# Patient Record
Sex: FEMALE | Race: WHITE | NOT HISPANIC OR LATINO | Employment: OTHER | URBAN - METROPOLITAN AREA
[De-identification: names, ages, dates, MRNs, and addresses within clinical notes are randomized per-mention and may not be internally consistent; named-entity substitution may affect disease eponyms.]

---

## 2017-01-13 ENCOUNTER — TELEPHONE (OUTPATIENT)
Dept: PHARMACY | Facility: CLINIC | Age: 82
End: 2017-01-13

## 2017-01-13 NOTE — TELEPHONE ENCOUNTER
We have been unable to reach this patient for MTM follow-up after several attempts. We will stop reaching out to the patient at this time. Please let us know if we can assist in this patient's care in the future.    Routing to PCP as Jasmyne OlivarezD, [facility]  Medication Therapy Management Provider  Pager: [phone]

## 2017-01-24 ENCOUNTER — CARE COORDINATION (OUTPATIENT)
Dept: CASE MANAGEMENT | Facility: CLINIC | Age: 82
End: 2017-01-24

## 2017-01-24 NOTE — PROGRESS NOTES
Clinic Care Coordination Contact  [facility]/Voicemail    Referral Source: Home Care Discharge    Clinical Data: Care Coordinator Outreach    Outreach attempted x 1.  Left message on voicemail with call back information and requested return call.    Plan:  Care Coordinator will try to reach patient again in 3-5 business days.      JUANITA Garcia, RN, PHN  FPA Care Coordinator  [phone]  January 24, 2017

## 2017-01-30 DIAGNOSIS — M19.079 DJD (DEGENERATIVE JOINT DISEASE), ANKLE AND FOOT, UNSPECIFIED LATERALITY: Primary | ICD-10-CM

## 2017-01-30 NOTE — TELEPHONE ENCOUNTER
TRAMADOL 50MG TABLETS      Last Written Prescription Date:  NA  Last Fill Quantity: NA,   # refills: NA  Last Office Visit with [facility], [facility] or [facility] prescribing provider: 12/20/2016  Future Office visit:       Routing refill request to provider for review/approval because:  Drug not active on patient's medication list

## 2017-01-31 RX ORDER — TRAMADOL HYDROCHLORIDE 50 MG/1
TABLET ORAL
Qty: 90 TABLET | Refills: 0 | Status: SHIPPED | OUTPATIENT
Start: 2017-01-31 | End: 2017-06-12

## 2017-01-31 NOTE — TELEPHONE ENCOUNTER
Routing refill request to provider for review/approval because:  Drug not active on patient's medication list    PCP: Last rx was on 11/30/2016 for 20 tabs with no refills  Med dc'd on 12/1/2016 during medication reconciliation    Medication pended. Pharmacy requesting 90 tabs    Please review. Thank you    Mabel Mejia RN

## 2017-02-01 NOTE — PROGRESS NOTES
Clinic Care Coordination Contact  Care Team Conversations    I spoke with Opal and she said she is doing okay. Her trip to [place] was good but it rained most of the time so they stayed in and played bridge.  She said she is still feeling tired but feels it is improving.  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  Headache is relieved with Tylenol. She is exercising daily and walking the halls.    She denies questions or concerns at this time.    Plan:  No further outreach indicated at this time.  No ongoing care coordination needs identified.  I encouraged Opal to call me if she has questions, concerns or needs further assistance.      Nadeen Beaver, BS, RN, PHN  A Care Coordinator  [phone]  February 1, 2017

## 2017-02-16 DIAGNOSIS — E78.2 MIXED HYPERLIPIDEMIA: ICD-10-CM

## 2017-02-17 RX ORDER — ATORVASTATIN CALCIUM 20 MG/1
TABLET, FILM COATED ORAL
Qty: 30 TABLET | Refills: 8 | Status: SHIPPED | OUTPATIENT
Start: 2017-02-17 | End: 2017-07-31

## 2017-02-17 NOTE — TELEPHONE ENCOUNTER
atorvastatin (LIPITOR) 20 MG tablet       Last Written Prescription Date: 7/26/2016  Last Fill Quantity: 90, # refills: 3  Last Office Visit with [facility], [facility] or [facility] prescribing provider: 12/20/2016       Lab Results   Component Value Date    CHOL 137 11/29/2016     Lab Results   Component Value Date    HDL 51 11/29/2016     Lab Results   Component Value Date    LDL 56 11/29/2016     Lab Results   Component Value Date    TRIG 152 11/29/2016     Lab Results   Component Value Date    CHOLHDLRATIO 2.8 08/11/2015

## 2017-02-17 NOTE — TELEPHONE ENCOUNTER
Prescription approved per [facility] Refill Protocol.  Romy Alvarez RN

## 2017-02-28 DIAGNOSIS — M81.0 OSTEOPOROSIS: ICD-10-CM

## 2017-02-28 NOTE — TELEPHONE ENCOUNTER
Pending Prescriptions:                       Disp   Refills    alendronate (FOSAMAX) 70 MG tablet        12 tab*3            Last Written Prescription Date: 12/23/16  Last Fill Quantity: 12, # refills: 3  Last Office Visit with [facility], [facility] or [facility] prescribing provider: 12/20/16       DEXA Scan:  Last order of DX HIP/PELVIS/SPINE was found on 8/10/2016 from Hospital Encounter on 8/10/2016     No order of DX HIP/PELVIS/SPINE W LAT FRACTION ANALYSIS is found.       Creatinine   Date Value Ref Range Status   11/30/2016 0.92 0.52 - 1.04 mg/dL Final

## 2017-03-02 RX ORDER — ALENDRONATE SODIUM 70 MG/1
TABLET ORAL
Qty: 12 TABLET | Refills: 1 | Status: SHIPPED | OUTPATIENT
Start: 2017-03-02 | End: 2017-03-13

## 2017-03-13 DIAGNOSIS — M81.0 OSTEOPOROSIS: ICD-10-CM

## 2017-03-13 RX ORDER — ALENDRONATE SODIUM 35 MG/1
35 TABLET ORAL
Qty: 12 TABLET | Refills: 3 | Status: SHIPPED | OUTPATIENT
Start: 2017-03-13 | End: 2018-02-16

## 2017-03-13 NOTE — TELEPHONE ENCOUNTER
Reason for Call:  Other prescription    Detailed comments: Opal Sin is calling to change the dosage on her alendronate (FOSAMAX) 70 MG tablet prescription.       Phone Number Patient can be reached at: Home number on file [phone] (home)    Best Time: ASAP     Can we leave a detailed message on this number? YES    Call taken on 3/13/2017 at 9:43 AM by Miguelina Wallis, Patient Representative

## 2017-03-13 NOTE — TELEPHONE ENCOUNTER
I called patient and spoke with her.   She is requesting Fosamax 35 mg tablets instead of the 70 mg tablets that were sent in.  Patient was switched to Fosamax 35 mg back in December; however, a new prescription was sent in 3/2/17 for 70 mg.   Patient states she has only been taking 35 mg Fosamax. Will route to PCP to verify that she is to be taking 35 mg versus 70 mg.  Please advise. I pended medication for 35 mg.     KT [address] Dr. 12/20/16 OV:  Assessment/Plan:  Patient Instructions   (M81.0) Osteoporosis  Comment: Discussed continuating of vitamin D and calcium. Also continue Evista. For treatment of osteoporosis with bisphosphonates we will add a second agent of fosamax again 35 mg weekly and we can follow up with Gay to ensure that dose is appropriate.  Plan: raloxifene (EVISTA) 60 MG tablet, alendronate   (FOSAMAX) 35 MG tablet        alendronate (FOSAMAX) 35 MG tablet 12 tablet 3 12/20/2016  No      Sig: Take 1 tablet (35 mg) by mouth every 7 days

## 2017-03-23 DIAGNOSIS — I10 BENIGN ESSENTIAL HYPERTENSION: ICD-10-CM

## 2017-03-23 RX ORDER — AMLODIPINE BESYLATE 5 MG/1
TABLET ORAL
Qty: 90 TABLET | Refills: 0 | OUTPATIENT
Start: 2017-03-23

## 2017-03-23 NOTE — TELEPHONE ENCOUNTER
Amlodipine      Last Written Prescription Date: 12/20/2016  Last Fill Quantity: 90, # refills: 3    Last Office Visit with [facility], [facility] or [facility] prescribing provider:  12/20/2016   Future Office Visit:        BP Readings from Last 3 Encounters:   12/20/16 135/80   12/07/16 132/65   12/01/16 147/80     Refills remaining.  Filled for 1 year 12/20/2016  Jory Corrigan RN  [employer]

## 2017-04-21 DIAGNOSIS — Z79.2 PREVENTIVE ANTIBIOTIC: Primary | ICD-10-CM

## 2017-04-21 RX ORDER — AZITHROMYCIN 500 MG/1
TABLET, FILM COATED ORAL
Qty: 1 TABLET | Refills: 0 | Status: SHIPPED | OUTPATIENT
Start: 2017-04-21 | End: 2017-05-03

## 2017-04-21 NOTE — TELEPHONE ENCOUNTER
Pending Prescriptions:                       Disp   Refills    azithromycin (ZITHROMAX) 500 MG tablet [Ph*1 tabl*0        Sig: TAKE 1 TABLET BY MOUTH ONCE FOR 1 DOSE          Last Written Prescription Date:  09/22/2015  Last Fill Quantity:3,   # refills: 0  Last Office Visit with [facility], [facility] or [facility] prescribing provider: 12/20/2016  Future Office visit:       Routing refill request to provider for review/approval because:  Drug not on the [facility], [facility] or  [facility] refill protocol or controlled substance medication is also NOT CURRENT on patients med list.

## 2017-05-03 DIAGNOSIS — Z79.2 PREVENTIVE ANTIBIOTIC: ICD-10-CM

## 2017-05-03 RX ORDER — AZITHROMYCIN 500 MG/1
TABLET, FILM COATED ORAL
Qty: 1 TABLET | Refills: 0 | Status: SHIPPED | OUTPATIENT
Start: 2017-05-03 | End: 2017-08-08

## 2017-05-03 NOTE — TELEPHONE ENCOUNTER
Reason for Call:  Medication or medication refill:    Do you use a [facility]?  Name of the pharmacy and phone number for the current request:         [facility] [id number] [place] [address] [address] AT [facility] [address]    Name of the medication requested: azithromycin (ZITHROMAX) 500 MG tablet    Other request: patient is having more dental work done on Wednesday, 5/10/17.  She is requesting a refill prior to her dental work.    Can we leave a detailed message on this number? YES    Phone number patient can be reached at: Home number on file [phone] (home)    Best Time: any time    Call taken on 5/3/2017 at 8:42 AM by Carlyn Murillo  .

## 2017-05-03 NOTE — TELEPHONE ENCOUNTER
Zithromax      Last Written Prescription Date: 04/21/17  Last Fill Quantity: 1 tablet,  # refills: 0   Last Office Visit with [facility], [facility] or [facility] prescribing provider: 12/20/16                                         Next 5 appointments (look out 90 days)     Jul 28, 2017 10:00 AM CDT   [facility] Russ [facility]   [facility]    [address] [address]   [phone]                  Mary Aggarwal MA

## 2017-05-18 ENCOUNTER — TRANSFERRED RECORDS (OUTPATIENT)
Dept: HEALTH INFORMATION MANAGEMENT | Facility: CLINIC | Age: 82
End: 2017-05-18

## 2017-06-12 DIAGNOSIS — M19.079 DJD (DEGENERATIVE JOINT DISEASE), ANKLE AND FOOT, UNSPECIFIED LATERALITY: ICD-10-CM

## 2017-06-12 NOTE — TELEPHONE ENCOUNTER
Controlled Substance Refill Request for traMADol (ULTRAM) 50 MG tablet    Problem List Complete:  Yes    Last Written Prescription Date:  1/31/2017  Last Fill Quantity: 90,   # refills: 0    Last Office Visit with [facility] primary care provider: 12/20/2016    Clinic visit frequency required: Q 6  months     Future Office visit:   Next 5 appointments (look out 90 days)     Jul 28, 2017 10:00 AM CDT   PHYSICAL with Damian Russ MD   [facility]    [address] [id number] [phone]                  Controlled substance agreement on file: No.     [facility]  [facility]   checked in past 6 months?  No, route to RN

## 2017-06-13 RX ORDER — TRAMADOL HYDROCHLORIDE 50 MG/1
TABLET ORAL
Qty: 90 TABLET | Refills: 0 | Status: SHIPPED | OUTPATIENT
Start: 2017-06-13 | End: 2017-07-31

## 2017-07-14 ENCOUNTER — DOCUMENTATION ONLY (OUTPATIENT)
Dept: LAB | Facility: CLINIC | Age: 82
End: 2017-07-14

## 2017-07-14 DIAGNOSIS — Z00.00 ROUTINE GENERAL MEDICAL EXAMINATION AT A HEALTH CARE FACILITY: Primary | ICD-10-CM

## 2017-07-14 NOTE — PROGRESS NOTES
Please review, associate diagnosis, and sign pending pre physical lab orders for patient's upcoming 7/21/17 lab appointment.     Thank you,  Lab

## 2017-07-26 ENCOUNTER — TELEPHONE (OUTPATIENT)
Dept: FAMILY MEDICINE | Facility: CLINIC | Age: 82
End: 2017-07-26

## 2017-07-26 DIAGNOSIS — Z00.00 ROUTINE GENERAL MEDICAL EXAMINATION AT A HEALTH CARE FACILITY: ICD-10-CM

## 2017-07-26 LAB
AMPHETAMINES UR QL: NORMAL NG/ML
ANION GAP SERPL CALCULATED.3IONS-SCNC: 9 MMOL/L (ref 3–14)
BARBITURATES UR QL SCN: NORMAL NG/ML
BENZODIAZ UR QL SCN: NORMAL NG/ML
BUN SERPL-MCNC: 22 MG/DL (ref 7–30)
BUPRENORPHINE UR QL: NORMAL NG/ML
CALCIUM SERPL-MCNC: 8.8 MG/DL (ref 8.5–10.1)
CANNABINOIDS UR QL: NORMAL NG/ML
CHLORIDE SERPL-SCNC: 105 MMOL/L (ref 94–109)
CHOLEST SERPL-MCNC: 134 MG/DL
CO2 SERPL-SCNC: 22 MMOL/L (ref 20–32)
COCAINE UR QL SCN: NORMAL NG/ML
CREAT SERPL-MCNC: 0.91 MG/DL (ref 0.52–1.04)
CREAT UR-MCNC: 114 MG/DL
D-METHAMPHET UR QL: NORMAL NG/ML
GFR SERPL CREATININE-BSD FRML MDRD: 59 ML/MIN/1.7M2
GLUCOSE SERPL-MCNC: 92 MG/DL (ref 70–99)
HDLC SERPL-MCNC: 65 MG/DL
LDLC SERPL CALC-MCNC: 53 MG/DL
METHADONE UR QL SCN: NORMAL NG/ML
MICROALBUMIN UR-MCNC: 503 MG/L
MICROALBUMIN/CREAT UR: 441.23 MG/G CR (ref 0–25)
NONHDLC SERPL-MCNC: 69 MG/DL
OPIATES UR QL SCN: NORMAL NG/ML
OXYCODONE UR QL SCN: NORMAL NG/ML
PCP UR QL SCN: NORMAL NG/ML
POTASSIUM SERPL-SCNC: 4.2 MMOL/L (ref 3.4–5.3)
PROPOXYPH UR QL: NORMAL NG/ML
SODIUM SERPL-SCNC: 136 MMOL/L (ref 133–144)
TRICYCLICS UR QL SCN: NORMAL NG/ML
TRIGL SERPL-MCNC: 81 MG/DL

## 2017-07-26 PROCEDURE — 36415 COLL VENOUS BLD VENIPUNCTURE: CPT | Performed by: NURSE PRACTITIONER

## 2017-07-26 PROCEDURE — 80061 LIPID PANEL: CPT | Performed by: NURSE PRACTITIONER

## 2017-07-26 PROCEDURE — 80048 BASIC METABOLIC PNL TOTAL CA: CPT | Performed by: NURSE PRACTITIONER

## 2017-07-26 PROCEDURE — 82043 UR ALBUMIN QUANTITATIVE: CPT | Performed by: NURSE PRACTITIONER

## 2017-07-26 PROCEDURE — 80306 DRUG TEST PRSMV INSTRMNT: CPT | Performed by: NURSE PRACTITIONER

## 2017-07-26 NOTE — TELEPHONE ENCOUNTER
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  Itched yesterday but woke up this morning with 14 raised red bumps.   Itched at first but now fluid filled pustules.   Some pain.  Tried Cortisone cream.  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  Please advise.  I did recommended UC this evening.  Betina Lott, RN

## 2017-07-26 NOTE — TELEPHONE ENCOUNTER
Reason for Call:  Other was in today    Detailed comments: had blood work and noticed she has  14 little red spots on breast. Today they are pustles and  She thinks they are shingles. They itch and hurt.    Phone Number Patient can be reached at: Home number on file [phone] (home)    Best Time: any    Can we leave a detailed message on this number? YES    Call taken on 7/26/2017 at 4:40 PM by Violet Garcia

## 2017-07-27 ENCOUNTER — OFFICE VISIT (OUTPATIENT)
Dept: FAMILY MEDICINE | Facility: CLINIC | Age: 82
End: 2017-07-27
Payer: COMMERCIAL

## 2017-07-27 VITALS
BODY MASS INDEX: 24.29 KG/M2 | SYSTOLIC BLOOD PRESSURE: 139 MMHG | HEART RATE: 59 BPM | HEIGHT: 62 IN | WEIGHT: 132 LBS | DIASTOLIC BLOOD PRESSURE: 75 MMHG | OXYGEN SATURATION: 97 %

## 2017-07-27 DIAGNOSIS — B02.9 HERPES ZOSTER WITHOUT COMPLICATION: Primary | ICD-10-CM

## 2017-07-27 PROCEDURE — 99213 OFFICE O/P EST LOW 20 MIN: CPT | Performed by: NURSE PRACTITIONER

## 2017-07-27 RX ORDER — VALACYCLOVIR HYDROCHLORIDE 1 G/1
1000 TABLET, FILM COATED ORAL 3 TIMES DAILY
Qty: 21 TABLET | Refills: 0 | Status: SHIPPED | OUTPATIENT
Start: 2017-07-27 | End: 2017-08-08

## 2017-07-27 NOTE — TELEPHONE ENCOUNTER
Its a bit difficult to treat over the phone - I would suggest a team office visit today if it is at all possible for her to be seen.    Damian Russ MD

## 2017-07-27 NOTE — TELEPHONE ENCOUNTER
I have scheduled patient with JESSIE Campo at 11:00 am today.  She is appreciative.  Marge Nolasco RN

## 2017-07-27 NOTE — MR AVS SNAPSHOT
After Visit Summary   7/27/2017    Opal Sin    MRN: [id number]           Patient Information     Date Of Birth          1935        Visit Information        Provider Department      7/27/2017 11:00 AM Aurora Robledo APRN [facility]        Today's Diagnoses     Herpes zoster without complication    -  1      Care Instructions    Take the valtrex as prescribed     Shingles  Shingles is a viral infection caused by the same virus as chicken pox. Anyone who has had chicken pox may get shingles later in life. The virus stays in the body, but remains dormant (asleep). Shingles often occurs in older persons or persons with lowered immunity. But it can affect anyone at any age.  Shingles starts as a tingling patch of skin on one side of the body. Small, painful blisters may then appear. The rash does not spread to the rest of the body.  Exposure to shingles cannot cause shingles. However, it can cause chicken pox in anyone who has not had chicken pox or has not been vaccinated. The contagious period ends when all blisters have crusted over (generally about 2 weeks after the illness begins).  After the blisters heal, the affected skin may be sensitive or painful for months (neuralgia). This often gradually goes away.  A shingles vaccine is available. This can help prevent shingles or make it less painful. It is generally recommended for adults over the age of 60 who have had chicken pox in the past, but who have never had shingles. Adults over 60 who have had neither chicken pox nor shingles can prevent both diseases with the chicken pox vaccine. Ask your healthcare provider about these vaccines.  Home care    Medicines may be prescribed to help relieve pain. Take these medicines as directed. Ask your healthcare provider or pharmacist before using over-the-counter medicines for helping treat pain and itching.    In certain cases, antiviral medicines may be prescribed to reduce  pain, shorten the illness, and prevent neuralgia. Take these medicines as directed.    Compresses made from a solution of cool water mixed with cornstarch or baking soda may help relieve pain and itching.     Gently wash skin daily with soap and water to help prevent infection.  Be certain to rinse off all of the soap, which can be irritating.    Trim fingernails and try not to scratch. Scratching the sores may leave scars.    Stay home from work or school until all blisters have formed a crust and you are no longer contagious.  Follow-up care  Follow up with your healthcare provider or as directed by our staff.  When to seek medical advice    Fever of 100.4 F (38 C) or higher, or as directed by your healthcare provider    Affected skin is on the face or neck and any of the following occur:    Headache    Eye pain    Changes in vision    Sores near the eye    Weakness of facial muscles    Pain, redness, or swelling of a joint    Signs of skin infection: colored drainage from the sores, warmth, increasing redness, or increasing pain  Date Last Reviewed: 9/25/2015 2000-2017 [employer]. [address] [place] [address]. All rights reserved. This information is not intended as a substitute for professional medical care. Always follow your healthcare professional's instructions.        Shingles (Herpes Zoster)     Talk to your healthcare provider about the shingles vaccine.     Shingles is also called herpes zoster. It is a painful skin rash caused by the herpes zoster virus. This is the same virus that causes chickenpox. After a person has chickenpox, the virus remains inactive in the nerve cells. Years later, the virus can become active again and travel to the skin. Most people have shingles only once, but it is possible to have it more than once.  What are the risk factors for shingles?  Anyone who has ever had chickenpox can develop shingles. But your risk is greater if you:    Are 50 years  of age or older    Have an illness that weakens your immune system, such as HIV/AIDS    Have cancer, especially Hodgkin disease or lymphoma    Take medicines that weaken your immune system  What are the symptoms of shingles?    The first sign of shingles is usually pain, burning, tingling, or itching on one part of your face or body. You may also feel as if you have the flu, with fever and chills.    A red rash with small blisters appears within a few days. The rash may appear as follows:     The blisters can occur anywhere, but they re most common on the back, chest, or abdomen.    They usually appear on only one side of the body, spreading along the nerve pathway where the virus was inactive.     The rash can also form around an eye, along one side of the face or neck, or in the mouth.    In a few people, usually those with weakened immune systems, shingles appear on more than one part of the body at once.    After a few days, the blisters become dry and form a crust. The crust falls off in days to weeks. The blisters generally do not leave scars.  How is shingles treated?  For most people, shingles heals on its own in a few weeks. But treatment is recommended to help relieve pain, speed healing, and reduce the risk of complications. Antiviral medicines are prescribed within the first 72 hours of the appearance of the rash. To lessen symptoms:    Apply ice packs (wrapped in a thin towel) or cool compresses, or soak in a cool bath.    Use calamine lotion to calm itchy skin.    Ask your healthcare provider about over-the-counter pain relievers. If your pain is severe, your healthcare provider may prescribe stronger pain medicines.  What are the complications of shingles?  Shingles often goes away with no lasting effects. But some people have serious problems long after the blisters have healed:    Postherpetic neuralgia. This is the most common complication. It is severe nerve pain at the place where the rash used  to be. It can last for months, or even years after you have had shingles. Medicines can be prescribed to help relieve the pain and improve quality of life.    Bacterial infection. Shingles blisters may become infected with bacteria. Antibiotic medicine is used to treat the infection.    Eye problems. A person with shingles on the face should see his or her healthcare provider right away. Shingles can cause serious problems with vision, and even blindness.  Very rarely shingles can also lead to pneumonia, hearing problems, brain inflammation, or even death.   When to seek medical care  Contact your healthcare provider if you experience any of the following:    Symptoms that don t go away with treatment    A rash or blisters near your eye    Increased drainage, fever, or rash after treatment, or severe pain that doesn t go away   How can shingles be prevented?  You can only get shingles if you have had chicken pox in the past. Those who have never had chickenpox can get the virus from you. Although instead of developing shingles, the person may get chickenpox. Until your blisters form scabs, avoid contact with others, especially the following:    Pregnant women who have never had chickenpox or the vaccine    Infants who were born early (prematurely) or who had low weight at birth    People with weak immune system (for example, people receiving chemotherapy for cancer, people who have had organ transplants, or people with HIV infections)     The shingles vaccine  If you re 60 years of age or older, ask your healthcare provider if you should receive the shingles vaccine. The vaccine makes it less likely that you will develop shingles. If you do develop shingles, your symptoms will likely be milder than if you hadn t been vaccinated. Note: Although the vaccine is licensed for people 50 years of age or older, the CDC does not recommend the vaccine for those who are 50 to 59 years old.   Date Last Reviewed: 10/1/2016     "9479-9568 [employer]. [address] [place] [address]. All rights reserved. This information is not intended as a substitute for professional medical care. Always follow your healthcare professional's instructions.                Follow-ups after your visit        Your next 10 appointments already scheduled     Jul 31, 2017  9:30 AM CDT   [facility] Russ MD   [facility]    [address] [facility] [address] [phone]              Who to contact     If you have questions or need follow up information about today's clinic visit or your schedule please contact [facility] directly at [phone].  Normal or non-critical lab and imaging results will be communicated to you by [employer], letter or phone within 4 business days after the clinic has received the results. If you do not hear from us within 7 days, please contact the clinic through [employer] or phone. If you have a critical or abnormal lab result, we will notify you by phone as soon as possible.  Submit refill requests through [employer] or call your pharmacy and they will forward the refill request to us. Please allow 3 business days for your refill to be completed.          Additional Information About Your Visit        [employer] Information     [employer] gives you secure access to your electronic health record. If you see a primary care provider, you can also send messages to your care team and make appointments. If you have questions, please call your primary care clinic.  If you do not have a primary care provider, please call [phone] and they will assist you.        Care EveryWhere ID     This is your Care EveryWhere ID. This could be used by other organizations to access your [facility] medical records  [id number]        Your Vitals Were     Pulse Height Pulse Oximetry Breastfeeding? BMI (Body Mass Index)       59 5' 2\" (1.575 m) 97% No 24.14 kg/m2     "    Blood Pressure from Last 3 Encounters:   07/27/17 139/75   12/20/16 135/80   12/07/16 132/65    Weight from Last 3 Encounters:   07/27/17 132 lb (59.9 kg)   12/20/16 135 lb 9.6 oz (61.5 kg)   12/07/16 135 lb (61.2 kg)              Today, you had the following     No orders found for display         Today's Medication Changes          These changes are accurate as of: 7/27/17 11:18 AM.  If you have any questions, ask your nurse or doctor.               Start taking these medicines.        Dose/Directions    valACYclovir 1000 mg tablet   Commonly known as:  VALTREX   Used for:  Herpes zoster without complication   Started by:  Aurora Robledo APRN CNP        Dose:  1000 mg   Take 1 tablet (1,000 mg) by mouth 3 times daily   Quantity:  21 tablet   Refills:  0            Where to get your medicines      These medications were sent to [facility], [place] - [address], [address]  [address], [address], [facility] [address]     Phone:  [phone]     valACYclovir 1000 mg tablet                Primary Care Provider Office Phone # Fax #    Damian Russ [phone]       [facility] [address]  [facility] [address]        Equal Access to Services     SANG SARAVIA AH: Hadii aad ku hadasho Soomaali, waaxda luqadaha, qaybta kaalmada adeegyada, juan pablo cortez haythad palacio. So [facility] [phone].    ATENCIÓN: Si habla español, tiene a rangel disposición servicios gratuitos de asistencia lingüística. Llame al [phone].    We comply with applicable federal civil rights laws and [place] laws. We do not discriminate on the basis of race, color, national origin, age, disability sex, sexual orientation or gender identity.            Thank you!     Thank you for choosing [facility]  for your care. Our goal is always to provide you with excellent care. Hearing back from our patients is one way we can continue to improve our  services. Please take a few minutes to complete the written survey that you may receive in the mail after your visit with us. Thank you!             Your Updated Medication List - Protect others around you: Learn how to safely use, store and throw away your medicines at www.disposemymeds.org.          This list is accurate as of: 7/27/17 11:18 AM.  Always use your most recent med list.                   Brand Name Dispense Instructions for use Diagnosis    acetaminophen 325 MG tablet    TYLENOL     Take 650 mg by mouth every 4 hours as needed for mild pain        acetaminophen-codeine 300-30 MG per tablet    TYLENOL #3    18 tablet    Take 1 tablet by mouth every 4 hours as needed for pain maximum 2 tablet(s) per day    Hip pain, left       alendronate 35 MG tablet    FOSAMAX    12 tablet    Take 1 tablet (35 mg) by mouth every 7 days    Osteoporosis       amLODIPine 5 MG tablet    NORVASC    90 tablet    Take 1 tablet (5 mg) by mouth daily    Benign essential hypertension       aspirin 81 MG EC tablet      Take 1 tablet (81 mg) by mouth daily    Transient cerebral ischemia, unspecified type       atorvastatin 20 MG tablet    LIPITOR    30 tablet    TAKE 1 TABLET BY MOUTH EVERY DAY    Mixed hyperlipidemia       azithromycin 500 MG tablet    ZITHROMAX    1 tablet    TAKE 1 TABLET BY MOUTH ONCE FOR 1 DOSE    Preventive antibiotic       Calcium-Magnesium 300-300 MG Tabs      Take 1 tablet by mouth daily        carbidopa-levodopa  MG per tablet    SINEMET     Take 1 tablet by mouth 4 times daily        carvedilol 6.25 MG tablet    COREG    180 tablet    Take 1 tablet (6.25 mg) by mouth 2 times daily (with meals)    Essential hypertension with goal blood pressure less than 140/90       losartan 50 MG tablet    COZAAR    90 tablet    Take 1 tablet (50 mg) by mouth daily    Essential hypertension with goal blood pressure less than 140/90       raloxifene 60 MG tablet    Evista    90 tablet    Take 1 tablet (60 mg) by  mouth daily    Osteoporosis       sodium chloride 0.65 % nasal spray    OCEAN     Spray 1 spray into both nostrils every 4 hours (while awake)        traMADol 50 MG tablet    ULTRAM    90 tablet    TAKE ONE TABLET BY MOUTH EVERY 6 HOURS AS NEEDED    DJD (degenerative joint disease), ankle and foot, unspecified laterality       valACYclovir 1000 mg tablet    VALTREX    21 tablet    Take 1 tablet (1,000 mg) by mouth 3 times daily    Herpes zoster without complication       VITAMIN B 12 PO      Take 100 mcg by mouth daily        VITAMIN D3 PO      Take 400 Units by mouth 2 times daily

## 2017-07-28 NOTE — PROGRESS NOTES
Cristhian Joseph,    I had the opportunity to review your recent labs and a summary of your labs reads as follows:  Your basic metabolic panel shows stable renal function  Your fasting lipid panel show  - normal HDL (good) cholesterol -as your goal is greater than 40  - low LDL (bad) cholesterol as your goal is less than 100  - normal triglyceride levels        Sincerely,  Damian Russ MD

## 2017-07-28 NOTE — PROGRESS NOTES
SUBJECTIVE:   Opal Sin is a 82 year old female who presents for Preventive Visit.      Are you in the first 12 months of your Medicare Part B coverage?  No    Healthy Habits:    Do you get at least three servings of calcium containing foods daily (dairy, green leafy vegetables, etc.)? yes    Amount of exercise or daily activities, outside of work: 5 day(s) per week- pt walks everyday    Problems taking medications regularly No    Medication side effects: No    Have you had an eye exam in the past two years? yes    Do you see a dentist twice per year? yes    Do you have sleep apnea, excessive snoring or daytime drowsiness?yes- sides effects from her parkinsons    COGNITIVE SCREEN  1) Repeat 3 items (Banana, Sunrise, Chair)    2) Clock draw: NORMAL  3) 3 item recall: Recalls 3 objects  Results: 3 items recalled: COGNITIVE IMPAIRMENT LESS LIKELY    Mini[employer]CogTM Copyright S My. Licensed by the author for use in [facility]; reprinted with permission ([email or web address]). All rights reserved.                Reviewed and updated as needed this visit by clinical staff         Reviewed and updated as needed this visit by Provider      Social History   Substance Use Topics     Smoking status: Never Smoker     Smokeless tobacco: Never Used     Alcohol use 0.0 oz/week     0 Standard drinks or equivalent per week      Comment: rare         Today's PHQ-2 Score:   PHQ-2 ( 1999 Pfizer) 7/27/2017 8/22/2016   Q1: Little interest or pleasure in doing things 0 0   Q2: Feeling down, depressed or hopeless 0 0   PHQ-2 Score 0 0   Q1: Little interest or pleasure in doing things - -   Q2: Feeling down, depressed or hopeless - -   PHQ-2 Score - -         Do you feel safe in your environment -yes    Do you have a Health Care Directive?: Yes: Advance Directive has been received and scanned.    Current providers sharing in care for this patient include:   Patient Care Team:  Damian Russ MD as PCP - General  (Internal Medicine)  Nadeen Beaver, RN as       Hearing impairment: Yes, wears hearing aids    Ability to successfully perform activities of daily living: Yes, no assistance needed     Fall risk:         Home safety:  none identified      The following health maintenance items are reviewed in Epic and correct as of today:Health Maintenance   Topic Date Due     COLONOSCOPY Q10 YR  05/09/1955     ASTHMA CONTROL TEST Q6 MOS  01/26/2017     PHQ-9 Q6 MONTHS  06/20/2017     FALL RISK ASSESSMENT  07/26/2017     MAMMO Q1 YR  08/10/2017     INFLUENZA VACCINE (SYSTEM ASSIGNED)  09/01/2017     HEMOGLOBIN Q1 YR  11/30/2017     ASTHMA ACTION PLAN Q1 YR  12/20/2017     FLORINDA QUESTIONNAIRE 1 YEAR  12/20/2017     DEPRESSION ACTION PLAN Q1 YR  12/20/2017     BMP Q6 MOS  01/26/2018     LIPID MONITORING Q1 YEAR  07/26/2018     MICROALBUMIN Q1 YEAR  07/26/2018     URINE DRUG SCREEN Q1 YR  07/26/2018     DEXA Q2 YR  08/10/2018     ADVANCE DIRECTIVE PLANNING Q5 YRS  11/11/2019     TETANUS IMMUNIZATION (SYSTEM ASSIGNED)  09/25/2022     PNEUMOCOCCAL  Completed        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  She is contemplating this still.  For pain medications she has been taking tramadol.  Essential hypertension with goal blood pressure less than 140/90   Blood pressure is well controlled with carvedilol, amlodipine and losartan  Malignant neoplasm of left female breast, unspecified estrogen receptor status, unspecified site of breast (H)   Due for follow mammogram   Cerebral aneurysm, nonruptured   Has had follow up in neurology - no issues - goal blood pressure 130/80  CKD (chronic kidney disease) stage 3, GFR 30-59 ml/min   Labs recently rechecked and stable  Gastroesophageal reflux disease without esophagitis   Doing well with omeprazole  Hyponatremia    Last sodium recheck was within normal limits  "      ROS:  Constitutional, HEENT, cardiovascular, pulmonary, GI, , musculoskeletal, neuro, skin, endocrine and psych systems are negative, except as otherwise noted.      OBJECTIVE:   /84 Estimated body mass index is 24.14 kg/(m^2) as calculated from the following:    Height as of 7/27/17: 5' 2\" (1.575 m).    Weight as of 7/27/17: 132 lb (59.9 kg).  EXAM:   GENERAL: healthy, alert and no distress  EYES: Eyes grossly normal to inspection, PERRL and conjunctivae and sclerae normal  HENT: ear canals and TM's normal, nose and mouth without ulcers or lesions  NECK: no adenopathy, no asymmetry, masses, or scars and thyroid normal to palpation  RESP: lungs clear to auscultation - no rales, rhonchi or wheezes  CV: regular rate and rhythm, normal S1 S2, no S3 or S4, no murmur, click or rub, no peripheral edema an  ABDOMEN: soft, nontender, no hepatosplenomegaly, no masses and bowel sounds normal  MS: no gross musculoskeletal defects noted, no edema  SKIN: rash on right breast  NEURO: tremor noted, gait was antalgic - using a cane  PSYCH: mentation appears normal, affect normal/bright    ASSESSMENT / PLAN:     (I10) Benign essential hypertension  (primary encounter diagnosis)  Comment: blood pressure is well controlled with current medications carvedilol, losartan and amlodipine  Plan:     (M19.439) DJD (degenerative joint disease), ankle and foot, unspecified laterality  Comment:  For foot ankle and hip pain I would recommend continuing the tramadol.  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  Comment: aneurysm has been coiled - follow up with interventional radiology   Plan:     (N18.3) CKD (chronic kidney disease) stage 3, GFR 30-59 ml/min  Comment: labs were reviewed and all within normal limits   Plan:     (I10) Essential hypertension, benign  Comment: blood pressure excellent  Plan:     (K21.9) Gastroesophageal reflux disease without esophagitis  Comment: continue on ranitidine  Plan:     (E87.1) Hyponatremia  Comment: sodium rechecked and within normal limits   Plan:     (G20) Parkinson's disease (H)  Comment: Doing well with current dose of sinemet  Plan:            End of Life Planning:  Patient currently has an advanced directive: DNR/DNI.  Practitioner is supportive of decision.    COUNSELING:        Estimated body mass index is 24.14 kg/(m^2) as calculated from the following:    Height as of 7/27/17: 5' 2\" (1.575 m).    Weight as of 7/27/17: 132 lb (59.9 kg).     reports that she has never smoked. She has never used smokeless tobacco.        Appropriate preventive services were discussed with this patient, including applicable screening as appropriate for cardiovascular disease, diabetes, osteopenia/osteoporosis, and glaucoma.  As appropriate for age/gender, discussed screening for colorectal cancer, prostate cancer, breast cancer, and cervical cancer. Checklist reviewing preventive services available has been given to the patient.    Reviewed patients plan of care and provided an AVS. The Basic Care Plan (routine screening as documented in Health Maintenance) for Opal meets the Care Plan requirement. This Care Plan has been established and reviewed with the Patient.    Counseling Resources:  ATP IV Guidelines  Pooled Cohorts Equation Calculator  Breast Cancer Risk Calculator  FRAX Risk Assessment  ICSI Preventive Guidelines  Dietary Guidelines for Americans, 2010  USDA's MyPlate  ASA Prophylaxis  Lung CA " Screening    Damian Russ MD, MD  [facility]

## 2017-07-28 NOTE — PATIENT INSTRUCTIONS
Preventive Health Recommendations    Female Ages 65 +    Yearly exam:     See your health care provider every year in order to  o Review health changes.   o Discuss preventive care.    o Review your medicines if your doctor has prescribed any.      You no longer need a yearly Pap test unless you've had an abnormal Pap test in the past 10 years. If you have vaginal symptoms, such as bleeding or discharge, be sure to talk with your provider about a Pap test.      Every 1 to 2 years, have a mammogram.  If you are over 69, talk with your health care provider about whether or not you want to continue having screening mammograms.      Every 10 years, have a colonoscopy. Or, have a yearly FIT test (stool test). These exams will check for colon cancer.       Have a cholesterol test every 5 years, or more often if your doctor advises it.       Have a diabetes test (fasting glucose) every three years. If you are at risk for diabetes, you should have this test more often.       At age 65, have a bone density scan (DEXA) to check for osteoporosis (brittle bone disease).    Shots:    Get a flu shot each year.    Get a tetanus shot every 10 years.    Talk to your doctor about your pneumonia vaccines. There are now two you should receive - Pneumovax (PPSV 23) and Prevnar (PCV 13).    Talk to your doctor about the shingles vaccine.    Talk to your doctor about the hepatitis B vaccine.    Nutrition:     Eat at least 5 servings of fruits and vegetables each day.      Eat whole-grain bread, whole-wheat pasta and brown rice instead of white grains and rice.      Talk to your provider about Calcium and Vitamin D.     Lifestyle    Exercise at least 150 minutes a week (30 minutes a day, 5 days a week). This will help you control your weight and prevent disease.      Limit alcohol to one drink per day.      No smoking.       Wear sunscreen to prevent skin cancer.       See your dentist twice a year for an exam and cleaning.      See your  eye doctor every 1 to 2 years to screen for conditions such as glaucoma, macular degeneration and cataracts.    (I10) Benign essential hypertension  (primary encounter diagnosis)  Comment: blood pressure is well controlled with current medications carvedilol, losartan and amlodipine  Plan:     (M19.079) DJD (degenerative joint disease), ankle and foot, unspecified laterality  Comment:  For foot ankle and hip pain I would recommend continuing the tramadol.  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

## 2017-07-31 ENCOUNTER — OFFICE VISIT (OUTPATIENT)
Dept: FAMILY MEDICINE | Facility: CLINIC | Age: 82
End: 2017-07-31
Payer: COMMERCIAL

## 2017-07-31 VITALS — DIASTOLIC BLOOD PRESSURE: 84 MMHG | SYSTOLIC BLOOD PRESSURE: 128 MMHG

## 2017-07-31 DIAGNOSIS — M19.079 DJD (DEGENERATIVE JOINT DISEASE), ANKLE AND FOOT, UNSPECIFIED LATERALITY: ICD-10-CM

## 2017-07-31 DIAGNOSIS — Z00.00 ROUTINE GENERAL MEDICAL EXAMINATION AT A HEALTH CARE FACILITY: Primary | ICD-10-CM

## 2017-07-31 DIAGNOSIS — N18.30 CKD (CHRONIC KIDNEY DISEASE) STAGE 3, GFR 30-59 ML/MIN (H): ICD-10-CM

## 2017-07-31 DIAGNOSIS — E87.1 HYPONATREMIA: ICD-10-CM

## 2017-07-31 DIAGNOSIS — I10 ESSENTIAL HYPERTENSION WITH GOAL BLOOD PRESSURE LESS THAN 140/90: ICD-10-CM

## 2017-07-31 DIAGNOSIS — I10 BENIGN ESSENTIAL HYPERTENSION: ICD-10-CM

## 2017-07-31 DIAGNOSIS — K21.9 GASTROESOPHAGEAL REFLUX DISEASE WITHOUT ESOPHAGITIS: ICD-10-CM

## 2017-07-31 DIAGNOSIS — I67.1 CEREBRAL ANEURYSM, NONRUPTURED: ICD-10-CM

## 2017-07-31 DIAGNOSIS — I10 ESSENTIAL HYPERTENSION, BENIGN: ICD-10-CM

## 2017-07-31 DIAGNOSIS — E78.2 MIXED HYPERLIPIDEMIA: ICD-10-CM

## 2017-07-31 DIAGNOSIS — C50.912 MALIGNANT NEOPLASM OF LEFT FEMALE BREAST, UNSPECIFIED ESTROGEN RECEPTOR STATUS, UNSPECIFIED SITE OF BREAST (H): ICD-10-CM

## 2017-07-31 DIAGNOSIS — G20.A1 PARKINSON'S DISEASE (H): ICD-10-CM

## 2017-07-31 DIAGNOSIS — I67.1 CEREBRAL ANEURYSM: ICD-10-CM

## 2017-07-31 PROCEDURE — 99397 PER PM REEVAL EST PAT 65+ YR: CPT | Performed by: INTERNAL MEDICINE

## 2017-07-31 RX ORDER — LOSARTAN POTASSIUM 50 MG/1
50 TABLET ORAL DAILY
Qty: 90 TABLET | Refills: 3 | Status: SHIPPED | OUTPATIENT
Start: 2017-07-31 | End: 2018-09-08

## 2017-07-31 RX ORDER — ATORVASTATIN CALCIUM 20 MG/1
20 TABLET, FILM COATED ORAL DAILY
Qty: 90 TABLET | Refills: 3 | Status: SHIPPED | OUTPATIENT
Start: 2017-07-31 | End: 2018-08-21

## 2017-07-31 RX ORDER — TRAMADOL HYDROCHLORIDE 50 MG/1
50 TABLET ORAL EVERY 6 HOURS PRN
Qty: 90 TABLET | Refills: 0 | Status: ON HOLD | OUTPATIENT
Start: 2017-07-31 | End: 2018-06-29

## 2017-07-31 RX ORDER — CARVEDILOL 6.25 MG/1
6.25 TABLET ORAL 2 TIMES DAILY WITH MEALS
Qty: 180 TABLET | Refills: 3 | Status: SHIPPED | OUTPATIENT
Start: 2017-07-31 | End: 2018-08-26

## 2017-07-31 NOTE — MR AVS SNAPSHOT
After Visit Summary   7/31/2017    Opal Sin    MRN: [id number]           Patient Information     Date Of Birth          1935        Visit Information        Provider Department      7/31/2017 9:30 AM Damian Russ MD [facility]        Today's Diagnoses     Benign essential hypertension    -  1    DJD (degenerative joint disease), ankle and foot, unspecified laterality        Essential hypertension with goal blood pressure less than 140/90        Malignant neoplasm of left female breast, unspecified estrogen receptor status, unspecified site of breast (H)        Carotid aneurysm non ruptured        CKD (chronic kidney disease) stage 3, GFR 30-59 ml/min        Essential hypertension, benign        Gastroesophageal reflux disease without esophagitis        Hyponatremia        Parkinson's disease (H)        Cerebral aneurysm          Care Instructions      Preventive Health Recommendations    Female Ages 65 +    Yearly exam:     See your health care provider every year in order to  o Review health changes.   o Discuss preventive care.    o Review your medicines if your doctor has prescribed any.      You no longer need a yearly Pap test unless you've had an abnormal Pap test in the past 10 years. If you have vaginal symptoms, such as bleeding or discharge, be sure to talk with your provider about a Pap test.      Every 1 to 2 years, have a mammogram.  If you are over 69, talk with your health care provider about whether or not you want to continue having screening mammograms.      Every 10 years, have a colonoscopy. Or, have a yearly FIT test (stool test). These exams will check for colon cancer.       Have a cholesterol test every 5 years, or more often if your doctor advises it.       Have a diabetes test (fasting glucose) every three years. If you are at risk for diabetes, you should have this test more often.       At age 65, have a bone density scan (DEXA) to  check for osteoporosis (brittle bone disease).    Shots:    Get a flu shot each year.    Get a tetanus shot every 10 years.    Talk to your doctor about your pneumonia vaccines. There are now two you should receive - Pneumovax (PPSV 23) and Prevnar (PCV 13).    Talk to your doctor about the shingles vaccine.    Talk to your doctor about the hepatitis B vaccine.    Nutrition:     Eat at least 5 servings of fruits and vegetables each day.      Eat whole-grain bread, whole-wheat pasta and brown rice instead of white grains and rice.      Talk to your provider about Calcium and Vitamin D.     Lifestyle    Exercise at least 150 minutes a week (30 minutes a day, 5 days a week). This will help you control your weight and prevent disease.      Limit alcohol to one drink per day.      No smoking.       Wear sunscreen to prevent skin cancer.       See your dentist twice a year for an exam and cleaning.      See your eye doctor every 1 to 2 years to screen for conditions such as glaucoma, macular degeneration and cataracts.    (I10) Benign essential hypertension  (primary encounter diagnosis)  Comment: blood pressure is well controlled with current medications carvedilol, losartan and amlodipine  Plan:     (M19.079) DJD (degenerative joint disease), ankle and foot, unspecified laterality  Comment:  For foot ankle and hip pain I would recommend continuing the tramadol.  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  Comment: aneurysm  has been coiled - follow up with interventional radiology   Plan:     (N18.3) CKD (chronic kidney disease) stage 3, GFR 30-59 ml/min  Comment: labs were reviewed and all within normal limits   Plan:     (I10) Essential hypertension, benign  Comment: blood pressure excellent  Plan:     (K21.9) Gastroesophageal reflux disease without esophagitis  Comment: continue on ranitidine  Plan:     (E87.1) Hyponatremia  Comment: sodium rechecked and within normal limits   Plan:     (G20) Parkinson's disease (H)  Comment: Doing well with current dose of sinemet  Plan:              Follow-ups after your visit        Who to contact     If you have questions or need follow up information about today's clinic visit or your schedule please contact [facility] directly at [phone].  Normal or non-critical lab and imaging results will be communicated to you by MyChart, letter or phone within 4 business days after the clinic has received the results. If you do not hear from us within 7 days, please contact the clinic through [employer] or phone. If you have a critical or abnormal lab result, we will notify you by phone as soon as possible.  Submit refill requests through [employer] or call your pharmacy and they will forward the refill request to us. Please allow 3 business days for your refill to be completed.          Additional Information About Your Visit        [employer] Information     [employer] gives you secure access to your electronic health record. If you see a primary care provider, you can also send messages to your care team and make appointments. If you have questions, please call your primary care clinic.  If you do not have a primary care provider, please call [phone] and they will assist you.        Care EveryWhere ID     This is your Care EveryWhere ID. This could be used by other organizations to access your [facility] medical records  [id number]         Blood Pressure from Last 3 Encounters:   07/31/17  128/84   07/27/17 139/75   12/20/16 135/80    Weight from Last 3 Encounters:   07/27/17 132 lb (59.9 kg)   12/20/16 135 lb 9.6 oz (61.5 kg)   12/07/16 135 lb (61.2 kg)              Today, you had the following     No orders found for display         Today's Medication Changes          These changes are accurate as of: 7/31/17 10:08 AM.  If you have any questions, ask your nurse or doctor.               These medicines have changed or have updated prescriptions.        Dose/Directions    traMADol 50 MG tablet   Commonly known as:  ULTRAM   This may have changed:  See the new instructions.   Used for:  DJD (degenerative joint disease), ankle and foot, unspecified laterality   Changed by:  Damian Russ MD        Dose:  50 mg   Take 1 tablet (50 mg) by mouth every 6 hours as needed for moderate pain   Quantity:  90 tablet   Refills:  0            Where to get your medicines      These medications were sent to [facility] [address] [place], [place] [address] AT [place] [id number] [facility] [address]     Phone:  [phone]     carvedilol 6.25 MG tablet    losartan 50 MG tablet         Some of these will need a paper prescription and others can be bought over the counter.  Ask your nurse if you have questions.     Bring a paper prescription for each of these medications     traMADol 50 MG tablet                Primary Care Provider Office Phone # Fax #    Damian Russ [phone]       [facility] [address] [facility] [[email or web address]] [address]  [facility] [address]        Equal Access to Services     [facility] RANI AH: Hadii aad ku hadasho Soomaali, waaxda luqadaha, qaybta kaalmada adeegyada, juan pablo palacio. So [facility] [phone].    ATENCIÓN: Si habla español, tiene a rangel disposición servicios gratuitos de asistencia lingüística. Llame al [phone].    We comply with applicable federal civil rights laws and [place] laws. We do not  discriminate on the basis of race, color, national origin, age, disability sex, sexual orientation or gender identity.            Thank you!     Thank you for choosing [facility]  for your care. Our goal is always to provide you with excellent care. Hearing back from our patients is one way we can continue to improve our services. Please take a few minutes to complete the written survey that you may receive in the mail after your visit with us. Thank you!             Your Updated Medication List - Protect others around you: Learn how to safely use, store and throw away your medicines at www.disposemymeds.org.          This list is accurate as of: 7/31/17 10:08 AM.  Always use your most recent med list.                   Brand Name Dispense Instructions for use Diagnosis    acetaminophen 325 MG tablet    TYLENOL     Take 650 mg by mouth every 4 hours as needed for mild pain        acetaminophen-codeine 300-30 MG per tablet    TYLENOL #3    18 tablet    Take 1 tablet by mouth every 4 hours as needed for pain maximum 2 tablet(s) per day    Hip pain, left       alendronate 35 MG tablet    FOSAMAX    12 tablet    Take 1 tablet (35 mg) by mouth every 7 days    Osteoporosis       amLODIPine 5 MG tablet    NORVASC    90 tablet    Take 1 tablet (5 mg) by mouth daily    Benign essential hypertension       aspirin 81 MG EC tablet      Take 1 tablet (81 mg) by mouth daily    Transient cerebral ischemia, unspecified type       atorvastatin 20 MG tablet    LIPITOR    30 tablet    TAKE 1 TABLET BY MOUTH EVERY DAY    Mixed hyperlipidemia       azithromycin 500 MG tablet    ZITHROMAX    1 tablet    TAKE 1 TABLET BY MOUTH ONCE FOR 1 DOSE    Preventive antibiotic       Calcium-Magnesium 300-300 MG Tabs      Take 1 tablet by mouth daily        carbidopa-levodopa  MG per tablet    SINEMET     Take 1 tablet by mouth 4 times daily        carvedilol 6.25 MG tablet    COREG    180 tablet    Take 1 tablet (6.25 mg) by mouth  2 times daily (with meals)    Essential hypertension with goal blood pressure less than 140/90       losartan 50 MG tablet    COZAAR    90 tablet    Take 1 tablet (50 mg) by mouth daily    Essential hypertension with goal blood pressure less than 140/90       raloxifene 60 MG tablet    Evista    90 tablet    Take 1 tablet (60 mg) by mouth daily    Osteoporosis       ranitidine 150 MG tablet    ZANTAC     Take by mouth 2 times daily        sodium chloride 0.65 % nasal spray    OCEAN     Spray 1 spray into both nostrils every 4 hours (while awake)        traMADol 50 MG tablet    ULTRAM    90 tablet    Take 1 tablet (50 mg) by mouth every 6 hours as needed for moderate pain    DJD (degenerative joint disease), ankle and foot, unspecified laterality       valACYclovir 1000 mg tablet    VALTREX    21 tablet    Take 1 tablet (1,000 mg) by mouth 3 times daily    Herpes zoster without complication       VITAMIN B 12 PO      Take 100 mcg by mouth daily        VITAMIN D3 PO      Take 400 Units by mouth 2 times daily

## 2017-08-08 ENCOUNTER — HOSPITAL ENCOUNTER (OUTPATIENT)
Facility: CLINIC | Age: 82
Setting detail: OBSERVATION
Discharge: HOME | End: 2017-08-09
Attending: EMERGENCY MEDICINE | Admitting: INTERNAL MEDICINE
Payer: COMMERCIAL

## 2017-08-08 ENCOUNTER — TELEPHONE (OUTPATIENT)
Dept: FAMILY MEDICINE | Facility: CLINIC | Age: 82
End: 2017-08-08

## 2017-08-08 ENCOUNTER — APPOINTMENT (OUTPATIENT)
Dept: CT IMAGING | Facility: CLINIC | Age: 82
End: 2017-08-08
Attending: EMERGENCY MEDICINE
Payer: COMMERCIAL

## 2017-08-08 DIAGNOSIS — R47.01 APHASIA: ICD-10-CM

## 2017-08-08 PROBLEM — I63.9 CVA (CEREBRAL VASCULAR ACCIDENT) (H): Status: ACTIVE | Noted: 2017-08-08

## 2017-08-08 LAB
ANION GAP SERPL CALCULATED.3IONS-SCNC: 9 MMOL/L (ref 3–14)
APTT PPP: 28 SEC (ref 22–37)
BASOPHILS # BLD AUTO: 0 10E9/L (ref 0–0.2)
BASOPHILS NFR BLD AUTO: 0.1 %
BUN SERPL-MCNC: 18 MG/DL (ref 7–30)
CALCIUM SERPL-MCNC: 9 MG/DL (ref 8.5–10.1)
CHLORIDE SERPL-SCNC: 96 MMOL/L (ref 94–109)
CHOLEST SERPL-MCNC: 141 MG/DL
CO2 SERPL-SCNC: 27 MMOL/L (ref 20–32)
CREAT SERPL-MCNC: 0.84 MG/DL (ref 0.52–1.04)
CRP SERPL-MCNC: <2.9 MG/L (ref 0–8)
DIFFERENTIAL METHOD BLD: ABNORMAL
EOSINOPHIL # BLD AUTO: 0.1 10E9/L (ref 0–0.7)
EOSINOPHIL NFR BLD AUTO: 0.6 %
ERYTHROCYTE [DISTWIDTH] IN BLOOD BY AUTOMATED COUNT: 15.4 % (ref 10–15)
GFR SERPL CREATININE-BSD FRML MDRD: 65 ML/MIN/1.7M2
GLUCOSE BLDC GLUCOMTR-MCNC: 102 MG/DL (ref 70–99)
GLUCOSE SERPL-MCNC: 100 MG/DL (ref 70–99)
HBA1C MFR BLD: 5.6 % (ref 4.3–6)
HCT VFR BLD AUTO: 39.6 % (ref 35–47)
HDLC SERPL-MCNC: 58 MG/DL
HGB BLD-MCNC: 13.9 G/DL (ref 11.7–15.7)
IMM GRANULOCYTES # BLD: 0 10E9/L (ref 0–0.4)
IMM GRANULOCYTES NFR BLD: 0.4 %
INR PPP: 0.89 (ref 0.86–1.14)
INTERPRETATION ECG - MUSE: NORMAL
LDLC SERPL CALC-MCNC: 69 MG/DL
LYMPHOCYTES # BLD AUTO: 1.3 10E9/L (ref 0.8–5.3)
LYMPHOCYTES NFR BLD AUTO: 13.8 %
MCH RBC QN AUTO: 32.3 PG (ref 26.5–33)
MCHC RBC AUTO-ENTMCNC: 35.1 G/DL (ref 31.5–36.5)
MCV RBC AUTO: 92 FL (ref 78–100)
MONOCYTES # BLD AUTO: 0.8 10E9/L (ref 0–1.3)
MONOCYTES NFR BLD AUTO: 7.8 %
NEUTROPHILS # BLD AUTO: 7.5 10E9/L (ref 1.6–8.3)
NEUTROPHILS NFR BLD AUTO: 77.3 %
NONHDLC SERPL-MCNC: 83 MG/DL
NRBC # BLD AUTO: 0 10*3/UL
NRBC BLD AUTO-RTO: 0 /100
PLATELET # BLD AUTO: 206 10E9/L (ref 150–450)
POTASSIUM SERPL-SCNC: 3.9 MMOL/L (ref 3.4–5.3)
RBC # BLD AUTO: 4.31 10E12/L (ref 3.8–5.2)
SODIUM SERPL-SCNC: 132 MMOL/L (ref 133–144)
TRIGL SERPL-MCNC: 71 MG/DL
TROPONIN I SERPL-MCNC: NORMAL UG/L (ref 0–0.04)
TSH SERPL DL<=0.005 MIU/L-ACNC: 0.95 MU/L (ref 0.4–4)
WBC # BLD AUTO: 9.7 10E9/L (ref 4–11)

## 2017-08-08 PROCEDURE — 99220 ZZC INITIAL OBSERVATION CARE,LEVL III: CPT | Performed by: INTERNAL MEDICINE

## 2017-08-08 PROCEDURE — 84484 ASSAY OF TROPONIN QUANT: CPT | Performed by: INTERNAL MEDICINE

## 2017-08-08 PROCEDURE — 80048 BASIC METABOLIC PNL TOTAL CA: CPT | Performed by: EMERGENCY MEDICINE

## 2017-08-08 PROCEDURE — 82306 VITAMIN D 25 HYDROXY: CPT | Performed by: INTERNAL MEDICINE

## 2017-08-08 PROCEDURE — 36415 COLL VENOUS BLD VENIPUNCTURE: CPT | Performed by: INTERNAL MEDICINE

## 2017-08-08 PROCEDURE — 93005 ELECTROCARDIOGRAM TRACING: CPT

## 2017-08-08 PROCEDURE — 25000125 ZZHC RX 250: Performed by: EMERGENCY MEDICINE

## 2017-08-08 PROCEDURE — 25000132 ZZH RX MED GY IP 250 OP 250 PS 637: Performed by: EMERGENCY MEDICINE

## 2017-08-08 PROCEDURE — 70470 CT HEAD/BRAIN W/O & W/DYE: CPT | Mod: XS

## 2017-08-08 PROCEDURE — 25000128 H RX IP 250 OP 636: Performed by: INTERNAL MEDICINE

## 2017-08-08 PROCEDURE — 00000146 ZZHCL STATISTIC GLUCOSE BY METER IP

## 2017-08-08 PROCEDURE — 70460 CT HEAD/BRAIN W/DYE: CPT

## 2017-08-08 PROCEDURE — 25000128 H RX IP 250 OP 636: Performed by: EMERGENCY MEDICINE

## 2017-08-08 PROCEDURE — 83036 HEMOGLOBIN GLYCOSYLATED A1C: CPT | Performed by: INTERNAL MEDICINE

## 2017-08-08 PROCEDURE — 25000132 ZZH RX MED GY IP 250 OP 250 PS 637: Performed by: INTERNAL MEDICINE

## 2017-08-08 PROCEDURE — 99207 ZZC CDG-CODE CATEGORY CHANGED: CPT | Performed by: INTERNAL MEDICINE

## 2017-08-08 PROCEDURE — 85610 PROTHROMBIN TIME: CPT | Performed by: EMERGENCY MEDICINE

## 2017-08-08 PROCEDURE — 86140 C-REACTIVE PROTEIN: CPT | Performed by: INTERNAL MEDICINE

## 2017-08-08 PROCEDURE — 99285 EMERGENCY DEPT VISIT HI MDM: CPT | Mod: 25

## 2017-08-08 PROCEDURE — 84443 ASSAY THYROID STIM HORMONE: CPT | Performed by: INTERNAL MEDICINE

## 2017-08-08 PROCEDURE — 12000000 ZZH R&B MED SURG/OB

## 2017-08-08 PROCEDURE — 85730 THROMBOPLASTIN TIME PARTIAL: CPT | Performed by: EMERGENCY MEDICINE

## 2017-08-08 PROCEDURE — 80061 LIPID PANEL: CPT | Performed by: INTERNAL MEDICINE

## 2017-08-08 PROCEDURE — 85025 COMPLETE CBC W/AUTO DIFF WBC: CPT | Performed by: EMERGENCY MEDICINE

## 2017-08-08 PROCEDURE — 70498 CT ANGIOGRAPHY NECK: CPT

## 2017-08-08 RX ORDER — ONDANSETRON 2 MG/ML
4 INJECTION INTRAMUSCULAR; INTRAVENOUS EVERY 6 HOURS PRN
Status: DISCONTINUED | OUTPATIENT
Start: 2017-08-08 | End: 2017-08-09 | Stop reason: HOSPADM

## 2017-08-08 RX ORDER — HYDRALAZINE HYDROCHLORIDE 20 MG/ML
10-20 INJECTION INTRAMUSCULAR; INTRAVENOUS
Status: DISCONTINUED | OUTPATIENT
Start: 2017-08-08 | End: 2017-08-09 | Stop reason: HOSPADM

## 2017-08-08 RX ORDER — LABETALOL HYDROCHLORIDE 5 MG/ML
10-40 INJECTION, SOLUTION INTRAVENOUS EVERY 10 MIN PRN
Status: DISCONTINUED | OUTPATIENT
Start: 2017-08-08 | End: 2017-08-09 | Stop reason: HOSPADM

## 2017-08-08 RX ORDER — ACETAMINOPHEN 325 MG/1
650 TABLET ORAL EVERY 4 HOURS PRN
Status: DISCONTINUED | OUTPATIENT
Start: 2017-08-08 | End: 2017-08-09 | Stop reason: HOSPADM

## 2017-08-08 RX ORDER — NALOXONE HYDROCHLORIDE 0.4 MG/ML
.1-.4 INJECTION, SOLUTION INTRAMUSCULAR; INTRAVENOUS; SUBCUTANEOUS
Status: DISCONTINUED | OUTPATIENT
Start: 2017-08-08 | End: 2017-08-09 | Stop reason: HOSPADM

## 2017-08-08 RX ORDER — SODIUM CHLORIDE 9 MG/ML
INJECTION, SOLUTION INTRAVENOUS CONTINUOUS
Status: DISCONTINUED | OUTPATIENT
Start: 2017-08-08 | End: 2017-08-09 | Stop reason: HOSPADM

## 2017-08-08 RX ORDER — ASPIRIN 300 MG/1
300 SUPPOSITORY RECTAL ONCE
Status: COMPLETED | OUTPATIENT
Start: 2017-08-08 | End: 2017-08-08

## 2017-08-08 RX ORDER — IOPAMIDOL 755 MG/ML
120 INJECTION, SOLUTION INTRAVASCULAR ONCE
Status: COMPLETED | OUTPATIENT
Start: 2017-08-08 | End: 2017-08-08

## 2017-08-08 RX ORDER — ACETAMINOPHEN 650 MG/1
650 SUPPOSITORY RECTAL EVERY 4 HOURS PRN
Status: DISCONTINUED | OUTPATIENT
Start: 2017-08-08 | End: 2017-08-09 | Stop reason: HOSPADM

## 2017-08-08 RX ORDER — ONDANSETRON 4 MG/1
4 TABLET, ORALLY DISINTEGRATING ORAL EVERY 6 HOURS PRN
Status: DISCONTINUED | OUTPATIENT
Start: 2017-08-08 | End: 2017-08-09 | Stop reason: HOSPADM

## 2017-08-08 RX ORDER — CARBIDOPA AND LEVODOPA 25; 100 MG/1; MG/1
1 TABLET ORAL 4 TIMES DAILY
Status: DISCONTINUED | OUTPATIENT
Start: 2017-08-08 | End: 2017-08-09 | Stop reason: HOSPADM

## 2017-08-08 RX ADMIN — CARBIDOPA AND LEVODOPA 1 TABLET: 25; 100 TABLET ORAL at 22:00

## 2017-08-08 RX ADMIN — IOPAMIDOL 120 ML: 755 INJECTION, SOLUTION INTRAVENOUS at 18:06

## 2017-08-08 RX ADMIN — SODIUM CHLORIDE 100 ML: 9 INJECTION, SOLUTION INTRAVENOUS at 18:06

## 2017-08-08 RX ADMIN — ASPIRIN 300 MG: 300 SUPPOSITORY RECTAL at 20:18

## 2017-08-08 RX ADMIN — ACETAMINOPHEN 650 MG: 325 TABLET, FILM COATED ORAL at 22:00

## 2017-08-08 RX ADMIN — SODIUM CHLORIDE: 9 INJECTION, SOLUTION INTRAVENOUS at 22:58

## 2017-08-08 ASSESSMENT — ENCOUNTER SYMPTOMS
CONFUSION: 1
FACIAL ASYMMETRY: 1
PHOTOPHOBIA: 0
SPEECH DIFFICULTY: 1

## 2017-08-08 ASSESSMENT — VISUAL ACUITY: OU: GLASSES

## 2017-08-08 NOTE — TELEPHONE ENCOUNTER
PCP:    During conversation with the Pt I observed the Pt exhibit difficulty with speaking.     Spoke with the patient regarding BP's. During conversation the Pt had a very difficult time speaking. Speech was disorganized and there were incomplete statements that did not make sense.   Denies numbness and tingling, unilateral weakness. Did report a HA earlier today.     States BP's are 170's/unknown.   Per PCP Pt's speech is typically clear and logical. Based on the observed findings I recommended Pt go into ER via 911.   Pt declined 911, however will have neighbor take her. I spoke with the neighbor on the phone, Horacio ([phone])  He will bring Pt into ER immediately.     Nadeen Duffy RN

## 2017-08-08 NOTE — IP AVS SNAPSHOT
[address] [facility]    [address]    [place] [address]    Phone:  [phone]                                       After Visit Summary   8/8/2017    Opal Sin    MRN: [id number]           After Visit Summary Signature Page     I have received my discharge instructions, and my questions have been answered. I have discussed any challenges I see with this plan with the nurse or doctor.    ..........................................................................................................................................  Patient/Patient Representative Signature      ..........................................................................................................................................  Patient Representative Print Name and Relationship to Patient    ..................................................               ................................................  Date                                            Time    ..........................................................................................................................................  Reviewed by Signature/Title    ...................................................              ..............................................  Date                                                            Time

## 2017-08-08 NOTE — LETTER
Transition Communication Hand-off for Care Transitions to Next Level of Care Provider    Name: Opal Sin  MRN #: [id number]  Primary Care Provider: Damian Russ MD  Primary Care MD Name: Damian Russ  Primary Clinic: [address]  [place] [address]  Primary Care Clinic Name: [facility]  Reason for Hospitalization:  Aphasia [R47.01]  Admit Date/Time: 8/8/2017  5:28 PM  Discharge Date: 08/09/2017  Payor Source: Payor: UCARE / Plan: [employer] / Product Type: HMO /     Readmission Assessment Measure (NIK) Risk Score/category: Elevated           Reason for Communication Hand-off Referral: Fragility  Difficulty understanding plan of care      Discharge Plan:       Most Recent Value    Concerns To Be Addressed denies needs/concerns at this time           Concern for non-adherence with plan of care:    yes  Discharge Needs Assessment:  Needs       Most Recent Value    Equipment Currently Used at Home cane, straight, walker, rolling, grab bar, shower chair    Transportation Available car, family or friend will provide [Pt still drives,  however, family can provide for now. ]    # of Referrals Placed by [facility] Care Coordination, Homecare, Scheduled Follow-up appointments            Follow-up specialty is recommended: Yes    Follow-up plan:  Future Appointments  Date Time Provider Department Center          8/14/2017 2:30 PM [facility]       Any outstanding tests or procedures:        Referrals     Future Labs/Procedures    Home care nursing referral     Comments:    RN skilled nursing visit. RN to assess home safety.    You are being discharged with orders for home care services. These services have been arranged through [facility]. Please contact home care agency directly at [phone] with any questions/concerns.    Home Care OT Referral for Hospital Discharge     Comments:    OT to eval and treat    Your provider has ordered home  care - occupational therapy. If you have not been contacted within 2 days of your discharge please call the department phone number listed on the top of this document.            Key Recommendations:  Patient was admitted with word finding difficulty, feeling confused and having problems with her memory.  MRI not done because of pain pump. Other work up negative. Lives alone. Still drives. OT  Had concerns and recommended out patient close  follow up. Home OT and RN safety evaluation. Patient doesn't think she needs Home care but has agreed to have Home RN and OT come.    Kathy Baltazar    AVS/Discharge Summary is the source of truth; this is a helpful guide for improved communication of patient story

## 2017-08-08 NOTE — PHARMACY-ADMISSION MEDICATION HISTORY
Admission medication history interview status for the 8/8/2017  admission is complete. See EPIC admission navigator for prior to admission medications     Medication history source reliability:Moderate, pt interview. Pt here with stroke symptoms    Actions taken by pharmacist (provider contacted, etc):None     Additional medication history information not noted on PTA med list :Just completed Valtrex for shingles. Removed from list.    Medication reconciliation/reorder completed by provider prior to medication history? No    Time spent in this activity: 20 minutes    Prior to Admission medications    Medication Sig Last Dose Taking? Auth Provider   traMADol (ULTRAM) 50 MG tablet Take 1 tablet (50 mg) by mouth every 6 hours as needed for moderate pain prn Yes Damian Russ MD   carvedilol (COREG) 6.25 MG tablet Take 1 tablet (6.25 mg) by mouth 2 times daily (with meals) 8/8/2017 at am Yes Damian Russ MD   losartan (COZAAR) 50 MG tablet Take 1 tablet (50 mg) by mouth daily 8/7/2017 at Unknown time Yes Damian Russ MD   ranitidine (ZANTAC) 150 MG tablet Take 150 mg by mouth 2 times daily  8/7/2017 at Unknown time Yes Reported, Patient   atorvastatin (LIPITOR) 20 MG tablet Take 1 tablet (20 mg) by mouth daily 8/7/2017 at hs Yes Damian Russ MD   alendronate (FOSAMAX) 35 MG tablet Take 1 tablet (35 mg) by mouth every 7 days 8/7/2017 at am Yes Damian Russ MD   raloxifene (EVISTA) 60 MG tablet Take 1 tablet (60 mg) by mouth daily 8/7/2017 at Unknown time Yes Damian Russ MD   amLODIPine (NORVASC) 5 MG tablet Take 1 tablet (5 mg) by mouth daily 8/8/2017 at am Yes Damian Russ MD   acetaminophen-codeine (TYLENOL #3) 300-30 MG per tablet Take 1 tablet by mouth every 4 hours as needed for pain maximum 2 tablet(s) per day prn Yes Damian Russ MD   acetaminophen (TYLENOL) 325 MG tablet Take 650 mg by mouth every 4 hours as needed for  mild pain prn Yes Reported, Patient   Cyanocobalamin (VITAMIN B 12 PO) Take 100 mcg by mouth daily  8/8/2017 at am Yes Reported, Patient   Calcium-Magnesium 300-300 MG TABS Take 1 tablet by mouth 3 times daily  8/7/2017 at am Yes Reported, Patient   carbidopa-levodopa (SINEMET)  MG per tablet Take 1 tablet by mouth 4 times daily Takes at 6am, 11am, 4pm, and 9 pm 8/8/2017 at am Yes Reported, Patient   Cholecalciferol (VITAMIN D3 PO) Take 400 Units by mouth 2 times daily  8/8/2017 at am Yes Unknown, Entered By History   aspirin EC 81 MG EC tablet Take 1 tablet (81 mg) by mouth daily 4-5 days ago  Amberly Gutierrez MD

## 2017-08-08 NOTE — ED PROVIDER NOTES
History     Chief Complaint:  Stroke-like symptoms     HPI   Opal Sin is a 82 year old female with a history of TIA, hypertension, and left carotid aneurysm post stenting who presents for evaluation of stroke-like symptoms. The patient was speaking with her neighbor at noon today when she had acute onset memory difficulties, confusion, and expressive aphasia with word finding difficulty. Neighbor reports new left facial droop as well as possible gait difficulty getting her into the car to bring her in. No recent head trauma or falls, focal numbness or weakness, visual deficit, or any other acute symptoms.  She is on aspirin but no other chronic anticoagulation.      Allergies:  Bee Pollen (hives)  Cephalexin Monohydrate (hives)  Clindamycin (hives)  Multi Vitamin-Minerals [Hair-Vites] (hives)  Penicillin [Penicillins] (hives)  Thiazide-Type Diuretics (hyponatremia)  Tobramycin (hives)  Vancomycin (hives)  Atorvastatin (leg cramps)  Cephalexin (hives)  Ciprofloxacin (hives)  Ibuprofen (abdominal pain, GI upset)  Versed [Midazolam] (confusion, agitation)  Zoloft [Sertraline] (headache, hypertension)  Ace Inhibitors (cough)  Advil [Ibuprofen Micronized] (nausea)  Metoprolol (diarrhea)     Medications:    Tramadol  Losartan  Carvedilol  Atorvastatin  Valacyclovir  Alendronate  Aspirin  Acetaminophen  Raloxifene  Vitamin B12  Sinemet  Cholecalciferol   Amlodipine     Past Medical History:    Carotid aneurysm, internal left cavernous, coiled 11/2016  TIA  Hypertension   Parkinson's Disease  Shingles  Physical deconditioning   Chronic pain  Ca, breast  CKD stage III  Asthma  Spinal stenosis cervical  Osteoporosis   Hyperlipidemia  GERD  DJD   Degeneration of intervertebral disc     Past Surgical History:    Appendectomy   Mastectomy, left   TKA, bilateral   Shoulder arthroplasty  Wedge resection of right lung    Family History:    CVA (brother at 67, sister at 68)  Ca, uterus (mother)  Ca, lung (brother)  CAD  (brother)  Alzheimer's disease (brother)    Social History:  Non-smoker. Rare drinker.  Marital Status:  Single [1]       Review of Systems   Eyes: Negative for photophobia and visual disturbance.   Musculoskeletal: Positive for gait problem.   Neurological: Positive for facial asymmetry and speech difficulty.   Psychiatric/Behavioral: Positive for confusion.   10 point review of systems performed and is negative except as above and in HPI.       Physical Exam     Patient Vitals for the past 24 hrs:   BP Temp Heart Rate Resp SpO2   08/08/17 2042 165/89 97.7  F (36.5  C) 79 18 94 %   08/08/17 2000 157/82 - 70 - -   08/08/17 1945 (!) 137/91 - - - -   08/08/17 1930 164/83 - 67 18 99 %   08/08/17 1915 177/86 - 64 13 99 %   08/08/17 1845 176/86 - 66 16 99 %   08/08/17 1830 180/84 - 68 18 99 %   08/08/17 1815 173/83 - 68 21 99 %   08/08/17 1809 (!) 166/115 97.1  F (36.2  C) - 16 -   08/08/17 1750 - - 65 - 99 %   08/08/17 1735 198/87 - 67 17 -     Physical Exam  General: Resting on gurney, appears uncomfortable  Head:  Slight left-sided facial droop. Otherwise the scalp, face, and head appear normal   Eyes:  The pupils are equal, round, and reactive to light    Conjunctivae normal  ENT:    The nose is normal    Ears/pinnae are normal    The oropharynx is normal.      Mucous membranes moist    There is no peritonsillar abscess.  Neck:  Normal range of motion.      There is no rigidity.  No meningismus.    Trachea is in the midline and normal.      No mass detected.    CV:  Regular rate    Normal S1 and S2    No S3 or S4    No pathological murmur   Resp:  Lungs are clear.      There is no tachypnea; Non-labored    No rales    No wheezing   GI:  Abdomen is soft, no rigidity    No distension. No tympani. No rebound tenderness.     Non-surgical without peritoneal features.    RUQ:    Normal    Epigastrium:  Normal    LUQ:   Normal    RLQ:   Normal    Suprapubic:  Normal    LLQ:   Normal  MS:  Normal muscular tone.      No major  joint effusions.      Normal motor assessment of all extremities.  Skin:  No rash or lesions noted.  No petechiae or purpura.  Neuro: Slight left-sided facial droop. Normal strength and sensation in bilateral extremities    Aphasia with considerable difficulty word finding.    Follows commands.     Left eye with incomplete movements (baseline from prior stroke per patient)  Psych:  Awake. Alert. Appropriate interactions.      Emergency Department Course   ECG:  ECG (17:32:07):  Indication: neuro   Rate 65 bpm. MA interval 184. QRS duration 92. QT/QTc 386/401. P-R-T axes 10, -25, 40.   normal sinus rhythm  Moderate voltage criteria for LVH, may be normal variant  Borderline ECG  No significant change when compared to EKG dated 11/29/16  Interpreted at 1742 by Sobia Rivers MD.     Imaging:  Radiographic findings were communicated with the patient who voiced understanding of the findings.  CT Head w/o contrast: 1. No acute abnormality.  2. Atrophy of the brain. White matter changes consistent with sequelae of small vessel ischemic disease. This is unchanged.  3. Previous aneurysm embolization coils and Neuroform stent are unchanged Results per Radiology.     CT Head w/ contrast: 1. No evidence of arterial occlusion or significant stenosis.  2. Previous aneurysm coils and Neuroform stent are again identified.  3. Ectatic distal internal carotid arteries and basilar artery.  4. Normal perfusion CT scan of the head. Results per Radiology.     CTA Angiogram Head Neck: 1. No evidence of arterial occlusion or significant stenosis.  2. Previous aneurysm coils and Neuroform stent are again identified.  3. Ectatic distal internal carotid arteries and basilar artery.  4. Normal perfusion CT scan of the head. Results per Radiology.    Laboratory:  CBC w/diff: RDW 15.4 (H), o/w WNL (WBC 9.7, Hgb 13.9, Plt 206)  BMP: Na 132 (L), Glu 100 (H), o/w WNL (Cr 0.84)  INR: 0.89   PTT: 28      Emergency Department Course:  1731: I am  called urgently into Stab 3. I performed an exam of the patient as documented above.   1732: Code Stroke called  Peripheral IV access established. Blood drawn and sent.    1732: EKG performed showing normal sinus rhythm as above.    Past medical records, nursing notes, and vitals reviewed.    1739: Discussed the case with Dr. Goetz of Stroke Neurology     The above  EKG,  imaging study, and labs  were obtained.  1810: Discussed the case with Dr. Bell of Radiology  1852: I rechecked patient. Findings and plan explained to the patient who consents to admission.   1857: Patient relocated to Room 1.    1915: Discussed the patient with Dr. Price of the Hospitalist Service, who will admit the patient to a neuro-tele bed for further monitoring, evaluation, and treatment.    2018: Aspirin 324mg, [place]    Impression & Plan    CMS Diagnoses: The patient has stroke symptoms:           ED Stroke specific documentation           NIHSS PDF          Protocol PDF     Patient last known well time: 1200h on 8/8/2017   ED Provider first to bedside at: 1731  CT Results received at: 1810  Patient was not treated with TPA due to the following reason(s):  Out of the treatment time window  Mild stroke symptoms ( NIHSS < 4 and not globally aphasic)  Isolated mild neurological deficits such as ataxia alone, sensory loss alone, dysarthria alone or minimal weakness ( NIHSS < 4 AND normal language AND visual fields )      National Institutes of Health Stroke Scale (Baseline)  Time Performed: 1732      Score    Level of consciousness: (0)   Alert, keenly responsive    LOC questions: (0)   Answers both questions correctly    LOC commands: (0)   Performs both tasks correctly    Best gaze: (0)   Normal    Visual: (0)   No visual loss    Facial palsy: (1)   Minor paralysis (flat nasolabial fold, smile asymmetry)    Motor arm (left): (0)   No drift    Motor arm (right): (0)   No drift    Motor leg (left): (0)   No drift    Motor leg  (right): (0)   No drift    Limb ataxia: (0)   Absent    Sensory: (0)   Normal- no sensory loss    Best language: (2)   Severe aphasia    Dysarthria: (0)   Normal    Extinction and inattention: (0)   No abnormality        Total Score:  3        Stroke Mimics were considered (including migraine headache, seizure disorder, hypoglycemia (or hyperglycemia), head or spinal trauma, CNS infection, Toxin ingestion and shock state (e.g. sepsis) .      National Institutes of Health Stroke Scale  Time Performed: 1815      Score    Level of consciousness: (0)   Alert, keenly responsive    LOC questions: (0)   Answers both questions correctly    LOC commands: (0)   Performs both tasks correctly    Best gaze: (0)   Normal    Visual: (0)   No visual loss    Facial palsy: (1)   Minor paralysis (flat nasolabial fold, smile asymmetry)    Motor arm (left): (0)   No drift    Motor arm (right): (0)   No drift    Motor leg (left): (0)   No drift    Motor leg (right): (0)   No drift    Limb ataxia: (0)   Absent    Sensory: (0)   Normal- no sensory loss    Best language: (2)   Severe aphasia    Dysarthria: (0)   Normal    Extinction and inattention: (0)   No abnormality        Total Score:  3         Medical Decision Making:  Opal Sin is a 82 year old female presents for evaluation of left sided facial droop and aphasic speech difficulty. The patient's symptoms started at 1200 and they are not in the window for TPA. A Code Stroke was called, based on symptoms and timing because of possiblity of directed thrombectomy.  The patient's symptoms are currently unchanged compared to when they first started. Per my evaluation and neurology recommendations, TPA was not given.  Other etiologies for the patient's symptoms, such as hypo/hyperglycemia, anemia, encephalitis, electrolyte abnormality, seizure, etc were considered, and evaluated as appropriate, however, stroke is more likely.  The patient was seen by neurology who agrees with  this assessment.  The patient is to be admitted to the Hospitalist with Neurology consulting for further management and treatment.      Critical Care time:  was 20 minutes for this patient excluding procedures.    Diagnosis:    ICD-10-CM    1. Aphasia R47.01        Disposition:  Admitted in fair condition     Roger BALDERRAMA am serving as a scribe at 5:38 PM on 8/8/2017 to document services personally performed by Sobia Rivers MD based on my observations and the provider's statements to me.      Roger Mota  8/8/2017    EMERGENCY DEPARTMENT       Sobia Rivers MD  08/08/17 1250

## 2017-08-08 NOTE — IP AVS SNAPSHOT
MRN:[id number]                      After Visit Summary   8/8/2017    Opal Sin    MRN: [id number]           Thank you!     Thank you for choosing [facility] for your care. Our goal is always to provide you with excellent care. Hearing back from our patients is one way we can continue to improve our services. Please take a few minutes to complete the written survey that you may receive in the mail after you visit with us. Thank you!        Patient Information     Date Of Birth          1935        About your hospital stay     You were admitted on:  August 8, 2017 You last received care in the:  [facility] [address] [facility]    You were discharged on:  August 9, 2017       Who to Call     For medical emergencies, please call 911.  For non-urgent questions about your medical care, please call your primary care provider or clinic, [phone]          Attending Provider     Provider Specialty    Sobia Rivers MD Emergency Medicine    [place], Jairo Mao MD Internal Medicine    Lauryn, MD Judith Internal Medicine       Primary Care Provider Office Phone # Fax #    Damian Russ [phone]      After Care Instructions     Activity       Your activity upon discharge: activity as tolerated            Diet       Follow this diet upon discharge:  Regular                  Follow-up Appointments     Follow-up and recommended labs and tests        Follow up with your neurologist Dr Ferreira in one month.  [facility] will call you to set this up after they talk with Dr Ferreira. Please call them if you don't hear from them in the next couple of days.  [phone]  [address], [address] [place] [id number]                  Your next 10 appointments already scheduled     Aug 14, 2017  2:30 PM CDT   [facility]    [address] [facility]  [id number] [phone]           Bring a current list of meds and any records pertaining to this visit. For Physicals, please bring immunization records and any forms needing to be filled out. Please arrive 10 minutes early to complete paperwork.              Additional Services     Home Care OT Referral for Hospital Discharge       OT to eval and treat    Your provider has ordered home care - occupational therapy. If you have not been contacted within 2 days of your discharge please call the department phone number listed on the top of this document.            Home care nursing referral       RN skilled nursing visit. RN to assess home safety.    You are being discharged with orders for home care services. These services have been arranged through [facility]. Please contact home care agency directly at [phone] with any questions/concerns.            Med Therapy Manage Referral       Your provider has referred you to: **[facility] Scheduling (numerous locations) [phone]   [email or web address]  [facility]: [facility] [phone]   [email or web address]    Reason for Referral: transition of care    The [facility] department will contact you to schedule an appointment.  You may also schedule the appointment by calling [phone].  For [facility] patients, please call [phone] to confirm/schedule your appointment on the next business day.    This service is designed to help you get the most from your medications.  A specially trained Pharmacist will work closely with you and your providers to solve any questions, concerns, issues or problems related to your medications.    Please bring all of your prescription and non-prescription medications (such as vitamins, over-the-counter medications, and herbals) or a detailed  medication list to your appointment.    If you have a glucose meter or other home monitoring information, please also bring this to your appointment (i.e. blood glucose log, blood pressure log, pain log, etc.).                  Further instructions from your care team       You are being discharged with orders for home care services. These services have been arranged through [facility]. Please contact home care agency directly at [phone] with any questions/concerns.    Diet recommendation: You are at risk for aspiration with thin liquids by straw. Do not use straw when drinking beverages. Try to select soft moist textures. Take small bites/sips, alternate between solids and liquids, and swallow twice before taking another bite.     Your risk factors for stroke or TIA (transient ischemic attack):    Your Risk Factors Your Results Normal Ranges   High blood pressure  -continue to take BP medication as prescribed BP Readings from Last 1 Encounters:   08/09/17 134/68    Less than 120/80   Cholesterol              Total Lab Results   Component Value Date    CHOL 141 08/08/2017      Less than 150    Triglycerides   Lab Results   Component Value Date    TRIG 71 08/08/2017    Less than 150   LDL Lab Results   Component Value Date    LDL 69 08/08/2017       Less than 70   HDL Lab Results   Component Value Date    HDL 58 08/08/2017            Greater than 40 (men)  Greater than 50 (women)   Diabetes   Recent Labs  Lab 08/08/17  1745   *    Fasting blood glucose    Smoking/tobacco use  Quit smoking and tobacco   Overweight  Lose 1-2 pounds a week   Lack of exercise  30 minutes moderate activity each day   Other risk factors include carotid (neck) artery disease, atrial fibrillation and stress. You may be on new medicine to treat high blood pressure, cholesterol, diabetes or atrial fibrillation.    Understanding Stroke Booklet given to patient. Please refer to booklet for further  information.    Stroke warning signs and symptoms - CALL 911 right away for:  - Sudden numbness or weakness in the face, arm or leg (often on one side of the body).  - Sudden confusion or trouble understanding what is going on.  - Sudden blurred or decreased vision in one or both eyes.  - Sudden trouble speaking, loss of balance, dizziness or problems with coordination.  - Sudden, severe headache for no reason.  - Fainting or seizures.  - Symptoms may go away then come back suddenly.      Pending Results     Date and Time Order Name Status Description    8/8/2017 2047 Vitamin D Deficiency In process             Statement of Approval     Ordered          08/09/17 1300  I have reviewed and agree with all the recommendations and orders detailed in this document.  EFFECTIVE NOW     Approved and electronically signed by:  Judith Combs MD             Admission Information     Date & Time Provider Department Dept. Phone    8/8/2017 Judith Combs MD [address] [facility] [phone]      Your Vitals Were     Blood Pressure Temperature Respirations Pulse Oximetry          134/68 98.1  F (36.7  C) (Oral) 16 97%        MyChart Information     [facility] gives you secure access to your electronic health record. If you see a primary care provider, you can also send messages to your care team and make appointments. If you have questions, please call your primary care clinic.  If you do not have a primary care provider, please call [phone] and they will assist you.        Care EveryWhere ID     This is your Care EveryWhere ID. This could be used by other organizations to access your [facility] medical records  [id number]        Equal Access to Services     [facility] RANI : Milton Juarez, waloulouda luqadaha, qaybta kaalmajuan pablo reece. So [facility] [phone].    ATENCIÓN: Si habla español, tiene a rangel disposición servicios gratuitos de asistencia  lingüísticaAna Bell al [phone].    We comply with applicable federal civil rights laws and [place] laws. We do not discriminate on the basis of race, color, national origin, age, disability sex, sexual orientation or gender identity.               Review of your medicines      CONTINUE these medicines which have NOT CHANGED        Dose / Directions    acetaminophen 325 MG tablet   Commonly known as:  TYLENOL        Dose:  650 mg   Take 650 mg by mouth every 4 hours as needed for mild pain   Refills:  0       acetaminophen-codeine 300-30 MG per tablet   Commonly known as:  TYLENOL #3   Used for:  Hip pain, left   Notes to Patient:  Do not exceed 4000mg of tylenol in 24hours        Dose:  1 tablet   Take 1 tablet by mouth every 4 hours as needed for pain maximum 2 tablet(s) per day   Quantity:  18 tablet   Refills:  0       alendronate 35 MG tablet   Commonly known as:  FOSAMAX   Used for:  Osteoporosis   Notes to Patient:  Resume home med schedule        Dose:  35 mg   Take 1 tablet (35 mg) by mouth every 7 days   Quantity:  12 tablet   Refills:  3       amLODIPine 5 MG tablet   Commonly known as:  NORVASC   Used for:  Benign essential hypertension        Dose:  5 mg   Take 1 tablet (5 mg) by mouth daily   Quantity:  90 tablet   Refills:  3       aspirin 81 MG EC tablet   Used for:  Transient cerebral ischemia, unspecified type        Dose:  81 mg   Take 1 tablet (81 mg) by mouth daily   Refills:  0       atorvastatin 20 MG tablet   Commonly known as:  LIPITOR   Used for:  Mixed hyperlipidemia        Dose:  20 mg   Take 1 tablet (20 mg) by mouth daily   Quantity:  90 tablet   Refills:  3       Calcium-Magnesium 300-300 MG Tabs   Notes to Patient:  Resume home med schedule        Dose:  1 tablet   Take 1 tablet by mouth 3 times daily   Refills:  0       carbidopa-levodopa  MG per tablet   Commonly known as:  SINEMET        Dose:  1 tablet   Take 1 tablet by mouth 4 times daily Takes at 6am, 11am, 4pm, and  9 pm   Refills:  0       carvedilol 6.25 MG tablet   Commonly known as:  COREG   Used for:  Essential hypertension with goal blood pressure less than 140/90   Notes to Patient:  Resume home med schedule        Dose:  6.25 mg   Take 1 tablet (6.25 mg) by mouth 2 times daily (with meals)   Quantity:  180 tablet   Refills:  3       losartan 50 MG tablet   Commonly known as:  COZAAR   Used for:  Essential hypertension with goal blood pressure less than 140/90        Dose:  50 mg   Take 1 tablet (50 mg) by mouth daily   Quantity:  90 tablet   Refills:  3       raloxifene 60 MG tablet   Commonly known as:  Evista   Used for:  Osteoporosis   Notes to Patient:  Resume home med schedule        Dose:  60 mg   Take 1 tablet (60 mg) by mouth daily   Quantity:  90 tablet   Refills:  3       ranitidine 150 MG tablet   Commonly known as:  ZANTAC   Notes to Patient:  Resume home med schedule        Dose:  150 mg   Take 150 mg by mouth 2 times daily   Refills:  0       traMADol 50 MG tablet   Commonly known as:  ULTRAM   Used for:  DJD (degenerative joint disease), ankle and foot, unspecified laterality   Notes to Patient:  Resume home med schedule        Dose:  50 mg   Take 1 tablet (50 mg) by mouth every 6 hours as needed for moderate pain   Quantity:  90 tablet   Refills:  0       VITAMIN B 12 PO   Notes to Patient:  Resume home med schedule        Dose:  100 mcg   Take 100 mcg by mouth daily   Refills:  0       VITAMIN D3 PO   Notes to Patient:  Resume home med schedule        Dose:  400 Units   Take 400 Units by mouth 2 times daily   Refills:  0                Protect others around you: Learn how to safely use, store and throw away your medicines at www.disposemymeds.org.             Medication List: This is a list of all your medications and when to take them. Check marks below indicate your daily home schedule. Keep this list as a reference.      Medications           Morning Afternoon Evening Bedtime As Needed     acetaminophen 325 MG tablet   Commonly known as:  TYLENOL   Take 650 mg by mouth every 4 hours as needed for mild pain   Last time this was given:  650 mg on 8/9/2017 12:09 PM   Next Dose Due:  Available anytime after 4:15pm on 8/9/17                                   acetaminophen-codeine 300-30 MG per tablet   Commonly known as:  TYLENOL #3   Take 1 tablet by mouth every 4 hours as needed for pain maximum 2 tablet(s) per day   Notes to Patient:  Do not exceed 4000mg of tylenol in 24hours                                   alendronate 35 MG tablet   Commonly known as:  FOSAMAX   Take 1 tablet (35 mg) by mouth every 7 days   Notes to Patient:  Resume home med schedule                                amLODIPine 5 MG tablet   Commonly known as:  NORVASC   Take 1 tablet (5 mg) by mouth daily   Next Dose Due:  8/10/17                                   aspirin 81 MG EC tablet   Take 1 tablet (81 mg) by mouth daily   Next Dose Due:  8/10/17                                   atorvastatin 20 MG tablet   Commonly known as:  LIPITOR   Take 1 tablet (20 mg) by mouth daily   Next Dose Due:  8/9/17                                   Calcium-Magnesium 300-300 MG Tabs   Take 1 tablet by mouth 3 times daily   Notes to Patient:  Resume home med schedule                                         carbidopa-levodopa  MG per tablet   Commonly known as:  SINEMET   Take 1 tablet by mouth 4 times daily Takes at 6am, 11am, 4pm, and 9 pm   Last time this was given:  1 tablet on 8/9/2017 11:22 AM   Next Dose Due:  8/9/17 at 4pm            6am       11am       4pm       9pm           carvedilol 6.25 MG tablet   Commonly known as:  COREG   Take 1 tablet (6.25 mg) by mouth 2 times daily (with meals)   Next Dose Due:  8/9/17 at dinnertime   Notes to Patient:  Resume home med schedule                                      losartan 50 MG tablet   Commonly known as:  COZAAR   Take 1 tablet (50 mg) by mouth daily   Next Dose Due:  8/10/17                                    raloxifene 60 MG tablet   Commonly known as:  Evista   Take 1 tablet (60 mg) by mouth daily   Notes to Patient:  Resume home med schedule                                ranitidine 150 MG tablet   Commonly known as:  ZANTAC   Take 150 mg by mouth 2 times daily   Notes to Patient:  Resume home med schedule                                traMADol 50 MG tablet   Commonly known as:  ULTRAM   Take 1 tablet (50 mg) by mouth every 6 hours as needed for moderate pain   Notes to Patient:  Resume home med schedule                                VITAMIN B 12 PO   Take 100 mcg by mouth daily   Notes to Patient:  Resume home med schedule                                VITAMIN D3 PO   Take 400 Units by mouth 2 times daily   Notes to Patient:  Resume home med schedule

## 2017-08-08 NOTE — LETTER
"Transition Communication Hand-off for Care Transitions to Next Level of Care Provider    Name: Opal Sin  MRN #: [id number]  Primary Care Provider: Damian Russ MD  Primary Care MD Name: (P) Damian Russ  Primary Clinic: [address]  [place] [address]  Primary Care Clinic Name: ([facility]) [facility]  Reason for Hospitalization:  Aphasia [R47.01]  Admit Date/Time: 8/8/2017  5:28 PM  Discharge Date: ***  Payor Source: Payor: ARE / Plan: [employer] / Product Type: HMO /     Readmission Assessment Measure (NIK) Risk Score/category: ***    Plan of Care Goals/Milestone Events:   Patient Concerns: ***   Patient Goals:   Short-term ***   Long-term ***   Medical Goals   Short-term ***   Long-term ***         Reason for Communication Hand-off Referral: {CCREASONCTNHANDOFFREFERRALCTS:21501}    Discharge Plan:       Concern for non-adherence with plan of care:   Y/N ***  Discharge Needs Assessment:  Needs       Most Recent Value    Equipment Currently Used at Home cane, straight, walker, rolling, grab bar, shower chair    Transportation Available car, family or friend will provide [Pt still drives,  however, family can provide for now. ]          Already enrolled in Tele-monitoring program and name of program:  ***  Follow-up specialty is recommended: {YES / NO:96646::\"Yes\"}    Follow-up plan:  Future Appointments  Date Time Provider Department Center   8/10/2017 5:30 AM Kendra Rios [facility]   8/14[facility]2017 2:30 PM [facility]       Any outstanding tests or procedures:              Key Recommendations:      Kathy Baltazar    AVS/Discharge Summary is the source of truth; this is a helpful guide for improved communication of patient story          "

## 2017-08-08 NOTE — LETTER
Transition Communication Hand-off for Care Transitions to Next Level of Care Provider    Name: Opal Sin  MRN #: [id number]  Primary Care Provider: Damian Russ MD  Primary Care MD Name: Damian Russ  Primary Clinic: [address]  [place] [address]  Primary Care Clinic Name: [facility]  Reason for Hospitalization:  Aphasia [R47.01]  Admit Date/Time: 8/8/2017  5:28 PM  Discharge Date: 08/09/2017  Payor Source: Payor: UCARE / Plan: [employer] / Product Type: HMO /     Readmission Assessment Measure (NIK) Risk Score/category:  Average but should be Elevated           Reason for Communication Hand-off Referral: Fragility  Difficulty understanding plan of care    Discharge Plan:  Discharge Plan:       Most Recent Value    Concerns To Be Addressed denies needs/concerns at this time           Concern for non-adherence with plan of care:   yes    Needs       Most Recent Value    Equipment Currently Used at Home cane, straight, walker, rolling, grab bar, shower chair    Transportation Available car, family or friend will provide [Pt still drives,  however, family can provide for now. ]    # of Referrals Placed by [facility] Care Coordination, Homecare, Scheduled Follow-up appointments          Follow-up specialty is recommended: Yes    Follow-up plan:  Future Appointments  Date Time Provider Department Center          8/14/2017 2:30 PM Damian Russ MD [facility]       Any outstanding tests or procedures:              Key Recommendations: Patient presented with  Left facial droop    Kathy Baltazar    AVS/Discharge Summary is the source of truth; this is a helpful guide for improved communication of patient story

## 2017-08-09 ENCOUNTER — APPOINTMENT (OUTPATIENT)
Dept: SPEECH THERAPY | Facility: CLINIC | Age: 82
End: 2017-08-09
Attending: INTERNAL MEDICINE
Payer: COMMERCIAL

## 2017-08-09 ENCOUNTER — APPOINTMENT (OUTPATIENT)
Dept: CARDIOLOGY | Facility: CLINIC | Age: 82
End: 2017-08-09
Attending: INTERNAL MEDICINE
Payer: COMMERCIAL

## 2017-08-09 ENCOUNTER — APPOINTMENT (OUTPATIENT)
Dept: OCCUPATIONAL THERAPY | Facility: CLINIC | Age: 82
End: 2017-08-09
Attending: INTERNAL MEDICINE
Payer: COMMERCIAL

## 2017-08-09 VITALS
OXYGEN SATURATION: 97 % | TEMPERATURE: 98.1 F | DIASTOLIC BLOOD PRESSURE: 68 MMHG | SYSTOLIC BLOOD PRESSURE: 134 MMHG | RESPIRATION RATE: 16 BRPM

## 2017-08-09 PROBLEM — R47.01 APHASIA: Status: ACTIVE | Noted: 2017-08-09

## 2017-08-09 LAB
DEPRECATED CALCIDIOL+CALCIFEROL SERPL-MC: 32 UG/L (ref 20–75)
TROPONIN I SERPL-MCNC: 0.02 UG/L (ref 0–0.04)

## 2017-08-09 PROCEDURE — 92526 ORAL FUNCTION THERAPY: CPT | Mod: GN | Performed by: SPEECH-LANGUAGE PATHOLOGIST

## 2017-08-09 PROCEDURE — 25000132 ZZH RX MED GY IP 250 OP 250 PS 637: Performed by: INTERNAL MEDICINE

## 2017-08-09 PROCEDURE — 40000133 ZZH STATISTIC OT WARD VISIT

## 2017-08-09 PROCEDURE — 97535 SELF CARE MNGMENT TRAINING: CPT | Mod: GO,59

## 2017-08-09 PROCEDURE — G8988 SELF CARE GOAL STATUS: HCPCS | Mod: GO,CH

## 2017-08-09 PROCEDURE — 97166 OT EVAL MOD COMPLEX 45 MIN: CPT | Mod: GO

## 2017-08-09 PROCEDURE — G8996 SWALLOW CURRENT STATUS: HCPCS | Mod: GN,CJ | Performed by: SPEECH-LANGUAGE PATHOLOGIST

## 2017-08-09 PROCEDURE — 25000125 ZZHC RX 250: Performed by: INTERNAL MEDICINE

## 2017-08-09 PROCEDURE — G8987 SELF CARE CURRENT STATUS: HCPCS | Mod: GO,CI

## 2017-08-09 PROCEDURE — 40000264 ECHO BUBBLE STUDY W/LUMASON

## 2017-08-09 PROCEDURE — G0378 HOSPITAL OBSERVATION PER HR: HCPCS

## 2017-08-09 PROCEDURE — 97530 THERAPEUTIC ACTIVITIES: CPT | Mod: GO,59

## 2017-08-09 PROCEDURE — 40000893 ZZH STATISTIC PT IP EVAL DEFER: Performed by: PHYSICAL THERAPIST

## 2017-08-09 PROCEDURE — G8998 SWALLOW D/C STATUS: HCPCS | Mod: GN,CJ | Performed by: SPEECH-LANGUAGE PATHOLOGIST

## 2017-08-09 PROCEDURE — 99217 ZZC OBSERVATION CARE DISCHARGE: CPT | Performed by: INTERNAL MEDICINE

## 2017-08-09 PROCEDURE — 36415 COLL VENOUS BLD VENIPUNCTURE: CPT | Performed by: INTERNAL MEDICINE

## 2017-08-09 PROCEDURE — 92610 EVALUATE SWALLOWING FUNCTION: CPT | Mod: GN | Performed by: SPEECH-LANGUAGE PATHOLOGIST

## 2017-08-09 PROCEDURE — 93306 TTE W/DOPPLER COMPLETE: CPT | Mod: 26 | Performed by: INTERNAL MEDICINE

## 2017-08-09 PROCEDURE — 25500064 ZZH RX 255 OP 636: Performed by: INTERNAL MEDICINE

## 2017-08-09 PROCEDURE — 40000225 ZZH STATISTIC SLP WARD VISIT: Performed by: SPEECH-LANGUAGE PATHOLOGIST

## 2017-08-09 PROCEDURE — G8997 SWALLOW GOAL STATUS: HCPCS | Mod: GN,CJ | Performed by: SPEECH-LANGUAGE PATHOLOGIST

## 2017-08-09 PROCEDURE — 84484 ASSAY OF TROPONIN QUANT: CPT | Performed by: INTERNAL MEDICINE

## 2017-08-09 RX ADMIN — ACETAMINOPHEN 650 MG: 325 TABLET, FILM COATED ORAL at 08:17

## 2017-08-09 RX ADMIN — CARBIDOPA AND LEVODOPA 1 TABLET: 25; 100 TABLET ORAL at 06:23

## 2017-08-09 RX ADMIN — CARBIDOPA AND LEVODOPA 1 TABLET: 25; 100 TABLET ORAL at 11:22

## 2017-08-09 RX ADMIN — ONDANSETRON 4 MG: 4 TABLET, ORALLY DISINTEGRATING ORAL at 00:31

## 2017-08-09 RX ADMIN — SULFUR HEXAFLUORIDE 5 ML: KIT at 10:43

## 2017-08-09 RX ADMIN — ACETAMINOPHEN 650 MG: 325 TABLET, FILM COATED ORAL at 03:09

## 2017-08-09 RX ADMIN — ACETAMINOPHEN 650 MG: 325 TABLET, FILM COATED ORAL at 12:09

## 2017-08-09 ASSESSMENT — VISUAL ACUITY
OU: GLASSES
OU: GLASSES

## 2017-08-09 ASSESSMENT — PAIN DESCRIPTION - DESCRIPTORS: DESCRIPTORS: HEADACHE

## 2017-08-09 ASSESSMENT — ACTIVITIES OF DAILY LIVING (ADL): PREVIOUS_RESPONSIBILITIES: MEAL PREP;HOUSEKEEPING;LAUNDRY;MEDICATION MANAGEMENT;FINANCES;DRIVING

## 2017-08-09 NOTE — DISCHARGE INSTRUCTIONS
You are being discharged with orders for home care services. These services have been arranged through [facility]. Please contact home care agency directly at [phone] with any questions/concerns.    Diet recommendation: You are at risk for aspiration with thin liquids by straw. Do not use straw when drinking beverages. Try to select soft moist textures. Take small bites/sips, alternate between solids and liquids, and swallow twice before taking another bite.     Your risk factors for stroke or TIA (transient ischemic attack):    Your Risk Factors Your Results Normal Ranges   High blood pressure  -continue to take BP medication as prescribed BP Readings from Last 1 Encounters:   08/09/17 134/68    Less than 120/80   Cholesterol              Total Lab Results   Component Value Date    CHOL 141 08/08/2017      Less than 150    Triglycerides   Lab Results   Component Value Date    TRIG 71 08/08/2017    Less than 150   LDL Lab Results   Component Value Date    LDL 69 08/08/2017       Less than 70   HDL Lab Results   Component Value Date    HDL 58 08/08/2017            Greater than 40 (men)  Greater than 50 (women)   Diabetes   Recent Labs  Lab 08/08/17  1745   *    Fasting blood glucose    Smoking/tobacco use  Quit smoking and tobacco   Overweight  Lose 1-2 pounds a week   Lack of exercise  30 minutes moderate activity each day   Other risk factors include carotid (neck) artery disease, atrial fibrillation and stress. You may be on new medicine to treat high blood pressure, cholesterol, diabetes or atrial fibrillation.    Understanding Stroke Booklet given to patient. Please refer to booklet for further information.    Stroke warning signs and symptoms - CALL 911 right away for:  - Sudden numbness or weakness in the face, arm or leg (often on one side of the body).  - Sudden confusion or trouble understanding what is going on.  - Sudden blurred or decreased vision in one or both eyes.  -  Sudden trouble speaking, loss of balance, dizziness or problems with coordination.  - Sudden, severe headache for no reason.  - Fainting or seizures.  - Symptoms may go away then come back suddenly.

## 2017-08-09 NOTE — PROGRESS NOTES
08/09/17 0905   Quick Adds   Type of Visit Initial Occupational Therapy Evaluation   Living Environment   Lives With alone   Living Arrangements condominium   Home Accessibility no concerns   Transportation Available car;family or friend will provide  (Pt still drives; however, family can provide for now. )   Self-Care   Dominant Hand right   Usual Activity Tolerance good   Current Activity Tolerance moderate   Equipment Currently Used at Home cane, straight;walker, rolling;grab bar;shower chair   Functional Level Prior   Ambulation 1-->assistive equipment   Transferring 1-->assistive equipment   Toileting 0-->independent  (comfort height toilets)   Bathing 0-->independent   Dressing 0-->independent   Eating 0-->independent   Communication 0-->understands/communicates without difficulty   Swallowing 0-->swallows foods/liquids without difficulty   Cognition 0 - no cognition issues reported   Fall history within last six months yes   Number of times patient has fallen within last six months 1   General Information   Onset of Illness/Injury or Date of Surgery - Date 08/08/17   Referring Physician Jairo Price MD   Patient/Family Goals Statement Pt plans to discharge home soon   Additional Occupational Profile Info/Pertinent History of Current Problem Admitted for word finding issues and L facial droop due to possible CVA.    Precautions/Limitations fall precautions   Cognitive Status Examination   Orientation orientation to person, place and time   Level of Consciousness alert   Able to Follow Commands WNL/WFL   Personal Safety (Cognitive) at risk behaviors demonstrated   Memory impaired   Visual Perception   Visual Perception Wears glasses   Visual Perception Comments Baseline doubled vision for ~3 yrs. No new visual concerns per pt.    Sensory Examination   Sensory Quick Adds No deficits were identified;Light touch   Sensory Comments Denies B UE numbness and tingling.    Sensation Light Touch, Rehab [place]  within normal limits   RUE within normal limits   Pain Assessment   Patient Currently in Pain No   Range of Motion (ROM)   ROM Quick Adds No deficits were identified   ROM Comment B UE WNL   Strength   Manual Muscle Testing Quick Adds No deficits were identified   Strength Comments B UE 5/5 on MMT   Hand Strength   Hand Strength Comments B grasp WFL   Coordination   Upper Extremity Coordination No deficits were identified   Transfer Skills   Transfer Transfer Safety Analysis Bed/Chair;Transfer Skill: Stand to Sit;Transfer Safety Analysis Sit/Stand   Transfer Skill: Bed to Chair/Chair to Bed   Level of [place]: Bed to Chair stand-by assist   Assistive Device - Transfer Skill Bed to Chair Chair to Bed Rehab Eval straight cane   Transfer Safety Analysis Bed/Chair   Impairments Contributing to Impaired Transfers (impaired safety)   Transfer Skill: Sit to Stand   Level of [place]: Sit/Stand stand-by assist   Assistive Device for Transfer: Sit/Stand straight cane   Transfer Safety Analysis Sit/Stand   Impaired Transfers: Sit/Stand (impaired safety)   Transfer Skill: Toilet Transfer   Level of [place]: Toilet stand-by assist   Assistive Device seat riser;grab bars;straight cane   Upper Body Dressing   Level of [place]: Dress Upper Body independent   Lower Body Dressing   Level of [place]: Dress Lower Body stand-by assist   Toileting   Level of [place]: Toilet stand-by assist   Grooming   Level of [place]: Grooming stand-by assist   Eating/Self Feeding   Level of [place]: Eating independent   Instrumental Activities of Daily Living (IADL)   Previous Responsibilities meal prep;housekeeping;laundry;medication management;finances;driving   IADL Comments Pt uses pillbox for med mgmt.    Activities of Daily Living Analysis   Impairments Contributing to Impaired Activities of Daily Living cognition impaired   ADL Comments Impaired safety   General Therapy Interventions   Planned Therapy  "Interventions ADL retraining;transfer training;cognition   Clinical Impression   Criteria for Skilled Therapeutic Interventions Met yes, treatment indicated   OT Diagnosis Decreased I and safety with ADLs   Influenced by the following impairments Impaired cognition and safety   Assessment of Occupational Performance 3-5 Performance Deficits   Identified Performance Deficits Med mgmt; Transportation; Household mgmt   Clinical Decision Making (Complexity) Moderate complexity   Therapy Frequency 5 times/wk   Predicted Duration of Therapy Intervention (days/wks) 3 days   Anticipated Discharge Disposition Home with Assist;Home with Home Therapy   Risks and Benefits of Treatment have been explained. Yes   Patient, Family & other staff in agreement with plan of care Yes   [facility] TM \"6 Clicks\"   2016, Trustees of [facility], under license to [employer].  All rights reserved.   6 Clicks Short Forms Daily Activity Inpatient Short Form   [facility]  \"6 Clicks\" Daily Activity Inpatient Short Form   1. Putting on and taking off regular lower body clothing? 3 - A Little   2. Bathing (including washing, rinsing, drying)? 3 - A Little   3. Toileting, which includes using toilet, bedpan or urinal? 3 - A Little   4. Putting on and taking off regular upper body clothing? 4 - None   5. Taking care of personal grooming such as brushing teeth? 4 - None   6. Eating meals? 4 - None   Daily Activity Raw Score (Score out of 24.Lower scores equate to lower levels of function) 21   Total Evaluation Time   Total Evaluation Time (Minutes) 12     "

## 2017-08-09 NOTE — PROVIDER NOTIFICATION
"txt pg to hospitalist, \"FYI: per neuro pt ok to d/c. therapies recommending home OT and home RN.\"  "

## 2017-08-09 NOTE — PLAN OF CARE
Problem: Goal Outcome Summary  Goal: Goal Outcome Summary  OT: Eval complete and Tx initiated. Pt admitted for word finding issues due to possible TIA. Prior to admit, pt lives alone in [facility] and reports mod I with ADL/IADLs, including driving, med mgmt, and household mgmt.      Discharge Planner OT   Patient plan for discharge: Home today  Current status: Pt required SBA and SEC with functional transfers and LE dressing task.   Barriers to return to prior living situation: Lives alone; Fall risk; Safety concern with IADLs  Recommendations for discharge: Home with increased A from family for IADLs, ade transportation, home RN for med mgmt, and home OT for ongoing intervention  Rationale for recommendations: Pt limited by impaired cognition and safety; thus, would benefit from continued OT intervention upon return home to ensure safety with IADLs.        Entered by: Rahul Rod 08/09/2017 10:00 AM       Occupational Therapy Discharge Summary    Reason for therapy discharge:    Discharged to home with home therapy.    Progress towards therapy goal(s). See goals on Care Plan in [facility] electronic health record for goal details.  Goals not met.  Barriers to achieving goals:   discharge from facility.    Therapy recommendation(s):    Continued therapy is recommended.  Rationale/Recommendations:  Pt would benefit from ongoing OT intervention in home setting to ensure safety with ADL/IADLs upon return home..

## 2017-08-09 NOTE — PLAN OF CARE
Problem: Goal Outcome Summary  Goal: Goal Outcome Summary  Outcome: Improving  A&O. Neuros slight L droop, some WFD. 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 VSS. Tele NSR. Reg diet, no straws. Up with SBA, cane. C/o headache and back pain decreased with heat and tylenol. Denies pain. Plan d/c home with home OT/RN. Neighbor to pick pt up later today.        Addendum: d/c instructions given to pt, pt able to verbalize d/c plan and f/u appts. Pt d/c with ride from neighbor at 1445.

## 2017-08-09 NOTE — UTILIZATION REVIEW
"Ryan Page  Admission Status; Secondary Review Determination     Admission Date: 8/8/2017  5:28 PM       Under the authority of the Utilization Management Committee, the utilization review process indicated a secondary review on the above patient.  The review outcome is based on review of the medical records, discussions with staff, and applying clinical experience noted on the date of the review.        ()      Inpatient Status Appropriate - This patient's medical care is consistent with medical management for inpatient care and reasonable inpatient medical practice.      (X) Observation Status Appropriate - This patient does not meet hospital inpatient criteria and is placed in observation status. If this patient's primary payer is Medicare and was admitted as an inpatient, Condition Code 44 should be used and patient status changed to \"observation\".   () Admission Status NOT Appropriate - This patient's medical care is not consistent with medical management for Inpatient or Observation Status.        () Outpatient Procedure Status Appropriate - Procedure not on Medicare Inpatient list and no complications at the time of this review       RATIONALE FOR DETERMINATION      Brief clinical presentation, information copied from the chart, abbreviated and edited for relevant content:       Reviewed Neurology evaluation today and all of her symptoms have resolved spontaneously. Paged attending to change her admit status to observation as she is currently not meeting inpatient guidelines and Neurology has cleared her for discharge today if her pending Echo is ok.   Opal Sin is a 82 year old female who was admitted on 8/8/2017 for expressive aphasia, but all her symptoms have resolved and it appears to have been a TIA. Team wanted MRI brain- unable to do due to pain pump so instead did vessel imaging:  CTA head and neck:  previous coils in correct area, no stenosis.  CTH unremarkable  echo with bubble: " pending. Telemetry has been normal.  Her laabs: HA1C normal, CRP normal, lipids: LDL 69, HDL 58, TSH normal, Trop neg  Planned Treatment: asa 81 mg a day at discharge. At the time of this note, I have not heard back from my paged to the attending.             The information on this document is developed by the utilization review team in order for the business office to ensure compliance.  This only denotes the appropriateness of proper admission status and does not reflect the quality of care rendered.         The definitions of Inpatient Status and Observation Status used in making the determination above are those provided in the CMS Coverage Manual, Chapter 1 and Chapter 6, section 70.4.      Sincerely,      Carina Burrows MD   Utilization Review/ Case Management  [facility].

## 2017-08-09 NOTE — DISCHARGE SUMMARY
[facility]  Discharge Summary        Opal Sin MRN# [id number]   YOB: 1935 Age: 82 year old     Date of Admission:  8/8/2017  Date of Discharge:  8/9/2017  Admitting Physician:  Judith Combs MD  Discharge Physician: Judith Combs MD  Discharging Service: Hospitalist     Primary Provider: Damian Russ  Primary Care Physician Phone Number: [phone]         Discharge Diagnoses/Problem Oriented Hospital Course (Providers):    Opal Sin was admitted on 8/8/2017 by Judith Combs MD and I would refer you to their history and physical.  The following problems were addressed during her hospitalization:  TIA with Aphasia:  Pt had word finding difficulty on admission   Improved   --MRI brain- unable to do due to pain pump  --vessel imaging:  CTA head and neck:  previous coils in correct area, no stenosis.  CTH unremarkable  --echo with bubble: normal . Negative bubble study   --tele monitoring: NSR  --EKG: NSR  asa 81 mg a day at discharge (had been taking this prior but stopped it for 5 days prior for a hip injection)  2.  History of Parkinson's disease:  She takes Sinemet  3.  History of hypertension:  PTA meds restarted on discharge   4.  History of hyperlipidemia:  On statin   5.  Mild infrequent asthma:  Stable   6.  Gastroesophageal reflux disease:  On Zantac  7.  Deep venous thrombosis prophylaxis:  PCDs   Dispo: home today with home RN and OT        Code Status:      DNR / DNI        Brief Hospital Stay Summary Sent Home With Patient in AVS:               Important Results:      See below         Pending Results:        Unresulted Labs Ordered in the Past 30 Days of this Admission     No orders found for last 61 day(s).            Discharge Instructions and Follow-Up:      Follow-up Appointments     Follow-up and recommended labs and tests        Follow up with your neurologist Dr Ferreira in one month.  [facility] will call you to set  this up after they talk with Dr Ferreira.   Please call them if you don't hear from them in the next couple of days.  [phone]  [address], [address]  [facility] [address]                      Discharge Disposition:      Discharged to home        Discharge Medications:        Discharge Medication List as of 8/9/2017  1:55 PM      CONTINUE these medications which have NOT CHANGED    Details   traMADol (ULTRAM) 50 MG tablet Take 1 tablet (50 mg) by mouth every 6 hours as needed for moderate pain, Disp-90 tablet, R-0, Local Print      carvedilol (COREG) 6.25 MG tablet Take 1 tablet (6.25 mg) by mouth 2 times daily (with meals), Disp-180 tablet, R-3, E-Prescribe      losartan (COZAAR) 50 MG tablet Take 1 tablet (50 mg) by mouth daily, Disp-90 tablet, R-3, E-Prescribe      ranitidine (ZANTAC) 150 MG tablet Take 150 mg by mouth 2 times daily , Historical      atorvastatin (LIPITOR) 20 MG tablet Take 1 tablet (20 mg) by mouth daily, Disp-90 tablet, R-3, E-Prescribe      alendronate (FOSAMAX) 35 MG tablet Take 1 tablet (35 mg) by mouth every 7 days, Disp-12 tablet, R-3, E-PrescribeTo replace prescription for 70 mg tablets      raloxifene (EVISTA) 60 MG tablet Take 1 tablet (60 mg) by mouth daily, Disp-90 tablet, R-3, E-Prescribe      amLODIPine (NORVASC) 5 MG tablet Take 1 tablet (5 mg) by mouth daily, Disp-90 tablet, R-3, E-Prescribe      acetaminophen-codeine (TYLENOL #3) 300-30 MG per tablet Take 1 tablet by mouth every 4 hours as needed for pain maximum 2 tablet(s) per day, Disp-18 tablet, R-0, Local Print      acetaminophen (TYLENOL) 325 MG tablet Take 650 mg by mouth every 4 hours as needed for mild pain, Historical      aspirin EC 81 MG EC tablet Take 1 tablet (81 mg) by mouth daily, Transitional      Cyanocobalamin (VITAMIN B 12 PO) Take 100 mcg by mouth daily , Historical      Calcium-Magnesium 300-300 MG TABS Take 1 tablet by mouth 3 times daily , Historical      carbidopa-levodopa (SINEMET)   MG per tablet Take 1 tablet by mouth 4 times daily Takes at 6am, 11am, 4pm, and 9 pm, Historical      Cholecalciferol (VITAMIN D3 PO) Take 400 Units by mouth 2 times daily , Historical               Allergies:         Allergies   Allergen Reactions     Bee Pollen Hives     If MVI contains this - she will break out in a rash.      Cephalexin Monohydrate Hives     Ciprofloxacin Hives     Clindamycin Hives     Multi Vitamin-Minerals [Hair-Vites] Other (See Comments)     (If MV contains bee pollen she will break out in hives)      Penicillin [Penicillins] Hives     All antibiotics except zpak??????     Thiazide-Type Diuretics Other (See Comments)     Very low sodium levels     Tobramycin Hives     Vancomycin Hives     Atorvastatin      Leg cramps     Cephalexin Hives     Ciprofloxacin Hives     Ibuprofen Nausea and Vomiting and Nausea     stomach pain     Versed [Midazolam] Other (See Comments)     Confused, agiitated, for 6-7 hrs after anngiogram sedation     Zoloft [Sertraline] Other (See Comments)     Headache, elevated BP     Ace Inhibitors Cough     Advil [Ibuprofen Micronized] Nausea     Metoprolol Diarrhea      tolerates Inderal           Consultations This Hospital Stay:      Consultation during this admission received from neurology        Condition and Physical on Discharge:      Discharge condition: Stable   Vitals: Blood pressure 134/68, temperature 98.1  F (36.7  C), temperature source Oral, resp. rate 16, SpO2 97 %, not currently breastfeeding.     Constitutional: Alert and awake    Lungs: CTA   Cardiovascular: RRR, no murmur    Abdomen: Soft, NT, ND, BS+   Skin: Warm and dry    Other:          Discharge Time:      Less  than 30 minutes.        Image Results From This Hospital Stay (For Non-EPIC Providers):        Results for orders placed or performed during the hospital encounter of 08/08/17   CT Head w/o Contrast    Narrative    CT SCAN OF THE HEAD WITHOUT CONTRAST  8/8/2017  5:56 PM     HISTORY:  Code stroke. Word-finding difficulties and left eye droop.    TECHNIQUE: Axial images of the head and coronal reformations without  IV contrast material. Radiation dose for this scan was reduced using  automated exposure control, adjustment of the mA and/or kV according  to patient size, or iterative reconstruction technique.    COMPARISON: 11/29/2016    FINDINGS: There is generalized atrophy of the brain. There is low  attenuation in the white matter of the cerebral hemispheres consistent  with sequelae of small vessel ischemic disease. There is no evidence  of intracranial hemorrhage, mass, acute infarct or anomaly. 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    The visualized portions of the sinuses and mastoids appear normal.  There is no evidence of trauma.       Impression    IMPRESSION:   1. No acute abnormality.  2. Atrophy of the brain. White matter changes consistent with sequelae  of small vessel ischemic disease. This is unchanged.  3. Previous aneurysm embolization coils and Neuroform stent are  unchanged.    RACHEL RIZO MD   CT Head w Contrast    Narrative    CT ANGIOGRAM OF THE HEAD AND NECK WITHOUT AND WITH CONTRAST  8/8/2017  6:06 PM     HISTORY: Code stroke. Word-finding issues and left eye drooping since  this morning. Prior placement of stent and coils.    TECHNIQUE:  Precontrast localizing scans were followed by CT  angiography with an injection of 70 mL Isovue-370 (accession  [id number]), 50 mL Isovue-370 (accession [id number]) IV with scans  through the head and neck.  Images were transferred to a separate 3-D  workstation where multiplanar reformations and 3-D images were  created.  Estimates of carotid stenoses are made relative to the  distal internal carotid artery diameters except as noted. Radiation  dose for this  scan was reduced using automated exposure control,  adjustment of the mA and/or kV according to patient size, or iterative  reconstruction technique.  Perfusion scans were performed at three  levels with injection of an additional 40 mL IV nonionic contrast and  20 mL saline flush.  These images were processed on a separate 3-D  workstation.    COMPARISON: 11/29/2016    CT HEAD FINDINGS: There is generalized atrophy of the brain.  White  matter changes are present in the cerebral hemispheres that are  consistent with small vessel ischemic disease in this age patient.  Cerebral blood flow is grossly normal.    CT ANGIOGRAM HEAD FINDINGS: Aneurysm coils adjacent to the left alirio  cavernous internal carotid artery and Neuroform stent in the same area  are unchanged. Internal carotid arteries are ectatic distally as is  the basilar artery. No new aneurysm is seen. There is no evidence of  arterial occlusion. Mild stenoses are seen in the carotid siphons.  Perfusion CT scan of the head appears normal. Venous circulation is  unremarkable.     CT ANGIOGRAM NECK FINDINGS:   Right carotid artery: Tortuous and ectatic internal carotid artery.  Minimal plaque in the bifurcation.  No significant stenosis.      Left carotid artery: No significant stenosis.      Vertebral arteries: Tortuous vessels. Balanced circulation.  No  significant stenosis.      Other findings: Cervical spine degenerative changes.      Impression    IMPRESSION:   1. No evidence of arterial occlusion or significant stenosis.  2. Previous aneurysm coils and Neuroform stent are again identified.  3. Ectatic distal internal carotid arteries and basilar artery.  4. Normal perfusion CT scan of the head.    I called report to Dr. Sobia Santos at 6:10 PM.    RACHEL RIZO MD   CTA Angiogram Head Neck    Narrative    CT ANGIOGRAM OF THE HEAD AND NECK WITHOUT AND WITH CONTRAST  8/8/2017  6:06 PM     HISTORY: Code stroke. Word-finding issues and left eye drooping  since  this morning. Prior placement of stent and coils.    TECHNIQUE:  Precontrast localizing scans were followed by CT  angiography with an injection of 70 mL Isovue-370 (accession  [id number]), 50 mL Isovue-370 (accession [id number]) IV with scans  through the head and neck.  Images were transferred to a separate 3-D  workstation where multiplanar reformations and 3-D images were  created.  Estimates of carotid stenoses are made relative to the  distal internal carotid artery diameters except as noted. Radiation  dose for this scan was reduced using automated exposure control,  adjustment of the mA and/or kV according to patient size, or iterative  reconstruction technique.  Perfusion scans were performed at three  levels with injection of an additional 40 mL IV nonionic contrast and  20 mL saline flush.  These images were processed on a separate 3-D  workstation.    COMPARISON: 11/29/2016    CT HEAD FINDINGS: There is generalized atrophy of the brain.  White  matter changes are present in the cerebral hemispheres that are  consistent with small vessel ischemic disease in this age patient.  Cerebral blood flow is grossly normal.    CT ANGIOGRAM HEAD FINDINGS: Aneurysm coils adjacent to the left alirio  cavernous internal carotid artery and Neuroform stent in the same area  are unchanged. Internal carotid arteries are ectatic distally as is  the basilar artery. No new aneurysm is seen. There is no evidence of  arterial occlusion. Mild stenoses are seen in the carotid siphons.  Perfusion CT scan of the head appears normal. Venous circulation is  unremarkable.     CT ANGIOGRAM NECK FINDINGS:   Right carotid artery: Tortuous and ectatic internal carotid artery.  Minimal plaque in the bifurcation.  No significant stenosis.      Left carotid artery: No significant stenosis.      Vertebral arteries: Tortuous vessels. Balanced circulation.  No  significant stenosis.      Other findings: Cervical spine degenerative  changes.      Impression    IMPRESSION:   1. No evidence of arterial occlusion or significant stenosis.  2. Previous aneurysm coils and Neuroform stent are again identified.  3. Ectatic distal internal carotid arteries and basilar artery.  4. Normal perfusion CT scan of the head.    I called report to Dr. Sobia Santos at 6:10 PM.    RACHEL RIZO MD             Most Recent Lab Results In EPIC (For Non-EPIC Providers):    Most Recent 3 CBC's:  Recent Labs   Lab Test  08/08/17 1745 11/30/16   0415  11/29/16   1405   WBC  9.7  10.1  13.8*   HGB  13.9  11.9  14.2   MCV  92  91  91   PLT  206  266  333      Most Recent 3 BMP's:  Recent Labs   Lab Test  08/08/17 1745 07/26/17   0759  11/30/16   0415   NA  132*  136  139   POTASSIUM  3.9  4.2  3.8   CHLORIDE  96  105  109   CO2  27  22  21   BUN  18  22  19   CR  0.84  0.91  0.92   ANIONGAP  9  9  9   CHRIS  9.0  8.8  7.7*   GLC  100*  92  82     Most Recent 3 Troponin's:  Recent Labs   Lab Test  08/09/17   0515  08/08/17   2200  11/30/16   0415   06/25/14   1436   09/28/13   1305   TROPI  0.021  <0.015  The 99th percentile for upper reference range is 0.045 ug/L.  Troponin values in   the range of 0.045 - 0.120 ug/L may be associated with risks of adverse   clinical events.    <0.015  The 99th percentile for upper reference range is 0.045 ug/L.  Troponin values in   the range of 0.045 - 0.120 ug/L may be associated with risks of adverse   clinical events.     < >   --    < >   --    TROPONIN   --    --    --    --   0.01   --   0.01    < > = values in this interval not displayed.     Most Recent 3 INR's:  Recent Labs   Lab Test  08/08/17 1745 11/29/16   1405  11/17/16   1135   INR  0.89  0.94  1.15*     Most Recent 2 LFT's:  Recent Labs   Lab Test  11/30/16 0415  07/22/16   0804   AST  12  19   ALT  7  12   ALKPHOS  53  62   BILITOTAL  0.6  0.5     Most Recent Cholesterol Panel:  Recent Labs   Lab Test  08/08/17   2200   CHOL  141   LDL  69   HDL  58   TRIG  71      Most Recent 6 Bacteria Isolates From Any Culture (See EPIC Reports for Culture Details):  Recent Labs   Lab Test  11/30/16   1210  10/28/14   1016  07/13/10   1321   CULT  No MRSA isolated  Culture negative for acid fast bacilli  Assayed at [facility] [address]    Culture negative after 4 weeks  No growth  No Beta Streptococcus isolated     Most Recent TSH, T4 and HgbA1c:   Recent Labs   Lab Test  08/08/17   2200   TSH  0.95   A1C  5.6

## 2017-08-09 NOTE — PLAN OF CARE
"Problem: Goal Outcome Summary  Goal: Goal Outcome Summary  Outcome: No Change  Patient VSS on RA. CYNTHIA orientation due to word finding difficulties, pt wasn't able to formulate the words to answer the orientation questions. Neuro's: Pt Jackson, bilateral hearing aids only left here, weak dorsiflexion on left, left eye unable to track to left pt states, \"not new\",  Word finding difficulties.  Tele: NSR. Patient had an episode of nausea relieved with zofran. C/O headache, back and neck pain managed with tylenol, heat, repositioning. Patient frusterated with the inability to find her words. Up with 1 + gait belt + cane. Plan for echow/bubble today. Will continue to monitor      "

## 2017-08-09 NOTE — PROGRESS NOTES
Care Transition Initial Assessment - RN    Reason For Consult: discharge planning, utilization management concerns (gave obs broch)   Met with: Patient.    DATA   Active Problems:    CVA (cerebral vascular accident) (H)    Aphasia       Cognitive Status: awake, alert and oriented.  [facility]: [facility]  Primary Care MD Name: Damian Russ  Contact information and PCP information verified: Yes    Lives With: alone  Living Arrangements: condominium  Quality Of Family Relationships: supportive, helpful, involved  Description of Support System: Supportive, Involved   Who is your support system?: Sibling(s)   Support Assessment: Other (see comments) (concern for cognitive impairment)     Insurance concerns: Other concerned about copays when changed to obs care    ASSESSMENT  Patient currently receives the following services:  Sisters assist with shopping and cleaning otherwise she is completely independent.       Identified issues/concerns regarding health management: OT feels she has some cognitive issues and would benefit from a home safety assessment.  Pt still drives.      PLAN  Financial costs for the patient include obs care copays if any .  Patient given options and choices for discharge yes, gave home care agency options .  Patient/family is agreeable to the plan?  Yes: She would like [facility]  Patient anticipates discharging to Home w/ home RN/OT . Sent referral to  home care.  Appts made with PCP Dr Russ for Mon Aug 14th and spoke with [facility] to set up appt with Dr Ferreira.  Pt already sees him.  They will fit pt into the schedule and call her at home once appt arranged.  Pt rescheduled her own hip LAURIE with [facility] that was previously scheduled for today.    Patient anticipates needs for home equipment: No    Care  (CTS) will continue to follow as needed.

## 2017-08-09 NOTE — PLAN OF CARE
Problem: Goal Outcome Summary  Goal: Goal Outcome Summary  PT: Orders received. Chart reviewed. Discussed Pt with OT no current skilled PT needs. See OT note for discharge recommendations. PT order completed.

## 2017-08-09 NOTE — PLAN OF CARE
Problem: Goal Outcome Summary  Goal: Goal Outcome Summary  Discharge Planner SLP   Patient plan for discharge: Home today.  Current status: Bedside swallow evaluation completed. Patient presents with mild oral and pharyngeal dysphagia at bedside. She has a baseline throat clear without PO noted. She has decreased lingual ROM for elevation and slight tongue deviation to the right. She reports that her speech/lnaguage function are near her baseline. She demonstrated premature entry of thin liquids via the straw and Sx of aspiration with a delayed throat clearing. Improved via the cup with single swallows, but can not rule out silent aspiration. Pudding texture was swallowed without difficulty. Mastication was adequate for solids but did have one episode of coughing during the swallow. Improved when texture was moist. Patient is at risk for aspiration with thin liquids by straw and on consecutive swallows of thin liquids and dry solids. Patient was provided education on Sx of aspiration and modifying her diet. She does not want to modify the diet, but is willing to select soft moist textures. (DDL 3 handout was given for soft food selections.) Recommend: 1. Regular diet making soft selections per patient's wishes. 2. No straws, small bites/sips, double swallow and alternate liquids/solids.   Barriers to return to prior living situation: None with increased assistance.   Recommendations for discharge: Home with increased assistance. Patient does not want any ST therapy f/u at home.   Rationale for recommendations: Home.        Entered by: Lulú Mathur 08/09/2017 10:27 AM

## 2017-08-09 NOTE — PROVIDER NOTIFICATION
Paged Dr Price that patient passed her swallowing test to place diet order. Also updated that patient is requesting for her sinemet.

## 2017-08-09 NOTE — PROGRESS NOTES
08/09/17 1003   General Information   Onset Date 08/08/17   Start of Care Date 08/09/17   Referring Physician Dr. Price   Patient Profile Review/OT: Additional Occupational Profile Info See Profile for full history and prior level of function   Patient/Family Goals Statement Patient wants to go home.    Swallowing Evaluation Bedside swallow evaluation   Behaviorial Observations Alert   Mode of current nutrition Oral diet   Type of oral diet Regular;Thin liquid   Respiratory Status Room air   Comments Per MD note: Opal Sin is a pleasant 82-year-old female.  She has a past medical history of TIA, hypertension and also left carotid artery aneurysms, status post coiling in 2016.  This patient around noon today started having word finding difficulty and reports feeling confused and having problems with her memory.  Some her neighbors noted that the left side of her face seemed to be drooping and so EMS was summoned.  She was brought in for further evaluation.  In the ED, her labs all appeared normal.  Blood pressures will elevated at 166/115.  CT of the head and CT angiogram of the neck show normal perfusion, no acute issues that can be ascertained.  As she is outside of the 4-hour window, she is not a candidate for TPA anymore plus she has history of aneurysm.  The patient was given high dose rectal aspirin.  Neurology was called and consulted from ED and they recommended bringing her in for further stroke workup.  Admission was deferred to the Hospitalist Service.  Patient is unable to have MRI due to placement of a pain pump, but she will complete the rest of the stroke workup and have full therapy evaluations.  She is still having the symptoms of word finding difficulties and she still has pretty significant left-sided facial droop but no other focal deficits.  There is report of difficulty walking but family and the patient confirm that this is chronic in nature.  She has chronic hip pain.  She was due to  have a steroid injection in her hip tomorrow. Patient seen by this department in the past.    Clinical Swallow Evaluation   Oral Musculature anomalies present   Structural Abnormalities none present   Dentition present and adequate   Mucosal Quality good   Mandibular Strength and Mobility intact   Oral Labial Strength and Mobility impaired retraction  (Minimal left facial droop. )   Lingual Strength and Mobility impaired protrusion;impaired anterior elevation  (Deviates to the right, and unable to elevate. )   Velar Elevation intact   Buccal Strength and Mobility intact   Laryngeal Function Cough;Throat clear;Swallow;Voicing initiated;Dry swallow palpated   Oral Musculature Comments Mild impairment.    Additional Documentation Yes   Swallow Eval   Feeding Assistance no assistance needed   Clinical Swallow Eval: Thin Liquid Texture Trial   Mode of Presentation, Thin Liquids cup;straw;self-fed   Volume of Liquid or Food Presented 3 oz of water.    Oral Phase of Swallow Premature pharyngeal entry   Pharyngeal Phase of Swallow impaired;throat clearing;reduction in laryngeal movement   Diagnostic Statement Throat clearing with a straw. Suspect penetration and can not rule out silent aspiration.    Clinical Swallow Eval: Puree Solid Texture Trial   Mode of Presentation, Puree spoon;self-fed   Volume of Puree Presented 2 oz of pudding.    Oral Phase, Puree WFL   Pharyngeal Phase, Puree intact   Clinical Swallow Eval: Solid Food Texture Trial   Mode of Presentation, Solid self-fed   Volume of Solid Food Presented 1 jey cracker.    Oral Phase, Solid Premature pharyngeal entry   Pharyngeal Phase, Solid impaired;coughing/choking;reduction in laryngeal movement;repeated swallows;throat clearing   Diagnostic Statement Premature entry with a cough x1. Suspect penetration/aspiration.    Swallow Compensations   Swallow Compensations Alternate viscosity of consistencies;Pacing;Reduce amounts;Multiple swallow   Results Suspect  silent aspiration;Oral difficulties only   Swallow Eval: Clinical Impressions   Skilled Criteria for Therapy Intervention Skilled criteria met.  Treatment indicated.   Functional Assessment Scale (FAS) 5   Treatment Diagnosis Mild oral and pharyngeal dysphagia   Diet texture recommendations Regular diet;Thin liquids  (Soft regular textures per patient's wishes despite increased)   Recommended Feeding/Eating Techniques alternate between small bites and sips of food/liquid;maintain upright posture during/after eating for 30 mins;no straws;small sips/bites   Therapy Frequency 5 times/wk   Predicted Duration of Therapy Intervention (days/wks) 1 session.   Anticipated Discharge Disposition home   Risks and Benefits of Treatment have been explained. Yes   Patient, family and/or staff in agreement with Plan of Care Yes   Clinical Impression Comments Patient presents with mild oral and pharyngeal dysphagia at bedside. She has a baseline throat clear without PO noted. She has decreased lingual ROM for elevation and slight tongue deviation to the right. She reports that her speech/lnaguage function are near her baseline. She demonstrated premature entry of thin liquids via the straw and Sx of aspiration with a delayed throat clearing. Improved via the cup with single swallows, but can not rule out silent aspiration. Pudding texture was swallowed without difficulty. Mastication was adequate for solids but did have one episode of coughing during the swallow. Improved when texture was moist. Patient is at risk for aspiration with thin liquids by straw and on consecutive swallows of thin liquids and dry solids. Patient was provided education on Sx of aspiration and modifying her diet. She does not want to modify the diet, but is willing to select soft moist textures. (DDL 3 handout was given for soft food selections.) Recommend: 1. Regular diet making soft selections per patient's wishes. 2. No straws, small bites/sips, double  swallow and alternate liquids/solids.    Total Evaluation Time   Total Evaluation Time (Minutes) 12
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
## 2017-08-09 NOTE — ED NOTES
[facility]  ED Nurse Handoff Report    ED Chief complaint: Cerebrovascular Accident (confusion, expressive aphasia started at noon today per pt; neighbor noted this afternoon that she has a L facial droop)      ED Diagnosis:   Final diagnoses:   Aphasia       Code Status: Full Code    Allergies:   Allergies   Allergen Reactions     Bee Pollen Hives     If MVI contains this - she will break out in a rash.      Cephalexin Monohydrate Hives     Ciprofloxacin Hives     Clindamycin Hives     Multi Vitamin-Minerals [Hair-Vites] Other (See Comments)     (If MV contains bee pollen she will break out in hives)      Penicillin [Penicillins] Hives     All antibiotics except zpak??????     Thiazide-Type Diuretics Other (See Comments)     Very low sodium levels     Tobramycin Hives     Vancomycin Hives     Atorvastatin      Leg cramps     Cephalexin Hives     Ciprofloxacin Hives     Ibuprofen Nausea and Vomiting and Nausea     stomach pain     Versed [Midazolam] Other (See Comments)     Confused, agiitated, for 6-7 hrs after anngiogram sedation     Zoloft [Sertraline] Other (See Comments)     Headache, elevated BP     Ace Inhibitors Cough     Advil [Ibuprofen Micronized] Nausea     Metoprolol Diarrhea      tolerates Inderal       Activity level - Baseline/Home:  Independent    Activity Level - Current:   Stand with Assist     Needed?: No    Isolation: No  Infection: Not Applicable    Bariatric?: No    Vital Signs:   Vitals:    08/08/17 1815 08/08/17 1830 08/08/17 1845 08/08/17 1915   BP: 173/83 180/84 176/86 177/86   Resp: 21 18 16 13   Temp:       SpO2: 99% 99% 99% 99%       Cardiac Rhythm: ,        Pain level:      Is this patient confused?: No    Patient Report: Initial Complaint: stroke symptoms  Focused Assessment: hypertensive to 160's/110's, otherwise VSS on R/A. Pt began feeling confused and had difficulty speaking around noon (per her report), was witnessed in hallway with a left facial droop  by neighbor who brought her immediately to ER. Cardiac WNL, Respiratory WNL. Neuro exam exhibits expressive aphasia and L facial droop (has improved since presentation to ER). No gross abnormalities noted on CT scan (see rad report). Plan to admit.  Tests Performed: labs, CT, EKG  Abnormal Results: Na 132  Treatments provided: N/A    Family Comments: sisters at bedside    OBS brochure/video discussed/provided to patient: N/A    ED Medications:   Medications   aspirin Suppository 300 mg (not administered)   sodium chloride 0.9 % for CT scan flush dose 100 mL (100 mLs Intravenous Given 8/8/17 1806)   iopamidol (ISOVUE-370) solution 120 mL (120 mLs Intravenous Given 8/8/17 1806)       Drips infusing?:  No      ED NURSE PHONE NUMBER: [phone]

## 2017-08-09 NOTE — H&P
PRIMARY CARE PHYSICIAN:  Damian Russ, [facility].      CODE STATUS:  DNR/DNI.      CHIEF COMPLAINT:  Word finding difficulties.      HISTORY OF PRESENT ILLNESS:  Opal Sin is a pleasant 82-year-old female.  She has a past medical history of TIA, hypertension and also left carotid artery aneurysms, status post coiling in 2016.  This patient around noon today started having word finding difficulty and reports feeling confused and having problems with her memory.  Some her neighbors noted that the left side of her face seemed to be drooping and so EMS was summoned.  She was brought in for further evaluation.  In the ED, her labs all appeared normal.  Blood pressures will elevated at 166/115.  CT of the head and CT angiogram of the neck show normal perfusion, no acute issues that can be ascertained.  As she is outside of the 4-hour window, she is not a candidate for TPA anymore plus she has history of aneurysm.  The patient was given high dose rectal aspirin.  Neurology was called and consulted from ED and they recommended bringing her in for further stroke workup.  Admission was deferred to the Hospitalist Service.  Patient is unable to have MRI due to placement of a pain pump, but she will complete the rest of the stroke workup and have full therapy evaluations.  She is still having the symptoms of word finding difficulties and she still has pretty significant left-sided facial droop but no other focal deficits.  There is report of difficulty walking but family and the patient confirm that this is chronic in nature.  She has chronic hip pain.  She was due to have a steroid injection in her hip tomorrow.      ALLERGIES:     1.  Bee pollen causes hives.   2.  Cephalexin causes hives.   3.  Ciprofloxacin causes hives.   4.  Clindamycin causes hives.   5.  Multivitamins cause hives.   6.  Penicillin causes hives.   7.  Thiazides caused hyponatremia.   8.  Tobramycin causes hives.   9.  Vancomycin causes  hives.   10.  Atorvastatin causes leg cramps.   11.  Ibuprofen causes nausea and vomiting.   12.  Versed causes confusion and agitation.   13.  Zoloft causes headaches and high blood pressure.   14.  ACE inhibitors cause cough.   15.  Advil causes nausea.   16.  Metoprolol causes diarrhea.      HOME MEDICATIONS:   1.  Ultram 50 mg q.6 h. p.r.n.   2.  Coreg 6.25 mg p.o. b.i.d.   3.  Losartan 50 mg p.o. daily.   4.  Zantac 150 mg p.o. b.i.d.   5.  Lipitor 20 mg daily.   6.  Fosamax 35 mg every 7 days.   7.  Evista 60 mg daily.   8.  Norvasc 5 mg daily.   9.  Tylenol #3 one tablet q.4 h. p.r.n.   10.  Regular acetaminophen 650 q.4 h. p.r.n.   11.  Aspirin 81 mg a day.   12.  Vitamin B12 100 mcg daily.   13.  Calcium, magnesium tablet t.i.d.   14.  Sinemet 25/100 four times daily.   15.  Vitamin D3 400 units 2 times a day.      PAST MEDICAL HISTORY:   1.  Transient ischemic attack.   2.  Spinal stenosis.   3.  Parkinson's disease.   4.  Osteoporosis.   5.  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   6.  Hyperlipidemia.   7.  GERD.   8.  Hypertension.   9.  Intervertebral disk degeneration.   10.  Breast cancer in .     11.  Asthma, mild and infrequent.      PAST SURGICAL HISTORY:   1.  Left radical mastectomy in .   2.  Bilateral total knee replacement.   3.  Thorascopic wedge resection of the right lung in .   4.  Replacement of bilateral interscapular sockets.   5.  Strabismus repair in .   6.  Dorsal column stimulator .   7.  Appendectomy.   8.  Bunionectomy.      FAMILY HISTORY:  Mother  of uterine cancer at 47.  Father is , cause unknown.  She has 8 sisters, all but one are alive and doing well.  One of her sisters had a stroke at the age of 68.  She has 13 brothers, 1 of them has a history of lung cancer, the other one had a stroke at age 67.  Heart disease is strong with 4 brothers having heart problems.  One brother had a heart transplant at the age of 60.  Another  brother had Alzheimer disease, onset at 74.  All are alive and doing well.      SOCIAL HISTORY:  Never smoked, very rarely does she use alcohol.  No illicit drug history.  She is retired, single, lives independently.  She is a retired RN      REVIEW OF SYSTEMS:  A 10-system review conducted with patient and other than those systems listed in history present illness are negative.      PHYSICAL EXAMINATION:   VITAL SIGNS:  Blood pressure 164/83, O2 sats 98% on room air, respiratory rate 18, heart rate 67, temperature 97.1 Fahrenheit.   GENERAL:  Well-nourished appearing elderly female in no apparent distress.  She is alert, difficult to orient because of her aphasia.   HEENT:  Normocephalic, atraumatic.  No oropharyngeal erythema.   NECK:  No cervical lymphadenopathy, no thyromegaly.   RESPIRATORY:  Lungs clear to auscultation bilaterally.  Normal work of breathing, no wheezes, rales or rhonchi.   CARDIOVASCULAR:  Regular rate and rhythm, no murmurs, rubs or gallops.  Normal S1, S2, no S3, S4.  2+ pulses palpable in upper extremities, no peripheral edema.   ABDOMEN:  Normal bowel sounds.  Abdomen is soft, nontender, nondistended.  No hepatosplenomegaly or mass palpable.   EXTREMITIES:  No clubbing, cyanosis or edema.   NEUROLOGIC:  She has a left-sided facial droop.  She has definite expressive aphasia.  No weakness in any extremities, 2+ DTRs bilaterally, 5/5 motor noted in all 4 extremities.      LABORATORY EVALUATION:  Metabolic panel shows a slightly reduced sodium of 132.  Her baseline runs about 135-139.  All other values on metabolic panel are normal.  CBC all values within normal limits.      IMAGING STUDIES:  CT head without contrast.        IMPRESSION:  No acute abnormality, atrophy of the brain, white matter change consistent with sequelae of small vessel ischemic disease.  This is unchanged.  Previous aneurysm embolization coils and Neuroform stent are unchanged.  CT angiogram of head and neck, no evidence  of arterial occlusion or significant stenosis, previous aneurysm coiled and Neuroform stents are again identified.  Ectatic distal internal carotid arteries and basilar artery.  Normal perfusion CT scan of the head.  CT head with contrast:  No evidence of arterial occlusion or significant stenosis, previous aneurysm coiling and Neuroform stent are again identified, ectatic distal internal carotid arteries and basilar artery normal perfusion CT scan of the head.      ASSESSMENT:  This is an 82-year-old female with a history of Parkinson's disease, TIA, mild asthma, gastroesophageal reflux disease, hypertension, hyperlipidemia, remote history of breast cancer who is presenting today with new symptoms of expressive aphasia, left-sided facial droop and memory deficits.  She is being admitted for what is likely ischemic stroke but territory is unknown at this time, suspect a possible left hemispheric stroke due to language deficits.  Will admit for further stroke workup and Neurology consults.      PLAN:   1.  Suspected cerebrovascular accident with expressive aphasia and left-sided facial droop.  Neurological deficits are ongoing.  Patient unfortunately outside of the window for TPA and also not a good candidate given her aneurysm history; has been given a therapeutic dose of rectal aspirin.  We will make n.p.o., have bedside swallow study tonight for letting her resume any of her p.o. meds.  PT, OT and speech evaluation in the morning.  Will need to get an echo to evaluate for intracardiac thrombus.  Watch on cardiac telemetry overnight for any suspicious arrhythmia issues.  Neurology consultation formally placed for evaluation in the morning.  The patient may require acute rehab or TCU on discharge.  Will be admitted as inpatient to the Neurology floor tonight.   2.  History of Parkinson's disease:  She takes Sinemet.  Hopefully, we can resume this soon, if she passes her swallow study tonight.  She does have what the  family describes as a pain pump.  It actually sounds like she cannot have an MRI because of her deep stimulator for her Parkinson's disease, so MRI is out as part of her stroke workup.     3.  History of hypertension:  We need to allow for some permissive hypertension tonight but IV hydralazine and labetalol have been ordered with parameters for use if systolic above 200 and diastolic above 100.   4.  History of hyperlipidemia:  Holding p.o. atorvastatin until she passes her speech swallow eval.   5.  Mild infrequent asthma:  Does not have anything that she takes at home for it because she never gets flares.  Tonight she sounds like she is stable without any bronchospasm.  Albuterol can always be ordered p.r.n. if needed.   6.  Gastroesophageal reflux disease:  Having to hold p.o. meds tonight.  If any issues I will order some IV Protonix.   7.  Deep venous thrombosis prophylaxis:  PCDs due to concern for possible hemorrhagic conversion.   8.  Code status:  Family and patient confirm to me that she is to remain DNR/DNI.  This change was made on 2016.      DISPOSITION:  Anticipate at least 2 days for more stroke workup, but most of this is going to concentrate now on safe disposition plan with therapy evaluations and neuro consult.         MEENAKSHI KAM MD             D: 2017 20:05   T: 2017 21:11   MT:       Name:     BENJI KENT   MRN:      [id number]        Account:      [id number]   :      1935           Admitted:     [id number]      Document: [id number]       cc: Damian Russ MD

## 2017-08-09 NOTE — PLAN OF CARE
Problem: Goal Outcome Summary  Goal: Goal Outcome Summary  Outcome: No Change  A&O x 4. Word finding difficulty. CYNTHIA heel to shin due to back pain. [place], have only left hearing aide . High SBP but still within given parameter. Tele NSR. regular diet. Up with 1 with belt and cane. Headache and back pain decreased with tylenol. Plan echo with bubble tomorrow.

## 2017-08-10 ENCOUNTER — TELEPHONE (OUTPATIENT)
Dept: FAMILY MEDICINE | Facility: CLINIC | Age: 82
End: 2017-08-10

## 2017-08-10 ENCOUNTER — CARE COORDINATION (OUTPATIENT)
Dept: CARE COORDINATION | Facility: CLINIC | Age: 82
End: 2017-08-10

## 2017-08-10 NOTE — PROGRESS NOTES
Clinic Care Coordination Contact  OUTREACH    Referral Information:  Referral Source: CTS  Reason for Hospitalization:    Benign essential hypertension   Nonruptured cerebral aneurysm   Nonintractable episodic headache, unspecified headache type   Transient cerebral ischemia, unspecified type   Admit Date/Time: 11/15/2016    Discharge Date: 11/17/2016  Reason for Contact:  Reason for Communication Hand-off Referral: Multiple providers/specialties   Planned readmit 11/21 for aneurysm coiling/stent  Care Conference: No     Universal Utilization:   ED Visits in last year: 0  Hospital visits in last year: 4  Last PCP appointment: 07/31/17  Missed Appointments: 0  Concerns: Transient Cerebral Ischemia  Multiple Providers or Specialists: Yes    Clinical Concerns:  Current Medical Concerns: Aphasia, CVA (cerebral vascular accident) (H)  Current Behavioral Concerns: Reports no concerns  Education Provided to patient: Reviewed discharge instuctions  Clinical Pathway Name: None      Medication Management: Patient has understanding of regimen and is adherent:  Yes      Functional Status:  Mobility Status: Independent w/Device  Equipment Currently Used at Home: cane, straight, walker, rolling, grab bar, shower chair  Transportation: Family           Psychosocial:  Current living arrangement: I live in a private home (condo)  Financial/Insurance: [facility] [employer]       Resources and Interventions:  Current Resources: [facility] RN, OT          Advanced Care Plans/Directives on file:: Yes    Referrals Placed: None     Patient/Caregiver understanding: Patient reports she is doing well and has no unmet needs. Stated she is independent and her family is very helpful and supportive. Stated she doesn't think she needs home care services. She is willing to meet with them to discuss it. We reviewed her discharge instructions and upcoming appointments.     Frequency of Care Coordination: As needed    Upcoming appointment:  08/14/17      Plan: RN Care Coordinator will remain available to contact as needed.     Ariela Levin RN  Clinic Care Coordinator  [facility]  [phone]

## 2017-08-10 NOTE — TELEPHONE ENCOUNTER
ED / Discharge Outreach Protocol    Patient Contact    Attempt # 1    Was call answered?  No.  Left message on voicemail with information to call me back.    F/U hospital appointment scheduled 8/14/17 at 2:30pm    Sabina MARTIN RN    [facility]  Discharge Summary      Opal Sin MRN# [id number]   YOB: 1935 Age: 82 year old      Date of Admission:                                 8/8/2017  Date of Discharge:                                  8/9/2017  Admitting Physician:                    Judith Combs MD  Discharge Physician:                 Judith Combs MD  Discharging Service:                    Hospitalist      Primary Provider: Damian Russ  Primary Care Physician Phone Number: [phone]          Discharge Diagnoses/Problem Oriented Hospital Course (Providers):    Opal Sin was admitted on 8/8/2017 by Judith Combs MD and I would refer you to their history and physical.  The following problems were addressed during her hospitalization:  TIA with Aphasia:  Pt had word finding difficulty on admission   Improved   --MRI brain- unable to do due to pain pump  --vessel imaging:  CTA head and neck:  previous coils in correct area, no stenosis.  CTH unremarkable  --echo with bubble: normal . Negative bubble study   --tele monitoring: NSR  --EKG: NSR  asa 81 mg a day at discharge (had been taking this prior but stopped it for 5 days prior for a hip injection)  2.  History of Parkinson's disease:  She takes Sinemet  3.  History of hypertension:  PTA meds restarted on discharge   4.  History of hyperlipidemia:  On statin   5.  Mild infrequent asthma:  Stable   6.  Gastroesophageal reflux disease:  On Zantac  7.  Deep venous thrombosis prophylaxis:  PCDs   Dispo: home today with home RN and OT        Discharge Instructions and Follow-Up:      Follow-up Appointments     Follow-up and recommended labs and tests        Follow up with your neurologist Dr Ferreira in  one month.  [facility]   Neurology will call you to set this up after they talk with Dr Ferreira.   Please call them if you don't hear from them in the next couple of days.  [phone]  [address], [address]  [facility] [address]               Discharge Medications:             Discharge Medication List as of 8/9/2017  1:55 PM           CONTINUE these medications which have NOT CHANGED     Details   traMADol (ULTRAM) 50 MG tablet Take 1 tablet (50 mg) by mouth every 6 hours as needed for moderate pain, Disp-90 tablet, R-0, Local Print       carvedilol (COREG) 6.25 MG tablet Take 1 tablet (6.25 mg) by mouth 2 times daily (with meals), Disp-180 tablet, R-3, E-Prescribe       losartan (COZAAR) 50 MG tablet Take 1 tablet (50 mg) by mouth daily, Disp-90 tablet, R-3, E-Prescribe       ranitidine (ZANTAC) 150 MG tablet Take 150 mg by mouth 2 times daily , Historical       atorvastatin (LIPITOR) 20 MG tablet Take 1 tablet (20 mg) by mouth daily, Disp-90 tablet, R-3, E-Prescribe       alendronate (FOSAMAX) 35 MG tablet Take 1 tablet (35 mg) by mouth every 7 days, Disp-12 tablet, R-3, E-PrescribeTo replace prescription for 70 mg tablets       raloxifene (EVISTA) 60 MG tablet Take 1 tablet (60 mg) by mouth daily, Disp-90 tablet, R-3, E-Prescribe       amLODIPine (NORVASC) 5 MG tablet Take 1 tablet (5 mg) by mouth daily, Disp-90 tablet, R-3, E-Prescribe       acetaminophen-codeine (TYLENOL #3) 300-30 MG per tablet Take 1 tablet by mouth every 4 hours as needed for pain maximum 2 tablet(s) per day, Disp-18 tablet, R-0, Local Print       acetaminophen (TYLENOL) 325 MG tablet Take 650 mg by mouth every 4 hours as needed for mild pain, Historical       aspirin EC 81 MG EC tablet Take 1 tablet (81 mg) by mouth daily, Transitional       Cyanocobalamin (VITAMIN B 12 PO) Take 100 mcg by mouth daily , Historical       Calcium-Magnesium 300-300 MG TABS Take 1 tablet by mouth 3 times daily , Historical        carbidopa-levodopa (SINEMET)  MG per tablet Take 1 tablet by mouth 4 times daily Takes at 6am, 11am, 4pm, and 9 pm, Historical       Cholecalciferol (VITAMIN D3 PO) Take 400 Units by mouth 2 times daily , Historical

## 2017-08-11 NOTE — TELEPHONE ENCOUNTER
"ED/Discharge Protocol    \"Hi, my name is Rossy Sutherland, a registered nurse, and I am calling on behalf of Dr. Russ's office at [place].  I am calling to follow up and see how things are going for you after your recent visit.\"    \"I see that you were in the (ER/UC/IP) on 8/8/2017-8/9/2017.    How are you doing now that you are home?\" patient states she is doing well, cancelled     Is patient experiencing symptoms that may require a hospital visit?  No    Discharge Instructions    \"Let's review your discharge instructions.  What is/are the follow-up recommendations?  Pt. Response: F/U with PCP and neurology    \"Were you instructed to make a follow-up appointment?\"  Pt. Response: Yes.  Has appointment been made?   Yes      \"When you see the provider, I would recommend that you bring your discharge instructions with you.    Medications    \"How many new medications are you on since your hospitalization/ED visit?\"    0-1  \"How many of your current medicines changed (dose, timing, name, etc.) while you were in the hospital/ED visit?\"   0-1  \"Do you have questions about your medications?\"   No  \"Were you newly diagnosed with heart failure, COPD, diabetes or did you have a heart attack?\"   No  For patients on insulin: \"Did you start on insulin in the hospital or did you have your insulin dose changed?\"   No    Medication reconciliation completed? Yes    Was MTM referral placed (*Make sure to put transitions as reason for referral)?   No    Call Summary    \"Do you have any questions or concerns about your condition or care plan at the moment?\"    No  Triage nurse advice given: Keep f/u appts, call sooner if returned s/s or questions/concerns    Patient was in ER once in the past year (assess appropriateness of ER visits.)      \"If you have questions or things don't continue to improve, we encourage you contact us through the main clinic number,  [phone].  Even if the clinic is not open, triage nurses are available " "24/7 to help you.     We would like you to know that our clinic has extended hours (provide information).  We also have urgent care (provide details on closest location and hours/contact info)\"      \"Thank you for your time and take care!\"    Rossy YOUSSEF RN      "

## 2017-08-14 ENCOUNTER — OFFICE VISIT (OUTPATIENT)
Dept: PHARMACY | Facility: CLINIC | Age: 82
End: 2017-08-14
Payer: COMMERCIAL

## 2017-08-14 ENCOUNTER — OFFICE VISIT (OUTPATIENT)
Dept: FAMILY MEDICINE | Facility: CLINIC | Age: 82
End: 2017-08-14
Payer: COMMERCIAL

## 2017-08-14 VITALS — SYSTOLIC BLOOD PRESSURE: 124 MMHG | HEART RATE: 58 BPM | DIASTOLIC BLOOD PRESSURE: 67 MMHG

## 2017-08-14 VITALS
TEMPERATURE: 97.1 F | DIASTOLIC BLOOD PRESSURE: 75 MMHG | HEART RATE: 58 BPM | WEIGHT: 132 LBS | SYSTOLIC BLOOD PRESSURE: 134 MMHG | OXYGEN SATURATION: 100 % | HEIGHT: 62 IN | BODY MASS INDEX: 24.29 KG/M2

## 2017-08-14 DIAGNOSIS — M19.079 DJD (DEGENERATIVE JOINT DISEASE), ANKLE AND FOOT, UNSPECIFIED LATERALITY: ICD-10-CM

## 2017-08-14 DIAGNOSIS — I10 ESSENTIAL HYPERTENSION: Primary | ICD-10-CM

## 2017-08-14 DIAGNOSIS — G45.9 TRANSIENT CEREBRAL ISCHEMIA, UNSPECIFIED TYPE: ICD-10-CM

## 2017-08-14 DIAGNOSIS — K59.00 CONSTIPATION, UNSPECIFIED CONSTIPATION TYPE: ICD-10-CM

## 2017-08-14 DIAGNOSIS — G20.A1 PARKINSON'S DISEASE (H): ICD-10-CM

## 2017-08-14 DIAGNOSIS — E78.5 HYPERLIPIDEMIA LDL GOAL <100: ICD-10-CM

## 2017-08-14 DIAGNOSIS — K21.9 GASTROESOPHAGEAL REFLUX DISEASE, ESOPHAGITIS PRESENCE NOT SPECIFIED: ICD-10-CM

## 2017-08-14 DIAGNOSIS — I10 ESSENTIAL HYPERTENSION, BENIGN: Primary | ICD-10-CM

## 2017-08-14 DIAGNOSIS — M81.0 AGE RELATED OSTEOPOROSIS, UNSPECIFIED PATHOLOGICAL FRACTURE PRESENCE: ICD-10-CM

## 2017-08-14 PROCEDURE — 99607 MTMS BY PHARM ADDL 15 MIN: CPT | Performed by: PHARMACIST

## 2017-08-14 PROCEDURE — 99605 MTMS BY PHARM NP 15 MIN: CPT | Performed by: PHARMACIST

## 2017-08-14 PROCEDURE — 99496 TRANSJ CARE MGMT HIGH F2F 7D: CPT | Performed by: INTERNAL MEDICINE

## 2017-08-14 NOTE — PATIENT INSTRUCTIONS
(I10) Essential hypertension  (primary encounter diagnosis)  Comment: blood pressure  Is well controlled with current medications.  If you have the hip injection I would recommend checking blood pressure at home and if blood pressure reads consistently over 140/ or /90 then I would recommend increasing your amlodipine to 10 mg.  Plan:     (G45.9) Transient cerebral ischemia, unspecified type  Comment: you are doing great - continue aspirin 81 mg and continue blood pressure management   Plan:     (E78.5) HYPERLIPIDEMIA LDL GOAL <130  Comment: continue atorvastatin 20 mg daily and we will check fasting lipid panel again in the next physical  Plan:

## 2017-08-14 NOTE — Clinical Note
What do you think of using aspirin through her cortisol injections in the future?  The bleed risk with a procedure like that is quite small.  Also, I encouraged her to measure her BP after injections but perhaps having amlodipine 10 mg to use during that time would be helpful.

## 2017-08-14 NOTE — MR AVS SNAPSHOT
After Visit Summary   8/14/2017    Opal Sin    MRN: [id number]           Patient Information     Date Of Birth          1935        Visit Information        Provider Department      8/14/2017 1:30 PM Miguelina Bradley, [facility] [place]        Today's Diagnoses     Essential hypertension, benign    -  1    Age related osteoporosis, unspecified pathological fracture presence        Transient cerebral ischemia, unspecified type        Parkinson's disease (H)        Gastroesophageal reflux disease, esophagitis presence not specified        DJD (degenerative joint disease), ankle and foot, unspecified laterality        Constipation, unspecified constipation type          Care Instructions    Recommendations from today's MTM visit:                                                    Today we reviewed what your medicines are for, how to know if they are working, that your medicines are safe and how to make your medicine regimen as easy as possible.     1. I was unable to find a relationship between Sinemet and skin dryness.  If the skin dryness persists, it may be bello to have Dr. Russ evaluate it.      2. Please continue to use aspirin for future Cortisol injections.      3. I will ask Dr. Russ about using additional amlodipine for the 5-7 days after Cortisol injections.  For now, please monitor your blood pressure at home for right now.     Next MTM visit: Please feel free to call or MyChart with questions at any time!    To schedule another MTM appointment, please call the clinic directly or you may call the MT scheduling line at [phone] or toll-free at [phone].     My Clinical Pharmacist's contact information:                                                      It was a pleasure seeing you today!  Please feel free to contact me with any questions or concerns you have.      Miguelina Bradley, Pharm.D., [facility]  Medication Therapy Management  Pharmacist  Page/VM:  [phone]    You may receive a survey about the [facility] services you received.  I would appreciate your feedback to help me serve you better in the future. Please fill it out and return it when you can. Your comments will be anonymous.                    Follow-ups after your visit        Your next 10 appointments already scheduled     Aug 14, 2017  2:30 PM CDT   Office Visit with Damian Russ MD   [facility]    [address] [facility] [address] [phone]           Bring a current list of meds and any records pertaining to this visit. For Physicals, please bring immunization records and any forms needing to be filled out. Please arrive 10 minutes early to complete paperwork.              Who to contact     If you have questions or need follow up information about today's clinic visit or your schedule please contact [facility] directly at [phone].  Normal or non-critical lab and imaging results will be communicated to you by CertiVoxhart, letter or phone within 4 business days after the clinic has received the results. If you do not hear from us within 7 days, please contact the clinic through [email or web address] or phone. If you have a critical or abnormal lab result, we will notify you by phone as soon as possible.  Submit refill requests through [employer] or call your pharmacy and they will forward the refill request to us. Please allow 3 business days for your refill to be completed.          Additional Information About Your Visit        CertiVoxharComecer Information     [employer] gives you secure access to your electronic health record. If you see a primary care provider, you can also send messages to your care team and make appointments. If you have questions, please call your primary care clinic.  If you do not have a primary care provider, please call [phone] and they will assist you.        Care EveryWhere ID     This is your  Care EveryWhere ID. This could be used by other organizations to access your [place] medical records  [id number]        Your Vitals Were     Pulse                   58            Blood Pressure from Last 3 Encounters:   08/14/17 124/67   08/09/17 134/68   07/31/17 128/84    Weight from Last 3 Encounters:   07/27/17 132 lb (59.9 kg)   12/20/16 135 lb 9.6 oz (61.5 kg)   12/07/16 135 lb (61.2 kg)              Today, you had the following     No orders found for display       Primary Care Provider Office Phone # Fax #    Damian Russ [phone] [address] [facility] [address]  [place] [address]        Equal Access to Services     KYLAH SARAVIA : Hadii vicente almonte hadasho Soomaali, waaxda luqadaha, qaybta kaalmada adeegyada, juan pablo villegas . So [facility] [phone].    ATENCIÓN: Si habla español, tiene a rangel disposición servicios gratuitos de asistencia lingüística. Llame al [phone].    We comply with applicable federal civil rights laws and [place] laws. We do not discriminate on the basis of race, color, national origin, age, disability sex, sexual orientation or gender identity.            Thank you!     Thank you for choosing [facility]  for your care. Our goal is always to provide you with excellent care. Hearing back from our patients is one way we can continue to improve our services. Please take a few minutes to complete the written survey that you may receive in the mail after your visit with us. Thank you!             Your Updated Medication List - Protect others around you: Learn how to safely use, store and throw away your medicines at www.disposemymeds.org.          This list is accurate as of: 8/14/17  2:28 PM.  Always use your most recent med list.                   Brand Name Dispense Instructions for use Diagnosis    acetaminophen 325 MG tablet    TYLENOL     Take 650 mg by mouth every 4 hours as needed for mild pain         acetaminophen-codeine 300-30 MG per tablet    TYLENOL #3    18 tablet    Take 1 tablet by mouth every 4 hours as needed for pain maximum 2 tablet(s) per day    Hip pain, left       alendronate 35 MG tablet    FOSAMAX    12 tablet    Take 1 tablet (35 mg) by mouth every 7 days    Osteoporosis       amLODIPine 5 MG tablet    NORVASC    90 tablet    Take 1 tablet (5 mg) by mouth daily    Benign essential hypertension       aspirin 81 MG EC tablet      Take 1 tablet (81 mg) by mouth daily    Transient cerebral ischemia, unspecified type       atorvastatin 20 MG tablet    LIPITOR    90 tablet    Take 1 tablet (20 mg) by mouth daily    Mixed hyperlipidemia       Calcium-Magnesium 300-300 MG Tabs      Take 1 tablet by mouth 3 times daily        carbidopa-levodopa  MG per tablet    SINEMET     Take 1 tablet by mouth 4 times daily Takes at 6am, 11am, 4pm, and 9 pm        carvedilol 6.25 MG tablet    COREG    180 tablet    Take 1 tablet (6.25 mg) by mouth 2 times daily (with meals)    Essential hypertension with goal blood pressure less than 140/90       losartan 50 MG tablet    COZAAR    90 tablet    Take 1 tablet (50 mg) by mouth daily    Essential hypertension with goal blood pressure less than 140/90       METAMUCIL FIBER PO      Take 1 capsule by mouth        raloxifene 60 MG tablet    Evista    90 tablet    Take 1 tablet (60 mg) by mouth daily    Osteoporosis       ranitidine 150 MG tablet    ZANTAC     Take 150 mg by mouth 2 times daily        traMADol 50 MG tablet    ULTRAM    90 tablet    Take 1 tablet (50 mg) by mouth every 6 hours as needed for moderate pain    DJD (degenerative joint disease), ankle and foot, unspecified laterality       VITAMIN B 12 PO      Take 100 mcg by mouth daily        VITAMIN D3 PO      Take 400 Units by mouth 2 times daily

## 2017-08-14 NOTE — PROGRESS NOTES
SUBJECTIVE:                                                      Patient presents for Hospital Followup Visit:    Hospital:  [facility]      Date of Admission: 08/08/17  Date of Discharge: 08/09/17  Reason(s) for Admission:  Transient ischemic attack             Problems taking medications regularly:  None       Medication changes since discharge: None       Problems adhering to non-medication therapy:  None    Summary of hospitalization:  [facility] discharge summary reviewed  Diagnostic Tests/Treatments reviewed.  Follow up needed: none  Other Healthcare Providers Involved in Patient s Care:         None  Update since discharge: improved.     Transient ischemic attack with aphasia   pOal Sin was recently admitted to [facility] for evaluation of transent word finding difficulty on admission.  This improved spontaneously. She did have have a CTA head and neck:  previous coils in correct area, no stenosis.  Echocardiogram with bubble study was normal and she remained in Normal sinus rhythm while on telemetry.  She was advised to resume aspirin 81 mg a day at discharge as this had been stopped prior to admission.  Hypertension:   PTA medications were restarted on discharge   History of hyperlipidemia:   She continues on statin     Past medical history, medications, allergies, social history, family history reviewed and updated in [facility] as of 8/14/2017 .    ROS  CONSTITUTIONAL: no fatigue, no unexpected change in weight  SKIN: no worrisome rashes, no worrisome moles, no worrisome lesions  EYES: no acute vision problems or changes  ENT: no ear problems, no mouth problems, no throat problems  RESP: no significant cough, no shortness of breath  CV: no chest pain, no palpitations, no new or worsening peripheral edema  GI: no nausea, no vomiting, no constipation, no diarrhea  : no frequency, no dysuria, no hematuria  MS:  Hip pain on left side  PSYCHIATRIC: no changes in mood or  "affect      Objective:  Vitals  /75  Pulse 58  Temp 97.1  F (36.2  C)  Ht 5' 2\" (1.575 m)  Wt 132 lb (59.9 kg)  SpO2 100%  Breastfeeding? No  BMI 24.14 kg/m2  GEN: Alert Oriented x3 NAD  HEENT: Atraumatic, normocephalic, neck supple, no thyromegaly, negative cervical adenopathy  TM: TM bilaterally pearly and grey with normal light reflex  CV: RRR no murmurs or rubs  PULM: CTA no wheezes or crackles  ABD: Soft, nontender nondistended, no hepatosplenomegally  SKIN: No visible skin lesion or ulcerations  EXT: 2+ dorsal pedis pulses, no edema bilateral lower extremities  NEURO: Gait and station with left side favoring, using a cane, no discrete muscle fatigue,   PSYCH: Mood good, affect mood congruent    No results found for this or any previous visit (from the past 24 hour(s)).    Assessment/Plan:  Patient Instructions   (I10) Essential hypertension  (primary encounter diagnosis)  Comment: blood pressure  Is well controlled with current medications.  If you have the hip injection I would recommend checking blood pressure at home and if blood pressure reads consistently over 140/ or /90 then I would recommend increasing your amlodipine to 10 mg.  Plan:     (G45.9) Transient cerebral ischemia, unspecified type  Comment: you are doing great - continue aspirin 81 mg and continue blood pressure management   Plan:     (E78.5) HYPERLIPIDEMIA LDL GOAL <130  Comment: continue atorvastatin 20 mg daily and we will check fasting lipid panel again in the next physical  Plan:        Follow up in 3 months    Disclaimer: This note consists of symbols derived from keyboarding, dictation and/or voice recognition software. As a result, there may be errors in the script that have gone undetected. Please consider this when interpreting information found in this chart.    Damian Russ MD  [phone]       "

## 2017-08-14 NOTE — MR AVS SNAPSHOT
After Visit Summary   8/14/2017    Opal Sin    MRN: [id number]           Patient Information     Date Of Birth          1935        Visit Information        Provider Department      8/14/2017 2:30 PM Damian Russ MD [facility]        Today's Diagnoses     Essential hypertension    -  1    Transient cerebral ischemia, unspecified type        HYPERLIPIDEMIA LDL GOAL <130          Care Instructions    (I10) Essential hypertension  (primary encounter diagnosis)  Comment: blood pressure  Is well controlled with current medications.  If you have the hip injection I would recommend checking blood pressure at home and if blood pressure reads consistently over 140/ or /90 then I would recommend increasing your amlodipine to 10 mg.  Plan:     (G45.9) Transient cerebral ischemia, unspecified type  Comment: you are doing great - continue aspirin 81 mg and continue blood pressure management   Plan:     (E78.5) HYPERLIPIDEMIA LDL GOAL <130  Comment: continue atorvastatin 20 mg daily and we will check fasting lipid panel again in the next physical  Plan:              Follow-ups after your visit        Who to contact     If you have questions or need follow up information about today's clinic visit or your schedule please contact [facility] directly at [phone].  Normal or non-critical lab and imaging results will be communicated to you by MyChart, letter or phone within 4 business days after the clinic has received the results. If you do not hear from us within 7 days, please contact the clinic through MyChart or phone. If you have a critical or abnormal lab result, we will notify you by phone as soon as possible.  Submit refill requests through [employer] or call your pharmacy and they will forward the refill request to us. Please allow 3 business days for your refill to be completed.          Additional Information About Your Visit        MyChart Information      "[employer] gives you secure access to your electronic health record. If you see a primary care provider, you can also send messages to your care team and make appointments. If you have questions, please call your primary care clinic.  If you do not have a primary care provider, please call [phone] and they will assist you.        Care EveryWhere ID     This is your Care EveryWhere ID. This could be used by other organizations to access your [place] medical records  [id number]        Your Vitals Were     Pulse Temperature Height Pulse Oximetry Breastfeeding? BMI (Body Mass Index)    58 97.1  F (36.2  C) 5' 2\" (1.575 m) 100% No 24.14 kg/m2       Blood Pressure from Last 3 Encounters:   08/14/17 134/75   08/14/17 124/67   08/09/17 134/68    Weight from Last 3 Encounters:   08/14/17 132 lb (59.9 kg)   07/27/17 132 lb (59.9 kg)   12/20/16 135 lb 9.6 oz (61.5 kg)              Today, you had the following     No orders found for display       Primary Care Provider Office Phone # Fax #    Damian Russ [phone] [address] [address]        Equal Access to Services     [facility] : Hadii aad ku hadasho Soomaali, waaxda luqadaha, qaybta kaalmada adeegyada, waxay idiin haykain parth miner lajosé miguel . So [facility] [phone].    ATENCIÓN: Si habla español, tiene a rangel disposición servicios gratuitos de asistencia lingüística. Llame al [phone].    We comply with applicable federal civil rights laws and [place] laws. We do not discriminate on the basis of race, color, national origin, age, disability sex, sexual orientation or gender identity.            Thank you!     Thank you for choosing [facility]  for your care. Our goal is always to provide you with excellent care. Hearing back from our patients is one way we can continue to improve our services. Please take a few minutes to complete the written survey that you may receive in the mail after your " visit with us. Thank you!             Your Updated Medication List - Protect others around you: Learn how to safely use, store and throw away your medicines at www.disposemymeds.org.          This list is accurate as of: 8/14/17  2:47 PM.  Always use your most recent med list.                   Brand Name Dispense Instructions for use Diagnosis    acetaminophen 325 MG tablet    TYLENOL     Take 650 mg by mouth every 4 hours as needed for mild pain        acetaminophen-codeine 300-30 MG per tablet    TYLENOL #3    18 tablet    Take 1 tablet by mouth every 4 hours as needed for pain maximum 2 tablet(s) per day    Hip pain, left       alendronate 35 MG tablet    FOSAMAX    12 tablet    Take 1 tablet (35 mg) by mouth every 7 days    Osteoporosis       amLODIPine 5 MG tablet    NORVASC    90 tablet    Take 1 tablet (5 mg) by mouth daily    Benign essential hypertension       aspirin 81 MG EC tablet      Take 1 tablet (81 mg) by mouth daily    Transient cerebral ischemia, unspecified type       atorvastatin 20 MG tablet    LIPITOR    90 tablet    Take 1 tablet (20 mg) by mouth daily    Mixed hyperlipidemia       Calcium-Magnesium 300-300 MG Tabs      Take 1 tablet by mouth 3 times daily        carbidopa-levodopa  MG per tablet    SINEMET     Take 1 tablet by mouth 4 times daily Takes at 6am, 11am, 4pm, and 9 pm        carvedilol 6.25 MG tablet    COREG    180 tablet    Take 1 tablet (6.25 mg) by mouth 2 times daily (with meals)    Essential hypertension with goal blood pressure less than 140/90       losartan 50 MG tablet    COZAAR    90 tablet    Take 1 tablet (50 mg) by mouth daily    Essential hypertension with goal blood pressure less than 140/90       METAMUCIL FIBER PO      Take 1 capsule by mouth        raloxifene 60 MG tablet    Evista    90 tablet    Take 1 tablet (60 mg) by mouth daily    Osteoporosis       ranitidine 150 MG tablet    ZANTAC     Take 150 mg by mouth 2 times daily        traMADol 50 MG  tablet    ULTRAM    90 tablet    Take 1 tablet (50 mg) by mouth every 6 hours as needed for moderate pain    DJD (degenerative joint disease), ankle and foot, unspecified laterality       VITAMIN B 12 PO      Take 100 mcg by mouth daily        VITAMIN D3 PO      Take 400 Units by mouth 2 times daily

## 2017-08-14 NOTE — PATIENT INSTRUCTIONS
Recommendations from today's MTM visit:                                                    Today we reviewed what your medicines are for, how to know if they are working, that your medicines are safe and how to make your medicine regimen as easy as possible.     1. I was unable to find a relationship between Sinemet and skin dryness.  If the skin dryness persists, it may be bello to have Dr. Russ evaluate it.      2. Please continue to use aspirin for future Cortisol injections.      3. I will ask Dr. Russ about using additional amlodipine for the 5-7 days after Cortisol injections.  For now, please monitor your blood pressure at home for right now.     Next MTM visit: Please feel free to call or MyChart with questions at any time!    To schedule another MTM appointment, please call the clinic directly or you may call the MTM scheduling line at [phone] or toll-free at [phone].     My Clinical Pharmacist's contact information:                                                      It was a pleasure seeing you today!  Please feel free to contact me with any questions or concerns you have.      Miguelina Bradley, Pharm.D., [facility]  Medication Therapy Management Pharmacist  Page/VM:  [phone]    You may receive a survey about the MTM services you received.  I would appreciate your feedback to help me serve you better in the future. Please fill it out and return it when you can. Your comments will be anonymous.

## 2017-09-17 DIAGNOSIS — I10 BENIGN ESSENTIAL HYPERTENSION: ICD-10-CM

## 2017-09-17 DIAGNOSIS — I10 ESSENTIAL HYPERTENSION WITH GOAL BLOOD PRESSURE LESS THAN 140/90: ICD-10-CM

## 2017-09-17 DIAGNOSIS — M81.0 OSTEOPOROSIS: ICD-10-CM

## 2017-09-18 RX ORDER — RALOXIFENE HYDROCHLORIDE 60 MG/1
TABLET, FILM COATED ORAL
Refills: 0 | OUTPATIENT
Start: 2017-09-18

## 2017-09-18 RX ORDER — AMLODIPINE BESYLATE 5 MG/1
TABLET ORAL
Refills: 0 | OUTPATIENT
Start: 2017-09-18

## 2017-09-18 RX ORDER — LOSARTAN POTASSIUM 50 MG/1
TABLET ORAL
Refills: 0 | OUTPATIENT
Start: 2017-09-18

## 2017-09-21 DIAGNOSIS — I10 BENIGN ESSENTIAL HYPERTENSION: ICD-10-CM

## 2017-09-21 DIAGNOSIS — M81.0 OSTEOPOROSIS: ICD-10-CM

## 2017-09-21 RX ORDER — RALOXIFENE HYDROCHLORIDE 60 MG/1
1 TABLET, FILM COATED ORAL DAILY
Qty: 90 TABLET | Refills: 2 | Status: SHIPPED | OUTPATIENT
Start: 2017-09-21 | End: 2018-06-10

## 2017-09-21 NOTE — TELEPHONE ENCOUNTER
"Evista Prescription approved per [facility] Refill Protocol.    Per OV notes 8/14/17, \"If you have the hip injection I would recommend checking blood pressure at home and if blood pressure reads consistently over 140/ or /90 then I would recommend increasing your amlodipine to 10 mg.\"     LVM for patient to call back to verify if has recent BP readings.    Sabina MARTIN RN    "

## 2017-09-21 NOTE — TELEPHONE ENCOUNTER
Request for [facility]    raloxifene (EVISTA) 60 MG tablet  Last Written Prescription Date: 12/20/2016  Last Fill Quantity: 90, # refills: 3  Last Office Visit with [facility], [facility] or [facility] prescribing provider: 8/14/2017       DEXA Scan:  Last order of DX HIP/PELVIS/SPINE was found on 8/10/2016 from Hospital Encounter on 8/10/2016     No order of DX HIP/PELVIS/SPINE W LAT FRACTION ANALYSIS is found.       Creatinine   Date Value Ref Range Status   08/08/2017 0.84 0.52 - 1.04 mg/dL Final       amLODIPine (NORVASC) 5 MG tablet        Last Written Prescription Date: 12/20/2016  Last Fill Quantity: 90, # refills: 3  Last Office Visit with [facility], [facility] or [facility] prescribing provider: 8/14/2017       Potassium   Date Value Ref Range Status   08/08/2017 3.9 3.4 - 5.3 mmol/L Final     Creatinine   Date Value Ref Range Status   08/08/2017 0.84 0.52 - 1.04 mg/dL Final     BP Readings from Last 3 Encounters:   08/14/17 134/75   08/14/17 124/67   08/09/17 134/68

## 2017-09-25 RX ORDER — AMLODIPINE BESYLATE 5 MG/1
5 TABLET ORAL DAILY
Qty: 90 TABLET | Refills: 0 | Status: SHIPPED | OUTPATIENT
Start: 2017-09-25 | End: 2017-12-20

## 2017-09-27 ENCOUNTER — TELEPHONE (OUTPATIENT)
Dept: FAMILY MEDICINE | Facility: CLINIC | Age: 82
End: 2017-09-27

## 2017-09-27 DIAGNOSIS — Z12.31 ENCOUNTER FOR SCREENING MAMMOGRAM FOR BREAST CANCER: Primary | ICD-10-CM

## 2017-10-11 ENCOUNTER — HOSPITAL ENCOUNTER (OUTPATIENT)
Dept: MAMMOGRAPHY | Facility: CLINIC | Age: 82
Discharge: HOME | End: 2017-10-11
Attending: INTERNAL MEDICINE | Admitting: INTERNAL MEDICINE
Payer: COMMERCIAL

## 2017-10-11 ENCOUNTER — TRANSFERRED RECORDS (OUTPATIENT)
Dept: HEALTH INFORMATION MANAGEMENT | Facility: CLINIC | Age: 82
End: 2017-10-11

## 2017-10-11 DIAGNOSIS — Z12.31 VISIT FOR SCREENING MAMMOGRAM: ICD-10-CM

## 2017-10-11 PROCEDURE — G0202 SCR MAMMO BI INCL CAD: HCPCS | Mod: 52

## 2017-10-23 DIAGNOSIS — Z79.2 PREVENTIVE ANTIBIOTIC: ICD-10-CM

## 2017-10-23 NOTE — TELEPHONE ENCOUNTER
Pending Prescriptions:                       Disp   Refills    azithromycin (ZITHROMAX) 500 MG tablet [P*1 tabl*0            Sig: TAKE 1 TABLET BY MOUTH 1 TIME FOR 1 DOSE    LOV: 8/14/17 [place]      Last Written Prescription Date:  5/3/17 Discontinued 8/8/17  Last Fill Quantity: 1,   # refills: 0  Future Office visit:       Routing refill request to provider for review/approval because:  Drug not on the [facility], [facility] or [facility] refill protocol or controlled substance  Drug not active on patient's medication list    eKtty Manzanares RT(R)

## 2017-10-24 RX ORDER — AZITHROMYCIN 500 MG/1
TABLET, FILM COATED ORAL
Qty: 1 TABLET | Refills: 0 | Status: SHIPPED | OUTPATIENT
Start: 2017-10-24 | End: 2018-04-27

## 2017-10-24 NOTE — TELEPHONE ENCOUNTER
"Routing refill request to provider for review/approval because:  Drug not on the [facility] refill protocol   Drug not active on patient's medication list - patient to still be on this?  Dx listed as \"preventative abx.\"  Rossy YOUSSEF RN          "

## 2017-11-16 ENCOUNTER — TRANSFERRED RECORDS (OUTPATIENT)
Dept: HEALTH INFORMATION MANAGEMENT | Facility: CLINIC | Age: 82
End: 2017-11-16

## 2017-12-18 ENCOUNTER — MYC MEDICAL ADVICE (OUTPATIENT)
Dept: FAMILY MEDICINE | Facility: CLINIC | Age: 82
End: 2017-12-18

## 2017-12-18 DIAGNOSIS — E04.1 THYROID NODULE: Primary | ICD-10-CM

## 2017-12-20 DIAGNOSIS — I10 BENIGN ESSENTIAL HYPERTENSION: ICD-10-CM

## 2017-12-21 RX ORDER — AMLODIPINE BESYLATE 5 MG/1
TABLET ORAL
Qty: 90 TABLET | Refills: 2 | Status: SHIPPED | OUTPATIENT
Start: 2017-12-21 | End: 2018-10-27

## 2017-12-21 NOTE — TELEPHONE ENCOUNTER
Prescription approved per [facility] Refill Protocol.    Majo RODNEY RN    Requested Prescriptions   Pending Prescriptions Disp Refills     amLODIPine (NORVASC) 5 MG tablet [Pharmacy Med Name: AMLODIPINE BESYLATE 5MG TABLETS] 90 tablet 2     Sig: TAKE 1 TABLET(5 MG) BY MOUTH DAILY    Calcium Channel Blockers Protocol  Passed    12/21/2017 10:07 AM       Passed - Blood pressure under 140/90    BP Readings from Last 3 Encounters:   08/14/17 134/75   08/14/17 124/67   08/09/17 134/68                Passed - Recent or future visit with authorizing provider    Patient had office visit in the last year or has a visit in the next 30 days with authorizing provider.  See chart review.              Passed - Patient is age 18 or older       Passed - No active pregnancy on record       Passed - Normal serum creatinine on file in past 12 months    Recent Labs   Lab Test  08/08/17   1745   CR  0.84            Passed - No positive pregnancy test in past 12 months

## 2017-12-27 ENCOUNTER — HOSPITAL ENCOUNTER (OUTPATIENT)
Dept: ULTRASOUND IMAGING | Facility: CLINIC | Age: 82
Discharge: HOME | End: 2017-12-27
Attending: INTERNAL MEDICINE | Admitting: INTERNAL MEDICINE
Payer: COMMERCIAL

## 2017-12-27 DIAGNOSIS — E04.1 THYROID NODULE: ICD-10-CM

## 2017-12-27 PROCEDURE — 76536 US EXAM OF HEAD AND NECK: CPT

## 2017-12-28 ENCOUNTER — TELEPHONE (OUTPATIENT)
Dept: FAMILY MEDICINE | Facility: CLINIC | Age: 82
End: 2017-12-28

## 2017-12-28 DIAGNOSIS — E04.1 THYROID NODULE: Primary | ICD-10-CM

## 2018-02-16 DIAGNOSIS — M81.0 OSTEOPOROSIS: ICD-10-CM

## 2018-02-16 RX ORDER — ALENDRONATE SODIUM 35 MG/1
TABLET ORAL
Qty: 12 TABLET | Refills: 1 | Status: SHIPPED | OUTPATIENT
Start: 2018-02-16 | End: 2018-07-22

## 2018-02-16 NOTE — TELEPHONE ENCOUNTER
"alendronate (FOSAMAX) 35 MG tablet 12 tablet 3 3/13/2017     Last Written Prescription Date:  3/13/17  Last Fill Quantity: 12,  # refills: 3   Last office visit: 8/14/2017 with prescribing provider:     Future Office Visit:    Requested Prescriptions   Pending Prescriptions Disp Refills     alendronate (FOSAMAX) 35 MG tablet [Pharmacy Med Name: ALENDRONATE 35MG TABLETS] 12 tablet 0     Sig: TAKE 1 TABLET BY MOUTH EVERY 7 DAYS    Bisphosphonates Passed    2/16/2018  8:35 AM       Passed - Recent or future visit with authorizing provider's specialty    Patient had office visit in the last year or has a visit in the next 30 days with authorizing provider.  See \"Patient Info\" tab in inbasket, or \"Choose Columns\" in Meds & Orders section of the refill encounter.            Passed - Dexa on file within past 2 years    Please review last Dexa result.          Passed - Patient is age 18 or older       Passed - Normal Serum Creatinine on file within past 12 months    Recent Labs   Lab Test  08/08/17   1745   CR  0.84             PHQ-9 SCORE 2/3/2015 7/26/2016 12/20/2016   Total Score 0 - -   Total Score MyChart - - -   Total Score - 0 0       "

## 2018-02-21 ENCOUNTER — TELEPHONE (OUTPATIENT)
Dept: PHARMACY | Facility: CLINIC | Age: 83
End: 2018-02-21

## 2018-02-21 NOTE — TELEPHONE ENCOUNTER
We have been unable to reach this patient for MTM follow-up after several attempts. We will stop reaching out to the patient at this time. Please let us know if we can assist in this patient's care in the future.    Routing to PCP as Jasmyne OlivarezD, [facility]  Medication Therapy Management Provider  Pager: [phone]

## 2018-04-04 ENCOUNTER — OFFICE VISIT (OUTPATIENT)
Dept: PHARMACY | Facility: CLINIC | Age: 83
End: 2018-04-04
Payer: COMMERCIAL

## 2018-04-04 VITALS
DIASTOLIC BLOOD PRESSURE: 68 MMHG | SYSTOLIC BLOOD PRESSURE: 114 MMHG | WEIGHT: 132 LBS | HEART RATE: 64 BPM | BODY MASS INDEX: 24.14 KG/M2

## 2018-04-04 DIAGNOSIS — G45.9 TRANSIENT CEREBRAL ISCHEMIA, UNSPECIFIED TYPE: ICD-10-CM

## 2018-04-04 DIAGNOSIS — E78.5 HYPERLIPIDEMIA LDL GOAL <100: ICD-10-CM

## 2018-04-04 DIAGNOSIS — M81.0 AGE RELATED OSTEOPOROSIS, UNSPECIFIED PATHOLOGICAL FRACTURE PRESENCE: ICD-10-CM

## 2018-04-04 DIAGNOSIS — K21.9 GASTROESOPHAGEAL REFLUX DISEASE, ESOPHAGITIS PRESENCE NOT SPECIFIED: ICD-10-CM

## 2018-04-04 DIAGNOSIS — G20.A1 PARKINSON'S DISEASE (H): ICD-10-CM

## 2018-04-04 DIAGNOSIS — E63.9 NUTRITIONAL DISORDER: ICD-10-CM

## 2018-04-04 DIAGNOSIS — K21.9 GASTROESOPHAGEAL REFLUX DISEASE WITHOUT ESOPHAGITIS: ICD-10-CM

## 2018-04-04 DIAGNOSIS — M19.079 DJD (DEGENERATIVE JOINT DISEASE), ANKLE AND FOOT, UNSPECIFIED LATERALITY: ICD-10-CM

## 2018-04-04 DIAGNOSIS — I10 ESSENTIAL HYPERTENSION, BENIGN: Primary | ICD-10-CM

## 2018-04-04 PROCEDURE — 99607 MTMS BY PHARM ADDL 15 MIN: CPT | Performed by: PHARMACIST

## 2018-04-04 PROCEDURE — 99605 MTMS BY PHARM NP 15 MIN: CPT | Performed by: PHARMACIST

## 2018-04-04 RX ORDER — ACETAMINOPHEN 500 MG
1000 TABLET ORAL EVERY 6 HOURS
Status: ON HOLD | COMMUNITY
End: 2024-05-02

## 2018-04-04 RX ORDER — B1/B2/B3/B5/B6/IRON/METH/CHOLN 2.5-18/15
1 LIQUID (ML) ORAL DAILY
Status: ON HOLD | COMMUNITY
End: 2024-05-02

## 2018-04-04 NOTE — MR AVS SNAPSHOT
After Visit Summary   4/4/2018    Opal Sin    MRN: [id number]           Patient Information     Date Of Birth          1935        Visit Information        Provider Department      4/4/2018 2:30 PM Gay Drew, [facility] MTM        Care Instructions    Recommendations from today's MTM visit:                                                    MTM (medication therapy management) is a service provided by a clinical pharmacist designed to help you get the most of out of your medicines.   Today we reviewed what your medicines are for, how to know if they are working, that your medicines are safe and how to make your medicine regimen as easy as possible.     1.  Follow-up with Dr. Russ in August, you'll be due for another DEXA (bone scan) around that time as well.    Next MTM visit:  1 year, sooner if needed    To schedule another MTM appointment, please call the clinic directly or you may call the MTM scheduling line at [phone] or toll-free at [phone].     My Clinical Pharmacist's contact information:                                                      It was a pleasure seeing you today!  Please feel free to contact me with any questions or concerns you have.      Gay Drew, PharmD, [facility]  Medication Therapy Management Provider  Pager: [phone]     You may receive a survey about the MTM services you received.  I would appreciate your feedback to help me serve you better in the future. Please fill it out and return it when you can. Your comments will be anonymous.                   Follow-ups after your visit        Who to contact     If you have questions or need follow up information about today's clinic visit or your schedule please contact [facility] MTM directly at [phone].  Normal or non-critical lab and imaging results will be communicated to you by MyChart, letter or phone within 4 business days after the clinic has  received the results. If you do not hear from us within 7 days, please contact the clinic through [employer] or phone. If you have a critical or abnormal lab result, we will notify you by phone as soon as possible.  Submit refill requests through [employer] or call your pharmacy and they will forward the refill request to us. Please allow 3 business days for your refill to be completed.          Additional Information About Your Visit        [employer] Information     [employer] gives you secure access to your electronic health record. If you see a primary care provider, you can also send messages to your care team and make appointments. If you have questions, please call your primary care clinic.  If you do not have a primary care provider, please call [phone] and they will assist you.        Care EveryWhere ID     This is your Care EveryWhere ID. This could be used by other organizations to access your [place] medical records  [id number]        Your Vitals Were     Pulse BMI (Body Mass Index)                64 24.14 kg/m2           Blood Pressure from Last 3 Encounters:   04/04/18 114/68   08/14/17 134/75   08/14/17 124/67    Weight from Last 3 Encounters:   04/04/18 132 lb (59.9 kg)   08/14/17 132 lb (59.9 kg)   07/27/17 132 lb (59.9 kg)              Today, you had the following     No orders found for display       Primary Care Provider Office Phone # Fax #    Damian Russ [phone] [address]  [place] [address]        Equal Access to Services     SANG SARAVIA : Hadii aad ku hadasho Soomaali, waaxda luqadaha, qaybta kaalmada adeegyada, juan pablo villegas . So [facility] [phone].    ATENCIÓN: Si habla español, tiene a rangel disposición servicios gratuitos de asistencia lingüística. Llame al [phone].    We comply with applicable federal civil rights laws and [place] laws. We do not discriminate on the basis of race, color, national origin, age,  disability, sex, sexual orientation, or gender identity.            Thank you!     Thank you for choosing [facility] [place] [facility]  for your care. Our goal is always to provide you with excellent care. Hearing back from our patients is one way we can continue to improve our services. Please take a few minutes to complete the written survey that you may receive in the mail after your visit with us. Thank you!             Your Updated Medication List - Protect others around you: Learn how to safely use, store and throw away your medicines at www.disposemymeds.org.          This list is accurate as of 4/4/18  2:46 PM.  Always use your most recent med list.                   Brand Name Dispense Instructions for use Diagnosis    acetaminophen 500 MG tablet    TYLENOL     Take 500-1,000 mg by mouth every 6 hours as needed for mild pain        alendronate 35 MG tablet    FOSAMAX    12 tablet    TAKE 1 TABLET BY MOUTH EVERY 7 DAYS    Osteoporosis       amLODIPine 5 MG tablet    NORVASC    90 tablet    TAKE 1 TABLET(5 MG) BY MOUTH DAILY    Benign essential hypertension       aspirin 81 MG EC tablet      Take 1 tablet (81 mg) by mouth daily    Transient cerebral ischemia, unspecified type       atorvastatin 20 MG tablet    LIPITOR    90 tablet    Take 1 tablet (20 mg) by mouth daily    Mixed hyperlipidemia       azithromycin 500 MG tablet    ZITHROMAX    1 tablet    TAKE 1 TABLET BY MOUTH 1 TIME FOR 1 DOSE    Preventive antibiotic       Calcium-Magnesium 300-300 MG Tabs      Take 1 tablet by mouth 3 times daily        carbidopa-levodopa  MG per tablet    SINEMET     Take 1 tablet by mouth 4 times daily Takes at 6am, 11am, 4pm, and 9 pm        carvedilol 6.25 MG tablet    COREG    180 tablet    Take 1 tablet (6.25 mg) by mouth 2 times daily (with meals)    Essential hypertension with goal blood pressure less than 140/90       GERITOL COMPLETE Tabs      Take 1 tablet by mouth daily        losartan 50 MG tablet     COZAAR    90 tablet    Take 1 tablet (50 mg) by mouth daily    Essential hypertension with goal blood pressure less than 140/90       METAMUCIL FIBER PO      Take 1 capsule by mouth        raloxifene 60 MG tablet    Evista    90 tablet    Take 1 tablet (60 mg) by mouth daily    Osteoporosis       ranitidine 150 MG tablet    ZANTAC    180 tablet    TAKE ONE TABLET BY MOUTH TWICE DAILY    Gastroesophageal reflux disease without esophagitis       traMADol 50 MG tablet    ULTRAM    90 tablet    Take 1 tablet (50 mg) by mouth every 6 hours as needed for moderate pain    DJD (degenerative joint disease), ankle and foot, unspecified laterality       VITAMIN B 12 PO      Take 100 mcg by mouth daily        VITAMIN D3 PO      Take 400 Units by mouth 3 times daily

## 2018-04-04 NOTE — PATIENT INSTRUCTIONS
Recommendations from today's MTM visit:                                                    MTM (medication therapy management) is a service provided by a clinical pharmacist designed to help you get the most of out of your medicines.   Today we reviewed what your medicines are for, how to know if they are working, that your medicines are safe and how to make your medicine regimen as easy as possible.     1.  Follow-up with Dr. Russ in August, you'll be due for another DEXA (bone scan) around that time as well.    Next MTM visit:  1 year, sooner if needed    To schedule another MTM appointment, please call the clinic directly or you may call the MTM scheduling line at [phone] or toll-free at [phone].     My Clinical Pharmacist's contact information:                                                      It was a pleasure seeing you today!  Please feel free to contact me with any questions or concerns you have.      Gay Drew PharmD, [facility]  Medication Therapy Management Provider  Pager: [phone]     You may receive a survey about the MTM services you received.  I would appreciate your feedback to help me serve you better in the future. Please fill it out and return it when you can. Your comments will be anonymous.

## 2018-04-04 NOTE — PROGRESS NOTES
SUBJECTIVE/OBJECTIVE:                Opal Sin is a 82 year old female coming in for a follow-up visit for Medication Therapy Management.  She was referred to me from care transitions.     Chief Complaint: Follow up from her visit on 8/14/17 with Yariel Bradley, PharmD, BCACP.  She has no questions or concerns today; would like a general medication review.  This visit serves as an initial visit for 2018.     Tobacco: No tobacco use  Alcohol: not currently using    Medication Adherence/Access: no issues reported    Hypertension: Current medications include amlodipine 5mg daily, carvedilol 6.25mg BID and losartan 50mg daily.  She does monitor her BP regularly and reports it's been running a little high since she's had company (140s systolic).  Otherwise she reports her BP is usually <140/90mmHg.  She denies lightheadedness/dizziness.    Osteoporosis: Current therapy includes: alendronate 35mg weekly (she gets jaw and abdominal pain with the 70mg dose), calcium/magnesium 300mg TID, Vitamin D 1200 IU daily, and she's also on Evista 60mg daily for history of breast cancer . Pt is not experiencing side effects.    Pt is getting the following sources of dietary calcium: limited  Last vitamin D level: 32 ug/L 8/8/17  DEXA History: 8/10/16 - IMPRESSION:  1. Advanced osteoporosis of the left wrist and normal bone marrow density of both hips.  2. The probability of major osteoporotic fracture is 10.8 percent and probability of hip fracture is 1.9 percent within the next 10 years according to FRAX risk assessment.  Risk factors: post-menopausal     Back Pain: She's no longer using Tylenol #3 and is now using APAP 1000mg daily PRN and tramadol 50mg QID PRN (she uses maybe once a week).  She feels this regimen is effective and she denies side effects.    H/o CVA: She takes ASA 81mg daily for this and denies side effects of therapy including bruising/bleeding.    Hyperlipidemia: Current therapy includes atorvastatin 20mg  once daily.  Pt reports no significant myalgias or other side effects.     Parkinson's Disease:  She continues taking Sinemet 25/100mg, is now taking QID and feels this is working well.  She can tell when she's due for her next dose, but denies any other sx.  She denies side effects.    GERD: Current medications include: ranitidine 150mg BID. Pt c/o no current symptoms.  Patient feels that current regimen is effective.     Supplements:  Current supplements include Vitamin B12, Geritol and metamucil.  She feels these are effective and she prefers to continue taking.  She denies side effects.    Current labs include:  Today's Vitals: /68  Pulse 64  Wt 132 lb (59.9 kg)  BMI 24.14 kg/m2     BP Readings from Last 3 Encounters:   08/14/17 134/75   08/14/17 124/67   08/09/17 134/68     Lab Results   Component Value Date    A1C 5.6 08/08/2017   .  Lab Results   Component Value Date    CHOL 141 08/08/2017     Lab Results   Component Value Date    TRIG 71 08/08/2017     Lab Results   Component Value Date    HDL 58 08/08/2017     Lab Results   Component Value Date    LDL 69 08/08/2017       Liver Function Studies -   Recent Labs   Lab Test  11/30/16   0415   PROTTOTAL  5.7*   ALBUMIN  3.1*   BILITOTAL  0.6   ALKPHOS  53   AST  12   ALT  7       Lab Results   Component Value Date    UCRR 114 07/26/2017    MICROL 503 07/26/2017    UMALCR 441.23 (H) 07/26/2017       Last Basic Metabolic Panel:  Lab Results   Component Value Date     08/08/2017      Lab Results   Component Value Date    POTASSIUM 3.9 08/08/2017     Lab Results   Component Value Date    CHLORIDE 96 08/08/2017     Lab Results   Component Value Date    BUN 18 08/08/2017     Lab Results   Component Value Date    CR 0.84 08/08/2017     Estimated CrCl - 42-50ml/min (depending on method used)    GFR Estimate   Date Value Ref Range Status   08/08/2017 65 >60 mL/min/1.7m2 Final     Comment:     Non  GFR Calc   07/26/2017 59 (L) >60  mL/min/1.7m2 Final     Comment:     Non  GFR Calc   11/30/2016 59 (L) >60 mL/min/1.7m2 Final     Comment:     Non  GFR Calc       TSH   Date Value Ref Range Status   08/08/2017 0.95 0.40 - 4.00 mU/L Final   ]    Most Recent Immunizations   Administered Date(s) Administered     Influenza (H1N1) 12/22/2009     Influenza (High Dose) 3 valent vaccine 10/20/2016     Influenza (IIV3) PF 10/24/2013     Pneumo Conj 13-V (2010&after) 05/26/2015     Pneumococcal 23 valent 09/10/2012     TD (ADULT, 7+) 08/16/2007     TDAP Vaccine (Adacel) 09/25/2012     Zoster vaccine, live 03/28/2008       ASSESSMENT:              Current medications were reviewed today as discussed above.      Medication Adherence: good, no issues identified    Hypertension: Stable. Patient is meeting BP goal of < 140/90mmHg.      Osteoporosis: Stable.  Will be due for DEXA scan in August.     Back Pain: Stable.    H/o CVA: Stable.    Hyperlipidemia: Stable. Pt is on moderate intensity statin which is indicated based on 2013 ACC/AHA guidelines for lipid management.    Parkinson's Disease:  Stable.    GERD: Needs improvement.  The maximum recommended dose of ranitidine for her renal function (CrCl <50ml/min) is 150mg once daily.     Supplements:  Stable.        PLAN:                  1.  Advised Opal she'll be due for another DEXA scan in August.  She plans to follow-up with Dr. Russ at that time.  2.  Recommended reducing ranitidine to 150mg once daily.    I spent 20 minutes with this patient today. All changes were made via collaborative practice agreement with Damian Russ. A copy of the visit note was provided to the patient's primary care provider.     Will follow up pending DEXA results in August.    The patient was given a summary of these recommendations as an after visit summary.    Gay Drew, PharmD, [facility]  Medication Therapy Management Provider  Pager: [phone]

## 2018-04-23 DIAGNOSIS — J98.01 ACUTE BRONCHOSPASM: Primary | ICD-10-CM

## 2018-04-23 RX ORDER — ALBUTEROL SULFATE 90 UG/1
1-2 AEROSOL, METERED RESPIRATORY (INHALATION)
COMMUNITY
End: 2018-04-23

## 2018-04-23 NOTE — TELEPHONE ENCOUNTER
Reason for Call:  Medication or medication refill:    Do you use a [facility]?  Name of the pharmacy and p  [facility] [address] [place] [address] [address] AT [facility] [address]    Name of the medication requested:  albuterol    Other request: pt is a seeking a refill for this rx. Please advise    Can we leave a detailed message on this number? YES    Phone number patient can be reached at: Home number on file [phone] (home)    Best Time: any    Call taken on 4/23/2018 at 8:22 AM by Hiren Mooney

## 2018-04-24 RX ORDER — ALBUTEROL SULFATE 90 UG/1
1-2 AEROSOL, METERED RESPIRATORY (INHALATION) EVERY 6 HOURS PRN
Qty: 18 G | Refills: 1 | Status: SHIPPED | OUTPATIENT
Start: 2018-04-24 | End: 2020-01-27

## 2018-04-24 NOTE — TELEPHONE ENCOUNTER
Historic,   Last filled at [facility] per pt.    Last seen 8/14/17 for OV, but saw Johnathan VILLAVICENCIO 4/4/18  Pended 1 inhaler, 1 refill.  Romy Alvarez RN

## 2018-04-27 DIAGNOSIS — Z79.2 PREVENTIVE ANTIBIOTIC: ICD-10-CM

## 2018-04-27 RX ORDER — AZITHROMYCIN 500 MG/1
TABLET, FILM COATED ORAL
Qty: 1 TABLET | Refills: 0 | Status: SHIPPED | OUTPATIENT
Start: 2018-04-27 | End: 2018-06-27

## 2018-04-27 NOTE — TELEPHONE ENCOUNTER
Azithromycin 500 mg    Last Written Prescription Date:  10/24/17  Last Fill Quantity: 1 tablet,  # refills: 0   Last office visit: 8/14/2017 with prescribing provider:  Petrona   Future Office Visit:      Routing refill request to provider for review/approval because:  Drug not on the [facility], P or [facility] refill protocol or controlled substance

## 2018-06-24 ENCOUNTER — NURSE TRIAGE (OUTPATIENT)
Dept: NURSING | Facility: CLINIC | Age: 83
End: 2018-06-24

## 2018-06-24 NOTE — TELEPHONE ENCOUNTER
"Pt started with diarrhea last night 6-8 episode yesterday and 6 today. Abd cramping before stool. Thinks it might be her new medication, raloxifene (EVISTA) 60 MG tablet, started 4 days ago or something she ate. \"I feel like I have an electrolyte imbalance.\" A very dry mouth.     Marie Crowder RN, [facility]    Reason for Disposition    [1] Drinking very little AND [2] dehydration suspected (e.g., no urine > 12 hours, very dry mouth, very lightheaded)    Additional Information    Negative: Shock suspected (e.g., cold/pale/clammy skin, too weak to stand, low BP, rapid pulse)    Negative: Difficult to awaken or acting confused  (e.g., disoriented, slurred speech)    Negative: Sounds like a life-threatening emergency to the triager    Negative: Vomiting also present and worse than the diarrhea    Negative: [1] Blood in stool AND [2] without diarrhea    Negative: [1] SEVERE abdominal pain (e.g., excruciating) AND [2] present > 1 hour    Negative: [1] SEVERE abdominal pain AND [2] age > 60    Negative: [1] Blood in the stool AND [2] moderate or large amount of blood    Negative: Black or tarry bowel movements  (Exception: chronic-unchanged  black-grey bowel movements AND is taking iron pills or Pepto-bismol)    Protocols used: DIARRHEA-ADULT-    "

## 2018-06-26 ENCOUNTER — HOSPITAL ENCOUNTER (EMERGENCY)
Facility: CLINIC | Age: 83
Discharge: HOME | End: 2018-06-26
Attending: EMERGENCY MEDICINE | Admitting: EMERGENCY MEDICINE
Payer: COMMERCIAL

## 2018-06-26 VITALS
RESPIRATION RATE: 18 BRPM | SYSTOLIC BLOOD PRESSURE: 141 MMHG | WEIGHT: 130 LBS | OXYGEN SATURATION: 98 % | DIASTOLIC BLOOD PRESSURE: 71 MMHG | TEMPERATURE: 96.8 F | HEART RATE: 97 BPM | HEIGHT: 63 IN | BODY MASS INDEX: 23.04 KG/M2

## 2018-06-26 DIAGNOSIS — R19.7 DIARRHEA, UNSPECIFIED TYPE: ICD-10-CM

## 2018-06-26 LAB
ANION GAP SERPL CALCULATED.3IONS-SCNC: 9 MMOL/L (ref 3–14)
BASOPHILS # BLD AUTO: 0 10E9/L (ref 0–0.2)
BASOPHILS NFR BLD AUTO: 0.3 %
BUN SERPL-MCNC: 26 MG/DL (ref 7–30)
C COLI+JEJUNI+LARI FUSA STL QL NAA+PROBE: NOT DETECTED
CALCIUM SERPL-MCNC: 8 MG/DL (ref 8.5–10.1)
CHLORIDE SERPL-SCNC: 109 MMOL/L (ref 94–109)
CO2 SERPL-SCNC: 20 MMOL/L (ref 20–32)
CREAT SERPL-MCNC: 1.05 MG/DL (ref 0.52–1.04)
DIFFERENTIAL METHOD BLD: ABNORMAL
EC STX1 GENE STL QL NAA+PROBE: NOT DETECTED
EC STX2 GENE STL QL NAA+PROBE: NOT DETECTED
ENTERIC PATHOGEN COMMENT: NORMAL
EOSINOPHIL # BLD AUTO: 0 10E9/L (ref 0–0.7)
EOSINOPHIL NFR BLD AUTO: 0.3 %
ERYTHROCYTE [DISTWIDTH] IN BLOOD BY AUTOMATED COUNT: 14 % (ref 10–15)
GFR SERPL CREATININE-BSD FRML MDRD: 50 ML/MIN/1.7M2
GLUCOSE SERPL-MCNC: 95 MG/DL (ref 70–99)
HCT VFR BLD AUTO: 35.8 % (ref 35–47)
HGB BLD-MCNC: 12.6 G/DL (ref 11.7–15.7)
IMM GRANULOCYTES # BLD: 0 10E9/L (ref 0–0.4)
IMM GRANULOCYTES NFR BLD: 0.2 %
LYMPHOCYTES # BLD AUTO: 1.1 10E9/L (ref 0.8–5.3)
LYMPHOCYTES NFR BLD AUTO: 18.2 %
MCH RBC QN AUTO: 31.7 PG (ref 26.5–33)
MCHC RBC AUTO-ENTMCNC: 35.2 G/DL (ref 31.5–36.5)
MCV RBC AUTO: 90 FL (ref 78–100)
MONOCYTES # BLD AUTO: 0.7 10E9/L (ref 0–1.3)
MONOCYTES NFR BLD AUTO: 11.3 %
NEUTROPHILS # BLD AUTO: 4.3 10E9/L (ref 1.6–8.3)
NEUTROPHILS NFR BLD AUTO: 69.7 %
NOROV GI+II ORF1-ORF2 JNC STL QL NAA+PR: NOT DETECTED
NRBC # BLD AUTO: 0 10*3/UL
NRBC BLD AUTO-RTO: 0 /100
PLATELET # BLD AUTO: 143 10E9/L (ref 150–450)
POTASSIUM SERPL-SCNC: 3.8 MMOL/L (ref 3.4–5.3)
RBC # BLD AUTO: 3.97 10E12/L (ref 3.8–5.2)
RVA NSP5 STL QL NAA+PROBE: NOT DETECTED
SALMONELLA SP RPOD STL QL NAA+PROBE: NOT DETECTED
SHIGELLA SP+EIEC IPAH STL QL NAA+PROBE: NOT DETECTED
SODIUM SERPL-SCNC: 138 MMOL/L (ref 133–144)
V CHOL+PARA RFBL+TRKH+TNAA STL QL NAA+PR: NOT DETECTED
WBC # BLD AUTO: 6.2 10E9/L (ref 4–11)
Y ENTERO RECN STL QL NAA+PROBE: NOT DETECTED

## 2018-06-26 PROCEDURE — 80048 BASIC METABOLIC PNL TOTAL CA: CPT | Performed by: PHYSICIAN ASSISTANT

## 2018-06-26 PROCEDURE — 87506 IADNA-DNA/RNA PROBE TQ 6-11: CPT | Performed by: PHYSICIAN ASSISTANT

## 2018-06-26 PROCEDURE — 96361 HYDRATE IV INFUSION ADD-ON: CPT

## 2018-06-26 PROCEDURE — 85025 COMPLETE CBC W/AUTO DIFF WBC: CPT | Performed by: PHYSICIAN ASSISTANT

## 2018-06-26 PROCEDURE — 96374 THER/PROPH/DIAG INJ IV PUSH: CPT

## 2018-06-26 PROCEDURE — 25000128 H RX IP 250 OP 636: Performed by: PHYSICIAN ASSISTANT

## 2018-06-26 PROCEDURE — 99284 EMERGENCY DEPT VISIT MOD MDM: CPT

## 2018-06-26 RX ORDER — ONDANSETRON 2 MG/ML
4 INJECTION INTRAMUSCULAR; INTRAVENOUS ONCE
Status: COMPLETED | OUTPATIENT
Start: 2018-06-26 | End: 2018-06-26

## 2018-06-26 RX ADMIN — SODIUM CHLORIDE 1000 ML: 9 INJECTION, SOLUTION INTRAVENOUS at 12:56

## 2018-06-26 RX ADMIN — ONDANSETRON 4 MG: 2 INJECTION INTRAMUSCULAR; INTRAVENOUS at 12:58

## 2018-06-26 ASSESSMENT — ENCOUNTER SYMPTOMS
NAUSEA: 1
ABDOMINAL PAIN: 1
BLOOD IN STOOL: 0
DIARRHEA: 0
FEVER: 0
VOMITING: 0

## 2018-06-26 NOTE — ED AVS SNAPSHOT
Emergency Department    [address] [facility] [address]    Phone:  [phone]    Fax:  [phone]                                       Opal Sin   MRN: [id number]    Department:   Emergency Department   Date of Visit:  6/26/2018           After Visit Summary Signature Page     I have received my discharge instructions, and my questions have been answered. I have discussed any challenges I see with this plan with the nurse or doctor.    ..........................................................................................................................................  Patient/Patient Representative Signature      ..........................................................................................................................................  Patient Representative Print Name and Relationship to Patient    ..................................................               ................................................  Date                                            Time    ..........................................................................................................................................  Reviewed by Signature/Title    ...................................................              ..............................................  Date                                                            Time

## 2018-06-26 NOTE — DISCHARGE INSTRUCTIONS
Discharge Instructions  Adult Diarrhea    You have been seen today for diarrhea (loose stools). This is usually caused by a virus, but some bacteria, parasites, medicines, or other medical conditions can cause similar symptoms. At this time your provider does not find that your diarrhea is a sign of anything dangerous or life-threatening. However, sometimes the signs of serious illness do not show up right away. If you have new or worse symptoms, you may need to be seen again in the Emergency Department or by your primary provider.     Generally, every Emergency Department visit should have a follow-up clinic visit with either a primary or a specialty clinic/provider. Please follow-up as instructed by your emergency provider today.    Return to the Emergency Department if:    You feel you are getting dehydrated, such as being very thirsty, not urinating (peeing) like usual, or feeling faint or lightheaded.     You develop a new fever.    You have abdominal (belly) pain that seems worse than cramps, is in one spot, or is getting worse over time.     You have blood in your stool or your stool becomes black.  (Remember that if you take Pepto-Bismol , this will turn your stool black).     You feel very weak.    What can I do to help myself?    The most important thing to do is to drink clear liquids.   It is best to have only small, frequent sips of liquids. Drinking too much at once may cause more diarrhea. You should also replace minerals, sodium and potassium lost with diarrhea. Pedialyte  and sports drinks can help you replace these minerals. You can also drink clear liquids such as water, weak tea, apple juice, and 7-Up . Avoid acidic liquids (orange juice), caffeine (coffee) or alcohol. Milk products will make the diarrhea worse.    Eat bland (plain) foods. Soda crackers, toast, plain noodles, gelatin, applesauce and bananas are good first choices. Avoid foods that have acid, are spicy, fatty or fibrous (such as  "meats, coarse grains, vegetables). You may start eating these foods again in about 3 days when you are better.     Sometimes treatment includes prescription medicine to prevent diarrhea. If your provider prescribes these for you, take them as directed.     Nonprescription medicine is available for the treatment of diarrhea and can be very effective. If you use it, make sure you use the dose recommended on the package. Check with your healthcare provider before you use any medicine for diarrhea.     Do not take ibuprofen, or other nonsteroidal anti-inflammatory medicines, without checking with your healthcare provider.   Probiotics: If you have been given an antibiotic, you may want to also take a probiotic pill or eat yogurt with live cultures. Probiotics have \"good bacteria\" to help your intestines stay healthy. Studies have shown that probiotics help prevent diarrhea and other intestine problems (including C. diff infection) when you take antibiotics. You can buy these without a prescription in the pharmacy section of the store.   If you were given a prescription for medicine here today, be sure to read all of the information (including the package insert) that comes with your prescription.  This will include important information about the medicine, its side effects, and any warnings that you need to know about.  The pharmacist who fills the prescription can provide more information and answer questions you may have about the medicine.  If you have questions or concerns that the pharmacist cannot address, please call or return to the Emergency Department.  Remember that you can always come back to the Emergency Department if you are not able to see your regular provider in the amount of time listed above, if you get any new symptoms, or if there is anything that worries you.    "
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
## 2018-06-26 NOTE — ED AVS SNAPSHOT
Emergency Department    [address] [facility] [address]    Phone:  [phone]    Fax:  [phone]                                       Opal Sin   MRN: [id number]    Department:   Emergency Department   Date of Visit:  6/26/2018           Patient Information     Date Of Birth          1935        Your diagnoses for this visit were:     Diarrhea, unspecified type        You were seen by Siva Robbins MD.      Follow-up Information     Follow up with Damian Russ MD In 3 days.    Specialty:  Internal Medicine    Contact information:    [address] [facility]  [facility] [id number] [phone]          Follow up with  Emergency Department.    Specialty:  EMERGENCY MEDICINE    Why:  If symptoms worsen    Contact information:    [address] [facility] [address] [phone]        Discharge Instructions         Discharge Instructions  Adult Diarrhea    You have been seen today for diarrhea (loose stools). This is usually caused by a virus, but some bacteria, parasites, medicines, or other medical conditions can cause similar symptoms. At this time your provider does not find that your diarrhea is a sign of anything dangerous or life-threatening. However, sometimes the signs of serious illness do not show up right away. If you have new or worse symptoms, you may need to be seen again in the Emergency Department or by your primary provider.     Generally, every Emergency Department visit should have a follow-up clinic visit with either a primary or a specialty clinic/provider. Please follow-up as instructed by your emergency provider today.    Return to the Emergency Department if:    You feel you are getting dehydrated, such as being very thirsty, not urinating (peeing) like usual, or feeling faint or lightheaded.     You develop a new fever.    You have abdominal (belly) pain that seems worse than cramps, is in one spot, or is getting worse  "over time.     You have blood in your stool or your stool becomes black.  (Remember that if you take Pepto-Bismol , this will turn your stool black).     You feel very weak.    What can I do to help myself?    The most important thing to do is to drink clear liquids.   It is best to have only small, frequent sips of liquids. Drinking too much at once may cause more diarrhea. You should also replace minerals, sodium and potassium lost with diarrhea. Pedialyte  and sports drinks can help you replace these minerals. You can also drink clear liquids such as water, weak tea, apple juice, and 7-Up . Avoid acidic liquids (orange juice), caffeine (coffee) or alcohol. Milk products will make the diarrhea worse.    Eat bland (plain) foods. Soda crackers, toast, plain noodles, gelatin, applesauce and bananas are good first choices. Avoid foods that have acid, are spicy, fatty or fibrous (such as meats, coarse grains, vegetables). You may start eating these foods again in about 3 days when you are better.     Sometimes treatment includes prescription medicine to prevent diarrhea. If your provider prescribes these for you, take them as directed.     Nonprescription medicine is available for the treatment of diarrhea and can be very effective. If you use it, make sure you use the dose recommended on the package. Check with your healthcare provider before you use any medicine for diarrhea.     Do not take ibuprofen, or other nonsteroidal anti-inflammatory medicines, without checking with your healthcare provider.   Probiotics: If you have been given an antibiotic, you may want to also take a probiotic pill or eat yogurt with live cultures. Probiotics have \"good bacteria\" to help your intestines stay healthy. Studies have shown that probiotics help prevent diarrhea and other intestine problems (including C. diff infection) when you take antibiotics. You can buy these without a prescription in the pharmacy section of the store.   If " you were given a prescription for medicine here today, be sure to read all of the information (including the package insert) that comes with your prescription.  This will include important information about the medicine, its side effects, and any warnings that you need to know about.  The pharmacist who fills the prescription can provide more information and answer questions you may have about the medicine.  If you have questions or concerns that the pharmacist cannot address, please call or return to the Emergency Department.  Remember that you can always come back to the Emergency Department if you are not able to see your regular provider in the amount of time listed above, if you get any new symptoms, or if there is anything that worries you.      Your next 10 appointments already scheduled     Jun 28, 2018  9:30 AM CDT   Office Visit with MIO P[facility]    [address] [id number] [phone]           Bring a current list of meds and any records pertaining to this visit. For Physicals, please bring immunization records and any forms needing to be filled out. Please arrive 10 minutes early to complete paperwork.              24 Hour Appointment Hotline       To make an appointment at any [facility], call [phone] ([phone]). If you don't have a family doctor or clinic, we will help you find one. [facility] are conveniently located to serve the needs of you and your family.             Review of your medicines      Our records show that you are taking the medicines listed below. If these are incorrect, please call your family doctor or clinic.        Dose / Directions Last dose taken    acetaminophen 500 MG tablet   Commonly known as:  TYLENOL   Dose:  500-1000 mg        Take 500-1,000 mg by mouth every 6 hours as needed for mild pain   Refills:  0        albuterol 108 (90 Base) MCG/ACT Inhaler   Commonly  known as:  PROAIR HFA/PROVENTIL HFA/VENTOLIN HFA   Dose:  1-2 puff   Quantity:  18 g        Inhale 1-2 puffs into the lungs every 6 hours as needed for shortness of breath / dyspnea or wheezing   Refills:  1        alendronate 35 MG tablet   Commonly known as:  FOSAMAX   Quantity:  12 tablet        TAKE 1 TABLET BY MOUTH EVERY 7 DAYS   Refills:  1        amLODIPine 5 MG tablet   Commonly known as:  NORVASC   Quantity:  90 tablet        TAKE 1 TABLET(5 MG) BY MOUTH DAILY   Refills:  2        aspirin 81 MG EC tablet   Dose:  81 mg        Take 1 tablet (81 mg) by mouth daily   Refills:  0        atorvastatin 20 MG tablet   Commonly known as:  LIPITOR   Dose:  20 mg   Quantity:  90 tablet        Take 1 tablet (20 mg) by mouth daily   Refills:  3        azithromycin 500 MG tablet   Commonly known as:  ZITHROMAX   Quantity:  1 tablet        TAKE 1 TABLET BY MOUTH 1 TIME FOR 1 DOSE   Refills:  0        Calcium-Magnesium 300-300 MG Tabs   Dose:  1 tablet        Take 1 tablet by mouth 3 times daily   Refills:  0        carbidopa-levodopa  MG per tablet   Commonly known as:  SINEMET   Dose:  1 tablet        Take 1 tablet by mouth 4 times daily Takes at 6am, 11am, 4pm, and 9 pm   Refills:  0        carvedilol 6.25 MG tablet   Commonly known as:  COREG   Dose:  6.25 mg   Quantity:  180 tablet        Take 1 tablet (6.25 mg) by mouth 2 times daily (with meals)   Refills:  3        GERITOL COMPLETE Tabs   Dose:  1 tablet        Take 1 tablet by mouth daily   Refills:  0        losartan 50 MG tablet   Commonly known as:  COZAAR   Dose:  50 mg   Quantity:  90 tablet        Take 1 tablet (50 mg) by mouth daily   Refills:  3        METAMUCIL FIBER PO   Dose:  1 capsule        Take 1 capsule by mouth   Refills:  0        raloxifene 60 MG tablet   Commonly known as:  Evista   Quantity:  90 tablet        TAKE 1 TABLET(60 MG) BY MOUTH DAILY   Refills:  0        ranitidine 150 MG tablet   Commonly known as:  ZANTAC   Dose:  150 mg    Quantity:  90 tablet        Take 1 tablet (150 mg) by mouth daily   Refills:  3        traMADol 50 MG tablet   Commonly known as:  ULTRAM   Dose:  50 mg   Quantity:  90 tablet        Take 1 tablet (50 mg) by mouth every 6 hours as needed for moderate pain   Refills:  0        VITAMIN B 12 PO   Dose:  100 mcg        Take 100 mcg by mouth daily   Refills:  0        VITAMIN D3 PO   Dose:  400 Units        Take 400 Units by mouth 3 times daily   Refills:  0                Procedures and tests performed during your visit     Basic metabolic panel    CBC with platelets differential    Enteric Bacteria and Virus Panel by PAOLO Stool      Orders Needing Specimen Collection     None      Pending Results     Date and Time Order Name Status Description    6/26/2018 1248 Enteric Bacteria and Virus Panel by PAOLO Stool In process             Pending Culture Results     Date and Time Order Name Status Description    6/26/2018 1248 Enteric Bacteria and Virus Panel by PAOLO Stool In process             Pending Results Instructions     If you had any lab results that were not finalized at the time of your Discharge, you can call the ED Lab Result RN at [phone]. You will be contacted by this team for any positive Lab results or changes in treatment. The nurses are available 7 days a week from 10A to 6:30P.  You can leave a message 24 hours per day and they will return your call.        Test Results From Your Hospital Stay        6/26/2018  1:09 PM      Component Results     Component Value Ref Range & Units Status    WBC 6.2 4.0 - 11.0 10e9/L Final    RBC Count 3.97 3.8 - 5.2 10e12/L Final    Hemoglobin 12.6 11.7 - 15.7 g/dL Final    Hematocrit 35.8 35.0 - 47.0 % Final    MCV 90 78 - 100 fl Final    MCH 31.7 26.5 - 33.0 pg Final    MCHC 35.2 31.5 - 36.5 g/dL Final    RDW 14.0 10.0 - 15.0 % Final    Platelet Count 143 (L) 150 - 450 10e9/L Final    Diff Method Automated Method  Final    % Neutrophils 69.7 % Final    % Lymphocytes 18.2  % Final    % Monocytes 11.3 % Final    % Eosinophils 0.3 % Final    % Basophils 0.3 % Final    % Immature Granulocytes 0.2 % Final    Nucleated RBCs 0 0 /100 Final    Absolute Neutrophil 4.3 1.6 - 8.3 10e9/L Final    Absolute Lymphocytes 1.1 0.8 - 5.3 10e9/L Final    Absolute Monocytes 0.7 0.0 - 1.3 10e9/L Final    Absolute Eosinophils 0.0 0.0 - 0.7 10e9/L Final    Absolute Basophils 0.0 0.0 - 0.2 10e9/L Final    Abs Immature Granulocytes 0.0 0 - 0.4 10e9/L Final    Absolute Nucleated RBC 0.0  Final         6/26/2018  1:31 PM      Component Results     Component Value Ref Range & Units Status    Sodium 138 133 - 144 mmol/L Final    Potassium 3.8 3.4 - 5.3 mmol/L Final    Chloride 109 94 - 109 mmol/L Final    Carbon Dioxide 20 20 - 32 mmol/L Final    Anion Gap 9 3 - 14 mmol/L Final    Glucose 95 70 - 99 mg/dL Final    Urea Nitrogen 26 7 - 30 mg/dL Final    Creatinine 1.05 (H) 0.52 - 1.04 mg/dL Final    GFR Estimate 50 (L) >60 mL/min/1.7m2 Final    Non  GFR Calc    GFR Estimate If Black 61 >60 mL/min/1.7m2 Final    African American GFR Calc    Calcium 8.0 (L) 8.5 - 10.1 mg/dL Final         6/26/2018  1:57 PM                Clinical Quality Measure: Blood Pressure Screening     Your blood pressure was checked while you were in the emergency department today. The last reading we obtained was  BP: 141/71 . Please read the guidelines below about what these numbers mean and what you should do about them.  If your systolic blood pressure (the top number) is less than 120 and your diastolic blood pressure (the bottom number) is less than 80, then your blood pressure is normal. There is nothing more that you need to do about it.  If your systolic blood pressure (the top number) is 120-139 or your diastolic blood pressure (the bottom number) is 80-89, your blood pressure may be higher than it should be. You should have your blood pressure rechecked within a year by a primary care provider.  If your systolic  blood pressure (the top number) is 140 or greater or your diastolic blood pressure (the bottom number) is 90 or greater, you may have high blood pressure. High blood pressure is treatable, but if left untreated over time it can put you at risk for heart attack, stroke, or kidney failure. You should have your blood pressure rechecked by a primary care provider within the next 4 weeks.  If your provider in the emergency department today gave you specific instructions to follow-up with your doctor or provider even sooner than that, you should follow that instruction and not wait for up to 4 weeks for your follow-up visit.        Thank you for choosing [facility]       Thank you for choosing [facility] for your care. Our goal is always to provide you with excellent care. Hearing back from our patients is one way we can continue to improve our services. Please take a few minutes to complete the written survey that you may receive in the mail after you visit with us. Thank you!        [facility] Information     [facility] [employer] gives you secure access to your electronic health record. If you see a primary care provider, you can also send messages to your care team and make appointments. If you have questions, please call your primary care clinic.  If you do not have a primary care provider, please call [phone] and they will assist you.        Care EveryWhere ID     This is your Care EveryWhere ID. This could be used by other organizations to access your [facility] medical records  [id number]        Equal Access to Services     SANG SARAVIA : Hadii vicente Juarez, waaxda luqadaha, qaybta kaalmada cecily, juan pablo palacio. So [facility] [phone].    ATENCIÓN: Si habla español, tiene a rangel disposición servicios gratuitos de asistencia lingüística. Llame al [phone].    We comply with applicable federal civil rights laws and [place] laws. We do not discriminate on the basis of race, color,  national origin, age, disability, sex, sexual orientation, or gender identity.            After Visit Summary       This is your record. Keep this with you and show to your community pharmacist(s) and doctor(s) at your next visit.

## 2018-06-26 NOTE — ED PROVIDER NOTES
Emergency Department Attending Supervision Note  6/26/2018  1:23 PM    I evaluated this patient in conjunction with Diamond Caal PA-C    Briefly, the patient presented with diarrhea. The patient states she ate some boxed fish prepared in the oven on Friday, 4 days ago and had no immediate symptoms thereafter. However, on in the middle of the night Saturday, 3 days ago, the patient reports she began having diarrhea and has been having 5-6 episodes of diarrhea per day since then. She notes she also had a poor appetite and some nausea this past weekend, but notes she has been able to eat more food since then. However, today, she continued to have the diarrhea, so she came to the ED for further evaluation. On presentation to the ED, the patient endorses some intermittent abdominal cramping, but denies any nausea, vomiting, fevers, blood in the stool, recent travel, or recent antibiotic use. She also denies eating at any restaurants or gas stations, including [employer], recently.    Physical Exam  Nursing note and vitals reviewed.  Constitutional:  Oriented to person, place, and time. Cooperative.   HENT:   Nose:    Nose normal.   Mouth/Throat:   Mucous membranes are normal.   Eyes:    Conjunctivae normal and EOM are normal.      Pupils are equal, round, and reactive to light.   Neck:    Trachea normal.   Cardiovascular:  Normal rate, regular rhythm, normal heart sounds and normal pulses. No murmur heard.  Pulmonary/Chest:  Effort normal and breath sounds normal.   Abdominal:   Soft. Normal appearance and bowel sounds are normal.      There is no tenderness.      There is no rebound and no CVA tenderness.   Musculoskeletal:  Extremities atraumatic x 4.   Lymphadenopathy:  No cervical adenopathy.   Neurological:   Alert and oriented to person, place, and time. Normal strength.      No cranial nerve deficit or sensory deficit. GCS eye subscore is 4. GCS verbal subscore is 5. GCS motor subscore is 6.   Skin:    Skin is  intact. No rash noted.   Psychiatric:   Normal mood and affect.    Results:  CBC:  (L), o/w WNL (WBC 6.2, HGB 12.6)  BMP: Creatinine 1.05 (H), GFR (L), Calcium 8.0 (L), o/w WNL  Enteric Bacteria and Virus Panel by PAOLO Stool: In process    Interventions:  1256: NS 1L IV Bolus  1258: 4 mg Zofran IV    ED course:  Past medical records, nursing notes, and vitals reviewed.  1407: I performed an exam of the patient and obtained history, as documented above.  IV inserted and blood drawn. Stool sample collected and sent for testing.    I rechecked the patient. Findings and plan explained to the patient. Patient discharged home with instructions regarding supportive care, medications, and reasons to return. The importance of close follow-up was reviewed.     My impression is this is an 83-year-old female who came in with ongoing diarrhea.  She had a very benign abdominal exam, which is reassuring.  Her blood work is also unremarkable other than showing a slight increase in her creatinine, which would be consistent with some dehydration.  She was able to provide a stool sample here for culture, which will be very helpful.  She feels comfortable going home, which I think is reasonable.  She does not want any medicine to go home with.  She understands somebody will call her with any positive stool cultures that require treatment.  She should return with any concerns and including increasing pain, vomiting, fevers, or concerns for worsening dehydration.  If she is not improving in a few days, she should also seek reevaluation either here or in the clinic.    Diagnosis    ICD-10-CM   1. Diarrhea, unspecified type R19.7     Siva Robbins MD     I, Lamar Meehan, am serving as a scribe at 2:07 PM on 6/26/2018 to document services personally performed by Siva Robbins MD based on my observations and the provider's statements to me.            Siva Robbins MD  06/26/18 1524       Siva Robbins,  MD  06/26/18 1529       Siva Robbins MD  06/26/18 9961

## 2018-06-27 ENCOUNTER — HOSPITAL ENCOUNTER (INPATIENT)
Facility: CLINIC | Age: 83
LOS: 2 days | Discharge: NURSING, REHAB OR LONG-TERM CARE | DRG: 544 | End: 2018-06-29
Attending: EMERGENCY MEDICINE | Admitting: HOSPITALIST
Payer: COMMERCIAL

## 2018-06-27 ENCOUNTER — APPOINTMENT (OUTPATIENT)
Dept: GENERAL RADIOLOGY | Facility: CLINIC | Age: 83
DRG: 544 | End: 2018-06-27
Attending: PHYSICIAN ASSISTANT
Payer: COMMERCIAL

## 2018-06-27 ENCOUNTER — APPOINTMENT (OUTPATIENT)
Dept: GENERAL RADIOLOGY | Facility: CLINIC | Age: 83
DRG: 544 | End: 2018-06-27
Attending: EMERGENCY MEDICINE
Payer: COMMERCIAL

## 2018-06-27 ENCOUNTER — APPOINTMENT (OUTPATIENT)
Dept: CT IMAGING | Facility: CLINIC | Age: 83
DRG: 544 | End: 2018-06-27
Attending: EMERGENCY MEDICINE
Payer: COMMERCIAL

## 2018-06-27 DIAGNOSIS — S32.301A CLOSED DISPLACED FRACTURE OF RIGHT ILIUM, UNSPECIFIED FRACTURE MORPHOLOGY, INITIAL ENCOUNTER (H): ICD-10-CM

## 2018-06-27 DIAGNOSIS — A08.4 VIRAL GASTROENTERITIS: Primary | ICD-10-CM
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
## 2018-06-27 LAB
ABO + RH BLD: NORMAL
ABO + RH BLD: NORMAL
ALBUMIN UR-MCNC: 30 MG/DL
ANION GAP SERPL CALCULATED.3IONS-SCNC: 9 MMOL/L (ref 3–14)
APPEARANCE UR: CLEAR
APTT PPP: 29 SEC (ref 22–37)
BASOPHILS # BLD AUTO: 0 10E9/L (ref 0–0.2)
BASOPHILS NFR BLD AUTO: 0.3 %
BILIRUB UR QL STRIP: NEGATIVE
BLD GP AB SCN SERPL QL: NORMAL
BLOOD BANK CMNT PATIENT-IMP: NORMAL
BUN SERPL-MCNC: 15 MG/DL (ref 7–30)
CALCIUM SERPL-MCNC: 8.3 MG/DL (ref 8.5–10.1)
CHLORIDE SERPL-SCNC: 110 MMOL/L (ref 94–109)
CK SERPL-CCNC: 107 U/L (ref 30–225)
CO2 SERPL-SCNC: 21 MMOL/L (ref 20–32)
COLOR UR AUTO: YELLOW
CREAT SERPL-MCNC: 0.82 MG/DL (ref 0.52–1.04)
DIFFERENTIAL METHOD BLD: NORMAL
EOSINOPHIL # BLD AUTO: 0 10E9/L (ref 0–0.7)
EOSINOPHIL NFR BLD AUTO: 0 %
ERYTHROCYTE [DISTWIDTH] IN BLOOD BY AUTOMATED COUNT: 14.2 % (ref 10–15)
GFR SERPL CREATININE-BSD FRML MDRD: 67 ML/MIN/1.7M2
GLUCOSE SERPL-MCNC: 91 MG/DL (ref 70–99)
GLUCOSE UR STRIP-MCNC: NEGATIVE MG/DL
HCT VFR BLD AUTO: 37.1 % (ref 35–47)
HGB BLD-MCNC: 12.7 G/DL (ref 11.7–15.7)
HGB UR QL STRIP: NEGATIVE
IMM GRANULOCYTES # BLD: 0 10E9/L (ref 0–0.4)
IMM GRANULOCYTES NFR BLD: 0.3 %
INR PPP: 1.15 (ref 0.86–1.14)
INTERPRETATION ECG - MUSE: NORMAL
KETONES UR STRIP-MCNC: 10 MG/DL
LEUKOCYTE ESTERASE UR QL STRIP: NEGATIVE
LYMPHOCYTES # BLD AUTO: 0.9 10E9/L (ref 0.8–5.3)
LYMPHOCYTES NFR BLD AUTO: 11.5 %
MAGNESIUM SERPL-MCNC: 2 MG/DL (ref 1.6–2.3)
MCH RBC QN AUTO: 31.1 PG (ref 26.5–33)
MCHC RBC AUTO-ENTMCNC: 34.2 G/DL (ref 31.5–36.5)
MCV RBC AUTO: 91 FL (ref 78–100)
MONOCYTES # BLD AUTO: 0.8 10E9/L (ref 0–1.3)
MONOCYTES NFR BLD AUTO: 10.1 %
MUCOUS THREADS #/AREA URNS LPF: PRESENT /LPF
NEUTROPHILS # BLD AUTO: 5.8 10E9/L (ref 1.6–8.3)
NEUTROPHILS NFR BLD AUTO: 77.8 %
NITRATE UR QL: NEGATIVE
NRBC # BLD AUTO: 0 10*3/UL
NRBC BLD AUTO-RTO: 0 /100
PH UR STRIP: 5.5 PH (ref 5–7)
PHOSPHATE SERPL-MCNC: 2.6 MG/DL (ref 2.5–4.5)
PLATELET # BLD AUTO: 176 10E9/L (ref 150–450)
POTASSIUM SERPL-SCNC: 3 MMOL/L (ref 3.4–5.3)
POTASSIUM SERPL-SCNC: 3.8 MMOL/L (ref 3.4–5.3)
RBC # BLD AUTO: 4.09 10E12/L (ref 3.8–5.2)
RBC #/AREA URNS AUTO: 1 /HPF (ref 0–2)
SODIUM SERPL-SCNC: 140 MMOL/L (ref 133–144)
SOURCE: ABNORMAL
SP GR UR STRIP: 1.01 (ref 1–1.03)
SPECIMEN EXP DATE BLD: NORMAL
SQUAMOUS #/AREA URNS AUTO: <1 /HPF (ref 0–1)
TSH SERPL DL<=0.005 MIU/L-ACNC: 1.14 MU/L (ref 0.4–4)
UROBILINOGEN UR STRIP-MCNC: NORMAL MG/DL (ref 0–2)
WBC # BLD AUTO: 7.4 10E9/L (ref 4–11)
WBC #/AREA URNS AUTO: 2 /HPF (ref 0–5)

## 2018-06-27 PROCEDURE — 82550 ASSAY OF CK (CPK): CPT | Performed by: EMERGENCY MEDICINE

## 2018-06-27 PROCEDURE — 81001 URINALYSIS AUTO W/SCOPE: CPT | Performed by: HOSPITALIST

## 2018-06-27 PROCEDURE — 72040 X-RAY EXAM NECK SPINE 2-3 VW: CPT

## 2018-06-27 PROCEDURE — 83735 ASSAY OF MAGNESIUM: CPT | Performed by: HOSPITALIST

## 2018-06-27 PROCEDURE — 96376 TX/PRO/DX INJ SAME DRUG ADON: CPT

## 2018-06-27 PROCEDURE — 36415 COLL VENOUS BLD VENIPUNCTURE: CPT | Performed by: ORTHOPAEDIC SURGERY

## 2018-06-27 PROCEDURE — 85730 THROMBOPLASTIN TIME PARTIAL: CPT | Performed by: EMERGENCY MEDICINE

## 2018-06-27 PROCEDURE — 86850 RBC ANTIBODY SCREEN: CPT | Performed by: EMERGENCY MEDICINE

## 2018-06-27 PROCEDURE — 84443 ASSAY THYROID STIM HORMONE: CPT | Performed by: EMERGENCY MEDICINE

## 2018-06-27 PROCEDURE — 72192 CT PELVIS W/O DYE: CPT

## 2018-06-27 PROCEDURE — 85610 PROTHROMBIN TIME: CPT | Performed by: EMERGENCY MEDICINE

## 2018-06-27 PROCEDURE — 84132 ASSAY OF SERUM POTASSIUM: CPT | Performed by: ORTHOPAEDIC SURGERY

## 2018-06-27 PROCEDURE — 86901 BLOOD TYPING SEROLOGIC RH(D): CPT | Performed by: EMERGENCY MEDICINE

## 2018-06-27 PROCEDURE — 99285 EMERGENCY DEPT VISIT HI MDM: CPT | Mod: 25

## 2018-06-27 PROCEDURE — 80048 BASIC METABOLIC PNL TOTAL CA: CPT | Performed by: EMERGENCY MEDICINE

## 2018-06-27 PROCEDURE — 12000000 ZZH R&B MED SURG/OB

## 2018-06-27 PROCEDURE — 83735 ASSAY OF MAGNESIUM: CPT | Performed by: EMERGENCY MEDICINE

## 2018-06-27 PROCEDURE — 25000128 H RX IP 250 OP 636: Performed by: HOSPITALIST

## 2018-06-27 PROCEDURE — 93005 ELECTROCARDIOGRAM TRACING: CPT

## 2018-06-27 PROCEDURE — 25000132 ZZH RX MED GY IP 250 OP 250 PS 637: Performed by: HOSPITALIST

## 2018-06-27 PROCEDURE — 99223 1ST HOSP IP/OBS HIGH 75: CPT | Mod: AI | Performed by: HOSPITALIST

## 2018-06-27 PROCEDURE — 84100 ASSAY OF PHOSPHORUS: CPT | Performed by: EMERGENCY MEDICINE

## 2018-06-27 PROCEDURE — 86900 BLOOD TYPING SEROLOGIC ABO: CPT | Performed by: EMERGENCY MEDICINE

## 2018-06-27 PROCEDURE — 96374 THER/PROPH/DIAG INJ IV PUSH: CPT

## 2018-06-27 PROCEDURE — 73502 X-RAY EXAM HIP UNI 2-3 VIEWS: CPT

## 2018-06-27 PROCEDURE — 85025 COMPLETE CBC W/AUTO DIFF WBC: CPT | Performed by: EMERGENCY MEDICINE

## 2018-06-27 PROCEDURE — 25000128 H RX IP 250 OP 636: Performed by: EMERGENCY MEDICINE

## 2018-06-27 RX ORDER — HYDROMORPHONE HYDROCHLORIDE 1 MG/ML
0.5 INJECTION, SOLUTION INTRAMUSCULAR; INTRAVENOUS; SUBCUTANEOUS ONCE
Status: COMPLETED | OUTPATIENT
Start: 2018-06-27 | End: 2018-06-27

## 2018-06-27 RX ORDER — ACETAMINOPHEN 500 MG
500-1000 TABLET ORAL EVERY 6 HOURS PRN
Status: DISCONTINUED | OUTPATIENT
Start: 2018-06-27 | End: 2018-06-29 | Stop reason: HOSPADM

## 2018-06-27 RX ORDER — OXYCODONE HYDROCHLORIDE 5 MG/1
5-10 TABLET ORAL
Status: DISCONTINUED | OUTPATIENT
Start: 2018-06-27 | End: 2018-06-29 | Stop reason: HOSPADM

## 2018-06-27 RX ORDER — B1/B2/B3/B5/B6/IRON/METH/CHOLN 2.5-18/15
1 LIQUID (ML) ORAL DAILY
Status: DISCONTINUED | OUTPATIENT
Start: 2018-06-27 | End: 2018-06-27

## 2018-06-27 RX ORDER — AMLODIPINE BESYLATE 5 MG/1
5 TABLET ORAL DAILY
Status: DISCONTINUED | OUTPATIENT
Start: 2018-06-27 | End: 2018-06-29 | Stop reason: HOSPADM

## 2018-06-27 RX ORDER — CARVEDILOL 6.25 MG/1
6.25 TABLET ORAL 2 TIMES DAILY WITH MEALS
Status: DISCONTINUED | OUTPATIENT
Start: 2018-06-27 | End: 2018-06-29 | Stop reason: HOSPADM

## 2018-06-27 RX ORDER — ONDANSETRON 2 MG/ML
4 INJECTION INTRAMUSCULAR; INTRAVENOUS EVERY 6 HOURS PRN
Status: DISCONTINUED | OUTPATIENT
Start: 2018-06-27 | End: 2018-06-29 | Stop reason: HOSPADM

## 2018-06-27 RX ORDER — MAGNESIUM SULFATE HEPTAHYDRATE 40 MG/ML
4 INJECTION, SOLUTION INTRAVENOUS EVERY 4 HOURS PRN
Status: DISCONTINUED | OUTPATIENT
Start: 2018-06-27 | End: 2018-06-29 | Stop reason: HOSPADM

## 2018-06-27 RX ORDER — UBIDECARENONE 75 MG
100 CAPSULE ORAL DAILY
Status: DISCONTINUED | OUTPATIENT
Start: 2018-06-27 | End: 2018-06-29 | Stop reason: HOSPADM

## 2018-06-27 RX ORDER — ALBUTEROL SULFATE 90 UG/1
1-2 AEROSOL, METERED RESPIRATORY (INHALATION) EVERY 6 HOURS PRN
Status: DISCONTINUED | OUTPATIENT
Start: 2018-06-27 | End: 2018-06-29 | Stop reason: HOSPADM

## 2018-06-27 RX ORDER — CARBIDOPA AND LEVODOPA 25; 100 MG/1; MG/1
1 TABLET ORAL 4 TIMES DAILY
Status: DISCONTINUED | OUTPATIENT
Start: 2018-06-27 | End: 2018-06-29 | Stop reason: HOSPADM

## 2018-06-27 RX ORDER — SODIUM CHLORIDE AND POTASSIUM CHLORIDE 150; 900 MG/100ML; MG/100ML
INJECTION, SOLUTION INTRAVENOUS CONTINUOUS
Status: DISCONTINUED | OUTPATIENT
Start: 2018-06-27 | End: 2018-06-28

## 2018-06-27 RX ORDER — POTASSIUM CHLORIDE 1500 MG/1
20-40 TABLET, EXTENDED RELEASE ORAL
Status: DISCONTINUED | OUTPATIENT
Start: 2018-06-27 | End: 2018-06-29 | Stop reason: HOSPADM

## 2018-06-27 RX ORDER — ASPIRIN 81 MG/1
81 TABLET ORAL DAILY
Status: DISCONTINUED | OUTPATIENT
Start: 2018-06-27 | End: 2018-06-29 | Stop reason: HOSPADM

## 2018-06-27 RX ORDER — POTASSIUM CHLORIDE 29.8 MG/ML
20 INJECTION INTRAVENOUS
Status: DISCONTINUED | OUTPATIENT
Start: 2018-06-27 | End: 2018-06-29 | Stop reason: HOSPADM

## 2018-06-27 RX ORDER — NALOXONE HYDROCHLORIDE 0.4 MG/ML
.1-.4 INJECTION, SOLUTION INTRAMUSCULAR; INTRAVENOUS; SUBCUTANEOUS
Status: DISCONTINUED | OUTPATIENT
Start: 2018-06-27 | End: 2018-06-29 | Stop reason: HOSPADM

## 2018-06-27 RX ORDER — POTASSIUM CHLORIDE 7.45 MG/ML
10 INJECTION INTRAVENOUS
Status: DISCONTINUED | OUTPATIENT
Start: 2018-06-27 | End: 2018-06-29 | Stop reason: HOSPADM

## 2018-06-27 RX ORDER — ONDANSETRON 4 MG/1
4 TABLET, ORALLY DISINTEGRATING ORAL EVERY 6 HOURS PRN
Status: DISCONTINUED | OUTPATIENT
Start: 2018-06-27 | End: 2018-06-29 | Stop reason: HOSPADM

## 2018-06-27 RX ORDER — HYDROMORPHONE HYDROCHLORIDE 1 MG/ML
0.2 INJECTION, SOLUTION INTRAMUSCULAR; INTRAVENOUS; SUBCUTANEOUS
Status: DISCONTINUED | OUTPATIENT
Start: 2018-06-27 | End: 2018-06-29 | Stop reason: HOSPADM

## 2018-06-27 RX ORDER — LOPERAMIDE HCL 2 MG
2 CAPSULE ORAL 4 TIMES DAILY PRN
Status: DISCONTINUED | OUTPATIENT
Start: 2018-06-27 | End: 2018-06-29 | Stop reason: HOSPADM

## 2018-06-27 RX ORDER — AMOXICILLIN 250 MG
1 CAPSULE ORAL 2 TIMES DAILY PRN
Status: DISCONTINUED | OUTPATIENT
Start: 2018-06-27 | End: 2018-06-29 | Stop reason: HOSPADM

## 2018-06-27 RX ORDER — AMOXICILLIN 250 MG
2 CAPSULE ORAL 2 TIMES DAILY PRN
Status: DISCONTINUED | OUTPATIENT
Start: 2018-06-27 | End: 2018-06-29 | Stop reason: HOSPADM

## 2018-06-27 RX ORDER — HYDROMORPHONE HYDROCHLORIDE 1 MG/ML
0.2 INJECTION, SOLUTION INTRAMUSCULAR; INTRAVENOUS; SUBCUTANEOUS
Status: COMPLETED | OUTPATIENT
Start: 2018-06-27 | End: 2018-06-27

## 2018-06-27 RX ORDER — LOSARTAN POTASSIUM 50 MG/1
50 TABLET ORAL DAILY
Status: DISCONTINUED | OUTPATIENT
Start: 2018-06-27 | End: 2018-06-29 | Stop reason: HOSPADM

## 2018-06-27 RX ORDER — POTASSIUM CHLORIDE 1.5 G/1.58G
20-40 POWDER, FOR SOLUTION ORAL
Status: DISCONTINUED | OUTPATIENT
Start: 2018-06-27 | End: 2018-06-29 | Stop reason: HOSPADM

## 2018-06-27 RX ORDER — ATORVASTATIN CALCIUM 10 MG/1
20 TABLET, FILM COATED ORAL DAILY
Status: DISCONTINUED | OUTPATIENT
Start: 2018-06-27 | End: 2018-06-29 | Stop reason: HOSPADM

## 2018-06-27 RX ORDER — RALOXIFENE HYDROCHLORIDE 60 MG/1
60 TABLET, FILM COATED ORAL DAILY
Status: DISCONTINUED | OUTPATIENT
Start: 2018-06-27 | End: 2018-06-29 | Stop reason: HOSPADM

## 2018-06-27 RX ORDER — POTASSIUM CL/LIDO/0.9 % NACL 10MEQ/0.1L
10 INTRAVENOUS SOLUTION, PIGGYBACK (ML) INTRAVENOUS
Status: DISCONTINUED | OUTPATIENT
Start: 2018-06-27 | End: 2018-06-29 | Stop reason: HOSPADM

## 2018-06-27 RX ADMIN — HYDROMORPHONE HYDROCHLORIDE 0.5 MG: 1 INJECTION, SOLUTION INTRAMUSCULAR; INTRAVENOUS; SUBCUTANEOUS at 09:55

## 2018-06-27 RX ADMIN — Medication 0.2 MG: at 20:23

## 2018-06-27 RX ADMIN — Medication 0.2 MG: at 14:08

## 2018-06-27 RX ADMIN — Medication 10 MEQ: at 16:58

## 2018-06-27 RX ADMIN — CARVEDILOL 6.25 MG: 6.25 TABLET, FILM COATED ORAL at 12:40

## 2018-06-27 RX ADMIN — OXYCODONE HYDROCHLORIDE 10 MG: 5 TABLET ORAL at 23:55

## 2018-06-27 RX ADMIN — OXYCODONE HYDROCHLORIDE 5 MG: 5 TABLET ORAL at 13:02

## 2018-06-27 RX ADMIN — ACETAMINOPHEN 1000 MG: 500 TABLET, FILM COATED ORAL at 20:13

## 2018-06-27 RX ADMIN — CARBIDOPA AND LEVODOPA 1 TABLET: 25; 100 TABLET ORAL at 16:05

## 2018-06-27 RX ADMIN — POTASSIUM CHLORIDE AND SODIUM CHLORIDE: 900; 150 INJECTION, SOLUTION INTRAVENOUS at 11:26

## 2018-06-27 RX ADMIN — CARBIDOPA AND LEVODOPA 1 TABLET: 25; 100 TABLET ORAL at 11:22

## 2018-06-27 RX ADMIN — HYDROMORPHONE HYDROCHLORIDE 0.2 MG: 1 INJECTION, SOLUTION INTRAMUSCULAR; INTRAVENOUS; SUBCUTANEOUS at 06:46

## 2018-06-27 RX ADMIN — AMLODIPINE BESYLATE 5 MG: 5 TABLET ORAL at 11:23

## 2018-06-27 RX ADMIN — RANITIDINE 75 MG: 75 TABLET, FILM COATED ORAL at 20:14

## 2018-06-27 RX ADMIN — ASPIRIN 81 MG: 81 TABLET, COATED ORAL at 11:23

## 2018-06-27 RX ADMIN — RALOXIFENE HYDROCHLORIDE 60 MG: 60 TABLET, FILM COATED ORAL at 14:15

## 2018-06-27 RX ADMIN — ACETAMINOPHEN 1000 MG: 500 TABLET, FILM COATED ORAL at 14:15

## 2018-06-27 RX ADMIN — OXYCODONE HYDROCHLORIDE 10 MG: 5 TABLET ORAL at 17:09

## 2018-06-27 RX ADMIN — HYDROMORPHONE HYDROCHLORIDE 0.5 MG: 1 INJECTION, SOLUTION INTRAMUSCULAR; INTRAVENOUS; SUBCUTANEOUS at 08:13

## 2018-06-27 RX ADMIN — CARBIDOPA AND LEVODOPA 1 TABLET: 25; 100 TABLET ORAL at 21:18

## 2018-06-27 RX ADMIN — ATORVASTATIN CALCIUM 20 MG: 10 TABLET, FILM COATED ORAL at 11:22

## 2018-06-27 RX ADMIN — Medication 0.2 MG: at 12:07

## 2018-06-27 RX ADMIN — Medication 10 MEQ: at 14:09

## 2018-06-27 RX ADMIN — Medication 10 MEQ: at 18:20

## 2018-06-27 RX ADMIN — RANITIDINE 75 MG: 75 TABLET, FILM COATED ORAL at 12:40

## 2018-06-27 RX ADMIN — LOSARTAN POTASSIUM 50 MG: 50 TABLET ORAL at 20:14

## 2018-06-27 RX ADMIN — Medication 100 MCG: at 12:39

## 2018-06-27 RX ADMIN — Medication 10 MEQ: at 15:48

## 2018-06-27 RX ADMIN — CHOLECALCIFEROL TAB 10 MCG (400 UNIT) 400 UNITS: 10 TAB at 14:15

## 2018-06-27 RX ADMIN — CARVEDILOL 6.25 MG: 6.25 TABLET, FILM COATED ORAL at 18:11

## 2018-06-27 ASSESSMENT — ACTIVITIES OF DAILY LIVING (ADL)
TOILETING: 0-->INDEPENDENT
RETIRED_EATING: 0-->INDEPENDENT
FALL_HISTORY_WITHIN_LAST_SIX_MONTHS: YES
AMBULATION: 1-->ASSISTIVE EQUIPMENT
WHICH_OF_THE_ABOVE_FUNCTIONAL_RISKS_HAD_A_RECENT_ONSET_OR_CHANGE?: FALL HISTORY
BATHING: 0-->INDEPENDENT
DRESS: 0-->INDEPENDENT
NUMBER_OF_TIMES_PATIENT_HAS_FALLEN_WITHIN_LAST_SIX_MONTHS: 1
RETIRED_COMMUNICATION: 0-->UNDERSTANDS/COMMUNICATES WITHOUT DIFFICULTY
COGNITION: 0 - NO COGNITION ISSUES REPORTED
TRANSFERRING: 0-->INDEPENDENT
SWALLOWING: 0-->SWALLOWS FOODS/LIQUIDS WITHOUT DIFFICULTY

## 2018-06-27 ASSESSMENT — ENCOUNTER SYMPTOMS
FEVER: 0
DIZZINESS: 1
VOMITING: 0
BLOOD IN STOOL: 0

## 2018-06-27 ASSESSMENT — PAIN DESCRIPTION - DESCRIPTORS: DESCRIPTORS: DISCOMFORT;ACHING

## 2018-06-27 NOTE — IP AVS SNAPSHOT
"    [place] [address] [facility]: [phone]                                              INTERAGENCY TRANSFER FORM - LAB / IMAGING / EKG / EMG RESULTS   2018                    Hospital Admission Date: 2018  BENJI KENT   : 1935  Sex: Female        Attending Provider: Randall Sal MD     Allergies:  Bee Pollen, Cephalexin Monohydrate, Ciprofloxacin, Clindamycin, Multi Vitamin-minerals [Hair-vites], Penicillin [Penicillins], Thiazide-type Diuretics, Tobramycin, Vancomycin, Atorvastatin, Cephalexin, Ciprofloxacin, Ibuprofen, Versed [Midazolam], Zoloft [Sertraline], Ace Inhibitors, Advil [Ibuprofen Micronized], Metoprolol    Infection:  None   Service:  HOSPITALIST    Ht:  1.6 m (5' 3\")   Wt:  59 kg (130 lb)   Admission Wt:  59 kg (130 lb)    BMI:  23.03 kg/m 2   BSA:  1.62 m 2            Patient PCP Information     Provider PCP Type    Damian Russ MD, MD General         Lab Results - 3 Days      Potassium [[id number]]  Resulted: 18 0849, Result status: Final result    Ordering provider: Randall Sal MD  18 0001 Resulting lab: [facility]    Specimen Information    Type Source Collected On   Blood  18 0820          Components       Value Reference Range Flag Lab   Potassium 4.1 3.4 - 5.3 mmol/L  FrStHsLb            Comprehensive metabolic panel [[id number]] (Abnormal)  Resulted: 18 0811, Result status: Final result    Ordering provider: Randall Sal MD  18 0000 Resulting lab: [facility]    Specimen Information    Type Source Collected On   Blood  18 0740          Components       Value Reference Range Flag Lab   Sodium 137 133 - 144 mmol/L  FrStHsLb   Potassium 4.0 3.4 - 5.3 mmol/L  FrStHsLb   Chloride 110 94 - 109 mmol/L H FrStHsLb   Carbon Dioxide 19 20 - 32 mmol/L L FrStHsLb   Anion Gap 8 3 - 14 mmol/L  FrStHsLb   Glucose 96 70 - 99 mg/dL  FrStHsLb   Urea Nitrogen 13 7 - 30 " mg/dL  FrStHsLb   Creatinine 0.74 0.52 - 1.04 mg/dL  FrStHsLb   GFR Estimate 76 >60 mL/min/1.7m2  FrStHsLb   Comment:  Non  GFR Calc   GFR Estimate If Black >90 >60 mL/min/1.7m2  FrStHsLb   Comment:  African American GFR Calc   Calcium 7.4 8.5 - 10.1 mg/dL L FrStHsLb   Bilirubin Total 0.6 0.2 - 1.3 mg/dL  FrStHsLb   Albumin 2.9 3.4 - 5.0 g/dL L FrStHsLb   Protein Total 5.5 6.8 - 8.8 g/dL L FrStHsLb   Alkaline Phosphatase 55 40 - 150 U/L  FrStHsLb   ALT 9 0 - 50 U/L  FrStHsLb   AST 18 0 - 45 U/L  FrStHsLb            CK total [[id number]]  Resulted: 06/28/18 0811, Result status: Final result    Ordering provider: Randall Sal MD  06/28/18 0000 Resulting lab: [facility]    Specimen Information    Type Source Collected On   Blood  06/28/18 0740          Components       Value Reference Range Flag Lab   CK Total 85 30 - 225 U/L  FrStHsLb            CBC with platelets [[id number]] (Abnormal)  Resulted: 06/28/18 0752, Result status: Final result    Ordering provider: Randall Sal MD  06/28/18 0000 Resulting lab: [facility]    Specimen Information    Type Source Collected On   Blood  06/28/18 0740          Components       Value Reference Range Flag Lab   WBC 7.2 4.0 - 11.0 10e9/L  FrStHsLb   RBC Count 3.64 3.8 - 5.2 10e12/L L FrStHsLb   Hemoglobin 11.4 11.7 - 15.7 g/dL L FrStHsLb   Hematocrit 33.0 35.0 - 47.0 % L FrStHsLb   MCV 91 78 - 100 fl  FrStHsLb   MCH 31.3 26.5 - 33.0 pg  FrStHsLb   MCHC 34.5 31.5 - 36.5 g/dL  FrStHsLb   RDW 14.3 10.0 - 15.0 %  FrStHsLb   Platelet Count 139 150 - 450 10e9/L L FrStHsLb            Potassium [[id number]]  Resulted: 06/27/18 2322, Result status: Final result    Ordering provider: Randall Sal MD  06/27/18 1925 Resulting lab: [facility]    Specimen Information    Type Source Collected On   Blood  06/27/18 2300          Components       Value Reference Range Flag Lab   Potassium 3.8 3.4 - 5.3 mmol/L   FrStHsLb            UA with Microscopic reflex to Culture [[id number]] (Abnormal)  Resulted: 06/27/18 1304, Result status: Final result    Ordering provider: Randall Sal MD  06/27/18 1059 Resulting lab: [facility]    Specimen Information    Type Source Collected On   Midstream Urine Urine clean catch 06/27/18 1145          Components       Value Reference Range Flag Lab   Color Urine Yellow   FrStHsLb   Appearance Urine Clear   FrStHsLb   Glucose Urine Negative NEG^Negative mg/dL  FrStHsLb   Bilirubin Urine Negative NEG^Negative  FrStHsLb   Ketones Urine 10 NEG^Negative mg/dL A FrStHsLb   Specific [place] Urine 1.011 1.003 - 1.035  FrStHsLb   Blood Urine Negative NEG^Negative  FrStHsLb   pH Urine 5.5 5.0 - 7.0 pH  FrStHsLb   Protein Albumin Urine 30 NEG^Negative mg/dL A FrStHsLb   Urobilinogen mg/dL Normal 0.0 - 2.0 mg/dL  FrStHsLb   Nitrite Urine Negative NEG^Negative  FrStHsLb   Leukocyte Esterase Urine Negative NEG^Negative  FrStHsLb   Source Midstream Urine   FrStHsLb   WBC Urine 2 0 - 5 /HPF  FrStHsLb   RBC Urine 1 0 - 2 /HPF  FrStHsLb   Squamous Epithelial /HPF Urine <1 0 - 1 /HPF  FrStHsLb   Mucous Urine Present NEG^Negative /LPF A FrStHsLb            TSH with free T4 reflex [[id number]]  Resulted: 06/27/18 1101, Result status: Final result    Ordering provider: Kiera Mena MD  06/27/18 0639 Resulting lab: [facility]    Specimen Information    Type Source Collected On     06/27/18 0639          Components       Value Reference Range Flag Lab   TSH 1.14 0.40 - 4.00 mU/L  FrStHsLb            Magnesium [[id number]]  Resulted: 06/27/18 1057, Result status: Final result    Ordering provider: Kiera Mena MD  06/27/18 0639 Resulting lab: [facility]    Specimen Information    Type Source Collected On     06/27/18 0639          Components       Value Reference Range Flag Lab   Magnesium 2.0 1.6 - 2.3 mg/dL  FrStHsLb            Phosphorus  [[id number]]  Resulted: 06/27/18 1057, Result status: Final result    Ordering provider: Kiera Mena MD  06/27/18 0639 Resulting lab: [facility]    Specimen Information    Type Source Collected On     06/27/18 0639          Components       Value Reference Range Flag Lab   Phosphorus 2.6 2.5 - 4.5 mg/dL  FrStHsLb            CK total [[id number]]  Resulted: 06/27/18 1057, Result status: Final result    Ordering provider: Kiera Mena MD  06/27/18 0639 Resulting lab: [facility]    Specimen Information    Type Source Collected On     06/27/18 0639          Components       Value Reference Range Flag Lab   CK Total 107 30 - 225 U/L  FrStHsLb            Basic metabolic panel [[id number]] (Abnormal)  Resulted: 06/27/18 0712, Result status: Final result    Ordering provider: Kiera Mena MD  06/27/18 0622 Resulting lab: [facility]    Specimen Information    Type Source Collected On   Blood  06/27/18 0639          Components       Value Reference Range Flag Lab   Sodium 140 133 - 144 mmol/L  FrStHsLb   Potassium 3.0 3.4 - 5.3 mmol/L L FrStHsLb   Chloride 110 94 - 109 mmol/L H FrStHsLb   Carbon Dioxide 21 20 - 32 mmol/L  FrStHsLb   Anion Gap 9 3 - 14 mmol/L  FrStHsLb   Glucose 91 70 - 99 mg/dL  FrStHsLb   Urea Nitrogen 15 7 - 30 mg/dL  FrStHsLb   Creatinine 0.82 0.52 - 1.04 mg/dL  FrStHsLb   GFR Estimate 67 >60 mL/min/1.7m2  FrStHsLb   Comment:  Non  GFR Calc   GFR Estimate If Black 81 >60 mL/min/1.7m2  FrStHsLb   Comment:  African American GFR Calc   Calcium 8.3 8.5 - 10.1 mg/dL L FrStHsLb            INR [[id number]] (Abnormal)  Resulted: 06/27/18 0703, Result status: Final result    Ordering provider: Kiera Mena MD  06/27/18 0622 Resulting lab: [facility]    Specimen Information    Type Source Collected On   Blood  06/27/18 0639          Components       Value Reference Range Flag Lab   INR 1.15 0.86 -  1.14 H FrStHsLb            Partial thromboplastin time [[id number]]  Resulted: 06/27/18 0703, Result status: Final result    Ordering provider: Kiera Mena MD  06/27/18 0622 Resulting lab: [facility]    Specimen Information    Type Source Collected On   Blood  06/27/18 0639          Components       Value Reference Range Flag Lab   PTT 29 22 - 37 sec  FrStHsLb            CBC with platelets differential [[id number]]  Resulted: 06/27/18 0652, Result status: Final result    Ordering provider: Kiera Mena MD  06/27/18 0622 Resulting lab: [facility]    Specimen Information    Type Source Collected On   Blood  06/27/18 0639          Components       Value Reference Range Flag Lab   WBC 7.4 4.0 - 11.0 10e9/L  FrStHsLb   RBC Count 4.09 3.8 - 5.2 10e12/L  FrStHsLb   Hemoglobin 12.7 11.7 - 15.7 g/dL  FrStHsLb   Hematocrit 37.1 35.0 - 47.0 %  FrStHsLb   MCV 91 78 - 100 fl  FrStHsLb   MCH 31.1 26.5 - 33.0 pg  FrStHsLb   MCHC 34.2 31.5 - 36.5 g/dL  FrStHsLb   RDW 14.2 10.0 - 15.0 %  FrStHsLb   Platelet Count 176 150 - 450 10e9/L  FrStHsLb   Diff Method Automated Method   FrStHsLb   % Neutrophils 77.8 %  FrStHsLb   % Lymphocytes 11.5 %  FrStHsLb   % Monocytes 10.1 %  FrStHsLb   % Eosinophils 0.0 %  FrStHsLb   % Basophils 0.3 %  FrStHsLb   % Immature Granulocytes 0.3 %  FrStHsLb   Nucleated RBCs 0 0 /100  FrStHsLb   Absolute Neutrophil 5.8 1.6 - 8.3 10e9/L  FrStHsLb   Absolute Lymphocytes 0.9 0.8 - 5.3 10e9/L  FrStHsLb   Absolute Monocytes 0.8 0.0 - 1.3 10e9/L  FrStHsLb   Absolute Eosinophils 0.0 0.0 - 0.7 10e9/L  FrStHsLb   Absolute Basophils 0.0 0.0 - 0.2 10e9/L  FrStHsLb   Abs Immature Granulocytes 0.0 0 - 0.4 10e9/L  FrStHsLb   Absolute Nucleated RBC 0.0   FrStHsLb            Enteric Bacteria and Virus Panel by PAOLO Stool [[id number]]  Resulted: 06/26/18 7328, Result status: Final result    Ordering provider: Diamond Caal PA-C  06/26/18 1248 Resulting lab:  [facility]    Specimen Information    Type Source Collected On   Feces  06/26/18 1351          Components       Value Reference Range Flag Lab   Campylobacter group by PAOLO Not Detected NDET^Not Detected  75   Salmonella species by PAOLO Not Detected NDET^Not Detected  75   Shigella species by PAOLO Not Detected NDET^Not Detected  75   Vibrio group by PAOLO Not Detected NDET^Not Detected  75   Rotavirus A by PAOLO Not Detected NDET^Not Detected  75   Shiga toxin 1 gene by PAOLO Not Detected NDET^Not Detected  75   Shiga toxin 2 gene by PALOO Not Detected NDET^Not Detected  75   Norovirus I and II by PAOLO Not Detected NDET^Not Detected  75   Yersinia enterocolitica by PAOLO Not Detected NDET^Not Detected  75   Enteric pathogen comment --   75   Comment:         Positive results do not rule out co-infection with other organisms that are   not detected by this test, and may not be the sole or definitive cause of   patient illness.   Negative results in the setting of clinical illness compatible with   gastroenteritis may be due to infection by pathogens that are not detected by   this test or non-infectious causes such as ulcerative colitis, irritable bowel   syndrome, or Crohn's disease.   Note: Shiga toxin producing E. coli (STEC) typically harbor one or both genes   that encode for Shiga toxins 1 and 2.     Result:         Testing performed by multiplexed, qualitative PCR using the [employer] Enteric   Pathogens Nucleic Acid Test. Results should not be used as the sole basis for diagnosis,   treatment, or other patient management decisions.              Basic metabolic panel [[id number]] (Abnormal)  Resulted: 06/26/18 1331, Result status: Final result    Ordering provider: Diamond Caal PA-C  06/26/18 1248 Resulting lab: [facility]    Specimen Information    Type Source Collected On   Blood  06/26/18 1255          Components       Value Reference Range Flag Lab    Sodium 138 133 - 144 mmol/L  FrStHsLb   Potassium 3.8 3.4 - 5.3 mmol/L  FrStHsLb   Chloride 109 94 - 109 mmol/L  FrStHsLb   Carbon Dioxide 20 20 - 32 mmol/L  FrStHsLb   Anion Gap 9 3 - 14 mmol/L  FrStHsLb   Glucose 95 70 - 99 mg/dL  FrStHsLb   Urea Nitrogen 26 7 - 30 mg/dL  FrStHsLb   Creatinine 1.05 0.52 - 1.04 mg/dL H FrStHsLb   GFR Estimate 50 >60 mL/min/1.7m2 L FrStHsLb   Comment:  Non  GFR Calc   GFR Estimate If Black 61 >60 mL/min/1.7m2  FrStHsLb   Comment:  African American GFR Calc   Calcium 8.0 8.5 - 10.1 mg/dL L FrStHsLb            CBC with platelets differential [[id number]] (Abnormal)  Resulted: 06/26/18 1309, Result status: Final result    Ordering provider: Diamond Caal PA-C  06/26/18 1248 Resulting lab: [facility]    Specimen Information    Type Source Collected On   Blood  06/26/18 1255          Components       Value Reference Range Flag Lab   WBC 6.2 4.0 - 11.0 10e9/L  FrStHsLb   RBC Count 3.97 3.8 - 5.2 10e12/L  FrStHsLb   Hemoglobin 12.6 11.7 - 15.7 g/dL  FrStHsLb   Hematocrit 35.8 35.0 - 47.0 %  FrStHsLb   MCV 90 78 - 100 fl  FrStHsLb   MCH 31.7 26.5 - 33.0 pg  FrStHsLb   MCHC 35.2 31.5 - 36.5 g/dL  FrStHsLb   RDW 14.0 10.0 - 15.0 %  FrStHsLb   Platelet Count 143 150 - 450 10e9/L L FrStHsLb   Diff Method Automated Method   FrStHsLb   % Neutrophils 69.7 %  FrStHsLb   % Lymphocytes 18.2 %  FrStHsLb   % Monocytes 11.3 %  FrStHsLb   % Eosinophils 0.3 %  FrStHsLb   % Basophils 0.3 %  FrStHsLb   % Immature Granulocytes 0.2 %  FrStHsLb   Nucleated RBCs 0 0 /100  FrStHsLb   Absolute Neutrophil 4.3 1.6 - 8.3 10e9/L  FrStHsLb   Absolute Lymphocytes 1.1 0.8 - 5.3 10e9/L  FrStHsLb   Absolute Monocytes 0.7 0.0 - 1.3 10e9/L  FrStHsLb   Absolute Eosinophils 0.0 0.0 - 0.7 10e9/L  FrStHsLb   Absolute Basophils 0.0 0.0 - 0.2 10e9/L  FrStHsLb   Abs Immature Granulocytes 0.0 0 - 0.4 10e9/L  FrStHsLb   Absolute Nucleated RBC 0.0   FrStHsLb            Testing Performed By   "   Lab - Abbreviation Name Director Address Valid Date Range    14 - FrStHsLb [facility] Unknown [address] [place] [address] 05/08/15 1057 - Present    75 - Unknown [facility] Unknown [address] [facility] [address] 01/15/15 1019 - Present            Unresulted Labs (24h ago through future)    Start       Ordered    Unscheduled  Potassium  (Potassium Replacement - \"High\" - Replacement for all levels less than 4.1 mmol/L - UU,UR,UA,RH,SH,PH,WY )  CONDITIONAL (SPECIFY),   Routine     Comments:  Obtain Potassium Level for these conditions:  *IF no potassium result within 24 hrs before initiation of order set, draw potassium level with next lab collect.    *2 HOURS AFTER last IV potassium replacement dose and 4 hours after an oral replacement dose when potassium replacement given for level less than 3.4.  *Next morning after potassium dose.     Repeat Potassium Replacement if necessary.    06/27/18 1059    Unscheduled  Magnesium  (Magnesium Replacement -  Adult - \"Standard\" - Replacement for all levels less than 1.6 mg/dL )  CONDITIONAL (SPECIFY),   Routine     Comments:  Obtain Magnesium Level for these conditions:  *IF no magnesium result within 24 hrs before initiation of order set, draw magnesium level with next lab collect.    *2 HOURS AFTER last magnesium replacement dose when magnesium replacement given for level less than 1.6   *Next morning after magnesium dose.     Repeat Magnesium Replacement if necessary.    06/27/18 1059         Imaging Results - 3 Days      XR Cervical Spine 2/3 Views [[id number]]  Resulted: 06/28/18 1209, Result status: Final result    Ordering provider: Kaylee Virgen PA-C  06/27/18 1226 Resulted by: José Robles MD    Performed: 06/27/18 1458 - 06/27/18 1511 Resulting lab: RADIOLOGY RESULTS    Narrative:       XR CERVICAL SPINE 2/3 VWS 6/27/2018 3:11 PM    COMPARISON: CT dated 1/29/2015    HISTORY: Fall with " neck pain.      Impression:       IMPRESSION: No definite fractures are seen in the cervical spine.  However, shoulder soft tissues overlie the lower cervical spine.  Fusion at C3-4 again seen. Severe degenerative change at C4-5, C5-6  and to a lesser extent C6-7. No significant listhesis identified. No  prevertebral soft tissue swelling.    ASHISH FISCHER MD      CT Pelvis w/o Contrast [[id number]]  Resulted: 06/27/18 0936, Result status: Final result    Ordering provider: Kiera Mena MD  06/27/18 0810 Resulted by: Jarvis Solano MD    Performed: 06/27/18 0843 - 06/27/18 0853 Resulting lab: RADIOLOGY RESULTS    Narrative:       CT PELVIS WITHOUT CONTRAST 6/27/2018 8:53 AM     HISTORY:  Right hip pain after fall. X-ray negative.     TECHNIQUE:  Axial images with reconstructions. No IV contrast.  Radiation dose for this scan was reduced using automated exposure  control, adjustment of the mA and/or kV according to patient size, or  iterative reconstruction technique.    COMPARISON:  7/7/2008    FINDINGS:  Comminuted fracture extending along the right iliac wing,  with 12 mm of maximal displacement. Adjacent enlargement of the right  iliacus muscle, presumably representing hemorrhage. Changes of lumbar  fusion surgery with hardware. Left iliac defect/deformity which  presumably relates to bone graft harvesting. Chondrocalcinosis of the  hips and symphysis pubis, consistent with calcium pyrophosphate  deposition disease. There is a 2.1 cm calcified or sclerotic lesion of  the left supra-acetabular region; this could represent an enchondroma  or bone island and is stable compared with July 2008. Moderate left  and fairly prominent right hip osteoarthritis. Incidentally noted  colonic diverticulosis.      Impression:       IMPRESSION:  1. Comminuted right iliac wing fracture with displacement. Adjacent  right iliacus hemorrhage.  2. Additional findings discussed above.    JARVIS SOLANO MD      XR Pelvis w Hip Right  1 View [[id number]]  Resulted: 06/27/18 0804, Result status: Final result    Ordering provider: Kiera Mena MD  06/27/18 0622 Resulted by: José Robles MD    Performed: 06/27/18 0721 - 06/27/18 0746 Resulting lab: RADIOLOGY RESULTS    Narrative:       XR PELVIS AND HIP RIGHT 1 VIEW 6/27/2018 7:46 AM    COMPARISON: 10/28/2014    HISTORY: Pain.      Impression:       IMPRESSION: Bilateral hip osteoarthritis, moderate to severe on the  right and mild to moderate on the left. No fractures are seen.    JOSÉ ROBLES MD      Testing Performed By     Lab - Abbreviation Name Director Address Valid Date Range    104 - Rad Rslts RADIOLOGY RESULTS Unknown Unknown 02/16/05 1553 - Present            Encounter-Level Documents:     There are no encounter-level documents.      Order-Level Documents:     There are no order-level documents.

## 2018-06-27 NOTE — IP AVS SNAPSHOT
"` [place] [address] [facility]: [phone]                                              INTERAGENCY TRANSFER FORM - NURSING   2018                    Hospital Admission Date: 2018  BENJI KENT   : 1935  Sex: Female        Attending Provider: Randall Sal MD     Allergies:  Bee Pollen, Cephalexin Monohydrate, Ciprofloxacin, Clindamycin, Multi Vitamin-minerals [Hair-vites], Penicillin [Penicillins], Thiazide-type Diuretics, Tobramycin, Vancomycin, Atorvastatin, Cephalexin, Ciprofloxacin, Ibuprofen, Versed [Midazolam], Zoloft [Sertraline], Ace Inhibitors, Advil [Ibuprofen Micronized], Metoprolol    Infection:  None   Service:  HOSPITALIST    Ht:  1.6 m (5' 3\")   Wt:  59 kg (130 lb)   Admission Wt:  59 kg (130 lb)    BMI:  23.03 kg/m 2   BSA:  1.62 m 2            Patient PCP Information     Provider PCP Type    Damian Russ MD, MD General      Current Code Status     Date Active Code Status Order ID Comments User Context       2018 10:59 AM DNR/DNI [id number]  Randall Sal MD Inpatient       Code Status History     Date Active Date Inactive Code Status Order ID Comments User Context    2017  1:00 PM 2018 10:59 AM DNR/DNI [id number]  Judith Combs MD Outpatient    2017  8:47 PM 2017  1:00 PM DNR/DNI [id number]  Jairo Price MD Inpatient    2016  1:31 PM 2017  8:47 PM DNR/DNI [id number]  Nick Monteiro MD Outpatient    2016 11:38 AM 2016  1:31 PM Full Code [id number]  Nick Monteiro MD Outpatient    2016  7:45 PM 2016 11:38 AM DNR/DNI [id number]  Nick Monteiro MD Inpatient    2016 12:05 PM 2016  7:45 PM DNR/DNI [id number]  Amberly Gutierrez MD Outpatient    11/15/2016  5:19 AM 2016 12:05 PM DNR/DNI [id number]  José Chen MD Inpatient    2014  2:49 PM 11/15/2016  5:19 AM Full Code [id number]  Damian Russ MD Outpatient    " 6/25/2014  4:13 PM 6/26/2014  3:26 PM Full Code [id number]  Simón Bledsoe PA-C Inpatient      Advance Directives        Scanned docmt in ACP Activity?           Yes, scanned ACP docmt        Hospital Problems as of 6/29/2018              Priority Class Noted POA    Fall Medium  6/27/2018 Yes      Non-Hospital Problems as of 6/29/2018              Priority Class Noted    Mixed hyperlipidemia Medium  12/8/2004    Essential hypertension Medium  12/8/2004    Internal derangement of knee Medium  12/8/2004    Degeneration of intervertebral disc, site unspecified Medium  1/26/2006    Cervical spinal stenosis Medium  1/26/2006    Acute bronchospasm Medium  8/16/2007    Hyposmolality and/or hyponatremia Medium  8/16/2007    Osteoporosis Medium  3/28/2008    HYPERLIPIDEMIA LDL GOAL <130 Medium  10/31/2010    CKD (chronic kidney disease) stage 3, GFR 30-59 ml/min Medium  12/7/2011    Intermittent asthma Medium  5/16/2012    Microalbuminuria Medium  5/23/2012    Essential hypertension, benign Medium  Unknown    Breast CA (H) Medium  Unknown    Previous back surgery Medium  2/26/2013    DJD (degenerative joint disease), ankle and foot Medium  2/26/2013    Ankle joint pain Medium  5/9/2013    Enthesopathy of ankle and tarsus Medium  5/22/2013    Abnormal Pap smear of cervix Medium  3/25/2014    Lung nodule Medium  6/29/2014    Esophageal reflux Medium  7/2/2014    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    Hx of osteomyelitis Medium  10/21/2014    Hx of antibiotic allergy Medium  Unknown    Advanced directives, counseling/discussion Medium  11/11/2014    Hyponatremia Medium  2/10/2015    Leg hematoma Medium  4/23/2015    Fall from standing Medium  4/23/2015    Orbital fracture (H) Medium  4/23/2015    Subdural bleeding (H) Medium  4/23/2015    Physical deconditioning Medium  4/23/2015    Spinal stenosis, unspecified region other than cervical Medium  Unknown    Chronic pain Medium  3/29/2016    Parkinson's disease (H) Medium   7/26/2016    TIA (transient ischemic attack) Medium  11/15/2016    Carotid aneurysm non ruptured Medium  11/18/2016    Delirium Medium  11/18/2016    Cerebral aneurysm Medium  11/21/2016    Benign essential hypertension Medium  11/23/2016    CVA (cerebral vascular accident) (H) Medium  8/8/2017    Aphasia Medium  8/9/2017    Thyroid nodule Medium  12/28/2017      Immunizations     Name Date      Influenza (H1N1) 12/22/09     Influenza (High Dose) 3 valent vaccine 10/20/16     Influenza (High Dose) 3 valent vaccine 09/30/14     Influenza (IIV3) PF 10/24/13     Influenza (IIV3) PF 09/10/12     Influenza (IIV3) PF 10/14/11     Influenza (IIV3) PF 09/09/10     Influenza (IIV3) PF 09/14/09     Influenza (IIV3) PF 10/28/08     Influenza (IIV3) PF 10/11/07     Influenza (IIV3) PF 10/25/06     Pneumo Conj 13-V (2010&after) 05/26/15     Pneumococcal 23 valent 09/10/12     Pneumococcal 23 valent 01/26/06     TD (ADULT, 7+) 08/16/07     TDAP Vaccine (Adacel) 09/25/12     Zoster vaccine, live 03/28/08          END      ASSESSMENT     Discharge Profile Flowsheet     EXPECTED DISCHARGE     GI Signs/Symptoms  constipation 06/29/18 0221    Expected Discharge Date  06/29/18 (TCU) 06/28/18 1621   Passing flatus  yes 06/29/18 0221    DISCHARGE NEEDS ASSESSMENT     COMMUNICATION ASSESSMENT      Concerns To Be Addressed  denies needs/concerns at this time 08/10/17 1626   Patient's communication style  spoken language (English or Bilingual) 06/27/18 1310    Concerns Comments  -- 08/10/17 1631   FINAL RESOURCES      Equipment Currently Used at Home  cane, straight;walker, rolling 06/28/18 1638   Resources List  -- 08/10/17 1631    Transportation Available  family or friend will provide (Pt drives at baseline) 06/28/18 1638   Other Resources  -- 08/10/17 1631    # of Referrals Placed by CTS  Post Acute Facilities 06/29/18 0939   Existing Resources/Services  None 05/11/15 1442    Does Patient Need a Referral for Clinic CC  Yes 08/09/17 1132  "  SKIN      [facility] Name  [facility] 08/09/17 1132   Inspection of bony prominences  Full 06/29/18 0221    Primary Care MD Name  Damian Russ 08/09/17 1132   Inspection under devices  Full 06/29/18 0221    Equipment Used at Home  grab bar;shower chair;walker, rolling 03/25/16 1317   Skin WDL  WDL 06/29/18 0221    GASTROINTESTINAL (ADULT,PEDIATRIC,OB)     SAFETY      GI WDL  ex;GI symptoms 06/29/18 0221   Safety WDL  WDL 06/29/18 0221    Last Bowel Movement  06/26/18 06/29/18 0221   All Alarms  alarm(s) activated and audible 06/29/18 0221                 Assessment WDL (Within Defined Limits) Definitions           Safety WDL     Effective: 09/28/15    Row Information: <b>WDL Definition:</b> Bed in low position, wheels locked; call light in reach; upper side rails up x 2; ID band on<br> <font color=\"gray\"><i>Item=AS safety wdl>>List=AS safety wdl>>Version=F14</i></font>      Skin WDL     Effective: 09/28/15    Row Information: <b>WDL Definition:</b> Warm; dry; intact; elastic; without discoloration; pressure points without redness<br> <font color=\"gray\"><i>Item=AS skin wdl>>List=AS skin wdl>>Version=F14</i></font>      Vitals     Vital Signs Flowsheet     VITAL SIGNS     ANALGESIA SIDE EFFECTS MONITORING      Temp  98.5  F (36.9  C) 06/29/18 0726   Side Effects Monitoring: Respiratory Quality  R 06/29/18 0858    Temp src  Oral 06/29/18 0726   Side Effects Monitoring: Respiratory Depth  N 06/29/18 0858    Resp  16 06/29/18 0858   Side Effects Monitoring: Sedation Level  1 06/29/18 0858    Pulse  68 06/28/18 1608   HEIGHT AND WEIGHT      Heart Rate  63 06/29/18 0726   Height  1.6 m (5' 3\") 06/27/18 0611    Pulse/Heart Rate Source  Monitor 06/29/18 0726   Height Method  Stated 06/27/18 0611    BP  138/57 06/29/18 0726   Weight  59 kg (130 lb) 06/27/18 0611    BP Location  Right arm 06/29/18 0726   BSA (Calculated - sq m)  1.62 06/27/18 0611    OXYGEN THERAPY     BMI (Calculated)  23.08 06/27/18 0611    SpO2  " 95 % 06/29/18 0726   POSITIONING      O2 Device  None (Room air) 06/29/18 0726   Body Position  independently positioning 06/29/18 0858    PAIN/COMFORT     Head of Bed (HOB)  HOB at 20-30 degrees 06/29/18 0359    Patient Currently in Pain  yes 06/29/18 0858   Positioning/Transfer Devices  pillows;in use 06/29/18 0858    Preferred Pain Scale  number (Numeric Rating Pain Scale) 06/29/18 0858   DAILY CARE      Patient's Stated Pain Goal  No pain 06/29/18 0858   Activity Management  up in chair 06/29/18 0858    0-10 Pain Scale  7 06/29/18 1044   Activity Assistance Provided  assistance, 2 people 06/29/18 0858    Pain Location  Hip 06/29/18 0858   Assistive Device Utilized  gait belt;walker 06/29/18 0858    Pain Orientation  Right 06/29/18 0858   HELDER COMA SCALE      Pain Descriptors  Aching 06/29/18 0858   Best Eye Response  4-->(E4) spontaneous 06/28/18 1720    Pain Management Interventions  analgesia administered 06/28/18 2235   Best Motor Response  6-->(M6) obeys commands 06/28/18 1720    Pain Intervention(s)  Medication (See eMAR) 06/29/18 0858   Best Verbal Response  5-->(V5) oriented 06/28/18 1720    Response to Interventions  Absence of nonverbal indicators of pain 06/29/18 0655   [place] Coma Scale Score  15 06/28/18 1720            Patient Lines/Drains/Airways Status    Active LINES/DRAINS/AIRWAYS     Name: Placement date: Placement time: Site: Days: Last dressing change:    Peripheral IV 06/27/18 Right Upper forearm 06/27/18   0644   Upper forearm   2             Patient Lines/Drains/Airways Status    Active PICC/CVC     None            Intake/Output Detail Report     Date Intake     Output Net    Shift P.O. I.V. IV Piggyback Total Urine Total       Noc 06/27/18 2300 - 06/28/18 0659 -- 1251 -- 1251     Day 06/28/18 0700 - 06/28/18 1459 180 212 -- 392 775 775 -383    [place] 06/28/18 1500 - 06/28/18 2259 100 -- -- 100 275 275 -175    Noc 06/28/18 2300 - 06/29/18 0659 -- -- -- -- 350 350 -350    Day  06/29/18 0700 - 06/29/18 1459 120 -- -- 120 300 300 -180      Last Void/BM       Most Recent Value    Urine Occurrence 1 [Bedpan] at 06/29/2018 1100    Stool Occurrence       Case Management/Discharge Planning     Case Management/Discharge Planning Flowsheet     REFERRAL INFORMATION     Concerns Comments  -- 08/10/17 1631    Did the Initial Social Work Assessment result in a Social Work Case?  Yes 06/29/18 0938   DISCHARGE PLANNING      Arrived From  home or self-care 08/09/17 1132   Transportation Available  family or friend will provide (Pt drives at baseline) 06/28/18 1638    Referral Source  interdisciplinary rounds;physician 06/29/18 0940   Does Patient Need a Referral for Clinic CC  Yes 08/09/17 1132    # of Referrals Placed by CTS  Post Acute Facilities 06/29/18 0939   Equipment Used at Home  grab bar;shower chair;walker, rolling 03/25/16 1317    Post Acute Facilities  TCU 06/29/18 0940   FINAL RESOURCES      CTS Assigned to Case  prashanth galvan 06/29/18 0938   Equipment Currently Used at Home  cane, straight;walker, rolling 06/28/18 1638    Primary Care Clinic Name  [facility] 08/09/17 1132   Resources List  -- 08/10/17 1631    Primary Care MD Name  Damian Russ 08/09/17 1132   Other Resources  -- 08/10/17 1631    LIVING ENVIRONMENT     Existing Resources/Services  None 05/11/15 1442    Lives With  alone 06/28/18 1638   ABUSE RISK SCREEN      Living Arrangements  condominium 06/28/18 1638   QUESTION TO PATIENT:  Has a member of your family or a partner(now or in the past) intimidated, hurt, manipulated, or controlled you in any way?  no 06/27/18 1310    COPING/STRESS     QUESTION TO PATIENT: Do you feel safe going back to the place where you are living?  yes 06/27/18 1310    Major Change/Loss/Stressor  illness 06/27/18 1506   OBSERVATION: Is there reason to believe there has been maltreatment of a vulnerable adult (ie. Physical/Sexual/Emotional abuse, self neglect, lack of adequate food, shelter, medical care,  or financial exploitation)?  no 06/27/18 1310    Patient Personal Strengths  expressive of needs;future/goal oriented;motivated;positive attitude;strong support system;successful coping history 11/17/16 1424   OTHER      EXPECTED DISCHARGE     Are you depressed or being treated for depression?  No 06/27/18 1310    Expected Discharge Date  06/29/18 (TCU) 06/28/18 1621   HOMICIDE RISK      ASSESSMENT/CONCERNS TO BE ADDRESSED     Feels Like Hurting Others  no 06/27/18 0613    Concerns To Be Addressed  denies needs/concerns at this time 08/10/17 1622

## 2018-06-27 NOTE — PLAN OF CARE
Problem: Patient Care Overview  Goal: Plan of Care/Patient Progress Review  PT: Attempted to see patient for PT session but pt at xray. RN reports she is very painful with any movement of leg or pelvis and would not tolerate any PT today in any case.

## 2018-06-27 NOTE — IP AVS SNAPSHOT
` [place] [address] [facility]: [phone]            Medication Administration Report for Opal Sin as of 06/29/18 4133   Legend:    Given Hold Not Given Due Canceled Entry Other Actions    Time Time (Time) Time  Time-Action       Inactive    Active    Linked        Medications 06/23/18 06/24/18 06/25/18 06/26/18 06/27/18 06/28/18 06/29/18    acetaminophen (TYLENOL) tablet 500-1,000 mg  Dose: 500-1,000 mg  Freq: EVERY 6 HOURS PRN Route: PO  PRN Reason: mild pain  Start: 06/27/18 1059   Admin Instructions: Maximum acetaminophen dose from all sources = 75 mg/kg/day not to exceed 4 gram    Admin. Amount: 1-2 tablet (1-2 × 500 mg tablet)  Last Admin: 06/29/18 0900  Dispense Loc: [facility]         1415 (1,000 mg)-Given       2013 (1,000 mg)-Given        0334 (1,000 mg)-Given       1037 (1,000 mg)-Given       2051 (1,000 mg)-Given        0250 (1,000 mg)-Given       0900 (1,000 mg)-Given           albuterol (PROAIR HFA/PROVENTIL HFA/VENTOLIN HFA) Inhaler 1-2 puff  Dose: 1-2 puff  Freq: EVERY 6 HOURS PRN Route: IN  PRN Reasons: shortness of breath / dyspnea,wheezing  Start: 06/27/18 1059   Admin. Amount: 1-2 puff  Dispense Loc:  Main Pharmacy               amLODIPine (NORVASC) tablet 5 mg  Dose: 5 mg  Freq: DAILY Route: PO  Start: 06/27/18 1100   Admin Instructions: Hold for SBP < 100    Admin. Amount: 1 tablet (1 × 5 mg tablet)  Last Admin: 06/29/18 0900  Dispense Loc: [facility]         1123 (5 mg)-Given        1036 (5 mg)-Given        0900 (5 mg)-Given           aspirin EC tablet 81 mg  Dose: 81 mg  Freq: DAILY Route: PO  Start: 06/27/18 1100   Admin Instructions: DO NOT CRUSH.    Admin. Amount: 1 tablet (1 × 81 mg tablet)  Last Admin: 06/29/18 0900  Dispense Loc: [place] [id number]         1123 (81 mg)-Given        1037 (81 mg)-Given        0900 (81 mg)-Given           atorvastatin (LIPITOR) tablet 20 mg  Dose: 20 mg  Freq: DAILY Route: PO  Start: 06/27/18 1100   Admin. Amount: 2 tablet  (2 × 10 mg tablet)  Last Admin: 06/29/18 0900  Dispense Loc:  [facility]         1122 (20 mg)-Given        1041 (20 mg)-Given        0900 (20 mg)-Given           carbidopa-levodopa (SINEMET)  MG per tablet 1 tablet  Dose: 1 tablet  Freq: 4 TIMES DAILY Route: PO  Start: 06/27/18 1100   Admin Instructions: Takes at 6am, 11am, 4pm, and 9 pm    Admin. Amount: 1 tablet  Last Admin: 06/29/18 1137  Dispense Loc:  [facility]         1122 (1 tablet)-Given       1605 (1 tablet)-Given       2118 (1 tablet)-Given        0558 (1 tablet)-Given       1115 (1 tablet)-Given       1608 (1 tablet)-Given       2051 (1 tablet)-Given        0553 (1 tablet)-Given       1137 (1 tablet)-Given       [ ] 1600       [ ] 2100           carvedilol (COREG) tablet 6.25 mg  Dose: 6.25 mg  Freq: 2 TIMES DAILY WITH MEALS Route: PO  Start: 06/27/18 1100   Admin. Amount: 1 tablet (1 × 6.25 mg tablet)  Last Admin: 06/29/18 0859  Dispense Loc: [facility]         1240 (6.25 mg)-Given       1811 (6.25 mg)-Given        1036 (6.25 mg)-Given       1836 (6.25 mg)-Given        0859 (6.25 mg)-Given       [ ] 1800           cholecalciferol (vitamin D3) tablet 400 Units  Dose: 400 Units  Freq: DAILY Route: PO  Start: 06/27/18 1100   Admin. Amount: 1 tablet (1 × 400 Units tablet)  Last Admin: 06/29/18 0900  Dispense Loc: [facility]         1415 (400 Units)-Given        1036 (400 Units)-Given        0900 (400 Units)-Given           cyanocolbalamin (vitamin  B-12) tablet 100 mcg  Dose: 100 mcg  Freq: DAILY Route: PO  Start: 06/27/18 1100   Admin. Amount: 1 tablet (1 × 100 mcg tablet)  Last Admin: 06/29/18 0900  Dispense Loc:  [facility]         1239 (100 mcg)-Given        1037 (100 mcg)-Given        0900 (100 mcg)-Given           HYDROmorphone (PF) (DILAUDID) injection 0.2 mg  Dose: 0.2 mg  Freq: EVERY 2 HOURS PRN Route: IV  PRN Reason: other  PRN Comment: pain control or improvement in physical function. Hold dose for analgesic side effects.  Start: 06/27/18 1058    Admin Instructions: Notify provider to assess for uncontrolled pain or analgesic side effects. Hold while on PCA or with regular IV opioid dosing  For ordered doses up to 4 mg give IV Push undiluted. Administer each 2mg over 2-5 minutes.    Admin. Amount: 0.2 mg  Last Admin: 06/27/18 2023  Dispense Loc:  [facility]         1207 (0.2 mg)-Given       1408 (0.2 mg)-Given       2023 (0.2 mg)-Given             loperamide (IMODIUM) capsule 2 mg  Dose: 2 mg  Freq: 4 TIMES DAILY PRN Route: PO  PRN Reason: diarrhea  Start: 06/27/18 1059   Admin. Amount: 1 capsule (1 × 2 mg capsule)  Dispense Loc:  [facility]               losartan (COZAAR) tablet 50 mg  Dose: 50 mg  Freq: DAILY Route: PO  Start: 06/27/18 1100   Admin. Amount: 1 tablet (1 × 50 mg tablet)  Last Admin: 06/28/18 2050  Dispense Loc:  [facility]         (1122)-Not Given [C]       2014 (50 mg)-Given        2050 (50 mg)-Given        [ ] 2100           magnesium sulfate 4 g in 100 mL sterile water (premade)  Dose: 4 g  Freq: EVERY 4 HOURS PRN Route: IV  PRN Reason: magnesium supplementation  Start: 06/27/18 1059   Admin Instructions: For serum Mg++ less than 1.6 mg/dL  Give 4 g and recheck magnesium level 2 hours after dose, and next AM.    Admin. Amount: 4 g = 100 mL Conc: 4 g/100 mL  Dispense Loc: Contact Rx for dose  Infused Over: 120 Minutes  Volume: 100 mL               melatonin tablet 1 mg  Dose: 1 mg  Freq: AT BEDTIME PRN Route: PO  PRN Reason: sleep  Start: 06/27/18 1059   Admin Instructions: Do not give unless at least 6 hours of uninterrupted sleep is expected.    Admin. Amount: 1 tablet (1 × 1 mg tablet)  Dispense Loc:  [facility]               naloxone (NARCAN) injection 0.1-0.4 mg  Dose: 0.1-0.4 mg  Freq: EVERY 2 MIN PRN Route: IV  PRN Reason: opioid reversal  Start: 06/27/18 1059   Admin Instructions: For respiratory rate LESS than or EQUAL to 8.  Partial reversal dose:  0.1 mg titrated q 2 minutes for Analgesia Side Effects Monitoring Sedation Level of  3 (frequently drowsy, arousable, drifts to sleep during conversation).Full reversal dose:  0.4 mg bolus for Analgesia Side Effects Monitoring Sedation Level of 4 (somnolent, minimal or no response to stimulation).  For ordered doses up to 2mg give IVP. Give each 0.4mg over 15 seconds in emergency situations. For non-emergent situations further dilute in 9mL of NS to facilitate titration of response.    Admin. Amount: 0.1-0.4 mg = 0.25-1 mL Conc: 0.4 mg/mL  Dispense Loc:  [facility]  Volume: 1 mL               ondansetron (ZOFRAN-ODT) ODT tab 4 mg  Dose: 4 mg  Freq: EVERY 6 HOURS PRN Route: PO  PRN Reasons: nausea,vomiting  Start: 06/27/18 1059   Admin Instructions: This is Step 1 of nausea and vomiting management.  If nausea not resolved in 15 minutes, go to Step 2 prochlorperazine (COMPAZINE). Do not push through foil backing. Peel back foil and gently remove. Place on tongue immediately. Administration with liquid unnecessary  With dry hands, peel back foil backing and gently remove tablet; do not push oral disintegrating tablet through foil backing; administer immediately on tongue and oral disintegrating tablet dissolves in seconds; then swallow with saliva; liquid not required.    Admin. Amount: 1 tablet (1 × 4 mg tablet)  Dispense Loc:  [facility]              Or  ondansetron (ZOFRAN) injection 4 mg  Dose: 4 mg  Freq: EVERY 6 HOURS PRN Route: IV  PRN Reasons: nausea,vomiting  Start: 06/27/18 1059   Admin Instructions: This is Step 1 of nausea and vomiting management.  If nausea not resolved in 15 minutes, go to Step 2 prochlorperazine (COMPAZINE).  Irritant. For ordered doses up to 4 mg, give IV Push undiluted over 2-5 minutes.    Admin. Amount: 4 mg = 2 mL Conc: 4 mg/2 mL  Dispense Loc:  [facility]  Infused Over: 2-5 Minutes  Volume: 2 mL               oxyCODONE IR (ROXICODONE) tablet 5-10 mg  Dose: 5-10 mg  Freq: EVERY 3 HOURS PRN Route: PO  PRN Reason: other  PRN Comment: pain control or improvement in  physical function. Hold dose for analgesic side effects.  Start: 06/27/18 1059   Admin Instructions: Start with the lowest dose.  May adjust dose by 5 mg every 3 hours as needed. Notify provider to assess for uncontrolled pain or analgesic side effects. Hold while on PCA or with regular IV opioid dosing.    Admin. Amount: 1-2 tablet (1-2 × 5 mg tablet)  Last Admin: 06/29/18 1412  Dispense Loc:  [facility]         1302 (5 mg)-Given       1709 (10 mg)-Given       2355 (10 mg)-Given        0334 (10 mg)-Given       0756 (10 mg)-Given       1115 (10 mg)-Given       1444 (10 mg)-Given       1835 (10 mg)-Given       2148 (10 mg)-Given        0603 (10 mg)-Given       1044 (10 mg)-Given       1412 (10 mg)-Given           potassium chloride (KLOR-CON) Packet 20-40 mEq  Dose: 20-40 mEq  Freq: EVERY 2 HOURS PRN Route: ORAL OR FEED  PRN Reason: potassium supplementation  Start: 06/27/18 1059   Admin Instructions: Use if unable to tolerate tablets.    If Serum K+ 3.4-4.0, dose = 20 mEq x1. Recheck K+ level the next AM.  If Serum K+ 3.0-3.3, dose = 60 mEq po total dose (40 mEq x 1 followed in 2 hours by 20 mEq X1). Recheck K+ level 4 hours after dose and the next AM.  If Serum K+ 2.5-2.9, dose = 80 mEq po total dose (40 mEq Q2H x2). Recheck K+ level 4 hours after dose and the next AM.  If Serum K+ less than 2.5, See IV order.  Dissolve packet contents in 4-8 ounces of cold water or juice.    Admin. Amount: 20-40 mEq  Dispense Loc:  [facility]               potassium chloride 10 mEq in 100 mL intermittent infusion with 10 mg lidocaine  Dose: 10 mEq  Freq: EVERY 1 HOUR PRN Route: IV  PRN Reason: potassium supplementation  Start: 06/27/18 1059   Admin Instructions: Infuse via PERIPHERAL LINE. Use potassium with lidocaine for pain with peripheral administration.  If Serum K+ 3.4-4.0, dose = 10 mEq/hr x2 doses. Recheck K+ level the next AM.  If Serum K+ 3.0-3.3, dose = 10 mEq/hr x4 doses (40 mEq IV total dose). Recheck K+ level 2 hours  after dose and the next AM.  If Serum K+ less than 3.0, dose = 10 mEq/hr x6 doses (60 mEq IV total dose). Recheck K+ level 2 hours after dose and the next AM.    Admin. Amount: 10 mEq = 100 mL Conc: 10 mEq/100 mL  Last Admin: 06/28/18 1220  Dispense Loc: Contact Rx for dose  Infused Over: 1 Hours  Volume: 100 mL         1409 (10 mEq)-New Bag       1548 (10 mEq)-New Bag       1658 (10 mEq)-New Bag       1820 (10 mEq)-New Bag        1117 (10 mEq)-New Bag       1220 (10 mEq)-New Bag            potassium chloride 10 mEq in 100 mL sterile water intermittent infusion (premix)  Dose: 10 mEq  Freq: EVERY 1 HOUR PRN Route: IV  PRN Reason: potassium supplementation  Start: 06/27/18 1059   Admin Instructions: Infuse via PERIPHERAL LINE or CENTRAL LINE. Use for central line replacement if patient weight less than 65 kg, if patient is on TPN with high potassium content or if unit does not stock 20 mEq bags.  If Serum K+ 3.4-4.0, dose = 10 mEq/hr x2 doses. Recheck K+ level the next AM.  If Serum K+ 3.0-3.3, dose = 10 mEq/hr x4 doses (40 mEq IV total dose). Recheck K+ level 2 hours after dose and the next AM.  If Serum K+ less than 3.0, dose = 10 mEq/hr x6 doses (60 mEq IV total dose). Recheck K+ level 2 hours after dose and the next AM.    Admin. Amount: 10 mEq = 100 mL Conc: 10 mEq/100 mL  Dispense Loc: Contact Rx for dose  Infused Over: 60 Minutes  Volume: 100 mL               potassium chloride 20 mEq in 50 mL intermittent infusion  Dose: 20 mEq  Freq: EVERY 1 HOUR PRN Route: IV  PRN Reason: potassium supplementation  Start: 06/27/18 1059   Admin Instructions: Infuse via CENTRAL LINE Only.  May need EKG if less than 65 kg or on TPN - Max rate is 0.3 mEq/kg/hr for patients not on EKG monitoring.  <br>If Serum K+ 3.4-4.0, dose = 20 mEq/hr x1 doses. Recheck K+ level the next AM.<br>If Serum K+ 3.0-3.3, dose = 20 mEq/hr x2 doses (40 mEq IV total dose).  Recheck K+ level 2 hours after dose and the next AM.<br>If Serum K+ less than  3.0, dose = 20 mEq/hr x3 doses (60 mEq IV total dose). Recheck K+ level 2 hours after dose and the next AM.<br>IF NOT ON TELE give over 75 minutes    Admin. Amount: 20 mEq = 50 mL Conc: 20 mEq/50 mL  Dispense Loc: Contact Rx for dose  Volume: 50 mL               potassium chloride SA (K-DUR/KLOR-CON M) CR tablet 20-40 mEq  Dose: 20-40 mEq  Freq: EVERY 2 HOURS PRN Route: PO  PRN Reason: potassium supplementation  Start: 06/27/18 1059   Admin Instructions: Use if able to take PO.   If Serum K+ 3.4-4.0, dose = 20 mEq x1. Recheck K+ level the next AM.  If Serum K+ 3.0-3.3, dose = 60 mEq po total dose (40 mEq x1 followed in 2 hours by 20 mEq x1). Recheck K+ level 4 hours after dose and the next AM.  If Serum K+ 2.5-2.9, dose = 80 mEq po total dose (40 mEq Q2H x2). Recheck K+ level 4 hours after dose and the next AM.  If Serum K+ less than 2.5, See IV order.  DO NOT CRUSH    Admin. Amount: 1-2 tablet (1-2 × 20 mEq tablet)  Dispense Loc:  [facility]               raloxifene (Evista) tablet 60 mg  Dose: 60 mg  Freq: DAILY Route: PO  Start: 06/27/18 1100   Admin. Amount: 1 tablet (1 × 60 mg tablet)  Last Admin: 06/29/18 0900  Dispense Loc:  [facility]         1415 (60 mg)-Given        1036 (60 mg)-Given        0900 (60 mg)-Given           ranitidine (ZANTAC) tablet 75 mg  Dose: 75 mg  Freq: 2 TIMES DAILY Route: PO  Start: 06/27/18 1100   Admin. Amount: 1 tablet (1 × 75 mg tablet)  Last Admin: 06/29/18 0859  Dispense Loc:  [facility]         1240 (75 mg)-Given       2014 (75 mg)-Given        0756 (75 mg)-Given              2050 (75 mg)-Given        0859 (75 mg)-Given       [ ] 2100           senna-docusate (SENOKOT-S;PERICOLACE) 8.6-50 MG per tablet 1 tablet  Dose: 1 tablet  Freq: 2 TIMES DAILY PRN Route: PO  PRN Reason: constipation  Start: 06/27/18 7969   Admin Instructions: If no bowel movement in 24 hours, increase to 2 tablets PO.  Hold for loose stools.  This is the first step of a three step constipation treatment.     Admin. Amount: 1 tablet  Last Admin: 18  Dispense Loc: [facility]          1115 (1 tablet)-Given           Or  senna-docusate (SENOKOT-S;PERICOLACE) 8.6-50 MG per tablet 2 tablet  Dose: 2 tablet  Freq: 2 TIMES DAILY PRN Route: PO  PRN Reason: constipation  Start: 18 1059   Admin Instructions: Hold for loose stools.  This is the first step of a three step constipation treatment.    Admin. Amount: 2 tablet  Dispense Loc: [facility]                     Completed Medications  Medications 18         Dose: 0.2 mg  Freq: ONCE PRN Route: IV  PRN Reason: other  PRN Comment: pain control or improvement in physical function. Hold dose for analgesic side effects.  Start: 18   End: 18   Admin Instructions: Notify the provider to assess for uncontrolled pain or analgesic side effects.  Hold while on IV PCA or with regular IV opioid dosing.  For ordered doses up to 4 mg give IV Push undiluted. Administer each 2mg over 2-5 minutes.    Admin. Amount: 0.2 mg  Last Admin: 18  Dispense Loc: [facility]  Administrations Remainin         0646 (0.2 mg)-Given               Dose: 0.5 mg  Freq: ONCE Route: IV  Start: 18   End: 18   Admin Instructions: For ordered doses up to 4 mg give IV Push undiluted. Administer each 2mg over 2-5 minutes.    Admin. Amount: 0.5 mg  Last Admin: 18  Dispense Loc: [facility]  Administrations Remainin         0955 (0.5 mg)-Given               Dose: 0.5 mg  Freq: ONCE Route: IV  Start: 18   End: 18   Admin Instructions: For ordered doses up to 4 mg give IV Push undiluted. Administer each 2mg over 2-5 minutes.    Admin. Amount: 0.5 mg  Last Admin: 18  Dispense Loc: [facility]  Administrations Remainin         0813 (0.5 mg)-Given            Discontinued Medications  Medications 18  06/27/18 06/28/18 06/29/18         Rate: 75 mL/hr   Freq: CONTINUOUS Route: IV  Start: 06/27/18 1100   End: 06/28/18 0843   Last Admin: 06/28/18 0449  Dispense Loc: [facility] Floor Stock  Volume: 1,000 mL         1126 ( )-New Bag        0449 ( )-New Bag       0843-Med Discontinued          Dose: 1 tablet  Freq: 2 TIMES DAILY WITH MEALS Route: PO  Start: 06/27/18 1130   End: 06/27/18 1355   Admin Instructions: Formulary alternate for<br>Serafin mag 300/300 tid    Admin. Amount: 1 tablet  Dispense Loc: Pharmacy to Load to ADS         1355-Med Discontinued                 Dose: 1 tablet  Freq: 3 TIMES DAILY WITH MEALS Route: PO  Start: 06/27/18 1400   End: 06/28/18 1440   Admin Instructions: Formulary alternate for  Serafin mag 300/300    Admin. Amount: 1 tablet  Dispense Loc: [facility]         (1609)-Not Given       (1808)-Not Given        (1037)-Not Given       (1120)-Not Given       1440-Med Discontinued          Dose: 1 tablet  Freq: DAILY Route: PO  Start: 06/27/18 1100   End: 06/27/18 1114         1114-Med Discontinued
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
## 2018-06-27 NOTE — PHARMACY-ADMISSION MEDICATION HISTORY
Admission medication history interview status for the 6/27/2018  admission is complete. See EPIC admission navigator for prior to admission medications     Medication history source reliability:Good    Actions taken by pharmacist (provider contacted, etc): interview with patient.     Additional medication history information not noted on PTA med list :None    Medication reconciliation/reorder completed by provider prior to medication history? No    Time spent in this activity: 15 min    Prior to Admission medications    Medication Sig Last Dose Taking? Auth Provider   acetaminophen (TYLENOL) 500 MG tablet Take 500-1,000 mg by mouth every 6 hours as needed for mild pain prn Yes Reported, Patient   albuterol (PROAIR HFA/PROVENTIL HFA/VENTOLIN HFA) 108 (90 Base) MCG/ACT Inhaler Inhale 1-2 puffs into the lungs every 6 hours as needed for shortness of breath / dyspnea or wheezing prn Yes Damian Russ MD   alendronate (FOSAMAX) 35 MG tablet TAKE 1 TABLET BY MOUTH EVERY 7 DAYS 6/19/2018 Yes Damian Russ MD   amLODIPine (NORVASC) 5 MG tablet TAKE 1 TABLET(5 MG) BY MOUTH DAILY 6/26/2018 at am Yes Damian Russ MD   aspirin EC 81 MG EC tablet Take 1 tablet (81 mg) by mouth daily 6/26/2018 at am Yes Amberly Gutierrez MD   atorvastatin (LIPITOR) 20 MG tablet Take 1 tablet (20 mg) by mouth daily 6/26/2018 at am Yes Damian Russ MD   Calcium-Magnesium 300-300 MG TABS Take 1 tablet by mouth 3 times daily  6/26/2018 at pm Yes Reported, Patient   carbidopa-levodopa (SINEMET)  MG per tablet Take 1 tablet by mouth 4 times daily Takes at 6am, 11am, 4pm, and 9 pm 6/27/2018 at 0600 Yes Reported, Patient   carvedilol (COREG) 6.25 MG tablet Take 1 tablet (6.25 mg) by mouth 2 times daily (with meals) 6/26/2018 at am Yes Damian Russ MD   Cholecalciferol (VITAMIN D3 PO) Take 400 Units by mouth daily  6/26/2018 at am Yes Unknown, Entered By History   Cyanocobalamin (VITAMIN B  12 PO) Take 100 mcg by mouth daily  6/26/2018 at am Yes Reported, Patient   Iron-Vitamins (GERITOL COMPLETE) TABS Take 1 tablet by mouth daily 6/26/2018 at am Yes Reported, Patient   losartan (COZAAR) 50 MG tablet Take 1 tablet (50 mg) by mouth daily 6/26/2018 at am Yes Damian Russ MD   Psyllium (METAMUCIL FIBER PO) Take 1 capsule by mouth daily as needed  prn Yes Reported, Patient   raloxifene (EVISTA) 60 MG tablet TAKE 1 TABLET(60 MG) BY MOUTH DAILY 6/26/2018 at am Yes Aurora Robledo APRN CNP   RANITIDINE HCL PO Take 75 mg by mouth 2 times daily 6/26/2018 at pm Yes Unknown, Entered By History   traMADol (ULTRAM) 50 MG tablet Take 1 tablet (50 mg) by mouth every 6 hours as needed for moderate pain prn Yes Damian Russ MD

## 2018-06-27 NOTE — PROGRESS NOTES
"SPIRITUAL HEALTH SERVICES Progress Note  [facility] 68    SH, consult visit. 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 With that, the patient talked about travels to the [place] and [place] on a spiritual retracing of places important in the bible. The patient requested the \"Our Father\" prayer.     provided care in presence/listening, said \"Our Father\" prayer, and put in the consult for .    Coping with [employer] francesco practices and some family support     remains available as requested.        Gaurav Bermudez  Chaplain Resident  "

## 2018-06-27 NOTE — ED NOTES
"Patient presents with [facility] after mechanical fall in bathroom.  Patient reports getting up to use the bathroom, tripping on a rug and falling on right side against toilet seat.  Patient states she was able to get up and get to the couch, which is were she was found by EMS.  Patient reports \"knowing it is broken\".  Patient has tenderness to palpation on lateral side of right hip.  Patient has some slight shortening and external rotation of right leg.  Patient has strong pulses and good movement distally (wiggling toes).  Patient denied chest pain, shortness of breath, abdominal pain.  Patient states she was able to use the bathroom prior to falling, reports taking Sinemet this AM.    "
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
## 2018-06-27 NOTE — IP AVS SNAPSHOT
[facility] [address] [facility]    [address] [place] [address]    Phone:  [phone]                                       After Visit Summary   6/27/2018    Opal Sin    MRN: [id number]           After Visit Summary Signature Page     I have received my discharge instructions, and my questions have been answered. I have discussed any challenges I see with this plan with the nurse or doctor.    ..........................................................................................................................................  Patient/Patient Representative Signature      ..........................................................................................................................................  Patient Representative Print Name and Relationship to Patient    ..................................................               ................................................  Date                                            Time    ..........................................................................................................................................  Reviewed by Signature/Title    ...................................................              ..............................................  Date                                                            Time

## 2018-06-27 NOTE — DISCHARGE INSTRUCTIONS
Belongings at admission:  Hearing aides; glasses; clothing (pajamas), cell phone.    Discharge to [facility] [phone]

## 2018-06-27 NOTE — IP AVS SNAPSHOT
"    [place] [address] [facility]: [phone]                                              INTERAGENCY TRANSFER FORM - PHYSICIAN ORDERS   2018                    Hospital Admission Date: 2018  BENJI KENT   : 1935  Sex: Female        Attending Provider: (none)    Allergies:  Bee Pollen, Cephalexin Monohydrate, Ciprofloxacin, Clindamycin, Multi Vitamin-minerals [Hair-vites], Penicillin [Penicillins], Thiazide-type Diuretics, Tobramycin, Vancomycin, Atorvastatin, Cephalexin, Ciprofloxacin, Ibuprofen, Versed [Midazolam], Zoloft [Sertraline], Ace Inhibitors, Advil [Ibuprofen Micronized], Metoprolol    Infection:  None   Service:  HOSPITALIST    Ht:  1.6 m (5' 3\")   Wt:  59 kg (130 lb)   Admission Wt:  59 kg (130 lb)    BMI:  23.03 kg/m 2   BSA:  1.62 m 2            Patient PCP Information     Provider PCP Type    Damian Russ MD, MD General      ED Clinical Impression     Diagnosis Description Comment Added By Time Added    Closed displaced fracture of right ilium, unspecified fracture morphology, initial encounter (H) [S32.301A] Closed displaced fracture of right ilium, unspecified fracture morphology, initial encounter (H) [S32.301A]  Kiera Mena MD 2018  9:00 AM      Hospital Problems as of 2018              Priority Class Noted POA    Fall Medium  2018 Yes      Non-Hospital Problems as of 2018              Priority Class Noted    Mixed hyperlipidemia Medium  2004    Essential hypertension Medium  2004    Internal derangement of knee Medium  2004    Degeneration of intervertebral disc, site unspecified Medium  2006    Cervical spinal stenosis Medium  2006    Acute bronchospasm Medium  2007    Hyposmolality and/or hyponatremia Medium  2007    Osteoporosis Medium  3/28/2008    HYPERLIPIDEMIA LDL GOAL <130 Medium  10/31/2010    CKD (chronic kidney disease) stage 3, GFR 30-59 ml/min Medium  2011    " Intermittent asthma Medium  5/16/2012    Microalbuminuria Medium  5/23/2012    Essential hypertension, benign Medium  Unknown    Breast CA (H) Medium  Unknown    Previous back surgery Medium  2/26/2013    DJD (degenerative joint disease), ankle and foot Medium  2/26/2013    Ankle joint pain Medium  5/9/2013    Enthesopathy of ankle and tarsus Medium  5/22/2013    Abnormal Pap smear of cervix Medium  3/25/2014    Lung nodule Medium  6/29/2014    Esophageal reflux Medium  7/2/2014    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    Hx of osteomyelitis Medium  10/21/2014    Hx of antibiotic allergy Medium  Unknown    Advanced directives, counseling/discussion Medium  11/11/2014    Hyponatremia Medium  2/10/2015    Leg hematoma Medium  4/23/2015    Fall from standing Medium  4/23/2015    Orbital fracture (H) Medium  4/23/2015    Subdural bleeding (H) Medium  4/23/2015    Physical deconditioning Medium  4/23/2015    Spinal stenosis, unspecified region other than cervical Medium  Unknown    Chronic pain Medium  3/29/2016    Parkinson's disease (H) Medium  7/26/2016    TIA (transient ischemic attack) Medium  11/15/2016    Carotid aneurysm non ruptured Medium  11/18/2016    Delirium Medium  11/18/2016    Cerebral aneurysm Medium  11/21/2016    Benign essential hypertension Medium  11/23/2016    CVA (cerebral vascular accident) (H) Medium  8/8/2017    Aphasia Medium  8/9/2017    Thyroid nodule Medium  12/28/2017      Code Status History     Date Active Date Inactive Code Status Order ID Comments User Context    6/29/2018 11:49 AM  DNR/DNI [id number]  Randall Sal MD Outpatient    6/27/2018 10:59 AM 6/29/2018 11:49 AM DNR/DNI [id number]  Randall Sal MD Inpatient    8/9/2017  1:00 PM 6/27/2018 10:59 AM DNR/DNI [id number]  Judith Combs MD Outpatient    8/8/2017  8:47 PM 8/9/2017  1:00 PM DNR/DNI [id number]  Jairo Price MD Inpatient    11/30/2016  1:31 PM 8/8/2017  8:47 PM DNR/DNI [id number]  Nick Monteiro MD  Outpatient    11/30/2016 11:38 AM 11/30/2016  1:31 PM Full Code [id number]  Nick Monteiro MD Outpatient    11/29/2016  7:45 PM 11/30/2016 11:38 AM DNR/DNI [id number]  Nick Monteiro MD Inpatient    11/17/2016 12:05 PM 11/29/2016  7:45 PM DNR/DNI [id number]  Amberly Gutierrez MD Outpatient    11/15/2016  5:19 AM 11/17/2016 12:05 PM DNR/DNI [id number]  José Chen MD Inpatient    11/11/2014  2:49 PM 11/15/2016  5:19 AM Full Code [id number]  Damian Russ MD Outpatient    6/25/2014  4:13 PM 6/26/2014  3:26 PM Full Code [id number]  Simón Bledsoe PA-C Inpatient         Medication Review      START taking        Dose / Directions Comments    loperamide 2 MG capsule   Commonly known as:  IMODIUM   Used for:  Viral gastroenteritis        Dose:  2 mg   Take 1 capsule (2 mg) by mouth 4 times daily as needed for diarrhea   Quantity:  20 capsule   Refills:  0        oxyCODONE IR 10 MG tablet   Commonly known as:  ROXICODONE   Used for:  Closed displaced fracture of right ilium, unspecified fracture morphology, initial encounter (H)        Dose:  10 mg   Take 1 tablet (10 mg) by mouth every 6 hours as needed for other (pain control or improvement in physical function. Hold dose if drowsy)   Quantity:  15 tablet   Refills:  0        senna-docusate 8.6-50 MG per tablet   Commonly known as:  SENOKOT-S;PERICOLACE   Used for:  Closed displaced fracture of right ilium, unspecified fracture morphology, initial encounter (H)        Dose:  1 tablet   Take 1 tablet by mouth 2 times daily as needed for constipation   Quantity:  30 tablet   Refills:  0          CONTINUE these medications which have NOT CHANGED        Dose / Directions Comments    acetaminophen 500 MG tablet   Commonly known as:  TYLENOL        Dose:  500-1000 mg   Take 500-1,000 mg by mouth every 6 hours as needed for mild pain   Refills:  0        albuterol 108 (90 Base) MCG/ACT Inhaler   Commonly known as:  PROAIR HFA/PROVENTIL HFA/VENTOLIN  HFA   Used for:  Acute bronchospasm        Dose:  1-2 puff   Inhale 1-2 puffs into the lungs every 6 hours as needed for shortness of breath / dyspnea or wheezing   Quantity:  18 g   Refills:  1        alendronate 35 MG tablet   Commonly known as:  FOSAMAX   Used for:  Osteoporosis        TAKE 1 TABLET BY MOUTH EVERY 7 DAYS   Quantity:  12 tablet   Refills:  1        amLODIPine 5 MG tablet   Commonly known as:  NORVASC   Used for:  Benign essential hypertension        TAKE 1 TABLET(5 MG) BY MOUTH DAILY   Quantity:  90 tablet   Refills:  2        aspirin 81 MG EC tablet   Used for:  Transient cerebral ischemia, unspecified type        Dose:  81 mg   Take 1 tablet (81 mg) by mouth daily   Refills:  0        atorvastatin 20 MG tablet   Commonly known as:  LIPITOR   Used for:  Mixed hyperlipidemia        Dose:  20 mg   Take 1 tablet (20 mg) by mouth daily   Quantity:  90 tablet   Refills:  3        Calcium-Magnesium 300-300 MG Tabs        Dose:  1 tablet   Take 1 tablet by mouth 3 times daily   Refills:  0        carbidopa-levodopa  MG per tablet   Commonly known as:  SINEMET        Dose:  1 tablet   Take 1 tablet by mouth 4 times daily Takes at 6am, 11am, 4pm, and 9 pm   Refills:  0        carvedilol 6.25 MG tablet   Commonly known as:  COREG   Used for:  Essential hypertension with goal blood pressure less than 140/90        Dose:  6.25 mg   Take 1 tablet (6.25 mg) by mouth 2 times daily (with meals)   Quantity:  180 tablet   Refills:  3        GERITOL COMPLETE Tabs        Dose:  1 tablet   Take 1 tablet by mouth daily   Refills:  0        losartan 50 MG tablet   Commonly known as:  COZAAR   Used for:  Essential hypertension with goal blood pressure less than 140/90        Dose:  50 mg   Take 1 tablet (50 mg) by mouth daily   Quantity:  90 tablet   Refills:  3        METAMUCIL FIBER PO        Dose:  1 capsule   Take 1 capsule by mouth daily as needed   Refills:  0        raloxifene 60 MG tablet   Commonly known  as:  Evista   Used for:  Age-related osteoporosis without current pathological fracture        TAKE 1 TABLET(60 MG) BY MOUTH DAILY   Quantity:  90 tablet   Refills:  0    Overdue for f/u from 8/14/17 apt. Also due for repeat dexa and labs in August       RANITIDINE HCL PO        Dose:  75 mg   Take 75 mg by mouth 2 times daily   Refills:  0        VITAMIN B 12 PO        Dose:  100 mcg   Take 100 mcg by mouth daily   Refills:  0        VITAMIN D3 PO        Dose:  400 Units   Take 400 Units by mouth daily   Refills:  0          STOP taking     traMADol 50 MG tablet   Commonly known as:  ULTRAM                     Further instructions from your care team       Belongings at admission:  Hearing aides; glasses; clothing (pajamas), cell phone.    Discharge to [facility] [phone]    Summary of Visit     Reason for your hospital stay       You were admitted with fall and right iliac wing fracture             After Care     Activity - Up with assistive device           Additional Discharge Instructions       Aggressive incentive spirometry  Recommend outpatient vitamin D levels.   Due for DEXA scan in August  Fall precautions       Advance Diet as Tolerated       Follow this diet upon discharge: Orders Placed This Encounter      Room Service      Regular Diet Adult       General info for SNF       Length of Stay Estimate: Short Term Care: Estimated # of Days <30  Condition at Discharge: Stable  Level of care:skilled   Rehabilitation Potential: Fair  Admission H&P remains valid and up-to-date: Yes  Recent Chemotherapy: N/A  Use Nursing Home Standing Orders: Yes       Mantoux instructions       Give two-step Mantoux (PPD) Per Facility Policy Yes       Weight bearing status       WBAT             Referrals     Occupational Therapy Adult Consult       Evaluate and treat as clinically indicated.    Reason: right iliac wing fracture       Physical Therapy Adult Consult       Evaluate and treat as clinically  indicated.    Reason: right iliac wing fracture             Follow-Up Appointment Instructions     Future Labs/Procedures    Follow Up and recommended labs and tests     Comments:    Follow up with Dr. Matta in 3 weeks.  No follow up labs or test are needed.  Follow-up x-rays will be taken at time of visit. Call Irlanda for appointment [phone]    Follow Up and recommended labs and tests     Comments:    Follow up with Nursing home physician.  No follow up labs or test are needed.      Follow-Up Appointment Instructions     Follow Up and recommended labs and tests       Follow up with Dr. Matta in 3 weeks.  No follow up labs or test are needed.  Follow-up x-rays will be taken at time of visit. Call Irlanda for appointment [phone]       Follow Up and recommended labs and tests       Follow up with Nursing home physician.  No follow up labs or test are needed.             Statement of Approval     Ordered          06/29/18 1935  I have reviewed and agree with all the recommendations and orders detailed in this document.  EFFECTIVE NOW     Approved and electronically signed by:  Randall Sal MD           06/29/18 4007  I have reviewed and agree with all the recommendations and orders detailed in this document.  EFFECTIVE NOW     Approved and electronically signed by:  Randall Sal MD

## 2018-06-27 NOTE — PROGRESS NOTES
RECEIVING UNIT ED HANDOFF REVIEW    ED Nurse Handoff Report was reviewed by: Malina Kyle on June 27, 2018 at 10:00 AM

## 2018-06-27 NOTE — PLAN OF CARE
Problem: Patient Care Overview  Goal: Plan of Care/Patient Progress Review  Outcome: No Change  Strong pain with any movement to right hip/pelvis.  Also reports pain to bilateral neck.  Heat to neck applied.  Dilaudid x 2; oxycodone x 1, ES tylenol x 1 given with little relief.  At rest in supine position is most comfortable.  Was able to use bedpan and submit UA and turn to left side with pillows.  Tele NSR.  No BM.  KCL IV bumps for K+ of 3.0; declined oral supplement as she states she does not tolerate any form of oral potassium.      Addendum:  Admission    Patient arrives to room 603-2 via cart from ED  Care plan note: see above    Inpatient nursing criteria listed below were met:    PCD's Documented: have in room; patient has declined so far; rationale informed  Skin issues/needs documented :NA  Isolation education started/completed NA  Patient allergies verified with patient: No  Verified completion of [place] Risk Assessment Tool:  No  Fall Prevention: Care plan updated, Education given and documented Yes  Care Plan initiated: Yes  Home medications documented in belongings flowsheet: NA  Patient belongings documented in belongings flowsheet: Yes  Reminder note (belongings/ medications) placed in discharge instructions:Yes  Admission profile/ required documentation complete: yes

## 2018-06-27 NOTE — IP AVS SNAPSHOT
` ` Patient Information     Patient Name Sex Opal Wang ([id number]) Female 1935       Room Bed    6603 6603-02      Patient Demographics     Address Phone E-mail Address    [address]    [place] [address] [phone] (Home) *Preferred*  [phone] (Mobile) [email or web address]      Patient Ethnicity & Race     Ethnic Group Patient Race    American White      Emergency Contact(s)     Name Relation Home Work Corrie Rubio [phone] none none      Documents on File        Status Date Received Description       Documents for the Patient    Insurance Card  () 05     Privacy Notice - [place] Received 03     Face Sheet Received () 09     Insurance Card  () 10/05/07     Insurance Card  () 08     Consent Form  08     Privacy Notice - [place] Received 08     Waiver - Payment  08     External Medication Information Consent Accepted () 09     CASIE Face Sheet Received () 09     Waiver - Payment  09     Insurance Card  () 01/05/10     Face Sheet Received () 06/23/10     External Medication Information Consent Accepted () 08/11/10     Patient ID Received () 12     Consent for Services - Hospital/Clinic Received () 11/01/10     Consent for Services - Hospital/Clinic Received () 11     External Medication Information Consent Accepted () 11     Immunization Record   [facility] Flu Immunization 10/14/11    Consent for Services - Hospital/Clinic Received () 12     Advance Directives and Living Will Received 08     External Medication Information Consent Accepted 12     Insurance Card Received () 12     Patient ID  ()      Insurance Card  ()      Consent for EHR Access  13 Copied from existing Consent for services - C/HOD collected on  2012    [facility] Specified Other       Insurance Card Received () 13 [employer]    Consent for Services - Hospital/Clinic Received () 13     Patient ID Received () 13     Patient ID Received () 13     Consent for Services - Hospital/Clinic Received () 14     Patient ID Received () 14 [place] exp: 2016    Insurance Card Received () 14 are    HIM CHANTELLE Authorization - File Only   [facility], 10/15/14    Advance Directives and Living Will Not Received  VALIDATION OF AD-08    Advance Directives and Living Will Received 10/29/14 HEALTH CARE DIRECTIVE-08    HIM CHANTELLE Authorization - File Only  14 BENJI YOLI-10/29/14    Advance Directives and Living Will Not Received  VALIDATION OF AD  3/13/08    Advance Directives and Living Will Received 14 HEALTH CARE DIRECTIVE  3/13/08    HIM CHANTELLE Authorization - File Only  03/01/15 BENJI KENT-10/29/14    Patient ID Received () 06/12/15     Insurance Card Received () 06/12/15     Consent for Services - Hospital/Clinic Received () 06/12/15     Consent for Services/Privacy Notice - Hospital/Clinic Received () 16     Patient ID Received 08/10/16 MNDL 2020    Insurance Card Received () 08/10/16 are    HIM CHANTELLE Authorization  16     Research  16 RESEARCH CONSENT FORM - NEUROFORM MICRODELIVERY STENT CONSENT FORM    Consent for Services - Geriatrics Received 16     Consent for Services/Privacy Notice - Hospital/Clinic Received 17     HIM CHANTELLE Authorization  17     HIM CHANTELLE Authorization  17     Patient ID Received 10/11/17 MNDL 2020    Insurance Card Received () 10/11/17     HIM CHANTELLE Authorization  () 10/17/17 Authorization for batch [employer]  CMS 10/17/2017    Care Everywhere Prospective Auth Received 17     Insurance Card Received 18 [facility]    Consent for Services - Hospital  and Clinic Received 06/26/18     HIE Auth Received 06/26/18        Documents for the Encounter    EMS/Ambulance Record  06/27/18 [facility]    CMS IM for Patient Signature Received 06/27/18       Admission Information     Attending Provider Admitting Provider Admission Type Admission Date/Time    Randall Sal MD Neshangi, Srivani, MD Emergency 06/27/18  0606    Discharge Date Hospital Service Auth/Cert Status Service Area     Hospitalist Incomplete [facility]    Unit Room/Bed Admission Status       [address] UNIT 6603/6603-02 Admission (Confirmed)       Admission     Complaint    Fall      Hospital Account     Name Acct ID Class Status Primary Coverage    Opal Sin [id number] Inpatient Open UCARE - UCARE FOR SENIORS            Guarantor Account (for Hospital Account #[id number])     Name Relation to Pt Service Area Active? Acct Type    Opal Sin  FCS Yes Personal/Family    Address Phone          [address] [place] [address] [phone](H)  NONE(O)              Coverage Information (for Hospital Account #[id number])     F/O Payor/Plan Precert #    UCARE/UCARE FOR SENIORS     Subscriber Subscriber #    Opal Sin [id number]    Address Phone    [address]  [place] [address] [phone]

## 2018-06-27 NOTE — ED PROVIDER NOTES
History     Chief Complaint:  Fall     HPI   Opal Sin is a 83 year old female with a history of parkinson's who was seen in the emergency department yesterday for diarrhea. She had eaten some fish 4 days ago and subsequently began diarrhea the following day. She had some intermittent abdominal cramping but no vomiting, no melanotrichia. She had a diagnostic evaluation performed in the emergency department including CBC, BMP with just mild abnormalities and enteric stool cultures which have returned negative. After some IV fluids and some Zofran, she felt much better and improved and so has discharged home. She presents now today after she sustained a fall and complained of right hip pain. The patient reports she was in her home bathroom bathroom where she lives alone this morning around 0445. She was trying to sit down when she slipped on her bathroom rug and fell onto her toilet on her right hip. She denies hitting her head or losing consciousness. She was able to get up on her own and contacted EMS when she began to notice right hip pain. Here in the emergency department, she denies new neck pain, she has chronic issues, and has been eating and drinking well since being discharged from her last emergency department visit yesterday. She also denies left hip pain, vomiting, chest pain, rib pain, back pain, fever, cough or bloody stool.     Allergies:  Bee Pollen  Cephalexin Monohydrate  Ciprofloxacin  Clindamycin  Multi Vitamin-Minerals [Hair-Vites]  Penicillin [Penicillins]  Thiazide-Type Diuretics  Tobramycin  Vancomycin  Atorvastatin  Cephalexin  Ciprofloxacin  Ibuprofen  Versed [Midazolam]  Zoloft [Sertraline]  Ace Inhibitors  Advil [Ibuprofen Micronized]  Metoprolol    Medications:    alendronate (FOSAMAX) 35 MG tablet  amLODIPine (NORVASC) 5 MG tablet  aspirin EC 81 MG EC tablet  atorvastatin (LIPITOR) 20 MG tablet  azithromycin (ZITHROMAX) 500 MG tablet  Calcium-Magnesium 300-300 MG  "TABS  carbidopa-levodopa (SINEMET)  MG per tablet  carvedilol (COREG) 6.25 MG tablet  losartan (COZAAR) 50 MG tablet  Psyllium (METAMUCIL FIBER PO)  raloxifene (EVISTA) 60 MG tablet  ranitidine (ZANTAC) 150 MG tablet  traMADol (ULTRAM) 50 MG tablet    Past Medical History:    Asthma   Breast CA   Degeneration of intervertebral disc, site unspecified   Essential hypertension, benign   Gastro-oesophageal reflux disease   Hx of antibiotic allergy   Hyperlipidaemia   Osteomyelitis    Osteoporosis, unspecified   Parkinson's disease    PONV (postoperative nausea and vomiting)   Spinal stenosis, unspecified region other than cervical   Unspecified cerebral artery occlusion with cerebral infarction    Past Surgical History:    Left and right knee arthroplasty    Family History:    History reviewed. No pertinent family history.     Social History:  The patient was alone in the emergency department.   Smoking Status: never  Smokeless Tobacco: never  Alcohol Use: rarely  Marital Status:  Single      Review of Systems   Constitutional: Negative for fever.   Cardiovascular: Negative for chest pain.   Gastrointestinal: Negative for blood in stool and vomiting.   Musculoskeletal:        Right hip pain. Chronic neck pain.    Neurological: Positive for dizziness.   All other systems reviewed and are negative.    Physical Exam   First Vitals: BP (!) 176/95  Temp 97.7  F (36.5  C) (Oral)  Resp 18  Ht 1.6 m (5' 3\")  Wt 59 kg (130 lb)  SpO2 100%  BMI 23.03 kg/m2  Patient Vitals for the past 24 hrs:   BP Temp Temp src Heart Rate Resp SpO2 Height Weight   06/27/18 1049 148/76 99.1  F (37.3  C) Oral 76 18 97 % - -   06/27/18 0958 137/75 - - - - 97 % - -   06/27/18 0818 - - - - - 99 % - -   06/27/18 0817 148/69 - - - - - - -   06/27/18 0610 (!) 176/95 97.7  F (36.5  C) Oral 69 18 100 % 1.6 m (5' 3\") 59 kg (130 lb)         Physical Exam    Physical Exam   Constitutional:  Patient is oriented to person, place, and time. They " appear well-developed and well-nourished. Mild distress secondary to hip pain.    HENT:   Mouth/Throat:   Oropharynx is clear and moist.   Eyes:    Strabismus. Conjunctivae normal and EOM are normal. Pupils are equal, round, and reactive to light.   Neck:    Normal range of motion.   Cardiovascular: Normal rate, regular rhythm and normal heart sounds.  Exam reveals no gallop and no friction rub.  No murmur heard.  Pulmonary/Chest:  Effort normal and breath sounds normal. Patient has no wheezes. Patient has no rales.   Abdominal:   Soft. Bowel sounds are normal. Patient exhibits no mass. There is no tenderness. There is no rebound and no guarding.   Musculoskeletal:  Patient has pain to palpation over the right iliac crest . There is no ecchymosis, no abrasion. No pain at the ankle or the knee. Unable to move this leg or sit up secondary to pain.   Neurological:   Parkinson's. Patient is alert and oriented to person, place, and time. Patient has normal strength. No cranial nerve deficit or sensory deficit. GCS 15  Skin:   Skin is warm and dry. No rash noted. No erythema.   Psychiatric:   Patient has a normal mood and affect. Patient's behavior is normal. Judgment and thought content normal.     Emergency Department Course     ECG:  Indication: Fall  Completed at 0628.  Read at 0630.   Sinus rhythm with occasional premature ventricular responses  Minimal voltage criteria for LVH, may be normal variant  Nonspecific T wave abnormality.   Abnormal ECG  Rate 76 bpm. ME interval 184. QRS duration 96. QT/QTc 412/463. P-R-T axes 102 -28 34.    Imaging:  Radiology findings were communicated with the patient who voiced understanding of the findings.    CT Pelvis w/o Contrast   Preliminary Result   IMPRESSION:   1. Comminuted right iliac wing fracture with displacement. Adjacent   right iliacus hemorrhage.   2. Additional findings discussed above.         XR Pelvis w Hip Right 1 View   Final Result   IMPRESSION: Bilateral hip  osteoarthritis, moderate to severe on the   right and mild to moderate on the left. No fractures are seen.      ASHISH FISCHER MD     Laboratory:  Laboratory findings were communicated with the patient who voiced understanding of the findings.    CBC: WBC 7.4, HGB 12.7,   INR: 1.15 (H)  Partial Thromboplastin: 29  ABO/Rh: B-  BMP: Creatinine 0.82, potassium 3.0 (L), chloride 110 (H), calcium 8.3 (L)    Interventions:  0646 Dilaudid 0.2 mg IV  0813 Dilaudid 0.5 mg IV  0955 Dilaudid 0.5 mg IV     Emergency Department Course:  Nursing notes and vitals reviewed.  I performed an exam of the patient as documented above.   The patient was sent for a CT Pelvis w/o Contrast and a XR Pelvis w Hip Right 1 View while in the emergency department, results above.   IV was inserted and blood was drawn for laboratory testing, results above.    0923: I spoke with Dr. Sal of the hospitalsit service regarding patient's presentation, findings, and plan of care.  0944: I spoke with ROSANGELA Page of the orthopedics service from regarding patient's presentation, findings, and plan of care.    I personally reviewed the laboratory and imaging results with the Patient and answered all related questions prior to admission.   Findings and plan explained to the Patient who consents to admission. Discussed the patient with Dr. Sal, who will admit the patient to a bed for further monitoring, evaluation, and treatment.    Impression & Plan      Medical Decision Making:  Opal Sin is a 83 year old female presenting after mechanical fall in the bathroom. She hit her right side on the toilet seat coming down. She did not hit her head or lose consciousness, did not complain of any new neck pain although she has chronic neck issues. Initially on exam, she had pain over the right pelvis, no shortening or rotation of the hip. XR of the hip as well as blood work was obtained. Findings as noted above. She was hypertensive. I suspect  this was due to discomfort. She was given the above pain medication with good improvement of pain and blood pressure. The XR was read as negative but due to significant pain, I did elect to CT her hip and pelvis. CT of the pelvis show a significant iliac wing fracture with displacement, adjacent iliacus hemorrhage. I spoke with orthopedics regarding this particular injury. At this pint, there is no acute intervention. She is hemodynamically stable and hemoglobin at this time is normal. I will admit to hospitalist for pain control. They will trend a hemoglobin. Admit to Dr. Sal.     Diagnosis:      1. Closed displaced fracture of right ilium, unspecified fracture morphology, initial encounter (H)     Disposition:  Admit to Dr. Sal.   Scribe Disclosure:  I, Steven Sosa, am serving as a scribe at 6:16 AM on 6/27/2018 to document services personally performed by Kiera Mena MD based on my observations and the provider's statements to me.     6/27/2018    EMERGENCY DEPARTMENT       Kiera Mena MD  06/27/18 0719

## 2018-06-27 NOTE — IP AVS SNAPSHOT
STOP!!! DO NOT PRINT OR REFERENCE THIS AVS!!!  AVS displayed here is NOT the version that was given to the patient at discharge.  GO TO CHART REVIEW to print or review a copy of the AVS that was frozen/printed at time of discharge.                           MRN:[id number]                      After Visit Summary   6/27/2018    Opal Sin    MRN: [id number]           Thank you!     Thank you for choosing [facility] for your care. Our goal is always to provide you with excellent care. Hearing back from our patients is one way we can continue to improve our services. Please take a few minutes to complete the written survey that you may receive in the mail after you visit with us. Thank you!        Patient Information     Date Of Birth          1935        Designated Caregiver       Most Recent Value    Caregiver    Will someone help with your care after discharge? no      About your hospital stay     You were admitted on:  June 27, 2018 You last received care in the:  [facility] [address] [facility]    You were discharged on:  June 29, 2018        Reason for your hospital stay       You were admitted with fall and right iliac wing fracture                  Who to Call     For medical emergencies, please call 911.  For non-urgent questions about your medical care, please call your primary care provider or clinic, [phone]          Attending Provider     Provider Specialty    Kiera eMna MD Emergency Medicine    [facility]       Primary Care Provider Office Phone # Fax #    Damian Russ [phone]      After Care Instructions     Activity - Up with assistive device           Additional Discharge Instructions       Aggressive incentive spirometry  Recommend outpatient vitamin D levels.   Due for DEXA scan in August Fall precautions            Advance Diet as Tolerated       Follow this diet upon discharge: Orders Placed  This Encounter      Room Service      Regular Diet Adult            General info for SNF       Length of Stay Estimate: Short Term Care: Estimated # of Days <30  Condition at Discharge: Stable  Level of care:skilled   Rehabilitation Potential: Fair  Admission H&P remains valid and up-to-date: Yes  Recent Chemotherapy: N/A  Use Nursing Home Standing Orders: Yes            Mantoux instructions       Give two-step Mantoux (PPD) Per Facility Policy Yes            Weight bearing status       WBAT                  Follow-up Appointments     Follow Up and recommended labs and tests       Follow up with Dr. Matta in 3 weeks.  No follow up labs or test are needed.  Follow-up x-rays will be taken at time of visit. Call Irlanda for appointment [phone]            Follow Up and recommended labs and tests       Follow up with Nursing home physician.  No follow up labs or test are needed.                  Additional Services     Occupational Therapy Adult Consult       Evaluate and treat as clinically indicated.    Reason: right iliac wing fracture            Physical Therapy Adult Consult       Evaluate and treat as clinically indicated.    Reason: right iliac wing fracture                  Further instructions from your care team       Belongings at admission:  Hearing aides; glasses; clothing (pajamas), cell phone.    Discharge to [facility] [phone]    Pending Results     No orders found from 6/25/2018 to 6/28/2018.            Statement of Approval     Ordered          06/29/18 1935  I have reviewed and agree with all the recommendations and orders detailed in this document.  EFFECTIVE NOW     Approved and electronically signed by:  Randall Sal MD           06/29/18 5291  I have reviewed and agree with all the recommendations and orders detailed in this document.  EFFECTIVE NOW     Approved and electronically signed by:  Randall Sal MD             Admission Information     Date & Time Department Dept.  "Phone    6/27/2018 [facility] [address] [facility] [phone]      Your Vitals Were     Blood Pressure Pulse Temperature Respirations Height Weight    138/57 (BP Location: Right arm) 68 98.5  F (36.9  C) (Oral) 16 1.6 m (5' 3\") 59 kg (130 lb)    Pulse Oximetry BMI (Body Mass Index)                95% 23.03 kg/m2          [facility] Information     [facility] gives you secure access to your electronic health record. If you see a primary care provider, you can also send messages to your care team and make appointments. If you have questions, please call your primary care clinic.  If you do not have a primary care provider, please call [phone] and they will assist you.        Care EveryWhere ID     This is your Care EveryWhere ID. This could be used by other organizations to access your [facility] medical records  [id number]        Equal Access to Services     SANG SARAVIA : Milton Juarez, tyson montague, karol tony, juan pablo villegas . So [facility] [phone].    ATENCIÓN: Si habla español, tiene a rangel disposición servicios gratuitos de asistencia lingüística. Llame al [phone].    We comply with applicable federal civil rights laws and [place] laws. We do not discriminate on the basis of race, color, national origin, age, disability, sex, sexual orientation, or gender identity.               Review of your medicines      START taking        Dose / Directions    loperamide 2 MG capsule   Commonly known as:  IMODIUM   Used for:  Viral gastroenteritis        Dose:  2 mg   Take 1 capsule (2 mg) by mouth 4 times daily as needed for diarrhea   Quantity:  20 capsule   Refills:  0       oxyCODONE IR 10 MG tablet   Commonly known as:  ROXICODONE   Used for:  Closed displaced fracture of right ilium, unspecified fracture morphology, initial encounter (H)        Dose:  10 mg   Take 1 tablet (10 mg) by mouth every 6 hours as needed for other (pain control or " improvement in physical function. Hold dose if drowsy)   Quantity:  15 tablet   Refills:  0       senna-docusate 8.6-50 MG per tablet   Commonly known as:  SENOKOT-S;PERICOLACE   Used for:  Closed displaced fracture of right ilium, unspecified fracture morphology, initial encounter (H)        Dose:  1 tablet   Take 1 tablet by mouth 2 times daily as needed for constipation   Quantity:  30 tablet   Refills:  0         CONTINUE these medicines which have NOT CHANGED        Dose / Directions    acetaminophen 500 MG tablet   Commonly known as:  TYLENOL        Dose:  500-1000 mg   Take 500-1,000 mg by mouth every 6 hours as needed for mild pain   Refills:  0       albuterol 108 (90 Base) MCG/ACT Inhaler   Commonly known as:  PROAIR HFA/PROVENTIL HFA/VENTOLIN HFA   Used for:  Acute bronchospasm        Dose:  1-2 puff   Inhale 1-2 puffs into the lungs every 6 hours as needed for shortness of breath / dyspnea or wheezing   Quantity:  18 g   Refills:  1       alendronate 35 MG tablet   Commonly known as:  FOSAMAX   Used for:  Osteoporosis        TAKE 1 TABLET BY MOUTH EVERY 7 DAYS   Quantity:  12 tablet   Refills:  1       amLODIPine 5 MG tablet   Commonly known as:  NORVASC   Used for:  Benign essential hypertension        TAKE 1 TABLET(5 MG) BY MOUTH DAILY   Quantity:  90 tablet   Refills:  2       aspirin 81 MG EC tablet   Used for:  Transient cerebral ischemia, unspecified type        Dose:  81 mg   Take 1 tablet (81 mg) by mouth daily   Refills:  0       atorvastatin 20 MG tablet   Commonly known as:  LIPITOR   Used for:  Mixed hyperlipidemia        Dose:  20 mg   Take 1 tablet (20 mg) by mouth daily   Quantity:  90 tablet   Refills:  3       Calcium-Magnesium 300-300 MG Tabs        Dose:  1 tablet   Take 1 tablet by mouth 3 times daily   Refills:  0       carbidopa-levodopa  MG per tablet   Commonly known as:  SINEMET        Dose:  1 tablet   Take 1 tablet by mouth 4 times daily Takes at 6am, 11am, 4pm, and 9 pm    Refills:  0       carvedilol 6.25 MG tablet   Commonly known as:  COREG   Used for:  Essential hypertension with goal blood pressure less than 140/90        Dose:  6.25 mg   Take 1 tablet (6.25 mg) by mouth 2 times daily (with meals)   Quantity:  180 tablet   Refills:  3       GERITOL COMPLETE Tabs        Dose:  1 tablet   Take 1 tablet by mouth daily   Refills:  0       losartan 50 MG tablet   Commonly known as:  COZAAR   Used for:  Essential hypertension with goal blood pressure less than 140/90        Dose:  50 mg   Take 1 tablet (50 mg) by mouth daily   Quantity:  90 tablet   Refills:  3       METAMUCIL FIBER PO        Dose:  1 capsule   Take 1 capsule by mouth daily as needed   Refills:  0       raloxifene 60 MG tablet   Commonly known as:  Evista   Used for:  Age-related osteoporosis without current pathological fracture        TAKE 1 TABLET(60 MG) BY MOUTH DAILY   Quantity:  90 tablet   Refills:  0       RANITIDINE HCL PO        Dose:  75 mg   Take 75 mg by mouth 2 times daily   Refills:  0       VITAMIN B 12 PO        Dose:  100 mcg   Take 100 mcg by mouth daily   Refills:  0       VITAMIN D3 PO        Dose:  400 Units   Take 400 Units by mouth daily   Refills:  0         STOP taking     traMADol 50 MG tablet   Commonly known as:  ULTRAM                Where to get your medicines      Some of these will need a paper prescription and others can be bought over the counter. Ask your nurse if you have questions.     Bring a paper prescription for each of these medications     oxyCODONE IR 10 MG tablet       You don't need a prescription for these medications     loperamide 2 MG capsule    senna-docusate 8.6-50 MG per tablet                Protect others around you: Learn how to safely use, store and throw away your medicines at www.disposemymeds.org.        Information about OPIOIDS     PRESCRIPTION OPIOIDS: WHAT YOU NEED TO KNOW   We gave you an opioid (narcotic) pain medicine. It is important to manage your  pain, but opioids are not always the best choice. You should first try all the other options your care team gave you. Take this medicine for as short a time (and as few doses) as possible.     These medicines have risks:    DO NOT drive when on new or higher doses of pain medicine. These medicines can affect your alertness and reaction times, and you could be arrested for driving under the influence (DUI). If you need to use opioids long-term, talk to your care team about driving.    DO NOT operate heave machinery    DO NOT do any other dangerous activities while taking these medicines.     DO NOT drink any alcohol while taking these medicines.      If the opioid prescribed includes acetaminophen, DO NOT take with any other medicines that contain acetaminophen. Read all labels carefully. Look for the word  acetaminophen  or  Tylenol.  Ask your pharmacist if you have questions or are unsure.    You can get addicted to pain medicines, especially if you have a history of addiction (chemical, alcohol or substance dependence). Talk to your care team about ways to reduce this risk.    Store your pills in a secure place, locked if possible. We will not replace any lost or stolen medicine. If you don t finish your medicine, please throw away (dispose) as directed by your pharmacist. The [place] Pollution Control Agency has more information about safe disposal: https://www.pca.[facility].[place].us/living-green/managing-unwanted-medications.     All opioids tend to cause constipation. Drink plenty of water and eat foods that have a lot of fiber, such as fruits, vegetables, prune juice, apple juice and high-fiber cereal. Take a laxative (Miralax, milk of magnesia, Colace, Senna) if you don t move your bowels at least every other day.              Medication List: This is a list of all your medications and when to take them. Check marks below indicate your daily home schedule. Keep this list as a reference.      Medications            Morning Afternoon Evening Bedtime As Needed    acetaminophen 500 MG tablet   Commonly known as:  TYLENOL   Take 500-1,000 mg by mouth every 6 hours as needed for mild pain   Last time this was given:  1,000 mg on 6/29/2018  9:00 AM                                albuterol 108 (90 Base) MCG/ACT Inhaler   Commonly known as:  PROAIR HFA/PROVENTIL HFA/VENTOLIN HFA   Inhale 1-2 puffs into the lungs every 6 hours as needed for shortness of breath / dyspnea or wheezing                                alendronate 35 MG tablet   Commonly known as:  FOSAMAX   TAKE 1 TABLET BY MOUTH EVERY 7 DAYS                                amLODIPine 5 MG tablet   Commonly known as:  NORVASC   TAKE 1 TABLET(5 MG) BY MOUTH DAILY   Last time this was given:  5 mg on 6/29/2018  9:00 AM                                aspirin 81 MG EC tablet   Take 1 tablet (81 mg) by mouth daily   Last time this was given:  81 mg on 6/29/2018  9:00 AM                                atorvastatin 20 MG tablet   Commonly known as:  LIPITOR   Take 1 tablet (20 mg) by mouth daily   Last time this was given:  20 mg on 6/29/2018  9:00 AM                                Calcium-Magnesium 300-300 MG Tabs   Take 1 tablet by mouth 3 times daily                                carbidopa-levodopa  MG per tablet   Commonly known as:  SINEMET   Take 1 tablet by mouth 4 times daily Takes at 6am, 11am, 4pm, and 9 pm   Last time this was given:  1 tablet on 6/29/2018 11:37 AM                                carvedilol 6.25 MG tablet   Commonly known as:  COREG   Take 1 tablet (6.25 mg) by mouth 2 times daily (with meals)   Last time this was given:  6.25 mg on 6/29/2018  8:59 AM                                GERITOL COMPLETE Tabs   Take 1 tablet by mouth daily                                loperamide 2 MG capsule   Commonly known as:  IMODIUM   Take 1 capsule (2 mg) by mouth 4 times daily as needed for diarrhea                                losartan 50 MG tablet   Commonly  known as:  COZAAR   Take 1 tablet (50 mg) by mouth daily   Last time this was given:  50 mg on 6/28/2018  8:50 PM                                METAMUCIL FIBER PO   Take 1 capsule by mouth daily as needed                                oxyCODONE IR 10 MG tablet   Commonly known as:  ROXICODONE   Take 1 tablet (10 mg) by mouth every 6 hours as needed for other (pain control or improvement in physical function. Hold dose if drowsy)   Last time this was given:  10 mg on 6/29/2018  2:12 PM                                raloxifene 60 MG tablet   Commonly known as:  Evista   TAKE 1 TABLET(60 MG) BY MOUTH DAILY   Last time this was given:  60 mg on 6/29/2018  9:00 AM                                RANITIDINE HCL PO   Take 75 mg by mouth 2 times daily   Last time this was given:  75 mg on 6/29/2018  8:59 AM                                senna-docusate 8.6-50 MG per tablet   Commonly known as:  SENOKOT-S;PERICOLACE   Take 1 tablet by mouth 2 times daily as needed for constipation   Last time this was given:  1 tablet on 6/28/2018 11:15 AM                                VITAMIN B 12 PO   Take 100 mcg by mouth daily                                VITAMIN D3 PO   Take 400 Units by mouth daily   Last time this was given:  400 Units on 6/29/2018  9:00 AM

## 2018-06-27 NOTE — ED NOTES
"[facility]  ED Nurse Handoff Report    ED Chief complaint: Fall (hx of parkinsons, was going to the bathroom. patient states she has some dizziness all the time, may have been dizzy when fell.) and Hip Pain (right hip pain)      ED Diagnosis:   Final diagnoses:   Closed displaced fracture of right ilium, unspecified fracture morphology, initial encounter (H)       Code Status: to be discussed with admit provider     Allergies:   Allergies   Allergen Reactions     Bee Pollen Hives     If MVI contains this - she will break out in a rash.      Cephalexin Monohydrate Hives     Ciprofloxacin Hives     Clindamycin Hives     Multi Vitamin-Minerals [Hair-Vites] Other (See Comments)     (If MV contains bee pollen she will break out in hives)      Penicillin [Penicillins] Hives     All antibiotics except zpak??????     Thiazide-Type Diuretics Other (See Comments)     Very low sodium levels     Tobramycin Hives     Vancomycin Hives     Atorvastatin      Leg cramps     Cephalexin Hives     Ciprofloxacin Hives     Ibuprofen Nausea and Vomiting and Nausea     stomach pain     Versed [Midazolam] Other (See Comments)     Confused, agiitated, for 6-7 hrs after anngiogram sedation     Zoloft [Sertraline] Other (See Comments)     Headache, elevated BP     Ace Inhibitors Cough     Advil [Ibuprofen Micronized] Nausea     Metoprolol Diarrhea      tolerates Inderal       Activity level - Baseline/Home:  Independent    Activity Level - Current:   Total Care     Needed?: No    Isolation: No  Infection: Not Applicable  Bariatric?: No    Vital Signs:   Vitals:    06/27/18 0610 06/27/18 0817 06/27/18 0818   BP: (!) 176/95 148/69    Resp: 18     Temp: 97.7  F (36.5  C)     TempSrc: Oral     SpO2: 100%  99%   Weight: 59 kg (130 lb)     Height: 1.6 m (5' 3\")         Cardiac Rhythm: ,        Pain level: 0-10 Pain Scale: 8    Is this patient confused?: No   [place] - Suicide Severity Rating Scale Completed?  Yes  If " yes, what color did the patient score?  White    Patient Report: Initial Complaint: Patient fell in bathroom today and hit the back of her right hip. Xray shows right iliac crest wing fracture.   Focused Assessment:   Neuro: WDL   Cards: WDL   Pulm: WDL   GI: WDL   : WDL   Tests Performed: Xray, Labs  Abnormal Results: Xray/CT   Treatments provided: Pain medications     Family Comments: None    OBS brochure/video discussed/provided to patient: No    ED Medications:   Medications   HYDROmorphone (PF) (DILAUDID) injection 0.2 mg (0.2 mg Intravenous Given 6/27/18 0646)   HYDROmorphone (PF) (DILAUDID) injection 0.5 mg (0.5 mg Intravenous Given 6/27/18 0813)       Drips infusing?:  No    For the majority of the shift this patient was Green.   Interventions performed were none.    Severe Sepsis OR Septic Shock Diagnosis Present: No      ED NURSE PHONE NUMBER: *[id number] RN 1

## 2018-06-27 NOTE — IP AVS SNAPSHOT
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 per tablet 1 tablet     carvedilol (COREG) tablet 6.25 mg     cholecalciferol (vitamin D3) tablet 400 Units     cyanocolbalamin (vitamin  B-12) tablet 100 mcg     HYDROmorphone (PF) (DILAUDID) injection 0.2 mg     loperamide (IMODIUM) capsule 2 mg     losartan (COZAAR) tablet 50 mg     magnesium sulfate 4 g in 100 mL sterile water (premade)     melatonin tablet 1 mg     naloxone (NARCAN) injection 0.1-0.4 mg     ondansetron (ZOFRAN-ODT) ODT tab 4 mg    Or     ondansetron (ZOFRAN) injection 4 mg     oxyCODONE IR (ROXICODONE) tablet 5-10 mg     potassium chloride (KLOR-CON) Packet 20-40 mEq     potassium chloride 10 mEq in 100 mL intermittent infusion with 10 mg lidocaine     potassium chloride 10 mEq in 100 mL sterile water intermittent infusion (premix)     potassium chloride 20 mEq in 50 mL intermittent infusion     potassium chloride SA (K-DUR/KLOR-CON M) CR tablet 20-40 mEq     raloxifene (Evista) tablet 60 mg     ranitidine (ZANTAC) tablet 75 mg      "senna-docusate (SENOKOT-S;PERICOLACE) 8.6-50 MG per tablet 1 tablet    Or     senna-docusate (SENOKOT-S;PERICOLACE) 8.6-50 MG per tablet 2 tablet             Review of Systems:[EC1.1]   CV: NEGATIVE for chest pain, palpitations or peripheral edema  C: NEGATIVE for fever, chills, change in weight  E/M: NEGATIVE for ear, mouth and throat problems  R: NEGATIVE for significant cough or SOB[EC1.2]          Physical Exam:   All vitals have been reviewed  Patient Vitals for the past 24 hrs:   BP Temp Temp src Heart Rate Resp SpO2 Height Weight   06/27/18 1049 148/76 99.1  F (37.3  C) Oral 76 18 97 % - -   06/27/18 0958 137/75 - - - - 97 % - -   06/27/18 0818 - - - - - 99 % - -   06/27/18 0817 148/69 - - - - - - -   06/27/18 0610 (!) 176/95 97.7  F (36.5  C) Oral 69 18 100 % 1.6 m (5' 3\") 59 kg (130 lb)     No intake or output data in the 24 hours ending 06/27/18 1202[EC1.1]    Patient resting comfortably in bed  Any motion of the right leg at this time causes her pain  No abrasions or lacerations noted on her right side  Bilateral calves are soft, non-tender.  Bilateral lower extremity is NVI.  Sensation intact bilateral lower extremities  5/5 motor with resisted dorsi and plantar flexion bilaterally  +Dp pulse    Able to fully flex and extend her neck  Able to right rotation of the neck to 15 degrees, to the left 10 degrees with some pain in left lateral area of her neck  No TTP  Bilateral UE sensation intact  Bilateral UE strength intact  Able to abduct and oppose left thumb  +Radial pulse  +cap refill[EC1.2]             Data:   All laboratory data reviewed  Results for orders placed or performed during the hospital encounter of 06/27/18   XR Pelvis w Hip Right 1 View    Narrative    XR PELVIS AND HIP RIGHT 1 VIEW 6/27/2018 7:46 AM    COMPARISON: 10/28/2014    HISTORY: Pain.      Impression    IMPRESSION: Bilateral hip osteoarthritis, moderate to severe on the  right and mild to moderate on the left. No fractures are " seen.    ASHISH FISCHER MD   CT Pelvis w/o Contrast    Narrative    CT PELVIS WITHOUT CONTRAST 6/27/2018 8:53 AM     HISTORY:  Right hip pain after fall. X-ray negative.     TECHNIQUE:  Axial images with reconstructions. No IV contrast.  Radiation dose for this scan was reduced using automated exposure  control, adjustment of the mA and/or kV according to patient size, or  iterative reconstruction technique.    COMPARISON:  7/7/2008    FINDINGS:  Comminuted fracture extending along the right iliac wing,  with 12 mm of maximal displacement. Adjacent enlargement of the right  iliacus muscle, presumably representing hemorrhage. Changes of lumbar  fusion surgery with hardware. Left iliac defect/deformity which  presumably relates to bone graft harvesting. Chondrocalcinosis of the  hips and symphysis pubis, consistent with calcium pyrophosphate  deposition disease. There is a 2.1 cm calcified or sclerotic lesion of  the left supra-acetabular region; this could represent an enchondroma  or bone island and is stable compared with July 2008. Moderate left  and fairly prominent right hip osteoarthritis. Incidentally noted  colonic diverticulosis.      Impression    IMPRESSION:  1. Comminuted right iliac wing fracture with displacement. Adjacent  right iliacus hemorrhage.  2. Additional findings discussed above.    FIDEL SOLANO MD   CBC with platelets differential   Result Value Ref Range    WBC 7.4 4.0 - 11.0 10e9/L    RBC Count 4.09 3.8 - 5.2 10e12/L    Hemoglobin 12.7 11.7 - 15.7 g/dL    Hematocrit 37.1 35.0 - 47.0 %    MCV 91 78 - 100 fl    MCH 31.1 26.5 - 33.0 pg    MCHC 34.2 31.5 - 36.5 g/dL    RDW 14.2 10.0 - 15.0 %    Platelet Count 176 150 - 450 10e9/L    Diff Method Automated Method     % Neutrophils 77.8 %    % Lymphocytes 11.5 %    % Monocytes 10.1 %    % Eosinophils 0.0 %    % Basophils 0.3 %    % Immature Granulocytes 0.3 %    Nucleated RBCs 0 0 /100    Absolute Neutrophil 5.8 1.6 - 8.3 10e9/L    Absolute  Lymphocytes 0.9 0.8 - 5.3 10e9/L    Absolute Monocytes 0.8 0.0 - 1.3 10e9/L    Absolute Eosinophils 0.0 0.0 - 0.7 10e9/L    Absolute Basophils 0.0 0.0 - 0.2 10e9/L    Abs Immature Granulocytes 0.0 0 - 0.4 10e9/L    Absolute Nucleated RBC 0.0    INR   Result Value Ref Range    INR 1.15 (H) 0.86 - 1.14   Partial thromboplastin time   Result Value Ref Range    PTT 29 22 - 37 sec   Basic metabolic panel   Result Value Ref Range    Sodium 140 133 - 144 mmol/L    Potassium 3.0 (L) 3.4 - 5.3 mmol/L    Chloride 110 (H) 94 - 109 mmol/L    Carbon Dioxide 21 20 - 32 mmol/L    Anion Gap 9 3 - 14 mmol/L    Glucose 91 70 - 99 mg/dL    Urea Nitrogen 15 7 - 30 mg/dL    Creatinine 0.82 0.52 - 1.04 mg/dL    GFR Estimate 67 >60 mL/min/1.7m2    GFR Estimate If Black 81 >60 mL/min/1.7m2    Calcium 8.3 (L) 8.5 - 10.1 mg/dL   CK total   Result Value Ref Range    CK Total 107 30 - 225 U/L   Magnesium   Result Value Ref Range    Magnesium 2.0 1.6 - 2.3 mg/dL   Phosphorus   Result Value Ref Range    Phosphorus 2.6 2.5 - 4.5 mg/dL   TSH with free T4 reflex   Result Value Ref Range    TSH 1.14 0.40 - 4.00 mU/L   EKG 12-lead, tracing only   Result Value Ref Range    Interpretation ECG Click View Image link to view waveform and result    ABO/Rh type and screen   Result Value Ref Range    ABO B     RH(D) Neg     Antibody Screen Neg     Test Valid Only At [facility]        Specimen Expires 06/30/2018           Attestation:  I have reviewed today's vital signs, notes, medications, labs and imaging with [EC1.1] [place][EC1.2].  Amount of time performed on this consult:[EC1.1] 20[EC1.2] minutes.    Kaylee Virgen PA-C[EC1.1]        Revision History        User Key Date/Time User Provider Type Action    > EC1.2 6/27/2018  1:56 PM Kaylee Virgen PA-C Physician Assistant - C Sign     EC1.1 6/27/2018 12:02 PM Kaylee Virgen PA-C Physician Assistant - QASIM                      Progress Notes - Physician (Notes from 06/26/18  through 06/29/18)      Progress Notes by Barbara Menon LICSW at 6/29/2018 11:17 AM     Author:  Barbara Menon LICSW Service:  (none) Author Type:      Filed:  6/29/2018 11:25 AM Date of Service:  6/29/2018 11:17 AM Creation Time:  6/29/2018 11:17 AM    Status:  Signed :  Barbara Menon LICSW ()         SW:  D: Patient accepted at both [facility] and [facility] TCU's.  Patient accepted a private room at [facility] and has been notified of he daily charge of $45.00 for the private room.  Transportation discussed and patient chose [facility] and was notified of the cost.  [facility] can transport at 1430.  Brother was present during the meeting.  Bedside nurse updated.  MD paged with update[KH1.1]       Revision History        User Key Date/Time User Provider Type Action    > KH1.1 6/29/2018 11:25 AM Barbara Menon LICSW  Sign            Progress Notes by Barbara Menon LICSW at 6/29/2018  9:30 AM     Author:  Barbara Menon LICSW Service:  (none) Author Type:      Filed:  6/29/2018  9:39 AM Date of Service:  6/29/2018  9:30 AM Creation Time:  6/29/2018  9:30 AM    Status:  Addendum :  Barbara Menon LICSW ()         SW:  D:  D/C is possibly today or Saturday pending pain management.[KH1.1]  TCU is recommended and [facility] is patient's preference.[KH1.2]    [facility] does not have a vacancy for today but may have one for Saturday.    I:  Writer met with patient to discuss alternative TCU's if she is medically stable for d/c today.  Patient shares she doesn't feel ready to d/c due to severe pain with movement.  She also did not think the TCU staff could lift her if needed for transfers and writer explained the TCU's are equipped to provide lifts.  Discussed other TCU's in the area.  Patient declined interest in [facility] however did allow a referral to be sent to [facility] which has a private  room open today.    Referral sent to [facility].  Reviewed cost of private room at [facility] and [facility] and general SNF benefits.    A:  Patient very uncomfortable with d/c today and able to vocalize her concerns.  Encouraged patient to explain her concerns to MD because d/c orders are based on medical readiness.[KH1.1]  Patient accepting of the need for TCU.[KH1.2]    P: Will follow for medical update today.  Will update chart as referral responses are received.[KH1.1]     Revision History        User Key Date/Time User Provider Type Action    > KH1.2 6/29/2018  9:39 AM Barbara Menon LICSW  Addend     KH1.1 6/29/2018  9:37 AM Barbara Menon LICSW  Sign            Progress Notes by Tracie Martins RN at 6/28/2018 12:22 PM     Author:  Tracie Mratins RN Service:  Care Coordinator Author Type:      Filed:  6/28/2018  4:21 PM Date of Service:  6/28/2018 12:22 PM Creation Time:  6/28/2018  3:57 PM    Status:  Addendum :  Tracie Martins, RN ()         Care Coordinator met with pt earlier today.  Pt had been a Nursing Instructor here years ago.  Pt is open to transition to TCU and prefers [facility]. Pt's discharge is anticipated in 1-2 days[HN1.1].[HN1.2]   Pt. has been on oral narcotic analgesis[HN1.3].  Pt has [facility] insurance[HN1.4], that typically waives the need for a three day stay for rehab if this is being recommended.[HN1.3]     Revision History        User Key Date/Time User Provider Type Action    > HN1.3 6/28/2018  4:21 PM Tracie Martins RN Case Manager Addend     HN1.4 6/28/2018  4:15 PM Tracie Martins, RN Case Manager Sign     HN1.2 6/28/2018  4:02 PM Tracie Martins RN Case Manager      HN1.1 6/28/2018  3:57 PM Tracie Martins, RN Case Manager             Progress Notes by Barbara Menon LICSW at 6/28/2018  3:29 PM     Author:  Barbara Menon LICSW Service:  (none) Author Type:      Filed:  6/28/2018   4:10 PM Date of Service:  6/28/2018  3:29 PM Creation Time:  6/28/2018  3:29 PM    Status:  Addendum :  Barbara Menon LICSW ()         SW:  D: Per Care Transition rounds, d/c to TCU is anticipated for Friday 6/29.  Per Care Coordinator, patient's preference is to [facility] TCU- private room.  Due to patient's insurance a three night stay is not required in order to qualify for SNF benefit.  Referral has been made to [facility] via DOD process.[KH1.1]    Update:  As of today [facility] TCU does not expect to have a vacancy.  They will call writer if this changes    Writer will speak with patient in the morning to discuss alternative TCU's.[KH1.2]     Revision History        User Key Date/Time User Provider Type Action    > KH1.2 6/28/2018  4:10 PM Barbara Menon LICSW  Addend     KH1.1 6/28/2018  3:30 PM Barbara Menon LICSW  Sign            Progress Notes by Randall Sal MD at 6/28/2018 12:04 PM     Author:  Randall Sal MD Service:  Hospitalist Author Type:  Physician    Filed:  6/28/2018  2:57 PM Date of Service:  6/28/2018 12:04 PM Creation Time:  6/28/2018 12:04 PM    Status:  Signed :  Randall Sal MD (Physician)         [facility]  Hospitalist Progress Note[SN1.1]   06/28/2018[SN1.2]          Assessment and Plan:[SN1.1]       Opal Sin is a 83 year old  female with medical history of chronic stable asthma, gastroesophageal reflux disease, hyperlipidemia, osteoporosis, Parkinson's disease with ongoing dizziness/orthostatic hypotension, breast cancer status post mastectomy brought into the ED via EMS after a fall this morning.     Status post mechanical fall.  Right iliac fracture with adjacent hemorrhage.  Neck pain likely from sprain / degenerative disk disease  Patient reports that she was in her bathroom, trying to sit down, slipped on her bathroom rug and fell onto her toilet seat, hurt her right hip. Was  able to get up, walked to her bed and had had right hip pain since then.Has chronic dizziness/orthostatic hypotension from Parkinson's disease.    Sodium 140, potassium 3.0, creatinine 0.82. Glucose 91, WBC 7.4,   hemoglobin 12.7.  Platelet 176. INR 1.15.TSH 1.14, CPK 85.  EKG showed sinus rhythm with PVCs heart rate 76, nonspecific T-wave abnormality. .  CT pelvis without contrast showed Comminuted right iliac wing fracture with displacement. Adjacent right iliacus hemorrhage.  X-ray hip and pelvis bilateral hip osteoarthritis, moderate to severe on the right and mild to moderate on the left. No fractures are seen.  X-ray cervical spine shows no definitive fractures. Severe degenerative changes at C-4- C-5, C5 -C6 and to a lesser extent at C6- C7  Telemetry monitoring normal sinus rhythm, no acute events.  Orthopedic team following along, no acute surgical intervention.  Fall precautions.  Orthostatic vitals prn  Pain management with Tylenol for mild pain, oxycodone for moderate pain and IV Dilaudid for severe pain.  Pending physical therapy, occupational therapy assessment.  Would recommend outpatient vitamin D levels. Due for DEXA scan in August     Acute diarrhea from viral gastroenteritis improved  Hypokalemia from diuresis/poor oral intake - corrected   Had diarrhea from 6/22 to 6/26, 4 to 5 times a day with intermittent cramping abdominal pain. Was in the ED on 6/26, electrolytes in the normal range, given IV fluids and dismissed home. Patient admitted to eating some fish prior to episodes of diarrhea likely from viral gastroenteritis.  Diary is improved, no fever or chills.  Potassium is improved from 3.024.0 with replacement therapy. Sodium 137, glucose 96.  Stool for enteric pathogens 6/26 negative  discontinue IV fluids.  PRN Imodium as needed.    Acute anemia likely dilutional, no blood loss noted.  Baseline hemoglobin around 12, presented with the hemoglobin of 12.7.  This morning hemoglobin 11.4  likely dilutional from IV fluid resuscitation.  No active blood loss  monitor hemoglobin levels a.m.     Parkinson's disease.  Continue PTA Sinemet four times a day.  Fall precautions.  PRN orthostatic vitals.     Mild intermittent asthma.  Chronic and stable.  Albuterol PRN.     History of TIA.  Nonruptured cerebral aneurysm, status post coiling.   Continue PTA aspirin 81 mg, atorvastatin 20 mg daily.     Chronic hypertension  Continue PTA amlodipine 5 mg, losartan 50 mg, Coreg 6.25 mg b.i.d.     Hyperlipidemia  Continue PTA atorvastatin 20 mg daily.     Advanced osteoporosis   DEXA History: 8/2016 Advanced osteoporosis of the left wrist and normal bone marrow density of both hips.  Due for DEXA scan in August  Given diarrhea will hold PTA Fosamax prophylactic dose.     Gastroesophageal reflux disease  Continue PTA ranitidine.     Chronic back pain from spinal stenosis.  Continue PTA Tylenol.  PTA takes tramadol - on hold   Treating  Pain currently with oral oxycodone/Dilaudid for pain during hospitalization.     History of left breast cancer   Status post left radical mastectomy 1987   Continue PTA  Evista 60mg daily   Surveillance/follow-up as outpatient.[SN1.3]    # Pain Assessment:[SN1.1]  Current Pain Score 6/28/2018   Patient currently in pain? -   Pain score (0-10) 5   Pain location -   Pain descriptors -[SN1.2]   - Opal is experiencing pain due to pelvic fracture. Pain management was discussed and the plan was created in a collaborative fashion.  Opal's response to the current recommendations: engaged  - Please see the plan for pain management as documented above[SN1.3]    Active Diet Order      Regular Diet Adult[SN1.2] as per patient's request[SN1.3]  DVT Prophylaxis:[SN1.1]  pneumatic compression device[SN1.3]  Code Status:[SN1.1] DNR/DNI[SN1.2]  Disposition: Expected discharge in[SN1.1] 1 to 2 days pending clinical improvement[SN1.3]    Patient, interdisciplinary team involved in care and agrees with  plan.  Total time - Greater than[SN1.1] 2[SN1.3]5 min. More than 50% of time spent in direct patient care, care coordination and formalizing plan of care.[SN1.1]     Randall Sal[SN1.2], MD        Interval History:[SN1.1]      patient lying in bed, minimal ambulation out of bed to commode.  Has been weaned off IV Dilaudid, taking oxycodone since this morning.  Admits to slight improvement in pain.  Denies any chest pain or shortness of breath, no palpitations no cough.  No headache or dizziness.  Tolerating oral diet. No further episodes of diarrhea.[SN1.3]         Physical Exam:[SN1.1]        Physical Exam   Temp:  [97.8  F (36.6  C)-98.9  F (37.2  C)] 98.2  F (36.8  C)  Heart Rate:  [59-68] 65  Resp:  [16-20] 20  BP: (121-143)/(55-68) 124/55  SpO2:  [97 %-98 %] 97 %[SN1.2]    Intake/Output Summary (Last 24 hours) at 06/28/18 1205  Last data filed at 06/28/18 1142   Gross per 24 hour   Intake             2072 ml   Output             1425 ml   Net              647 ml     Admission Weight: 59 kg (130 lb)  Current Weight: 59 kg (130 lb)    PHYSICAL EXAM  GENERAL: Patient is in[SN1.1] mild pain[SN1.3]. Alert and oriented.  HEART: Regular rate and rhythm. S1S2. No murmurs  LUNGS: Clear to auscultation bilaterally.  ABDOMEN: Soft, no abdominal tenderness  NEURO:  Motor exam in lower extremity slightly restricted due to pain.  EXTREMITIES: No pedal edema. 2+ peripheral pulses.  SKIN: Warm, dry. No rash or bruising.         Medications:[SN1.1]          amLODIPine  5 mg Oral Daily     aspirin  81 mg Oral Daily     atorvastatin  20 mg Oral Daily     Calcium-Magnesium-Zinc  1 tablet Oral TID w/meals     carbidopa-levodopa  1 tablet Oral 4x Daily     carvedilol  6.25 mg Oral BID w/meals     cholecalciferol (vitamin D3) tablet 400 Units  400 Units Oral Daily     cyanocolbalamin  100 mcg Oral Daily     losartan  50 mg Oral Daily     raloxifene  60 mg Oral Daily     ranitidine  75 mg Oral BID     acetaminophen, albuterol,  HYDROmorphone, loperamide, magnesium sulfate, melatonin, naloxone, ondansetron **OR** ondansetron, oxyCODONE IR, potassium chloride, potassium chloride with lidocaine, potassium chloride, potassium chloride, potassium chloride, senna-docusate **OR** senna-docusate[SN1.2]         Data:      All new lab and imaging data was reviewed.[SN1.1]                  Revision History        User Key Date/Time User Provider Type Action    > SN1.3 6/28/2018  2:57 PM Randall Sal MD Physician Sign     SN1.2 6/28/2018 12:05 PM Randall Sal MD Physician      SN1.1 6/28/2018 12:04 PM Randall Sal MD Physician             Progress Notes by Kaylee Virgen PA-C at 6/28/2018 10:22 AM     Author:  Kaylee Virgen PA-C Service:  Orthopedics Author Type:  Physician Assistant - C    Filed:  6/28/2018 12:23 PM Date of Service:  6/28/2018 10:22 AM Creation Time:  6/28/2018 10:22 AM    Status:  Signed :  Kaylee Virgen PA-C (Physician Assistant - C)         Opal reports that she feels that her pain is slightly better today - she has been able to wean down from dilaudid to oxycodone. She has PT scheduled for 3:30 this afternoon.   Her neck pain is also improving.  No concerning findings on Cervical Spine films other than DDD which is well known.    Continue ambulation as tolerated   Will Continue to follow    Kaylee Virgen PAC[EC1.1]     Revision History        User Key Date/Time User Provider Type Action    > EC1.1 6/28/2018 12:23 PM Kaylee Virgen PA-C Physician Assistant - C Sign            Progress Notes by Gaurav Bermudez at 6/27/2018  3:56 PM     Author:  Gaurav Bermudez Service:  Spiritual Health Author Type:      Filed:  6/27/2018  4:00 PM Date of Service:  6/27/2018  3:56 PM Creation Time:  6/27/2018  3:56 PM    Status:  Signed :  Gaurav Bermudez ()         SPIRITUAL HEALTH SERVICES Progress Note  [facility] 68    SH, consult visit. 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 With that, the patient talked about travels to the [place] and [place] on a spiritual retracing of places important in the bible. The patient requested the \"Our Father\" prayer.     provided care in presence/listening, said \"Our Father\" prayer, and put in the consult for .    Coping with [employer] practices and some family support    SH remains available as requested.        Gaurav Bermudez  Chaplain Resident[DC1.1]     Revision History        User Key Date/Time User Provider Type Action    > DC1.1 6/27/2018  4:00 PM Gaurav Bermudez Sign            Progress Notes by Kaylee Virgen PA-C at 6/27/2018 12:56 PM     Author:  Kaylee Virgen PA-C Service:  Orthopedics Author Type:  Physician Assistant - C    Filed:  6/27/2018 12:57 PM Date of Service:  6/27/2018 12:56 PM Creation Time:  6/27/2018 12:56 PM    Status:  Signed :  Kaylee Virgen PA-C (Physician Assistant - C)         Formal consult note to follow    Non-surgical treatment at this time  WBAT with walker assist  Pain medication as needed    Having some cervical spine pain - ordered XR 2 views    Kaylee Virgen PAC[EC1.1]     Revision History        User Key Date/Time User Provider Type Action    > EC1.1 6/27/2018 12:57 PM Kaylee Virgen PA-C Physician Assistant - C Sign            ED Provider Notes by Kiera Mena MD at 6/27/2018  6:04 AM     Author:  Kiera Mena MD Service:  Emergency Medicine Author Type:  Physician    Filed:  6/27/2018 10:58 AM Date of Service:  6/27/2018  6:04 AM Creation Time:  6/27/2018  6:15 AM    Status:  Signed :  Kiera Mena MD (Physician)           History     Chief Complaint:  Fall     HPI   Opal Sin is a 83 year old female with a history of parkinson's who[EH1.1] was seen in the emergency department yesterday for diarrhea. She had eaten some fish 4 days ago and " subsequently began diarrhea the following day. She had some intermittent abdominal cramping but no vomiting, no melanotrichia. She had a diagnostic evaluation performed in the emergency department including CBC, BMP with just mild abnormalities and enteric stool cultures which have returned negative. After some IV fluids and some Zofran, she felt much better and improved and so has discharged home. She presents now today after she sustained a fall and complained of right hip pain.[EH1.2] The patient reports she was in her home bathroom bathroom where she lives alone this morning around 0445[EH1.1]. She was trying to sit down when she slipped on her bathroom rug and fell onto her toilet on her right hip. She denies hitting her head or losing consciousness. She was able to get up on her own and contacted EMS when she began to notice right hip pain. Here in the emergency department, she denies[EH1.3] new[MB1.1] neck pain[EH1.3], she has chronic issues,[MB1.1] and has been eating and drinking well since being discharged from her last emergency department visit yesterday.[EH1.3] She[EH1.1] also[EH1.3] denies[EH1.1] left hip pain,[EH1.3] vomiting, chest pain,[EH1.1] rib pain, back pain,[MB1.1] fever, cough or bloody stool.     Allergies:[EH1.1]  Bee Pollen  Cephalexin Monohydrate  Ciprofloxacin  Clindamycin  Multi Vitamin-Minerals [Hair-Vites]  Penicillin [Penicillins]  Thiazide-Type Diuretics  Tobramycin  Vancomycin  Atorvastatin  Cephalexin  Ciprofloxacin  Ibuprofen  Versed [Midazolam]  Zoloft [Sertraline]  Ace Inhibitors  Advil [Ibuprofen Micronized]  Metoprolol[EH1.4]    Medications:    alendronate (FOSAMAX) 35 MG tablet  amLODIPine (NORVASC) 5 MG tablet  aspirin EC 81 MG EC tablet  atorvastatin (LIPITOR) 20 MG tablet  azithromycin (ZITHROMAX) 500 MG tablet  Calcium-Magnesium 300-300 MG TABS  carbidopa-levodopa (SINEMET)  MG per tablet  carvedilol (COREG) 6.25 MG tablet  losartan (COZAAR) 50 MG tablet  Psyllium  "(METAMUCIL FIBER PO)  raloxifene (EVISTA) 60 MG tablet  ranitidine (ZANTAC) 150 MG tablet  traMADol (ULTRAM) 50 MG tablet    Past Medical History:[EH1.1]    Asthma   Breast CA   Degeneration of intervertebral disc, site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 Robbins MD (Physician)         Emergency Department Attending Supervision Note  6/26/2018  1:23 PM    I evaluated this patient in conjunction with Diamond Caal PA-C    Briefly, the patient presented with[SN1.1] diarrhea. The patient states she ate some boxed fish prepared in the oven on Friday, 4 days ago and had no immediate symptoms thereafter. However, on[SN1.2] in the middle of the night[SL1.1] Saturday, 3 days ago, the patient reports she began having diarrhea and has been having 5-6 episodes of diarrhea per  day since then. She notes she also had a poor appetite and some nausea this past weekend, but notes she has been able to eat more food since then. However, today, she continued to have the diarrhea, so she came to the ED for further evaluation. On presentation to the ED, the patient endorses some intermittent abdominal cramping, but denies any nausea, vomiting, fevers, blood in the stool, recent travel, or recent antibiotic use. She also denies eating at any restaurants or gas stations[SN1.2], including [employer],[SL1.1] recently.[SN1.2]    Physical Exam  Nursing note and vitals reviewed.  Constitutional:  Oriented to person, place, and time. Cooperative.   HENT:   Nose:    Nose normal.   Mouth/Throat:   Mucous membranes are normal.   Eyes:    Conjunctivae normal and EOM are normal.      Pupils are equal, round, and reactive to light.   Neck:    Trachea normal.   Cardiovascular:  Normal rate, regular rhythm, normal heart sounds and normal pulses. No murmur heard.  Pulmonary/Chest:  Effort normal and breath sounds normal.   Abdominal:   Soft. Normal appearance and bowel sounds are normal.      There is no tenderness.      There is no rebound and no CVA tenderness.   Musculoskeletal:  Extremities atraumatic x 4.   Lymphadenopathy:  No cervical adenopathy.   Neurological:   Alert and oriented to person, place, and time. Normal strength.      No cranial nerve deficit or sensory deficit. GCS eye subscore is 4. GCS verbal subscore is 5. GCS motor subscore is 6.   Skin:    Skin is intact. No rash noted.   Psychiatric:   Normal mood and affect.    Results:[SN1.1]  CBC:  (L), o/w WNL (WBC 6.2, HGB 12.6)  BMP: Creatinine 1.05 (H), GFR (L), Calcium 8.0 (L), o/w WNL  Enteric Bacteria and Virus Panel by PAOLO Stool: In process    Interventions:  1256: NS 1L IV Bolus  1258: 4 mg Zofran IV[SN1.2]    ED course:[SN1.1]  Past medical records, nursing notes, and vitals reviewed.  1407: I performed an exam of the patient and  obtained history, as documented above.  IV inserted and blood drawn. Stool sample collected and sent for testing.    I rechecked the patient. Findings and plan explained to the patient. Patient discharged home with instructions regarding supportive care, medications, and reasons to return. The importance of close follow-up was reviewed.[SN1.2]     My impression is[SN1.1] this is an 83-year-old female who came in with ongoing diarrhea.  She had a very benign abdominal exam, which is reassuring.  Her blood work is also unremarkable other than showing a slight increase in her creatinine, which would be consistent with some dehydration.  She was able to provide a stool sample here for culture, which will be very helpful.  She feels comfortable going home, which I think is reasonable.  She does not want any medicine to go home with.  She understands somebody will call her with any positive stool cultures that require treatment.  She should return with any concerns and including increasing pain, vomiting, fevers, or concerns for worsening dehydration.  If she is not improving in a few days, she should also seek reevaluation either here or in the clinic.[SL1.1]    Diagnosis[SN1.1]    ICD-10-CM   1. Diarrhea, unspecified type R19.7[SN1.3]     Siva Robbins MD[SN1.1]     I, Lamar Meehan, am serving as a scribe at 2:07 PM on 6/26/2018 to document services personally performed by Siva Robbins MD based on my observations and the provider's statements to me.[SN1.2]            Siva Robbins MD  06/26/18 1524[SL1.2]       Siva Robbins MD  06/26/18 1529[SL1.3]       Siva Robbins MD  06/26/18 2118  [SL1.4]     Revision History        User Key Date/Time User Provider Type Action    > SL1.4 6/26/2018  9:18 PM Siva Robbins MD Physician Addend     SL1.3 6/26/2018  3:29 PM Siva Robbins MD Physician Addend     SL1.2 6/26/2018  3:24 PM Siva Robbins MD Physician Sign     SL1.1 6/26/2018   2:44 PM Siva Robbins MD Physician Share     SN1.3 6/26/2018  2:20 PM Lamar Meehan Share     SN1.2 6/26/2018  2:13 PM Lamar Meehan      SN1.1 6/26/2018  1:25 PM Lamar Meehan Share                  Procedure Notes     No notes of this type exist for this encounter.      Progress Notes - Therapies (Notes from 06/26/18 through 06/29/18)     No notes of this type exist for this encounter.

## 2018-06-27 NOTE — PROGRESS NOTES
Formal consult note to follow    Non-surgical treatment at this time  WBAT with walker assist  Pain medication as needed    Having some cervical spine pain - ordered XR 2 views    Kaylee Virgen PAC

## 2018-06-27 NOTE — PHARMACY-PHARMACOTHERAPY NOTE
"The following home medications were NOT continued on inpatient admission per \"Discontinuation of nonessential home medications during hospitalization\" policy: MVI and minerals    If a therapeutic holiday is deemed inappropriate per the prescriber, please notify the pharmacist regarding the medication order.    The pharmacist is available to answer any questions and/or concerns the patient may have regarding discontinuation of non-essential medications.    Please ensure that these medications are restarted as needed upon discharge via the medication reconciliation discharge process and included on the discharge medication reconciliation report.    Thank you,  Makayla Stewart [facility]    "

## 2018-06-28 ENCOUNTER — APPOINTMENT (OUTPATIENT)
Dept: PHYSICAL THERAPY | Facility: CLINIC | Age: 83
DRG: 544 | End: 2018-06-28
Attending: HOSPITALIST
Payer: COMMERCIAL

## 2018-06-28 LAB
ALBUMIN SERPL-MCNC: 2.9 G/DL (ref 3.4–5)
ALP SERPL-CCNC: 55 U/L (ref 40–150)
ALT SERPL W P-5'-P-CCNC: 9 U/L (ref 0–50)
ANION GAP SERPL CALCULATED.3IONS-SCNC: 8 MMOL/L (ref 3–14)
AST SERPL W P-5'-P-CCNC: 18 U/L (ref 0–45)
BILIRUB SERPL-MCNC: 0.6 MG/DL (ref 0.2–1.3)
BUN SERPL-MCNC: 13 MG/DL (ref 7–30)
CALCIUM SERPL-MCNC: 7.4 MG/DL (ref 8.5–10.1)
CHLORIDE SERPL-SCNC: 110 MMOL/L (ref 94–109)
CK SERPL-CCNC: 85 U/L (ref 30–225)
CO2 SERPL-SCNC: 19 MMOL/L (ref 20–32)
CREAT SERPL-MCNC: 0.74 MG/DL (ref 0.52–1.04)
ERYTHROCYTE [DISTWIDTH] IN BLOOD BY AUTOMATED COUNT: 14.3 % (ref 10–15)
GFR SERPL CREATININE-BSD FRML MDRD: 76 ML/MIN/1.7M2
GLUCOSE SERPL-MCNC: 96 MG/DL (ref 70–99)
HCT VFR BLD AUTO: 33 % (ref 35–47)
HGB BLD-MCNC: 11.4 G/DL (ref 11.7–15.7)
MCH RBC QN AUTO: 31.3 PG (ref 26.5–33)
MCHC RBC AUTO-ENTMCNC: 34.5 G/DL (ref 31.5–36.5)
MCV RBC AUTO: 91 FL (ref 78–100)
PLATELET # BLD AUTO: 139 10E9/L (ref 150–450)
POTASSIUM SERPL-SCNC: 4 MMOL/L (ref 3.4–5.3)
PROT SERPL-MCNC: 5.5 G/DL (ref 6.8–8.8)
RBC # BLD AUTO: 3.64 10E12/L (ref 3.8–5.2)
SODIUM SERPL-SCNC: 137 MMOL/L (ref 133–144)
WBC # BLD AUTO: 7.2 10E9/L (ref 4–11)

## 2018-06-28 PROCEDURE — 85027 COMPLETE CBC AUTOMATED: CPT | Performed by: HOSPITALIST

## 2018-06-28 PROCEDURE — 97530 THERAPEUTIC ACTIVITIES: CPT | Mod: GP | Performed by: PHYSICAL THERAPIST

## 2018-06-28 PROCEDURE — 99232 SBSQ HOSP IP/OBS MODERATE 35: CPT | Performed by: HOSPITALIST

## 2018-06-28 PROCEDURE — 97161 PT EVAL LOW COMPLEX 20 MIN: CPT | Mod: GP | Performed by: PHYSICAL THERAPIST

## 2018-06-28 PROCEDURE — 80053 COMPREHEN METABOLIC PANEL: CPT | Performed by: HOSPITALIST

## 2018-06-28 PROCEDURE — 36415 COLL VENOUS BLD VENIPUNCTURE: CPT | Performed by: HOSPITALIST

## 2018-06-28 PROCEDURE — 40000193 ZZH STATISTIC PT WARD VISIT: Performed by: PHYSICAL THERAPIST

## 2018-06-28 PROCEDURE — 97116 GAIT TRAINING THERAPY: CPT | Mod: GP | Performed by: PHYSICAL THERAPIST

## 2018-06-28 PROCEDURE — 25000128 H RX IP 250 OP 636: Performed by: HOSPITALIST

## 2018-06-28 PROCEDURE — 25000132 ZZH RX MED GY IP 250 OP 250 PS 637: Performed by: HOSPITALIST

## 2018-06-28 PROCEDURE — 12000000 ZZH R&B MED SURG/OB

## 2018-06-28 PROCEDURE — 82550 ASSAY OF CK (CPK): CPT | Performed by: HOSPITALIST

## 2018-06-28 RX ADMIN — CARBIDOPA AND LEVODOPA 1 TABLET: 25; 100 TABLET ORAL at 05:58

## 2018-06-28 RX ADMIN — RANITIDINE 75 MG: 75 TABLET, FILM COATED ORAL at 20:50

## 2018-06-28 RX ADMIN — CARBIDOPA AND LEVODOPA 1 TABLET: 25; 100 TABLET ORAL at 11:15

## 2018-06-28 RX ADMIN — LOSARTAN POTASSIUM 50 MG: 50 TABLET ORAL at 20:50

## 2018-06-28 RX ADMIN — CARBIDOPA AND LEVODOPA 1 TABLET: 25; 100 TABLET ORAL at 16:08

## 2018-06-28 RX ADMIN — OXYCODONE HYDROCHLORIDE 10 MG: 5 TABLET ORAL at 21:48

## 2018-06-28 RX ADMIN — RALOXIFENE HYDROCHLORIDE 60 MG: 60 TABLET, FILM COATED ORAL at 10:36

## 2018-06-28 RX ADMIN — ASPIRIN 81 MG: 81 TABLET, COATED ORAL at 10:37

## 2018-06-28 RX ADMIN — SENNOSIDES AND DOCUSATE SODIUM 1 TABLET: 8.6; 5 TABLET ORAL at 11:15

## 2018-06-28 RX ADMIN — CHOLECALCIFEROL TAB 10 MCG (400 UNIT) 400 UNITS: 10 TAB at 10:36

## 2018-06-28 RX ADMIN — Medication 100 MCG: at 10:37

## 2018-06-28 RX ADMIN — CARVEDILOL 6.25 MG: 6.25 TABLET, FILM COATED ORAL at 10:36

## 2018-06-28 RX ADMIN — OXYCODONE HYDROCHLORIDE 10 MG: 5 TABLET ORAL at 07:56

## 2018-06-28 RX ADMIN — CARVEDILOL 6.25 MG: 6.25 TABLET, FILM COATED ORAL at 18:36

## 2018-06-28 RX ADMIN — ACETAMINOPHEN 1000 MG: 500 TABLET, FILM COATED ORAL at 20:51

## 2018-06-28 RX ADMIN — Medication 10 MEQ: at 11:17

## 2018-06-28 RX ADMIN — ACETAMINOPHEN 1000 MG: 500 TABLET, FILM COATED ORAL at 10:37

## 2018-06-28 RX ADMIN — POTASSIUM CHLORIDE AND SODIUM CHLORIDE: 900; 150 INJECTION, SOLUTION INTRAVENOUS at 04:49

## 2018-06-28 RX ADMIN — OXYCODONE HYDROCHLORIDE 10 MG: 5 TABLET ORAL at 11:15

## 2018-06-28 RX ADMIN — CARBIDOPA AND LEVODOPA 1 TABLET: 25; 100 TABLET ORAL at 20:51

## 2018-06-28 RX ADMIN — OXYCODONE HYDROCHLORIDE 10 MG: 5 TABLET ORAL at 18:35

## 2018-06-28 RX ADMIN — OXYCODONE HYDROCHLORIDE 10 MG: 5 TABLET ORAL at 03:34

## 2018-06-28 RX ADMIN — Medication 10 MEQ: at 12:20

## 2018-06-28 RX ADMIN — ACETAMINOPHEN 1000 MG: 500 TABLET, FILM COATED ORAL at 03:34

## 2018-06-28 RX ADMIN — OXYCODONE HYDROCHLORIDE 10 MG: 5 TABLET ORAL at 14:44

## 2018-06-28 RX ADMIN — AMLODIPINE BESYLATE 5 MG: 5 TABLET ORAL at 10:36

## 2018-06-28 RX ADMIN — RANITIDINE 75 MG: 75 TABLET, FILM COATED ORAL at 07:56

## 2018-06-28 RX ADMIN — ATORVASTATIN CALCIUM 20 MG: 10 TABLET, FILM COATED ORAL at 10:41

## 2018-06-28 NOTE — PLAN OF CARE
Problem: Patient Care Overview  Goal: Plan of Care/Patient Progress Review  Outcome: No Change  [id number] Pt is A&Ox4, bedrest, VSS on RA, c/o R hip/pelvic pain and cervical pain, PRN oxycodone x1 given with some relief. Cervical xray done, results pending. CMS intact. Refusing T&R, uses bedpan. K 3.0, replaced, recheck at 2300. No BM this shift, Pt concerned about becoming constipated, requesting PO senna at HS. LS clear, BS +, Flatus +. D/c pending progress.

## 2018-06-28 NOTE — PLAN OF CARE
Problem: Patient Care Overview  Goal: Plan of Care/Patient Progress Review  Outcome: Improving  Pt VSS on RA. Tele SR w/ 1 degree AVB. A&Ox4. [place]. Bedrest, using bedpan. GIven dilaudid, tylenol and oxycodone for pain. Plan to recheck potassium in the am. PT/OT. Continue to monitor.

## 2018-06-28 NOTE — PROGRESS NOTES
SW:  D: Per Care Transition rounds, d/c to TCU is anticipated for Friday 6/29.  Per Care Coordinator, patient's preference is to [facility] TCU- private room.  Due to patient's insurance a three night stay is not required in order to qualify for SNF benefit.  Referral has been made to [facility] via DOD process.    Update:  As of today [facility] TCU does not expect to have a vacancy.  They will call writer if this changes    Writer will speak with patient in the morning to discuss alternative TCU's.

## 2018-06-28 NOTE — PLAN OF CARE
Problem: Patient Care Overview  Goal: Plan of Care/Patient Progress Review  OT order received. Per nursing, still waiting for xray results, and patient in significant pain.  Given patient's history of dizziness, will hold OT until PT evaluation completed.  Noted plan is for TCU.

## 2018-06-28 NOTE — PLAN OF CARE
Problem: Patient Care Overview  Goal: Plan of Care/Patient Progress Review  Discharge Planner PT   Patient plan for discharge: TCU  Current status: Orders received, eval complete, and treatment initiated. Pt is a 82 y/o F admitted for fall resulting in R iliac fracture. Supine>sit EOB with min A x2 and sit<>stand with min A x2 and FWW. Transfers difficult to complete due to severe pain in R iliac fracture region. Pt amb 10' WBAT with min A and FWW. Completed seated exercise x 10 reps.  Barriers to return to prior living situation: Pain, currently requiring Ax2 with mobility, lives alone, decreased activity tolerance  Recommendations for discharge: TCU  Rationale for recommendations: Pt is currently not at baseline with functional mobility. Anticipate pt will benefit from continued skilled PT intervention at TCU to progress independence, safety, strength, and activity tolerance.       Entered by: Aaron Nissen 06/28/2018 4:52 PM

## 2018-06-28 NOTE — PLAN OF CARE
Problem: Patient Care Overview  Goal: Plan of Care/Patient Progress Review  Outcome: No Change  A&Ox4 but forgetful at times. VSS on RA. Reports right hip pain 8/10- oral oxycodone given x2 and Tylenol x1- effective. Bedrest with assist of one when using bedpan. Reports constipation and would like senna in AM. IV K replaced- recheck 3.8, second recheck scheduled for AM. Frequently refuses turn and repo Q2hr. Tele NSR. Implant on right chest is reported to be a back stimulator from a previous accident. Left arm limb alert from previous radical mastectomy. Plan to D/C to TCU within next 1-2 days. Nursing will continue to monitor.

## 2018-06-28 NOTE — PROGRESS NOTES
Care Coordinator met with pt earlier today.  Pt had been a Nursing Instructor here years ago.  Pt is open to transition to TCU and prefers [facility]. Pt's discharge is anticipated in 1-2 days.   Pt. has been on oral narcotic analgesis.  Pt has [employer] insurance, that typically waives the need for a three day stay for rehab if this is being recommended.

## 2018-06-28 NOTE — PROGRESS NOTES
[facility]  Hospitalist Progress Note   06/28/2018          Assessment and Plan:       Opal Sin is a 83 year old  female with medical history of chronic stable asthma, gastroesophageal reflux disease, hyperlipidemia, osteoporosis, Parkinson's disease with ongoing dizziness/orthostatic hypotension, breast cancer status post mastectomy brought into the ED via EMS after a fall this morning.     Status post mechanical fall.  Right iliac fracture with adjacent hemorrhage.  Neck pain likely from sprain / degenerative disk disease  Patient reports that she was in her bathroom, trying to sit down, slipped on her bathroom rug and fell onto her toilet seat, hurt her right hip. Was able to get up, walked to her bed and had had right hip pain since then.Has chronic dizziness/orthostatic hypotension from Parkinson's disease.    Sodium 140, potassium 3.0, creatinine 0.82. Glucose 91, WBC 7.4,   hemoglobin 12.7.  Platelet 176. INR 1.15.TSH 1.14, CPK 85.  EKG showed sinus rhythm with PVCs heart rate 76, nonspecific T-wave abnormality. .  CT pelvis without contrast showed Comminuted right iliac wing fracture with displacement. Adjacent right iliacus hemorrhage.  X-ray hip and pelvis bilateral hip osteoarthritis, moderate to severe on the right and mild to moderate on the left. No fractures are seen.  X-ray cervical spine shows no definitive fractures. Severe degenerative changes at C-4- C-5, C5 -C6 and to a lesser extent at C6- C7  Telemetry monitoring normal sinus rhythm, no acute events.  Orthopedic team following along, no acute surgical intervention.  Fall precautions.  Orthostatic vitals prn  Pain management with Tylenol for mild pain, oxycodone for moderate pain and IV Dilaudid for severe pain.  Pending physical therapy, occupational therapy assessment.  Would recommend outpatient vitamin D levels. Due for DEXA scan in August     Acute diarrhea from viral gastroenteritis  improved  Hypokalemia from diuresis/poor oral intake - corrected   Had diarrhea from 6/22 to 6/26, 4 to 5 times a day with intermittent cramping abdominal pain. Was in the ED on 6/26, electrolytes in the normal range, given IV fluids and dismissed home. Patient admitted to eating some fish prior to episodes of diarrhea likely from viral gastroenteritis.  Diary is improved, no fever or chills.  Potassium is improved from 3.024.0 with replacement therapy. Sodium 137, glucose 96.  Stool for enteric pathogens 6/26 negative  discontinue IV fluids.  PRN Imodium as needed.    Acute anemia likely dilutional, no blood loss noted.  Baseline hemoglobin around 12, presented with the hemoglobin of 12.7.  This morning hemoglobin 11.4 likely dilutional from IV fluid resuscitation.  No active blood loss  monitor hemoglobin levels a.m.     Parkinson's disease.  Continue PTA Sinemet four times a day.  Fall precautions.  PRN orthostatic vitals.     Mild intermittent asthma.  Chronic and stable.  Albuterol PRN.     History of TIA.  Nonruptured cerebral aneurysm, status post coiling.   Continue PTA aspirin 81 mg, atorvastatin 20 mg daily.     Chronic hypertension  Continue PTA amlodipine 5 mg, losartan 50 mg, Coreg 6.25 mg b.i.d.     Hyperlipidemia  Continue PTA atorvastatin 20 mg daily.     Advanced osteoporosis   DEXA History: 8/2016 Advanced osteoporosis of the left wrist and normal bone marrow density of both hips.  Due for DEXA scan in August  Given diarrhea will hold PTA Fosamax prophylactic dose.     Gastroesophageal reflux disease  Continue PTA ranitidine.     Chronic back pain from spinal stenosis.  Continue PTA Tylenol.  PTA takes tramadol - on hold   Treating  Pain currently with oral oxycodone/Dilaudid for pain during hospitalization.     History of left breast cancer   Status post left radical mastectomy 1987   Continue PTA  Evista 60mg daily   Surveillance/follow-up as outpatient.    # Pain Assessment:  Current Pain Score  6/28/2018   Patient currently in pain? -   Pain score (0-10) 5   Pain location -   Pain descriptors -   - pOal is experiencing pain due to pelvic fracture. Pain management was discussed and the plan was created in a collaborative fashion.  Opal's response to the current recommendations: engaged  - Please see the plan for pain management as documented above    Active Diet Order      Regular Diet Adult as per patient's request  DVT Prophylaxis:  pneumatic compression device  Code Status: DNR/DNI  Disposition: Expected discharge in 1 to 2 days pending clinical improvement    Patient, interdisciplinary team involved in care and agrees with plan.  Total time - Greater than 25 min. More than 50% of time spent in direct patient care, care coordination and formalizing plan of care.     Randall Sal MD        Interval History:      patient lying in bed, minimal ambulation out of bed to commode.  Has been weaned off IV Dilaudid, taking oxycodone since this morning.  Admits to slight improvement in pain.  Denies any chest pain or shortness of breath, no palpitations no cough.  No headache or dizziness.  Tolerating oral diet. No further episodes of diarrhea.         Physical Exam:        Physical Exam   Temp:  [97.8  F (36.6  C)-98.9  F (37.2  C)] 98.2  F (36.8  C)  Heart Rate:  [59-68] 65  Resp:  [16-20] 20  BP: (121-143)/(55-68) 124/55  SpO2:  [97 %-98 %] 97 %    Intake/Output Summary (Last 24 hours) at 06/28/18 1205  Last data filed at 06/28/18 1142   Gross per 24 hour   Intake             2072 ml   Output             1425 ml   Net              647 ml     Admission Weight: 59 kg (130 lb)  Current Weight: 59 kg (130 lb)    PHYSICAL EXAM  GENERAL: Patient is in mild pain. Alert and oriented.  HEART: Regular rate and rhythm. S1S2. No murmurs  LUNGS: Clear to auscultation bilaterally.  ABDOMEN: Soft, no abdominal tenderness  NEURO:  Motor exam in lower extremity slightly restricted due to pain.  EXTREMITIES: No pedal  edema. 2+ peripheral pulses.  SKIN: Warm, dry. No rash or bruising.         Medications:          amLODIPine  5 mg Oral Daily     aspirin  81 mg Oral Daily     atorvastatin  20 mg Oral Daily     Calcium-Magnesium-Zinc  1 tablet Oral TID w/meals     carbidopa-levodopa  1 tablet Oral 4x Daily     carvedilol  6.25 mg Oral BID w/meals     cholecalciferol (vitamin D3) tablet 400 Units  400 Units Oral Daily     cyanocolbalamin  100 mcg Oral Daily     losartan  50 mg Oral Daily     raloxifene  60 mg Oral Daily     ranitidine  75 mg Oral BID     acetaminophen, albuterol, HYDROmorphone, loperamide, magnesium sulfate, melatonin, naloxone, ondansetron **OR** ondansetron, oxyCODONE IR, potassium chloride, potassium chloride with lidocaine, potassium chloride, potassium chloride, potassium chloride, senna-docusate **OR** senna-docusate         Data:      All new lab and imaging data was reviewed.

## 2018-06-28 NOTE — PROGRESS NOTES
Opal reports that she feels that her pain is slightly better today - she has been able to wean down from dilaudid to oxycodone. She has PT scheduled for 3:30 this afternoon.   Her neck pain is also improving.  No concerning findings on Cervical Spine films other than DDD which is well known.    Continue ambulation as tolerated   Will Continue to follow    Kaylee LUIS

## 2018-06-28 NOTE — PLAN OF CARE
Problem: Patient Care Overview  Goal: Plan of Care/Patient Progress Review  Outcome: Improving  Some improvement in pain and movement in bed.  Oxycodone x 3 today and a 3rd dose will be given soon prior to PT eval scheduled for 3:30pm; ES tylenol also given.  Using bedpan and voiding adequately; IVF were d/c'd.  K+ replacement, recheck in am.  Tele NSR, was d/c'd.  Skin intact, no bruising or wounds.  Neck pain also improved; cervical xray results show DDD; Ortho following.

## 2018-06-29 ENCOUNTER — APPOINTMENT (OUTPATIENT)
Dept: PHYSICAL THERAPY | Facility: CLINIC | Age: 83
DRG: 544 | End: 2018-06-29
Payer: COMMERCIAL

## 2018-06-29 VITALS
DIASTOLIC BLOOD PRESSURE: 57 MMHG | WEIGHT: 130 LBS | HEART RATE: 68 BPM | OXYGEN SATURATION: 95 % | SYSTOLIC BLOOD PRESSURE: 138 MMHG | TEMPERATURE: 98.5 F | RESPIRATION RATE: 16 BRPM | HEIGHT: 63 IN | BODY MASS INDEX: 23.04 KG/M2

## 2018-06-29 LAB — POTASSIUM SERPL-SCNC: 4.1 MMOL/L (ref 3.4–5.3)

## 2018-06-29 PROCEDURE — 99239 HOSP IP/OBS DSCHRG MGMT >30: CPT | Performed by: HOSPITALIST

## 2018-06-29 PROCEDURE — 36415 COLL VENOUS BLD VENIPUNCTURE: CPT | Performed by: HOSPITALIST

## 2018-06-29 PROCEDURE — 97116 GAIT TRAINING THERAPY: CPT | Mod: GP | Performed by: PHYSICAL THERAPIST

## 2018-06-29 PROCEDURE — 97110 THERAPEUTIC EXERCISES: CPT | Mod: GP | Performed by: PHYSICAL THERAPIST

## 2018-06-29 PROCEDURE — 25000132 ZZH RX MED GY IP 250 OP 250 PS 637: Performed by: HOSPITALIST

## 2018-06-29 PROCEDURE — 40000193 ZZH STATISTIC PT WARD VISIT: Performed by: PHYSICAL THERAPIST

## 2018-06-29 PROCEDURE — 84132 ASSAY OF SERUM POTASSIUM: CPT | Performed by: HOSPITALIST

## 2018-06-29 RX ORDER — AMOXICILLIN 250 MG
1 CAPSULE ORAL 2 TIMES DAILY PRN
Qty: 30 TABLET | Refills: 0 | DISCHARGE
Start: 2018-06-29

## 2018-06-29 RX ORDER — LOPERAMIDE HCL 2 MG
2 CAPSULE ORAL 4 TIMES DAILY PRN
Qty: 20 CAPSULE | Refills: 0 | DISCHARGE
Start: 2018-06-29 | End: 2024-06-17

## 2018-06-29 RX ORDER — OXYCODONE HYDROCHLORIDE 10 MG/1
10 TABLET ORAL EVERY 6 HOURS PRN
Qty: 15 TABLET | Refills: 0 | Status: SHIPPED | OUTPATIENT
Start: 2018-06-29 | End: 2018-07-03

## 2018-06-29 RX ADMIN — Medication 100 MCG: at 09:00

## 2018-06-29 RX ADMIN — ACETAMINOPHEN 1000 MG: 500 TABLET, FILM COATED ORAL at 02:50

## 2018-06-29 RX ADMIN — OXYCODONE HYDROCHLORIDE 10 MG: 5 TABLET ORAL at 06:03

## 2018-06-29 RX ADMIN — ASPIRIN 81 MG: 81 TABLET, COATED ORAL at 09:00

## 2018-06-29 RX ADMIN — AMLODIPINE BESYLATE 5 MG: 5 TABLET ORAL at 09:00

## 2018-06-29 RX ADMIN — ATORVASTATIN CALCIUM 20 MG: 10 TABLET, FILM COATED ORAL at 09:00

## 2018-06-29 RX ADMIN — OXYCODONE HYDROCHLORIDE 10 MG: 5 TABLET ORAL at 14:12

## 2018-06-29 RX ADMIN — CHOLECALCIFEROL TAB 10 MCG (400 UNIT) 400 UNITS: 10 TAB at 09:00

## 2018-06-29 RX ADMIN — CARBIDOPA AND LEVODOPA 1 TABLET: 25; 100 TABLET ORAL at 11:37

## 2018-06-29 RX ADMIN — RALOXIFENE HYDROCHLORIDE 60 MG: 60 TABLET, FILM COATED ORAL at 09:00

## 2018-06-29 RX ADMIN — CARVEDILOL 6.25 MG: 6.25 TABLET, FILM COATED ORAL at 08:59

## 2018-06-29 RX ADMIN — OXYCODONE HYDROCHLORIDE 10 MG: 5 TABLET ORAL at 10:44

## 2018-06-29 RX ADMIN — CARBIDOPA AND LEVODOPA 1 TABLET: 25; 100 TABLET ORAL at 05:53

## 2018-06-29 RX ADMIN — RANITIDINE 75 MG: 75 TABLET, FILM COATED ORAL at 08:59

## 2018-06-29 RX ADMIN — ACETAMINOPHEN 1000 MG: 500 TABLET, FILM COATED ORAL at 09:00

## 2018-06-29 NOTE — PLAN OF CARE
Problem: Patient Care Overview  Goal: Plan of Care/Patient Progress Review  Outcome: Adequate for Discharge Date Met: 06/29/18  Discharge    Patient discharged to [facility] via [facility] wheelchair  Care plan note: Pt A&Ox4. VSS on room air. Up with assist of 1, right leg WBAT, gait belt and walker to chair for meals and halls once this shift. Complains of right hip pain with intermittent relief of pain from PRN Tylenol once this shift and Oxycodone twice this shift. No complaints of SOB. Lung sounds clear with intermittent audible wheezes. Tolerating regular diet. Adequate output; pt voices constipation but attributes this to taking Imodium prior to admission; denies need for intervention at this time. CMS intact. Discharge packet made and sent with pt. Pt sent with all belongings. [facility] provided wheelchair transport to [facility] TCU.     Listed belongings gathered and returned to patient. Yes  Care Plan and Patient education resolved: Yes  Prescriptions if needed, hard copies sent with patient  Yes  Home and hospital acquired medications returned to patient: NA  Medication Bin checked and emptied on discharge No; pt discharge to TCU  Follow up appointment made for patient: No; pt discharge to TCU

## 2018-06-29 NOTE — PLAN OF CARE
Problem: Patient Care Overview  Goal: Plan of Care/Patient Progress Review  Outcome: No Change  A&Ox4. VSS on RA. Reports generalized aching 7/10- Tylenol given x1. Utilizes bedpan with assist of 1. WBAT. Regular diet. CMS intact, check Q4hr. Possible D/C to TCU later today. Nursing will continue to monitor.

## 2018-06-29 NOTE — PROGRESS NOTES
SW:  D:  D/C is possibly today or Saturday pending pain management.  TCU is recommended and [facility] is patient's preference.    [facility] does not have a vacancy for today but may have one for Saturday.    I:  Writer met with patient to discuss alternative TCU's if she is medically stable for d/c today.  Patient shares she doesn't feel ready to d/c due to severe pain with movement.  She also did not think the TCU staff could lift her if needed for transfers and writer explained the TCU's are equipped to provide lifts.  Discussed other TCU's in the area.  Patient declined interest in [place] however did allow a referral to be sent to [facility] which has a private room open today.    Referral sent to [facility].  Reviewed cost of private room at [facility] and [facility] and general SNF benefits.    A:  Patient very uncomfortable with d/c today and able to vocalize her concerns.  Encouraged patient to explain her concerns to MD because d/c orders are based on medical readiness.  Patient accepting of the need for TCU.    P: Will follow for medical update today.  Will update chart as referral responses are received.

## 2018-06-29 NOTE — PLAN OF CARE
Problem: Patient Care Overview  Goal: Plan of Care/Patient Progress Review  Outcome: Improving  Pt is A&Ox4. Left limb alert. VSS on RA with LS clear throughout. PRN Oxy given 2x and Tylenol x1 with minimal relief for constant severe right hip and neck pain. CMS on affected leg intact. Extensive A-2 with bg and walker. Up in chair for meal. WBAT. Decline T/R. Reg diet with good appetite. Cont B/B uses bedpan. Voiding adequately, no BM this shift. Anticipated d/c Friday 6/29 to TCU. PT/OT/SW following. Will continue to monitor.     .

## 2018-06-29 NOTE — DISCHARGE SUMMARY
"Discharge Summary  Hospitalist    Date of Admission:  6/27/2018  Date of Discharge:  6/29/2018  Discharging Provider: Randall Sal MD    Primary Care Physician   Damian Russ MD  Primary Care Provider Phone Number: [phone]  Primary Care Provider Fax Number: [phone]    PRINCIPAL DIAGNOSIS  Status post mechanical fall.  Right iliac fracture with adjacent hemorrhage.  Neck pain likely from sprain / degenerative disk disease  Acute diarrhea from viral gastroenteritis improved  Hypokalemia from diuresis/poor oral intake - corrected   Acute anemia likely dilutional, no blood loss noted.    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 - MRSA     Osteoporosis, unspecified     bone density- 2011     Parkinson's disease (H) 2015     PONV (postoperative nausea and vomiting)     nausea     Spinal stenosis, unspecified region other than cervical      Unspecified cerebral artery occlusion with cerebral infarction        History of Present Illness   Opal Sin is an 83 year old female who presented with fall    Hospital Course   Opal Sin is a 83 year old  female with medical history of chronic stable asthma, gastroesophageal reflux disease, hyperlipidemia, osteoporosis, Parkinson's disease with ongoing dizziness/orthostatic hypotension, breast cancer status post mastectomy brought into the ED via EMS after a fall on 6/27    Status post mechanical fall.  Right iliac fracture with adjacent hemorrhage.  Neck pain likely from sprain / degenerative disk disease  Patient reports that she was in her bathroom, trying to sit down, slipped on her bathroom " rug and fell onto her toilet seat, hurt her right hip. Was able to get up, walked to her bed and had had right hip pain since then.Has chronic dizziness/orthostatic hypotension from Parkinson's disease.     Sodium 140, potassium 3.0, creatinine 0.82. Glucose 91, WBC 7.4,   Hemoglobin 12.7.  Platelet 176. INR 1.15.TSH 1.14, CPK 85.  EKG showed sinus rhythm with PVCs heart rate 76, nonspecific T-wave abnormality. .  CT pelvis without contrast showed Comminuted right iliac wing fracture with displacement. Adjacent right iliacus hemorrhage.  X-ray hip and pelvis bilateral hip osteoarthritis, moderate to severe on the right and mild to moderate on the left. No fractures are seen.  X-ray cervical spine shows no definitive fractures. Severe degenerative changes at C-4- C-5, C5 -C6 and to a lesser extent at C6- C7  Telemetry monitoring normal sinus rhythm, no acute events.  Orthopedic team consulted, no acute surgical intervention. Follow up in clinic in 2 to 3 weeks for repeat imaging.  Fall precautions.  Pain management with Tylenol for mild pain, oxycodone for moderate - severe pain. Received IV Dilaudid during hospitalization. At the time of discharge given a prescription for three days of 10 mg oxycodone every six hours as needed for pain. Take with stool softeners as needed for constipation  Physical therapy, occupational therapy assessment - recommend TCU   Would recommend outpatient vitamin D levels. Due for DEXA scan in August      Acute diarrhea from viral gastroenteritis improved  Hypokalemia from diuresis/poor oral intake - corrected   Had diarrhea from 6/22 to 6/26, 4 to 5 times a day with intermittent cramping abdominal pain. Was in the ED on 6/26, electrolytes in the normal range, given IV fluids and dismissed home. Patient admitted to eating some fish prior to episodes of diarrhea likely from viral gastroenteritis.  Diarrhea has improved, no fever or chills.  Potassium is improved from 3.0 > 4.0 with  replacement therapy. Sodium 137, glucose 96.  Stool for enteric pathogens 6/26 negative  PRN Imodium as needed.     Acute anemia likely dilutional, no blood loss noted.  Baseline hemoglobin around 12, presented with the hemoglobin of 12.7.  Hemoglobin 11.4 likely dilutional from IV fluid resuscitation.  No active blood loss      Parkinson's disease.  Continue PTA Sinemet four times a day.  Fall precautions.  PRN orthostatic vitals.      Mild intermittent asthma.  Chronic and stable.  Albuterol PRN.      History of TIA.  Nonruptured cerebral aneurysm, status post coiling.   Continue PTA aspirin 81 mg, atorvastatin 20 mg daily.      Chronic hypertension  Continue PTA amlodipine 5 mg, losartan 50 mg, Coreg 6.25 mg b.i.d.      Hyperlipidemia  Continue PTA atorvastatin 20 mg daily.      Advanced osteoporosis   DEXA History: 8/2016 Advanced osteoporosis of the left wrist and normal bone marrow density of both hips.  Due for DEXA scan in August  restarted PTA Fosamax prophylactic dose at the time of discharge       Gastroesophageal reflux disease  Continue PTA ranitidine.      Chronic back pain from spinal stenosis.  Continue PTA Tylenol.  PTA takes tramadol - on hold   Treating  Pain currently with oral oxycodone/Dilaudid for pain during hospitalization.      History of left breast cancer   Status post left radical mastectomy 1987   Continue PTA  Evista 60mg daily   Surveillance/follow-up as outpatient.    # Discharge Pain Plan:   - During her hospitalization, Opal experienced pain due to fall and iliac fracture.  The pain plan for discharge was discussed with Opal and her brother and the plan was created in a collaborative fashion.    - Opioids prescribed on discharge: Oxycodone 10 mg q 6 hr prn  - Duration of opioids after discharge: Per CDC opioid prescribing guidelines, a 3 day prescription of opioids was provided.  - Bowel regimen: goodna  patient, her brother by the bedside and interdisciplinary team involved in  care and agree with discharge plan.    Randall Sal MD    Pending Results   Unresulted Labs Ordered in the Past 30 Days of this Admission     No orders found from 4/28/2018 to 6/28/2018.             Physical Exam   Vitals:    06/27/18 0610   Weight: 59 kg (130 lb)     Vital Signs with Ranges  Temp:  [97.1  F (36.2  C)-98.5  F (36.9  C)] 98.5  F (36.9  C)  Pulse:  [68] 68  Heart Rate:  [63-69] 63  Resp:  [16-21] 16  BP: ()/(55-70) 138/57  SpO2:  [95 %-96 %] 95 %  I/O last 3 completed shifts:  In: 492 [P.O.:280; I.V.:212]  Out: 1400 [Urine:1400]  PHYSICAL EXAM  GENERAL: Patient is in mild pain. Alert and oriented.  HEART: Regular rate and rhythm. S1S2. No murmurs  LUNGS: Clear to auscultation bilaterally.  ABDOMEN: Soft, no abdominal tenderness  NEURO: motor and sensory system grossly intact.  EXTREMITIES: No pedal edema. 2+ peripheral pulses.  SKIN: Warm, dry. No rash or bruising.    )Consultations This Hospital Stay   SOCIAL WORK IP CONSULT  PHYSICAL THERAPY ADULT IP CONSULT  OCCUPATIONAL THERAPY ADULT IP CONSULT  ORTHOPEDICS IP CONSULT  PHYSICAL THERAPY ADULT IP CONSULT  SPIRITUAL HEALTH SERVICES IP CONSULT  SOCIAL WORK IP CONSULT  PHYSICAL THERAPY ADULT IP CONSULT  OCCUPATIONAL THERAPY ADULT IP CONSULT    Time Spent on this Encounter   IRandall, personally saw the patient today and spent greater than 30 minutes discharging this patient.  More than 50% of time spent in direct patient care, care coordination, patient/caregiver counseling, and formalizing plan of care.    Discharge Orders     Follow Up and recommended labs and tests   Follow up with Dr. Matta in 3 weeks.  No follow up labs or test are needed.  Follow-up x-rays will be taken at time of visit. Call Irlanda for appointment [phone]     Activity - Up with assistive device     Weight bearing status   WBAT     General info for SNF   Length of Stay Estimate: Short Term Care: Estimated # of Days <30  Condition at Discharge:  Stable  Level of care:skilled   Rehabilitation Potential: Fair  Admission H&P remains valid and up-to-date: Yes  Recent Chemotherapy: N/A  Use Nursing Home Standing Orders: Yes     Mantoux instructions   Give two-step Mantoux (PPD) Per Facility Policy Yes     Reason for your hospital stay   You were admitted with fall and right iliac wing fracture     Follow Up and recommended labs and tests   Follow up with Nursing home physician.  No follow up labs or test are needed.     Additional Discharge Instructions   Aggressive incentive spirometry  Recommend outpatient vitamin D levels.   Due for DEXA scan in August  Fall precautions     DNR/DNI     Physical Therapy Adult Consult   Evaluate and treat as clinically indicated.    Reason: right iliac wing fracture     Occupational Therapy Adult Consult   Evaluate and treat as clinically indicated.    Reason: right iliac wing fracture     Advance Diet as Tolerated   Follow this diet upon discharge: Orders Placed This Encounter     Room Service     Regular Diet Adult         Discharge Medications   Current Discharge Medication List      START taking these medications    Details   loperamide (IMODIUM) 2 MG capsule Take 1 capsule (2 mg) by mouth 4 times daily as needed for diarrhea  Qty: 20 capsule, Refills: 0    Associated Diagnoses: Viral gastroenteritis      oxyCODONE IR (ROXICODONE) 10 MG tablet Take 1 tablet (10 mg) by mouth every 6 hours as needed for other (pain control or improvement in physical function. Hold dose if drowsy)  Qty: 15 tablet, Refills: 0    Associated Diagnoses: Closed displaced fracture of right ilium, unspecified fracture morphology, initial encounter (H)      senna-docusate (SENOKOT-S;PERICOLACE) 8.6-50 MG per tablet Take 1 tablet by mouth 2 times daily as needed for constipation  Qty: 30 tablet, Refills: 0    Associated Diagnoses: Closed displaced fracture of right ilium, unspecified fracture morphology, initial encounter (H)         CONTINUE these  medications which have NOT CHANGED    Details   acetaminophen (TYLENOL) 500 MG tablet Take 500-1,000 mg by mouth every 6 hours as needed for mild pain      albuterol (PROAIR HFA/PROVENTIL HFA/VENTOLIN HFA) 108 (90 Base) MCG/ACT Inhaler Inhale 1-2 puffs into the lungs every 6 hours as needed for shortness of breath / dyspnea or wheezing  Qty: 18 g, Refills: 1    Associated Diagnoses: Acute bronchospasm      alendronate (FOSAMAX) 35 MG tablet TAKE 1 TABLET BY MOUTH EVERY 7 DAYS  Qty: 12 tablet, Refills: 1    Associated Diagnoses: Osteoporosis      amLODIPine (NORVASC) 5 MG tablet TAKE 1 TABLET(5 MG) BY MOUTH DAILY  Qty: 90 tablet, Refills: 2    Associated Diagnoses: Benign essential hypertension      aspirin EC 81 MG EC tablet Take 1 tablet (81 mg) by mouth daily    Associated Diagnoses: Transient cerebral ischemia, unspecified type      atorvastatin (LIPITOR) 20 MG tablet Take 1 tablet (20 mg) by mouth daily  Qty: 90 tablet, Refills: 3    Associated Diagnoses: Mixed hyperlipidemia      Calcium-Magnesium 300-300 MG TABS Take 1 tablet by mouth 3 times daily       carbidopa-levodopa (SINEMET)  MG per tablet Take 1 tablet by mouth 4 times daily Takes at 6am, 11am, 4pm, and 9 pm      carvedilol (COREG) 6.25 MG tablet Take 1 tablet (6.25 mg) by mouth 2 times daily (with meals)  Qty: 180 tablet, Refills: 3    Associated Diagnoses: Essential hypertension with goal blood pressure less than 140/90      Cholecalciferol (VITAMIN D3 PO) Take 400 Units by mouth daily       Cyanocobalamin (VITAMIN B 12 PO) Take 100 mcg by mouth daily       Iron-Vitamins (GERITOL COMPLETE) TABS Take 1 tablet by mouth daily      losartan (COZAAR) 50 MG tablet Take 1 tablet (50 mg) by mouth daily  Qty: 90 tablet, Refills: 3    Associated Diagnoses: Essential hypertension with goal blood pressure less than 140/90      Psyllium (METAMUCIL FIBER PO) Take 1 capsule by mouth daily as needed       raloxifene (EVISTA) 60 MG tablet TAKE 1 TABLET(60 MG)  BY MOUTH DAILY  Qty: 90 tablet, Refills: 0    Comments: Overdue for f/u from 8/14/17 apt. Also due for repeat dexa and labs in August  Associated Diagnoses: Age-related osteoporosis without current pathological fracture      RANITIDINE HCL PO Take 75 mg by mouth 2 times daily         STOP taking these medications       traMADol (ULTRAM) 50 MG tablet Comments:   Reason for Stopping:             Allergies   Allergies   Allergen Reactions     Bee Pollen Hives     If MVI contains this - she will break out in a rash.      Cephalexin Monohydrate Hives     Ciprofloxacin Hives     Clindamycin Hives     Multi Vitamin-Minerals [Hair-Vites] Other (See Comments)     (If MV contains bee pollen she will break out in hives)      Penicillin [Penicillins] Hives     All antibiotics except zpak??????     Thiazide-Type Diuretics Other (See Comments)     Very low sodium levels     Tobramycin Hives     Vancomycin Hives     Atorvastatin      Leg cramps     Cephalexin Hives     Ciprofloxacin Hives     Ibuprofen Nausea and Vomiting and Nausea     stomach pain     Versed [Midazolam] Other (See Comments)     Confused, agiitated, for 6-7 hrs after anngiogram sedation     Zoloft [Sertraline] Other (See Comments)     Headache, elevated BP     Ace Inhibitors Cough     Advil [Ibuprofen Micronized] Nausea     Metoprolol Diarrhea      tolerates Inderal       Discharge Disposition   Discharged to short-term care facility  Condition at discharge: Stable    DATA  Most Recent 3 CBC's:  Recent Labs   Lab Test  06/28/18   0740  06/27/18   0639  06/26/18   1255   WBC  7.2  7.4  6.2   HGB  11.4*  12.7  12.6   MCV  91  91  90   PLT  139*  176  143*      Most Recent 3 BMP's:  Recent Labs   Lab Test  06/29/18   0820  06/28/18   0740  06/27/18   2300  06/27/18   0639  06/26/18   1255   NA   --   137   --   140  138   POTASSIUM  4.1  4.0  3.8  3.0*  3.8   CHLORIDE   --   110*   --   110*  109   CO2   --   19*   --   21  20   BUN   --   13   --   15  26   CR   --    0.74   --   0.82  1.05*   ANIONGAP   --   8   --   9  9   CHRIS   --   7.4*   --   8.3*  8.0*   GLC   --   96   --   91  95     Most Recent 2 LFT's:  Recent Labs   Lab Test  06/28/18   0740  11/30/16   0415   AST  18  12   ALT  9  7   ALKPHOS  55  53   BILITOTAL  0.6  0.6       Most Recent TSH, T4 and A1c Labs:  Recent Labs   Lab Test  06/27/18   0639  08/08/17   2200   TSH  1.14  0.95   A1C   --   5.6     Results for orders placed or performed during the hospital encounter of 06/27/18   XR Pelvis w Hip Right 1 View    Narrative    XR PELVIS AND HIP RIGHT 1 VIEW 6/27/2018 7:46 AM    COMPARISON: 10/28/2014    HISTORY: Pain.      Impression    IMPRESSION: Bilateral hip osteoarthritis, moderate to severe on the  right and mild to moderate on the left. No fractures are seen.    ASHISH FISHCER MD   CT Pelvis w/o Contrast    Narrative    CT PELVIS WITHOUT CONTRAST 6/27/2018 8:53 AM     HISTORY:  Right hip pain after fall. X-ray negative.     TECHNIQUE:  Axial images with reconstructions. No IV contrast.  Radiation dose for this scan was reduced using automated exposure  control, adjustment of the mA and/or kV according to patient size, or  iterative reconstruction technique.    COMPARISON:  7/7/2008    FINDINGS:  Comminuted fracture extending along the right iliac wing,  with 12 mm of maximal displacement. Adjacent enlargement of the right  iliacus muscle, presumably representing hemorrhage. Changes of lumbar  fusion surgery with hardware. Left iliac defect/deformity which  presumably relates to bone graft harvesting. Chondrocalcinosis of the  hips and symphysis pubis, consistent with calcium pyrophosphate  deposition disease. There is a 2.1 cm calcified or sclerotic lesion of  the left supra-acetabular region; this could represent an enchondroma  or bone island and is stable compared with July 2008. Moderate left  and fairly prominent right hip osteoarthritis. Incidentally noted  colonic diverticulosis.      Impression     IMPRESSION:  1. Comminuted right iliac wing fracture with displacement. Adjacent  right iliacus hemorrhage.  2. Additional findings discussed above.    FIDEL SOLANO MD   XR Cervical Spine 2/3 Views    Narrative    XR CERVICAL SPINE 2/3 VWS 6/27/2018 3:11 PM    COMPARISON: CT dated 1/29/2015    HISTORY: Fall with neck pain.      Impression    IMPRESSION: No definite fractures are seen in the cervical spine.  However, shoulder soft tissues overlie the lower cervical spine.  Fusion at C3-4 again seen. Severe degenerative change at C4-5, C5-6  and to a lesser extent C6-7. No significant listhesis identified. No  prevertebral soft tissue swelling.    ASHISH FISCHER MD

## 2018-06-29 NOTE — PROGRESS NOTES
SW:  D: Patient accepted at both [facility] and [facility] TCU's.  Patient accepted a private room at [facility] and has been notified of he daily charge of $45.00 for the private room.  Trans[facility] and [facility] and was notified of [facility] cost.  [employer] can transport at 1430.  Brother was present during the meeting.  Bedside nurse updated.  MD paged with update  PAS-RR    D: Per DHS regulation, SW completed and submitted PAS-RR to [place] [employer] via the Senior LinkAge Line.  PAS-RR confirmation # is : [id number]    I: SW spoke with patient  and they are aware a PAS-RR has been submitted.  SW reviewed with patient that they may be contacted for a follow up appointment within 10 days of hospital discharge if their SNF stay is < 30 days.  Contact information for Senior LinkAge Line was also provided.    A:Patient verbalized understanding.    P: Further questions may be directed to [facility] LinkAge Line at #[phone], option #4 for PAS-RR staff.

## 2018-06-29 NOTE — PLAN OF CARE
Problem: Patient Care Overview  Goal: Plan of Care/Patient Progress Review  Physical Therapy Discharge Summary    Reason for therapy discharge:    Discharged to transitional care facility.    Progress towards therapy goal(s). See goals on Care Plan in [facility] electronic health record for goal details.  Goals not met.  Barriers to achieving goals:   discharge from facility.    Therapy recommendation(s):    Continued therapy is recommended.  Rationale/Recommendations:  To progress independence, safety, and endurance with functional activities .

## 2018-07-01 VITALS
TEMPERATURE: 97.8 F | HEART RATE: 76 BPM | RESPIRATION RATE: 18 BRPM | BODY MASS INDEX: 23.49 KG/M2 | DIASTOLIC BLOOD PRESSURE: 70 MMHG | SYSTOLIC BLOOD PRESSURE: 118 MMHG | WEIGHT: 132.6 LBS | OXYGEN SATURATION: 97 %

## 2018-07-02 ENCOUNTER — NURSING HOME VISIT (OUTPATIENT)
Dept: GERIATRICS | Facility: CLINIC | Age: 83
End: 2018-07-02
Payer: COMMERCIAL

## 2018-07-02 DIAGNOSIS — G20.A1 PARKINSON'S DISEASE (H): ICD-10-CM

## 2018-07-02 DIAGNOSIS — N18.30 CKD (CHRONIC KIDNEY DISEASE) STAGE 3, GFR 30-59 ML/MIN (H): ICD-10-CM

## 2018-07-02 DIAGNOSIS — S32.301D CLOSED FRACTURE OF RIGHT ILIAC WING WITH ROUTINE HEALING, SUBSEQUENT ENCOUNTER: Primary | ICD-10-CM

## 2018-07-02 DIAGNOSIS — G89.4 CHRONIC PAIN SYNDROME: ICD-10-CM

## 2018-07-02 DIAGNOSIS — I10 BENIGN ESSENTIAL HYPERTENSION: ICD-10-CM

## 2018-07-02 DIAGNOSIS — R53.81 PHYSICAL DECONDITIONING: ICD-10-CM

## 2018-07-02 PROCEDURE — 99310 SBSQ NF CARE HIGH MDM 45: CPT | Performed by: NURSE PRACTITIONER

## 2018-07-02 NOTE — PROGRESS NOTES
[facility]  PRIMARY CARE PROVIDER AND CLINIC:  Damian Russ [address] / [place] [address]  Chief Complaint   Patient presents with     Hospital F/U     [facility] Medical Record Number:  [id number]    HPI:    Opal Sin is a 83 year old  (1935), PMH of  asthma, gastroesophageal reflux disease, hyperlipidemia, osteoporosis, Parkinson's disease with ongoing dizziness/orthostatic hypotension, breast cancer status post mastectomy presented to ED after a fall admitted to the [facility] from [facility].  Hospital stay 6/27/18 through 6/29/18.   Right iliac wing Fx: nonsurgical, pain management, WBAT   Viral gastroenteritis: improved, IVF, K+ low and replaced during hospitalization.   Parkinson's disease: PTA sinemet, orthostasis from meds.   Chronic back pain/spinal stenosis: PTA tramadol, on hold, patient taking oxycodone and dilaudid for above injury.   Admitted to this facility for  rehab, medical management and nursing care.  HPI information obtained from: facility chart records, facility staff, patient report and [facility] chart review.  Current issues are: ON exam today patient is alert, pleasant, states pain in right hip is controlled, rates pain as 6/10 at rest, pain does in crease with weight bearing, patient states she was able to sleep last night, denies fever, chills, cough, congestion, SOB, N/V/D states she had a formed BM last night, no other concerns, states appetite is good.       Last 3 BPs: 118/70, 117/64, 102/64 mmHg  HR Ranges: 64-76 bpm  Admission Weight: 133.3 lbs  Current Weight: 132.6 lbs    CODE STATUS/ADVANCE DIRECTIVES DISCUSSION:   DNR / DNI  Patient's living condition: lives alone    ALLERGIES:Bee pollen; Cephalexin monohydrate; Ciprofloxacin; Clindamycin; Multi vitamin-minerals [hair-vites]; Penicillin [penicillins]; Thiazide-type diuretics; Tobramycin; Vancomycin; Atorvastatin; Cephalexin; Ciprofloxacin;  Ibuprofen; Versed [midazolam]; Zoloft [sertraline]; Ace inhibitors; Advil [ibuprofen micronized]; and Metoprolol  PAST MEDICAL HISTORY:  has a past medical history of Asthma (5 yrs); Breast CA (H) (1987); Degeneration of intervertebral disc, site unspecified; Essential hypertension, benign; Gastro-oesophageal reflux disease; antibiotic allergy; Hyperlipidaemia; Osteomyelitis (H); Osteoporosis, unspecified; Parkinson's disease (H) (2015); PONV (postoperative nausea and vomiting); Spinal stenosis, unspecified region other than cervical; and Unspecified cerebral artery occlusion with cerebral infarction.  PAST SURGICAL HISTORY:  has a past surgical history that includes NONSPECIFIC PROCEDURE; NONSPECIFIC PROCEDURE (1987); NONSPECIFIC PROCEDURE; Bunionectomy; replacement socket, shoulder disarticulation/interscapular thoracic, molded to; TKR; Breast surgery; Mastectomy (Left); Insert stimulator dorsal column (1968); Thoracoscopic wedge resection lung (Right, 10/28/2014); biopsy; TOTAL KNEE ARTHROPLASTY (2008); and Recession resection (repair strabismus) (Left, 6/12/2015).  FAMILY HISTORY: family history includes Alzheimer Disease (age of onset: 74) in her brother; Cancer (age of onset: 47) in her mother; Cancer (age of onset: 6) in her brother; Cardiovascular in her brother; Cerebrovascular Disease in her sister; HEART DISEASE in her brother and brother; HEART DISEASE (age of onset: 60) in her brother; Respiratory in her sister. There is no history of Breast Cancer.  SOCIAL HISTORY:  reports that she has never smoked. She has never used smokeless tobacco. She reports that she drinks alcohol. She reports that she does not use illicit drugs.    Post Discharge Medication Reconciliation Status: discharge medications reconciled, continue medications without change.  Current Outpatient Prescriptions   Medication Sig Dispense Refill     acetaminophen (TYLENOL) 500 MG tablet Take 500-1,000 mg by mouth every 6 hours as needed  for mild pain       albuterol (PROAIR HFA/PROVENTIL HFA/VENTOLIN HFA) 108 (90 Base) MCG/ACT Inhaler Inhale 1-2 puffs into the lungs every 6 hours as needed for shortness of breath / dyspnea or wheezing 18 g 1     alendronate (FOSAMAX) 35 MG tablet TAKE 1 TABLET BY MOUTH EVERY 7 DAYS 12 tablet 1     amLODIPine (NORVASC) 5 MG tablet TAKE 1 TABLET(5 MG) BY MOUTH DAILY 90 tablet 2     aspirin EC 81 MG EC tablet Take 1 tablet (81 mg) by mouth daily       atorvastatin (LIPITOR) 20 MG tablet Take 1 tablet (20 mg) by mouth daily 90 tablet 3     Calcium-Magnesium 300-300 MG TABS Take 1 tablet by mouth 3 times daily        carbidopa-levodopa (SINEMET)  MG per tablet Take 1 tablet by mouth 4 times daily Takes at 6am, 11am, 4pm, and 9 pm       carvedilol (COREG) 6.25 MG tablet Take 1 tablet (6.25 mg) by mouth 2 times daily (with meals) 180 tablet 3     Cholecalciferol (VITAMIN D3 PO) Take 400 Units by mouth daily        Cyanocobalamin (VITAMIN B 12 PO) Take 100 mcg by mouth daily        Iron-Vitamins (GERITOL COMPLETE) TABS Take 1 tablet by mouth daily       loperamide (IMODIUM) 2 MG capsule Take 1 capsule (2 mg) by mouth 4 times daily as needed for diarrhea 20 capsule 0     losartan (COZAAR) 50 MG tablet Take 1 tablet (50 mg) by mouth daily 90 tablet 3     oxyCODONE IR (ROXICODONE) 10 MG tablet Take 1 tablet (10 mg) by mouth every 6 hours as needed for other (pain control or improvement in physical function. Hold dose if drowsy) 15 tablet 0     Psyllium (METAMUCIL FIBER PO) Take 1 capsule by mouth daily as needed        raloxifene (EVISTA) 60 MG tablet TAKE 1 TABLET(60 MG) BY MOUTH DAILY 90 tablet 0     RANITIDINE HCL PO Take 75 mg by mouth 2 times daily       senna-docusate (SENOKOT-S;PERICOLACE) 8.6-50 MG per tablet Take 1 tablet by mouth 2 times daily as needed for constipation 30 tablet 0       ROS:  10 point ROS of systems including Constitutional, Eyes, Respiratory, Cardiovascular, Gastroenterology, Genitourinary,  Integumentary, Muscularskeletal, Psychiatric were all negative except for pertinent positives noted in my HPI.    Exam:  /70  Pulse 76  Temp 97.8  F (36.6  C)  Resp 18  Wt 132 lb 9.6 oz (60.1 kg)  SpO2 97%  BMI 23.49 kg/m2  GENERAL APPEARANCE:  Alert, in no distress  ENT:  Mouth and posterior oropharynx normal, moist mucous membranes  EYES:  EOM, conjunctivae, lids, pupils and irises normal  RESP:  respiratory effort and palpation of chest normal, lungs clear to auscultation , no respiratory distress  CV:  Palpation and auscultation of heart done , regular rate and rhythm, no murmur, rub, or gallop  ABDOMEN:  normal bowel sounds, soft, nontender, no hepatosplenomegaly or other masses  M/S:   Examination of:   right upper extremity, left upper extremity, right lower extremity and left lower extremity  Inspection, ROM, stability and muscle strength normal  SKIN:  Inspection of skin and subcutaneous tissue baseline  NEURO:   Cranial nerves 2-12 are normal tested and grossly at patient's baseline, speech WNL  PSYCH:  affect and mood normal    Lab/Diagnostic data:  CBC RESULTS:   Recent Labs   Lab Test  06/28/18   0740  06/27/18   0639   WBC  7.2  7.4   RBC  3.64*  4.09   HGB  11.4*  12.7   HCT  33.0*  37.1   MCV  91  91   MCH  31.3  31.1   MCHC  34.5  34.2   RDW  14.3  14.2   PLT  139*  176       Last Basic Metabolic Panel:  Recent Labs   Lab Test  06/29/18   0820  06/28/18   0740   06/27/18   0639   NA   --   137   --   140   POTASSIUM  4.1  4.0   < >  3.0*   CHLORIDE   --   110*   --   110*   CHRIS   --   7.4*   --   8.3*   CO2   --   19*   --   21   BUN   --   13   --   15   CR   --   0.74   --   0.82   GLC   --   96   --   91    < > = values in this interval not displayed.       Liver Function Studies -   Recent Labs   Lab Test  06/28/18   0740  11/30/16   0415   PROTTOTAL  5.5*  5.7*   ALBUMIN  2.9*  3.1*   BILITOTAL  0.6  0.6   ALKPHOS  55  53   AST  18  12   ALT  9  7       TSH   Date Value Ref Range  Status   06/27/2018 1.14 0.40 - 4.00 mU/L Final   08/08/2017 0.95 0.40 - 4.00 mU/L Final       Lab Results   Component Value Date    A1C 5.6 08/08/2017    A1C 5.8 11/29/2016       ASSESSMENT/PLAN:  Closed fracture of right iliac wing with routine healing, subsequent encounter  Chronic pain syndrome  Parkinson's disease (H)  Physical deconditioning  Acute/ongoing: PT and OT for strengthening, WBAT, change tylenol to 1000mg TID scheduled, change oxycodone  To 5-10mg q 4 hours prn,   Sinemet 25/100mg 1 PO QID  F/u with Dr. Matta in 3 weeks    Benign essential hypertension  CKD (chronic kidney disease) stage 3, GFR 30-59 ml/min  Chronic/ongoing: vitals daily and prn, BMP follow, continue norvasc 5mg QD, coreg 6.25mg BID, losartan 50mg QD       Orders:  BMP and Hgb weekly on Thursday  Tylenol 1000mg TID scheduled,   Change oxycodone to 5-10mg q 4 hours prn    Total time with patient visit: 40 minutes including discussions about the POC and care coordination with the patient. Greater than 50% of total time spent with counseling and coordinating care due to review chart and update orders.      Electronically signed by:  Tonya Lynn Haase, APRN CNP

## 2018-07-02 NOTE — LETTER
7/2/2018        RE: Opal Sin  [address]  [address]  [facility] [address]        [facility]  PRIMARY CARE PROVIDER AND CLINIC:  Damian Russ [address] / [place] [address]  Chief Complaint   Patient presents with     Hospital F/U     [facility] Medical Record Number:  [id number]    HPI:    Opal Sin is a 83 year old  (1935), PMH of  asthma, gastroesophageal reflux disease, hyperlipidemia, osteoporosis, Parkinson's disease with ongoing dizziness/orthostatic hypotension, breast cancer status post mastectomy presented to ED after a fall admitted to the [facility] from [facility].  Hospital stay 6/27/18 through 6/29/18.   Right iliac wing Fx: nonsurgical, pain management, WBAT   Viral gastroenteritis: improved, IVF, K+ low and replaced during hospitalization.   Parkinson's disease: PTA sinemet, orthostasis from meds.   Chronic back pain/spinal stenosis: PTA tramadol, on hold, patient taking oxycodone and dilaudid for above injury.   Admitted to this facility for  rehab, medical management and nursing care.  HPI information obtained from: facility chart records, facility staff, patient report and [facility] chart review.  Current issues are: ON exam today patient is alert, pleasant, states pain in right hip is controlled, rates pain as 6/10 at rest, pain does in crease with weight bearing, patient states she was able to sleep last night, denies fever, chills, cough, congestion, SOB, N/V/D states she had a formed BM last night, no other concerns, states appetite is good.       Last 3 BPs: 118/70, 117/64, 102/64 mmHg  HR Ranges: 64-76 bpm  Admission Weight: 133.3 lbs  Current Weight: 132.6 lbs    CODE STATUS/ADVANCE DIRECTIVES DISCUSSION:   DNR / DNI  Patient's living condition: lives alone    ALLERGIES:Bee pollen; Cephalexin monohydrate; Ciprofloxacin; Clindamycin; Multi vitamin-minerals [hair-vites];  Penicillin [penicillins]; Thiazide-type diuretics; Tobramycin; Vancomycin; Atorvastatin; Cephalexin; Ciprofloxacin; Ibuprofen; Versed [midazolam]; Zoloft [sertraline]; Ace inhibitors; Advil [ibuprofen micronized]; and Metoprolol  PAST MEDICAL HISTORY:  has a past medical history of Asthma (5 yrs); Breast CA (H) (1987); Degeneration of intervertebral disc, site unspecified; Essential hypertension, benign; Gastro-oesophageal reflux disease; antibiotic allergy; Hyperlipidaemia; Osteomyelitis (H); Osteoporosis, unspecified; Parkinson's disease (H) (2015); PONV (postoperative nausea and vomiting); Spinal stenosis, unspecified region other than cervical; and Unspecified cerebral artery occlusion with cerebral infarction.  PAST SURGICAL HISTORY:  has a past surgical history that includes NONSPECIFIC PROCEDURE; NONSPECIFIC PROCEDURE (1987); NONSPECIFIC PROCEDURE; Bunionectomy; replacement socket, shoulder disarticulation/interscapular thoracic, molded to; TKR; Breast surgery; Mastectomy (Left); Insert stimulator dorsal column (1968); Thoracoscopic wedge resection lung (Right, 10/28/2014); biopsy; TOTAL KNEE ARTHROPLASTY (2008); and Recession resection (repair strabismus) (Left, 6/12/2015).  FAMILY HISTORY: family history includes Alzheimer Disease (age of onset: 74) in her brother; Cancer (age of onset: 47) in her mother; Cancer (age of onset: 6) in her brother; Cardiovascular in her brother; Cerebrovascular Disease in her sister; HEART DISEASE in her brother and brother; HEART DISEASE (age of onset: 60) in her brother; Respiratory in her sister. There is no history of Breast Cancer.  SOCIAL HISTORY:  reports that she has never smoked. She has never used smokeless tobacco. She reports that she drinks alcohol. She reports that she does not use illicit drugs.    Post Discharge Medication Reconciliation Status: discharge medications reconciled, continue medications without change.  Current Outpatient Prescriptions   Medication  Sig Dispense Refill     acetaminophen (TYLENOL) 500 MG tablet Take 500-1,000 mg by mouth every 6 hours as needed for mild pain       albuterol (PROAIR HFA/PROVENTIL HFA/VENTOLIN HFA) 108 (90 Base) MCG/ACT Inhaler Inhale 1-2 puffs into the lungs every 6 hours as needed for shortness of breath / dyspnea or wheezing 18 g 1     alendronate (FOSAMAX) 35 MG tablet TAKE 1 TABLET BY MOUTH EVERY 7 DAYS 12 tablet 1     amLODIPine (NORVASC) 5 MG tablet TAKE 1 TABLET(5 MG) BY MOUTH DAILY 90 tablet 2     aspirin EC 81 MG EC tablet Take 1 tablet (81 mg) by mouth daily       atorvastatin (LIPITOR) 20 MG tablet Take 1 tablet (20 mg) by mouth daily 90 tablet 3     Calcium-Magnesium 300-300 MG TABS Take 1 tablet by mouth 3 times daily        carbidopa-levodopa (SINEMET)  MG per tablet Take 1 tablet by mouth 4 times daily Takes at 6am, 11am, 4pm, and 9 pm       carvedilol (COREG) 6.25 MG tablet Take 1 tablet (6.25 mg) by mouth 2 times daily (with meals) 180 tablet 3     Cholecalciferol (VITAMIN D3 PO) Take 400 Units by mouth daily        Cyanocobalamin (VITAMIN B 12 PO) Take 100 mcg by mouth daily        Iron-Vitamins (GERITOL COMPLETE) TABS Take 1 tablet by mouth daily       loperamide (IMODIUM) 2 MG capsule Take 1 capsule (2 mg) by mouth 4 times daily as needed for diarrhea 20 capsule 0     losartan (COZAAR) 50 MG tablet Take 1 tablet (50 mg) by mouth daily 90 tablet 3     oxyCODONE IR (ROXICODONE) 10 MG tablet Take 1 tablet (10 mg) by mouth every 6 hours as needed for other (pain control or improvement in physical function. Hold dose if drowsy) 15 tablet 0     Psyllium (METAMUCIL FIBER PO) Take 1 capsule by mouth daily as needed        raloxifene (EVISTA) 60 MG tablet TAKE 1 TABLET(60 MG) BY MOUTH DAILY 90 tablet 0     RANITIDINE HCL PO Take 75 mg by mouth 2 times daily       senna-docusate (SENOKOT-S;PERICOLACE) 8.6-50 MG per tablet Take 1 tablet by mouth 2 times daily as needed for constipation 30 tablet 0       ROS:  10  point ROS of systems including Constitutional, Eyes, Respiratory, Cardiovascular, Gastroenterology, Genitourinary, Integumentary, Muscularskeletal, Psychiatric were all negative except for pertinent positives noted in my HPI.    Exam:  /70  Pulse 76  Temp 97.8  F (36.6  C)  Resp 18  Wt 132 lb 9.6 oz (60.1 kg)  SpO2 97%  BMI 23.49 kg/m2  GENERAL APPEARANCE:  Alert, in no distress  ENT:  Mouth and posterior oropharynx normal, moist mucous membranes  EYES:  EOM, conjunctivae, lids, pupils and irises normal  RESP:  respiratory effort and palpation of chest normal, lungs clear to auscultation , no respiratory distress  CV:  Palpation and auscultation of heart done , regular rate and rhythm, no murmur, rub, or gallop  ABDOMEN:  normal bowel sounds, soft, nontender, no hepatosplenomegaly or other masses  M/S:   Examination of:   right upper extremity, left upper extremity, right lower extremity and left lower extremity  Inspection, ROM, stability and muscle strength normal  SKIN:  Inspection of skin and subcutaneous tissue baseline  NEURO:   Cranial nerves 2-12 are normal tested and grossly at patient's baseline, speech WNL  PSYCH:  affect and mood normal    Lab/Diagnostic data:  CBC RESULTS:   Recent Labs   Lab Test  06/28/18   0740  06/27/18   0639   WBC  7.2  7.4   RBC  3.64*  4.09   HGB  11.4*  12.7   HCT  33.0*  37.1   MCV  91  91   MCH  31.3  31.1   MCHC  34.5  34.2   RDW  14.3  14.2   PLT  139*  176       Last Basic Metabolic Panel:  Recent Labs   Lab Test  06/29/18   0820  06/28/18   0740   06/27/18   0639   NA   --   137   --   140   POTASSIUM  4.1  4.0   < >  3.0*   CHLORIDE   --   110*   --   110*   CHRIS   --   7.4*   --   8.3*   CO2   --   19*   --   21   BUN   --   13   --   15   CR   --   0.74   --   0.82   GLC   --   96   --   91    < > = values in this interval not displayed.       Liver Function Studies -   Recent Labs   Lab Test  06/28/18   0740  11/30/16   0415   PROTTOTAL  5.5*  5.7*    ALBUMIN  2.9*  3.1*   BILITOTAL  0.6  0.6   ALKPHOS  55  53   AST  18  12   ALT  9  7       TSH   Date Value Ref Range Status   06/27/2018 1.14 0.40 - 4.00 mU/L Final   08/08/2017 0.95 0.40 - 4.00 mU/L Final       Lab Results   Component Value Date    A1C 5.6 08/08/2017    A1C 5.8 11/29/2016       ASSESSMENT/PLAN:  Closed fracture of right iliac wing with routine healing, subsequent encounter  Chronic pain syndrome  Parkinson's disease (H)  Physical deconditioning  Acute/ongoing: PT and OT for strengthening, WBAT, change tylenol to 1000mg TID scheduled, change oxycodone  To 5-10mg q 4 hours prn,   Sinemet 25/100mg 1 PO QID  F/u with Dr. Matta in 3 weeks    Benign essential hypertension  CKD (chronic kidney disease) stage 3, GFR 30-59 ml/min  Chronic/ongoing: vitals daily and prn, BMP follow, continue norvasc 5mg QD, coreg 6.25mg BID, losartan 50mg QD       Orders:  BMP and Hgb weekly on Thursday  Tylenol 1000mg TID scheduled,   Change oxycodone to 5-10mg q 4 hours prn    Total time with patient visit: 40 minutes including discussions about the POC and care coordination with the patient. Greater than 50% of total time spent with counseling and coordinating care due to review chart and update orders.      Electronically signed by:  Tonya Lynn Haase, APRN CNP                    Sincerely,        Tonya Lynn Haase, APRN CNP

## 2018-07-03 ENCOUNTER — TRANSFERRED RECORDS (OUTPATIENT)
Dept: HEALTH INFORMATION MANAGEMENT | Facility: CLINIC | Age: 83
End: 2018-07-03

## 2018-07-03 ENCOUNTER — NURSING HOME VISIT (OUTPATIENT)
Dept: GERIATRICS | Facility: CLINIC | Age: 83
End: 2018-07-03

## 2018-07-03 ENCOUNTER — PATIENT OUTREACH (OUTPATIENT)
Dept: CARE COORDINATION | Facility: CLINIC | Age: 83
End: 2018-07-03

## 2018-07-03 ENCOUNTER — NURSING HOME VISIT (OUTPATIENT)
Dept: GERIATRICS | Facility: CLINIC | Age: 83
End: 2018-07-03
Payer: COMMERCIAL

## 2018-07-03 VITALS
BODY MASS INDEX: 23.84 KG/M2 | OXYGEN SATURATION: 99 % | DIASTOLIC BLOOD PRESSURE: 78 MMHG | HEART RATE: 64 BPM | SYSTOLIC BLOOD PRESSURE: 153 MMHG | RESPIRATION RATE: 20 BRPM | TEMPERATURE: 97 F | WEIGHT: 134.6 LBS

## 2018-07-03 DIAGNOSIS — M25.551 HIP PAIN, RIGHT: ICD-10-CM

## 2018-07-03 DIAGNOSIS — G89.4 CHRONIC PAIN SYNDROME: ICD-10-CM

## 2018-07-03 DIAGNOSIS — M25.551 HIP PAIN, RIGHT: Primary | ICD-10-CM

## 2018-07-03 DIAGNOSIS — G20.A1 PARKINSON'S DISEASE (H): ICD-10-CM

## 2018-07-03 DIAGNOSIS — R53.81 PHYSICAL DECONDITIONING: ICD-10-CM

## 2018-07-03 DIAGNOSIS — S32.301D CLOSED FRACTURE OF RIGHT ILIAC WING WITH ROUTINE HEALING, SUBSEQUENT ENCOUNTER: Primary | ICD-10-CM

## 2018-07-03 DIAGNOSIS — N18.30 CKD (CHRONIC KIDNEY DISEASE) STAGE 3, GFR 30-59 ML/MIN (H): ICD-10-CM

## 2018-07-03 DIAGNOSIS — I10 BENIGN ESSENTIAL HYPERTENSION: ICD-10-CM

## 2018-07-03 PROCEDURE — 99309 SBSQ NF CARE MODERATE MDM 30: CPT | Performed by: NURSE PRACTITIONER

## 2018-07-03 NOTE — PROGRESS NOTES
"[facility]    Chief Complaint   Patient presents with     POLLY       [facility] Medical Record Number:  [id number]    HPI:    Opal Sin is a 83 year old  (1935), who is being seen today for an episodic care visit at [facility].  HPI information obtained from: facility chart records, facility staff, patient report and [facility] chart review.Today's concern is:  Right hip pain: patient was transferring onto the toilet this AM and states she \"heard a loud crack\" and immediately had pain in right hip. On exam today patient states pain is different than previous pain from right iliac wing Fx. Patient has sharp 10/10 pain with weight bearing and movement of right leg/hip, states pain over hip bone and some groing pain. Point tenderness to right femur head, patient does not tolerate ROM or internal/external rotation due to pain.   HTN/CKD: Last 3 BPs: 153/78, 120/68, 120/71 mmHg  HR Ranges: 64/76 bpm  Admission Weight: 133.3 lbs  Current Weight: 134.6 lbs    ALLERGIES: Bee pollen; Cephalexin monohydrate; Ciprofloxacin; Clindamycin; Multi vitamin-minerals [hair-vites]; Penicillin [penicillins]; Thiazide-type diuretics; Tobramycin; Vancomycin; Atorvastatin; Cephalexin; Ciprofloxacin; Ibuprofen; Versed [midazolam]; Zoloft [sertraline]; Ace inhibitors; Advil [ibuprofen micronized]; and Metoprolol  Past Medical, Surgical, Family and Social History reviewed and updated in [facility].    Current Outpatient Prescriptions   Medication Sig Dispense Refill     acetaminophen (TYLENOL) 500 MG tablet Take 1,000 mg by mouth 3 times daily        albuterol (PROAIR HFA/PROVENTIL HFA/VENTOLIN HFA) 108 (90 Base) MCG/ACT Inhaler Inhale 1-2 puffs into the lungs every 6 hours as needed for shortness of breath / dyspnea or wheezing 18 g 1     alendronate (FOSAMAX) 35 MG tablet TAKE 1 TABLET BY MOUTH EVERY 7 DAYS 12 tablet 1     amLODIPine (NORVASC) 5 MG tablet TAKE 1 TABLET(5 MG) BY MOUTH DAILY 90 tablet 2     " aspirin EC 81 MG EC tablet Take 1 tablet (81 mg) by mouth daily       atorvastatin (LIPITOR) 20 MG tablet Take 1 tablet (20 mg) by mouth daily 90 tablet 3     Calcium-Magnesium 300-300 MG TABS Take 1 tablet by mouth 3 times daily        carbidopa-levodopa (SINEMET)  MG per tablet Take 1 tablet by mouth 4 times daily Takes at 6am, 11am, 4pm, and 9 pm       carvedilol (COREG) 6.25 MG tablet Take 1 tablet (6.25 mg) by mouth 2 times daily (with meals) 180 tablet 3     Cholecalciferol (VITAMIN D3 PO) Take 400 Units by mouth daily        Cyanocobalamin (VITAMIN B 12 PO) Take 100 mcg by mouth daily        Iron-Vitamins (GERITOL COMPLETE) TABS Take 1 tablet by mouth daily       loperamide (IMODIUM) 2 MG capsule Take 1 capsule (2 mg) by mouth 4 times daily as needed for diarrhea 20 capsule 0     losartan (COZAAR) 50 MG tablet Take 1 tablet (50 mg) by mouth daily 90 tablet 3     OXYCODONE HCL PO Take 5-10 mg by mouth every 4 hours as needed       Psyllium (METAMUCIL FIBER PO) Take 1 capsule by mouth daily as needed        raloxifene (EVISTA) 60 MG tablet TAKE 1 TABLET(60 MG) BY MOUTH DAILY 90 tablet 0     RANITIDINE HCL PO Take 75 mg by mouth 2 times daily       senna-docusate (SENOKOT-S;PERICOLACE) 8.6-50 MG per tablet Take 1 tablet by mouth 2 times daily as needed for constipation 30 tablet 0     Medications reviewed:  Medications reconciled to facility chart and changes were made to reflect current medications as identified as above med list. Below are the changes that were made:   Medications stopped since last EPIC medication reconciliation:   Medications Discontinued During This Encounter   Medication Reason     oxyCODONE IR (ROXICODONE) 10 MG tablet Therapy completed       Medications started since last [facility] medication reconciliation:  Orders Placed This Encounter   Medications     OXYCODONE HCL PO     Sig: Take 5-10 mg by mouth every 4 hours as needed         REVIEW OF SYSTEMS:  10 point ROS of systems including  Constitutional, Eyes, Respiratory, Cardiovascular, Gastroenterology, Genitourinary, Integumentary, Muscularskeletal, Psychiatric were all negative except for pertinent positives noted in my HPI.    Physical Exam:  /78  Pulse 64  Temp 97  F (36.1  C)  Resp 20  Wt 134 lb 9.6 oz (61.1 kg)  SpO2 99%  BMI 23.84 kg/m2  GENERAL APPEARANCE:  Alert, in no distress  ENT:  Mouth and posterior oropharynx normal, moist mucous membranes  EYES:  EOM, conjunctivae, lids, pupils and irises normal  RESP:  respiratory effort and palpation of chest normal, lungs clear to auscultation , no respiratory distress  CV:  Palpation and auscultation of heart done , regular rate and rhythm, no murmur, rub, or gallop  ABDOMEN:  normal bowel sounds, soft, nontender, no hepatosplenomegaly or other masses  M/S:   Examination of:   right upper extremity, left upper extremity,  and left lower extremity  Inspection, ROM, stability and muscle strength normal RIght LE pain with ROM, internal/external rotation. shart 10/10 pain  SKIN:  Inspection of skin and subcutaneous tissue baseline  NEURO:   Cranial nerves 2-12 are normal tested and grossly at patient's baseline, speech WNL  PSYCH:  affect and mood normal    Recent Labs:   CBC RESULTS:   Recent Labs   Lab Test  06/28/18   0740  06/27/18   0639   WBC  7.2  7.4   RBC  3.64*  4.09   HGB  11.4*  12.7   HCT  33.0*  37.1   MCV  91  91   MCH  31.3  31.1   MCHC  34.5  34.2   RDW  14.3  14.2   PLT  139*  176       Last Basic Metabolic Panel:  Recent Labs   Lab Test  06/29/18   0820  06/28/18   0740   06/27/18   0639   NA   --   137   --   140   POTASSIUM  4.1  4.0   < >  3.0*   CHLORIDE   --   110*   --   110*   CHRIS   --   7.4*   --   8.3*   CO2   --   19*   --   21   BUN   --   13   --   15   CR   --   0.74   --   0.82   GLC   --   96   --   91    < > = values in this interval not displayed.     Liver Function Studies -   Recent Labs   Lab Test  06/28/18   0740  11/30/16   0415   PROTTOTAL  5.5*   5.7*   ALBUMIN  2.9*  3.1*   BILITOTAL  0.6  0.6   ALKPHOS  55  53   AST  18  12   ALT  9  7       TSH   Date Value Ref Range Status   06/27/2018 1.14 0.40 - 4.00 mU/L Final   08/08/2017 0.95 0.40 - 4.00 mU/L Final     Lab Results   Component Value Date    A1C 5.6 08/08/2017    A1C 5.8 11/29/2016         Assessment/Plan:  Acute right hip pain: stat 2 view x-ray of right hip and pelvis: called and talked to Kenn Helms PA-C who will review x-ray, will attempt to direct admit if found to have Hip Fx. Patient states she would want operative repair and Dr. Michelle would be ortho of choice.     Closed fracture of right iliac wing with routine healing, subsequent encounter  Chronic pain syndrome  Parkinson's disease (H)  Physical deconditioning  Acute/ongoing: PT and OT for strengthening, WBAT, change tylenol to 1000mg TID scheduled, change oxycodone  To 5-10mg q 4 hours prn,   Sinemet 25/100mg 1 PO QID  F/u with Dr. Matta in 3 weeks     Benign essential hypertension  CKD (chronic kidney disease) stage 3, GFR 30-59 ml/min  Chronic/ongoing: vitals daily and prn, BMP follow, continue norvasc 5mg QD, coreg 6.25mg BID, losartan 50mg QD          Orders:  2 view x-ray of right hip and pelvis  Will attempt to direct admit if Fx found      Electronically signed by  Tonya Lynn Haase, APRN CNP

## 2018-07-03 NOTE — LETTER
"    7/3/2018        RE: Opal Sin  [address]  [address]  [facility] [address]        [facility]    Chief Complaint   Patient presents with     STEPHANIEECK       [facility] Medical Record Number:  [id number]    HPI:    Opal Sin is a 83 year old  (1935), who is being seen today for an episodic care visit at [facility].  HPI information obtained from: facility chart records, facility staff, patient report and [facility] chart review.Today's concern is:  Right hip pain: patient was transferring onto the toilet this AM and states she \"heard a loud crack\" and immediately had pain in right hip. On exam today patient states pain is different than previous pain from right iliac wing Fx. Patient has sharp 10/10 pain with weight bearing and movement of right leg/hip, states pain over hip bone and some groing pain. Point tenderness to right femur head, patient does not tolerate ROM or internal/external rotation due to pain.   HTN/CKD: Last 3 BPs: 153/78, 120/68, 120/71 mmHg  HR Ranges: 64/76 bpm  Admission Weight: 133.3 lbs  Current Weight: 134.6 lbs    ALLERGIES: Bee pollen; Cephalexin monohydrate; Ciprofloxacin; Clindamycin; Multi vitamin-minerals [hair-vites]; Penicillin [penicillins]; Thiazide-type diuretics; Tobramycin; Vancomycin; Atorvastatin; Cephalexin; Ciprofloxacin; Ibuprofen; Versed [midazolam]; Zoloft [sertraline]; Ace inhibitors; Advil [ibuprofen micronized]; and Metoprolol  Past Medical, Surgical, Family and Social History reviewed and updated in [facility].    Current Outpatient Prescriptions   Medication Sig Dispense Refill     acetaminophen (TYLENOL) 500 MG tablet Take 1,000 mg by mouth 3 times daily        albuterol (PROAIR HFA/PROVENTIL HFA/VENTOLIN HFA) 108 (90 Base) MCG/ACT Inhaler Inhale 1-2 puffs into the lungs every 6 hours as needed for shortness of breath / dyspnea or wheezing 18 g 1     alendronate (FOSAMAX) 35 MG tablet TAKE 1 TABLET BY MOUTH " EVERY 7 DAYS 12 tablet 1     amLODIPine (NORVASC) 5 MG tablet TAKE 1 TABLET(5 MG) BY MOUTH DAILY 90 tablet 2     aspirin EC 81 MG EC tablet Take 1 tablet (81 mg) by mouth daily       atorvastatin (LIPITOR) 20 MG tablet Take 1 tablet (20 mg) by mouth daily 90 tablet 3     Calcium-Magnesium 300-300 MG TABS Take 1 tablet by mouth 3 times daily        carbidopa-levodopa (SINEMET)  MG per tablet Take 1 tablet by mouth 4 times daily Takes at 6am, 11am, 4pm, and 9 pm       carvedilol (COREG) 6.25 MG tablet Take 1 tablet (6.25 mg) by mouth 2 times daily (with meals) 180 tablet 3     Cholecalciferol (VITAMIN D3 PO) Take 400 Units by mouth daily        Cyanocobalamin (VITAMIN B 12 PO) Take 100 mcg by mouth daily        Iron-Vitamins (GERITOL COMPLETE) TABS Take 1 tablet by mouth daily       loperamide (IMODIUM) 2 MG capsule Take 1 capsule (2 mg) by mouth 4 times daily as needed for diarrhea 20 capsule 0     losartan (COZAAR) 50 MG tablet Take 1 tablet (50 mg) by mouth daily 90 tablet 3     OXYCODONE HCL PO Take 5-10 mg by mouth every 4 hours as needed       Psyllium (METAMUCIL FIBER PO) Take 1 capsule by mouth daily as needed        raloxifene (EVISTA) 60 MG tablet TAKE 1 TABLET(60 MG) BY MOUTH DAILY 90 tablet 0     RANITIDINE HCL PO Take 75 mg by mouth 2 times daily       senna-docusate (SENOKOT-S;PERICOLACE) 8.6-50 MG per tablet Take 1 tablet by mouth 2 times daily as needed for constipation 30 tablet 0     Medications reviewed:  Medications reconciled to facility chart and changes were made to reflect current medications as identified as above med list. Below are the changes that were made:   Medications stopped since last EPIC medication reconciliation:   Medications Discontinued During This Encounter   Medication Reason     oxyCODONE IR (ROXICODONE) 10 MG tablet Therapy completed       Medications started since last [facility] medication reconciliation:  Orders Placed This Encounter   Medications     OXYCODONE HCL PO      Sig: Take 5-10 mg by mouth every 4 hours as needed         REVIEW OF SYSTEMS:  10 point ROS of systems including Constitutional, Eyes, Respiratory, Cardiovascular, Gastroenterology, Genitourinary, Integumentary, Muscularskeletal, Psychiatric were all negative except for pertinent positives noted in my HPI.    Physical Exam:  /78  Pulse 64  Temp 97  F (36.1  C)  Resp 20  Wt 134 lb 9.6 oz (61.1 kg)  SpO2 99%  BMI 23.84 kg/m2  GENERAL APPEARANCE:  Alert, in no distress  ENT:  Mouth and posterior oropharynx normal, moist mucous membranes  EYES:  EOM, conjunctivae, lids, pupils and irises normal  RESP:  respiratory effort and palpation of chest normal, lungs clear to auscultation , no respiratory distress  CV:  Palpation and auscultation of heart done , regular rate and rhythm, no murmur, rub, or gallop  ABDOMEN:  normal bowel sounds, soft, nontender, no hepatosplenomegaly or other masses  M/S:   Examination of:   right upper extremity, left upper extremity,  and left lower extremity  Inspection, ROM, stability and muscle strength normal RIght LE pain with ROM, internal/external rotation. shart 10/10 pain  SKIN:  Inspection of skin and subcutaneous tissue baseline  NEURO:   Cranial nerves 2-12 are normal tested and grossly at patient's baseline, speech WNL  PSYCH:  affect and mood normal    Recent Labs:   CBC RESULTS:   Recent Labs   Lab Test  06/28/18   0740  06/27/18   0639   WBC  7.2  7.4   RBC  3.64*  4.09   HGB  11.4*  12.7   HCT  33.0*  37.1   MCV  91  91   MCH  31.3  31.1   MCHC  34.5  34.2   RDW  14.3  14.2   PLT  139*  176       Last Basic Metabolic Panel:  Recent Labs   Lab Test  06/29/18   0820  06/28/18   0740   06/27/18   0639   NA   --   137   --   140   POTASSIUM  4.1  4.0   < >  3.0*   CHLORIDE   --   110*   --   110*   CHRIS   --   7.4*   --   8.3*   CO2   --   19*   --   21   BUN   --   13   --   15   CR   --   0.74   --   0.82   GLC   --   96   --   91    < > = values in this interval not  displayed.     Liver Function Studies -   Recent Labs   Lab Test  06/28/18   0740  11/30/16   0415   PROTTOTAL  5.5*  5.7*   ALBUMIN  2.9*  3.1*   BILITOTAL  0.6  0.6   ALKPHOS  55  53   AST  18  12   ALT  9  7       TSH   Date Value Ref Range Status   06/27/2018 1.14 0.40 - 4.00 mU/L Final   08/08/2017 0.95 0.40 - 4.00 mU/L Final     Lab Results   Component Value Date    A1C 5.6 08/08/2017    A1C 5.8 11/29/2016         Assessment/Plan:  Acute right hip pain: stat 2 view x-ray of right hip and pelvis: called and talked to Kenn Helms PA-C who will review x-ray, will attempt to direct admit if found to have Hip Fx. Patient states she would want operative repair and Dr. Michelle would be ortho of choice.     Closed fracture of right iliac wing with routine healing, subsequent encounter  Chronic pain syndrome  Parkinson's disease (H)  Physical deconditioning  Acute/ongoing: PT and OT for strengthening, WBAT, change tylenol to 1000mg TID scheduled, change oxycodone  To 5-10mg q 4 hours prn,   Sinemet 25/100mg 1 PO QID  F/u with Dr. Matta in 3 weeks     Benign essential hypertension  CKD (chronic kidney disease) stage 3, GFR 30-59 ml/min  Chronic/ongoing: vitals daily and prn, BMP follow, continue norvasc 5mg QD, coreg 6.25mg BID, losartan 50mg QD          Orders:  2 view x-ray of right hip and pelvis  Will attempt to direct admit if Fx found      Electronically signed by  Tonya Lynn Haase, APRN CNP                            Sincerely,        Tonya Lynn Haase, APRN CNP
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
## 2018-07-03 NOTE — LETTER
[facility]   To:             Please give to facility    From:   Kamilla Goodman RN  Care Coordinator   [facility]   P: [phone]  [email or web address]   Patient Name:  Opal Sin  Date of Birth: [birth year]   Admit date: 7/2/2018      *Information Needed:  Please contact me when the patient will discharge (or if they will move to long term care)- include the discharge date, disposition, and main diagnosis   - If the patient is discharged with home care services, please provide the name of the agency    Also- Please inform me if a care conference is being held.   Phone, Fax or Email with information                              Thank you

## 2018-07-03 NOTE — PROGRESS NOTES
Clinic Care Coordination Contact  Care Coordination Transition Communication         Clinical Data: Patient was hospitalized at [facility] from 6/27/2018 to 6/29/2018 with diagnosis of Right iliac fracture with adjacent hemorrhage.     Transition to Facility:              Facility Name: [facility]               Contact name and phone number/fax: [phone]    Plan: RN/SW Care Coordinator will await notification from facility staff informing RN/SW Care Coordinator of patient's discharge plans/needs. RN/SW Care Coordinator will review chart and outreach to facility staff every 4 weeks and as needed.     Kamilla Goodman RN  Clinic Care Coordinator  [phone]  [email or web address]

## 2018-07-05 ENCOUNTER — TRANSFERRED RECORDS (OUTPATIENT)
Dept: HEALTH INFORMATION MANAGEMENT | Facility: CLINIC | Age: 83
End: 2018-07-05

## 2018-07-05 LAB
BUN SERPL-MCNC: 23 MG/DL (ref 9–26)
CALCIUM SERPL-MCNC: 8.7 MG/DL (ref 8.4–10.4)
CHLORIDE SERPLBLD-SCNC: 105 MMOL/L (ref 98–109)
CO2 SERPL-SCNC: 24 MMOL/L (ref 22–31)
CREAT SERPL-MCNC: 0.8 MG/DL (ref 0.55–1.02)
GFR SERPL CREATININE-BSD FRML MDRD: >60 ML/MIN/1.73M2
GLUCOSE SERPL-MCNC: 102 MG/DL (ref 70–100)
HEMOGLOBIN: 11.1 G/DL (ref 11.8–15.5)
POTASSIUM SERPL-SCNC: 4.3 MMOL/L (ref 3.5–5.2)
SODIUM SERPL-SCNC: 136 MMOL/L (ref 136–145)

## 2018-07-07 ENCOUNTER — NURSING HOME VISIT (OUTPATIENT)
Dept: GERIATRICS | Facility: CLINIC | Age: 83
End: 2018-07-07
Payer: COMMERCIAL

## 2018-07-07 DIAGNOSIS — S32.301D CLOSED FRACTURE OF RIGHT ILIAC WING WITH ROUTINE HEALING, SUBSEQUENT ENCOUNTER: Primary | ICD-10-CM

## 2018-07-07 DIAGNOSIS — I95.9 HYPOTENSION, UNSPECIFIED HYPOTENSION TYPE: ICD-10-CM

## 2018-07-07 DIAGNOSIS — G20.A1 PARKINSON'S DISEASE (H): ICD-10-CM

## 2018-07-07 DIAGNOSIS — I10 ESSENTIAL HYPERTENSION, BENIGN: ICD-10-CM

## 2018-07-07 PROCEDURE — 99305 1ST NF CARE MODERATE MDM 35: CPT | Performed by: INTERNAL MEDICINE

## 2018-07-07 NOTE — MR AVS SNAPSHOT
After Visit Summary   7/7/2018    Opal Sin    MRN: [id number]           Patient Information     Date Of Birth          1935        Visit Information        Provider Department      7/7/2018 5:01 PM Gaurav Ferreira MD [facility]        Today's Diagnoses     Closed fracture of right iliac wing with routine healing, subsequent encounter    -  1    Parkinson's disease (H)        Essential hypertension, benign        Hypotension, unspecified hypotension type           Follow-ups after your visit        Who to contact     If you have questions or need follow up information about today's clinic visit or your schedule please contact [facility] directly at [phone].  Normal or non-critical lab and imaging results will be communicated to you by MyChart, letter or phone within 4 business days after the clinic has received the results. If you do not hear from us within 7 days, please contact the clinic through [employer] or phone. If you have a critical or abnormal lab result, we will notify you by phone as soon as possible.  Submit refill requests through E[employer] or call your pharmacy and they will forward the refill request to us. Please allow 3 business days for your refill to be completed.          Additional Information About Your Visit        MyChart Information     E[employer] gives you secure access to your electronic health record. If you see a primary care provider, you can also send messages to your care team and make appointments. If you have questions, please call your primary care clinic.  If you do not have a primary care provider, please call [phone] and they will assist you.        Care EveryWhere ID     This is your Care EveryWhere ID. This could be used by other organizations to access your [place] medical records  [id number]         Blood Pressure from Last 3 Encounters:   07/09/18 146/80   07/03/18 153/78   07/01/18 118/70     Weight from Last 3 Encounters:   07/09/18 134 lb 9.6 oz (61.1 kg)   07/03/18 134 lb 9.6 oz (61.1 kg)   07/01/18 132 lb 9.6 oz (60.1 kg)              Today, you had the following     No orders found for display       Primary Care Provider Office Phone # Fax #    Damian Russ [phone] [address] [address]  [facility] [address]        Equal Access to Services     SANG SARAVIA : Hadii aad ku hadasho Soomaali, waaxda luqadaha, qaybta kaalmada adeegyada, waxay pernellin hayaan adeeg kristofer villegas . So [facility] [phone].    ATENCIÓN: Si habla español, tiene a rangel disposición servicios gratuitos de asistencia lingüística. [facility] [phone].    We comply with applicable federal civil rights laws and [place] laws. We do not discriminate on the basis of race, color, national origin, age, disability, sex, sexual orientation, or gender identity.            Thank you!     Thank you for choosing [facility]  for your care. Our goal is always to provide you with excellent care. Hearing back from our patients is one way we can continue to improve our services. Please take a few minutes to complete the written survey that you may receive in the mail after your visit with us. Thank you!             Your Updated Medication List - Protect others around you: Learn how to safely use, store and throw away your medicines at www.disposemymeds.org.          This list is accurate as of 7/7/18 11:59 PM.  Always use your most recent med list.                   Brand Name Dispense Instructions for use Diagnosis    acetaminophen 500 MG tablet    TYLENOL     Take 1,000 mg by mouth 3 times daily        albuterol 108 (90 Base) MCG/ACT Inhaler    PROAIR HFA/PROVENTIL HFA/VENTOLIN HFA    18 g    Inhale 1-2 puffs into the lungs every 6 hours as needed for shortness of breath / dyspnea or wheezing    Acute bronchospasm       alendronate 35 MG tablet    FOSAMAX    12 tablet    TAKE 1 TABLET BY MOUTH  EVERY 7 DAYS    Osteoporosis       amLODIPine 5 MG tablet    NORVASC    90 tablet    TAKE 1 TABLET(5 MG) BY MOUTH DAILY    Benign essential hypertension       aspirin 81 MG EC tablet      Take 1 tablet (81 mg) by mouth daily    Transient cerebral ischemia, unspecified type       atorvastatin 20 MG tablet    LIPITOR    90 tablet    Take 1 tablet (20 mg) by mouth daily    Mixed hyperlipidemia       Calcium-Magnesium 300-300 MG Tabs      Take 1 tablet by mouth 3 times daily        carbidopa-levodopa  MG per tablet    SINEMET     Take 1 tablet by mouth 4 times daily Takes at 6am, 11am, 4pm, and 9 pm        carvedilol 6.25 MG tablet    COREG    180 tablet    Take 1 tablet (6.25 mg) by mouth 2 times daily (with meals)    Essential hypertension with goal blood pressure less than 140/90       GERITOL COMPLETE Tabs      Take 1 tablet by mouth daily        loperamide 2 MG capsule    IMODIUM    20 capsule    Take 1 capsule (2 mg) by mouth 4 times daily as needed for diarrhea    Viral gastroenteritis       losartan 50 MG tablet    COZAAR    90 tablet    Take 1 tablet (50 mg) by mouth daily    Essential hypertension with goal blood pressure less than 140/90       METAMUCIL FIBER PO      Take 1 capsule by mouth daily as needed        OXYCODONE HCL PO      Take 5-10 mg by mouth every 4 hours as needed        raloxifene 60 MG tablet    Evista    90 tablet    TAKE 1 TABLET(60 MG) BY MOUTH DAILY    Age-related osteoporosis without current pathological fracture       RANITIDINE HCL PO      Take 75 mg by mouth 2 times daily        senna-docusate 8.6-50 MG per tablet    SENOKOT-S;PERICOLACE    30 tablet    Take 1 tablet by mouth 2 times daily as needed for constipation    Closed displaced fracture of right ilium, unspecified fracture morphology, initial encounter (H)       VITAMIN B 12 PO      Take 100 mcg by mouth daily        VITAMIN D3 PO      Take 400 Units by mouth daily

## 2018-07-09 ENCOUNTER — NURSING HOME VISIT (OUTPATIENT)
Dept: GERIATRICS | Facility: CLINIC | Age: 83
End: 2018-07-09
Payer: COMMERCIAL

## 2018-07-09 VITALS
DIASTOLIC BLOOD PRESSURE: 80 MMHG | TEMPERATURE: 97.3 F | BODY MASS INDEX: 23.84 KG/M2 | HEART RATE: 65 BPM | WEIGHT: 134.6 LBS | OXYGEN SATURATION: 97 % | RESPIRATION RATE: 20 BRPM | SYSTOLIC BLOOD PRESSURE: 146 MMHG

## 2018-07-09 DIAGNOSIS — M25.551 HIP PAIN, RIGHT: Primary | ICD-10-CM

## 2018-07-09 DIAGNOSIS — G89.4 CHRONIC PAIN SYNDROME: ICD-10-CM

## 2018-07-09 DIAGNOSIS — G20.A1 PARKINSON'S DISEASE (H): ICD-10-CM

## 2018-07-09 DIAGNOSIS — I10 BENIGN ESSENTIAL HYPERTENSION: ICD-10-CM

## 2018-07-09 DIAGNOSIS — R53.81 PHYSICAL DECONDITIONING: ICD-10-CM

## 2018-07-09 DIAGNOSIS — N18.30 CKD (CHRONIC KIDNEY DISEASE) STAGE 3, GFR 30-59 ML/MIN (H): ICD-10-CM

## 2018-07-09 DIAGNOSIS — S32.301D CLOSED FRACTURE OF RIGHT ILIAC WING WITH ROUTINE HEALING, SUBSEQUENT ENCOUNTER: ICD-10-CM

## 2018-07-09 PROCEDURE — 99309 SBSQ NF CARE MODERATE MDM 30: CPT | Performed by: NURSE PRACTITIONER

## 2018-07-09 NOTE — PROGRESS NOTES
[facility]    Chief Complaint   Patient presents with     POLLY       [place] Medical Record Number:  [id number]    HPI:    Opal Sin is a 83 year old  (1935), who is being seen today for an episodic care visit at [facility].  HPI information obtained from: facility chart records, facility staff, patient report and [facility] chart review.Today's concern is:  Right hip pain:patient states right hip pain has improved slowly, has some pain radiating from posterior right hip down leg but pain is getting better, Kenn Helms PA-C did evaluate and found patient to have muscle strain, will continue therapy.    HTN/CKD: Last 3 BPs: 146/80, 122/72, 118/67  mmHg  HR Ranges: 56-65 bpm  Admission Weight: 133.3 lbs  Current Weight: 134.6 lbs    ALLERGIES: Bee pollen; Cephalexin monohydrate; Ciprofloxacin; Clindamycin; Multi vitamin-minerals [hair-vites]; Penicillin [penicillins]; Thiazide-type diuretics; Tobramycin; Vancomycin; Atorvastatin; Cephalexin; Ciprofloxacin; Ibuprofen; Versed [midazolam]; Zoloft [sertraline]; Ace inhibitors; Advil [ibuprofen micronized]; and Metoprolol  Past Medical, Surgical, Family and Social History reviewed and updated in [facility].    Current Outpatient Prescriptions   Medication Sig Dispense Refill     acetaminophen (TYLENOL) 500 MG tablet Take 1,000 mg by mouth 3 times daily        albuterol (PROAIR HFA/PROVENTIL HFA/VENTOLIN HFA) 108 (90 Base) MCG/ACT Inhaler Inhale 1-2 puffs into the lungs every 6 hours as needed for shortness of breath / dyspnea or wheezing 18 g 1     alendronate (FOSAMAX) 35 MG tablet TAKE 1 TABLET BY MOUTH EVERY 7 DAYS 12 tablet 1     amLODIPine (NORVASC) 5 MG tablet TAKE 1 TABLET(5 MG) BY MOUTH DAILY 90 tablet 2     aspirin EC 81 MG EC tablet Take 1 tablet (81 mg) by mouth daily       atorvastatin (LIPITOR) 20 MG tablet Take 1 tablet (20 mg) by mouth daily 90 tablet 3     Calcium-Magnesium 300-300 MG TABS Take 1 tablet by mouth 3 times  daily        carbidopa-levodopa (SINEMET)  MG per tablet Take 1 tablet by mouth 4 times daily Takes at 6am, 11am, 4pm, and 9 pm       carvedilol (COREG) 6.25 MG tablet Take 1 tablet (6.25 mg) by mouth 2 times daily (with meals) 180 tablet 3     Cholecalciferol (VITAMIN D3 PO) Take 400 Units by mouth daily        Cyanocobalamin (VITAMIN B 12 PO) Take 100 mcg by mouth daily        Iron-Vitamins (GERITOL COMPLETE) TABS Take 1 tablet by mouth daily       loperamide (IMODIUM) 2 MG capsule Take 1 capsule (2 mg) by mouth 4 times daily as needed for diarrhea 20 capsule 0     losartan (COZAAR) 50 MG tablet Take 1 tablet (50 mg) by mouth daily 90 tablet 3     OXYCODONE HCL PO Take 5-10 mg by mouth every 4 hours as needed       Psyllium (METAMUCIL FIBER PO) Take 1 capsule by mouth daily as needed        raloxifene (EVISTA) 60 MG tablet TAKE 1 TABLET(60 MG) BY MOUTH DAILY 90 tablet 0     RANITIDINE HCL PO Take 75 mg by mouth 2 times daily       senna-docusate (SENOKOT-S;PERICOLACE) 8.6-50 MG per tablet Take 1 tablet by mouth 2 times daily as needed for constipation 30 tablet 0     Medications reviewed:  Medications reconciled to facility chart and changes were made to reflect current medications as identified as above med list. Below are the changes that were made:   Medications stopped since last EPIC medication reconciliation:   Medications Discontinued During This Encounter   Medication Reason     oxyCODONE IR (ROXICODONE) 10 MG tablet Therapy completed       Medications started since last [facility] medication reconciliation:  Orders Placed This Encounter   Medications     OXYCODONE HCL PO     Sig: Take 5-10 mg by mouth every 4 hours as needed         REVIEW OF SYSTEMS:  10 point ROS of systems including Constitutional, Eyes, Respiratory, Cardiovascular, Gastroenterology, Genitourinary, Integumentary, Muscularskeletal, Psychiatric were all negative except for pertinent positives noted in my HPI.    Physical Exam:  /80   Pulse 65  Temp 97.3  F (36.3  C)  Resp 20  Wt 134 lb 9.6 oz (61.1 kg)  SpO2 97%  BMI 23.84 kg/m2  GENERAL APPEARANCE:  Alert, in no distress  ENT:  Mouth and posterior oropharynx normal, moist mucous membranes  EYES:  EOM, conjunctivae, lids, pupils and irises normal  RESP:  respiratory effort and palpation of chest normal, lungs clear to auscultation , no respiratory distress  CV:  Palpation and auscultation of heart done , regular rate and rhythm, no murmur, rub, or gallop  ABDOMEN:  normal bowel sounds, soft, nontender, no hepatosplenomegaly or other masses  M/S:   Examination of:   right upper extremity, left upper extremity,  and left lower extremity  Inspection, ROM, stability and muscle strength normal RIght LE pain has improved, patient tolerating ROM and strength has improved back to normal with relief of pain  SKIN:  Inspection of skin and subcutaneous tissue baseline  NEURO:   Cranial nerves 2-12 are normal tested and grossly at patient's baseline, speech WNL  PSYCH:  affect and mood normal    Recent Labs:   CBC RESULTS:   Recent Labs   Lab Test 07/05/18 06/28/18   0740  06/27/18   0639   WBC   --   7.2  7.4   RBC   --   3.64*  4.09   HGB  11.1*  11.4*  12.7   HCT   --   33.0*  37.1   MCV   --   91  91   MCH   --   31.3  31.1   MCHC   --   34.5  34.2   RDW   --   14.3  14.2   PLT   --   139*  176       Last Basic Metabolic Panel:  Recent Labs   Lab Test 07/05/18 06/29/18   0820  06/28/18   0740   NA  136   --   137   POTASSIUM  4.3  4.1  4.0   CHLORIDE  105   --   110*   CHRIS  8.7   --   7.4*   CO2  24   --   19*   BUN  23   --   13   CR  0.80   --   0.74   GLC  102*   --   96       Liver Function Studies -   Recent Labs   Lab Test  06/28/18   0740  11/30/16   0415   PROTTOTAL  5.5*  5.7*   ALBUMIN  2.9*  3.1*   BILITOTAL  0.6  0.6   ALKPHOS  55  53   AST  18  12   ALT  9  7       TSH   Date Value Ref Range Status   06/27/2018 1.14 0.40 - 4.00 mU/L Final   08/08/2017 0.95 0.40 - 4.00 mU/L Final          Assessment/Plan:  Acute right hip pain: pain improved, continue current POC.      Closed fracture of right iliac wing with routine healing, subsequent encounter  Chronic pain syndrome  Parkinson's disease (H)  Physical deconditioning  Acute/ongoing: PT and OT for strengthening, WBAT, change tylenol to 1000mg TID scheduled, continue oxycodone  To 5-10mg q 4 hours prn,   Sinemet 25/100mg 1 PO QID  F/u with Dr. Matta in 3 weeks     Benign essential hypertension  CKD (chronic kidney disease) stage 3, GFR 30-59 ml/min  Chronic/ongoing: vitals daily and prn, BMP follow, continue norvasc 5mg QD, coreg 6.25mg BID, losartan 50mg QD          Orders:  No new orders today on exam       Electronically signed by  Tonya Lynn Haase, APRN CNP

## 2018-07-09 NOTE — LETTER
7/9/2018        RE: Opal Sin  [address]  [address]  [facility] [address]        [place] [facility]    Chief Complaint   Patient presents with     RECHECK       [place] Medical Record Number:  [id number]    HPI:    Opal Sin is a 83 year old  (1935), who is being seen today for an episodic care visit at [facility].  HPI information obtained from: facility chart records, facility staff, patient report and [facility] chart review.Today's concern is:  Right hip pain:patient states right hip pain has improved slowly, has some pain radiating from posterior right hip down leg but pain is getting better, Kenn Helms PA-C did evaluate and found patient to have muscle strain, will continue therapy.    HTN/CKD: Last 3 BPs: 146/80, 122/72, 118/67  mmHg  HR Ranges: 56-65 bpm  Admission Weight: 133.3 lbs  Current Weight: 134.6 lbs    ALLERGIES: Bee pollen; Cephalexin monohydrate; Ciprofloxacin; Clindamycin; Multi vitamin-minerals [hair-vites]; Penicillin [penicillins]; Thiazide-type diuretics; Tobramycin; Vancomycin; Atorvastatin; Cephalexin; Ciprofloxacin; Ibuprofen; Versed [midazolam]; Zoloft [sertraline]; Ace inhibitors; Advil [ibuprofen micronized]; and Metoprolol  Past Medical, Surgical, Family and Social History reviewed and updated in [facility].    Current Outpatient Prescriptions   Medication Sig Dispense Refill     acetaminophen (TYLENOL) 500 MG tablet Take 1,000 mg by mouth 3 times daily        albuterol (PROAIR HFA/PROVENTIL HFA/VENTOLIN HFA) 108 (90 Base) MCG/ACT Inhaler Inhale 1-2 puffs into the lungs every 6 hours as needed for shortness of breath / dyspnea or wheezing 18 g 1     alendronate (FOSAMAX) 35 MG tablet TAKE 1 TABLET BY MOUTH EVERY 7 DAYS 12 tablet 1     amLODIPine (NORVASC) 5 MG tablet TAKE 1 TABLET(5 MG) BY MOUTH DAILY 90 tablet 2     aspirin EC 81 MG EC tablet Take 1 tablet (81 mg) by mouth daily       atorvastatin (LIPITOR) 20 MG tablet Take 1  tablet (20 mg) by mouth daily 90 tablet 3     Calcium-Magnesium 300-300 MG TABS Take 1 tablet by mouth 3 times daily        carbidopa-levodopa (SINEMET)  MG per tablet Take 1 tablet by mouth 4 times daily Takes at 6am, 11am, 4pm, and 9 pm       carvedilol (COREG) 6.25 MG tablet Take 1 tablet (6.25 mg) by mouth 2 times daily (with meals) 180 tablet 3     Cholecalciferol (VITAMIN D3 PO) Take 400 Units by mouth daily        Cyanocobalamin (VITAMIN B 12 PO) Take 100 mcg by mouth daily        Iron-Vitamins (GERITOL COMPLETE) TABS Take 1 tablet by mouth daily       loperamide (IMODIUM) 2 MG capsule Take 1 capsule (2 mg) by mouth 4 times daily as needed for diarrhea 20 capsule 0     losartan (COZAAR) 50 MG tablet Take 1 tablet (50 mg) by mouth daily 90 tablet 3     OXYCODONE HCL PO Take 5-10 mg by mouth every 4 hours as needed       Psyllium (METAMUCIL FIBER PO) Take 1 capsule by mouth daily as needed        raloxifene (EVISTA) 60 MG tablet TAKE 1 TABLET(60 MG) BY MOUTH DAILY 90 tablet 0     RANITIDINE HCL PO Take 75 mg by mouth 2 times daily       senna-docusate (SENOKOT-S;PERICOLACE) 8.6-50 MG per tablet Take 1 tablet by mouth 2 times daily as needed for constipation 30 tablet 0     Medications reviewed:  Medications reconciled to facility chart and changes were made to reflect current medications as identified as above med list. Below are the changes that were made:   Medications stopped since last EPIC medication reconciliation:   Medications Discontinued During This Encounter   Medication Reason     oxyCODONE IR (ROXICODONE) 10 MG tablet Therapy completed       Medications started since last [facility] medication reconciliation:  Orders Placed This Encounter   Medications     OXYCODONE HCL PO     Sig: Take 5-10 mg by mouth every 4 hours as needed         REVIEW OF SYSTEMS:  10 point ROS of systems including Constitutional, Eyes, Respiratory, Cardiovascular, Gastroenterology, Genitourinary, Integumentary,  Muscularskeletal, Psychiatric were all negative except for pertinent positives noted in my HPI.    Physical Exam:  /80  Pulse 65  Temp 97.3  F (36.3  C)  Resp 20  Wt 134 lb 9.6 oz (61.1 kg)  SpO2 97%  BMI 23.84 kg/m2  GENERAL APPEARANCE:  Alert, in no distress  ENT:  Mouth and posterior oropharynx normal, moist mucous membranes  EYES:  EOM, conjunctivae, lids, pupils and irises normal  RESP:  respiratory effort and palpation of chest normal, lungs clear to auscultation , no respiratory distress  CV:  Palpation and auscultation of heart done , regular rate and rhythm, no murmur, rub, or gallop  ABDOMEN:  normal bowel sounds, soft, nontender, no hepatosplenomegaly or other masses  M/S:   Examination of:   right upper extremity, left upper extremity,  and left lower extremity  Inspection, ROM, stability and muscle strength normal RIght LE pain has improved, patient tolerating ROM and strength has improved back to normal with relief of pain  SKIN:  Inspection of skin and subcutaneous tissue baseline  NEURO:   Cranial nerves 2-12 are normal tested and grossly at patient's baseline, speech WNL  PSYCH:  affect and mood normal    Recent Labs:   CBC RESULTS:   Recent Labs   Lab Test 07/05/18 06/28/18   0740  06/27/18   0639   WBC   --   7.2  7.4   RBC   --   3.64*  4.09   HGB  11.1*  11.4*  12.7   HCT   --   33.0*  37.1   MCV   --   91  91   MCH   --   31.3  31.1   MCHC   --   34.5  34.2   RDW   --   14.3  14.2   PLT   --   139*  176       Last Basic Metabolic Panel:  Recent Labs   Lab Test 07/05/18 06/29/18   0820  06/28/18   0740   NA  136   --   137   POTASSIUM  4.3  4.1  4.0   CHLORIDE  105   --   110*   CHRIS  8.7   --   7.4*   CO2  24   --   19*   BUN  23   --   13   CR  0.80   --   0.74   GLC  102*   --   96       Liver Function Studies -   Recent Labs   Lab Test  06/28/18   0740  11/30/16   0415   PROTTOTAL  5.5*  5.7*   ALBUMIN  2.9*  3.1*   BILITOTAL  0.6  0.6   ALKPHOS  55  53   AST  18  12   ALT  9  7        TSH   Date Value Ref Range Status   06/27/2018 1.14 0.40 - 4.00 mU/L Final   08/08/2017 0.95 0.40 - 4.00 mU/L Final         Assessment/Plan:  Acute right hip pain: pain improved, continue current POC.      Closed fracture of right iliac wing with routine healing, subsequent encounter  Chronic pain syndrome  Parkinson's disease (H)  Physical deconditioning  Acute/ongoing: PT and OT for strengthening, WBAT, change tylenol to 1000mg TID scheduled, continue oxycodone  To 5-10mg q 4 hours prn,   Sinemet 25/100mg 1 PO QID  F/u with Dr. Matta in 3 weeks     Benign essential hypertension  CKD (chronic kidney disease) stage 3, GFR 30-59 ml/min  Chronic/ongoing: vitals daily and prn, BMP follow, continue norvasc 5mg QD, coreg 6.25mg BID, losartan 50mg QD          Orders:  No new orders today on exam       Electronically signed by  Tonya Lynn Haase, APRN CNP                          Sincerely,        Tonya Lynn Haase, APRN CNP

## 2018-07-09 NOTE — PROGRESS NOTES
[facility]  PRIMARY CARE PROVIDER AND CLINIC:  PetronaDamian Jairo [address] / [place] [address]    Pt was seen by Dr Ferreira on 7/7/18 for an initial TCU visit        HPI:    Opal Sin is a 83 year old  (1935), PMH of  Parkinson's disease with symptomatic orthostatic hypotension, who was hospitalized at [facility] from 6/27/18-6/29/18 for the management of a R iliac wing fracture suffered in a fall    Hospital course reviewed by me, is as per the hospital dc summary and NP note.  Pt suffered the injury when she fell when she slipped on her bathroom rug.  She had been ill with diarrhea for the previous days. (neg stool studies in ER).  She has chronic symptomatic orthostatic hypotension.      Pt notes gradually improving R hip pain,  She had experienced acute worsening of hip pain 7/3/18, was felt to have suffered a hip strain.  Xray neg for new acute fracture  She denies abd pain, diarrhea, nausea, cough, dysuria. She is having regular stools    .    CODE STATUS/ADVANCE DIRECTIVES DISCUSSION:   DNR / DNI  Patient's living condition: lives alone    ALLERGIES:Bee pollen; Cephalexin monohydrate; Ciprofloxacin; Clindamycin; Multi vitamin-minerals [hair-vites]; Penicillin [penicillins]; Thiazide-type diuretics; Tobramycin; Vancomycin; Atorvastatin; Cephalexin; Ciprofloxacin; Ibuprofen; Versed [midazolam]; Zoloft [sertraline]; Ace inhibitors; Advil [ibuprofen micronized]; and Metoprolol  PAST MEDICAL HISTORY:  has a past medical history of Asthma (5 yrs); Breast CA (H) (1987); Degeneration of intervertebral disc, site unspecified; Essential hypertension, benign; Gastro-oesophageal reflux disease; antibiotic allergy; Hyperlipidaemia; Osteomyelitis (H); Osteoporosis, unspecified; Parkinson's disease (H) (2015); PONV (postoperative nausea and vomiting); Spinal stenosis, unspecified region other than cervical; and Unspecified cerebral artery occlusion with cerebral infarction.  PAST  SURGICAL HISTORY:  has a past surgical history that includes NONSPECIFIC PROCEDURE; NONSPECIFIC PROCEDURE (1987); NONSPECIFIC PROCEDURE; Bunionectomy; replacement socket, shoulder disarticulation/interscapular thoracic, molded to; TKR; Breast surgery; Mastectomy (Left); Insert stimulator dorsal column (1968); Thoracoscopic wedge resection lung (Right, 10/28/2014); biopsy; TOTAL KNEE ARTHROPLASTY (2008); and Recession resection (repair strabismus) (Left, 6/12/2015).  FAMILY HISTORY: family history includes Alzheimer Disease (age of onset: 74) in her brother; Cancer (age of onset: 47) in her mother; Cancer (age of onset: 6) in her brother; Cardiovascular in her brother; Cerebrovascular Disease in her sister; HEART DISEASE in her brother and brother; HEART DISEASE (age of onset: 60) in her brother; Respiratory in her sister. There is no history of Breast Cancer.  SOCIAL HISTORY:  reports that she has never smoked. She has never used smokeless tobacco. She reports that she drinks alcohol. She reports that she does not use illicit drugs.        Post Discharge Medication Reconciliation Status:   .  Current Outpatient Prescriptions   Medication Sig Dispense Refill     acetaminophen (TYLENOL) 500 MG tablet Take 1,000 mg by mouth 3 times daily        albuterol (PROAIR HFA/PROVENTIL HFA/VENTOLIN HFA) 108 (90 Base) MCG/ACT Inhaler Inhale 1-2 puffs into the lungs every 6 hours as needed for shortness of breath / dyspnea or wheezing 18 g 1     alendronate (FOSAMAX) 35 MG tablet TAKE 1 TABLET BY MOUTH EVERY 7 DAYS 12 tablet 1     amLODIPine (NORVASC) 5 MG tablet TAKE 1 TABLET(5 MG) BY MOUTH DAILY 90 tablet 2     aspirin EC 81 MG EC tablet Take 1 tablet (81 mg) by mouth daily       atorvastatin (LIPITOR) 20 MG tablet Take 1 tablet (20 mg) by mouth daily 90 tablet 3     Calcium-Magnesium 300-300 MG TABS Take 1 tablet by mouth 3 times daily        carbidopa-levodopa (SINEMET)  MG per tablet Take 1 tablet by mouth 4 times daily  Takes at 6am, 11am, 4pm, and 9 pm       carvedilol (COREG) 6.25 MG tablet Take 1 tablet (6.25 mg) by mouth 2 times daily (with meals) 180 tablet 3     Cholecalciferol (VITAMIN D3 PO) Take 400 Units by mouth daily        Cyanocobalamin (VITAMIN B 12 PO) Take 100 mcg by mouth daily        Iron-Vitamins (GERITOL COMPLETE) TABS Take 1 tablet by mouth daily       loperamide (IMODIUM) 2 MG capsule Take 1 capsule (2 mg) by mouth 4 times daily as needed for diarrhea 20 capsule 0     losartan (COZAAR) 50 MG tablet Take 1 tablet (50 mg) by mouth daily 90 tablet 3     OXYCODONE HCL PO Take 5-10 mg by mouth every 4 hours as needed       Psyllium (METAMUCIL FIBER PO) Take 1 capsule by mouth daily as needed        raloxifene (EVISTA) 60 MG tablet TAKE 1 TABLET(60 MG) BY MOUTH DAILY 90 tablet 0     RANITIDINE HCL PO Take 75 mg by mouth 2 times daily       senna-docusate (SENOKOT-S;PERICOLACE) 8.6-50 MG per tablet Take 1 tablet by mouth 2 times daily as needed for constipation 30 tablet 0       ROS:  10 point ROS neg except as noted above      Exam:    GENERAL APPEARANCE:  Sleepy, lying in bed, appears comfortable  ENT:  Mouth and posterior oropharynx normal, moist mucous membranes  EYES:  EOM, conjunctivae, lids, pupils and irises normal  RESP:  Lungs clear  CV:  RRR no M  ABDOMEN:  Soft, non-tender  M/S:   No LE edema  + mod pain with ROM R hip  Gait was not assessed  SKIN:  No rash  NEURO:   Pt is sleepy, easily alerts, is fully oriented. No facial asymmetry  No tremors noted  PSYCH:  affect and mood normal    Lab/Diagnostic data:  CBC RESULTS:   Recent Labs   Lab Test  06/28/18   0740  06/27/18   0639   WBC  7.2  7.4   RBC  3.64*  4.09   HGB  11.4*  12.7   HCT  33.0*  37.1   MCV  91  91   MCH  31.3  31.1   MCHC  34.5  34.2   RDW  14.3  14.2   PLT  139*  176       Last Basic Metabolic Panel:  Recent Labs   Lab Test  06/29/18   0820  06/28/18   0740   06/27/18   0639   NA   --   137   --   140   POTASSIUM  4.1  4.0   < >  3.0*    CHLORIDE   --   110*   --   110*   CHRIS   --   7.4*   --   8.3*   CO2   --   19*   --   21   BUN   --   13   --   15   CR   --   0.74   --   0.82   GLC   --   96   --   91    < > = values in this interval not displayed.       Liver Function Studies -   Recent Labs   Lab Test  06/28/18   0740  11/30/16   0415   PROTTOTAL  5.5*  5.7*   ALBUMIN  2.9*  3.1*   BILITOTAL  0.6  0.6   ALKPHOS  55  53   AST  18  12   ALT  9  7       TSH   Date Value Ref Range Status   06/27/2018 1.14 0.40 - 4.00 mU/L Final   08/08/2017 0.95 0.40 - 4.00 mU/L Final       Lab Results   Component Value Date    A1C 5.6 08/08/2017    A1C 5.8 11/29/2016       ASSESSMENT/PLAN:    Closed fracture of right iliac wing with routine healing, subsequent encounter  Acute R hip pain, likely secondary to muscle strain  Plan PT/OT, WBAT with walker. Continue oxycodone. Ortho f/u    Acute diarrhea  Likely representing a viral gastroenteritis  Resolved  Plan monitor GI status      Parkinson's disease (H)  Hs TIA, s/p coiling of a non-ruptured cerebral aneurysm  Stable  Plan continue therapies, same medication regimen.     Benign essential hypertension  CKD (chronic kidney disease) stage 3, GFR 30-59 ml/min  Chronic  Plan monitor supine and orthostatic BP. Avoid symptomatic hypotension, may need to accept some degree of supine HTN       Gaurav Ferreira MD

## 2018-07-16 VITALS
RESPIRATION RATE: 16 BRPM | OXYGEN SATURATION: 98 % | HEART RATE: 98 BPM | TEMPERATURE: 98.7 F | WEIGHT: 130 LBS | SYSTOLIC BLOOD PRESSURE: 140 MMHG | BODY MASS INDEX: 23.03 KG/M2 | DIASTOLIC BLOOD PRESSURE: 70 MMHG

## 2018-07-17 ENCOUNTER — DISCHARGE SUMMARY NURSING HOME (OUTPATIENT)
Dept: GERIATRICS | Facility: CLINIC | Age: 83
End: 2018-07-17
Payer: COMMERCIAL

## 2018-07-17 DIAGNOSIS — I10 BENIGN ESSENTIAL HYPERTENSION: ICD-10-CM

## 2018-07-17 DIAGNOSIS — G20.A1 PARKINSON'S DISEASE (H): ICD-10-CM

## 2018-07-17 DIAGNOSIS — R53.81 PHYSICAL DECONDITIONING: ICD-10-CM

## 2018-07-17 DIAGNOSIS — S32.301D CLOSED FRACTURE OF RIGHT ILIAC WING WITH ROUTINE HEALING, SUBSEQUENT ENCOUNTER: Primary | ICD-10-CM

## 2018-07-17 DIAGNOSIS — M25.551 HIP PAIN, RIGHT: ICD-10-CM

## 2018-07-17 DIAGNOSIS — N18.30 CKD (CHRONIC KIDNEY DISEASE) STAGE 3, GFR 30-59 ML/MIN (H): ICD-10-CM

## 2018-07-17 DIAGNOSIS — G89.4 CHRONIC PAIN SYNDROME: ICD-10-CM

## 2018-07-17 PROCEDURE — 99316 NF DSCHRG MGMT 30 MIN+: CPT | Performed by: NURSE PRACTITIONER

## 2018-07-17 NOTE — PROGRESS NOTES
[facility] DISCHARGE SUMMARY    PATIENT'S NAME: Opal Sin  YOB: 1935  MEDICAL RECORD NUMBER:  [id number]    PRIMARY CARE PROVIDER AND CLINIC RESPONSIBLE AFTER TRANSFER: Damian Russ [address] / [place] [id number]     CODE STATUS/ADVANCE DIRECTIVES DISCUSSION:   DNR / DNI       Allergies   Allergen Reactions     Bee Pollen Hives     If MVI contains this - she will break out in a rash.      Cephalexin Monohydrate Hives     Ciprofloxacin Hives     Clindamycin Hives     Multi Vitamin-Minerals [Hair-Vites] Other (See Comments)     (If MV contains bee pollen she will break out in hives)      Penicillin [Penicillins] Hives     All antibiotics except zpak??????     Thiazide-Type Diuretics Other (See Comments)     Very low sodium levels     Tobramycin Hives     Vancomycin Hives     Atorvastatin      Leg cramps     Cephalexin Hives     Ciprofloxacin Hives     Ibuprofen Nausea and Vomiting and Nausea     stomach pain     Versed [Midazolam] Other (See Comments)     Confused, agiitated, for 6-7 hrs after anngiogram sedation     Zoloft [Sertraline] Other (See Comments)     Headache, elevated BP     Ace Inhibitors Cough     Advil [Ibuprofen Micronized] Nausea     Metoprolol Diarrhea      tolerates Inderal       TRANSFERRING PROVIDERS: MIO Coburn CNP, Dr. Hanna MD  DATE OF SNF ADMISSION:  June / 29 / 2018  DATE OF SNF (anticipated) DISCHARGE: to be determined - 7/20 or 7/22  DISCHARGE DISPOSITION: [facility] Provider   Nursing Facility: [facility] stay 6/27/18 to 6/29/18.     Condition on Discharge:  Improving.  Function:  Ambulated with therapies  Cognitive Scores: not available    Equipment: walker    DISCHARGE DIAGNOSIS:   1. Closed fracture of right iliac wing with routine healing, subsequent encounter    2. Chronic pain syndrome    3. Parkinson's disease (H)    4. Physical deconditioning    5. Benign  essential hypertension    6. CKD (chronic kidney disease) stage 3, GFR 30-59 ml/min    7. Hip pain, right        HPI Nursing Facility Course:  HPI information obtained from: facility chart records, facility staff, patient report and [facility] chart review.    Acute right hip pain  Closed fracture of right iliac wing with routine healing, subsequent encounter  Chronic pain syndrome  Parkinson's disease (H)  Physical deconditioning  83 year old female with PMH of  asthma, gastroesophageal reflux disease, hyperlipidemia, osteoporosis, Parkinson's disease with ongoing dizziness/orthostatic hypotension, breast cancer status post mastectomy presented to ED after a fall and had a hospital stay 6/27/18 through 6/29/18 with right iliac wing Fx (nonsurgical, pain management, WBAT). She then had increased right hip pain - xray negative, felt to be due to muscle strain. Now ready to dc home this week. Will need home HHA and PT for strengthening, WBAT, changed tylenol to 1000mg TID scheduled, continue oxycodone 5-10mg q 4 hours prn, Sinemet 25/100mg 1 PO QID. Needs to f/u with Dr. Matta in 3 weeks     Benign essential hypertension  CKD (chronic kidney disease) stage 3, GFR 30-59 ml/min  Continue norvasc 5mg QD, coreg 6.25mg BID, losartan 50mg QD   Admission Weight: 133.3 lbs and Current Weight: 130 lbs with BP: 115-153/65-82 mmHg  Lab Results   Component Value Date    CR 0.80 07/05/2018    CR 0.74 06/28/2018       PAST MEDICAL HISTORY:  has a past medical history of Asthma (5 yrs); Breast CA (H) (1987); Degeneration of intervertebral disc, site unspecified; Essential hypertension, benign; Gastro-oesophageal reflux disease; antibiotic allergy; Hyperlipidaemia; Osteomyelitis (H); Osteoporosis, unspecified; Parkinson's disease (H) (2015); PONV (postoperative nausea and vomiting); Spinal stenosis, unspecified region other than cervical; and Unspecified cerebral artery occlusion with cerebral infarction.    DISCHARGE  MEDICATIONS:  Current Outpatient Prescriptions   Medication Sig Dispense Refill     acetaminophen (TYLENOL) 500 MG tablet Take 1,000 mg by mouth 3 times daily        albuterol (PROAIR HFA/PROVENTIL HFA/VENTOLIN HFA) 108 (90 Base) MCG/ACT Inhaler Inhale 1-2 puffs into the lungs every 6 hours as needed for shortness of breath / dyspnea or wheezing 18 g 1     alendronate (FOSAMAX) 35 MG tablet TAKE 1 TABLET BY MOUTH EVERY 7 DAYS 12 tablet 1     amLODIPine (NORVASC) 5 MG tablet TAKE 1 TABLET(5 MG) BY MOUTH DAILY 90 tablet 2     aspirin EC 81 MG EC tablet Take 1 tablet (81 mg) by mouth daily       atorvastatin (LIPITOR) 20 MG tablet Take 1 tablet (20 mg) by mouth daily 90 tablet 3     Calcium-Magnesium 300-300 MG TABS Take 1 tablet by mouth 3 times daily        carbidopa-levodopa (SINEMET)  MG per tablet Take 1 tablet by mouth 4 times daily Takes at 6am, 11am, 4pm, and 9 pm       carvedilol (COREG) 6.25 MG tablet Take 1 tablet (6.25 mg) by mouth 2 times daily (with meals) 180 tablet 3     Cholecalciferol (VITAMIN D3 PO) Take 400 Units by mouth daily        Cyanocobalamin (VITAMIN B 12 PO) Take 100 mcg by mouth daily        Iron-Vitamins (GERITOL COMPLETE) TABS Take 1 tablet by mouth daily       loperamide (IMODIUM) 2 MG capsule Take 1 capsule (2 mg) by mouth 4 times daily as needed for diarrhea 20 capsule 0     losartan (COZAAR) 50 MG tablet Take 1 tablet (50 mg) by mouth daily 90 tablet 3     OXYCODONE HCL PO Take 5-10 mg by mouth every 4 hours as needed       Psyllium (METAMUCIL FIBER PO) Take 1 capsule by mouth daily as needed        raloxifene (EVISTA) 60 MG tablet TAKE 1 TABLET(60 MG) BY MOUTH DAILY 90 tablet 0     RANITIDINE HCL PO Take 75 mg by mouth 2 times daily       senna-docusate (SENOKOT-S;PERICOLACE) 8.6-50 MG per tablet Take 1 tablet by mouth 2 times daily as needed for constipation 30 tablet 0       MEDICATION CHANGES/RATIONALE:   1. Changed Tylenol to 1000 mg PO TID for pain  2. Changed Oxycodone  to 5-10 mg PO every 4 hrs PRN pain.     Controlled medications sent with patient:   Script for oxycodone 5 mg tabs medication for 20 tabs and 0 refills given to patient at dischage to have them fill at their out patient pharmacy     ROS:    10 point ROS of systems including Constitutional, Eyes, Respiratory, Cardiovascular, Gastroenterology, Genitourinary, Integumentary, Musculoskeletal, Psychiatric were all negative except for pertinent positives noted in my HPI.    Physical Exam:   Vitals: /70  Pulse 98  Temp 98.7  F (37.1  C)  Resp 16  Wt 130 lb (59 kg)  SpO2 98%  BMI 23.03 kg/m2  BMI= Body mass index is 23.03 kg/(m^2).  GENERAL APPEARANCE:  Alert, in no distress, pleasant, cooperative, oriented x 4  EYES:  EOM, lids, pupils and irises normal, sclera clear and conjunctiva normal, no discharge or mattering on lids or lashes noted  ENT:  Mouth normal, moist mucous membranes, nose normal without drainage or crusting, external ears without lesions, hearing acuity intact  NECK:  Nontender, supple, symmetrical; no adenopathy, masses or thyromegaly, trachea midline  RESP:  respiratory effort and palpation of chest normal, no chest wall tenderness, no respiratory distress, Lung sounds clear, patient is on room air  CV:  Palpation and auscultation of heart done, rate and rhythm controlled and regular, no murmur, no rub or gallop. Edema none bilateral lower extremities.   ABDOMEN:  normal bowel sounds, soft, nontender, no grimacing or guarding with palpation, no hepatosplenomegaly or other masses  M/S:   Gait and station walks with assist , Digits and nails normal, no tenderness or swelling of the joints; able to move all extremities   NEURO: cranial nerves 2-12 grossly intact, no facial asymmetry, no speech deficits and able to follow directions, moves all extremities symmetrically  PSYCH:  insight and judgement intact, memory intact, affect and mood normal     DISCHARGE PLAN:  Physical Therapy, Home Health  Aide and From:  [facility]  Patient instructed to follow-up with:  PCP in 7 days      Current [facility] scheduled appointments:  Future Appointments  No future appointments.    MTM referral needed and placed by this provider: No    Pending labs: none    SNF labs   CBC RESULTS:   Recent Labs   Lab Test 07/05/18 06/28/18   0740  06/27/18   0639   WBC   --   7.2  7.4   RBC   --   3.64*  4.09   HGB  11.1*  11.4*  12.7   HCT   --   33.0*  37.1   MCV   --   91  91   MCH   --   31.3  31.1   MCHC   --   34.5  34.2   RDW   --   14.3  14.2   PLT   --   139*  176       Last Basic Metabolic Panel:  Recent Labs   Lab Test 07/05/18 06/29/18   0820  06/28/18   0740   NA  136   --   137   POTASSIUM  4.3  4.1  4.0   CHLORIDE  105   --   110*   CHRIS  8.7   --   7.4*   CO2  24   --   19*   BUN  23   --   13   CR  0.80   --   0.74   GLC  102*   --   96       Liver Function Studies -   Recent Labs   Lab Test  06/28/18   0740  11/30/16   0415   PROTTOTAL  5.5*  5.7*   ALBUMIN  2.9*  3.1*   BILITOTAL  0.6  0.6   ALKPHOS  55  53   AST  18  12   ALT  9  7       TSH   Date Value Ref Range Status   06/27/2018 1.14 0.40 - 4.00 mU/L Final   08/08/2017 0.95 0.40 - 4.00 mU/L Final       Discharge Treatments: none    TOTAL DISCHARGE TIME:   Greater than 30 minutes  Electronically signed by:  MIO Coburn CNP

## 2018-07-17 NOTE — LETTER
7/17/2018        RE: Opal Sin  [address]  [address]  [facility] [address]          [facility] DISCHARGE SUMMARY    PATIENT'S NAME: Opal Sin  YOB: 1935  MEDICAL RECORD NUMBER:  [id number]    PRIMARY CARE PROVIDER AND CLINIC RESPONSIBLE AFTER TRANSFER: Damian Russ [address] / [place] [address]     CODE STATUS/ADVANCE DIRECTIVES DISCUSSION:   DNR / DNI       Allergies   Allergen Reactions     Bee Pollen Hives     If MVI contains this - she will break out in a rash.      Cephalexin Monohydrate Hives     Ciprofloxacin Hives     Clindamycin Hives     Multi Vitamin-Minerals [Hair-Vites] Other (See Comments)     (If MV contains bee pollen she will break out in hives)      Penicillin [Penicillins] Hives     All antibiotics except zpak??????     Thiazide-Type Diuretics Other (See Comments)     Very low sodium levels     Tobramycin Hives     Vancomycin Hives     Atorvastatin      Leg cramps     Cephalexin Hives     Ciprofloxacin Hives     Ibuprofen Nausea and Vomiting and Nausea     stomach pain     Versed [Midazolam] Other (See Comments)     Confused, agiitated, for 6-7 hrs after anngiogram sedation     Zoloft [Sertraline] Other (See Comments)     Headache, elevated BP     Ace Inhibitors Cough     Advil [Ibuprofen Micronized] Nausea     Metoprolol Diarrhea      tolerates Inderal       TRANSFERRING PROVIDERS: MIO Coburn CNP, Dr. Hanna MD  DATE OF SNF ADMISSION:  June / 29 / 2018  DATE OF SNF (anticipated) DISCHARGE: to be determined - 7/20 or 7/22  DISCHARGE DISPOSITION: [facility] Provider   Nursing Facility: [facility] stay 6/27/18 to 6/29/18.     Condition on Discharge:  Improving.  Function:  Ambulated with therapies  Cognitive Scores: not available    Equipment: walker    DISCHARGE DIAGNOSIS:   1. Closed fracture of right iliac wing with routine healing, subsequent  encounter    2. Chronic pain syndrome    3. Parkinson's disease (H)    4. Physical deconditioning    5. Benign essential hypertension    6. CKD (chronic kidney disease) stage 3, GFR 30-59 ml/min    7. Hip pain, right        HPI Nursing Facility Course:  HPI information obtained from: facility chart records, facility staff, patient report and [facility] chart review.    Acute right hip pain  Closed fracture of right iliac wing with routine healing, subsequent encounter  Chronic pain syndrome  Parkinson's disease (H)  Physical deconditioning  83 year old female with PMH of  asthma, gastroesophageal reflux disease, hyperlipidemia, osteoporosis, Parkinson's disease with ongoing dizziness/orthostatic hypotension, breast cancer status post mastectomy presented to ED after a fall and had a hospital stay 6/27/18 through 6/29/18 with right iliac wing Fx (nonsurgical, pain management, WBAT). She then had increased right hip pain - xray negative, felt to be due to muscle strain. Now ready to dc home this week. Will need home HHA and PT for strengthening, WBAT, changed tylenol to 1000mg TID scheduled, continue oxycodone 5-10mg q 4 hours prn, Sinemet 25/100mg 1 PO QID. Needs to f/u with Dr. Matta in 3 weeks     Benign essential hypertension  CKD (chronic kidney disease) stage 3, GFR 30-59 ml/min  Continue norvasc 5mg QD, coreg 6.25mg BID, losartan 50mg QD   Admission Weight: 133.3 lbs and Current Weight: 130 lbs with BP: 115-153/65-82 mmHg  Lab Results   Component Value Date    CR 0.80 07/05/2018    CR 0.74 06/28/2018       PAST MEDICAL HISTORY:  has a past medical history of Asthma (5 yrs); Breast CA (H) (1987); Degeneration of intervertebral disc, site unspecified; Essential hypertension, benign; Gastro-oesophageal reflux disease; antibiotic allergy; Hyperlipidaemia; Osteomyelitis (H); Osteoporosis, unspecified; Parkinson's disease (H) (2015); PONV (postoperative nausea and vomiting); Spinal stenosis, unspecified region  other than cervical; and Unspecified cerebral artery occlusion with cerebral infarction.    DISCHARGE MEDICATIONS:  Current Outpatient Prescriptions   Medication Sig Dispense Refill     acetaminophen (TYLENOL) 500 MG tablet Take 1,000 mg by mouth 3 times daily        albuterol (PROAIR HFA/PROVENTIL HFA/VENTOLIN HFA) 108 (90 Base) MCG/ACT Inhaler Inhale 1-2 puffs into the lungs every 6 hours as needed for shortness of breath / dyspnea or wheezing 18 g 1     alendronate (FOSAMAX) 35 MG tablet TAKE 1 TABLET BY MOUTH EVERY 7 DAYS 12 tablet 1     amLODIPine (NORVASC) 5 MG tablet TAKE 1 TABLET(5 MG) BY MOUTH DAILY 90 tablet 2     aspirin EC 81 MG EC tablet Take 1 tablet (81 mg) by mouth daily       atorvastatin (LIPITOR) 20 MG tablet Take 1 tablet (20 mg) by mouth daily 90 tablet 3     Calcium-Magnesium 300-300 MG TABS Take 1 tablet by mouth 3 times daily        carbidopa-levodopa (SINEMET)  MG per tablet Take 1 tablet by mouth 4 times daily Takes at 6am, 11am, 4pm, and 9 pm       carvedilol (COREG) 6.25 MG tablet Take 1 tablet (6.25 mg) by mouth 2 times daily (with meals) 180 tablet 3     Cholecalciferol (VITAMIN D3 PO) Take 400 Units by mouth daily        Cyanocobalamin (VITAMIN B 12 PO) Take 100 mcg by mouth daily        Iron-Vitamins (GERITOL COMPLETE) TABS Take 1 tablet by mouth daily       loperamide (IMODIUM) 2 MG capsule Take 1 capsule (2 mg) by mouth 4 times daily as needed for diarrhea 20 capsule 0     losartan (COZAAR) 50 MG tablet Take 1 tablet (50 mg) by mouth daily 90 tablet 3     OXYCODONE HCL PO Take 5-10 mg by mouth every 4 hours as needed       Psyllium (METAMUCIL FIBER PO) Take 1 capsule by mouth daily as needed        raloxifene (EVISTA) 60 MG tablet TAKE 1 TABLET(60 MG) BY MOUTH DAILY 90 tablet 0     RANITIDINE HCL PO Take 75 mg by mouth 2 times daily       senna-docusate (SENOKOT-S;PERICOLACE) 8.6-50 MG per tablet Take 1 tablet by mouth 2 times daily as needed for constipation 30 tablet 0        MEDICATION CHANGES/RATIONALE:   1. Changed Tylenol to 1000 mg PO TID for pain  2. Changed Oxycodone to 5-10 mg PO every 4 hrs PRN pain.     Controlled medications sent with patient:   Script for oxycodone 5 mg tabs medication for 20 tabs and 0 refills given to patient at dischage to have them fill at their out patient pharmacy     ROS:    10 point ROS of systems including Constitutional, Eyes, Respiratory, Cardiovascular, Gastroenterology, Genitourinary, Integumentary, Musculoskeletal, Psychiatric were all negative except for pertinent positives noted in my HPI.    Physical Exam:   Vitals: /70  Pulse 98  Temp 98.7  F (37.1  C)  Resp 16  Wt 130 lb (59 kg)  SpO2 98%  BMI 23.03 kg/m2  BMI= Body mass index is 23.03 kg/(m^2).  GENERAL APPEARANCE:  Alert, in no distress, pleasant, cooperative, oriented x 4  EYES:  EOM, lids, pupils and irises normal, sclera clear and conjunctiva normal, no discharge or mattering on lids or lashes noted  ENT:  Mouth normal, moist mucous membranes, nose normal without drainage or crusting, external ears without lesions, hearing acuity intact  NECK:  Nontender, supple, symmetrical; no adenopathy, masses or thyromegaly, trachea midline  RESP:  respiratory effort and palpation of chest normal, no chest wall tenderness, no respiratory distress, Lung sounds clear, patient is on room air  CV:  Palpation and auscultation of heart done, rate and rhythm controlled and regular, no murmur, no rub or gallop. Edema none bilateral lower extremities.   ABDOMEN:  normal bowel sounds, soft, nontender, no grimacing or guarding with palpation, no hepatosplenomegaly or other masses  M/S:   Gait and station walks with assist , Digits and nails normal, no tenderness or swelling of the joints; able to move all extremities   NEURO: cranial nerves 2-12 grossly intact, no facial asymmetry, no speech deficits and able to follow directions, moves all extremities symmetrically  PSYCH:  insight and  judgement intact, memory intact, affect and mood normal     DISCHARGE PLAN:  Physical Therapy, Home Health Aide and From:  [facility]  Patient instructed to follow-up with:  PCP in 7 days      Current [place] scheduled appointments:  Future Appointments  No future appointments.    MTM referral needed and placed by this provider: No    Pending labs: none    SNF labs   CBC RESULTS:   Recent Labs   Lab Test 07/05/18 06/28/18   0740  06/27/18   0639   WBC   --   7.2  7.4   RBC   --   3.64*  4.09   HGB  11.1*  11.4*  12.7   HCT   --   33.0*  37.1   MCV   --   91  91   MCH   --   31.3  31.1   MCHC   --   34.5  34.2   RDW   --   14.3  14.2   PLT   --   139*  176       Last Basic Metabolic Panel:  Recent Labs   Lab Test 07/05/18 06/29/18   0820  06/28/18   0740   NA  136   --   137   POTASSIUM  4.3  4.1  4.0   CHLORIDE  105   --   110*   CHRIS  8.7   --   7.4*   CO2  24   --   19*   BUN  23   --   13   CR  0.80   --   0.74   GLC  102*   --   96       Liver Function Studies -   Recent Labs   Lab Test  06/28/18   0740  11/30/16   0415   PROTTOTAL  5.5*  5.7*   ALBUMIN  2.9*  3.1*   BILITOTAL  0.6  0.6   ALKPHOS  55  53   AST  18  12   ALT  9  7       TSH   Date Value Ref Range Status   06/27/2018 1.14 0.40 - 4.00 mU/L Final   08/08/2017 0.95 0.40 - 4.00 mU/L Final       Discharge Treatments: none    TOTAL DISCHARGE TIME:   Greater than 30 minutes  Electronically signed by:  MIO Coburn CNP      Sincerely,        MIO Coburn CNP

## 2018-07-19 ENCOUNTER — TRANSFERRED RECORDS (OUTPATIENT)
Dept: HEALTH INFORMATION MANAGEMENT | Facility: CLINIC | Age: 83
End: 2018-07-19

## 2018-07-22 DIAGNOSIS — M81.0 OSTEOPOROSIS: ICD-10-CM

## 2018-07-23 RX ORDER — ALENDRONATE SODIUM 35 MG/1
TABLET ORAL
Qty: 12 TABLET | Refills: 0 | Status: SHIPPED | OUTPATIENT
Start: 2018-07-23 | End: 2018-11-15

## 2018-07-23 NOTE — TELEPHONE ENCOUNTER
Medication is being filled for 1 time refill only due to:  Patient is being seen this week  Carina Ambriz RN- Triage [employer]

## 2018-07-23 NOTE — TELEPHONE ENCOUNTER
"Alendronate 35 mg    Last Written Prescription Date:  02/16/18  Last Fill Quantity: 12 tablets,  # refills: 1   Last office visit: 8/14/2017 with prescribing provider:  Petrona   Future Office Visit:   Next 5 appointments (look out 90 days)     Jul 27, 2018  2:00 PM CDT   [facility] Damian Russ [facility]   [facility]    [address] [facility] [id number] [phone]                 Requested Prescriptions   Pending Prescriptions Disp Refills     alendronate (FOSAMAX) 35 MG tablet [Pharmacy Med Name: ALENDRONATE 35MG TABLETS] 12 tablet 0     Sig: TAKE 1 TABLET BY MOUTH EVERY 7 DAYS    Bisphosphonates Passed    7/22/2018  3:43 AM       Passed - Recent (12 mo) or future (30 days) visit within the authorizing provider's specialty    Patient had office visit in the last 12 months or has a visit in the next 30 days with authorizing provider or within the authorizing provider's specialty.  See \"Patient Info\" tab in inbasket, or \"Choose Columns\" in Meds & Orders section of the refill encounter.           Passed - Dexa on file within past 2 years    Please review last Dexa result.          Passed - Patient is age 18 or older       Passed - Normal serum creatinine on file within past 12 months    Recent Labs   Lab Test 07/05/18   CR  0.80               "

## 2018-07-24 ENCOUNTER — PATIENT OUTREACH (OUTPATIENT)
Dept: CARE COORDINATION | Facility: CLINIC | Age: 83
End: 2018-07-24

## 2018-07-24 NOTE — PROGRESS NOTES
Clinic Care Coordination Contact  Care Coordination Communication         Clinical Data: Patient was hospitalized at [facility] from 6/27/2018 to 6/29/2018 with diagnosis of S/p mechanical fall. Right iliac fracture with adjacent hemorrhage.     Home Care Contact:              Home Care Agency: [facility]               Contact name () and phone number: Kimberly Carolina @ [phone]              Care Coordination contacted home care: Yes              Anticipated start of care date: 7/22/2018      Plan: RN/SW Care Coordinator will await notification from home care staff informing RN/SW Care Coordinator of patients discharge plans/needs. RN/SW Care Coordinator will review chart and outreach to home care every 4 weeks and as needed.      Kamilla Goodman RN  Clinic Care Coordinator  [phone]  [email or web address]

## 2018-07-25 ENCOUNTER — DOCUMENTATION ONLY (OUTPATIENT)
Dept: CARE COORDINATION | Facility: CLINIC | Age: 83
End: 2018-07-25

## 2018-07-25 NOTE — PROGRESS NOTES
[facility] now requests orders and shares plan of care/discharge summaries for some patients through "[employer].  Please REPLY TO THIS MESSAGE OR ROUTE BACK TO THE AUTHOR in order to give authorization for orders when needed.  This is considered a verbal order, you will still receive a faxed copy of orders for signature.  Thank you for your assistance in improving collaboration for our patients.    Completed OT eval, requesting 2 additional OT visits to address IADLS, safety  Thank you

## 2018-07-27 ENCOUNTER — OFFICE VISIT (OUTPATIENT)
Dept: FAMILY MEDICINE | Facility: CLINIC | Age: 83
End: 2018-07-27
Payer: COMMERCIAL

## 2018-07-27 VITALS
DIASTOLIC BLOOD PRESSURE: 66 MMHG | HEIGHT: 63 IN | WEIGHT: 131 LBS | OXYGEN SATURATION: 97 % | BODY MASS INDEX: 23.21 KG/M2 | TEMPERATURE: 96.9 F | SYSTOLIC BLOOD PRESSURE: 130 MMHG | HEART RATE: 64 BPM

## 2018-07-27 DIAGNOSIS — N18.30 CKD (CHRONIC KIDNEY DISEASE) STAGE 3, GFR 30-59 ML/MIN (H): ICD-10-CM

## 2018-07-27 DIAGNOSIS — S32.301D CLOSED FRACTURE OF RIGHT ILIAC WING WITH ROUTINE HEALING, SUBSEQUENT ENCOUNTER: Primary | ICD-10-CM

## 2018-07-27 DIAGNOSIS — E78.5 HYPERLIPIDEMIA LDL GOAL <100: ICD-10-CM

## 2018-07-27 DIAGNOSIS — G20.A1 PARKINSON'S DISEASE (H): ICD-10-CM

## 2018-07-27 DIAGNOSIS — I10 BENIGN ESSENTIAL HYPERTENSION: ICD-10-CM

## 2018-07-27 LAB
ERYTHROCYTE [DISTWIDTH] IN BLOOD BY AUTOMATED COUNT: 15 % (ref 10–15)
HCT VFR BLD AUTO: 38.2 % (ref 35–47)
HGB BLD-MCNC: 12.6 G/DL (ref 11.7–15.7)
MCH RBC QN AUTO: 31.7 PG (ref 26.5–33)
MCHC RBC AUTO-ENTMCNC: 33 G/DL (ref 31.5–36.5)
MCV RBC AUTO: 96 FL (ref 78–100)
PLATELET # BLD AUTO: 229 10E9/L (ref 150–450)
RBC # BLD AUTO: 3.98 10E12/L (ref 3.8–5.2)
WBC # BLD AUTO: 9 10E9/L (ref 4–11)

## 2018-07-27 PROCEDURE — 99214 OFFICE O/P EST MOD 30 MIN: CPT | Performed by: INTERNAL MEDICINE

## 2018-07-27 PROCEDURE — 82043 UR ALBUMIN QUANTITATIVE: CPT | Performed by: INTERNAL MEDICINE

## 2018-07-27 PROCEDURE — 80048 BASIC METABOLIC PNL TOTAL CA: CPT | Performed by: INTERNAL MEDICINE

## 2018-07-27 PROCEDURE — 36415 COLL VENOUS BLD VENIPUNCTURE: CPT | Performed by: INTERNAL MEDICINE

## 2018-07-27 PROCEDURE — 85027 COMPLETE CBC AUTOMATED: CPT | Performed by: INTERNAL MEDICINE

## 2018-07-27 PROCEDURE — 80061 LIPID PANEL: CPT | Performed by: INTERNAL MEDICINE

## 2018-07-27 RX ORDER — OXYCODONE HYDROCHLORIDE 5 MG/1
5-10 TABLET ORAL EVERY 4 HOURS PRN
Qty: 12 TABLET | Refills: 0 | Status: SHIPPED | OUTPATIENT
Start: 2018-07-27 | End: 2018-10-30

## 2018-07-27 ASSESSMENT — ANXIETY QUESTIONNAIRES
IF YOU CHECKED OFF ANY PROBLEMS ON THIS QUESTIONNAIRE, HOW DIFFICULT HAVE THESE PROBLEMS MADE IT FOR YOU TO DO YOUR WORK, TAKE CARE OF THINGS AT HOME, OR GET ALONG WITH OTHER PEOPLE: NOT DIFFICULT AT ALL
7. FEELING AFRAID AS IF SOMETHING AWFUL MIGHT HAPPEN: NOT AT ALL
6. BECOMING EASILY ANNOYED OR IRRITABLE: NOT AT ALL
1. FEELING NERVOUS, ANXIOUS, OR ON EDGE: NOT AT ALL
2. NOT BEING ABLE TO STOP OR CONTROL WORRYING: NOT AT ALL
GAD7 TOTAL SCORE: 1
5. BEING SO RESTLESS THAT IT IS HARD TO SIT STILL: NOT AT ALL
3. WORRYING TOO MUCH ABOUT DIFFERENT THINGS: NOT AT ALL

## 2018-07-27 ASSESSMENT — PATIENT HEALTH QUESTIONNAIRE - PHQ9: 5. POOR APPETITE OR OVEREATING: SEVERAL DAYS

## 2018-07-27 NOTE — PATIENT INSTRUCTIONS
(G20) Parkinson's disease (H)  (primary encounter diagnosis)  Comment: Continue current dose of sinemet as prescribed by neurology   Plan:     (S32.301D) Closed fracture of right iliac wing with routine healing, subsequent encounter  Comment: Noted that you are taking oxycodone and doing well.  We will prescribe an additional 10 tablets to have as needed   Plan:     (I10) Benign essential hypertension  Comment: Blood pressure is excellent  Plan:     (N18.3) CKD (chronic kidney disease) stage 3, GFR 30-59 ml/min  Comment: check labs today   Plan: Albumin Random Urine Quantitative with Creat         Ratio, CBC with platelets, Basic metabolic         panel            (E78.5) HYPERLIPIDEMIA LDL GOAL <130  Comment: Check fasting lipid panel today   Plan: Lipid panel reflex to direct LDL Fasting

## 2018-07-27 NOTE — PROGRESS NOTES
SUBJECTIVE:   Opal Sin is a 83 year old female who presents to clinic today for the following health issues:          Hospital Follow-up Visit:    Hospital/Nursing Home/IP Rehab Facility: [facility]  Date of Admission: June / 29 / 2018  Date of Discharge: 7/20/2018  Reason(s) for Admission:     1. Closed fracture of right iliac wing with routine healing, subsequent encounter    2. Chronic pain syndrome    3. Parkinson's disease (H)    4. Physical deconditioning    5. Benign essential hypertension    6. CKD (chronic kidney disease) stage 3, GFR 30-59 ml/min    7. Hip pain, right                   Problems taking medications regularly:  None       Medication changes since discharge: None       Problems adhering to non-medication therapy:  None    Summary of hospitalization:  [facility] discharge summary reviewed  Diagnostic Tests/Treatments reviewed.  Follow up needed: none  Other Healthcare Providers Involved in Patient s Care:         None  Update since discharge: improved.     Post Discharge Medication Reconciliation: discharge medications reconciled, continue medications without change.  Plan of care communicated with patient     Coding guidelines for this visit:  Type of Medical   Decision Making Face-to-Face Visit       within 7 Days of discharge Face-to-Face Visit        within 14 days of discharge   Moderate Complexity [id number]   High Complexity [id number]              [facility]  CLINIC PROGRESS NOTE    Subjective:  Closed fracture of her right iliac wing   Opal Sin presents to clinic following her recent discharge from the transitional care unit.  She was originally admitted to [facility] from June 27 - June 29 for a closed fracture of her right iliac wing and transferred to the [place] where she is remained from June 29 - July 20.  She worked with rehab to the transitional care unit and was deemed to be ready for discharge approximately 1  "week ago.  At that time she was recommended to continue with home health and physical therapy for strengthening.  Her pain was controlled with Tylenol scheduled 1000 mg 3 times daily in addition to oxycodone 5 mg to 10 mg every 4 hours as needed.   She has been taking oxycodone at night and feels that she has plenty.  Parkinson's   She continues to follow-up with neurology and is taking Sinemet 1 tablet 4 times daily  Hypertension   There were no changes made in her blood pressure medications and she is on both amlodipine, carvedilol and losartan.      Past medical history, medications, allergies, social history, family history reviewed and updated in [facility] as of 7/27/2018 .    ROS  CONSTITUTIONAL: + o fatigue, no unexpected change in weight  SKIN: no worrisome rashes, no worrisome moles, no worrisome lesions  EYES: no acute vision problems or changes  ENT: no ear problems, no mouth problems, no throat problems  RESP: no significant cough, no shortness of breath  CV: no chest pain, no palpitations, no new or worsening peripheral edema  GI: no nausea, no vomiting, no constipation, no diarrhea  : no frequency, no dysuria, no hematuria  MS: as above  PSYCHIATRIC: no changes in mood or affect      Objective:  Vitals  /66 (BP Location: Right arm, Cuff Size: Adult Regular)  Pulse 64  Temp 96.9  F (36.1  C) (Oral)  Ht 5' 3\" (1.6 m)  Wt 131 lb (59.4 kg)  SpO2 97%  BMI 23.21 kg/m2  GEN: Alert Oriented x3 NAD  HEENT: Atraumatic, normocephalic, neck supple, no thyromegaly, negative cervical adenopathy  TM: TM bilaterally pearly and grey with normal light reflex  CV: RRR no murmurs or rubs  PULM: CTA no wheezes or crackles  ABD: Soft, nontender nondistended, no hepatosplenomegally  SKIN: No visible skin lesion or ulcerations  EXT: No edema bilateral lower extremities  NEURO: No focal neurologic deficits  PSYCH: Mood good, affect mood congruent    No images are attached to the encounter.    No results found for this " or any previous visit (from the past 24 hour(s)).    Assessment/Plan:  Patient Instructions   (G20) Parkinson's disease (H)  (primary encounter diagnosis)  Comment: Continue current dose of sinemet as prescribed by neurology   Plan:     (S32.301D) Closed fracture of right iliac wing with routine healing, subsequent encounter  Comment: Noted that you are taking oxycodone and doing well.  We will prescribe an additional 10 tablets to have as needed   Plan:     (I10) Benign essential hypertension  Comment: Blood pressure is excellent  Plan:     (N18.3) CKD (chronic kidney disease) stage 3, GFR 30-59 ml/min  Comment: check labs today   Plan: Albumin Random Urine Quantitative with Creat         Ratio, CBC with platelets, Basic metabolic         panel            (E78.5) HYPERLIPIDEMIA LDL GOAL <130  Comment: Check fasting lipid panel today   Plan: Lipid panel reflex to direct LDL Fasting               Follow up in 3 months     Disclaimer: This note consists of symbols derived from keyboarding, dictation and/or voice recognition software. As a result, there may be errors in the script that have gone undetected. Please consider this when interpreting information found in this chart.    Damian Russ MD  [phone]

## 2018-07-27 NOTE — MR AVS SNAPSHOT
After Visit Summary   7/27/2018    Opal Sin    MRN: [id number]           Patient Information     Date Of Birth          1935        Visit Information        Provider Department      7/27/2018 2:00 PM Damian Russ MD [facility]        Today's Diagnoses     Closed fracture of right iliac wing with routine healing, subsequent encounter    -  1    Parkinson's disease (H)        Benign essential hypertension        CKD (chronic kidney disease) stage 3, GFR 30-59 ml/min        HYPERLIPIDEMIA LDL GOAL <130          Care Instructions    (G20) Parkinson's disease (H)  (primary encounter diagnosis)  Comment: Continue current dose of sinemet as prescribed by neurology   Plan:     (S32.301D) Closed fracture of right iliac wing with routine healing, subsequent encounter  Comment: Noted that you are taking oxycodone and doing well.  We will prescribe an additional 10 tablets to have as needed   Plan:     (I10) Benign essential hypertension  Comment: Blood pressure is excellent  Plan:     (N18.3) CKD (chronic kidney disease) stage 3, GFR 30-59 ml/min  Comment: check labs today   Plan: Albumin Random Urine Quantitative with Creat         Ratio, CBC with platelets, Basic metabolic         panel            (E78.5) HYPERLIPIDEMIA LDL GOAL <130  Comment: Check fasting lipid panel today   Plan: Lipid panel reflex to direct LDL Fasting                     Follow-ups after your visit        Who to contact     If you have questions or need follow up information about today's clinic visit or your schedule please contact [facility] directly at [phone].  Normal or non-critical lab and imaging results will be communicated to you by MyChart, letter or phone within 4 business days after the clinic has received the results. If you do not hear from us within 7 days, please contact the clinic through MyChart or phone. If you have a critical or abnormal lab result, we will notify  "you by phone as soon as possible.  Submit refill requests through [employer] or call your pharmacy and they will forward the refill request to us. Please allow 3 business days for your refill to be completed.          Additional Information About Your Visit        [employer] Information     [employer] gives you secure access to your electronic health record. If you see a primary care provider, you can also send messages to your care team and make appointments. If you have questions, please call your primary care clinic.  If you do not have a primary care provider, please call [phone] and they will assist you.        Care EveryWhere ID     This is your Care EveryWhere ID. This could be used by other organizations to access your [place] medical records  [id number]        Your Vitals Were     Pulse Temperature Height Pulse Oximetry BMI (Body Mass Index)       64 96.9  F (36.1  C) (Oral) 5' 3\" (1.6 m) 97% 23.21 kg/m2        Blood Pressure from Last 3 Encounters:   07/27/18 130/66   07/16/18 140/70   07/09/18 146/80    Weight from Last 3 Encounters:   07/27/18 131 lb (59.4 kg)   07/16/18 130 lb (59 kg)   07/09/18 134 lb 9.6 oz (61.1 kg)              We Performed the Following     Albumin Random Urine Quantitative with Creat Ratio     Basic metabolic panel     CBC with platelets     Lipid panel reflex to direct LDL Fasting          Today's Medication Changes          These changes are accurate as of 7/27/18  2:29 PM.  If you have any questions, ask your nurse or doctor.               These medicines have changed or have updated prescriptions.        Dose/Directions    oxyCODONE IR 5 MG tablet   Commonly known as:  ROXICODONE   This may have changed:  medication strength   Used for:  Closed fracture of right iliac wing with routine healing, subsequent encounter   Changed by:  Damian Russ MD        Dose:  5-10 mg   Take 1-2 tablets (5-10 mg) by mouth every 4 hours as needed   Quantity:  12 tablet   Refills:  0 "            Where to get your medicines      Some of these will need a paper prescription and others can be bought over the counter.  Ask your nurse if you have questions.     Bring a paper prescription for each of these medications     oxyCODONE IR 5 MG tablet               Information about OPIOIDS     PRESCRIPTION OPIOIDS: WHAT YOU NEED TO KNOW   We gave you an opioid (narcotic) pain medicine. It is important to manage your pain, but opioids are not always the best choice. You should first try all the other options your care team gave you. Take this medicine for as short a time (and as few doses) as possible.     These medicines have risks:    DO NOT drive when on new or higher doses of pain medicine. These medicines can affect your alertness and reaction times, and you could be arrested for driving under the influence (DUI). If you need to use opioids long-term, talk to your care team about driving.    DO NOT operate heave machinery    DO NOT do any other dangerous activities while taking these medicines.     DO NOT drink any alcohol while taking these medicines.      If the opioid prescribed includes acetaminophen, DO NOT take with any other medicines that contain acetaminophen. Read all labels carefully. Look for the word  acetaminophen  or  Tylenol.  Ask your pharmacist if you have questions or are unsure.    You can get addicted to pain medicines, especially if you have a history of addiction (chemical, alcohol or substance dependence). Talk to your care team about ways to reduce this risk.    Store your pills in a secure place, locked if possible. We will not replace any lost or stolen medicine. If you don t finish your medicine, please throw away (dispose) as directed by your pharmacist. The [place] Pollution [employer] Agency has more information about safe disposal: https://www.pca.[facility].[place].us/living-green/managing-unwanted-medications.     All opioids tend to cause constipation. Drink plenty of water and  eat foods that have a lot of fiber, such as fruits, vegetables, prune juice, apple juice and high-fiber cereal. Take a laxative (Miralax, milk of magnesia, Colace, Senna) if you don t move your bowels at least every other day.          Primary Care Provider Office Phone # Fax #    Damian Russ [phone] [address] [address]        Equal Access to Services     [facility] Center[facility]LVIA : Hadii aad ku hadasho Soomaali, waaxda luqadaha, qaybta kaalmada adeegyada, waxay idiin hayaan adeeg kristofer villegas . [facility] [phone].    ATENCIÓN: Si habla español, tiene a rangel disposición servicios gratuitos de asistencia lingüística. [facility] [phone].    We comply with applicable federal civil rights laws and [place] laws. We do not discriminate on the basis of race, color, national origin, age, disability, sex, sexual orientation, or gender identity.            Thank you!     Thank you for choosing [facility]  for your care. Our goal is always to provide you with excellent care. Hearing back from our patients is one way we can continue to improve our services. Please take a few minutes to complete the written survey that you may receive in the mail after your visit with us. Thank you!             Your Updated Medication List - Protect others around you: Learn how to safely use, store and throw away your medicines at www.disposemymeds.org.          This list is accurate as of 7/27/18  2:29 PM.  Always use your most recent med list.                   Brand Name Dispense Instructions for use Diagnosis    acetaminophen 500 MG tablet    TYLENOL     Take 1,000 mg by mouth 3 times daily        albuterol 108 (90 Base) MCG/ACT Inhaler    PROAIR HFA/PROVENTIL HFA/VENTOLIN HFA    18 g    Inhale 1-2 puffs into the lungs every 6 hours as needed for shortness of breath / dyspnea or wheezing    Acute bronchospasm       alendronate 35 MG tablet    FOSAMAX    12 tablet    TAKE 1  TABLET BY MOUTH EVERY 7 DAYS    Osteoporosis       amLODIPine 5 MG tablet    NORVASC    90 tablet    TAKE 1 TABLET(5 MG) BY MOUTH DAILY    Benign essential hypertension       aspirin 81 MG EC tablet      Take 1 tablet (81 mg) by mouth daily    Transient cerebral ischemia, unspecified type       atorvastatin 20 MG tablet    LIPITOR    90 tablet    Take 1 tablet (20 mg) by mouth daily    Mixed hyperlipidemia       Calcium-Magnesium 300-300 MG Tabs      Take 1 tablet by mouth 3 times daily        carbidopa-levodopa  MG per tablet    SINEMET     Take 1 tablet by mouth 4 times daily Takes at 6am, 11am, 4pm, and 9 pm        carvedilol 6.25 MG tablet    COREG    180 tablet    Take 1 tablet (6.25 mg) by mouth 2 times daily (with meals)    Essential hypertension with goal blood pressure less than 140/90       GERITOL COMPLETE Tabs      Take 1 tablet by mouth daily        loperamide 2 MG capsule    IMODIUM    20 capsule    Take 1 capsule (2 mg) by mouth 4 times daily as needed for diarrhea    Viral gastroenteritis       losartan 50 MG tablet    COZAAR    90 tablet    Take 1 tablet (50 mg) by mouth daily    Essential hypertension with goal blood pressure less than 140/90       METAMUCIL FIBER PO      Take 1 capsule by mouth daily as needed        oxyCODONE IR 5 MG tablet    ROXICODONE    12 tablet    Take 1-2 tablets (5-10 mg) by mouth every 4 hours as needed    Closed fracture of right iliac wing with routine healing, subsequent encounter       raloxifene 60 MG tablet    Evista    90 tablet    TAKE 1 TABLET(60 MG) BY MOUTH DAILY    Age-related osteoporosis without current pathological fracture       RANITIDINE HCL PO      Take 75 mg by mouth 2 times daily        senna-docusate 8.6-50 MG per tablet    SENOKOT-S;PERICOLACE    30 tablet    Take 1 tablet by mouth 2 times daily as needed for constipation    Closed displaced fracture of right ilium, unspecified fracture morphology, initial encounter (H)       VITAMIN B 12 PO       Take 100 mcg by mouth daily        VITAMIN D3 PO      Take 400 Units by mouth daily

## 2018-07-28 LAB
ANION GAP SERPL CALCULATED.3IONS-SCNC: 10 MMOL/L (ref 3–14)
BUN SERPL-MCNC: 27 MG/DL (ref 7–30)
CALCIUM SERPL-MCNC: 8.7 MG/DL (ref 8.5–10.1)
CHLORIDE SERPL-SCNC: 107 MMOL/L (ref 94–109)
CHOLEST SERPL-MCNC: 117 MG/DL
CO2 SERPL-SCNC: 21 MMOL/L (ref 20–32)
CREAT SERPL-MCNC: 1.19 MG/DL (ref 0.52–1.04)
CREAT UR-MCNC: 187 MG/DL
GFR SERPL CREATININE-BSD FRML MDRD: 44 ML/MIN/1.7M2
GLUCOSE SERPL-MCNC: 107 MG/DL (ref 70–99)
HDLC SERPL-MCNC: 45 MG/DL
LDLC SERPL CALC-MCNC: 31 MG/DL
MICROALBUMIN UR-MCNC: 330 MG/L
MICROALBUMIN/CREAT UR: 176.47 MG/G CR (ref 0–25)
NONHDLC SERPL-MCNC: 72 MG/DL
POTASSIUM SERPL-SCNC: 4.8 MMOL/L (ref 3.4–5.3)
SODIUM SERPL-SCNC: 138 MMOL/L (ref 133–144)
TRIGL SERPL-MCNC: 205 MG/DL

## 2018-07-28 ASSESSMENT — ASTHMA QUESTIONNAIRES: ACT_TOTALSCORE: 25

## 2018-07-28 ASSESSMENT — PATIENT HEALTH QUESTIONNAIRE - PHQ9: SUM OF ALL RESPONSES TO PHQ QUESTIONS 1-9: 2

## 2018-07-28 ASSESSMENT — ANXIETY QUESTIONNAIRES: GAD7 TOTAL SCORE: 1

## 2018-07-31 NOTE — PROGRESS NOTES
Cristhian Joseph,    I had the opportunity to review your recent labs and a summary of your labs reads as follows:    Your complete blood counts show no sign of anemia, normal white blood cell count and platelets.  Your basic metabolic panel shows stable renal function  Your fasting lipid panel show  - normal HDL (good) cholesterol -as your goal is greater than 40  - low LDL (bad) cholesterol as your goal is less than 130  - normal triglyceride levels    Congratulaions on your excellent results      Sincerely,  Damian Russ MD

## 2018-08-14 ENCOUNTER — TELEPHONE (OUTPATIENT)
Dept: FAMILY MEDICINE | Facility: CLINIC | Age: 83
End: 2018-08-14

## 2018-08-14 NOTE — TELEPHONE ENCOUNTER
Verbal approval given per request below.Homecare/hospice agency to fax orders for provider signature.     Majo RODNEY RN

## 2018-08-14 NOTE — TELEPHONE ENCOUNTER
Reason for Call:  Home Health Care    lenore with [facility] called regarding (reason for call): PT orders    Orders are needed for this patient. Lenore needs PT orders    PT:  1x per wk for 2 wks for PT therapy,Gait training and strengthening         OT:     Skilled Nursing:     Pt Provider:     Phone Number Homecare Nurse can be reached at: [phone] or [phone]     Can we leave a detailed message on this number? YES    Phone number patient can be reached at: Home number on file [phone] (home)    Best Time: any    Call taken on 8/14/2018 at 9:34 AM by Hiren Mooney

## 2018-08-17 ENCOUNTER — TELEPHONE (OUTPATIENT)
Dept: FAMILY MEDICINE | Facility: CLINIC | Age: 83
End: 2018-08-17

## 2018-08-17 NOTE — TELEPHONE ENCOUNTER
Noted, thanks.  Gay Drew, PharmD, [facility]  Medication Therapy Management Provider  Pager: [phone]

## 2018-08-17 NOTE — TELEPHONE ENCOUNTER
Reason for Call:  Other     Detailed comments: pt called/ will not make mtm appt    Phone Number Patient can be reached at: Home number on file [phone] (home)    Best Time:     Can we leave a detailed message on this number? YES    Call taken on 8/17/2018 at 3:30 PM by Milton Spicer

## 2018-08-21 DIAGNOSIS — E78.2 MIXED HYPERLIPIDEMIA: ICD-10-CM

## 2018-08-21 NOTE — TELEPHONE ENCOUNTER
"atorvastatin (LIPITOR) 20 MG tablet 90 tablet 3 7/31/2017     Last Written Prescription Date:  7/31/17  Last Fill Quantity: 90,  # refills: 3   Last office visit: 7/27/2018 with prescribing provider:  Petrona   Future Office Visit: none     Requested Prescriptions   Pending Prescriptions Disp Refills     atorvastatin (LIPITOR) 20 MG tablet [Pharmacy Med Name: ATORVASTATIN 20MG TABLETS] 90 tablet 0     Sig: TAKE 1 TABLET(20 MG) BY MOUTH DAILY    Statins Protocol Passed    8/21/2018 11:15 AM       Passed - LDL on file in past 12 months    Recent Labs   Lab Test  07/27/18   1434   LDL  31            Passed - No abnormal creatine kinase in past 12 months    Recent Labs   Lab Test  06/28/18   0740   CKT  85               Passed - Recent (12 mo) or future (30 days) visit within the authorizing provider's specialty    Patient had office visit in the last 12 months or has a visit in the next 30 days with authorizing provider or within the authorizing provider's specialty.  See \"Patient Info\" tab in inbasket, or \"Choose Columns\" in Meds & Orders section of the refill encounter.           Passed - Patient is age 18 or older       Passed - No active pregnancy on record       Passed - No positive pregnancy test in past 12 months        PHQ-9 SCORE 7/26/2016 12/20/2016 7/27/2018   Total Score - - -   Total Score MyChart - - -   Total Score 0 0 2     "

## 2018-08-22 NOTE — TELEPHONE ENCOUNTER
Routing refill request to provider for review/approval because:  Drug interaction warning    Please review and authorize if appropriate,     Thank you,   Majo BAI RN

## 2018-08-23 ENCOUNTER — TRANSFERRED RECORDS (OUTPATIENT)
Dept: HEALTH INFORMATION MANAGEMENT | Facility: CLINIC | Age: 83
End: 2018-08-23

## 2018-08-23 RX ORDER — ATORVASTATIN CALCIUM 20 MG/1
TABLET, FILM COATED ORAL
Qty: 90 TABLET | Refills: 3 | Status: SHIPPED | OUTPATIENT
Start: 2018-08-23 | End: 2019-02-27

## 2018-08-26 DIAGNOSIS — I10 ESSENTIAL HYPERTENSION WITH GOAL BLOOD PRESSURE LESS THAN 140/90: ICD-10-CM

## 2018-08-27 RX ORDER — CARVEDILOL 6.25 MG/1
TABLET ORAL
Qty: 180 TABLET | Refills: 3 | Status: SHIPPED | OUTPATIENT
Start: 2018-08-27 | End: 2019-09-17

## 2018-08-27 NOTE — TELEPHONE ENCOUNTER
"Carvedilol 6.25 mg    Last Written Prescription Date:  07/31/17  Last Fill Quantity: 180 tablets,  # refills: 3   Last office visit: 7/27/2018 with prescribing provider:  Petrona   Future Office Visit:      Requested Prescriptions   Pending Prescriptions Disp Refills     carvedilol (COREG) 6.25 MG tablet [Pharmacy Med Name: CARVEDILOL 6.25MG TABLETS] 180 tablet 0     Sig: TAKE 1 TABLET(6.25 MG) BY MOUTH TWICE DAILY WITH MEALS    Beta-Blockers Protocol Passed    8/26/2018 10:37 AM       Passed - Blood pressure under 140/90 in past 12 months    BP Readings from Last 3 Encounters:   07/27/18 130/66   07/16/18 140/70   07/09/18 146/80                Passed - Patient is age 6 or older       Passed - Recent (12 mo) or future (30 days) visit within the authorizing provider's specialty    Patient had office visit in the last 12 months or has a visit in the next 30 days with authorizing provider or within the authorizing provider's specialty.  See \"Patient Info\" tab in inbasket, or \"Choose Columns\" in Meds & Orders section of the refill encounter.              "

## 2018-10-17 ENCOUNTER — TRANSFERRED RECORDS (OUTPATIENT)
Dept: HEALTH INFORMATION MANAGEMENT | Facility: CLINIC | Age: 83
End: 2018-10-17

## 2018-10-18 ENCOUNTER — TRANSFERRED RECORDS (OUTPATIENT)
Dept: HEALTH INFORMATION MANAGEMENT | Facility: CLINIC | Age: 83
End: 2018-10-18

## 2018-10-27 DIAGNOSIS — I10 BENIGN ESSENTIAL HYPERTENSION: ICD-10-CM

## 2018-10-27 NOTE — TELEPHONE ENCOUNTER
"Last Written Prescription Date:  12/21/17  Last Fill Quantity: 90 tablet,  # refills: 2   Last office visit: 7/27/2018 with prescribing provider:  Petrona   Future Office Visit:   Next 5 appointments (look out 90 days)     Oct 30, 2018 10:30 AM CDT   PHYSICAL with Damian Russ MD   [facility]    [address] [facility] [address] [phone]                 Requested Prescriptions   Pending Prescriptions Disp Refills     amLODIPine (NORVASC) 5 MG tablet [Pharmacy Med Name: AMLODIPINE BESYLATE 5MG TABLETS] 90 tablet 0     Sig: TAKE 1 TABLET(5 MG) BY MOUTH DAILY    Calcium Channel Blockers Protocol  Failed    10/27/2018  9:45 AM       Failed - Normal serum creatinine on file in past 12 months    Recent Labs   Lab Test  07/27/18   1434   10/13/14   1028   CR  1.19*   < >   --    CREAT   --    --   1.1*    < > = values in this interval not displayed.            Passed - Blood pressure under 140/90 in past 12 months    BP Readings from Last 3 Encounters:   07/27/18 130/66   07/16/18 140/70   07/09/18 146/80                Passed - Recent (12 mo) or future (30 days) visit within the authorizing provider's specialty    Patient had office visit in the last 12 months or has a visit in the next 30 days with authorizing provider or within the authorizing provider's specialty.  See \"Patient Info\" tab in inbasket, or \"Choose Columns\" in Meds & Orders section of the refill encounter.             Passed - Patient is age 18 or older       Passed - No active pregnancy on record       Passed - No positive pregnancy test in past 12 months          "

## 2018-10-29 RX ORDER — AMLODIPINE BESYLATE 5 MG/1
TABLET ORAL
Qty: 90 TABLET | Refills: 0 | Status: SHIPPED | OUTPATIENT
Start: 2018-10-29 | End: 2018-10-30

## 2018-10-30 ENCOUNTER — OFFICE VISIT (OUTPATIENT)
Dept: FAMILY MEDICINE | Facility: CLINIC | Age: 83
End: 2018-10-30
Payer: COMMERCIAL

## 2018-10-30 VITALS
DIASTOLIC BLOOD PRESSURE: 68 MMHG | WEIGHT: 131 LBS | BODY MASS INDEX: 23.21 KG/M2 | SYSTOLIC BLOOD PRESSURE: 123 MMHG | OXYGEN SATURATION: 98 % | HEART RATE: 59 BPM | TEMPERATURE: 97 F | HEIGHT: 63 IN

## 2018-10-30 DIAGNOSIS — Z79.2 PREVENTIVE ANTIBIOTIC: ICD-10-CM

## 2018-10-30 DIAGNOSIS — E87.1 HYPONATREMIA: ICD-10-CM

## 2018-10-30 DIAGNOSIS — Z00.00 ROUTINE GENERAL MEDICAL EXAMINATION AT A HEALTH CARE FACILITY: Primary | ICD-10-CM

## 2018-10-30 DIAGNOSIS — I10 BENIGN ESSENTIAL HYPERTENSION: ICD-10-CM

## 2018-10-30 DIAGNOSIS — I63.9 CEREBROVASCULAR ACCIDENT (CVA), UNSPECIFIED MECHANISM (H): ICD-10-CM

## 2018-10-30 DIAGNOSIS — G20.A1 PARKINSON'S DISEASE (H): ICD-10-CM

## 2018-10-30 DIAGNOSIS — N18.30 CKD (CHRONIC KIDNEY DISEASE) STAGE 3, GFR 30-59 ML/MIN (H): ICD-10-CM

## 2018-10-30 DIAGNOSIS — S32.301D CLOSED FRACTURE OF RIGHT ILIAC WING WITH ROUTINE HEALING, SUBSEQUENT ENCOUNTER: ICD-10-CM

## 2018-10-30 DIAGNOSIS — I67.1 CEREBRAL ANEURYSM: ICD-10-CM

## 2018-10-30 DIAGNOSIS — E78.5 HYPERLIPIDEMIA LDL GOAL <100: ICD-10-CM

## 2018-10-30 DIAGNOSIS — M81.0 AGE RELATED OSTEOPOROSIS, UNSPECIFIED PATHOLOGICAL FRACTURE PRESENCE: ICD-10-CM

## 2018-10-30 LAB
ERYTHROCYTE [DISTWIDTH] IN BLOOD BY AUTOMATED COUNT: 13.9 % (ref 10–15)
HCT VFR BLD AUTO: 39.2 % (ref 35–47)
HGB BLD-MCNC: 13.1 G/DL (ref 11.7–15.7)
MCH RBC QN AUTO: 31.3 PG (ref 26.5–33)
MCHC RBC AUTO-ENTMCNC: 33.4 G/DL (ref 31.5–36.5)
MCV RBC AUTO: 94 FL (ref 78–100)
PLATELET # BLD AUTO: 240 10E9/L (ref 150–450)
RBC # BLD AUTO: 4.18 10E12/L (ref 3.8–5.2)
WBC # BLD AUTO: 9.4 10E9/L (ref 4–11)

## 2018-10-30 PROCEDURE — 36415 COLL VENOUS BLD VENIPUNCTURE: CPT | Performed by: INTERNAL MEDICINE

## 2018-10-30 PROCEDURE — G0439 PPPS, SUBSEQ VISIT: HCPCS | Performed by: INTERNAL MEDICINE

## 2018-10-30 PROCEDURE — 80053 COMPREHEN METABOLIC PANEL: CPT | Performed by: INTERNAL MEDICINE

## 2018-10-30 PROCEDURE — 85027 COMPLETE CBC AUTOMATED: CPT | Performed by: INTERNAL MEDICINE

## 2018-10-30 PROCEDURE — 84443 ASSAY THYROID STIM HORMONE: CPT | Performed by: INTERNAL MEDICINE

## 2018-10-30 RX ORDER — AMLODIPINE BESYLATE 5 MG/1
5 TABLET ORAL DAILY
Qty: 90 TABLET | Refills: 3 | Status: SHIPPED | OUTPATIENT
Start: 2018-10-30 | End: 2019-09-17

## 2018-10-30 RX ORDER — AZITHROMYCIN 500 MG/1
500 TABLET, FILM COATED ORAL ONCE
Qty: 1 TABLET | Refills: 0 | Status: SHIPPED | OUTPATIENT
Start: 2018-10-30 | End: 2018-11-29

## 2018-10-30 RX ORDER — TRAMADOL HYDROCHLORIDE 50 MG/1
TABLET ORAL
Refills: 0 | Status: ON HOLD | COMMUNITY
Start: 2018-10-21 | End: 2019-02-21

## 2018-10-30 NOTE — PATIENT INSTRUCTIONS
Preventive Health Recommendations    Female Ages 65 +    Yearly exam:     See your health care provider every year in order to  o Review health changes.   o Discuss preventive care.    o Review your medicines if your doctor has prescribed any.      You no longer need a yearly Pap test unless you've had an abnormal Pap test in the past 10 years. If you have vaginal symptoms, such as bleeding or discharge, be sure to talk with your provider about a Pap test.      Every 1 to 2 years, have a mammogram.  If you are over 69, talk with your health care provider about whether or not you want to continue having screening mammograms.      Every 10 years, have a colonoscopy. Or, have a yearly FIT test (stool test). These exams will check for colon cancer.       Have a cholesterol test every 5 years, or more often if your doctor advises it.       Have a diabetes test (fasting glucose) every three years. If you are at risk for diabetes, you should have this test more often.       At age 65, have a bone density scan (DEXA) to check for osteoporosis (brittle bone disease).    Shots:    Get a flu shot each year.    Get a tetanus shot every 10 years.    Talk to your doctor about your pneumonia vaccines. There are now two you should receive - Pneumovax (PPSV 23) and Prevnar (PCV 13).    Talk to your pharmacist about the shingles vaccine.    Talk to your doctor about the hepatitis B vaccine.    Nutrition:     Eat at least 5 servings of fruits and vegetables each day.      Eat whole-grain bread, whole-wheat pasta and brown rice instead of white grains and rice.      Get adequate Calcium and Vitamin D.     Lifestyle    Exercise at least 150 minutes a week (30 minutes a day, 5 days a week). This will help you control your weight and prevent disease.      Limit alcohol to one drink per day.      No smoking.       Wear sunscreen to prevent skin cancer.       See your dentist twice a year for an exam and cleaning.      See your eye doctor  every 1 to 2 years to screen for conditions such as glaucoma, macular degeneration and cataracts.    (Z00.00) Routine general medical examination at a health care facility  (primary encounter diagnosis)  Comment: For routine exam, we will draw labs as ordered, diabetes mellitus check, liver function, renal function,  And plan for mammogram  We will also update vaccination history.  Plan: CBC with platelets, Comprehensive metabolic         panel, TSH with free T4 reflex            (I67.1) Cerebral aneurysm  Comment: Goal is keep blood pressure lower and take statin - continue to take medications as prescribed  Plan:     (I63.9) Cerebrovascular accident (CVA), unspecified mechanism (H)  Comment: As above   Plan:     (G20) Parkinson's disease (H)  Comment: Continue follow up in neurology  Plan:     (E87.1) Hyponatremia  Comment: We will check sodium again today  Plan:     (E78.5) HYPERLIPIDEMIA LDL GOAL <130  Comment: Continue on statin as you have been taking  Plan:     (I10) Benign essential hypertension  Comment: blood pressure is well controlled  Plan: amLODIPine (NORVASC) 5 MG tablet            (S32.301D) Closed fracture of right iliac wing with routine healing, subsequent encounter  Comment: Follow-up in orthopedics is recommended  Plan:     (M81.0) Age related osteoporosis, unspecified pathological fracture presence  Comment: We will recheck renal functoin and consider resuming alendronate -= also referral for repeat bone density scan placed  [facility] (also performs diagnostic mammogram, ultrasound and biopsy) [phone].  Plan:     (N18.3) CKD (chronic kidney disease) stage 3, GFR 30-59 ml/min (H)  Comment: labs checked today - no concerns   Plan:

## 2018-10-30 NOTE — PROGRESS NOTES
SUBJECTIVE:   Opal Sin is a 83 year old female who presents for Preventive Visit.      Are you in the first 12 months of your Medicare Part B coverage?  No    Healthy Habits:    Do you get at least three servings of calcium containing foods daily (dairy, green leafy vegetables, etc.)? yes and is also taking calcium and/or vitamin D supplement: yes - Both    Amount of exercise or daily activities, outside of work: 7 day(s) per week    Problems taking medications regularly No    Medication side effects: Yes increased urination and insomnia     Have you had an eye exam in the past two years? yes    Do you see a dentist twice per year? yes    Do you have sleep apnea, excessive snoring or daytime drowsiness?no      Ability to successfully perform activities of daily living: No, needs assistance with: housework and no assistance needed    Home safety:  none identified     Hearing impairment: Yes, Wears hearing aids    Fall risk:  Fallen 2 or more times in the past year?: No  Any fall with injury in the past year?: Yes        COGNITIVE SCREEN  1) Repeat 3 items (Leader, Season, Table)    2) Clock draw: NORMAL  3) 3 item recall: Recalls 3 objects  Results: 3 items recalled: COGNITIVE IMPAIRMENT LESS LIKELY    Mini-CogTM Copyright S My. Licensed by the author for use in [facility]; reprinted with permission (soob@[facility]). All rights reserved.        Reviewed and updated as needed this visit by clinical staff         Reviewed and updated as needed this visit by Provider        Social History   Substance Use Topics     Smoking status: Never Smoker     Smokeless tobacco: Never Used     Alcohol use 0.0 oz/week     0 Standard drinks or equivalent per week      Comment: rare       If you drink alcohol do you typically have >3 drinks per day or >7 drinks per week? No                        Today's PHQ-2 Score:   PHQ-2 ( 1999 Pf[employer]) 7/27/2017 8/22/2016   Q1: Little interest or pleasure in doing  "things 0 0   Q2: Feeling down, depressed or hopeless 0 0   PHQ-2 Score 0 0   Q1: Little interest or pleasure in doing things - -   Q2: Feeling down, depressed or hopeless - -   PHQ-2 Score - -       Do you feel safe in your environment - Yes    Do you have a Health Care Directive?: Yes: Advance Directive has been received and scanned.    Current providers sharing in care for this patient include:   Patient Care Team:  Damian Russ MD as PCP - General (Internal Medicine)  Care, [facility] ([facility] ([facility] ([place]    The following health maintenance items are reviewed in Epic and correct as of today:  Health Maintenance   Topic Date Due     COLONOSCOPY Q10 YR  05/09/1955     ASTHMA ACTION PLAN Q1 YR  12/20/2017     DEPRESSION ACTION PLAN Q1 YR  12/20/2017     URINE DRUG SCREEN Q1 YR  07/26/2018     DEXA Q2 YR  08/10/2018     INFLUENZA VACCINE (1) 09/01/2018     MAMMO Q1 YR  10/11/2018     BMP Q6 MOS  01/27/2019     ASTHMA CONTROL TEST Q6 MOS  01/27/2019     PHQ-9 Q6 MONTHS  01/27/2019     HEMOGLOBIN Q1 YR  07/27/2019     FALL RISK ASSESSMENT  07/27/2019     LIPID MONITORING Q1 YEAR  07/27/2019     MICROALBUMIN Q1 YEAR  07/27/2019     FLORINDA QUESTIONNAIRE 1 YEAR  07/27/2019     ADVANCE DIRECTIVE PLANNING Q5 YRS  11/11/2019     TETANUS IMMUNIZATION (SYSTEM ASSIGNED)  09/25/2022     PNEUMOCOCCAL  Completed     Labs reviewed in EPIC      ROS:  Constitutional, HEENT, cardiovascular, pulmonary, GI, , musculoskeletal - still having discomfort from pelvic fracture and has bad right hip - following in orthopedics, neurology - following in neurology and sinemet increased last week, skin, endocrine and psych - depression has been more prevalent - systems are negative, except as otherwise noted.    OBJECTIVE:   /68 (BP Location: Right arm, Cuff Size: Adult Regular)  Pulse 59  Temp 97  F (36.1  C) (Oral)  Ht 5' 3\" (1.6 m)  Wt 131 lb (59.4 kg)  SpO2 98%  Breastfeeding? No  BMI 23.21 kg/m2 " "Estimated body mass index is 23.21 kg/(m^2) as calculated from the following:    Height as of this encounter: 5' 3\" (1.6 m).    Weight as of this encounter: 131 lb (59.4 kg).  EXAM:   GENERAL: healthy, alert and no distress  EYES: Eyes grossly normal to inspection, PERRL and conjunctivae and sclerae normal  HENT: ear canals and TM's normal, nose and mouth without ulcers or lesions  NECK: no adenopathy, no asymmetry, masses, or scars and thyroid normal to palpation  RESP: lungs clear to auscultation - no rales, rhonchi or wheezes  BREAST: deferred  CV: regular rate and rhythm, normal S1 S2, no S3 or S4, no murmur, click or rub, no peripheral edema and peripheral pulses strong  ABDOMEN: soft, nontender, no hepatosplenomegaly, no masses and bowel sounds normal  MS: gait antalgic, no changes  SKIN: no suspicious lesions or rashes  NEURO: Normal strength and tone, mentation intact and speech normal  PSYCH: mentation appears normal, affect normal/slightly depressed    Diagnostic Test Results:  none     ASSESSMENT / PLAN:   (Z00.00) Routine general medical examination at a health care facility  (primary encounter diagnosis)  Comment: For routine exam, we will draw labs as ordered, diabetes mellitus check, liver function, renal function,  And plan for mammogram  We will also update vaccination history.  Plan: CBC with platelets, Comprehensive metabolic         panel, TSH with free T4 reflex            (I67.1) Cerebral aneurysm  Comment: Goal is keep blood pressure lower and take statin - continue to take medications as prescribed  Plan:     (I63.9) Cerebrovascular accident (CVA), unspecified mechanism (H)  Comment: As above   Plan:     (G20) Parkinson's disease (H)  Comment: Continue follow up in neurology  Plan:     (E87.1) Hyponatremia  Comment: We will check sodium again today  Plan:     (E78.5) HYPERLIPIDEMIA LDL GOAL <130  Comment: Continue on statin as you have been taking  Plan:     (I10) Benign essential " "hypertension  Comment: blood pressure is well controlled  Plan: amLODIPine (NORVASC) 5 MG tablet            (S32.301D) Closed fracture of right iliac wing with routine healing, subsequent encounter  Comment: Follow-up in orthopedics is recommended  Plan:     (M81.0) Age related osteoporosis, unspecified pathological fracture presence  Comment: We will recheck renal functoin and consider resuming alendronate -= also referral for repeat bone density scan placed  [facility] (also performs diagnostic mammogram, ultrasound and biopsy) [phone].  Plan:     (N18.3) CKD (chronic kidney disease) stage 3, GFR 30-59 ml/min (H)  Comment: labs checked today - no concerns   Plan:            End of Life Planning:  Patient currently has an advanced directive: Yes.  Practitioner is supportive of decision.    COUNSELING:  Reviewed preventive health counseling, as reflected in patient instructions    BP Readings from Last 1 Encounters:   07/27/18 130/66     Estimated body mass index is 23.21 kg/(m^2) as calculated from the following:    Height as of 7/27/18: 5' 3\" (1.6 m).    Weight as of 7/27/18: 131 lb (59.4 kg).           reports that she has never smoked. She has never used smokeless tobacco.      Appropriate preventive services were discussed with this patient, including applicable screening as appropriate for cardiovascular disease, diabetes, osteopenia/osteoporosis, and glaucoma.  As appropriate for age/gender, discussed screening for colorectal cancer, prostate cancer, breast cancer, and cervical cancer. Checklist reviewing preventive services available has been given to the patient.    Reviewed patients plan of care and provided an AVS. The Basic Care Plan (routine screening as documented in Health Maintenance) for Opal meets the Care Plan requirement. This Care Plan has been established and reviewed with the Patient.    Counseling Resources:  ATP IV Guidelines  Pooled Cohorts Equation " Calculator  Breast Cancer Risk Calculator  FRAX Risk Assessment  ICSI Preventive Guidelines  Dietary Guidelines for Americans, 2010  [employer] MyPlate  ASA Prophylaxis  Lung CA Screening    Damian Russ MD, MD  [facility]

## 2018-10-30 NOTE — MR AVS SNAPSHOT
After Visit Summary   10/30/2018    Opal Sin    MRN: [id number]           Patient Information     Date Of Birth          1935        Visit Information        Provider Department      10/30/2018 10:30 AM Damian Russ MD [facility]        Today's Diagnoses     Routine general medical examination at a health care facility    -  1    Cerebral aneurysm        Cerebrovascular accident (CVA), unspecified mechanism (H)        Parkinson's disease (H)        Hyponatremia        HYPERLIPIDEMIA LDL GOAL <130        Benign essential hypertension        Closed fracture of right iliac wing with routine healing, subsequent encounter        Age related osteoporosis, unspecified pathological fracture presence        CKD (chronic kidney disease) stage 3, GFR 30-59 ml/min (H)        Preventive antibiotic          Care Instructions      Preventive Health Recommendations    Female Ages 65 +    Yearly exam:     See your health care provider every year in order to  o Review health changes.   o Discuss preventive care.    o Review your medicines if your doctor has prescribed any.      You no longer need a yearly Pap test unless you've had an abnormal Pap test in the past 10 years. If you have vaginal symptoms, such as bleeding or discharge, be sure to talk with your provider about a Pap test.      Every 1 to 2 years, have a mammogram.  If you are over 69, talk with your health care provider about whether or not you want to continue having screening mammograms.      Every 10 years, have a colonoscopy. Or, have a yearly FIT test (stool test). These exams will check for colon cancer.       Have a cholesterol test every 5 years, or more often if your doctor advises it.       Have a diabetes test (fasting glucose) every three years. If you are at risk for diabetes, you should have this test more often.       At age 65, have a bone density scan (DEXA) to check for osteoporosis (brittle bone  disease).    Shots:    Get a flu shot each year.    Get a tetanus shot every 10 years.    Talk to your doctor about your pneumonia vaccines. There are now two you should receive - Pneumovax (PPSV 23) and Prevnar (PCV 13).    Talk to your pharmacist about the shingles vaccine.    Talk to your doctor about the hepatitis B vaccine.    Nutrition:     Eat at least 5 servings of fruits and vegetables each day.      Eat whole-grain bread, whole-wheat pasta and brown rice instead of white grains and rice.      Get adequate Calcium and Vitamin D.     Lifestyle    Exercise at least 150 minutes a week (30 minutes a day, 5 days a week). This will help you control your weight and prevent disease.      Limit alcohol to one drink per day.      No smoking.       Wear sunscreen to prevent skin cancer.       See your dentist twice a year for an exam and cleaning.      See your eye doctor every 1 to 2 years to screen for conditions such as glaucoma, macular degeneration and cataracts.    (Z00.00) Routine general medical examination at a health care facility  (primary encounter diagnosis)  Comment: For routine exam, we will draw labs as ordered, diabetes mellitus check, liver function, renal function,  And plan for mammogram  We will also update vaccination history.  Plan: CBC with platelets, Comprehensive metabolic         panel, TSH with free T4 reflex            (I67.1) Cerebral aneurysm  Comment: Goal is keep blood pressure lower and take statin - continue to take medications as prescribed  Plan:     (I63.9) Cerebrovascular accident (CVA), unspecified mechanism (H)  Comment: As above   Plan:     (G20) Parkinson's disease (H)  Comment: Continue follow up in neurology  Plan:     (E87.1) Hyponatremia  Comment: We will check sodium again today  Plan:     (E78.5) HYPERLIPIDEMIA LDL GOAL <130  Comment: Continue on statin as you have been taking  Plan:     (I10) Benign essential hypertension  Comment: blood pressure is well  controlled  Plan: amLODIPine (NORVASC) 5 MG tablet            (S32.301D) Closed fracture of right iliac wing with routine healing, subsequent encounter  Comment: Follow-up in orthopedics is recommended  Plan:     (M81.0) Age related osteoporosis, unspecified pathological fracture presence  Comment: We will recheck renal functoin and consider resuming alendronate -= also referral for repeat bone density scan placed  [facility] (also performs diagnostic mammogram, ultrasound and biopsy) [phone].  Plan:     (N18.3) CKD (chronic kidney disease) stage 3, GFR 30-59 ml/min (H)  Comment: labs checked today - no concerns   Plan:              Follow-ups after your visit        Follow-up notes from your care team     Return in about 3 months (around 1/30/2019) for BP Recheck.      Future tests that were ordered for you today     Open Future Orders        Priority Expected Expires Ordered    DX Hip/Pelvis/Spine Routine 10/30/2018 10/30/2019 10/30/2018            Who to contact     If you have questions or need follow up information about today's clinic visit or your schedule please contact [facility] directly at [phone].  Normal or non-critical lab and imaging results will be communicated to you by MyChart, letter or phone within 4 business days after the clinic has received the results. If you do not hear from us within 7 days, please contact the clinic through eyeOShart or phone. If you have a critical or abnormal lab result, we will notify you by phone as soon as possible.  Submit refill requests through [employer] or call your pharmacy and they will forward the refill request to us. Please allow 3 business days for your refill to be completed.          Additional Information About Your Visit        eyeOShart Information     [employer] gives you secure access to your electronic health record. If you see a primary care provider, you can also send messages to your care team and make  "appointments. If you have questions, please call your primary care clinic.  If you do not have a primary care provider, please call [phone] and they will assist you.        Care EveryWhere ID     This is your Care EveryWhere ID. This could be used by other organizations to access your [place] medical records  [id number]        Your Vitals Were     Pulse Temperature Height Pulse Oximetry Breastfeeding? BMI (Body Mass Index)    59 97  F (36.1  C) (Oral) 5' 3\" (1.6 m) 98% No 23.21 kg/m2       Blood Pressure from Last 3 Encounters:   10/30/18 123/68   07/27/18 130/66   07/16/18 140/70    Weight from Last 3 Encounters:   10/30/18 131 lb (59.4 kg)   07/27/18 131 lb (59.4 kg)   07/16/18 130 lb (59 kg)              We Performed the Following     CBC with platelets     Comprehensive metabolic panel     TSH with free T4 reflex          Today's Medication Changes          These changes are accurate as of 10/30/18 10:54 AM.  If you have any questions, ask your nurse or doctor.               Start taking these medicines.        Dose/Directions    azithromycin 500 MG tablet   Commonly known as:  ZITHROMAX   Used for:  Preventive antibiotic   Started by:  Damian Russ MD        Dose:  500 mg   Take 1 tablet (500 mg) by mouth once for 1 dose   Quantity:  1 tablet   Refills:  0         These medicines have changed or have updated prescriptions.        Dose/Directions    amLODIPine 5 MG tablet   Commonly known as:  NORVASC   This may have changed:  See the new instructions.   Used for:  Benign essential hypertension   Changed by:  Damian Russ MD        Dose:  5 mg   Take 1 tablet (5 mg) by mouth daily   Quantity:  90 tablet   Refills:  3            Where to get your medicines      [facility] [address] [facility] [address] AT [facility] [address] [facility] [address]     Phone:  [phone]     amLODIPine 5 MG tablet    " azithromycin 500 MG tablet               Information about OPIOIDS     PRESCRIPTION OPIOIDS: WHAT YOU NEED TO KNOW   We gave you an opioid (narcotic) pain medicine. It is important to manage your pain, but opioids are not always the best choice. You should first try all the other options your care team gave you. Take this medicine for as short a time (and as few doses) as possible.    Some activities can increase your pain, such as bandage changes or therapy sessions. It may help to take your pain medicine 30 to 60 minutes before these activities. Reduce your stress by getting enough sleep, working on hobbies you enjoy and practicing relaxation or meditation. Talk to your care team about ways to manage your pain beyond prescription opioids.    These medicines have risks:    DO NOT drive when on new or higher doses of pain medicine. These medicines can affect your alertness and reaction times, and you could be arrested for driving under the influence (DUI). If you need to use opioids long-term, talk to your care team about driving.    DO NOT operate heavy machinery    DO NOT do any other dangerous activities while taking these medicines.    DO NOT drink any alcohol while taking these medicines.     If the opioid prescribed includes acetaminophen, DO NOT take with any other medicines that contain acetaminophen. Read all labels carefully. Look for the word  acetaminophen  or  Tylenol.  Ask your pharmacist if you have questions or are unsure.    You can get addicted to pain medicines, especially if you have a history of addiction (chemical, alcohol or substance dependence). Talk to your care team about ways to reduce this risk.    All opioids tend to cause constipation. Drink plenty of water and eat foods that have a lot of fiber, such as fruits, vegetables, prune juice, apple juice and high-fiber cereal. Take a laxative (Miralax, milk of magnesia, Colace, Senna) if you don t move your bowels at least every other day.  Other side effects include upset stomach, sleepiness, dizziness, throwing up, tolerance (needing more of the medicine to have the same effect), physical dependence and slowed breathing.    Store your pills in a secure place, locked if possible. We will not replace any lost or stolen medicine. If you don t finish your medicine, please throw away (dispose) as directed by your pharmacist. The [place] Pollution Control Agency has more information about safe disposal: https://www.pca.[facility].[place].us/living-green/managing-unwanted-medications         Primary Care Provider Office Phone # Fax #    Damian Russ [phone] [address] [address]        Equal Access to Services     SANG SARAVIA : Hadii vicente esquivelo Soromero, waaxda luqadaha, qaybta kaalmada adebrandtyada, juan pablo villegas . So [facility] [phone].    ATENCIÓN: Si habla español, tiene a rangel disposición servicios gratuitos de asistencia lingüística. Arabella al [phone].    We comply with applicable federal civil rights laws and [place] laws. We do not discriminate on the basis of race, color, national origin, age, disability, sex, sexual orientation, or gender identity.            Thank you!     Thank you for choosing [facility]  for your care. Our goal is always to provide you with excellent care. Hearing back from our patients is one way we can continue to improve our services. Please take a few minutes to complete the written survey that you may receive in the mail after your visit with us. Thank you!             Your Updated Medication List - Protect others around you: Learn how to safely use, store and throw away your medicines at www.disposemymeds.org.          This list is accurate as of 10/30/18 10:54 AM.  Always use your most recent med list.                   Brand Name Dispense Instructions for use Diagnosis    acetaminophen 500 MG tablet    TYLENOL     Take 1,000  mg by mouth 3 times daily        albuterol 108 (90 Base) MCG/ACT inhaler    PROAIR HFA/PROVENTIL HFA/VENTOLIN HFA    18 g    Inhale 1-2 puffs into the lungs every 6 hours as needed for shortness of breath / dyspnea or wheezing    Acute bronchospasm       alendronate 35 MG tablet    FOSAMAX    12 tablet    TAKE 1 TABLET BY MOUTH EVERY 7 DAYS    Osteoporosis       amLODIPine 5 MG tablet    NORVASC    90 tablet    Take 1 tablet (5 mg) by mouth daily    Benign essential hypertension       aspirin 81 MG EC tablet      Take 1 tablet (81 mg) by mouth daily    Transient cerebral ischemia, unspecified type       atorvastatin 20 MG tablet    LIPITOR    90 tablet    TAKE 1 TABLET(20 MG) BY MOUTH DAILY    Mixed hyperlipidemia       azithromycin 500 MG tablet    ZITHROMAX    1 tablet    Take 1 tablet (500 mg) by mouth once for 1 dose    Preventive antibiotic       Calcium-Magnesium 300-300 MG Tabs      Take 1 tablet by mouth 3 times daily        carbidopa-levodopa  MG per tablet    SINEMET     Take 1.5 tablets by mouth 4 times daily Takes at 6am, 11am, 4pm, and 9 pm        carvedilol 6.25 MG tablet    COREG    180 tablet    TAKE 1 TABLET(6.25 MG) BY MOUTH TWICE DAILY WITH MEALS    Essential hypertension with goal blood pressure less than 140/90       GERITOL COMPLETE Tabs      Take 1 tablet by mouth daily        loperamide 2 MG capsule    IMODIUM    20 capsule    Take 1 capsule (2 mg) by mouth 4 times daily as needed for diarrhea    Viral gastroenteritis       losartan 50 MG tablet    COZAAR    90 tablet    TAKE 1 TABLET(50 MG) BY MOUTH DAILY    Essential hypertension with goal blood pressure less than 140/90       METAMUCIL FIBER PO      Take 1 capsule by mouth daily as needed        raloxifene 60 MG tablet    Evista    90 tablet    TAKE 1 TABLET BY MOUTH DAILY    Age-related osteoporosis without current pathological fracture       RANITIDINE HCL PO      Take 75 mg by mouth 2 times daily        senna-docusate 8.6-50 MG  per tablet    SENOKOT-S;PERICOLACE    30 tablet    Take 1 tablet by mouth 2 times daily as needed for constipation    Closed displaced fracture of right ilium, unspecified fracture morphology, initial encounter (H)       traMADol 50 MG tablet    ULTRAM     TK 1 T PO Q 4 TO 6 H PRN P        VITAMIN B 12 PO      Take 100 mcg by mouth daily        VITAMIN D3 PO      Take 400 Units by mouth daily

## 2018-10-31 LAB
ALBUMIN SERPL-MCNC: 4 G/DL (ref 3.4–5)
ALP SERPL-CCNC: 39 U/L (ref 40–150)
ALT SERPL W P-5'-P-CCNC: 12 U/L (ref 0–50)
ANION GAP SERPL CALCULATED.3IONS-SCNC: 8 MMOL/L (ref 3–14)
AST SERPL W P-5'-P-CCNC: 20 U/L (ref 0–45)
BILIRUB SERPL-MCNC: 0.5 MG/DL (ref 0.2–1.3)
BUN SERPL-MCNC: 25 MG/DL (ref 7–30)
CALCIUM SERPL-MCNC: 9.1 MG/DL (ref 8.5–10.1)
CHLORIDE SERPL-SCNC: 102 MMOL/L (ref 94–109)
CO2 SERPL-SCNC: 26 MMOL/L (ref 20–32)
CREAT SERPL-MCNC: 1.04 MG/DL (ref 0.52–1.04)
GFR SERPL CREATININE-BSD FRML MDRD: 51 ML/MIN/1.7M2
GLUCOSE SERPL-MCNC: 83 MG/DL (ref 70–99)
POTASSIUM SERPL-SCNC: 4.8 MMOL/L (ref 3.4–5.3)
PROT SERPL-MCNC: 7.1 G/DL (ref 6.8–8.8)
SODIUM SERPL-SCNC: 136 MMOL/L (ref 133–144)
TSH SERPL DL<=0.005 MIU/L-ACNC: 1.26 MU/L (ref 0.4–4)

## 2018-11-02 NOTE — PROGRESS NOTES
Cristhian Joseph,    I had the opportunity to review your recent labs and a summary of your labs reads as follows:    Your complete blood counts show no sign of anemia, normal white blood cell count and platelets.  Your comprehensive metabolic panel showed normal renal function, normal liver function, and normal fasting blood glucose indicating no evidence of diabetes mellitus.  Your thyroid function returned within normal limits       Congratulaions on your excellent results     Sincerely,  Damian Russ MD

## 2018-11-15 ENCOUNTER — HOSPITAL ENCOUNTER (OUTPATIENT)
Dept: MAMMOGRAPHY | Facility: CLINIC | Age: 83
End: 2018-11-15
Attending: INTERNAL MEDICINE
Payer: COMMERCIAL

## 2018-11-15 ENCOUNTER — HOSPITAL ENCOUNTER (OUTPATIENT)
Dept: BONE DENSITY | Facility: CLINIC | Age: 83
End: 2018-11-15
Attending: INTERNAL MEDICINE
Payer: COMMERCIAL

## 2018-11-15 ENCOUNTER — HOSPITAL ENCOUNTER (OUTPATIENT)
Dept: BONE DENSITY | Facility: CLINIC | Age: 83
Discharge: HOME | End: 2018-11-15
Attending: INTERNAL MEDICINE | Admitting: INTERNAL MEDICINE
Payer: COMMERCIAL

## 2018-11-15 ENCOUNTER — TELEPHONE (OUTPATIENT)
Dept: FAMILY MEDICINE | Facility: CLINIC | Age: 83
End: 2018-11-15

## 2018-11-15 DIAGNOSIS — M81.0 AGE RELATED OSTEOPOROSIS, UNSPECIFIED PATHOLOGICAL FRACTURE PRESENCE: ICD-10-CM

## 2018-11-15 DIAGNOSIS — M80.00XS OSTEOPOROSIS WITH CURRENT PATHOLOGICAL FRACTURE, UNSPECIFIED OSTEOPOROSIS TYPE, SEQUELA: ICD-10-CM

## 2018-11-15 DIAGNOSIS — Z12.31 VISIT FOR SCREENING MAMMOGRAM: ICD-10-CM

## 2018-11-15 PROCEDURE — 77080 DXA BONE DENSITY AXIAL: CPT

## 2018-11-15 PROCEDURE — 77081 DXA BONE DENSITY APPENDICULR: CPT

## 2018-11-15 PROCEDURE — 77067 SCR MAMMO BI INCL CAD: CPT | Mod: 52

## 2018-11-15 RX ORDER — ALENDRONATE SODIUM 35 MG/1
TABLET ORAL
Qty: 12 TABLET | Refills: 3 | Status: SHIPPED | OUTPATIENT
Start: 2018-11-15 | End: 2019-02-27

## 2018-11-16 NOTE — TELEPHONE ENCOUNTER
Can we call Opal Sin and let her know that     Your bone density scan does show evidence of osteoporosis and given that your renal function has improved I would recommend we resume your osteoporosis medications - Fosamax for another year.

## 2018-11-21 ENCOUNTER — TELEPHONE (OUTPATIENT)
Dept: PHARMACY | Facility: CLINIC | Age: 83
End: 2018-11-21

## 2018-11-21 NOTE — TELEPHONE ENCOUNTER
We have been unable to reach this patient for MTM follow-up after several attempts. We will stop reaching out to the patient at this time. Please let us know if we can assist in this patient's care in the future.    Routing to PCP as Jasmyne OlivarezD, [facility]  Medication Therapy Management Provider  Pager: [phone]

## 2018-11-29 DIAGNOSIS — Z79.2 PREVENTIVE ANTIBIOTIC: ICD-10-CM

## 2018-11-29 NOTE — TELEPHONE ENCOUNTER
Pending Prescriptions:                       Disp   Refills    azithromycin (ZITHROMAX) 500 MG tablet [P*1 tabl*0            Sig: TAKE 1 TABLET BY MOUTH FOR 1 DOSE            Last Office Visit: 10/30/18  Future Office visit:       Routing refill request to provider for review/approval because:  Drug not active on patient's medication list

## 2018-11-30 RX ORDER — AZITHROMYCIN 500 MG/1
TABLET, FILM COATED ORAL
Qty: 1 TABLET | Refills: 0 | Status: ON HOLD | OUTPATIENT
Start: 2018-11-30 | End: 2019-02-24

## 2018-11-30 NOTE — TELEPHONE ENCOUNTER
"Routing refill request to provider for review/approval because:  Drug not on the [facility] refill protocol   Noted historically med has been sent for #1 pill with 0 refills \"preventative antibiotic\" as the diagnosis     Sabina MARTIN RN    "

## 2019-01-23 DIAGNOSIS — I10 BENIGN ESSENTIAL HYPERTENSION: ICD-10-CM

## 2019-01-23 RX ORDER — AMLODIPINE BESYLATE 5 MG/1
TABLET ORAL
Qty: 90 TABLET | Refills: 0 | OUTPATIENT
Start: 2019-01-23

## 2019-01-23 NOTE — TELEPHONE ENCOUNTER
"Requested Prescriptions   Pending Prescriptions Disp Refills     amLODIPine (NORVASC) 5 MG tablet [Pharmacy Med Name: AMLODIPINE BESYLATE 5MG TABLETS] 90 tablet 0    Last Written Prescription Date:  10/30/2018  Last Fill Quantity: 90,  # refills: 3   Last office visit: 10/30/2018 with prescribing provider:  Petrona  Future Office Visit:     Sig: TAKE 1 TABLET(5 MG) BY MOUTH DAILY    Calcium Channel Blockers Protocol  Passed - 1/23/2019  9:33 AM       Passed - Blood pressure under 140/90 in past 12 months    BP Readings from Last 3 Encounters:   10/30/18 123/68   07/27/18 130/66   07/16/18 140/70                Passed - Recent (12 mo) or future (30 days) visit within the authorizing provider's specialty    Patient had office visit in the last 12 months or has a visit in the next 30 days with authorizing provider or within the authorizing provider's specialty.  See \"Patient Info\" tab in inbasket, or \"Choose Columns\" in Meds & Orders section of the refill encounter.             Passed - Medication is active on med list       Passed - Patient is age 18 or older       Passed - No active pregnancy on record       Passed - Normal serum creatinine on file in past 12 months    Recent Labs   Lab Test 10/30/18  1059  10/13/14  1028   CR 1.04   < >  --    CREAT  --   --  1.1*    < > = values in this interval not displayed.            Passed - No positive pregnancy test in past 12 months        Patient has refills remaining at pharmacy.  JUANITA WestN, RN  [employer] [facility]    "

## 2019-02-16 ENCOUNTER — TRANSFERRED RECORDS (OUTPATIENT)
Dept: HEALTH INFORMATION MANAGEMENT | Facility: CLINIC | Age: 84
End: 2019-02-16

## 2019-02-20 ENCOUNTER — HOSPITAL ENCOUNTER (OUTPATIENT)
Facility: CLINIC | Age: 84
Setting detail: OBSERVATION
Discharge: NURSING, REHAB OR LONG-TERM CARE | End: 2019-02-24
Attending: EMERGENCY MEDICINE | Admitting: INTERNAL MEDICINE
Payer: COMMERCIAL

## 2019-02-20 ENCOUNTER — APPOINTMENT (OUTPATIENT)
Dept: CT IMAGING | Facility: CLINIC | Age: 84
End: 2019-02-20
Attending: EMERGENCY MEDICINE
Payer: COMMERCIAL

## 2019-02-20 DIAGNOSIS — S76.912A STRAIN OF LEFT HIP AND THIGH, INITIAL ENCOUNTER: ICD-10-CM

## 2019-02-20 DIAGNOSIS — S76.012A STRAIN OF LEFT HIP AND THIGH, INITIAL ENCOUNTER: ICD-10-CM

## 2019-02-20 PROCEDURE — 99285 EMERGENCY DEPT VISIT HI MDM: CPT | Mod: 25

## 2019-02-20 PROCEDURE — 74176 CT ABD & PELVIS W/O CONTRAST: CPT

## 2019-02-20 ASSESSMENT — ENCOUNTER SYMPTOMS
DIARRHEA: 0
VOMITING: 0
NAUSEA: 0
ABDOMINAL PAIN: 0
DYSURIA: 0
ARTHRALGIAS: 1

## 2019-02-20 NOTE — LETTER
Transition Communication Hand-off for Care Transitions to Next Level of Care Provider    Name: Opal Sin  : 1935  MRN #: [id number]  Primary Care Provider: Damian Russ MD     Primary Clinic: [facility] [address] [address]     Reason for Hospitalization:  Strain of left hip and thigh, initial encounter [S76.012A, S76.912A]  Admit Date/Time: 2019 10:03 PM  Discharge Date: 19  Payor Source: Payor: [facility] / Plan: ARE MEDICARE / Product Type: HMO /     Readmission Assessment Measure (NIK) Risk Score/category: N/A         Reason for Communication Hand-off Referral: Other discharge plan    Discharge Plan: discharged to [facility]    Follow-up specialty is recommended: Yes, Orthopedics if not improving    Follow-up plan:  No future appointments.    Kiera Lyon RN Care Coordinator    AVS/Discharge Summary is the source of truth; this is a helpful guide for improved communication of patient story

## 2019-02-21 ENCOUNTER — APPOINTMENT (OUTPATIENT)
Dept: CT IMAGING | Facility: CLINIC | Age: 84
End: 2019-02-21
Attending: PHYSICIAN ASSISTANT
Payer: COMMERCIAL

## 2019-02-21 ENCOUNTER — APPOINTMENT (OUTPATIENT)
Dept: GENERAL RADIOLOGY | Facility: CLINIC | Age: 84
End: 2019-02-21
Attending: PHYSICIAN ASSISTANT
Payer: COMMERCIAL

## 2019-02-21 PROBLEM — R52 PAIN: Status: ACTIVE | Noted: 2019-02-21

## 2019-02-21 LAB
ANION GAP SERPL CALCULATED.3IONS-SCNC: 12 MMOL/L (ref 3–14)
BUN SERPL-MCNC: 26 MG/DL (ref 7–30)
CALCIUM SERPL-MCNC: 8.1 MG/DL (ref 8.5–10.1)
CHLORIDE SERPL-SCNC: 103 MMOL/L (ref 94–109)
CK SERPL-CCNC: 33 U/L (ref 30–225)
CO2 SERPL-SCNC: 20 MMOL/L (ref 20–32)
CREAT SERPL-MCNC: 0.79 MG/DL (ref 0.52–1.04)
CRP SERPL-MCNC: 9.2 MG/L (ref 0–8)
ERYTHROCYTE [DISTWIDTH] IN BLOOD BY AUTOMATED COUNT: 14.6 % (ref 10–15)
GFR SERPL CREATININE-BSD FRML MDRD: 70 ML/MIN/{1.73_M2}
GLUCOSE SERPL-MCNC: 160 MG/DL (ref 70–99)
HCT VFR BLD AUTO: 35.7 % (ref 35–47)
HGB BLD-MCNC: 12.2 G/DL (ref 11.7–15.7)
MCH RBC QN AUTO: 30.8 PG (ref 26.5–33)
MCHC RBC AUTO-ENTMCNC: 34.2 G/DL (ref 31.5–36.5)
MCV RBC AUTO: 90 FL (ref 78–100)
PLATELET # BLD AUTO: 240 10E9/L (ref 150–450)
POTASSIUM SERPL-SCNC: 3.6 MMOL/L (ref 3.4–5.3)
RBC # BLD AUTO: 3.96 10E12/L (ref 3.8–5.2)
SODIUM SERPL-SCNC: 135 MMOL/L (ref 133–144)
WBC # BLD AUTO: 12.4 10E9/L (ref 4–11)

## 2019-02-21 PROCEDURE — 25000132 ZZH RX MED GY IP 250 OP 250 PS 637: Performed by: INTERNAL MEDICINE

## 2019-02-21 PROCEDURE — G0378 HOSPITAL OBSERVATION PER HR: HCPCS

## 2019-02-21 PROCEDURE — 96374 THER/PROPH/DIAG INJ IV PUSH: CPT | Mod: 59

## 2019-02-21 PROCEDURE — 25000125 ZZHC RX 250: Performed by: INTERNAL MEDICINE

## 2019-02-21 PROCEDURE — 82550 ASSAY OF CK (CPK): CPT | Performed by: INTERNAL MEDICINE

## 2019-02-21 PROCEDURE — 40000257 ZZH STATISTIC CONSULT NO CHARGE VASC ACCESS

## 2019-02-21 PROCEDURE — 36569 INSJ PICC 5 YR+ W/O IMAGING: CPT

## 2019-02-21 PROCEDURE — 72080 X-RAY EXAM THORACOLMB 2/> VW: CPT

## 2019-02-21 PROCEDURE — 96376 TX/PRO/DX INJ SAME DRUG ADON: CPT

## 2019-02-21 PROCEDURE — 25000128 H RX IP 250 OP 636: Performed by: PHYSICIAN ASSISTANT

## 2019-02-21 PROCEDURE — 27211407 ZZ H KIT CATH IV 18 OR 20 G, POWERGLIDE BASIC

## 2019-02-21 PROCEDURE — 80048 BASIC METABOLIC PNL TOTAL CA: CPT | Performed by: INTERNAL MEDICINE

## 2019-02-21 PROCEDURE — 25000132 ZZH RX MED GY IP 250 OP 250 PS 637: Performed by: PHYSICIAN ASSISTANT

## 2019-02-21 PROCEDURE — 86140 C-REACTIVE PROTEIN: CPT | Performed by: INTERNAL MEDICINE

## 2019-02-21 PROCEDURE — 99219 ZZC INITIAL OBSERVATION CARE,LEVL II: CPT | Performed by: INTERNAL MEDICINE

## 2019-02-21 PROCEDURE — 27211111 XR MYELOGRAPHY LUMBAR SPINE

## 2019-02-21 PROCEDURE — 25000132 ZZH RX MED GY IP 250 OP 250 PS 637: Performed by: EMERGENCY MEDICINE

## 2019-02-21 PROCEDURE — 36415 COLL VENOUS BLD VENIPUNCTURE: CPT | Performed by: INTERNAL MEDICINE

## 2019-02-21 PROCEDURE — 25500064 ZZH RX 255 OP 636: Performed by: INTERNAL MEDICINE

## 2019-02-21 PROCEDURE — 40000141 ZZH STATISTIC PERIPHERAL IV START W/O US GUIDANCE

## 2019-02-21 PROCEDURE — 72132 CT LUMBAR SPINE W/DYE: CPT

## 2019-02-21 PROCEDURE — 99207 ZZC NON-BILLABLE SERV PER CHARTING: CPT | Performed by: INTERNAL MEDICINE

## 2019-02-21 PROCEDURE — 85027 COMPLETE CBC AUTOMATED: CPT | Performed by: INTERNAL MEDICINE

## 2019-02-21 PROCEDURE — 99207 ZZC CDG-MDM COMPONENT: MEETS LOW - DOWN CODED: CPT | Performed by: INTERNAL MEDICINE

## 2019-02-21 RX ORDER — CARVEDILOL 6.25 MG/1
6.25 TABLET ORAL 2 TIMES DAILY WITH MEALS
Status: DISCONTINUED | OUTPATIENT
Start: 2019-02-21 | End: 2019-02-24 | Stop reason: HOSPADM

## 2019-02-21 RX ORDER — LOSARTAN POTASSIUM 50 MG/1
50 TABLET ORAL DAILY
Status: DISCONTINUED | OUTPATIENT
Start: 2019-02-21 | End: 2019-02-24 | Stop reason: HOSPADM

## 2019-02-21 RX ORDER — TRAMADOL HYDROCHLORIDE 50 MG/1
50 TABLET ORAL EVERY 6 HOURS PRN
Status: DISCONTINUED | OUTPATIENT
Start: 2019-02-21 | End: 2019-02-21

## 2019-02-21 RX ORDER — LIDOCAINE 40 MG/G
CREAM TOPICAL
Status: DISCONTINUED | OUTPATIENT
Start: 2019-02-21 | End: 2019-02-24 | Stop reason: HOSPADM

## 2019-02-21 RX ORDER — ATORVASTATIN CALCIUM 10 MG/1
20 TABLET, FILM COATED ORAL DAILY
Status: DISCONTINUED | OUTPATIENT
Start: 2019-02-21 | End: 2019-02-21

## 2019-02-21 RX ORDER — LOPERAMIDE HCL 2 MG
2 CAPSULE ORAL 4 TIMES DAILY PRN
Status: DISCONTINUED | OUTPATIENT
Start: 2019-02-21 | End: 2019-02-24 | Stop reason: HOSPADM

## 2019-02-21 RX ORDER — ACETAMINOPHEN 325 MG/1
650 TABLET ORAL EVERY 4 HOURS PRN
Status: DISCONTINUED | OUTPATIENT
Start: 2019-02-21 | End: 2019-02-24 | Stop reason: HOSPADM

## 2019-02-21 RX ORDER — KETOROLAC TROMETHAMINE 15 MG/ML
15 INJECTION, SOLUTION INTRAMUSCULAR; INTRAVENOUS EVERY 6 HOURS PRN
Status: COMPLETED | OUTPATIENT
Start: 2019-02-21 | End: 2019-02-22

## 2019-02-21 RX ORDER — ACETAMINOPHEN 650 MG/1
650 SUPPOSITORY RECTAL EVERY 4 HOURS PRN
Status: DISCONTINUED | OUTPATIENT
Start: 2019-02-21 | End: 2019-02-24 | Stop reason: HOSPADM

## 2019-02-21 RX ORDER — ACETAMINOPHEN 500 MG
1000 TABLET ORAL 3 TIMES DAILY
Status: DISCONTINUED | OUTPATIENT
Start: 2019-02-21 | End: 2019-02-24 | Stop reason: HOSPADM

## 2019-02-21 RX ORDER — TRAMADOL HYDROCHLORIDE 50 MG/1
50 TABLET ORAL EVERY 6 HOURS PRN
COMMUNITY
End: 2019-02-27

## 2019-02-21 RX ORDER — ONDANSETRON 2 MG/ML
4 INJECTION INTRAMUSCULAR; INTRAVENOUS EVERY 6 HOURS PRN
Status: DISCONTINUED | OUTPATIENT
Start: 2019-02-21 | End: 2019-02-24 | Stop reason: HOSPADM

## 2019-02-21 RX ORDER — HYDROCODONE BITARTRATE AND ACETAMINOPHEN 5; 325 MG/1; MG/1
1 TABLET ORAL ONCE
Status: COMPLETED | OUTPATIENT
Start: 2019-02-21 | End: 2019-02-21

## 2019-02-21 RX ORDER — IOPAMIDOL 408 MG/ML
20 INJECTION, SOLUTION INTRATHECAL ONCE
Status: COMPLETED | OUTPATIENT
Start: 2019-02-21 | End: 2019-02-21

## 2019-02-21 RX ORDER — RALOXIFENE HYDROCHLORIDE 60 MG/1
60 TABLET, FILM COATED ORAL DAILY
Status: DISCONTINUED | OUTPATIENT
Start: 2019-02-21 | End: 2019-02-24 | Stop reason: HOSPADM

## 2019-02-21 RX ORDER — ALBUTEROL SULFATE 90 UG/1
1-2 AEROSOL, METERED RESPIRATORY (INHALATION) EVERY 6 HOURS PRN
Status: DISCONTINUED | OUTPATIENT
Start: 2019-02-21 | End: 2019-02-24 | Stop reason: HOSPADM

## 2019-02-21 RX ORDER — ASPIRIN 81 MG/1
81 TABLET ORAL DAILY
Status: DISCONTINUED | OUTPATIENT
Start: 2019-02-21 | End: 2019-02-24 | Stop reason: HOSPADM

## 2019-02-21 RX ORDER — AMOXICILLIN 250 MG
1 CAPSULE ORAL 2 TIMES DAILY PRN
Status: DISCONTINUED | OUTPATIENT
Start: 2019-02-21 | End: 2019-02-24 | Stop reason: HOSPADM

## 2019-02-21 RX ORDER — UBIDECARENONE 75 MG
100 CAPSULE ORAL DAILY
Status: DISCONTINUED | OUTPATIENT
Start: 2019-02-21 | End: 2019-02-24 | Stop reason: HOSPADM

## 2019-02-21 RX ORDER — MULTIPLE VITAMINS W/ MINERALS TAB 9MG-400MCG
1 TAB ORAL DAILY
Status: DISCONTINUED | OUTPATIENT
Start: 2019-02-21 | End: 2019-02-24 | Stop reason: HOSPADM

## 2019-02-21 RX ORDER — AMLODIPINE BESYLATE 5 MG/1
5 TABLET ORAL DAILY
Status: DISCONTINUED | OUTPATIENT
Start: 2019-02-21 | End: 2019-02-24 | Stop reason: HOSPADM

## 2019-02-21 RX ORDER — ONDANSETRON 4 MG/1
4 TABLET, ORALLY DISINTEGRATING ORAL EVERY 6 HOURS PRN
Status: DISCONTINUED | OUTPATIENT
Start: 2019-02-21 | End: 2019-02-24 | Stop reason: HOSPADM

## 2019-02-21 RX ORDER — NALOXONE HYDROCHLORIDE 0.4 MG/ML
.1-.4 INJECTION, SOLUTION INTRAMUSCULAR; INTRAVENOUS; SUBCUTANEOUS
Status: DISCONTINUED | OUTPATIENT
Start: 2019-02-21 | End: 2019-02-24 | Stop reason: HOSPADM

## 2019-02-21 RX ADMIN — SENNOSIDES AND DOCUSATE SODIUM 1 TABLET: 8.6; 5 TABLET ORAL at 20:05

## 2019-02-21 RX ADMIN — CARBIDOPA AND LEVODOPA 3 HALF-TAB: 25; 100 TABLET ORAL at 16:13

## 2019-02-21 RX ADMIN — RANITIDINE 150 MG: 150 TABLET ORAL at 08:59

## 2019-02-21 RX ADMIN — RALOXIFENE HYDROCHLORIDE 60 MG: 60 TABLET, FILM COATED ORAL at 08:58

## 2019-02-21 RX ADMIN — CARVEDILOL 6.25 MG: 6.25 TABLET, FILM COATED ORAL at 18:38

## 2019-02-21 RX ADMIN — LOSARTAN POTASSIUM 50 MG: 50 TABLET, FILM COATED ORAL at 20:05

## 2019-02-21 RX ADMIN — KETOROLAC TROMETHAMINE 15 MG: 15 INJECTION, SOLUTION INTRAMUSCULAR; INTRAVENOUS at 22:00

## 2019-02-21 RX ADMIN — ACETAMINOPHEN 1000 MG: 500 TABLET, FILM COATED ORAL at 08:55

## 2019-02-21 RX ADMIN — CHOLECALCIFEROL TAB 10 MCG (400 UNIT) 400 UNITS: 10 TAB at 08:56

## 2019-02-21 RX ADMIN — LIDOCAINE HYDROCHLORIDE 10 ML: 10 INJECTION, SOLUTION INFILTRATION; PERINEURAL at 16:51

## 2019-02-21 RX ADMIN — HYDROCODONE BITARTRATE AND ACETAMINOPHEN 1 TABLET: 5; 325 TABLET ORAL at 00:56

## 2019-02-21 RX ADMIN — TRAMADOL HYDROCHLORIDE 50 MG: 50 TABLET, COATED ORAL at 04:32

## 2019-02-21 RX ADMIN — AMLODIPINE BESYLATE 5 MG: 5 TABLET ORAL at 08:58

## 2019-02-21 RX ADMIN — ACETAMINOPHEN 1000 MG: 500 TABLET, FILM COATED ORAL at 13:55

## 2019-02-21 RX ADMIN — Medication 100 MCG: at 08:57

## 2019-02-21 RX ADMIN — CARBIDOPA AND LEVODOPA 3 HALF-TAB: 25; 100 TABLET ORAL at 11:10

## 2019-02-21 RX ADMIN — ASPIRIN 81 MG: 81 TABLET, COATED ORAL at 08:56

## 2019-02-21 RX ADMIN — ACETAMINOPHEN 1000 MG: 500 TABLET, FILM COATED ORAL at 20:05

## 2019-02-21 RX ADMIN — KETOROLAC TROMETHAMINE 15 MG: 15 INJECTION, SOLUTION INTRAMUSCULAR; INTRAVENOUS at 13:13

## 2019-02-21 RX ADMIN — OXYCODONE HYDROCHLORIDE 2.5 MG: 5 TABLET ORAL at 17:00

## 2019-02-21 RX ADMIN — CARBIDOPA AND LEVODOPA 3 HALF-TAB: 25; 100 TABLET ORAL at 21:20

## 2019-02-21 RX ADMIN — RANITIDINE 150 MG: 150 TABLET ORAL at 20:05

## 2019-02-21 RX ADMIN — CARBIDOPA AND LEVODOPA 3 HALF-TAB: 25; 100 TABLET ORAL at 06:35

## 2019-02-21 RX ADMIN — LIDOCAINE HYDROCHLORIDE 2 ML: 10 INJECTION, SOLUTION EPIDURAL; INFILTRATION; INTRACAUDAL; PERINEURAL at 15:19

## 2019-02-21 RX ADMIN — IOPAMIDOL 20 ML: 408 INJECTION, SOLUTION INTRATHECAL at 15:24

## 2019-02-21 ASSESSMENT — MIFFLIN-ST. JEOR: SCORE: 1039

## 2019-02-21 NOTE — PLAN OF CARE
AVSS; left hip/leg pain controlled with tramadol and repositioning; pt on bedrest overnight; voiding; continue to monitor.

## 2019-02-21 NOTE — PROGRESS NOTES
RADIOLOGY PROCEDURE NOTE  Patient name: Opal Sin  MRN: [id number]  : 1935    Pre-procedure diagnosis: Low back pain  Post-procedure diagnosis: Same    Procedure Date/Time: 2019  3:37 PM  Procedure: Lumbar myelogram  Estimated blood loss: None  Specimen(s) collected with description: none  The patient tolerated the procedure well with no immediate complications.  Significant findings:20 ml contrast injected    See imaging dictation for procedural details.    Provider name: Tremayne Toro  Assistant(s):None
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
## 2019-02-21 NOTE — PHARMACY-ADMISSION MEDICATION HISTORY
Admission medication history interview status for the 2/20/2019  admission is complete. See EPIC admission navigator for prior to admission medications     Medication history source reliability:Good    Actions taken by pharmacist (provider contacted, etc): interviewed patient, epic notes, care everywhere     Additional medication history information not noted on PTA med list :None    Medication reconciliation/reorder completed by provider prior to medication history? Yes    Time spent in this activity: 20 min    Prior to Admission medications    Medication Sig Last Dose Taking? Auth Provider   acetaminophen (TYLENOL) 500 MG tablet Take 1,000 mg by mouth 3 times daily as needed for mild pain  prn Yes Reported, Patient   albuterol (PROAIR HFA/PROVENTIL HFA/VENTOLIN HFA) 108 (90 Base) MCG/ACT Inhaler Inhale 1-2 puffs into the lungs every 6 hours as needed for shortness of breath / dyspnea or wheezing prn Yes Damian Russ MD   alendronate (FOSAMAX) 35 MG tablet TAKE 1 TABLET BY MOUTH EVERY 7 DAYS Past Week at Unknown time Yes Damian Russ MD   amLODIPine (NORVASC) 5 MG tablet Take 1 tablet (5 mg) by mouth daily 2/20/2019 at Unknown time Yes Damian Russ MD   aspirin EC 81 MG EC tablet Take 1 tablet (81 mg) by mouth daily 2/20/2019 at Unknown time Yes Amberly Gutierrez MD   atorvastatin (LIPITOR) 20 MG tablet TAKE 1 TABLET(20 MG) BY MOUTH DAILY 2/20/2019 at Unknown time Yes Aurora Robledo, APRN CNP   azithromycin (ZITHROMAX) 500 MG tablet TAKE 1 TABLET BY MOUTH FOR 1 DOSE prn Yes Damian Russ MD   Calcium-Magnesium 300-300 MG TABS Take 1 tablet by mouth 3 times daily  2/20/2019 at Unknown time Yes Reported, Patient   carbidopa-levodopa (SINEMET)  MG per tablet Take 1.5 tablets by mouth 4 times daily Takes at 6am, 11am, 4pm, and 9 pm 2/20/2019 at Unknown time Yes Reported, Patient   carvedilol (COREG) 6.25 MG tablet TAKE 1 TABLET(6.25 MG) BY MOUTH TWICE DAILY WITH  MEALS 2/20/2019 at Unknown time Yes Damian Russ MD   Cholecalciferol (VITAMIN D3 PO) Take 400 Units by mouth daily  2/20/2019 at Unknown time Yes Unknown, Entered By History   Cyanocobalamin (VITAMIN B 12 PO) Take 100 mcg by mouth daily  2/20/2019 at Unknown time Yes Reported, Patient   Iron-Vitamins (GERITOL COMPLETE) TABS Take 1 tablet by mouth daily 2/20/2019 at Unknown time Yes Reported, Patient   loperamide (IMODIUM) 2 MG capsule Take 1 capsule (2 mg) by mouth 4 times daily as needed for diarrhea prn Yes Randall Sal MD   losartan (COZAAR) 50 MG tablet TAKE 1 TABLET(50 MG) BY MOUTH DAILY 2/20/2019 at Unknown time Yes Damian Russ MD   raloxifene (EVISTA) 60 MG tablet TAKE 1 TABLET BY MOUTH DAILY 2/20/2019 at Unknown time Yes Damian Russ MD   ranitidine (ZANTAC) 150 MG tablet Take 75 mg by mouth 2 times daily 2/20/2019 at Unknown time Yes Unknown, Entered By History   senna-docusate (SENOKOT-S;PERICOLACE) 8.6-50 MG per tablet Take 1 tablet by mouth 2 times daily as needed for constipation prn Yes Randall Sal MD   traMADol (ULTRAM) 50 MG tablet Take 50 mg by mouth every 6 hours as needed for severe pain prn Yes Unknown, Entered By History   Psyllium (METAMUCIL FIBER PO) Take 1 capsule by mouth daily as needed    Reported, Patient
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
## 2019-02-21 NOTE — PROGRESS NOTES
RECEIVING UNIT ED HANDOFF REVIEW    ED Nurse Handoff Report was reviewed by: Khloe Lira on February 21, 2019 at 1:31 AM

## 2019-02-21 NOTE — CONSULTS
Care Transition Initial Assessment - SW     Met with: Patient    Active Problems:    Pain       DATA  Lives With: alone      Quality of Family Relationships: supportive, involved  Description of Support System: Supportive, Involved  Who is your support system?: Sibling(s)  Support Assessment: Adequate social supports.   Identified issues/concerns regarding health management: Discharge planning, TCU placement.         Quality of Family Relationships: supportive, involved  Transportation Anticipated: family or friend will provide    ASSESSMENT  Cognitive Status:  awake, alert and oriented  Concerns to be addressed: Per SW consult for discharge planning. Pt admitted 2/21/19 with pain, discharge likely tomorrow per MD, pending Ortho and PT consults. Pt is requesting TCU placement. 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 SW met with pt who informs that she would like to go to [facility] and states that she was there last June, would want a shared room. Second choice is [facility] pt understands and agrees to private room fee as this is the only option at [facility]. Pt informs that she has a family / friend to transport to TCU at discharge. SW will send referrals as requested.      PLAN  Financial costs for the patient includes: Private room @ [facility]  Patient given options and choices for discharge: TCU   Patient/family is agreeable to the plan?  Yes  Patient Goals and Preferences: TCU   Patient anticipates discharging to: TCU     LESLY South

## 2019-02-21 NOTE — ED PROVIDER NOTES
History     Chief Complaint:  Left Hip Pain    HPI   Opal Sin is a 83 year old female with a history of Parkinson's Disease, DJD, chronic pain, s/p bilateral knee replacement who presents with left hip pain. The patient reports that 2 months ago she tripped on a sidewalk while using her walker and fell onto her knees. Since then the patient states her left leg has been bothering her, but has been manageable. Four days ago the patient states she developed increasingly severe pain in her left hip causing her to report to Urgent Care. The patient states that she had X-rays completed at Urgent Care with no fractures found and she was sent home with a Medrol Dosepak. The patient reports she has been taking the Medrol Dosepak in addition to Tramadol and Extra strength Tylenol at home with no improvement in her pain. Last night the patient states she began having increasing difficulty with mobility secondary to pain. Currently the patient complains of non-radiating left hip pain that increases in severity with movement. The patient denies any abdominal pain, nausea, vomiting, diarrhea or dysuria. She denies any falls since she fell 2 months ago and states she has been eating and drinking normally. The patient states she sees Dr. Ferreira for her Parkinson's.    Allergies:  Bee Pollen  Cephalexin Monohydrate  Ciprofloxacin  Clindamycin  Multi Vitamin-Minerals [Hair-Vites]  Penicillins  Thiazide-Type Diuretics  Tobramycin  Vancomycin  Atorvastatin  Cephalexin  Ibuprofen  Versed  Zoloft  ACE Inhibitors  Advil  Metoprolol    Medications:    Tylenol  Albuterol Inhaler  Fosamax  Norvasc  Aspirin EC 81 mg EC tablet  Lipitor  Zithromax  Calcium-Magnesium 300-300 mg TABS  Sinemet  Coreg  Vitamin D3 PO  Vitamin B12 PO  Geritol Complete  Imodium  Cozaar  Metamucil Fiber PO  Evista  Zantac  Ranitidine HCL PO  Senokot-S; Pericolace  Ultram    Past Medical History:    Asthma  Breast Cancer  Degeneration of intervertebral  "disc  Benign Essential Hypertension  GERD  Hyperlipidemia  Osteomyelitis  Osteoporosis  Parkinson's Disease  PONV  Spinal Stenosis  Cerebral artery occlusion with cerebral infarction  TIA  Stage 3 CKD  DJD  Lung Nodule  6th Nerve Palsy  Physical Deconditioning  Chronic Pain  Carotid aneurysm non ruptured  CVA  Thyroid Nodule    Past Surgical History:    Biopsy  Bunionectomy  Appendectomy  Left mastectomy with left breast silicon implant  Several Back Surgeries  Bilateral total knee arthroplasty  Shoulder Surgery  Insert Stimulator Dorsal Column  Recession Resection (Repair Strabismus)  Replacement Socket, Shoulder Disarticulation/Intrascapular Thoracic, bilateral  Thoracoscopic Lung Wedge resection    Family History:    Uterine Cancer  Lung Cancer  Stroke  Heart Disease  Heart Transplant  Respiratory  Alzheimer's Disease    Social History:  Smoking Status: Never Smoker  Alcohol Use: Rarely  Patient presents with a friend.  Marital Status:  Single     Review of Systems   Gastrointestinal: Negative for abdominal pain, diarrhea, nausea and vomiting.   Genitourinary: Negative for dysuria.   Musculoskeletal: Positive for arthralgias.   All other systems reviewed and are negative.    Physical Exam     Patient Vitals for the past 24 hrs:   BP Temp Temp src Heart Rate Resp SpO2 Height   02/20/19 2159 176/70 97.5  F (36.4  C) Temporal 58 18 99 % 1.6 m (5' 3\")     Physical Exam  General: Appears well-developed and well-nourished.   Head: No signs of trauma.   CV: Normal rate and regular rhythm.    Resp: Effort normal and breath sounds normal. No respiratory distress.   GI: Soft. There is no tenderness.  No rebound or guarding.  Normal bowel sounds.  No CVA tenderness.  MSK: No pain with palpation to hips or spine.  Minimal discomfort with passive ROM of left hip, but significant pain to anterior left hip with active ROM.  +neurovascularly intact distally.  Neuro: The patient is alert and oriented to person, place, and time.  " Strength in upper/lower extremities normal and symmetrical.   Sensation normal. Speech normal.  GCS 15  Skin: Skin is warm and dry. No rash noted.   Psych: normal mood and affect. behavior is normal.       Emergency Department Course     Imaging:  Radiographic findings were communicated with the patient who voiced understanding of the findings.    Abd/pelvis CT no contrast - Stone Protocol  1. No acute abnormality. No bowel obstruction or inflammation.  2. Colonic diverticula without acute diverticulitis.  As read by radiology.    Interventions:    0056: Norco 1 tablet PO    Emergency Department Course:  Past medical records, nursing notes, and vitals reviewed.  2230: I performed an exam of the patient and obtained history, as documented above.    The patient was sent for a CT scan while in the emergency department, findings above.    0026: I rechecked the patient. Explained findings to patient and friend.    0034: Rechecked the patient, findings and plan explained to the patient, who consents to admission to Obs.     0057: Discussed the patient with Dr. Sheldon, who will admit the patient to a monitored bed for further observation, evaluation, and treatment.    Impression & Plan      Medical Decision Making:  Opal Sin is an 83-year-old woman who presents due to left hip pain.  She apparently had a fall about a month ago and had some general discomfort following, but her pain became worse over the last few days.  She actually had been evaluated at the orthopedic clinic and had X-rays done that were apparently negative and she had been sent home with tramadol and a Medrol Dosepak.  Given the continued pain, she came to the ER.  On my evaluation, the patient appeared comfortable except for when she tried to actively flex the left hip.  There is no clear bony deformities or tenderness to palpation, and the patient had no pain with palpation to the back.  Patient was neurovascularly intact.  CT scan was  obtained that did not show any signs of fracture or other acute process.  The patient's symptoms are very consistent with a strain of the left hip that seems to be muscular in nature given that the pain is only present with active flexion of the hip flexor and much less so with passive range of motion.  Given the patient's age, Parkinson's, and concern for fall risk, she will be admitted to observation bed for symptom management and discussion of placement in a rehab facility for physical therapy and continued management.    Diagnosis:    ICD-10-CM    1. Strain of left hip and thigh, initial encounter S76.012A     S76.912A        Disposition:  Admitted to Obs.     Shannen Crain  2/20/2019    EMERGENCY DEPARTMENT  I, Shannen Crain, am serving as a scribe at 10:30 PM on 2/20/2019 to document services personally performed by Saurav Leung MD based on my observations and the provider's statements to me.        Saurav Leung MD  02/21/19 0578

## 2019-02-21 NOTE — PLAN OF CARE
Rn: Patient A/O x4  Patient experiencing pain with little bed movement in her back and left side.  Tylenol scheduled and prn Ultram on board with little pain relief.  Ortho consult completed.  New order placed for ortho spine consult today.  PT cancelled.  Patient place back on bedrest.  New pain medications ordered to assist for hopeful better pain control.  Tolerating diet.  SW consulted to help with dispo needs.   Discharge pending.

## 2019-02-21 NOTE — PROGRESS NOTES
[facility]    Hospitalist Progress Note    Assessment & Plan   Opal Sin is a 83 year old female admitted on 2/20/2019. She is being admitted for persistent left upper thigh pain/hip pain.    Left upper thigh and left hip pain:  -She does have history of Parkinson disease and osteoarthritis and walks with a walker.    -She lives alone at home.    -had history of fall on December 21, 2018 and no fracture was found   -She was started on Medrol pack as well as tramadol as OP but the pain continues.    - with very restricted mobility of left hip; will consult ortho for 2nd opinion.  -consult physical therapy and social work care for possible transitional care unit   -pain control as needed    Parkinson's disease:   -she lives independently and does not have any history of fall since last December.    -She sees Dr. Ferreira for her Parkinson's.    -continue with her outpatient medication.    Chronic low back pain:  -stable and related to osteoarthritis    Benign essential hypertension  HPL  -continue PTA amlodipine, losartan and coreg.  -Resume statin at DC             # Pain Assessment:  Current Pain Score 2/21/2019   Patient currently in pain? sleeping: patient not able to self report   Pain score (0-10) -   Pain location -   Pain descriptors -   - Opal is experiencing pain due to left hip pain. Pain management was discussed and the plan was created in a collaborative fashion.  Opal's response to the current recommendations: engaged  - Please see the plan for pain management as documented above          D/W: RN  DVT Prophylaxis: Pneumatic Compression Devices  Code Status: Full Code    Disposition: Expected discharge 2/22; pending ortho and PT kong Hein MD    Interval History   Continue to have pain 6-8/10 in thee left groin area, exacerbated by hip flexion.  Afebrile. Denies abd pain      -Data reviewed today: I reviewed all new labs and imaging results over the  last 24 hours. I personally reviewed no images or EKG's today.      Physical Exam   Temp: 96.2  F (35.7  C) Temp src: Oral BP: 149/76   Heart Rate: 67 Resp: 16 SpO2: 95 % O2 Device: None (Room air)    Vitals:    02/21/19 0203   Weight: 60.5 kg (133 lb 6.1 oz)     Vital Signs with Ranges  Temp:  [95.2  F (35.1  C)-97.5  F (36.4  C)] 96.2  F (35.7  C)  Heart Rate:  [58-67] 67  Resp:  [16-18] 16  BP: (149-176)/(70-79) 149/76  SpO2:  [95 %-99 %] 95 %  No intake/output data recorded.    Constitutional: AAOX3, NAD  Respiratory:  No crackles, No wheezes  Cardiovascular: RRR, No murmur  GI: Soft, Non- tender, BS- normoactive  Skin/Integument: Warm and dry, no rashes  MSK: left groin tender ade with flexion, no obvious deformity  Neuro: CN- grossly intact    Medications           acetaminophen  1,000 mg Oral TID     amLODIPine  5 mg Oral Daily     aspirin  81 mg Oral Daily     atorvastatin  20 mg Oral Daily     Calcium-Magnesium-Zinc  1 tablet Oral TID     carbidopa-levodopa  3 half-tab Oral 4x Daily     carvedilol  6.25 mg Oral BID w/meals     cholecalciferol  400 Units Oral Daily     losartan  50 mg Oral Daily     multivitamin w/minerals  1 tablet Oral Daily     raloxifene  60 mg Oral Daily     ranitidine  150 mg Oral BID     vitamin B-12  100 mcg Oral Daily       Data     No lab results found in last 7 days.    Recent Results (from the past 24 hour(s))   Abd/pelvis CT no contrast - Stone Protocol    Narrative    CT ABDOMEN PELVIS W/O CONTRAST  2/20/2019 11:41 PM     HISTORY: Left hip pain.    TECHNIQUE: Noncontrast CT abdomen and pelvis was performed. Radiation  dose for this scan was reduced using automated exposure control,  adjustment of the mA and/or kV according to patient size, or iterative  reconstruction technique.    COMPARISON: 11/28/2006.    FINDINGS:  Abdomen: There is mild scarring at the lung bases. Left breast  prosthesis. Coronary artery atherosclerotic calcifications. The heart  size is normal. Evaluation  of the solid abdominal organs is limited by  the lack of intravenous contrast. The liver, spleen, gallbladder,  pancreas and adrenal glands are normal in appearance. There is  cortical scarring and a small probable high-density cyst at the  lateral aspect of the right kidney. Cortical scarring and simple cysts  in the left kidney. No hydronephrosis. There is no abdominal or pelvic  lymph node enlargement. There is atherosclerotic calcification of the  aorta and its branches. No aneurysm.    Pelvis: There are colonic diverticula without acute diverticulitis. No  bowel obstruction or inflammation. No free intraperitoneal gas or  fluid. Postoperative changes in the lumbosacral spine. Degenerative  disease throughout the spine and in the hips. Old right pelvic  fractures. No acute fracture.      Impression    IMPRESSION:  1. No acute abnormality. No bowel obstruction or inflammation.  2. Colonic diverticula without acute diverticulitis.    CAMMY VERONICA MD

## 2019-02-21 NOTE — ED NOTES
"[facility]  ED Nurse Handoff Report    ED Chief complaint: Leg Pain (History of Parkinson's disease, left leg and knee pain worsening since Saturday. )      ED Diagnosis:   Final diagnoses:   None       Code Status: DNR/DNI in past Admissions    Allergies:   Allergies   Allergen Reactions     Bee Pollen Hives     If MVI contains this - she will break out in a rash.      Cephalexin Monohydrate Hives     Ciprofloxacin Hives     Clindamycin Hives     Multi Vitamin-Minerals [Hair-Vites] Other (See Comments)     (If MV contains bee pollen she will break out in hives)      Penicillin [Penicillins] Hives     All antibiotics except zpak??????     Thiazide-Type Diuretics Other (See Comments)     Very low sodium levels     Tobramycin Hives     Vancomycin Hives     Atorvastatin      Leg cramps     Cephalexin Hives     Ciprofloxacin Hives     Ibuprofen Nausea and Vomiting and Nausea     stomach pain     Versed [Midazolam] Other (See Comments)     Confused, agiitated, for 6-7 hrs after anngiogram sedation     Zoloft [Sertraline] Other (See Comments)     Headache, elevated BP     Ace Inhibitors Cough     Advil [Ibuprofen Micronized] Nausea     Metoprolol Diarrhea      tolerates Inderal       Activity level - Baseline/Home:  Stand with Assist    Activity Level - Current:   Stand with Assist     Needed?: No    Isolation: No  Infection: Not Applicable  Bariatric?: No    Vital Signs:   Vitals:    02/20/19 2159   BP: 176/70   Resp: 18   Temp: 97.5  F (36.4  C)   TempSrc: Temporal   SpO2: 99%   Height: 1.6 m (5' 3\")       Cardiac Rhythm: ,        Pain level:      Is this patient confused?: No   Does this patient have a guardian?  No         If yes, is there guardianship documents in the Epic \"Code/ACP\" activity?  N/A         Guardian Notified?  N/A  [place] - Suicide Severity Rating Scale Completed?  Yes  If yes, what color did the patient score?  White    Patient Report: Initial Complaint: Patient presents " with complaints of leg pain that started two months ago she tripped on a sidewalk while using her walker and fell.  Since this incident, pt states her left leg has been troublesome, but has been tolerable. Four days ago the patient states she developed increasingly severe pain in her left hip causing her to report to Urgent Care. UC did xray with no evidence of fx. Pt has been controlling pain with tramadol and tylenol.       Focused Assessment: Left hip pain  Tests Performed: CT  Abnormal Results: None  Treatments provided: None  Family Comments: At bedside    OBS brochure/video discussed/provided to patient/family: Yes              Name of person given brochure if not patient: NA              Relationship to patient: NA    ED Medications: Medications - No data to display    Drips infusing?:  No    For the majority of the shift this patient was Green.   Interventions performed were NONE.    Severe Sepsis OR Septic Shock Diagnosis Present: No    To be done/followed up on inpatient unit:  NONE    ED NURSE PHONE NUMBER: [phone]

## 2019-02-21 NOTE — PLAN OF CARE
PT: Pt admitted with L hip and upper thigh pain present for a few weeks, unable to tolerate it any longer and symptoms have worsened. Per RN, pt to be seen by spine surgery today prior to PT eval.  Hold PT eval at this time.

## 2019-02-21 NOTE — PLAN OF CARE
A/O x4. VSS on RA. On bedrest. Toradol, oxycodone for pain. On schedule tylenol as well. Regular diet. Only had bites to eat for dinner. Down in xray for myelogram currently. 24g IV in R hand, order placed for midline. CT lumbar spine also ordered. Ortho following. Using bedpan.   SW for Discharge planning. Will need PT consult. Anticipated TCU at discharge.

## 2019-02-22 ENCOUNTER — APPOINTMENT (OUTPATIENT)
Dept: GENERAL RADIOLOGY | Facility: CLINIC | Age: 84
End: 2019-02-22
Attending: PHYSICIAN ASSISTANT
Payer: COMMERCIAL

## 2019-02-22 LAB
CRP SERPL-MCNC: 18.3 MG/L (ref 0–8)
ERYTHROCYTE [SEDIMENTATION RATE] IN BLOOD BY WESTERGREN METHOD: 9 MM/H (ref 0–30)

## 2019-02-22 PROCEDURE — 73560 X-RAY EXAM OF KNEE 1 OR 2: CPT | Mod: LT

## 2019-02-22 PROCEDURE — 85652 RBC SED RATE AUTOMATED: CPT | Performed by: PHYSICIAN ASSISTANT

## 2019-02-22 PROCEDURE — 99225 ZZC SUBSEQUENT OBSERVATION CARE,LEVEL II: CPT | Performed by: INTERNAL MEDICINE

## 2019-02-22 PROCEDURE — 25000132 ZZH RX MED GY IP 250 OP 250 PS 637: Performed by: PHYSICIAN ASSISTANT

## 2019-02-22 PROCEDURE — 96376 TX/PRO/DX INJ SAME DRUG ADON: CPT

## 2019-02-22 PROCEDURE — 25000132 ZZH RX MED GY IP 250 OP 250 PS 637: Performed by: INTERNAL MEDICINE

## 2019-02-22 PROCEDURE — 40000239 ZZH STATISTIC VAT ROUNDS

## 2019-02-22 PROCEDURE — G0378 HOSPITAL OBSERVATION PER HR: HCPCS

## 2019-02-22 PROCEDURE — 25000128 H RX IP 250 OP 636: Performed by: PHYSICIAN ASSISTANT

## 2019-02-22 PROCEDURE — 86140 C-REACTIVE PROTEIN: CPT | Performed by: INTERNAL MEDICINE

## 2019-02-22 PROCEDURE — 72170 X-RAY EXAM OF PELVIS: CPT

## 2019-02-22 RX ORDER — SODIUM CHLORIDE 9 MG/ML
100 INJECTION, SOLUTION INTRAVENOUS CONTINUOUS
Status: ACTIVE | OUTPATIENT
Start: 2019-02-22 | End: 2019-02-22

## 2019-02-22 RX ORDER — DEXTROSE MONOHYDRATE 25 G/50ML
25-50 INJECTION, SOLUTION INTRAVENOUS
Status: DISCONTINUED | OUTPATIENT
Start: 2019-02-22 | End: 2019-02-24 | Stop reason: HOSPADM

## 2019-02-22 RX ORDER — NICOTINE POLACRILEX 4 MG
15-30 LOZENGE BUCCAL
Status: DISCONTINUED | OUTPATIENT
Start: 2019-02-22 | End: 2019-02-24 | Stop reason: HOSPADM

## 2019-02-22 RX ADMIN — OXYCODONE HYDROCHLORIDE 5 MG: 5 TABLET ORAL at 04:30

## 2019-02-22 RX ADMIN — RANITIDINE 150 MG: 150 TABLET ORAL at 08:41

## 2019-02-22 RX ADMIN — CARVEDILOL 6.25 MG: 6.25 TABLET, FILM COATED ORAL at 08:40

## 2019-02-22 RX ADMIN — ACETAMINOPHEN 1000 MG: 500 TABLET, FILM COATED ORAL at 14:30

## 2019-02-22 RX ADMIN — CARBIDOPA AND LEVODOPA 3 HALF-TAB: 25; 100 TABLET ORAL at 21:09

## 2019-02-22 RX ADMIN — ASPIRIN 81 MG: 81 TABLET, COATED ORAL at 08:41

## 2019-02-22 RX ADMIN — CARVEDILOL 6.25 MG: 6.25 TABLET, FILM COATED ORAL at 18:23

## 2019-02-22 RX ADMIN — ACETAMINOPHEN 650 MG: 325 TABLET, FILM COATED ORAL at 04:30

## 2019-02-22 RX ADMIN — SENNOSIDES AND DOCUSATE SODIUM 1 TABLET: 8.6; 5 TABLET ORAL at 20:39

## 2019-02-22 RX ADMIN — ACETAMINOPHEN 1000 MG: 500 TABLET, FILM COATED ORAL at 20:39

## 2019-02-22 RX ADMIN — CHOLECALCIFEROL TAB 10 MCG (400 UNIT) 400 UNITS: 10 TAB at 08:40

## 2019-02-22 RX ADMIN — OXYCODONE HYDROCHLORIDE 5 MG: 5 TABLET ORAL at 19:05

## 2019-02-22 RX ADMIN — OXYCODONE HYDROCHLORIDE 5 MG: 5 TABLET ORAL at 08:40

## 2019-02-22 RX ADMIN — Medication 1 TABLET: at 20:40

## 2019-02-22 RX ADMIN — Medication 100 MCG: at 08:41

## 2019-02-22 RX ADMIN — LOSARTAN POTASSIUM 50 MG: 50 TABLET, FILM COATED ORAL at 20:39

## 2019-02-22 RX ADMIN — OXYCODONE HYDROCHLORIDE 5 MG: 5 TABLET ORAL at 14:30

## 2019-02-22 RX ADMIN — CARBIDOPA AND LEVODOPA 3 HALF-TAB: 25; 100 TABLET ORAL at 05:58

## 2019-02-22 RX ADMIN — KETOROLAC TROMETHAMINE 15 MG: 15 INJECTION, SOLUTION INTRAMUSCULAR; INTRAVENOUS at 04:01

## 2019-02-22 RX ADMIN — KETOROLAC TROMETHAMINE 15 MG: 15 INJECTION, SOLUTION INTRAMUSCULAR; INTRAVENOUS at 10:23

## 2019-02-22 RX ADMIN — CARBIDOPA AND LEVODOPA 3 HALF-TAB: 25; 100 TABLET ORAL at 11:02

## 2019-02-22 RX ADMIN — CARBIDOPA AND LEVODOPA 3 HALF-TAB: 25; 100 TABLET ORAL at 16:29

## 2019-02-22 RX ADMIN — RANITIDINE 150 MG: 150 TABLET ORAL at 20:39

## 2019-02-22 RX ADMIN — RALOXIFENE HYDROCHLORIDE 60 MG: 60 TABLET, FILM COATED ORAL at 08:41

## 2019-02-22 RX ADMIN — ACETAMINOPHEN 1000 MG: 500 TABLET, FILM COATED ORAL at 08:41

## 2019-02-22 RX ADMIN — AMLODIPINE BESYLATE 5 MG: 5 TABLET ORAL at 08:41

## 2019-02-22 ASSESSMENT — MIFFLIN-ST. JEOR: SCORE: 1012.79

## 2019-02-22 NOTE — PROGRESS NOTES
[facility]    Hospitalist Progress Note    Assessment & Plan   Opal Sin is a 83 year old female admitted on 2/20/2019. She is being admitted for persistent left upper thigh pain/hip pain.    Left upper thigh and left hip pain:  -She does have history of Parkinson disease and osteoarthritis and walks with a walker.    -She lives alone at home.    -had history of fall on December 21, 2018 and no fracture was found   -She was started on Medrol pack as well as tramadol as OP but the pain continues.    - with very restricted mobility of left hip/left back  -Orthopedics consulted, CT lumbar spine without any obvious etiology for the pain  -Continues to have unchanged pain, patient getting more x-rays today to further evaluate pain, await further recommendation from orthopedics  -Patient on bedrest at the moment, unable to complete physical therapy evaluation  -pain control as needed    Parkinson's disease:   -she lives independently and does not have any history of fall since last December.    -She sees Dr. Ferreira for her Parkinson's.    -continue with her outpatient medication.    Chronic low back pain:  -stable and related to osteoarthritis    Benign essential hypertension  HPL  -continue PTA amlodipine, losartan and coreg.  -Resume statin at DC             # Pain Assessment:  Current Pain Score 2/22/2019   Patient currently in pain? -   Pain score (0-10) 4   Pain location -   Pain descriptors -   - Opal is experiencing pain due to left hip pain. Pain management was discussed and the plan was created in a collaborative fashion.  Opal's response to the current recommendations: engaged  - Please see the plan for pain management as documented above          D/W: RN  DVT Prophylaxis: Pneumatic Compression Devices  Code Status: Full Code    Disposition: Expected discharge likely tomorrow if the workup is negative and pain adequately controlled     Blake Hein MD    Interval  History   Continues to have similar pain which is unchanged.  Pain in the left groin/left lower back area which is 4/10 at rest however aggravates to 8/10 with movement.  Denies abdominal pain nausea or vomiting.  No dysuria.    -Data reviewed today: I reviewed all new labs and imaging results over the last 24 hours. I personally reviewed no images or EKG's today.      Physical Exam   Temp: 95.8  F (35.4  C) Temp src: Oral BP: 176/83   Heart Rate: 65 Resp: 16 SpO2: 98 % O2 Device: None (Room air)    Vitals:    02/21/19 0203 02/22/19 0600   Weight: 60.5 kg (133 lb 6.1 oz) 57.9 kg (127 lb 9.6 oz)     Vital Signs with Ranges  Temp:  [95.8  F (35.4  C)] 95.8  F (35.4  C)  Heart Rate:  [54-65] 65  Resp:  [16] 16  BP: (135-176)/(65-83) 176/83  SpO2:  [96 %-98 %] 98 %  I/O last 3 completed shifts:  In: -   Out: 100 [Urine:100]    Constitutional: AAOX3, NAD  Respiratory:  No crackles, No wheezes  Cardiovascular: RRR, No murmur  GI: Soft, Non- tender, BS- normoactive  Skin/Integument: Warm and dry, no rashes  MSK: left groin tender with any movement.  No obvious deformity.  Neuro: CN- grossly intact    Medications           acetaminophen  1,000 mg Oral TID     amLODIPine  5 mg Oral Daily     aspirin  81 mg Oral Daily     Calcium-Magnesium-Zinc  1 tablet Oral TID     carbidopa-levodopa  3 half-tab Oral 4x Daily     carvedilol  6.25 mg Oral BID w/meals     cholecalciferol  400 Units Oral Daily     losartan  50 mg Oral Daily     multivitamin w/minerals  1 tablet Oral Daily     raloxifene  60 mg Oral Daily     ranitidine  150 mg Oral BID     sodium chloride (PF)  10 mL Intracatheter Q8H     sodium chloride (PF)  10 mL Intracatheter Q8H     vitamin B-12  100 mcg Oral Daily       Data     Recent Labs   Lab 02/21/19  0926   WBC 12.4*   HGB 12.2   MCV 90         POTASSIUM 3.6   CHLORIDE 103   CO2 20   BUN 26   CR 0.79   ANIONGAP 12   CHRIS 8.1*   *       Recent Results (from the past 24 hour(s))   X-ray Myelography  lumbar spine    Narrative    XR MYELOGRAPHY LUMBAR SPINE                 2/21/2019 3:45 PM       History:  LEFT leg radiculopathy with severe pain in hip/thigh.  Patient has an implanted stimulator device.    Procedure: The procedure and its risks were discussed with the patient  prior to the myelogram. The risks explained included but were not  limited to infection, bleeding, headaches, seizure, neural injury,  etc. The patient had no further questions and wished to proceed.     The lower back was prepped and draped in the usual sterile manner.  Lidocaine 1% was used for local anesthesia. A 22 gauge spinal needle  was used to enter the thecal sac at the L3-4 level under fluoroscopic  guidance. 20 mL of myelographic contrast was infused. The needle was  removed. No complications were encountered.       Fluoroscopy time: 0.3 minutes.  Medications used: 2 mL Lidocaine 1% intradermally, 20 mL Isovue 200  epidurally   Number of Images: 7    Findings:  Technically successful procedure without complications.  CT  exam to be dictated separately.     SUDHA SHIRLEY PA-C   CT Lumbar spine w contrast    Narrative    CT LUMBAR SPINE WITH INTRATHECAL CONTRAST  2/21/2019 3:54 PM     HISTORY: Previous spine surgery - pain in buttock and left leg  radiculopathy.    COMPARISON: CT 7/7/2008    TECHNIQUE: Computed tomography imaging of the lumbar spine was  performed after administration of intrathecal contrast. Coronal  sagittal reformats were constructed. Radiation dose for this scan was  reduced using automated exposure control, adjustment of the mA and/or  kV according to patient size, or iterative reconstruction technique    FINDINGS:  Postoperative change of L1 through right S1 and left S2  fusion are demonstrated. Changes of L2-L5 laminectomy are  demonstrated. No evidence of hardware complication. No fractures are  identified. Mild kyphosis is present at the thoracolumbar junction.  There is fusion at T11-12. Conus  medullaris and cauda equina are  unremarkable. Wires/leads are present within the posterior paraspinal  soft tissues of the lower thoracic spine with lead entering the thecal  sac at the level of T9-10 and electrode along the dorsal aspect of the  thecal sac at the level of T9. Paraspinal soft tissues demonstrate  atherosclerotic calcifications and extensive sigmoid diverticulosis.    Segmental analysis:  T12-L1: Mild spinal canal stenosis. No significant foraminal stenosis.  L1-2: No significant spinal canal stenosis. Mild-to-moderate foraminal  stenosis bilaterally, unchanged.  L2-3: No significant foraminal or spinal canal stenosis.  L3-4: No significant foraminal or spinal canal stenosis.  L4-5: Mild left foraminal stenosis, unchanged. No significant right  foraminal or spinal canal stenosis.  L5-S1: No significant foraminal or spinal canal stenosis.      Impression    IMPRESSION:  1. No significant change compared to 2008. No evidence of new disc  herniation or high-grade foraminal stenosis.    RACHEL DUBOIS MD

## 2019-02-22 NOTE — PROGRESS NOTES
Discussed with Dr. Zuleta - next step is to rule out indolent infection in the left hip.   Will order aspiration of the left hip with labs    Will follow    Patient may WBAT with walker assist - as long as she tolerates with pain    Kaylee Virgen PAC

## 2019-02-22 NOTE — PROGRESS NOTES
Observation Goals:     TCU / subacute rehab placement - not met    A&O. VSS. C/o severe left hip pain, managed w/ oxycodone, tylenol, and toradol. Unable to ambulate d/t pain. Using bedpan. Midline in right arm. Tolerating diet. PT, SW, and Ortho following. Continue to monitor.

## 2019-02-22 NOTE — PROGRESS NOTES
Observation goals PRIOR TO DISCHARGE     Comments: TCU / subacute rehab placement - not met   Order Class: Hospital Performed

## 2019-02-22 NOTE — PROGRESS NOTES
SW:  D: ROSANA spoke with Stevo, who has been in touch with Rhett, Rhett will wave the 3 night stay for pt, pt just needs to have therapy notes and therapy ordered upon discharge, cannot go to TCU on bedrest. SW waiting for pt to be medically stable for discharge. Keep Stevo updated on expected discharge date.    P: ROSANA will continue to coordinate discharge needs.       LESLY South

## 2019-02-22 NOTE — PROGRESS NOTES
Discussed myelogram with Dr. Pisano - we have ruled out that pain has spine etiology at this time.   I have ordered further xrays of pelvis and left knee    Will discuss further with Dr. Zuleta - on call for [facility]    Continue current cares    Kaylee Virgen PAC  [phone]

## 2019-02-22 NOTE — PLAN OF CARE
A&Ox4.  VSS ex hypertensive, decreased with AM meds.  C/o 4-5/10 pain with movement.  Utilizing PRN oxycodone, warm packs and repositioning of left leg.  XRay of pelvis and left knee ordered today.  Utilizes bedpan.  Voiding adequately.  LS clear.  BS active.  Tolerating regular diet.  Has not been physically active d/t LLE pain and awaiting imaging reports.  Requested PTA lipitor med, however declined after informed of charges d/t OBS status.

## 2019-02-22 NOTE — PLAN OF CARE
PT Note 2/22/2019 2:23 PM    PT kong received. Chart reviewed. CT myelogram is negative for spine etiology. However XR of pelvis and L knee is ordered by otho. Discussed with RN supervision. PT will initiate assessment once XR results are back.

## 2019-02-22 NOTE — PLAN OF CARE
Pt A&O x4. VSS on RA. Pt took Oxycodone x1, effective, but pt has pain with movement. Midline placed in R arm. Pt had x-ray myleograph and CT of lumbar spine, results pending. Voiding adequately on bedpan, pt on bedrest. Regular diet, tolerating well. Will continue to monitor.

## 2019-02-23 ENCOUNTER — APPOINTMENT (OUTPATIENT)
Dept: ULTRASOUND IMAGING | Facility: CLINIC | Age: 84
End: 2019-02-23
Attending: INTERNAL MEDICINE
Payer: COMMERCIAL

## 2019-02-23 ENCOUNTER — APPOINTMENT (OUTPATIENT)
Dept: PHYSICAL THERAPY | Facility: CLINIC | Age: 84
End: 2019-02-23
Attending: INTERNAL MEDICINE
Payer: COMMERCIAL

## 2019-02-23 ENCOUNTER — APPOINTMENT (OUTPATIENT)
Dept: GENERAL RADIOLOGY | Facility: CLINIC | Age: 84
End: 2019-02-23
Attending: PHYSICIAN ASSISTANT
Payer: COMMERCIAL

## 2019-02-23 LAB
APPEARANCE FLD: NORMAL
COLOR FLD: NORMAL
CRYSTALS SNV MICRO: NORMAL
GRAM STN SPEC: NORMAL
GRAM STN SPEC: NORMAL
LYMPHOCYTES NFR FLD MANUAL: 17 %
MONOS+MACROS NFR FLD MANUAL: 1 %
NEUTS BAND NFR FLD MANUAL: 82 %
SPECIMEN SOURCE FLD: NORMAL
SPECIMEN SOURCE: NORMAL
WBC # FLD AUTO: 93 /UL

## 2019-02-23 PROCEDURE — 25000125 ZZHC RX 250: Performed by: RADIOLOGY

## 2019-02-23 PROCEDURE — 89051 BODY FLUID CELL COUNT: CPT | Performed by: PHYSICIAN ASSISTANT

## 2019-02-23 PROCEDURE — 25000132 ZZH RX MED GY IP 250 OP 250 PS 637: Performed by: PHYSICIAN ASSISTANT

## 2019-02-23 PROCEDURE — 87070 CULTURE OTHR SPECIMN AEROBIC: CPT | Performed by: PHYSICIAN ASSISTANT

## 2019-02-23 PROCEDURE — G0378 HOSPITAL OBSERVATION PER HR: HCPCS

## 2019-02-23 PROCEDURE — 97162 PT EVAL MOD COMPLEX 30 MIN: CPT | Mod: GP

## 2019-02-23 PROCEDURE — 93971 EXTREMITY STUDY: CPT | Mod: LT

## 2019-02-23 PROCEDURE — 99225 ZZC SUBSEQUENT OBSERVATION CARE,LEVEL II: CPT | Performed by: INTERNAL MEDICINE

## 2019-02-23 PROCEDURE — G8980 MOBILITY D/C STATUS: HCPCS | Mod: GP,CJ

## 2019-02-23 PROCEDURE — 97161 PT EVAL LOW COMPLEX 20 MIN: CPT | Mod: GP

## 2019-02-23 PROCEDURE — G8978 MOBILITY CURRENT STATUS: HCPCS | Mod: CJ

## 2019-02-23 PROCEDURE — 89060 EXAM SYNOVIAL FLUID CRYSTALS: CPT | Performed by: PHYSICIAN ASSISTANT

## 2019-02-23 PROCEDURE — 20610 DRAIN/INJ JOINT/BURSA W/O US: CPT | Mod: LT

## 2019-02-23 PROCEDURE — 87205 SMEAR GRAM STAIN: CPT | Performed by: PHYSICIAN ASSISTANT

## 2019-02-23 PROCEDURE — 40000239 ZZH STATISTIC VAT ROUNDS

## 2019-02-23 PROCEDURE — G8979 MOBILITY GOAL STATUS: HCPCS | Mod: GP,CJ

## 2019-02-23 PROCEDURE — 25000132 ZZH RX MED GY IP 250 OP 250 PS 637: Performed by: INTERNAL MEDICINE

## 2019-02-23 RX ORDER — LIDOCAINE HYDROCHLORIDE 10 MG/ML
3 INJECTION, SOLUTION EPIDURAL; INFILTRATION; INTRACAUDAL; PERINEURAL ONCE
Status: COMPLETED | OUTPATIENT
Start: 2019-02-23 | End: 2019-02-23

## 2019-02-23 RX ORDER — BISACODYL 10 MG
10 SUPPOSITORY, RECTAL RECTAL DAILY PRN
Status: DISCONTINUED | OUTPATIENT
Start: 2019-02-23 | End: 2019-02-24 | Stop reason: HOSPADM

## 2019-02-23 RX ORDER — AMOXICILLIN 250 MG
2 CAPSULE ORAL 2 TIMES DAILY
Status: DISCONTINUED | OUTPATIENT
Start: 2019-02-23 | End: 2019-02-24 | Stop reason: HOSPADM

## 2019-02-23 RX ORDER — POLYETHYLENE GLYCOL 3350 17 G/17G
17 POWDER, FOR SOLUTION ORAL DAILY PRN
Status: DISCONTINUED | OUTPATIENT
Start: 2019-02-23 | End: 2019-02-24 | Stop reason: HOSPADM

## 2019-02-23 RX ORDER — AMOXICILLIN 250 MG
1 CAPSULE ORAL 2 TIMES DAILY
Status: DISCONTINUED | OUTPATIENT
Start: 2019-02-23 | End: 2019-02-24 | Stop reason: HOSPADM

## 2019-02-23 RX ADMIN — POLYETHYLENE GLYCOL 3350 17 G: 17 POWDER, FOR SOLUTION ORAL at 21:35

## 2019-02-23 RX ADMIN — ACETAMINOPHEN 1000 MG: 500 TABLET, FILM COATED ORAL at 20:15

## 2019-02-23 RX ADMIN — LIDOCAINE HYDROCHLORIDE 2 ML: 10 INJECTION, SOLUTION EPIDURAL; INFILTRATION; INTRACAUDAL; PERINEURAL at 12:48

## 2019-02-23 RX ADMIN — Medication 1 TABLET: at 20:15

## 2019-02-23 RX ADMIN — CARVEDILOL 6.25 MG: 6.25 TABLET, FILM COATED ORAL at 18:16

## 2019-02-23 RX ADMIN — OXYCODONE HYDROCHLORIDE 5 MG: 5 TABLET ORAL at 10:32

## 2019-02-23 RX ADMIN — RANITIDINE 150 MG: 150 TABLET ORAL at 09:30

## 2019-02-23 RX ADMIN — SENNOSIDES AND DOCUSATE SODIUM 2 TABLET: 8.6; 5 TABLET ORAL at 21:35

## 2019-02-23 RX ADMIN — LOSARTAN POTASSIUM 50 MG: 50 TABLET, FILM COATED ORAL at 20:15

## 2019-02-23 RX ADMIN — OXYCODONE HYDROCHLORIDE 5 MG: 5 TABLET ORAL at 06:14

## 2019-02-23 RX ADMIN — AMLODIPINE BESYLATE 5 MG: 5 TABLET ORAL at 09:30

## 2019-02-23 RX ADMIN — CARBIDOPA AND LEVODOPA 3 HALF-TAB: 25; 100 TABLET ORAL at 16:00

## 2019-02-23 RX ADMIN — ASPIRIN 81 MG: 81 TABLET, COATED ORAL at 09:30

## 2019-02-23 RX ADMIN — RANITIDINE 150 MG: 150 TABLET ORAL at 20:15

## 2019-02-23 RX ADMIN — OXYCODONE HYDROCHLORIDE 5 MG: 5 TABLET ORAL at 20:20

## 2019-02-23 RX ADMIN — RALOXIFENE HYDROCHLORIDE 60 MG: 60 TABLET, FILM COATED ORAL at 09:30

## 2019-02-23 RX ADMIN — CARVEDILOL 6.25 MG: 6.25 TABLET, FILM COATED ORAL at 09:30

## 2019-02-23 RX ADMIN — OXYCODONE HYDROCHLORIDE 5 MG: 5 TABLET ORAL at 14:43

## 2019-02-23 RX ADMIN — CARBIDOPA AND LEVODOPA 3 HALF-TAB: 25; 100 TABLET ORAL at 06:07

## 2019-02-23 RX ADMIN — CARBIDOPA AND LEVODOPA 3 HALF-TAB: 25; 100 TABLET ORAL at 11:16

## 2019-02-23 RX ADMIN — OXYCODONE HYDROCHLORIDE 5 MG: 5 TABLET ORAL at 01:59

## 2019-02-23 RX ADMIN — ACETAMINOPHEN 1000 MG: 500 TABLET, FILM COATED ORAL at 09:29

## 2019-02-23 RX ADMIN — CARBIDOPA AND LEVODOPA 3 HALF-TAB: 25; 100 TABLET ORAL at 21:34

## 2019-02-23 NOTE — PROGRESS NOTES
[facility]    Hospitalist Progress Note    Assessment & Plan   Opal Sin is a 83 year old female admitted on 2/20/2019. She is being admitted for persistent left upper thigh pain/hip pain.    Left upper thigh and left hip pain:  -She does have history of Parkinson disease and osteoarthritis and walks with a walker.    -She lives alone at home.    -had history of fall on December 21, 2018 and no fracture was found   -She was started on Medrol pack as well as tramadol as OP but the pain continues.    -Orthopedics consulted, CT lumbar spine without any obvious etiology for the pain  -All of the imaging studies negative thus far  -Intermittently complains of pain extending up to the mid thighs, will check a venous Doppler for completeness although I doubt this is due to DVT  -Still complains of pain about 5/10 with activity, however was able to weight-bear and walk yesterday and feels slightly better.  -Orthopedics now recommending left hip joint aspiration to rule out indolent infection, will try to coordinate with interventional radiology if it is a possibility today.  -pain control as needed    Parkinson's disease:   -she lives independently and does not have any history of fall since last December.    -She sees Dr. Ferreira for her Parkinson's.    -continue with her outpatient medication.    Chronic low back pain:  -stable and related to osteoarthritis    Benign essential hypertension  HPL  -continue PTA amlodipine, losartan and coreg.  -Blood pressure intermittently on the higher side, presumably made worse by the pain, continue to monitor for now  -Resume statin at DC       # Pain Assessment:  Current Pain Score 2/23/2019   Patient currently in pain? yes   Pain score (0-10) 5   Pain location -   Pain descriptors -   - Oapl is experiencing pain due to left hip pain. Pain management was discussed and the plan was created in a collaborative fashion.  Opal's response to the current  recommendations: engaged  - Please see the plan for pain management as documented above          D/W: RN  DVT Prophylaxis: Pneumatic Compression Devices  Code Status: Full Code    Disposition: Expected discharge unclear at this time, pending left hip arthrocentesis.    Blake Hein MD    Interval History   Pain overall slightly better and able to weight-bear on the left side however with activity still reports of pain up to 5/10 despite use of oxycodone.  No abdominal pain.  Today she reports pain that is up to her mid thighs also.  No chest pain,  no shortness of breath.    -Data reviewed today: I reviewed all new labs and imaging results over the last 24 hours. I personally reviewed no images or EKG's today.      Physical Exam   Temp: 95.6  F (35.3  C) Temp src: Axillary BP: 153/66 Pulse: 66 Heart Rate: 60 Resp: 18 SpO2: 96 % O2 Device: None (Room air)    Vitals:    02/21/19 0203 02/22/19 0600   Weight: 60.5 kg (133 lb 6.1 oz) 57.9 kg (127 lb 9.6 oz)     Vital Signs with Ranges  Temp:  [95.6  F (35.3  C)-96.8  F (36  C)] 95.6  F (35.3  C)  Pulse:  [66] 66  Heart Rate:  [60-61] 60  Resp:  [16-18] 18  BP: (140-162)/(57-72) 153/66  SpO2:  [96 %-98 %] 96 %  I/O last 3 completed shifts:  In: 250 [P.O.:240; I.V.:10]  Out: 375 [Urine:375]    Constitutional: AAOX3, NAD  Respiratory:  No crackles, No wheezes  Cardiovascular: RRR, No murmur  GI: Soft, Non- tender, BS- normoactive  Skin/Integument: Warm and dry, no rashes  MSK: left groin tender with flexion.  Question tenderness over proximal left thigh  Neuro: CN- grossly intact    Medications           acetaminophen  1,000 mg Oral TID     amLODIPine  5 mg Oral Daily     aspirin  81 mg Oral Daily     Calcium-Magnesium-Zinc  1 tablet Oral TID     carbidopa-levodopa  3 half-tab Oral 4x Daily     carvedilol  6.25 mg Oral BID w/meals     cholecalciferol  400 Units Oral Daily     losartan  50 mg Oral Daily     multivitamin w/minerals  1 tablet Oral Daily     raloxifene  60  mg Oral Daily     ranitidine  150 mg Oral BID     sodium chloride (PF)  10 mL Intracatheter Q8H     sodium chloride (PF)  10 mL Intracatheter Q8H     vitamin B-12  100 mcg Oral Daily       Data     Recent Labs   Lab 02/21/19  0926   WBC 12.4*   HGB 12.2   MCV 90         POTASSIUM 3.6   CHLORIDE 103   CO2 20   BUN 26   CR 0.79   ANIONGAP 12   CHRIS 8.1*   *       Recent Results (from the past 24 hour(s))   XR Knee Left 1/2 Views    Narrative    XR KNEE LT 1/2 VW 2/22/2019 3:00 PM    HISTORY: Total knee arthroplasty.    COMPARISON: 4/3/2008    FINDINGS: Left knee arthroplasty components appear well-seated without  evidence of complication. No significant joint effusion.      Impression    IMPRESSION: Intact appearing left knee arthroplasty.    RAUL LOMBARDI MD   XR Pelvis 1/2 Views    Narrative    XR PELVIS 1/2 VW 2/22/2019 3:14 PM    HISTORY: Left thigh pain.    COMPARISON: 6/27/2018    FINDINGS: No fracture or malalignment. Chronic loss of right hip joint  space. Left hip joint space is preserved and appears stable in  comparison with previous.      Impression    IMPRESSION: No acute osseous abnormality.    RAUL LOMBARDI MD

## 2019-02-23 NOTE — PROGRESS NOTES
Observation goals--PRIOR TO DISCHARGE    Comments:   TCU / subacute rehab placement : Partially met. Accepted at [facility]. Awaiting fluid culture.

## 2019-02-23 NOTE — PROGRESS NOTES
Observation goals--PRIOR TO DISCHARGE    Comments:   TCU / subacute rehab placement : Partially met.  Patient accepted at [facility].  Waiting for patient to be medically cleared.

## 2019-02-23 NOTE — PLAN OF CARE
A/OX4. Slightly  elevated BP, other VSS on RA. CMS intact. LLE WBAT. Ambulating in the hallways w/ A1+GB and walker. Pain managed w/ baylee Tylenol. Adequate UOP.

## 2019-02-23 NOTE — PROGRESS NOTES
SW:    I: SW following for discharge planning. Pt has been accepted at [facility] for TCU placement. Pt has [employer] and therefore 3 night stay will be waived. SW to update [facility] admissions once discharge date known, likely 2/23 vs 2/24.    P: SW to follow.    PAS-RR    D: Per DHS regulation, SW completed and submitted PAS-RR to [place] [employer] Direct Connect via the Senior LinkAge Line.  PAS-RR confirmation # is : [id number]    I: SW spoke with pt and they are aware a PAS-RR has been submitted.  SW reviewed with pt that they may be contacted for a follow up appointment within 10 days of hospital discharge if their SNF stay is < 30 days.  Contact information for [facility] LinkAge Line was also provided.    A: Pt verbalized understanding.    P: Further questions may be directed to [facility] LinkAge Line at #[phone], option #4 for PAS-RR staff.      MARTINA Lock, [facility]  Daytime (8:00am-4:30pm): [phone]  After-Hours SW Pager (4:30pm-11:30pm): [phone]

## 2019-02-23 NOTE — PLAN OF CARE
Discharge Planner PT   Patient plan for discharge: TCU or Home  Current status: PT eval completed. Pt is a 83 yr old with history of parkinson's admitted with c/o L hip pain. XR of L knee, XR of pelvis and CT of lumbar spine: negative for acute findings. Pt lives in an ILF on the 2nd floor with elevator access. Pt was ind with all ADL's including cooking/cleaning and mod I with ambulation using 4WW. Currently pt requires min assist x 1 with supine<>sit, sat at EOB with supervision, STS at CGA and gait x 60 ft with RW and CGA for safety. Pt noted with antalgic gait pattern with multiple stops due to intermittent sharp pain at the L hip.   Barriers to return to prior living situation: Pain, Fall risk, Level of assistance, decreased strength and activity tolerance.   Recommendations for discharge: TCU   Rationale for recommendations: Pt will benefit from continued skilled PT at a TCU to achieve PLOF and independence.        Entered by: Alissa Hook 02/23/2019 8:55 AM

## 2019-02-23 NOTE — PLAN OF CARE
Pt is alert/oriented x4, complains of pain in hip, tx with prn oxycodone, managed well and slept well during NOC, VSS on RA, A x 1 with WBAT in LLE. Waiting on L hip aspiration, will continue to monitor.

## 2019-02-23 NOTE — PROGRESS NOTES
Observation goals PRIOR TO DISCHARGE    Comments:   TCU / subacute rehab placement : Not met

## 2019-02-23 NOTE — PLAN OF CARE
RN:   Patient A/O x4.  VSS on RA.  Oxycodone prn for complaints of pain in the left thigh/hip.  Tylenol remains scheduled.  NPO for left hip aspiration completed today.  C/D/I bandaid over the site.  Up with SBA/Walker.  Placed back on regular diet this afternoon.  Ortho following.  SW consulted to work on dispo needs. Patient accepted at [facility].

## 2019-02-23 NOTE — PROGRESS NOTES
02/23/19 0800   Quick Adds   Type of Visit Initial PT Evaluation   Living Environment   Lives With alone   Living Arrangements independent living facility   Home Accessibility no concerns   Transportation Anticipated family or friend will provide   Living Environment Comment Pt lives in an ILF on the 2nd floor with elevator access.   Self-Care   Usual Activity Tolerance good   Current Activity Tolerance fair   Regular Exercise Yes   Activity/Exercise Type walking   Exercise Amount/Frequency 20 mins;daily   Equipment Currently Used at Home other (see comments)  (4WW)   Activity/Exercise/Self-Care Comment Pt owns a 4WW, 3WW, and std cane. Pt also reported having a WC available if needed at the [facility]. Pt was ind with all ADL's including cooking and cleaning. Pt does not drive.   Functional Level Prior   Ambulation 1-->assistive equipment   Transferring 1-->assistive equipment   Toileting 0-->independent   Bathing 0-->independent   Communication 0-->understands/communicates without difficulty   Swallowing 0-->swallows foods/liquids without difficulty   Cognition 0 - no cognition issues reported   Fall history within last six months yes   Number of times patient has fallen within last six months 2   Which of the above functional risks had a recent onset or change? ambulation;transferring   Prior Functional Level Comment Pt is mod I with ambulation using 4WW.   General Information   Onset of Illness/Injury or Date of Surgery - Date 02/20/19   Referring Physician Sanju Villar MD   Patient/Family Goals Statement Pain diminished   Pertinent History of Current Problem (include personal factors and/or comorbidities that impact the POC) Pt is a 83 yr old female with history of Parkinson's Disease, DJD, chronic pain, s/p bilateral knee replacement who presents with left hip pain is admitted under OBS status. XR of L knee, CT of lumbar spine and XR of pelvis negative for acute events.    Precautions/Limitations fall  precautions   Weight-Bearing Status - LLE weight-bearing as tolerated   Weight-Bearing Status - RLE weight-bearing as tolerated   General Observations Cooperative   General Info Comments Activity: Per Ortho note from 02/22/2019: pt may WBAT using walker   Cognitive Status Examination   Orientation orientation to person, place and time   Level of Consciousness alert   Follows Commands and Answers Questions 100% of the time   Personal Safety and Judgment intact   Memory intact   Pain Assessment   Patient Currently in Pain Yes, see Vital Sign flowsheet  (4/10 at rest, and intermitted 8/10 with gait, RN aware)   Range of Motion (ROM)   ROM Comment L hip: Unable to assess due to pain, otherwise WFL   Strength   Strength Comments L hip and knee: unable to assess due to pain, otherwise: 4/5   Bed Mobility   Bed Mobility Comments Supine>sit: Min assist x 1, Sit>supine: min assist x 1 to guide LLE   Transfer Skills   Transfer Comments STS with CGA and verbal cues on hand placement.   Gait   Gait Comments 60 ft with RW and CGA for safety. Pt took multiple stops during gait due to intermitted sharp pain at the L hip. Overall antalgic gait pattern noted.    Balance   Balance Comments Sitting: good   Sensory Examination   Sensory Perception no deficits were identified   Coordination   Coordination no deficits were identified   Muscle Tone   Muscle Tone no deficits were identified   General Therapy Interventions   Intervention Comments Pt is admitted under OBS status and PT eval is performed for recommendation purpose. PT will defer all interventions to TCU at this time.   Clinical Impression   Criteria for Skilled Therapeutic Intervention evaluation only   PT Diagnosis Impaired gait   Influenced by the following impairments Decreased strength, decreased activity toelrance, pain   Functional limitations due to impairments assistance needed with mobility   Clinical Presentation Evolving/Changing   Clinical Presentation Rationale  "Current clinical and fucntional presentation   Clinical Decision Making (Complexity) Low complexity   Anticipated Discharge Disposition Transitional Care Facility   Risk & Benefits of therapy have been explained Yes   Patient, Family & other staff in agreement with plan of care Yes   Clinical Impression Comments Pt presents with L hip pain along with decreased strength and activiyt tolerance limiting mobility. Pt will benefit from continued skilled PT at a TCU to achieve PLOF before returning to ILF.    PT G-Codes   G-code Mobility: Walking and moving around   Mobility: Walking and Moving Around Current Status () CJ   Current Mobility Modifier Rationale Current level of assistnace needed with fucntional mobility   Mobility: Walking and Moving Around Goal Status () CJ   Mobility: Walking and Moving Around Discharge Status () CJ   Discharge Mobility Modifier Rationale PT eval only.   [facility] AM-PAC  \"6 Clicks\" V.2 Basic Mobility Inpatient Short Form   1. Turning from your back to your side while in a flat bed without using bedrails? 3 - A Little   2. Moving from lying on your back to sitting on the side of a flat bed without using bedrails? 3 - A Little   3. Moving to and from a bed to a chair (including a wheelchair)? 3 - A Little   4. Standing up from a chair using your arms (e.g., wheelchair, or bedside chair)? 3 - A Little   5. To walk in hospital room? 3 - A Little   6. Climbing 3-5 steps with a railing? 2 - A Lot   Basic Mobility Raw Score (Score out of 24.Lower scores equate to lower levels of function) 17   Total Evaluation Time   Total Evaluation Time (Minutes) 30     "

## 2019-02-23 NOTE — PROGRESS NOTES
Orthopedic Surgery  Opal Sin  2019  Admit Date:  2019    IR aspiration of left hip today. Per patient and nursing staff, pain is improved. Patient is now weightbearing on the LLE, mobilizing with PT. Patient states today that her pain is most significant in her lower back. No CP/SOB/N/V.     Alert and orient to person, place, and time.  Vital Sign Ranges  Temperature Temp  Av.1  F (35.6  C)  Min: 95.6  F (35.3  C)  Max: 96.8  F (36  C)   Blood pressure Systolic (24hrs), Av , Min:131 , Max:162        Diastolic (24hrs), Av, Min:57, Max:70      Pulse No Data Recorded   Respirations Resp  Av.2  Min: 16  Max: 18   Pulse oximetry SpO2  Av.2 %  Min: 96 %  Max: 97 %       Focused exam of the left lower extremity demonstrates no obvious deformity.  Normal alignment.  Skin is clean, dry, and intact throughout.  There is no erythema, induration, or warmth.  There is no pain with passive hip range of motion.  No pain with heel strike.  Negative logroll.  Some tenderness to palpation over the proximal quadriceps muscle belly.  No tenderness to palpation distally. Sensation intact to light touch in tibial, peroneal, sural and saphenous distributions. Motor intact in quadriceps, hamstrings, tibialis anterior, EHL, and gastrocsoleus distributions. Dorsalis pedis and anterior tibial pulses 2+. Capillary refill <2 seconds.    Labs:  Recent Labs   Lab Test 19  0926 10/30/18  1059 18  1434   POTASSIUM 3.6 4.8 4.8     Recent Labs   Lab Test 19  0926 10/30/18  1059 18  1434   HGB 12.2 13.1 12.6     Recent Labs   Lab Test 18  0639 17  1745 16  1405   INR 1.15* 0.89 0.94     Recent Labs   Lab Test 19  0926 10/30/18  1059 18  1434    240 229     Left hip aspirate demonstrates 93 WBC with 84% neutrophils. Fluid negative for crystals. Cultures pending.     A/P  1. Opal Sin is an 83 year old female with left hip/thigh/low back pain  of unclear etiology, pain and mobility clinically improving.    -Cell count from left hip aspiration negative. No crystals. We will continue to monitor cultures as an outpatient. With her benign physical exam and improving pain/mobility, I have a low index of suspicion of septic arthritis.    -Imaging negative for acute bony injury   -Recommend follow-up with PCP as outpatient, ortho if things not improving upon discharge.     2. Disposition   OK to discharge from orthopedic perspective. No further workup per ortho at this time.     Eulalia Jo PA-C

## 2019-02-23 NOTE — PROGRESS NOTES
Observation goals--PRIOR TO DISCHARGE    Comments:   TCU / subacute rehab placement : Partially met.  Patient accepted at [facility].  Waiting for patient to be medically cleared.   Patient heading off the unit @1230 for left hip aspiration.

## 2019-02-24 VITALS
TEMPERATURE: 95.8 F | HEIGHT: 63 IN | HEART RATE: 66 BPM | SYSTOLIC BLOOD PRESSURE: 149 MMHG | BODY MASS INDEX: 23.34 KG/M2 | DIASTOLIC BLOOD PRESSURE: 68 MMHG | WEIGHT: 131.7 LBS | OXYGEN SATURATION: 96 % | RESPIRATION RATE: 18 BRPM

## 2019-02-24 VITALS
SYSTOLIC BLOOD PRESSURE: 144 MMHG | DIASTOLIC BLOOD PRESSURE: 67 MMHG | HEART RATE: 56 BPM | BODY MASS INDEX: 23.6 KG/M2 | WEIGHT: 133.2 LBS | OXYGEN SATURATION: 98 % | TEMPERATURE: 96.8 F | RESPIRATION RATE: 18 BRPM

## 2019-02-24 PROCEDURE — 25000132 ZZH RX MED GY IP 250 OP 250 PS 637: Performed by: PHYSICIAN ASSISTANT

## 2019-02-24 PROCEDURE — 99217 ZZC OBSERVATION CARE DISCHARGE: CPT | Performed by: INTERNAL MEDICINE

## 2019-02-24 PROCEDURE — 25000132 ZZH RX MED GY IP 250 OP 250 PS 637: Performed by: INTERNAL MEDICINE

## 2019-02-24 PROCEDURE — G0378 HOSPITAL OBSERVATION PER HR: HCPCS

## 2019-02-24 RX ORDER — OXYCODONE HYDROCHLORIDE 5 MG/1
2.5 TABLET ORAL EVERY 6 HOURS PRN
Qty: 10 TABLET | Refills: 0 | Status: SHIPPED | OUTPATIENT
Start: 2019-02-24 | End: 2019-02-27

## 2019-02-24 RX ADMIN — RALOXIFENE HYDROCHLORIDE 60 MG: 60 TABLET, FILM COATED ORAL at 09:20

## 2019-02-24 RX ADMIN — RANITIDINE 150 MG: 150 TABLET ORAL at 09:21

## 2019-02-24 RX ADMIN — ACETAMINOPHEN 650 MG: 325 TABLET, FILM COATED ORAL at 00:55

## 2019-02-24 RX ADMIN — OXYCODONE HYDROCHLORIDE 2.5 MG: 5 TABLET ORAL at 07:22

## 2019-02-24 RX ADMIN — Medication 10 MG: at 10:09

## 2019-02-24 RX ADMIN — CARBIDOPA AND LEVODOPA 3 HALF-TAB: 25; 100 TABLET ORAL at 05:59

## 2019-02-24 RX ADMIN — OXYCODONE HYDROCHLORIDE 2.5 MG: 5 TABLET ORAL at 00:54

## 2019-02-24 RX ADMIN — ASPIRIN 81 MG: 81 TABLET, COATED ORAL at 09:21

## 2019-02-24 RX ADMIN — CARBIDOPA AND LEVODOPA 3 HALF-TAB: 25; 100 TABLET ORAL at 11:46

## 2019-02-24 RX ADMIN — ACETAMINOPHEN 1000 MG: 500 TABLET, FILM COATED ORAL at 07:22

## 2019-02-24 RX ADMIN — AMLODIPINE BESYLATE 5 MG: 5 TABLET ORAL at 09:20

## 2019-02-24 RX ADMIN — SENNOSIDES AND DOCUSATE SODIUM 1 TABLET: 8.6; 5 TABLET ORAL at 09:20

## 2019-02-24 RX ADMIN — CARVEDILOL 6.25 MG: 6.25 TABLET, FILM COATED ORAL at 09:21

## 2019-02-24 RX ADMIN — OXYCODONE HYDROCHLORIDE 5 MG: 5 TABLET ORAL at 12:35

## 2019-02-24 ASSESSMENT — MIFFLIN-ST. JEOR: SCORE: 1031.39

## 2019-02-24 NOTE — PROVIDER NOTIFICATION
MD Notification    Notified Person: MD    Notified Person Name: Dr Hein    Notification Date/Time: 02/24/19 12:57 PM     Notification Interaction: paged    Purpose of Notification: [facility] called requesting a substitute order for patient's metamucil as a staff member is allergic to it    Orders Received: Substitute= Miralax    Comments:

## 2019-02-24 NOTE — PLAN OF CARE
A/O x4.  VSS on RA. L hip aspiration site w/ bandaid, CDI. Ortho signed off. Pain managed w/ PRN Oxycodone and baylee Tylenol. C/o constipation, last BM on 2/20/19, miralax and senna given, no results yet.Trudi reg diet well. A1+gb and walker. Likely to discharge to [facility] tomorrow.

## 2019-02-24 NOTE — PROGRESS NOTES
Observation goals--PRIOR TO DISCHARGE    Comments:   TCU / subacute rehab placement : Partially met. Accepted at [facility]. Fluid culture pending.

## 2019-02-24 NOTE — PLAN OF CARE
Observation goals PRIOR TO DISCHARGE     Comments: TCU / subacute rehab placement- met, expected discharge to [facility] today

## 2019-02-24 NOTE — DISCHARGE SUMMARY
[facility]    Discharge Summary  Hospitalist    Date of Admission:  2/20/2019  Date of Discharge:  2/24/2019  Discharging Provider: Blake Hein MD    Discharge Diagnoses      Left hip strain   Parkinson's disease   Chronic low back pain  Benign essential hypertension  HPL      Hospital Course   Opal Sin is a 83 year old female admitted on 2/20/2019. She is being admitted for persistent left upper thigh pain/hip pain.     Left upper thigh and left hip pain most likely due to left hip strain:  -She does have history of Parkinson disease and osteoarthritis and walks with a walker.    -She lives alone at home.    -had history of fall on December 21, 2018 and no fracture was found   -She was started on Medrol pack as well as tramadol as OP but the pain continues.    -Orthopedics consulted, CT lumbar spine without any obvious etiology for the pain  -Comprehensive imaging studies including lumbar spine CT, x-ray of the pelvis were all negative  -Venous Doppler of the left lower extremity was also negative  -Orthopedic surgery was consulted, given negative initial workup, a joint aspiration was also done to rule out indolent infection which was unremarkable.  No evidence of crystal disease.  -Slowly pain was improving, plan is to discharge to TCU.  -Most likely cause of her pain is left hip strain    Parkinson's disease:   -she lives independently and does not have any history of fall since last December.    -She sees Dr. Ferreira for her Parkinson's.    -continue with her outpatient medication.     Chronic low back pain:  -stable and related to osteoarthritis     Benign essential hypertension  HPL  -continue PTA amlodipine, losartan and coreg.  -Resume statin at DC        # Discharge Pain Plan:   - During her hospitalization, Opal experienced pain due to left hip strain.  The pain plan for discharge was discussed with Opal and the plan was created in a collaborative fashion.    - Opioids  prescribed on discharge: Please see orders      Blake Hein MD    Significant Results and Procedures       Pending Results     Unresulted Labs Ordered in the Past 30 Days of this Admission     Date and Time Order Name Status Description    2/22/2019 1557 Fluid Culture Aerobic Bacterial Preliminary           Code Status   Full Code       Primary Care Physician   Damian Russ MD    Physical Exam   Temp: 95.8  F (35.4  C) Temp src: Oral BP: 149/68   Heart Rate: 58 Resp: 18 SpO2: 96 % O2 Device: None (Room air)      Constitutional: AAOX3, NAD  Respiratory: CTA B/L, Normal WOB  Cardiovascular: RRR, No murmur  GI: Soft, Non- tender, BS- normoactive  MSK: Slightly better range of movement in the left hip today   neuro: CN- grossly intact    Discharge Disposition   Discharged to short-term care facility  Condition at discharge: Stable    Consultations This Hospital Stay   SOCIAL WORK IP CONSULT  PHYSICAL THERAPY ADULT IP CONSULT  ORTHOPEDIC SURGERY IP CONSULT  VASCULAR ACCESS ADULT IP CONSULT  PHYSICAL THERAPY ADULT IP CONSULT  OCCUPATIONAL THERAPY ADULT IP CONSULT    Time Spent on this Encounter   I, Blake Hein, personally saw the patient today and spent less than or equal to 30 minutes discharging this patient.    Discharge Orders      General info for SNF    Length of Stay Estimate: Short Term Care: Estimated # of Days <30  Condition at Discharge: Improving  Level of care:skilled   Rehabilitation Potential: Good  Admission H&P remains valid and up-to-date: Yes  Recent Chemotherapy: N/A  Use Nursing Home Standing Orders: N/A     Mantoux instructions    Give two-step Mantoux (PPD) Per Facility Policy Yes     Follow Up and recommended labs and tests    Follow up with long-term physician.  The following labs/tests are recommended:     Activity - Up with nursing assistance     Full Code     Physical Therapy Adult Consult    Evaluate and treat as clinically indicated.    Reason:     Occupational  Therapy Adult Consult    Evaluate and treat as clinically indicated.    Reason:     Fall precautions     Advance Diet as Tolerated    Follow this diet upon discharge: Orders Placed This Encounter      Advance Diet as Tolerated: Regular Diet Adult       Discharge Medications   Current Discharge Medication List      START taking these medications    Details   oxyCODONE (ROXICODONE) 5 MG tablet Take 0.5 tablets (2.5 mg) by mouth every 6 hours as needed for moderate to severe pain  Qty: 10 tablet, Refills: 0    Associated Diagnoses: Strain of left hip and thigh, initial encounter         CONTINUE these medications which have NOT CHANGED    Details   acetaminophen (TYLENOL) 500 MG tablet Take 1,000 mg by mouth 3 times daily as needed for mild pain       albuterol (PROAIR HFA/PROVENTIL HFA/VENTOLIN HFA) 108 (90 Base) MCG/ACT Inhaler Inhale 1-2 puffs into the lungs every 6 hours as needed for shortness of breath / dyspnea or wheezing  Qty: 18 g, Refills: 1    Associated Diagnoses: Acute bronchospasm      alendronate (FOSAMAX) 35 MG tablet TAKE 1 TABLET BY MOUTH EVERY 7 DAYS  Qty: 12 tablet, Refills: 3    Associated Diagnoses: Osteoporosis with current pathological fracture, unspecified osteoporosis type, sequela      amLODIPine (NORVASC) 5 MG tablet Take 1 tablet (5 mg) by mouth daily  Qty: 90 tablet, Refills: 3    Comments: For Profile Only - patient will call to fill  Associated Diagnoses: Benign essential hypertension      aspirin EC 81 MG EC tablet Take 1 tablet (81 mg) by mouth daily    Associated Diagnoses: Transient cerebral ischemia, unspecified type      atorvastatin (LIPITOR) 20 MG tablet TAKE 1 TABLET(20 MG) BY MOUTH DAILY  Qty: 90 tablet, Refills: 3    Associated Diagnoses: Mixed hyperlipidemia      Calcium-Magnesium 300-300 MG TABS Take 1 tablet by mouth 3 times daily       carbidopa-levodopa (SINEMET)  MG per tablet Take 1.5 tablets by mouth 4 times daily Takes at 6am, 11am, 4pm, and 9 pm       carvedilol (COREG) 6.25 MG tablet TAKE 1 TABLET(6.25 MG) BY MOUTH TWICE DAILY WITH MEALS  Qty: 180 tablet, Refills: 3    Associated Diagnoses: Essential hypertension with goal blood pressure less than 140/90      Cholecalciferol (VITAMIN D3 PO) Take 400 Units by mouth daily       Cyanocobalamin (VITAMIN B 12 PO) Take 100 mcg by mouth daily       Iron-Vitamins (GERITOL COMPLETE) TABS Take 1 tablet by mouth daily      loperamide (IMODIUM) 2 MG capsule Take 1 capsule (2 mg) by mouth 4 times daily as needed for diarrhea  Qty: 20 capsule, Refills: 0    Associated Diagnoses: Viral gastroenteritis      losartan (COZAAR) 50 MG tablet TAKE 1 TABLET(50 MG) BY MOUTH DAILY  Qty: 90 tablet, Refills: 3    Associated Diagnoses: Essential hypertension with goal blood pressure less than 140/90      raloxifene (EVISTA) 60 MG tablet TAKE 1 TABLET BY MOUTH DAILY  Qty: 90 tablet, Refills: 3    Comments: Due for apt end of Oct  Associated Diagnoses: Age-related osteoporosis without current pathological fracture      ranitidine (ZANTAC) 150 MG tablet Take 75 mg by mouth 2 times daily      senna-docusate (SENOKOT-S;PERICOLACE) 8.6-50 MG per tablet Take 1 tablet by mouth 2 times daily as needed for constipation  Qty: 30 tablet, Refills: 0    Associated Diagnoses: Closed displaced fracture of right ilium, unspecified fracture morphology, initial encounter (H)      traMADol (ULTRAM) 50 MG tablet Take 50 mg by mouth every 6 hours as needed for severe pain      Psyllium (METAMUCIL FIBER PO) Take 1 capsule by mouth daily as needed          STOP taking these medications       azithromycin (ZITHROMAX) 500 MG tablet Comments:   Reason for Stopping:             Allergies   Allergies   Allergen Reactions     Bee Pollen Hives     If MVI contains this - she will break out in a rash.      Cephalexin Monohydrate Hives     Ciprofloxacin Hives     Clindamycin Hives     Multi Vitamin-Minerals [Hair-Vites] Other (See Comments)     (If MV contains bee  pollen she will break out in hives)      Penicillin [Penicillins] Hives     All antibiotics except zpak??????     Thiazide-Type Diuretics Other (See Comments)     Very low sodium levels     Tobramycin Hives     Vancomycin Hives     Atorvastatin      Leg cramps     Cephalexin Hives     Ciprofloxacin Hives     Ibuprofen Nausea and Vomiting and Nausea     stomach pain     Versed [Midazolam] Other (See Comments)     Confused, agiitated, for 6-7 hrs after anngiogram sedation     Zoloft [Sertraline] Other (See Comments)     Headache, elevated BP     Ace Inhibitors Cough     Advil [Ibuprofen Micronized] Nausea     Metoprolol Diarrhea      tolerates Inderal     Data   Most Recent 3 CBC's:  Recent Labs   Lab Test 02/21/19  0926 10/30/18  1059 07/27/18  1434   WBC 12.4* 9.4 9.0   HGB 12.2 13.1 12.6   MCV 90 94 96    240 229      Most Recent 3 BMP's:  Recent Labs   Lab Test 02/21/19  0926 10/30/18  1059 07/27/18  1434    136 138   POTASSIUM 3.6 4.8 4.8   CHLORIDE 103 102 107   CO2 20 26 21   BUN 26 25 27   CR 0.79 1.04 1.19*   ANIONGAP 12 8 10   CHRIS 8.1* 9.1 8.7   * 83 107*     Most Recent 2 LFT's:  Recent Labs   Lab Test 10/30/18  1059 06/28/18  0740   AST 20 18   ALT 12 9   ALKPHOS 39* 55   BILITOTAL 0.5 0.6     Most Recent INR's and Anticoagulation Dosing History:  Anticoagulation Dose History     Recent Dosing and Labs Latest Ref Rng & Units 11/16/2009 10/28/2014 11/15/2016 11/17/2016 11/29/2016 8/8/2017 6/27/2018    INR 0.86 - 1.14 0.99 0.96 1.26(H) 1.15(H) 0.94 0.89 1.15(H)        Most Recent 3 Troponin's:  Recent Labs   Lab Test 08/09/17  0515 08/08/17  2200 11/30/16  0415  06/25/14  1436  09/28/13  1305   TROPI 0.021 <0.015  The 99th percentile for upper reference range is 0.045 ug/L.  Troponin values in   the range of 0.045 - 0.120 ug/L may be associated with risks of adverse   clinical events.   <0.015  The 99th percentile for upper reference range is 0.045 ug/L.  Troponin values in   the range of  0.045 - 0.120 ug/L may be associated with risks of adverse   clinical events.     < >  --    < >  --    TROPONIN  --   --   --   --  0.01  --  0.01    < > = values in this interval not displayed.     Most Recent Cholesterol Panel:  Recent Labs   Lab Test 07/27/18  1434   CHOL 117   LDL 31   HDL 45*   TRIG 205*     Most Recent 6 Bacteria Isolates From Any Culture (See EPIC Reports for Culture Details):  Recent Labs   Lab Test 02/23/19  1256 11/30/16  1210 10/28/14  1016   CULT PENDING No MRSA isolated Culture negative for acid fast bacilli  Assayed at [facility] [address]    Culture negative after 4 weeks  No growth     Most Recent TSH, T4 and A1c Labs:  Recent Labs   Lab Test 10/30/18  1059  08/08/17  2200   TSH 1.26   < > 0.95   A1C  --   --  5.6    < > = values in this interval not displayed.       Results for orders placed or performed during the hospital encounter of 02/20/19   Abd/pelvis CT no contrast - Stone Protocol    Narrative    CT ABDOMEN PELVIS W/O CONTRAST  2/20/2019 11:41 PM     HISTORY: Left hip pain.    TECHNIQUE: Noncontrast CT abdomen and pelvis was performed. Radiation  dose for this scan was reduced using automated exposure control,  adjustment of the mA and/or kV according to patient size, or iterative  reconstruction technique.    COMPARISON: 11/28/2006.    FINDINGS:  Abdomen: There is mild scarring at the lung bases. Left breast  prosthesis. Coronary artery atherosclerotic calcifications. The heart  size is normal. Evaluation of the solid abdominal organs is limited by  the lack of intravenous contrast. The liver, spleen, gallbladder,  pancreas and adrenal glands are normal in appearance. There is  cortical scarring and a small probable high-density cyst at the  lateral aspect of the right kidney. Cortical scarring and simple cysts  in the left kidney. No hydronephrosis. There is no abdominal or pelvic  lymph node enlargement. There is atherosclerotic calcification  of the  aorta and its branches. No aneurysm.    Pelvis: There are colonic diverticula without acute diverticulitis. No  bowel obstruction or inflammation. No free intraperitoneal gas or  fluid. Postoperative changes in the lumbosacral spine. Degenerative  disease throughout the spine and in the hips. Old right pelvic  fractures. No acute fracture.      Impression    IMPRESSION:  1. No acute abnormality. No bowel obstruction or inflammation.  2. Colonic diverticula without acute diverticulitis.    CAMMY VERONICA MD   XR Thoracic Lumbar Spine 2 Views    Narrative    XR THORACIC LUMBAR SPINE 2 VW 2/21/2019 3:44 PM    COMPARISON: 7/24/2008    HISTORY: RIGHT leg radiculopathy.      Impression    IMPRESSION: Instrumented surgical fusion at L1 through the sacrum.  Hardware appears intact and in unchanged position. Focal kyphosis  again seen at T12-L1, slightly worsened since 7/24/2008.    ASHISH FISCHER MD   X-ray Myelography lumbar spine    Narrative    XR MYELOGRAPHY LUMBAR SPINE                 2/21/2019 3:45 PM       History:  LEFT leg radiculopathy with severe pain in hip/thigh.  Patient has an implanted stimulator device.    Procedure: The procedure and its risks were discussed with the patient  prior to the myelogram. The risks explained included but were not  limited to infection, bleeding, headaches, seizure, neural injury,  etc. The patient had no further questions and wished to proceed.     The lower back was prepped and draped in the usual sterile manner.  Lidocaine 1% was used for local anesthesia. A 22 gauge spinal needle  was used to enter the thecal sac at the L3-4 level under fluoroscopic  guidance. 20 mL of myelographic contrast was infused. The needle was  removed. No complications were encountered.       Fluoroscopy time: 0.3 minutes.  Medications used: 2 mL Lidocaine 1% intradermally, 20 mL Isovue 200  epidurally   Number of Images: 7    Findings:  Technically successful procedure without  complications.  CT  exam to be dictated separately.     SUDHA SHIRLEY PA-C   CT Lumbar spine w contrast    Narrative    CT LUMBAR SPINE WITH INTRATHECAL CONTRAST  2/21/2019 3:54 PM     HISTORY: Previous spine surgery - pain in buttock and left leg  radiculopathy.    COMPARISON: CT 7/7/2008    TECHNIQUE: Computed tomography imaging of the lumbar spine was  performed after administration of intrathecal contrast. Coronal  sagittal reformats were constructed. Radiation dose for this scan was  reduced using automated exposure control, adjustment of the mA and/or  kV according to patient size, or iterative reconstruction technique    FINDINGS:  Postoperative change of L1 through right S1 and left S2  fusion are demonstrated. Changes of L2-L5 laminectomy are  demonstrated. No evidence of hardware complication. No fractures are  identified. Mild kyphosis is present at the thoracolumbar junction.  There is fusion at T11-12. Conus medullaris and cauda equina are  unremarkable. Wires/leads are present within the posterior paraspinal  soft tissues of the lower thoracic spine with lead entering the thecal  sac at the level of T9-10 and electrode along the dorsal aspect of the  thecal sac at the level of T9. Paraspinal soft tissues demonstrate  atherosclerotic calcifications and extensive sigmoid diverticulosis.    Segmental analysis:  T12-L1: Mild spinal canal stenosis. No significant foraminal stenosis.  L1-2: No significant spinal canal stenosis. Mild-to-moderate foraminal  stenosis bilaterally, unchanged.  L2-3: No significant foraminal or spinal canal stenosis.  L3-4: No significant foraminal or spinal canal stenosis.  L4-5: Mild left foraminal stenosis, unchanged. No significant right  foraminal or spinal canal stenosis.  L5-S1: No significant foraminal or spinal canal stenosis.      Impression    IMPRESSION:  1. No significant change compared to 2008. No evidence of new disc  herniation or high-grade foraminal  stenosis.    RACHEL DUBOIS MD   XR Knee Left 1/2 Views    Narrative    XR KNEE LT 1/2 VW 2/22/2019 3:00 PM    HISTORY: Total knee arthroplasty.    COMPARISON: 4/3/2008    FINDINGS: Left knee arthroplasty components appear well-seated without  evidence of complication. No significant joint effusion.      Impression    IMPRESSION: Intact appearing left knee arthroplasty.    RAUL LOMBARDI MD   XR Pelvis 1/2 Views    Narrative    XR PELVIS 1/2 VW 2/22/2019 3:14 PM    HISTORY: Left thigh pain.    COMPARISON: 6/27/2018    FINDINGS: No fracture or malalignment. Chronic loss of right hip joint  space. Left hip joint space is preserved and appears stable in  comparison with previous.      Impression    IMPRESSION: No acute osseous abnormality.    RAUL LOMBARDI MD   XR Joint Aspiration Major Left    Narrative    JOINT ASPIRATION MAJOR LEFT  2/23/2019 1:11 PM     HISTORY:  Left hip pain.    COMPARISON: CT scan of the abdomen and pelvis dated 2/20/2019.    FINDINGS: After obtaining informed consent, the patient was placed in  a supine position on the fluoroscopy table. The skin overlying the  left hip was prepped and draped in the usual sterile manner. 1%  lidocaine was injected for local anesthesia. Under fluoroscopic  guidance, a 21-gauge needle was passed into the left hip joint space.  Less than 1 mL of bloody fluid was able to be aspirated out. This was  submitted to the lab for testing.    The patient tolerated the procedure well. There were no immediate  postprocedure complications.    Fluoroscopy time: 0.3 minutes.    One spot fluoroscopic image saved.      Impression    IMPRESSION: Left hip aspiration performed as above.   US Lower Extremity Venous Duplex Left    Narrative    ULTRASOUND LOWER EXTREMITY VENOUS DUPLEX LEFT  2/23/2019 10:15 AM    HISTORY: Rule out deep vein thrombosis, left groin/upper thigh pain.    TECHNIQUE:  Venous Doppler US.? Color flow and spectral Doppler with  waveform analysis performed.       Impression    IMPRESSION:  No evidence of lower extremity deep venous thrombosis.    FIDEL SOLANO MD

## 2019-02-24 NOTE — PLAN OF CARE
RN:  Patient A/O x4.  VSS on RA.  Oxycodone for pain in left leg/hip/back prn.  Observation goals met.  Up WBAT to the chair and bathroom with assist.  Had a BM after suppository.  Tolerating diet.  Able to call and state needs.  Discharged to [facility] for short rehab TCU stay.  AVS reviewed with the patient prior to the transfer.

## 2019-02-24 NOTE — PROGRESS NOTES
SW:    I: SW following for discharge planning. Pt has been accepted at [facility] for TCU placement. Pt has family transporting and they are anticipated to be here around 1300. ROSANA faxed discharge orders to facility TCU nurses station ([phone]).     P: Pt to discharge to [facility] via family transport at 1300.    MARTINA Lock, [facility]. Gould HospitalSW  Daytime (8:00am-4:30pm): [phone]  After-Hours ROSANA Pager (4:30pm-11:30pm): [phone]

## 2019-02-24 NOTE — PROGRESS NOTES
A&O, VSS on RA, A1 gait/walker, regular diet, [place], puncture site L hip CDI, BM regimen ordered last BM 2/20/19, pain controlled with tylenol and oxycodone, due to discharge to [facility] today, continue to monitor

## 2019-02-24 NOTE — PROGRESS NOTES
Observation goals PRIOR TO DISCHARGE     Comments: TCU / subacute rehab placement- Met.  Expected discharge to [facility] today at 1300.

## 2019-02-24 NOTE — PLAN OF CARE
Observation goals PRIOR TO DISCHARGE     Comments: TCU / subacute rehab placement- met, expected discharge to [facility] today

## 2019-02-25 ENCOUNTER — NURSING HOME VISIT (OUTPATIENT)
Dept: GERIATRICS | Facility: CLINIC | Age: 84
End: 2019-02-25
Payer: COMMERCIAL

## 2019-02-25 ENCOUNTER — PATIENT OUTREACH (OUTPATIENT)
Dept: CARE COORDINATION | Facility: CLINIC | Age: 84
End: 2019-02-25

## 2019-02-25 DIAGNOSIS — G89.4 CHRONIC PAIN SYNDROME: Primary | ICD-10-CM

## 2019-02-25 DIAGNOSIS — N18.30 CKD (CHRONIC KIDNEY DISEASE) STAGE 3, GFR 30-59 ML/MIN (H): ICD-10-CM

## 2019-02-25 DIAGNOSIS — R53.81 PHYSICAL DECONDITIONING: ICD-10-CM

## 2019-02-25 DIAGNOSIS — I10 ESSENTIAL HYPERTENSION, BENIGN: ICD-10-CM

## 2019-02-25 DIAGNOSIS — G20.A1 PARKINSON'S DISEASE (H): ICD-10-CM

## 2019-02-25 DIAGNOSIS — M79.659 PAIN OF THIGH, UNSPECIFIED LATERALITY: ICD-10-CM

## 2019-02-25 PROCEDURE — 99309 SBSQ NF CARE MODERATE MDM 30: CPT | Performed by: NURSE PRACTITIONER

## 2019-02-25 PROCEDURE — 99207 ZZC CDG-MDM COMPONENT: MEETS LOW - DOWN CODED: CPT | Performed by: NURSE PRACTITIONER

## 2019-02-25 NOTE — LETTER
2/25/2019        RE: Opal Sin  [address]  [address]  [facility] [address]        [place] [facility]  PRIMARY CARE PROVIDER AND CLINIC:  Damian Russ [address] / [place] [address]  Chief Complaint   Patient presents with     Hospital F/U     [facility] Record Number:  [id number]  Place of Service where encounter took place:  [facility] ([facility]) [[id number]]    HPI:    Opal Sin is a 83 year old  (1935), PMH HTN, CKD chronic low back pain, Parkinson's disease, presents with c/o left thigh pain admitted to the above facility from  [facility].  Hospital stay 2/20/19 through 2/24/19.   Left Thigh Pain:  Hx of fall 12/21 no Fx found, started on medrol dosepack and tramadol as OP but pain continues, CT lumbar spine without obvious etiology, Ortho consult, left hip aspirate with no etiology found, pain improved and localized to low  Back at time of discharge.   Parkinson's: followed by neurology Dr. Ferreira, living indep at home   HTN: no change in PTA meds  Admitted to this facility for  rehab, medical management and nursing care.  HPI information obtained from: facility chart records, facility staff, patient report and [facility] chart reviewCurrent issues are:   On exam today patient is sitting up in chair, states pain in left hip is extreme/severe when she moves her leg, shooting pain left thigh, patient states during the night she has more muscle spasm type pain, states the pain started abruptly about a week ago when she was playing dominos, denies fever, chills, cough, congestion, SOB, N/V/D or constipation, patient states she thinks her lipitor is causing pain, states she missed 2 doses of lipitor recently and feels like pain was better on those days that she missed doses.       Last 3 BPs: 144/67, 137/68, 152/68 mmHg  HR Ranges: 56-58 bpm  Admission Weight: 133.2 lbs    CODE STATUS/ADVANCE DIRECTIVES  DISCUSSION:   CPR/Full code   Patient's living condition: lives alone    ALLERGIES:Bee pollen; Cephalexin monohydrate; Ciprofloxacin; Clindamycin; Multi vitamin-minerals [hair-vites]; Penicillin [penicillins]; Thiazide-type diuretics; Tobramycin; Vancomycin; Atorvastatin; Cephalexin; Ciprofloxacin; Ibuprofen; Versed [midazolam]; Zoloft [sertraline]; Ace inhibitors; Advil [ibuprofen micronized]; and Metoprolol  PAST MEDICAL HISTORY:  has a past medical history of Asthma (5 yrs), Breast CA (H) (1987), Degeneration of intervertebral disc, site unspecified, Essential hypertension, benign, Gastro-oesophageal reflux disease, antibiotic allergy, Hyperlipidaemia, Osteomyelitis (H), Osteoporosis, unspecified, Parkinson's disease (H) (2015), PONV (postoperative nausea and vomiting), Spinal stenosis, unspecified region other than cervical, and Unspecified cerebral artery occlusion with cerebral infarction.  PAST SURGICAL HISTORY:  has a past surgical history that includes NONSPECIFIC PROCEDURE; NONSPECIFIC PROCEDURE (1987); NONSPECIFIC PROCEDURE; Bunionectomy; replacement socket, shoulder disarticulation/interscapular thoracic, molded to; TKR; Breast surgery; Mastectomy (Left); Insert stimulator dorsal column (1968); Thoracoscopic wedge resection lung (Right, 10/28/2014); biopsy; TOTAL KNEE ARTHROPLASTY (2008); and Recession resection (repair strabismus) (Left, 6/12/2015).  FAMILY HISTORY: family history includes Alzheimer Disease (age of onset: 74) in her brother; Cancer (age of onset: 47) in her mother; Cancer (age of onset: 6) in her brother; Cardiovascular in her brother; Cerebrovascular Disease in her sister; Heart Disease in her brother and brother; Heart Disease (age of onset: 60) in her brother; Respiratory in her sister.  SOCIAL HISTORY:  reports that  has never smoked. she has never used smokeless tobacco. She reports that she drinks alcohol. She reports that she does not use drugs.    Post Discharge Medication  Reconciliation Status: discharge medications reconciled, continue medications without change.  Current Outpatient Medications   Medication Sig Dispense Refill     acetaminophen (TYLENOL) 500 MG tablet Take 1,000 mg by mouth 3 times daily as needed for mild pain        albuterol (PROAIR HFA/PROVENTIL HFA/VENTOLIN HFA) 108 (90 Base) MCG/ACT Inhaler Inhale 1-2 puffs into the lungs every 6 hours as needed for shortness of breath / dyspnea or wheezing 18 g 1     alendronate (FOSAMAX) 35 MG tablet TAKE 1 TABLET BY MOUTH EVERY 7 DAYS 12 tablet 3     amLODIPine (NORVASC) 5 MG tablet Take 1 tablet (5 mg) by mouth daily 90 tablet 3     aspirin EC 81 MG EC tablet Take 1 tablet (81 mg) by mouth daily       atorvastatin (LIPITOR) 20 MG tablet TAKE 1 TABLET(20 MG) BY MOUTH DAILY 90 tablet 3     Calcium-Magnesium 300-300 MG TABS Take 1 tablet by mouth 3 times daily        carbidopa-levodopa (SINEMET)  MG per tablet Take 1.5 tablets by mouth 4 times daily Takes at 6am, 11am, 4pm, and 9 pm       carvedilol (COREG) 6.25 MG tablet TAKE 1 TABLET(6.25 MG) BY MOUTH TWICE DAILY WITH MEALS 180 tablet 3     Cholecalciferol (VITAMIN D3 PO) Take 400 Units by mouth daily        Cyanocobalamin (VITAMIN B 12 PO) Take 100 mcg by mouth daily        Iron-Vitamins (GERITOL COMPLETE) TABS Take 1 tablet by mouth daily       loperamide (IMODIUM) 2 MG capsule Take 1 capsule (2 mg) by mouth 4 times daily as needed for diarrhea 20 capsule 0     losartan (COZAAR) 50 MG tablet TAKE 1 TABLET(50 MG) BY MOUTH DAILY 90 tablet 3     oxyCODONE (ROXICODONE) 5 MG tablet Take 0.5 tablets (2.5 mg) by mouth every 6 hours as needed for moderate to severe pain 10 tablet 0     Psyllium (METAMUCIL FIBER PO) Take 1 capsule by mouth daily as needed        raloxifene (EVISTA) 60 MG tablet TAKE 1 TABLET BY MOUTH DAILY 90 tablet 3     ranitidine (ZANTAC) 150 MG tablet Take 75 mg by mouth 2 times daily       senna-docusate (SENOKOT-S;PERICOLACE) 8.6-50 MG per tablet Take  1 tablet by mouth 2 times daily as needed for constipation 30 tablet 0     traMADol (ULTRAM) 50 MG tablet Take 50 mg by mouth every 6 hours as needed for severe pain         ROS:  10 point ROS of systems including Constitutional, Eyes, Respiratory, Cardiovascular, Gastroenterology, Genitourinary, Integumentary, Musculoskeletal, Psychiatric were all negative except for pertinent positives noted in my HPI.    Exam:  /67   Pulse 56   Temp 96.8  F (36  C)   Resp 18   Wt 60.4 kg (133 lb 3.2 oz)   SpO2 98%   BMI 23.60 kg/m     GENERAL APPEARANCE:  Alert, in mild to moderate distress due to pain in left thigh  ENT:  Mouth and posterior oropharynx normal, moist mucous membranes, normal hearing acuity  EYES:  EOM, conjunctivae, lids, pupils and irises normal, PERRL  RESP:  respiratory effort and palpation of chest normal, lungs clear to auscultation , no respiratory distress  CV:  Palpation and auscultation of heart done , regular rate and rhythm, no murmur, rub, or gallop, no edema  ABDOMEN:  normal bowel sounds, soft, nontender, no hepatosplenomegaly or other masses  M/S:   Examination of:   right upper extremity, left upper extremity and right lower extremity  Inspection, ROM, stability and muscle strength normal and left thigh pain with active movement of left leg, less pain with passive movement of left leg but patient does still have pain, pain too much to test muscle strength  SKIN:  Inspection of skin and subcutaneous tissue baseline  NEURO:   Cranial nerves 2-12 are normal tested and grossly at patient's baseline, speech WNL  PSYCH:  affect and mood normal    Lab/Diagnostic data:  CBC RESULTS:   Recent Labs   Lab Test 02/21/19  0926 10/30/18  1059   WBC 12.4* 9.4   RBC 3.96 4.18   HGB 12.2 13.1   HCT 35.7 39.2   MCV 90 94   MCH 30.8 31.3   MCHC 34.2 33.4   RDW 14.6 13.9    240       Last Basic Metabolic Panel:  Recent Labs   Lab Test 02/21/19  0926 10/30/18  1059    136   POTASSIUM 3.6 4.8    CHLORIDE 103 102   CHRIS 8.1* 9.1   CO2 20 26   BUN 26 25   CR 0.79 1.04   * 83       ASSESSMENT/PLAN:  Chronic pain syndrome  Parkinson's disease (H)  Left thigh pain  Physical deconditioning  Acute/ongoing low back and left thigh pain  PT and OT for strengthening, schedule tylenol 650mg TID and q 6 hours prn, continue oxycodone 2.5mg q 6 hours prn  Sinemet 25/100mg 1.5 tabs QID  Consulted ortho KEM Helms about possible pathologic Fx left femur from taking fosomax for long period of time  Will attempt to make appt with [facility] ortho Dr. Robesron or Bert  TTWB to left leg at this time.   Vistaril 25mg q 6 hours prn    Essential hypertension, benign  CKD (chronic kidney disease) stage 3, GFR 30-59 ml/min (H)  Chronic/ongoing: vitals daily and prn, BMP follow, continue coreg 6.25mg BID, losartan 50mg QD, norvasc 5mg QD    Constipation  Acute/ongoing:  Senna s 1 PO BID prn, DC metamucil, start miralax 17gm QD prn    Orders:  BMP and Hgb weekly on Wed  Tylenol 650mg TID and q 6 hours prn  DC lipitor and fosomax  DC tramadol  DC metamucil  miralax 17gm QD  Prn  TTWB to Left leg   Vistaril 25mg q 6 hours prn  Make appt with ortho Dr. Noel or Da for ongoing left thigh pain.         Electronically signed by:  Tonya Lynn Haase, APRN CNP                    Sincerely,        Tonya Lynn Haase, APRN CNP

## 2019-02-25 NOTE — LETTER
To:  [facility]    Attn:  Facility s         Patient s name:       Opal Sin                                                                              Patient s :         1935                                       Admission date: 2019       Dear ,      Please contact me when the patient is going to be discharged or move to long term care. If the patient is being discharged with Home Care services, please provide the name of the agency.    Please include the discharge date, disposition and main diagnosis, if you will.    If a care conference is going to be held, please let me know as well.    The information needed can be given by e-mail or a phone call.    Thank you!        Gabby Mir RN Clinic Care Coordinator  [facility]  Phone: [phone]         e-mail: [email or web address]

## 2019-02-25 NOTE — PROGRESS NOTES
[place] [facility]  PRIMARY CARE PROVIDER AND CLINIC:  Damian Russ [address] / [place] [address]  Chief Complaint   Patient presents with     Hospital F/U     [facility] Medical Record Number:  [id number]  Place of Service where encounter took place:  [facility] ([facility] [[id number]]    HPI:    Opal Sin is a 83 year old  (1935), PMH HTN, CKD chronic low back pain, Parkinson's disease, presents with c/o left thigh pain admitted to the above facility from  [facility].  Hospital stay 2/20/19 through 2/24/19.   Left Thigh Pain:  Hx of fall 12/21 no Fx found, started on medrol dosepack and tramadol as OP but pain continues, CT lumbar spine without obvious etiology, Ortho consult, left hip aspirate with no etiology found, pain improved and localized to low  Back at time of discharge.   Parkinson's: followed by neurology Dr. Ferreira, living indep at home   HTN: no change in PTA meds  Admitted to this facility for  rehab, medical management and nursing care.  HPI information obtained from: facility chart records, facility staff, patient report and [facility] chart reviewCurrent issues are:   On exam today patient is sitting up in chair, states pain in left hip is extreme/severe when she moves her leg, shooting pain left thigh, patient states during the night she has more muscle spasm type pain, states the pain started abruptly about a week ago when she was playing dominos, denies fever, chills, cough, congestion, SOB, N/V/D or constipation, patient states she thinks her lipitor is causing pain, states she missed 2 doses of lipitor recently and feels like pain was better on those days that she missed doses.       Last 3 BPs: 144/67, 137/68, 152/68 mmHg  HR Ranges: 56-58 bpm  Admission Weight: 133.2 lbs    CODE STATUS/ADVANCE DIRECTIVES DISCUSSION:   CPR/Full code   Patient's living condition: lives alone    ALLERGIES:Bee pollen; Cephalexin monohydrate;  Ciprofloxacin; Clindamycin; Multi vitamin-minerals [hair-vites]; Penicillin [penicillins]; Thiazide-type diuretics; Tobramycin; Vancomycin; Atorvastatin; Cephalexin; Ciprofloxacin; Ibuprofen; Versed [midazolam]; Zoloft [sertraline]; Ace inhibitors; Advil [ibuprofen micronized]; and Metoprolol  PAST MEDICAL HISTORY:  has a past medical history of Asthma (5 yrs), Breast CA (H) (1987), Degeneration of intervertebral disc, site unspecified, Essential hypertension, benign, Gastro-oesophageal reflux disease, antibiotic allergy, Hyperlipidaemia, Osteomyelitis (H), Osteoporosis, unspecified, Parkinson's disease (H) (2015), PONV (postoperative nausea and vomiting), Spinal stenosis, unspecified region other than cervical, and Unspecified cerebral artery occlusion with cerebral infarction.  PAST SURGICAL HISTORY:  has a past surgical history that includes NONSPECIFIC PROCEDURE; NONSPECIFIC PROCEDURE (1987); NONSPECIFIC PROCEDURE; Bunionectomy; replacement socket, shoulder disarticulation/interscapular thoracic, molded to; TKR; Breast surgery; Mastectomy (Left); Insert stimulator dorsal column (1968); Thoracoscopic wedge resection lung (Right, 10/28/2014); biopsy; TOTAL KNEE ARTHROPLASTY (2008); and Recession resection (repair strabismus) (Left, 6/12/2015).  FAMILY HISTORY: family history includes Alzheimer Disease (age of onset: 74) in her brother; Cancer (age of onset: 47) in her mother; Cancer (age of onset: 6) in her brother; Cardiovascular in her brother; Cerebrovascular Disease in her sister; Heart Disease in her brother and brother; Heart Disease (age of onset: 60) in her brother; Respiratory in her sister.  SOCIAL HISTORY:  reports that  has never smoked. she has never used smokeless tobacco. She reports that she drinks alcohol. She reports that she does not use drugs.    Post Discharge Medication Reconciliation Status: discharge medications reconciled, continue medications without change.  Current Outpatient  Medications   Medication Sig Dispense Refill     acetaminophen (TYLENOL) 500 MG tablet Take 1,000 mg by mouth 3 times daily as needed for mild pain        albuterol (PROAIR HFA/PROVENTIL HFA/VENTOLIN HFA) 108 (90 Base) MCG/ACT Inhaler Inhale 1-2 puffs into the lungs every 6 hours as needed for shortness of breath / dyspnea or wheezing 18 g 1     alendronate (FOSAMAX) 35 MG tablet TAKE 1 TABLET BY MOUTH EVERY 7 DAYS 12 tablet 3     amLODIPine (NORVASC) 5 MG tablet Take 1 tablet (5 mg) by mouth daily 90 tablet 3     aspirin EC 81 MG EC tablet Take 1 tablet (81 mg) by mouth daily       atorvastatin (LIPITOR) 20 MG tablet TAKE 1 TABLET(20 MG) BY MOUTH DAILY 90 tablet 3     Calcium-Magnesium 300-300 MG TABS Take 1 tablet by mouth 3 times daily        carbidopa-levodopa (SINEMET)  MG per tablet Take 1.5 tablets by mouth 4 times daily Takes at 6am, 11am, 4pm, and 9 pm       carvedilol (COREG) 6.25 MG tablet TAKE 1 TABLET(6.25 MG) BY MOUTH TWICE DAILY WITH MEALS 180 tablet 3     Cholecalciferol (VITAMIN D3 PO) Take 400 Units by mouth daily        Cyanocobalamin (VITAMIN B 12 PO) Take 100 mcg by mouth daily        Iron-Vitamins (GERITOL COMPLETE) TABS Take 1 tablet by mouth daily       loperamide (IMODIUM) 2 MG capsule Take 1 capsule (2 mg) by mouth 4 times daily as needed for diarrhea 20 capsule 0     losartan (COZAAR) 50 MG tablet TAKE 1 TABLET(50 MG) BY MOUTH DAILY 90 tablet 3     oxyCODONE (ROXICODONE) 5 MG tablet Take 0.5 tablets (2.5 mg) by mouth every 6 hours as needed for moderate to severe pain 10 tablet 0     Psyllium (METAMUCIL FIBER PO) Take 1 capsule by mouth daily as needed        raloxifene (EVISTA) 60 MG tablet TAKE 1 TABLET BY MOUTH DAILY 90 tablet 3     ranitidine (ZANTAC) 150 MG tablet Take 75 mg by mouth 2 times daily       senna-docusate (SENOKOT-S;PERICOLACE) 8.6-50 MG per tablet Take 1 tablet by mouth 2 times daily as needed for constipation 30 tablet 0     traMADol (ULTRAM) 50 MG tablet Take 50  mg by mouth every 6 hours as needed for severe pain         ROS:  10 point ROS of systems including Constitutional, Eyes, Respiratory, Cardiovascular, Gastroenterology, Genitourinary, Integumentary, Musculoskeletal, Psychiatric were all negative except for pertinent positives noted in my HPI.    Exam:  /67   Pulse 56   Temp 96.8  F (36  C)   Resp 18   Wt 60.4 kg (133 lb 3.2 oz)   SpO2 98%   BMI 23.60 kg/m    GENERAL APPEARANCE:  Alert, in mild to moderate distress due to pain in left thigh  ENT:  Mouth and posterior oropharynx normal, moist mucous membranes, normal hearing acuity  EYES:  EOM, conjunctivae, lids, pupils and irises normal, PERRL  RESP:  respiratory effort and palpation of chest normal, lungs clear to auscultation , no respiratory distress  CV:  Palpation and auscultation of heart done , regular rate and rhythm, no murmur, rub, or gallop, no edema  ABDOMEN:  normal bowel sounds, soft, nontender, no hepatosplenomegaly or other masses  M/S:   Examination of:   right upper extremity, left upper extremity and right lower extremity  Inspection, ROM, stability and muscle strength normal and left thigh pain with active movement of left leg, less pain with passive movement of left leg but patient does still have pain, pain too much to test muscle strength  SKIN:  Inspection of skin and subcutaneous tissue baseline  NEURO:   Cranial nerves 2-12 are normal tested and grossly at patient's baseline, speech WNL  PSYCH:  affect and mood normal    Lab/Diagnostic data:  CBC RESULTS:   Recent Labs   Lab Test 02/21/19  0926 10/30/18  1059   WBC 12.4* 9.4   RBC 3.96 4.18   HGB 12.2 13.1   HCT 35.7 39.2   MCV 90 94   MCH 30.8 31.3   MCHC 34.2 33.4   RDW 14.6 13.9    240       Last Basic Metabolic Panel:  Recent Labs   Lab Test 02/21/19  0926 10/30/18  1059    136   POTASSIUM 3.6 4.8   CHLORIDE 103 102   CHRIS 8.1* 9.1   CO2 20 26   BUN 26 25   CR 0.79 1.04   * 83        ASSESSMENT/PLAN:  Chronic pain syndrome  Parkinson's disease (H)  Left thigh pain  Physical deconditioning  Acute/ongoing low back and left thigh pain  PT and OT for strengthening, schedule tylenol 650mg TID and q 6 hours prn, continue oxycodone 2.5mg q 6 hours prn  Sinemet 25/100mg 1.5 tabs QID  Consulted ortho KEM Helms about possible pathologic Fx left femur from taking fosomax for long period of time  Will attempt to make appt with [facility] ortho Dr. Roberson or Bert  TTWB to left leg at this time.   Vistaril 25mg q 6 hours prn    Essential hypertension, benign  CKD (chronic kidney disease) stage 3, GFR 30-59 ml/min (H)  Chronic/ongoing: vitals daily and prn, BMP follow, continue coreg 6.25mg BID, losartan 50mg QD, norvasc 5mg QD    Constipation  Acute/ongoing:  Senna s 1 PO BID prn, DC metamucil, start miralax 17gm QD prn    Orders:  BMP and Hgb weekly on Wed  Tylenol 650mg TID and q 6 hours prn  DC lipitor and fosomax  DC tramadol  DC metamucil  miralax 17gm QD  Prn  TTWB to Left leg   Vistaril 25mg q 6 hours prn  Make appt with ortho Dr. Noel or Da for ongoing left thigh pain.         Electronically signed by:  Tonya Lynn Haase, APRN CNP

## 2019-02-25 NOTE — PROGRESS NOTES
Clinic Care Coordination Contact  Care Coordination Transition Communication    Referral Source: IP Handoff    Clinical Data: Patient was hospitalized at [facility] from 2/20/19 to 2/24/19 with diagnoses of left hip strain; Parkinson's disease; chronic low back pain; benign essential hypertension; hyperlipidemia.     Transition to Facility:  Facility Name: [facility]  Contact name and phone number: Jory Duval, [phone]    Plan: RN Care Coordinator will await notification from facility staff informing RN Care Coordinator of patient's discharge plans/needs. RN Care Coordinator will review chart and outreach to facility staff every 4 weeks and as needed.     Gabby Mir RN Care Coordinator  [facility]  Phone:  [phone] (Mondays, Wednesdays & Fridays)  Phone:  [phone] (Tuesdays & Thursdays)  Email: [email or web address]

## 2019-02-26 ENCOUNTER — TRANSFERRED RECORDS (OUTPATIENT)
Dept: HEALTH INFORMATION MANAGEMENT | Facility: CLINIC | Age: 84
End: 2019-02-26

## 2019-02-27 ENCOUNTER — NURSING HOME VISIT (OUTPATIENT)
Dept: GERIATRICS | Facility: CLINIC | Age: 84
End: 2019-02-27
Payer: COMMERCIAL

## 2019-02-27 VITALS
HEART RATE: 66 BPM | OXYGEN SATURATION: 95 % | SYSTOLIC BLOOD PRESSURE: 168 MMHG | DIASTOLIC BLOOD PRESSURE: 80 MMHG | WEIGHT: 130.7 LBS | TEMPERATURE: 97.7 F | RESPIRATION RATE: 18 BRPM | BODY MASS INDEX: 23.16 KG/M2

## 2019-02-27 DIAGNOSIS — G20.A1 PARKINSON'S DISEASE (H): ICD-10-CM

## 2019-02-27 DIAGNOSIS — I10 ESSENTIAL HYPERTENSION, BENIGN: ICD-10-CM

## 2019-02-27 DIAGNOSIS — M79.659 PAIN OF THIGH, UNSPECIFIED LATERALITY: Primary | ICD-10-CM

## 2019-02-27 PROCEDURE — 99309 SBSQ NF CARE MODERATE MDM 30: CPT | Performed by: INTERNAL MEDICINE

## 2019-02-27 RX ORDER — OXYCODONE HYDROCHLORIDE 5 MG/1
2.5 TABLET ORAL EVERY 4 HOURS PRN
COMMUNITY
End: 2019-03-14

## 2019-02-27 RX ORDER — HYDROXYZINE HYDROCHLORIDE 25 MG/1
25 TABLET, FILM COATED ORAL EVERY 6 HOURS PRN
COMMUNITY
End: 2019-08-26

## 2019-02-27 RX ORDER — POLYETHYLENE GLYCOL 3350 17 G/17G
1 POWDER, FOR SOLUTION ORAL DAILY PRN
COMMUNITY

## 2019-02-27 NOTE — PROGRESS NOTES
[facility]    Chief Complaint   Patient presents with     RECHECK       [facility] Record Number:  [id number]  Place of Service where encounter took place:  [facility] ([facility]) [[id number]]    HPI:    Opal Sin is a 83 year old  (1935), who is being seen today for an episodic care visit.  HPI information obtained from: facility chart records, facility staff, patient report and [facility] chart review.Today's concern is:  Left hip and leg pain: patient having a lot of pain with AROM of left hip, screams in pain with flexion of left hip, states pain goes from hip area down side of left leg, rates pain 10/10 with movement, seems to be fairly comfortable otherwise, she is able to stand without a lot of pain, states during night last night pain was bad and she didn't get much sleep   HTN/CKD: Last 3 BPs: 168/80, 121/73, 159/65 mmHg  HR Ranges: 56-66 bpm denies CP, palpitations, SOB  Admission Weight: 2/24/19: 133.2 lbs  Current Weight: 2/25/19: 130.7 lbs    ALLERGIES: Bee pollen; Cephalexin monohydrate; Ciprofloxacin; Clindamycin; Multi vitamin-minerals [hair-vites]; Penicillin [penicillins]; Thiazide-type diuretics; Tobramycin; Vancomycin; Atorvastatin; Cephalexin; Ciprofloxacin; Ibuprofen; Versed [midazolam]; Zoloft [sertraline]; Ace inhibitors; Advil [ibuprofen micronized]; and Metoprolol  Past Medical, Surgical, Family and Social History reviewed and updated in [facility].    Current Outpatient Medications   Medication Sig Dispense Refill     acetaminophen (TYLENOL) 500 MG tablet Take 1,000 mg by mouth every 6 hours        albuterol (PROAIR HFA/PROVENTIL HFA/VENTOLIN HFA) 108 (90 Base) MCG/ACT Inhaler Inhale 1-2 puffs into the lungs every 6 hours as needed for shortness of breath / dyspnea or wheezing 18 g 1     amLODIPine (NORVASC) 5 MG tablet Take 1 tablet (5 mg) by mouth daily 90 tablet 3     aspirin EC 81 MG EC tablet Take 1 tablet (81 mg) by mouth daily       calcium  carbonate-vitamin D (OSCAL W/D) 500-200 MG-UNIT tablet Take 1 tablet by mouth daily       carbidopa-levodopa (SINEMET)  MG per tablet Take 1.5 tablets by mouth 4 times daily Takes at 6am, 11am, 4pm, and 9 pm       carvedilol (COREG) 6.25 MG tablet TAKE 1 TABLET(6.25 MG) BY MOUTH TWICE DAILY WITH MEALS 180 tablet 3     Cholecalciferol (VITAMIN D3 PO) Take 400 Units by mouth daily        Cyanocobalamin (VITAMIN B 12 PO) Take 100 mcg by mouth daily        hydrOXYzine (ATARAX) 25 MG tablet Take 25 mg by mouth every 6 hours as needed for itching       Iron-Vitamins (GERITOL COMPLETE) TABS Take 1 tablet by mouth daily       loperamide (IMODIUM) 2 MG capsule Take 1 capsule (2 mg) by mouth 4 times daily as needed for diarrhea 20 capsule 0     losartan (COZAAR) 50 MG tablet TAKE 1 TABLET(50 MG) BY MOUTH DAILY 90 tablet 3     oxyCODONE (ROXICODONE) 5 MG tablet Take 2.5 mg by mouth every 4 hours as needed for severe pain       polyethylene glycol (MIRALAX/GLYCOLAX) packet Take 1 packet by mouth daily as needed for constipation       raloxifene (EVISTA) 60 MG tablet TAKE 1 TABLET BY MOUTH DAILY 90 tablet 3     ranitidine (ZANTAC) 150 MG tablet Take 75 mg by mouth 2 times daily       senna-docusate (SENOKOT-S;PERICOLACE) 8.6-50 MG per tablet Take 1 tablet by mouth 2 times daily as needed for constipation 30 tablet 0     Medications reviewed:  Medications reconciled to facility chart and changes were made to reflect current medications as identified as above med list. Below are the changes that were made:   Medications stopped since last EPIC medication reconciliation:   Medications Discontinued During This Encounter   Medication Reason     oxyCODONE (ROXICODONE) 5 MG tablet Therapy completed     atorvastatin (LIPITOR) 20 MG tablet Therapy completed     alendronate (FOSAMAX) 35 MG tablet Therapy completed     Calcium-Magnesium 300-300 MG TABS Therapy completed     traMADol (ULTRAM) 50 MG tablet Therapy completed      Psyllium (METAMUCIL FIBER PO) Therapy completed       Medications started since last EPIC medication reconciliation:  Orders Placed This Encounter   Medications     calcium carbonate-vitamin D (OSCAL W/D) 500-200 MG-UNIT tablet     Sig: Take 1 tablet by mouth daily     polyethylene glycol (MIRALAX/GLYCOLAX) packet     Sig: Take 1 packet by mouth daily as needed for constipation     hydrOXYzine (ATARAX) 25 MG tablet     Sig: Take 25 mg by mouth every 6 hours as needed for itching     oxyCODONE (ROXICODONE) 5 MG tablet     Sig: Take 2.5 mg by mouth every 4 hours as needed for severe pain       REVIEW OF SYSTEMS:  10 point ROS of systems including Constitutional, Eyes, Respiratory, Cardiovascular, Gastroenterology, Genitourinary, Integumentary, Musculoskeletal, Psychiatric were all negative except for pertinent positives noted in my HPI.    Physical Exam:  /80   Pulse 66   Temp 97.7  F (36.5  C)   Resp 18   Wt 59.3 kg (130 lb 11.2 oz)   SpO2 95%   BMI 23.16 kg/m    GENERAL APPEARANCE:  Alert, in mild to moderate distress due to pain in left thigh  ENT:  Mouth and posterior oropharynx normal, moist mucous membranes, normal hearing acuity  EYES:  EOM, conjunctivae, lids, pupils and irises normal, PERRL  RESP:  respiratory effort and palpation of chest normal, lungs clear to auscultation , no respiratory distress  CV:  Palpation and auscultation of heart done , regular rate and rhythm, no murmur, rub, or gallop, no edema  ABDOMEN:  normal bowel sounds, soft, nontender, no hepatosplenomegaly or other masses  M/S:   Examination of:   right upper extremity, left upper extremity and right lower extremity  Inspection, ROM, stability and muscle strength normal and left hip pain with AROM radiates down lateral left thigh, point tenderness to left iliac crest area, difficult to assess due to extreme pain with AROM  SKIN:  Inspection of skin and subcutaneous tissue baseline  NEURO:   Cranial nerves 2-12 are normal  tested and grossly at patient's baseline, speech WNL  PSYCH:  affect and mood normal    Recent Labs:   Basic Metabolic Panel (02/27/2019 6:29 AM CST)  Basic Metabolic Panel (02/27/2019 6:29 AM CST)   Component Value Ref Range Performed At [facility]   Creatinine Serum 0.89 0.55 - 1.02 mg/dL PN SOFT     Lab Glucose 86  Comment:   The stated glucose range is for the fasting state.  Non-fasting glucose range is  mg/dL   70 - 100 mg/dL PN SOFT     CO2 22 22 - 31 mmol/L PN SOFT     Chloride 107 98 - 109 mmol/L PN SOFT     Potassium 4.5 3.5 - 5.2 mmol/L PN SOFT     Sodium 137 136 - 145 mmol/L PN SOFT     Blood Urea Nitrogen 27 (H) 9 - 26 mg/dL PN SOFT     Calcium 8.9 8.4 - 10.4 mg/dL PN SOFT     Est GFR African Am >60 >60 mL/min/1.73m2 PN SOFT     Est GFR Non-Afr Am >60  Comment:   Normal>60, moderate decrease 30 - 59, severe decrease 15 - 29,  renal failure <15 mL/min/1.73 m2  NOTE:  Choose the eGFR result above appropriate for the race of the patient.   >60 mL/min/1.73m2 PN SOFT     Hemoglobin, Blood (02/27/2019 6:29 AM CST)  Hemoglobin, Blood (02/27/2019 6:29 AM CST)   Component Value Ref Range Performed At [facility]   Hemoglobin 11.4 (L) 11.8 - 15.5 g/dL PN SOFT         CBC RESULTS:   Recent Labs   Lab Test 02/21/19  0926 10/30/18  1059   WBC 12.4* 9.4   RBC 3.96 4.18   HGB 12.2 13.1   HCT 35.7 39.2   MCV 90 94   MCH 30.8 31.3   MCHC 34.2 33.4   RDW 14.6 13.9    240       Last Basic Metabolic Panel:  Recent Labs   Lab Test 02/21/19  0926 10/30/18  1059    136   POTASSIUM 3.6 4.8   CHLORIDE 103 102   CHRIS 8.1* 9.1   CO2 20 26   BUN 26 25   CR 0.79 1.04   * 83           Assessment/Plan:  Chronic pain syndrome  Parkinson's disease (H)  Left thigh pain  Physical deconditioning  Acute/ongoing low back and left thigh pain  PT and OT for strengthening, continue scheduled tylenol 650mg TID and q 6 hours prn, continue oxycodone 2.5mg q 6 hours prn  Sinemet 25/100mg 1.5 tabs  QID  Vistaril 25mg q 6 hours prn  Start neurontin 100mg TID and 200mg qhs  Follow up with Dr. Roberson early next week to review results of hip and pelvis CT and get further recommendations on management  TTWB to left leg at this time, limited PT due to discomfort with increased activity       Essential hypertension, benign  CKD (chronic kidney disease) stage 3, GFR 30-59 ml/min (H)  Chronic/ongoing: vitals daily and prn, BMP follow, continue coreg 6.25mg BID, losartan 50mg QD, norvasc 5mg QD     Constipation  Acute/ongoing:  Senna s 1 PO BID prn, DC metamucil, start miralax 17gm QD prn    Orders:  Start neurontin 100mg TID and 200mg qhs    Electronically signed by  Tonya Lynn Haase, APRN CNP

## 2019-02-27 NOTE — PROGRESS NOTES
[facility]  PRIMARY CARE PROVIDER AND CLINIC:  Damian Russ [address] [facility] [address] / [facility] [address]    Patient was seen by Dr. Ferreira at the [facility] on February 27, 2019 for an extended hospital follow-up visit.  Patient is known to me from previous TCU stay in 2018      HPI:    Opal Sin is a 83 year old  (1935), PMH HTN, CKD chronic low back pain, Parkinson's disease, who was hospitalized at [facility] from February 20, 2019 to February 24, 2019 for the evaluation of left thigh pain.  Patient reported that the pain began abruptly, without known injury    Etiology of the left thigh pain was not clear as no fracture was found on x-rays.  A CT lumbar spine myelogram revealed no evidence of disc herniation or high-grade stenosis.  Left hip aspirate was unremarkable.  A Medrol Dosepak was started without improvement.  At the time of discharge, the impression was that the hip pain was secondary to a hip strain.      Admitted to this facility for  rehab, medical management and nursing care.     Patient has continued to have severe pain in her left mid thigh area whenever she moves her leg the pain is described as a shooting pain, with a feeling of spasms at the time.  She denies back pain.  He has been unable to ambulate secondary to the pain in her leg    Patient did have a CT scan of her hip and pelvis, the results of which are pending.  Orthopedic follow-up is being arranged in view of her continued pain.  It is not clear if her left thigh has been adequately imaged      CODE STATUS/ADVANCE DIRECTIVES DISCUSSION:   CPR/Full code   Patient's living condition: lives alone    ALLERGIES:Bee pollen; Cephalexin monohydrate; Ciprofloxacin; Clindamycin; Multi vitamin-minerals [hair-vites]; Penicillin [penicillins]; Thiazide-type diuretics; Tobramycin; Vancomycin; Atorvastatin; Cephalexin; Ciprofloxacin; Ibuprofen; Versed [midazolam]; Zoloft  [sertraline]; Ace inhibitors; Advil [ibuprofen micronized]; and Metoprolol  PAST MEDICAL HISTORY:  has a past medical history of Asthma (5 yrs), Breast CA (H) (1987), Degeneration of intervertebral disc, site unspecified, Essential hypertension, benign, Gastro-oesophageal reflux disease, antibiotic allergy, Hyperlipidaemia, Osteomyelitis (H), Osteoporosis, unspecified, Parkinson's disease (H) (2015), PONV (postoperative nausea and vomiting), Spinal stenosis, unspecified region other than cervical, and Unspecified cerebral artery occlusion with cerebral infarction.  PAST SURGICAL HISTORY:  has a past surgical history that includes NONSPECIFIC PROCEDURE; NONSPECIFIC PROCEDURE (1987); NONSPECIFIC PROCEDURE; Bunionectomy; replacement socket, shoulder disarticulation/interscapular thoracic, molded to; TKR; Breast surgery; Mastectomy (Left); Insert stimulator dorsal column (1968); Thoracoscopic wedge resection lung (Right, 10/28/2014); biopsy; TOTAL KNEE ARTHROPLASTY (2008); and Recession resection (repair strabismus) (Left, 6/12/2015).  FAMILY HISTORY: family history includes Alzheimer Disease (age of onset: 74) in her brother; Cancer (age of onset: 47) in her mother; Cancer (age of onset: 6) in her brother; Cardiovascular in her brother; Cerebrovascular Disease in her sister; Heart Disease in her brother and brother; Heart Disease (age of onset: 60) in her brother; Respiratory in her sister.  SOCIAL HISTORY:  reports that  has never smoked. she has never used smokeless tobacco. She reports that she drinks alcohol. She reports that she does not use drugs.    Post Discharge Medication Reconciliation Status:   .  Current Outpatient Medications   Medication Sig Dispense Refill     acetaminophen (TYLENOL) 500 MG tablet Take 1,000 mg by mouth every 6 hours        albuterol (PROAIR HFA/PROVENTIL HFA/VENTOLIN HFA) 108 (90 Base) MCG/ACT Inhaler Inhale 1-2 puffs into the lungs every 6 hours as needed for shortness of breath /  dyspnea or wheezing 18 g 1     amLODIPine (NORVASC) 5 MG tablet Take 1 tablet (5 mg) by mouth daily 90 tablet 3     aspirin EC 81 MG EC tablet Take 1 tablet (81 mg) by mouth daily       calcium carbonate-vitamin D (OSCAL W/D) 500-200 MG-UNIT tablet Take 1 tablet by mouth daily       carbidopa-levodopa (SINEMET)  MG per tablet Take 1.5 tablets by mouth 4 times daily Takes at 6am, 11am, 4pm, and 9 pm       carvedilol (COREG) 6.25 MG tablet TAKE 1 TABLET(6.25 MG) BY MOUTH TWICE DAILY WITH MEALS 180 tablet 3     Cholecalciferol (VITAMIN D3 PO) Take 400 Units by mouth daily        Cyanocobalamin (VITAMIN B 12 PO) Take 100 mcg by mouth daily        hydrOXYzine (ATARAX) 25 MG tablet Take 25 mg by mouth every 6 hours as needed for itching       Iron-Vitamins (GERITOL COMPLETE) TABS Take 1 tablet by mouth daily       loperamide (IMODIUM) 2 MG capsule Take 1 capsule (2 mg) by mouth 4 times daily as needed for diarrhea 20 capsule 0     losartan (COZAAR) 50 MG tablet TAKE 1 TABLET(50 MG) BY MOUTH DAILY 90 tablet 3     oxyCODONE (ROXICODONE) 5 MG tablet Take 2.5 mg by mouth every 4 hours as needed for severe pain       polyethylene glycol (MIRALAX/GLYCOLAX) packet Take 1 packet by mouth daily as needed for constipation       raloxifene (EVISTA) 60 MG tablet TAKE 1 TABLET BY MOUTH DAILY 90 tablet 3     ranitidine (ZANTAC) 150 MG tablet Take 75 mg by mouth 2 times daily       senna-docusate (SENOKOT-S;PERICOLACE) 8.6-50 MG per tablet Take 1 tablet by mouth 2 times daily as needed for constipation 30 tablet 0       ROS:  10 point ROS.  Patient denies fevers chills sweats, cough    Exam:    GENERAL APPEARANCE:  Alert, lying in bed, appears to be mildly uncomfortable secondary to left thigh pain   ENT:  moist mucous membranes, normal   EYES:  conjunctivae, lids, pupils and irises normal  RESP: Lungs clear  CV: Regular rate and rhythm without murmur  ABDOMEN: Soft nondistended nontender  M/S: Left lower extremity is without  edema or erythema.  The patient does have marked tenderness with palpation over her medial mid left thigh area.  Pain is accentuated with attempts to move her left leg.  No rashes noted  NEURO: Patient is alert, pleasant, fully oriented, no tremors are noted.  Left lower extremity strength could not be assessed secondary to pain  PSYCH:  affect and mood normal    Lab/Diagnostic data:  CBC RESULTS:   Recent Labs   Lab Test 02/21/19  0926 10/30/18  1059   WBC 12.4* 9.4   RBC 3.96 4.18   HGB 12.2 13.1   HCT 35.7 39.2   MCV 90 94   MCH 30.8 31.3   MCHC 34.2 33.4   RDW 14.6 13.9    240       Last Basic Metabolic Panel:  Recent Labs   Lab Test 02/21/19  0926 10/30/18  1059    136   POTASSIUM 3.6 4.8   CHLORIDE 103 102   CHRIS 8.1* 9.1   CO2 20 26   BUN 26 25   CR 0.79 1.04   * 83       ASSESSMENT/PLAN:    Chronic pain syndrome  Parkinson's disease (H)  Left thigh pain, etiology unclear, with neg w/u to date. Ortho has noted concern re possible pathologic fracture, possibly related to chronic Fosamax therapy  Plan review CT of pelvis with ortho, assure adequate visualization of the L femur, consider bone scan or MRI. Continue  oxycodone. Bowel regimen      Essential hypertension, benign  CKD (chronic kidney disease) stage 3, GFR 30-59 ml/min (H)  CV status stable  Plan monitor BP, BMP      Gaurav Ferreira MD

## 2019-02-28 ENCOUNTER — NURSING HOME VISIT (OUTPATIENT)
Dept: GERIATRICS | Facility: CLINIC | Age: 84
End: 2019-02-28
Payer: COMMERCIAL

## 2019-02-28 DIAGNOSIS — G20.A1 PARKINSON'S DISEASE (H): ICD-10-CM

## 2019-02-28 DIAGNOSIS — R53.81 PHYSICAL DECONDITIONING: ICD-10-CM

## 2019-02-28 DIAGNOSIS — G89.4 CHRONIC PAIN SYNDROME: Primary | ICD-10-CM

## 2019-02-28 DIAGNOSIS — N18.30 CKD (CHRONIC KIDNEY DISEASE) STAGE 3, GFR 30-59 ML/MIN (H): ICD-10-CM

## 2019-02-28 DIAGNOSIS — M79.659 PAIN OF THIGH, UNSPECIFIED LATERALITY: ICD-10-CM

## 2019-02-28 DIAGNOSIS — I10 ESSENTIAL HYPERTENSION, BENIGN: ICD-10-CM

## 2019-02-28 DIAGNOSIS — K59.03 DRUG-INDUCED CONSTIPATION: ICD-10-CM

## 2019-02-28 LAB
BACTERIA SPEC CULT: NO GROWTH
SPECIMEN SOURCE: NORMAL

## 2019-02-28 PROCEDURE — 99309 SBSQ NF CARE MODERATE MDM 30: CPT | Performed by: NURSE PRACTITIONER

## 2019-02-28 NOTE — LETTER
2/28/2019        RE: Opal Sin  [address]  [address]  [facility] [address]        [place] [facility]    Chief Complaint   Patient presents with     RECHECK       [place] Medical Record Number:  [id number]  Place of Service where encounter took place:  [facility] ([facility]) [[id number]]    HPI:    Opal Sin is a 83 year old  (1935), who is being seen today for an episodic care visit.  HPI information obtained from: facility chart records, facility staff, patient report and [facility] chart review.Today's concern is:  Left hip and leg pain: patient having a lot of pain with AROM of left hip, screams in pain with flexion of left hip, states pain goes from hip area down side of left leg, rates pain 10/10 with movement, seems to be fairly comfortable otherwise, she is able to stand without a lot of pain, states during night last night pain was bad and she didn't get much sleep   HTN/CKD: Last 3 BPs: 168/80, 121/73, 159/65 mmHg  HR Ranges: 56-66 bpm denies CP, palpitations, SOB  Admission Weight: 2/24/19: 133.2 lbs  Current Weight: 2/25/19: 130.7 lbs    ALLERGIES: Bee pollen; Cephalexin monohydrate; Ciprofloxacin; Clindamycin; Multi vitamin-minerals [hair-vites]; Penicillin [penicillins]; Thiazide-type diuretics; Tobramycin; Vancomycin; Atorvastatin; Cephalexin; Ciprofloxacin; Ibuprofen; Versed [midazolam]; Zoloft [sertraline]; Ace inhibitors; Advil [ibuprofen micronized]; and Metoprolol  Past Medical, Surgical, Family and Social History reviewed and updated in [facility].    Current Outpatient Medications   Medication Sig Dispense Refill     acetaminophen (TYLENOL) 500 MG tablet Take 1,000 mg by mouth every 6 hours        albuterol (PROAIR HFA/PROVENTIL HFA/VENTOLIN HFA) 108 (90 Base) MCG/ACT Inhaler Inhale 1-2 puffs into the lungs every 6 hours as needed for shortness of breath / dyspnea or wheezing 18 g 1     amLODIPine (NORVASC) 5 MG tablet Take 1 tablet (5 mg) by  mouth daily 90 tablet 3     aspirin EC 81 MG EC tablet Take 1 tablet (81 mg) by mouth daily       calcium carbonate-vitamin D (OSCAL W/D) 500-200 MG-UNIT tablet Take 1 tablet by mouth daily       carbidopa-levodopa (SINEMET)  MG per tablet Take 1.5 tablets by mouth 4 times daily Takes at 6am, 11am, 4pm, and 9 pm       carvedilol (COREG) 6.25 MG tablet TAKE 1 TABLET(6.25 MG) BY MOUTH TWICE DAILY WITH MEALS 180 tablet 3     Cholecalciferol (VITAMIN D3 PO) Take 400 Units by mouth daily        Cyanocobalamin (VITAMIN B 12 PO) Take 100 mcg by mouth daily        hydrOXYzine (ATARAX) 25 MG tablet Take 25 mg by mouth every 6 hours as needed for itching       Iron-Vitamins (GERITOL COMPLETE) TABS Take 1 tablet by mouth daily       loperamide (IMODIUM) 2 MG capsule Take 1 capsule (2 mg) by mouth 4 times daily as needed for diarrhea 20 capsule 0     losartan (COZAAR) 50 MG tablet TAKE 1 TABLET(50 MG) BY MOUTH DAILY 90 tablet 3     oxyCODONE (ROXICODONE) 5 MG tablet Take 2.5 mg by mouth every 4 hours as needed for severe pain       polyethylene glycol (MIRALAX/GLYCOLAX) packet Take 1 packet by mouth daily as needed for constipation       raloxifene (EVISTA) 60 MG tablet TAKE 1 TABLET BY MOUTH DAILY 90 tablet 3     ranitidine (ZANTAC) 150 MG tablet Take 75 mg by mouth 2 times daily       senna-docusate (SENOKOT-S;PERICOLACE) 8.6-50 MG per tablet Take 1 tablet by mouth 2 times daily as needed for constipation 30 tablet 0     Medications reviewed:  Medications reconciled to facility chart and changes were made to reflect current medications as identified as above med list. Below are the changes that were made:   Medications stopped since last EPIC medication reconciliation:   Medications Discontinued During This Encounter   Medication Reason     oxyCODONE (ROXICODONE) 5 MG tablet Therapy completed     atorvastatin (LIPITOR) 20 MG tablet Therapy completed     alendronate (FOSAMAX) 35 MG tablet Therapy completed      Calcium-Magnesium 300-300 MG TABS Therapy completed     traMADol (ULTRAM) 50 MG tablet Therapy completed     Psyllium (METAMUCIL FIBER PO) Therapy completed       Medications started since last EPIC medication reconciliation:  Orders Placed This Encounter   Medications     calcium carbonate-vitamin D (OSCAL W/D) 500-200 MG-UNIT tablet     Sig: Take 1 tablet by mouth daily     polyethylene glycol (MIRALAX/GLYCOLAX) packet     Sig: Take 1 packet by mouth daily as needed for constipation     hydrOXYzine (ATARAX) 25 MG tablet     Sig: Take 25 mg by mouth every 6 hours as needed for itching     oxyCODONE (ROXICODONE) 5 MG tablet     Sig: Take 2.5 mg by mouth every 4 hours as needed for severe pain       REVIEW OF SYSTEMS:  10 point ROS of systems including Constitutional, Eyes, Respiratory, Cardiovascular, Gastroenterology, Genitourinary, Integumentary, Musculoskeletal, Psychiatric were all negative except for pertinent positives noted in my HPI.    Physical Exam:  /80   Pulse 66   Temp 97.7  F (36.5  C)   Resp 18   Wt 59.3 kg (130 lb 11.2 oz)   SpO2 95%   BMI 23.16 kg/m     GENERAL APPEARANCE:  Alert, in mild to moderate distress due to pain in left thigh  ENT:  Mouth and posterior oropharynx normal, moist mucous membranes, normal hearing acuity  EYES:  EOM, conjunctivae, lids, pupils and irises normal, PERRL  RESP:  respiratory effort and palpation of chest normal, lungs clear to auscultation , no respiratory distress  CV:  Palpation and auscultation of heart done , regular rate and rhythm, no murmur, rub, or gallop, no edema  ABDOMEN:  normal bowel sounds, soft, nontender, no hepatosplenomegaly or other masses  M/S:   Examination of:   right upper extremity, left upper extremity and right lower extremity  Inspection, ROM, stability and muscle strength normal and left  hip pain with AROM radiates down lateral left thigh, point tenderness to left iliac crest area, difficult to assess due to extreme pain with  AROM  SKIN:  Inspection of skin and subcutaneous tissue baseline  NEURO:   Cranial nerves 2-12 are normal tested and grossly at patient's baseline, speech WNL  PSYCH:  affect and mood normal    Recent Labs:   Basic Metabolic Panel (02/27/2019 6:29 AM CST)  Basic Metabolic Panel (02/27/2019 6:29 AM CST)   Component Value Ref Range Performed At Pathologist Signature   Creatinine Serum 0.89 0.55 - 1.02 mg/dL PN SOFT     Lab Glucose 86  Comment:   The stated glucose range is for the fasting state.  Non-fasting glucose range is  mg/dL   70 - 100 mg/dL PN SOFT     CO2 22 22 - 31 mmol/L PN SOFT     Chloride 107 98 - 109 mmol/L PN SOFT     Potassium 4.5 3.5 - 5.2 mmol/L PN SOFT     Sodium 137 136 - 145 mmol/L PN SOFT     Blood Urea Nitrogen 27 (H) 9 - 26 mg/dL PN SOFT     Calcium 8.9 8.4 - 10.4 mg/dL PN SOFT     Est GFR African Am >60 >60 mL/min/1.73m2 PN SOFT     Est GFR Non-Afr Am >60  Comment:   Normal>60, moderate decrease 30 - 59, severe decrease 15 - 29,  renal failure <15 mL/min/1.73 m2  NOTE:  Choose the eGFR result above appropriate for the race of the patient.   >60 mL/min/1.73m2 PN SOFT     Hemoglobin, Blood (02/27/2019 6:29 AM CST)  Hemoglobin, Blood (02/27/2019 6:29 AM CST)   Component Value Ref Range Performed At Pathologist Signature   Hemoglobin 11.4 (L) 11.8 - 15.5 g/dL PN SOFT         CBC RESULTS:   Recent Labs   Lab Test 02/21/19  0926 10/30/18  1059   WBC 12.4* 9.4   RBC 3.96 4.18   HGB 12.2 13.1   HCT 35.7 39.2   MCV 90 94   MCH 30.8 31.3   MCHC 34.2 33.4   RDW 14.6 13.9    240       Last Basic Metabolic Panel:  Recent Labs   Lab Test 02/21/19  0926 10/30/18  1059    136   POTASSIUM 3.6 4.8   CHLORIDE 103 102   CHRIS 8.1* 9.1   CO2 20 26   BUN 26 25   CR 0.79 1.04   * 83           Assessment/Plan:  Chronic pain syndrome  Parkinson's disease (H)  Left thigh pain  Physical deconditioning  Acute/ongoing low back and left thigh pain  PT and OT for strengthening, continue scheduled  tylenol 650mg TID and q 6 hours prn, continue oxycodone 2.5mg q 6 hours prn  Sinemet 25/100mg 1.5 tabs QID  Vistaril 25mg q 6 hours prn  Start neurontin 100mg TID and 200mg qhs  Follow up with Dr. Roberson early next week to review results of hip and pelvis CT and get further recommendations on management  TTWB to left leg at this time, limited PT due to discomfort with increased activity       Essential hypertension, benign  CKD (chronic kidney disease) stage 3, GFR 30-59 ml/min (H)  Chronic/ongoing: vitals daily and prn, BMP follow, continue coreg 6.25mg BID, losartan 50mg QD, norvasc 5mg QD     Constipation  Acute/ongoing:  Senna s 1 PO BID prn, DC metamucil, start miralax 17gm QD prn    Orders:  Start neurontin 100mg TID and 200mg qhs    Electronically signed by  Tonya Lynn Haase, APRN CNP                      Sincerely,        Tonya Lynn Haase, APRN CNP

## 2019-03-04 ENCOUNTER — TRANSFERRED RECORDS (OUTPATIENT)
Dept: HEALTH INFORMATION MANAGEMENT | Facility: CLINIC | Age: 84
End: 2019-03-04

## 2019-03-05 ENCOUNTER — TRANSFERRED RECORDS (OUTPATIENT)
Dept: HEALTH INFORMATION MANAGEMENT | Facility: CLINIC | Age: 84
End: 2019-03-05

## 2019-03-07 VITALS
HEART RATE: 73 BPM | TEMPERATURE: 98.4 F | BODY MASS INDEX: 23.02 KG/M2 | WEIGHT: 129.9 LBS | SYSTOLIC BLOOD PRESSURE: 131 MMHG | OXYGEN SATURATION: 97 % | DIASTOLIC BLOOD PRESSURE: 81 MMHG | RESPIRATION RATE: 18 BRPM

## 2019-03-07 RX ORDER — GABAPENTIN 100 MG/1
100 CAPSULE ORAL 3 TIMES DAILY
COMMUNITY
End: 2019-03-26

## 2019-03-08 ENCOUNTER — NURSING HOME VISIT (OUTPATIENT)
Dept: GERIATRICS | Facility: CLINIC | Age: 84
End: 2019-03-08
Payer: COMMERCIAL

## 2019-03-08 DIAGNOSIS — N18.30 CKD (CHRONIC KIDNEY DISEASE) STAGE 3, GFR 30-59 ML/MIN (H): ICD-10-CM

## 2019-03-08 DIAGNOSIS — G89.4 CHRONIC PAIN SYNDROME: Primary | ICD-10-CM

## 2019-03-08 DIAGNOSIS — R53.81 PHYSICAL DECONDITIONING: ICD-10-CM

## 2019-03-08 DIAGNOSIS — G20.A1 PARKINSON'S DISEASE (H): ICD-10-CM

## 2019-03-08 DIAGNOSIS — K59.03 DRUG-INDUCED CONSTIPATION: ICD-10-CM

## 2019-03-08 DIAGNOSIS — I10 ESSENTIAL HYPERTENSION, BENIGN: ICD-10-CM

## 2019-03-08 DIAGNOSIS — M79.659 PAIN OF THIGH, UNSPECIFIED LATERALITY: ICD-10-CM

## 2019-03-08 PROCEDURE — 99309 SBSQ NF CARE MODERATE MDM 30: CPT | Performed by: NURSE PRACTITIONER

## 2019-03-08 NOTE — LETTER
3/8/2019        RE: Opal Sin  [address]  [address]  [facility] [address]        [place] [facility]  [place] Medical Record Number:  [id number]  Place of Service where encounter took place:  [facility] ([facility]) [[id number]]  Chief Complaint   Patient presents with     RECHECK       HPI:    Opal Sin  is a 83 year old (1935), who is being seen today for an episodic care visit.  HPI information obtained from: facility chart records, facility staff, patient report and [facility] chart review. Today's concern is:  Left leg pain: on exam today patient is alert, states pain in left hip/leg is ongoing: she is able to stand and walk > 150 feet with little pain, states standing makes her pain better, pain is worse when she adduct or abducts leg, which seems to cause shooting pain from hip down left thigh. Patient states pain has improved some but she is interested in injection if that is an option.     HTN/CKD: BPs as follows: 131/81, 146/81, 137/74 with HR in the 70's, labs stable  Weight: admit weight 133.2lbs and current weight is 129/9lbs     Past Medical and Surgical History reviewed in [facility] today.    REVIEW OF SYSTEMS:  10 point ROS of systems including Constitutional, Eyes, Respiratory, Cardiovascular, Gastroenterology, Genitourinary, Integumentary, Musculoskeletal, Psychiatric were all negative except for pertinent positives noted in my HPI.    Objective:  /81   Pulse 73   Temp 98.4  F (36.9  C)   Resp 18   Wt 58.9 kg (129 lb 14.4 oz)   SpO2 97%   BMI 23.02 kg/m     Exam:  GENERAL APPEARANCE:  Alert, in mild to moderate distress due to pain in left thigh  ENT:  Mouth and posterior oropharynx normal, moist mucous membranes, normal hearing acuity  EYES:  EOM, conjunctivae, lids, pupils and irises normal, PERRL  RESP:  respiratory effort and palpation of chest normal, lungs clear to auscultation , no respiratory distress  CV:  Palpation and  auscultation of heart done , regular rate and rhythm, no murmur, rub, or gallop, no edema  ABDOMEN:  normal bowel sounds, soft, nontender, no hepatosplenomegaly or other masses  M/S:   Examination of:   right upper extremity, left upper extremity and right lower extremity  Inspection, ROM, stability and muscle strength normal and left hip pain with AROM radiates down lateral left thigh, point tenderness to left iliac crest area, difficult to assess due to extreme pain with AROM  SKIN:  Inspection of skin and subcutaneous tissue baseline  NEURO:   Cranial nerves 2-12 are normal tested and grossly at patient's baseline, speech WNL  PSYCH:  affect and mood normal        Labs:   Labs done in SNF are in [facility]. Please refer to them using [employer] Everywhere. and Recent labs in EPIC reviewed by me today.        Basic Metabolic Panel (02/27/2019 6:29 AM CST)  Basic Metabolic Panel (02/27/2019 6:29 AM CST)   Component Value Ref Range Performed At Pathologist Signature   Creatinine Serum 0.89 0.55 - 1.02 mg/dL PN SOFT     Lab Glucose 86  Comment:   The stated glucose range is for the fasting state.  Non-fasting glucose range is  mg/dL   70 - 100 mg/dL PN SOFT     CO2 22 22 - 31 mmol/L PN SOFT     Chloride 107 98 - 109 mmol/L PN SOFT     Potassium 4.5 3.5 - 5.2 mmol/L PN SOFT     Sodium 137 136 - 145 mmol/L PN SOFT     Blood Urea Nitrogen 27 (H) 9 - 26 mg/dL PN SOFT     Calcium 8.9 8.4 - 10.4 mg/dL PN SOFT     Est GFR African Am >60 >60 mL/min/1.73m2 PN SOFT     Est GFR Non-Afr Am >60  Comment:   Normal>60, moderate decrease 30 - 59, severe decrease 15 - 29,  renal failure <15 mL/min/1.73 m2  NOTE:  Choose the eGFR result above appropriate for the race of the patient.   >60 mL/min/1.73m2 PN SOFT      Hemoglobin, Blood (02/27/2019 6:29 AM CST)  Hemoglobin, Blood (02/27/2019 6:29 AM CST)   Component Value Ref Range Performed At Pathologist Signature   Hemoglobin 11.4 (L) 11.8 - 15.5 g/dL PN SOFT            ASSESSMENT/PLAN:  Assessment/Plan:  Chronic pain syndrome  Parkinson's disease (H)  Left thigh pain  Physical deconditioning  Acute/ongoing low back and left thigh pain  PT and OT for strengthening,  WBAT, continue scheduled tylenol 650mg TID and q 6 hours prn, continue oxycodone 2.5mg q 6 hours prn  Sinemet 25/100mg 1.5 tabs QID  Vistaril 25mg q 6 hours prn  Start neurontin 100mg TID and 200mg qhs  Bone scan negative for fracture or concerning lesion  F/u with [facility] for further treatment options    Essential hypertension, benign  CKD (chronic kidney disease) stage 3, GFR 30-59 ml/min (H)  Chronic/ongoing: vitals daily and prn, BMP follow, continue coreg 6.25mg BID, losartan 50mg QD, norvasc 5mg QD     Constipation  Acute/ongoing:  Senna s 1 PO BID prn, continue miralax 17gm QD prn     Orders:  No new orders on exam today       Electronically signed by  Tonya Lynn Haase, APRN CNP    Orders written by provider at facility,      Electronically signed by:  Tonya Lynn Haase, APRN CNP             Sincerely,        Tonya Lynn Haase, APRN CNP

## 2019-03-08 NOTE — PROGRESS NOTES
[place] [facility]  [place] Medical Record Number:  [id number]  Place of Service where encounter took place:  [facility] ([facility]) [[id number]]  Chief Complaint   Patient presents with     RECHECK       HPI:    Opal Sin  is a 83 year old (1935), who is being seen today for an episodic care visit.  HPI information obtained from: facility chart records, facility staff, patient report and [facility] chart review. Today's concern is:  Left leg pain: on exam today patient is alert, states pain in left hip/leg is ongoing: she is able to stand and walk > 150 feet with little pain, states standing makes her pain better, pain is worse when she adduct or abducts leg, which seems to cause shooting pain from hip down left thigh. Patient states pain has improved some but she is interested in injection if that is an option.     HTN/CKD: BPs as follows: 131/81, 146/81, 137/74 with HR in the 70's, labs stable  Weight: admit weight 133.2lbs and current weight is 129/9lbs     Past Medical and Surgical History reviewed in [facility] today.    REVIEW OF SYSTEMS:  10 point ROS of systems including Constitutional, Eyes, Respiratory, Cardiovascular, Gastroenterology, Genitourinary, Integumentary, Musculoskeletal, Psychiatric were all negative except for pertinent positives noted in my HPI.    Objective:  /81   Pulse 73   Temp 98.4  F (36.9  C)   Resp 18   Wt 58.9 kg (129 lb 14.4 oz)   SpO2 97%   BMI 23.02 kg/m    Exam:  GENERAL APPEARANCE:  Alert, in mild to moderate distress due to pain in left thigh  ENT:  Mouth and posterior oropharynx normal, moist mucous membranes, normal hearing acuity  EYES:  EOM, conjunctivae, lids, pupils and irises normal, PERRL  RESP:  respiratory effort and palpation of chest normal, lungs clear to auscultation , no respiratory distress  CV:  Palpation and auscultation of heart done , regular rate and rhythm, no murmur, rub, or gallop, no edema  ABDOMEN:  normal bowel  sounds, soft, nontender, no hepatosplenomegaly or other masses  M/S:   Examination of:   right upper extremity, left upper extremity and right lower extremity  Inspection, ROM, stability and muscle strength normal and left hip pain with AROM radiates down lateral left thigh, point tenderness to left iliac crest area, difficult to assess due to extreme pain with AROM  SKIN:  Inspection of skin and subcutaneous tissue baseline  NEURO:   Cranial nerves 2-12 are normal tested and grossly at patient's baseline, speech WNL  PSYCH:  affect and mood normal        Labs:   [facility] SNF [facility]. Please refer to them using "[employer]/Care Everywhere. and Recent labs in EPIC reviewed by me today.        Basic Metabolic Panel (02/27/2019 6:29 AM CST)  Basic Metabolic Panel (02/27/2019 6:29 AM CST)   Component Value Ref Range Performed At Pathologist Signature   Creatinine Serum 0.89 0.55 - 1.02 mg/dL PN SOFT     Lab Glucose 86  Comment:   The stated glucose range is for the fasting state.  Non-fasting glucose range is  mg/dL   70 - 100 mg/dL PN SOFT     CO2 22 22 - 31 mmol/L PN SOFT     Chloride 107 98 - 109 mmol/L PN SOFT     Potassium 4.5 3.5 - 5.2 mmol/L PN SOFT     Sodium 137 136 - 145 mmol/L PN SOFT     Blood Urea Nitrogen 27 (H) 9 - 26 mg/dL PN SOFT     Calcium 8.9 8.4 - 10.4 mg/dL PN SOFT     Est GFR African Am >60 >60 mL/min/1.73m2 PN SOFT     Est GFR Non-Afr Am >60  Comment:   Normal>60, moderate decrease 30 - 59, severe decrease 15 - 29,  renal failure <15 mL/min/1.73 m2  NOTE:  Choose the eGFR result above appropriate for the race of the patient.   >60 mL/min/1.73m2 PN SOFT      Hemoglobin, Blood (02/27/2019 6:29 AM CST)  Hemoglobin, Blood (02/27/2019 6:29 AM CST)   Component Value Ref Range Performed At Pathologist Signature   Hemoglobin 11.4 (L) 11.8 - 15.5 g/dL PN SOFT           ASSESSMENT/PLAN:  Assessment/Plan:  Chronic pain syndrome  Parkinson's disease (H)  Left thigh pain  Physical  deconditioning  Acute/ongoing low back and left thigh pain  PT and OT for strengthening, WBAT, continue scheduled tylenol 650mg TID and q 6 hours prn, continue oxycodone 2.5mg q 6 hours prn  Sinemet 25/100mg 1.5 tabs QID  Vistaril 25mg q 6 hours prn  Start neurontin 100mg TID and 200mg qhs  Bone scan negative for fracture or concerning lesion  F/u with [facility] for further treatment options    Essential hypertension, benign  CKD (chronic kidney disease) stage 3, GFR 30-59 ml/min (H)  Chronic/ongoing: vitals daily and prn, BMP follow, continue coreg 6.25mg BID, losartan 50mg QD, norvasc 5mg QD     Constipation  Acute/ongoing:  Senna s 1 PO BID prn, continue miralax 17gm QD prn     Orders:  No new orders on exam today       Electronically signed by  Tonya Lynn Haase, APRN CNP    Orders written by provider at facility,      Electronically signed by:  Tonya Lynn Haase, APRN CNP

## 2019-03-11 VITALS
HEART RATE: 60 BPM | SYSTOLIC BLOOD PRESSURE: 155 MMHG | OXYGEN SATURATION: 99 % | RESPIRATION RATE: 18 BRPM | BODY MASS INDEX: 23.02 KG/M2 | TEMPERATURE: 97 F | WEIGHT: 129.9 LBS | DIASTOLIC BLOOD PRESSURE: 70 MMHG

## 2019-03-11 RX ORDER — IBUPROFEN 200 MG
1 CAPSULE ORAL 3 TIMES DAILY
Status: ON HOLD | COMMUNITY
End: 2024-05-02

## 2019-03-12 ENCOUNTER — NURSING HOME VISIT (OUTPATIENT)
Dept: GERIATRICS | Facility: CLINIC | Age: 84
End: 2019-03-12
Payer: COMMERCIAL

## 2019-03-12 DIAGNOSIS — N18.30 CKD (CHRONIC KIDNEY DISEASE) STAGE 3, GFR 30-59 ML/MIN (H): ICD-10-CM

## 2019-03-12 DIAGNOSIS — K59.03 DRUG-INDUCED CONSTIPATION: ICD-10-CM

## 2019-03-12 DIAGNOSIS — G20.A1 PARKINSON'S DISEASE (H): ICD-10-CM

## 2019-03-12 DIAGNOSIS — I10 ESSENTIAL HYPERTENSION, BENIGN: ICD-10-CM

## 2019-03-12 DIAGNOSIS — G89.4 CHRONIC PAIN SYNDROME: Primary | ICD-10-CM

## 2019-03-12 DIAGNOSIS — M79.659 PAIN OF THIGH, UNSPECIFIED LATERALITY: ICD-10-CM

## 2019-03-12 DIAGNOSIS — R53.81 PHYSICAL DECONDITIONING: ICD-10-CM

## 2019-03-12 PROCEDURE — 99309 SBSQ NF CARE MODERATE MDM 30: CPT | Performed by: NURSE PRACTITIONER

## 2019-03-12 NOTE — PROGRESS NOTES
[place] [facility]  [place] Medical Record Number:  [id number]  Place of Service where encounter took place:  [facility] ([facility]) [[id number]]  Chief Complaint   Patient presents with     RECHECK       HPI:    Opal Sin  is a 83 year old (1935), who is being seen today for an episodic care visit.  HPI information obtained from: facility chart records, facility staff, patient report and [facility] chart review. Today's concern is:  Left leg pain: on exam today patient is alert, states pain in left hip/leg is improving: she is able to stand and walk > 150 feet with little pain, patient is interested in pursuing injection for pain management.     HTN/CKD: BPs as follows: 142/72 stable with HR in the 70's, labs stable  Weight: admit weight 133.2lbs and current weight 129.0lbs     Past Medical and Surgical History reviewed in Epic today.    REVIEW OF SYSTEMS:  10 point ROS of systems including Constitutional, Eyes, Respiratory, Cardiovascular, Gastroenterology, Genitourinary, Integumentary, Musculoskeletal, Psychiatric were all negative except for pertinent positives noted in my HPI.    Objective:  /70   Pulse 60   Temp 97  F (36.1  C)   Resp 18   Wt 58.9 kg (129 lb 14.4 oz)   SpO2 99%   BMI 23.02 kg/m    Exam:  GENERAL APPEARANCE:  Alert, in mild to moderate distress due to pain in left thigh  ENT:  Mouth and posterior oropharynx normal, moist mucous membranes, normal hearing acuity  EYES:  EOM, conjunctivae, lids, pupils and irises normal, PERRL  RESP:  respiratory effort and palpation of chest normal, lungs clear to auscultation , no respiratory distress  CV:  Palpation and auscultation of heart done , regular rate and rhythm, no murmur, rub, or gallop, no edema  ABDOMEN:  normal bowel sounds, soft, nontender, no hepatosplenomegaly or other masses  M/S:   Examination of:   right upper extremity, left upper extremity and right lower extremity  Inspection, ROM, stability and  muscle strength normal and left hip pain with AROM radiates down lateral left thigh, point tenderness to left iliac crest area, difficult to assess due to extreme pain with AROM  SKIN:  Inspection of skin and subcutaneous tissue baseline  NEURO:   Cranial nerves 2-12 are normal tested and grossly at patient's baseline, speech WNL  PSYCH:  affect and mood normal        Labs:   [facility]. Please refer to them using EPIC/Care Everywhere. and Recent labs in EPIC reviewed by me today.        Basic Metabolic Panel (02/27/2019 6:29 AM CST)  Basic Metabolic Panel (02/27/2019 6:29 AM CST)   Component Value Ref Range Performed At Pathologist Signature   Creatinine Serum 0.89 0.55 - 1.02 mg/dL PN SOFT     Lab Glucose 86  Comment:   The stated glucose range is for the fasting state.  Non-fasting glucose range is  mg/dL   70 - 100 mg/dL PN SOFT     CO2 22 22 - 31 mmol/L PN SOFT     Chloride 107 98 - 109 mmol/L PN SOFT     Potassium 4.5 3.5 - 5.2 mmol/L PN SOFT     Sodium 137 136 - 145 mmol/L PN SOFT     Blood Urea Nitrogen 27 (H) 9 - 26 mg/dL PN SOFT     Calcium 8.9 8.4 - 10.4 mg/dL PN SOFT     Est GFR African Am >60 >60 mL/min/1.73m2 PN SOFT     Est GFR Non-Afr Am >60  Comment:   Normal>60, moderate decrease 30 - 59, severe decrease 15 - 29,  renal failure <15 mL/min/1.73 m2  NOTE:  Choose the eGFR result above appropriate for the race of the patient.   >60 mL/min/1.73m2 PN SOFT      Hemoglobin, Blood (02/27/2019 6:29 AM CST)  Hemoglobin, Blood (02/27/2019 6:29 AM CST)   Component Value Ref Range Performed At Pathologist Signature   Hemoglobin 11.4 (L) 11.8 - 15.5 g/dL PN SOFT         Assessment/Plan:  Chronic pain syndrome  Parkinson's disease (H)  Left thigh pain  Physical deconditioning  Acute/ongoing low back and left thigh pain  PT and OT for strengthening, WBAT, continue scheduled tylenol 650mg TID and q 6 hours prn, continue oxycodone 2.5mg q 6 hours prn  Sinemet 25/100mg 1.5 tabs  QID  Vistaril 25mg q 6 hours prn  Continue neurontin 100mg TID and 200mg qhs  Bone scan negative for fracture or concerning lesion  Set up consult through [facility] or [facility] for injection     Essential hypertension, benign  CKD (chronic kidney disease) stage 3, GFR 30-59 ml/min (H)  Chronic/ongoing: vitals daily and prn, BMP follow, continue coreg 6.25mg BID, losartan 50mg QD, norvasc 5mg QD     Constipation  Acute/ongoing:  Senna s 1 PO BID prn, continue miralax 17gm QD prn     Orders:  Set up consult for low back injection with [facility]       Electronically signed by  Tonya Lynn Haase, APRN CNP    Orders written by provider at facility,      Electronically signed by:  Tonya Lynn Haase, APRN CNP

## 2019-03-12 NOTE — LETTER
3/12/2019        RE: Opal Sin  [address]  [address]  [facility] [address]        [place] [facility]  [place] [facility] Record Number:  [id number]  Place of Service where encounter took place:  [facility] ([facility]) [[id number]]  Chief Complaint   Patient presents with     RECHECK       HPI:    Opal Sin  is a 83 year old (1935), who is being seen today for an episodic care visit.  HPI information obtained from: facility chart records, facility staff, patient report and [facility] chart review. Today's concern is:  Left leg pain: on exam today patient is alert, states pain in left hip/leg is improving: she is able to stand and walk > 150 feet with little pain, patient is interested in pursuing injection for pain management.     HTN/CKD: BPs as follows: 142/72 stable with HR in the 70's, labs stable  Weight: admit weight 133.2lbs and current weight 129.0lbs     Past Medical and Surgical History reviewed in [facility] today.    REVIEW OF SYSTEMS:  10 point ROS of systems including Constitutional, Eyes, Respiratory, Cardiovascular, Gastroenterology, Genitourinary, Integumentary, Musculoskeletal, Psychiatric were all negative except for pertinent positives noted in my HPI.    Objective:  /70   Pulse 60   Temp 97  F (36.1  C)   Resp 18   Wt 58.9 kg (129 lb 14.4 oz)   SpO2 99%   BMI 23.02 kg/m     Exam:  GENERAL APPEARANCE:  Alert, in mild to moderate distress due to pain in left thigh  ENT:  Mouth and posterior oropharynx normal, moist mucous membranes, normal hearing acuity  EYES:  EOM, conjunctivae, lids, pupils and irises normal, PERRL  RESP:  respiratory effort and palpation of chest normal, lungs clear to auscultation , no respiratory distress  CV:  Palpation and auscultation of heart done , regular rate and rhythm, no murmur, rub, or gallop, no edema  ABDOMEN:  normal bowel sounds, soft, nontender, no hepatosplenomegaly or other  masses  M/S:   Examination of:   right upper extremity, left upper extremity and right lower extremity  Inspection, ROM, stability and muscle strength normal and left hip pain with AROM radiates down lateral left thigh, point tenderness to left iliac crest area, difficult to assess due to extreme pain with AROM  SKIN:  Inspection of skin and subcutaneous tissue baseline  NEURO:   Cranial nerves 2-12 are normal tested and grossly at patient's baseline, speech WNL  PSYCH:  affect and mood normal        Labs:   Labs done in SNF are in [place]. Please refer to them using EPIC/Care Everywhere. and Recent labs in EPIC reviewed by me today.        Basic Metabolic Panel (02/27/2019 6:29 AM CST)  Basic Metabolic Panel (02/27/2019 6:29 AM CST)   Component Value Ref Range Performed At Pathologist Signature   Creatinine Serum 0.89 0.55 - 1.02 mg/dL PN SOFT     Lab Glucose 86  Comment:   The stated glucose range is for the fasting state.  Non-fasting glucose range is  mg/dL   70 - 100 mg/dL PN SOFT     CO2 22 22 - 31 mmol/L PN SOFT     Chloride 107 98 - 109 mmol/L PN SOFT     Potassium 4.5 3.5 - 5.2 mmol/L PN SOFT     Sodium 137 136 - 145 mmol/L PN SOFT     Blood Urea Nitrogen 27 (H) 9 - 26 mg/dL PN SOFT     Calcium 8.9 8.4 - 10.4 mg/dL PN SOFT     Est GFR African Am >60 >60 mL/min/1.73m2 PN SOFT     Est GFR Non-Afr Am >60  Comment:   Normal>60, moderate decrease 30 - 59, severe decrease 15 - 29,  renal failure <15 mL/min/1.73 m2  NOTE:  Choose the eGFR result above appropriate for the race of the patient.   >60 mL/min/1.73m2 PN SOFT      Hemoglobin, Blood (02/27/2019 6:29 AM CST)  Hemoglobin, Blood (02/27/2019 6:29 AM CST)   Component Value Ref Range Performed At Pathologist Signature   Hemoglobin 11.4 (L) 11.8 - 15.5 g/dL PN SOFT         Assessment/Plan:  Chronic pain syndrome  Parkinson's disease (H)  Left thigh pain  Physical deconditioning  Acute/ongoing low back and left thigh pain  PT and OT for strengthening,  WBAT, continue scheduled tylenol 650mg TID and q 6 hours prn, continue oxycodone 2.5mg q 6 hours prn  Sinemet 25/100mg 1.5 tabs QID  Vistaril 25mg q 6 hours prn  Continue neurontin 100mg TID and 200mg qhs  Bone scan negative for fracture or concerning lesion  Set up consult through [facility] for injection     Essential hypertension, benign  CKD (chronic kidney disease) stage 3, GFR 30-59 ml/min (H)  Chronic/ongoing: vitals daily and prn, BMP follow, continue coreg 6.25mg BID, losartan 50mg QD, norvasc 5mg QD     Constipation  Acute/ongoing:  Senna s 1 PO BID prn, continue miralax 17gm QD prn     Orders:  Set up consult for low back injection with [facility]       Electronically signed by  Tonya Lynn Haase, APRN CNP    Orders written by provider at facility,      Electronically signed by:  Tonya Lynn Haase, APRN CNP           Sincerely,        Tonya Lynn Haase, APRN CNP

## 2019-03-14 VITALS
SYSTOLIC BLOOD PRESSURE: 122 MMHG | HEART RATE: 61 BPM | BODY MASS INDEX: 22.82 KG/M2 | DIASTOLIC BLOOD PRESSURE: 72 MMHG | TEMPERATURE: 97.3 F | RESPIRATION RATE: 17 BRPM | OXYGEN SATURATION: 97 % | WEIGHT: 128.8 LBS

## 2019-03-14 RX ORDER — TRAMADOL HYDROCHLORIDE 50 MG/1
50 TABLET ORAL EVERY 6 HOURS PRN
COMMUNITY
End: 2020-01-27

## 2019-03-15 ENCOUNTER — DISCHARGE SUMMARY NURSING HOME (OUTPATIENT)
Dept: GERIATRICS | Facility: CLINIC | Age: 84
End: 2019-03-15
Payer: COMMERCIAL

## 2019-03-15 DIAGNOSIS — N18.30 CKD (CHRONIC KIDNEY DISEASE) STAGE 3, GFR 30-59 ML/MIN (H): ICD-10-CM

## 2019-03-15 DIAGNOSIS — R53.81 PHYSICAL DECONDITIONING: ICD-10-CM

## 2019-03-15 DIAGNOSIS — G89.4 CHRONIC PAIN SYNDROME: ICD-10-CM

## 2019-03-15 DIAGNOSIS — G20.A1 PARKINSON'S DISEASE (H): ICD-10-CM

## 2019-03-15 DIAGNOSIS — M79.659 PAIN OF THIGH, UNSPECIFIED LATERALITY: Primary | ICD-10-CM

## 2019-03-15 DIAGNOSIS — I10 ESSENTIAL HYPERTENSION, BENIGN: ICD-10-CM

## 2019-03-15 DIAGNOSIS — K59.03 DRUG-INDUCED CONSTIPATION: ICD-10-CM

## 2019-03-15 PROCEDURE — 99316 NF DSCHRG MGMT 30 MIN+: CPT | Performed by: NURSE PRACTITIONER

## 2019-03-15 RX ORDER — TRAMADOL HYDROCHLORIDE 50 MG/1
50 TABLET ORAL EVERY 6 HOURS PRN
Qty: 20 TABLET | Refills: 0 | Status: SHIPPED | OUTPATIENT
Start: 2019-03-15 | End: 2019-03-22

## 2019-03-15 NOTE — LETTER
3/15/2019        RE: Opal Sin  [address]  [address]  [facility] [address]        [place] [facility] DISCHARGE SUMMARY  PATIENT'S NAME: Opal Sin  YOB: 1935  MEDICAL RECORD NUMBER:  [id number]  Place of Service where encounter took place:  [facility] ([facility]) [[id number]]    PRIMARY CARE PROVIDER AND CLINIC RESPONSIBLE AFTER TRANSFER:   aDmian Russ MD, MD, [address] / [place] [id number]    [facility]     Transferring providers: Tonya Lynn Haase, APRN CNP, Dr. Hanna MD  Recent Hospitalization/ED:  [facility] Hospital stay 2/20/19 to 2/24/19.  Date of SNF Admission: February / 24 / 2019  Date of SNF (anticipated) Discharge: March / 16 / 2019  Discharged to: previous independent home  Cognitive Scores: BIMS 15/15 SBT 2/28  Physical Function: Ambulating 100 ft with RW indep  DME: Walker    CODE STATUS/ADVANCE DIRECTIVES DISCUSSION:  Full Code     ALLERGIES: Bee pollen; Cephalexin monohydrate; Ciprofloxacin; Clindamycin; Multi vitamin-minerals [hair-vites]; Penicillin [penicillins]; Thiazide-type diuretics; Tobramycin; Vancomycin; Atorvastatin; Cephalexin; Ciprofloxacin; Ibuprofen; Versed [midazolam]; Zoloft [sertraline]; Ace inhibitors; Advil [ibuprofen micronized]; and Metoprolol    DISCHARGE DIAGNOSIS/NURSING FACILITY COURSE:   Patient progressed in therapy to walking up to 100 feet using a RW indep, pain in left hip and left mid thigh did improve some throughout coarse of stay here in TCU. Patient did see Dr. Roberson and Dr. Bailon for OP f/u of pain, CT of hip and bone scan of hip were both done with no acute findings noted. Patient will DC home with home PT through [facility].     Past Medical History:  has a past medical history of Asthma (5 yrs), Breast CA (H) (1987), Degeneration of intervertebral disc, site unspecified, Essential hypertension, benign, Gastro-oesophageal reflux disease, antibiotic allergy,  Hyperlipidaemia, Osteomyelitis (H), Osteoporosis, unspecified, Parkinson's disease (H) (2015), PONV (postoperative nausea and vomiting), Spinal stenosis, unspecified region other than cervical, and Unspecified cerebral artery occlusion with cerebral infarction.    Discharge Medications:  Current Outpatient Medications   Medication Sig Dispense Refill     acetaminophen (TYLENOL) 500 MG tablet Take 1,000 mg by mouth every 6 hours        albuterol (PROAIR HFA/PROVENTIL HFA/VENTOLIN HFA) 108 (90 Base) MCG/ACT Inhaler Inhale 1-2 puffs into the lungs every 6 hours as needed for shortness of breath / dyspnea or wheezing 18 g 1     amLODIPine (NORVASC) 5 MG tablet Take 1 tablet (5 mg) by mouth daily 90 tablet 3     aspirin EC 81 MG EC tablet Take 1 tablet (81 mg) by mouth daily       calcium 600 MG tablet Take 1 tablet by mouth 3 times daily       carbidopa-levodopa (SINEMET)  MG per tablet Take 1.5 tablets by mouth 4 times daily Takes at 6am, 11am, 4pm, and 9 pm       carvedilol (COREG) 6.25 MG tablet TAKE 1 TABLET(6.25 MG) BY MOUTH TWICE DAILY WITH MEALS 180 tablet 3     Cholecalciferol (VITAMIN D3 PO) Take 400 Units by mouth daily        Cyanocobalamin (VITAMIN B 12 PO) Take 100 mcg by mouth daily        gabapentin (NEURONTIN) 100 MG capsule Take 100 mg by mouth 3 times daily AND Give 200 mg by mouth one time a day for pain       hydrOXYzine (ATARAX) 25 MG tablet Take 25 mg by mouth every 6 hours as needed for itching       Iron-Vitamins (GERITOL COMPLETE) TABS Take 1 tablet by mouth daily       loperamide (IMODIUM) 2 MG capsule Take 1 capsule (2 mg) by mouth 4 times daily as needed for diarrhea 20 capsule 0     losartan (COZAAR) 50 MG tablet TAKE 1 TABLET(50 MG) BY MOUTH DAILY 90 tablet 3     polyethylene glycol (MIRALAX/GLYCOLAX) packet Take 1 packet by mouth daily as needed for constipation       raloxifene (EVISTA) 60 MG tablet TAKE 1 TABLET BY MOUTH DAILY 90 tablet 3     ranitidine (ZANTAC) 150 MG tablet Take  75 mg by mouth 2 times daily       senna-docusate (SENOKOT-S;PERICOLACE) 8.6-50 MG per tablet Take 1 tablet by mouth 2 times daily as needed for constipation 30 tablet 0     traMADol (ULTRAM) 50 MG tablet Take 50 mg by mouth every 6 hours as needed for severe pain         Medication Changes/Rationale:     DC oxycodone    Started tramadol 50mg q 6 hours prn for pain on 3/11/19    Started neurontin 100mg TID on 2/28/19    Controlled medications sent with patient:   Medication tramadol 50mg q 6 hours prn electronically prescribed to 20 pharmacy     ROS:   10 point ROS of systems including Constitutional, Eyes, Respiratory, Cardiovascular, Gastroenterology, Genitourinary, Integumentary, Musculoskeletal, Psychiatric were all negative except for pertinent positives noted in my HPI.    Physical Exam:   Vitals: /72   Pulse 61   Temp 97.3  F (36.3  C)   Resp 17   Wt 58.4 kg (128 lb 12.8 oz)   SpO2 97%   BMI 22.82 kg/m     BMI= Body mass index is 22.82 kg/m .  GENERAL APPEARANCE:  Alert, in mild to moderate distress due to pain in left thigh  ENT:  Mouth and posterior oropharynx normal, moist mucous membranes, normal hearing acuity  EYES:  EOM, conjunctivae, lids, pupils and irises normal, PERRL  RESP:  respiratory effort and palpation of chest normal, lungs clear to auscultation , no respiratory distress  CV:  Palpation and auscultation of heart done , regular rate and rhythm, no murmur, rub, or gallop, no edema  ABDOMEN:  normal bowel sounds, soft, nontender, no hepatosplenomegaly or other masses  M/S:   Examination of:   right upper extremity, left upper extremity and right lower extremity  Inspection, ROM, stability and muscle strength normal and left hip pain with AROM radiates down lateral left thigh, point tenderness to left iliac crest area, difficult to assess due to extreme pain with AROM  SKIN:  Inspection of skin and subcutaneous tissue baseline  NEURO:   Cranial nerves 2-12 are normal tested and  grossly at patient's baseline, speech WNL  PSYCH:  affect and mood normal         SNF labs: Recent labs in [facility] reviewed by me today.     Assessment/Plan:  Chronic pain syndrome  Parkinson's disease (H)  Left thigh pain  Physical deconditioning  Acute/ongoing low back and left thigh pain  DC home with home PT through [facility], WBAT, continue scheduled tylenol 650mg q 6 hours prn, DC oxycodone, started tramadol 50mg q 6 hours prn per patient request  Sinemet 25/100mg 1.5 tabs QID  Vistaril 25mg q 6 hours prn  Continue neurontin 100mg TID and 200mg qhs  Bone scan negative for fracture or concerning lesion  Set up left hip injection under [facility] for injection      Essential hypertension, benign  CKD (chronic kidney disease) stage 3, GFR 30-59 ml/min (H)  Chronic/ongoing: continue coreg 6.25mg BID, losartan 50mg QD, norvasc 5mg QD     Constipation  Acute/ongoing:  Senna s 1 PO BID prn, continue miralax 17gm QD prn    DISCHARGE PLAN:    Follow up labs: No labs orders/due    Medical Follow Up:      Follow up with primary care provider in 1-2 weeks    MTM referral needed and placed by this provider: No    Current [place] scheduled appointments:  Next 5 appointments (look out 90 days)    Mar 22, 2019  1:00 PM CDT  [facility] [address] [facility] [address] [phone]          Discharge Services: Home Care:  Physical Therapy    Discharge Instructions Verbalized to Patient at Discharge:     None      TOTAL DISCHARGE TIME:   Greater than 30 minutes  Electronically signed by:  Tonya Lynn Haase, APRN CNP                     Sincerely,        Tonya Lynn Haase, APRN CNP

## 2019-03-15 NOTE — PROGRESS NOTES
[place] [facility] DISCHARGE SUMMARY  PATIENT'S NAME: Opal Sin  YOB: 1935  MEDICAL RECORD NUMBER:  [id number]  Place of Service where encounter took place:  [facility] ([facility]) [[id number]]    PRIMARY CARE PROVIDER AND CLINIC RESPONSIBLE AFTER TRANSFER:   Damian Russ MD, MD, [address] / [place] [address]    [facility] Provider     Transferring providers: Tonya Lynn Haase, MIO PIERRE, Dr. Hanna MD  Recent Hospitalization/ED:  [facility] Hospital stay 2/20/19 to 2/24/19.  Date of SNF Admission: February / 24 / 2019  Date of SNF (anticipated) Discharge: March / 16 / 2019  Discharged to: previous independent home  Cognitive Scores: BIMS 15/15 SBT 2/28  Physical Function: Ambulating 100 ft with RW indep  DME: Walker    CODE STATUS/ADVANCE DIRECTIVES DISCUSSION:  Full Code     ALLERGIES: Bee pollen; Cephalexin monohydrate; Ciprofloxacin; Clindamycin; Multi vitamin-minerals [hair-vites]; Penicillin [penicillins]; Thiazide-type diuretics; Tobramycin; Vancomycin; Atorvastatin; Cephalexin; Ciprofloxacin; Ibuprofen; Versed [midazolam]; Zoloft [sertraline]; Ace inhibitors; Advil [ibuprofen micronized]; and Metoprolol    DISCHARGE DIAGNOSIS/NURSING FACILITY COURSE:   Patient progressed in therapy to walking up to 100 feet using a RW indep, pain in left hip and left mid thigh did improve some throughout coarse of stay here in TCU. Patient did see Dr. Roberson and Dr. Bailon for OP f/u of pain, CT of hip and bone scan of hip were both done with no acute findings noted. Patient will DC home with home PT through [facility].     Past Medical History:  has a past medical history of Asthma (5 yrs), Breast CA (H) (1987), Degeneration of intervertebral disc, site unspecified, Essential hypertension, benign, Gastro-oesophageal reflux disease, antibiotic allergy, Hyperlipidaemia, Osteomyelitis (H), Osteoporosis, unspecified, Parkinson's disease (H) (2015), PONV  (postoperative nausea and vomiting), Spinal stenosis, unspecified region other than cervical, and Unspecified cerebral artery occlusion with cerebral infarction.    Discharge Medications:  Current Outpatient Medications   Medication Sig Dispense Refill     acetaminophen (TYLENOL) 500 MG tablet Take 1,000 mg by mouth every 6 hours        albuterol (PROAIR HFA/PROVENTIL HFA/VENTOLIN HFA) 108 (90 Base) MCG/ACT Inhaler Inhale 1-2 puffs into the lungs every 6 hours as needed for shortness of breath / dyspnea or wheezing 18 g 1     amLODIPine (NORVASC) 5 MG tablet Take 1 tablet (5 mg) by mouth daily 90 tablet 3     aspirin EC 81 MG EC tablet Take 1 tablet (81 mg) by mouth daily       calcium 600 MG tablet Take 1 tablet by mouth 3 times daily       carbidopa-levodopa (SINEMET)  MG per tablet Take 1.5 tablets by mouth 4 times daily Takes at 6am, 11am, 4pm, and 9 pm       carvedilol (COREG) 6.25 MG tablet TAKE 1 TABLET(6.25 MG) BY MOUTH TWICE DAILY WITH MEALS 180 tablet 3     Cholecalciferol (VITAMIN D3 PO) Take 400 Units by mouth daily        Cyanocobalamin (VITAMIN B 12 PO) Take 100 mcg by mouth daily        gabapentin (NEURONTIN) 100 MG capsule Take 100 mg by mouth 3 times daily AND Give 200 mg by mouth one time a day for pain       hydrOXYzine (ATARAX) 25 MG tablet Take 25 mg by mouth every 6 hours as needed for itching       Iron-Vitamins (GERITOL COMPLETE) TABS Take 1 tablet by mouth daily       loperamide (IMODIUM) 2 MG capsule Take 1 capsule (2 mg) by mouth 4 times daily as needed for diarrhea 20 capsule 0     losartan (COZAAR) 50 MG tablet TAKE 1 TABLET(50 MG) BY MOUTH DAILY 90 tablet 3     polyethylene glycol (MIRALAX/GLYCOLAX) packet Take 1 packet by mouth daily as needed for constipation       raloxifene (EVISTA) 60 MG tablet TAKE 1 TABLET BY MOUTH DAILY 90 tablet 3     ranitidine (ZANTAC) 150 MG tablet Take 75 mg by mouth 2 times daily       senna-docusate (SENOKOT-S;PERICOLACE) 8.6-50 MG per tablet Take 1  tablet by mouth 2 times daily as needed for constipation 30 tablet 0     traMADol (ULTRAM) 50 MG tablet Take 50 mg by mouth every 6 hours as needed for severe pain         Medication Changes/Rationale:     DC oxycodone    Started tramadol 50mg q 6 hours prn for pain on 3/11/19    Started neurontin 100mg TID on 2/28/19    Controlled medications sent with patient:   Medication tramadol 50mg q 6 hours prn electronically prescribed to 20 pharmacy     ROS:   10 point ROS of systems including Constitutional, Eyes, Respiratory, Cardiovascular, Gastroenterology, Genitourinary, Integumentary, Musculoskeletal, Psychiatric were all negative except for pertinent positives noted in my HPI.    Physical Exam:   Vitals: /72   Pulse 61   Temp 97.3  F (36.3  C)   Resp 17   Wt 58.4 kg (128 lb 12.8 oz)   SpO2 97%   BMI 22.82 kg/m    BMI= Body mass index is 22.82 kg/m .  GENERAL APPEARANCE:  Alert, in mild to moderate distress due to pain in left thigh  ENT:  Mouth and posterior oropharynx normal, moist mucous membranes, normal hearing acuity  EYES:  EOM, conjunctivae, lids, pupils and irises normal, PERRL  RESP:  respiratory effort and palpation of chest normal, lungs clear to auscultation , no respiratory distress  CV:  Palpation and auscultation of heart done , regular rate and rhythm, no murmur, rub, or gallop, no edema  ABDOMEN:  normal bowel sounds, soft, nontender, no hepatosplenomegaly or other masses  M/S:   Examination of:   right upper extremity, left upper extremity and right lower extremity  Inspection, ROM, stability and muscle strength normal and left hip pain with AROM radiates down lateral left thigh, point tenderness to left iliac crest area, difficult to assess due to extreme pain with AROM  SKIN:  Inspection of skin and subcutaneous tissue baseline  NEURO:   Cranial nerves 2-12 are normal tested and grossly at patient's baseline, speech WNL  PSYCH:  affect and mood normal         SNF labs: Recent labs in  [facility] reviewed by me today.     Assessment/Plan:  Chronic pain syndrome  Parkinson's disease (H)  Left thigh pain  Physical deconditioning  Acute/ongoing low back and left thigh pain  DC home with home PT through [facility], WBAT, continue scheduled tylenol 650mg q 6 hours prn, DC oxycodone, started tramadol 50mg q 6 hours prn per patient request  Sinemet 25/100mg 1.5 tabs QID  Vistaril 25mg q 6 hours prn  Continue neurontin 100mg TID and 200mg qhs  Bone scan negative for fracture or concerning lesion  Set up left hip injection under [facility] for injection      Essential hypertension, benign  CKD (chronic kidney disease) stage 3, GFR 30-59 ml/min (H)  Chronic/ongoing: continue coreg 6.25mg BID, losartan 50mg QD, norvasc 5mg QD     Constipation  Acute/ongoing:  Senna s 1 PO BID prn, continue miralax 17gm QD prn    DISCHARGE PLAN:    Follow up labs: No labs orders/due    Medical Follow Up:      Follow up with primary care provider in 1-2 weeks    MTM referral needed and placed by this provider: No    Current [place] scheduled appointments:  Next 5 appointments (look out 90 days)    Mar 22, 2019  1:00 PM CDT  Office Visit with Damian Russ MD  [facility] ([facility]) [address] [facility] [id number] [phone]          Discharge Services: Home Care:  Physical Therapy    Discharge Instructions Verbalized to Patient at Discharge:     None      TOTAL DISCHARGE TIME:   Greater than 30 minutes  Electronically signed by:  Tonya Lynn Haase, APRN CNP

## 2019-03-19 ENCOUNTER — PATIENT OUTREACH (OUTPATIENT)
Dept: CARE COORDINATION | Facility: CLINIC | Age: 84
End: 2019-03-19

## 2019-03-19 NOTE — PATIENT INSTRUCTIONS
[facility] from [facility] on 3/16/19 to home with home care       Received notification 3/19

## 2019-03-19 NOTE — LETTER
Health Care Home - Access Care Plan    About Me  Patient Name:  Opal Sin    YOB: 1935  Age:                             83 year old   [place] MRN:            [id number] Telephone Information:   Home Phone [phone]   Mobile [phone]       Address:    [address] [place] [address] Rd  [address]  [facility] [address] Email address:  [email or web address]      Emergency Contact(s)  Name Relationship Lgl Grd Work Phone Home Phone Mobile Phone   1. PAU MARTINEZ* Sister No  [phone]   2. NILDA CAO Sister   [phone]   3. CAROLE Friend   [phone]             Health Maintenance: Routine Health maintenance Reviewed: Not assessed    My Access Plan  Medical Emergency 911   Questions or concerns during clinic hours Primary Clinic Line, I will call the clinic directly: [facility] [phone]   24 Hour Appointment Line [phone] or  [phone]) (toll free)   24 Hour Nurse Line [phone] (toll free)   Questions or concerns outside clinic hours 24 Hour Appointment Line, I will call the after-hours on-call line:   [facility] [phone] or [phone]) (toll-free)   Preferred Urgent Care [facility], [phone]   Preferred Hospital [facility]  [phone]   Preferred Pharmacy [facility] [facility] [address] AT [facility]     Behavioral Health Crisis Line The National Suicide Prevention Lifeline at 1-127.304.4179 or 919     My Care Team Members  Patient Care Team       Relationship Specialty Notifications Start End    Damian Russ MD PCP - General Internal Medicine  2/18/13     Phone: [phone] Pager: [phone] Fax: [phone] [address] [facility] [address]    [facility] ([facility] ([place])  7/19/18     Phone: [phone]         [place]  Damian Gill MD Assigned PCP   9/16/18     Phone: [phone] Pager: [phone] Fax: [phone] [address] [facility] [address] [facility] [address]    Gabby Mir, RN Clinic Care Coordinator Primary Care - [facility]  2/25/19            My Medical and Care Information  Problem List   Patient Active Problem List   Diagnosis     Mixed hyperlipidemia     Essential hypertension     Internal derangement of knee     Degeneration of intervertebral disc, site unspecified     Cervical spinal stenosis     Acute bronchospasm     Hyposmolality and/or hyponatremia     Osteoporosis     HYPERLIPIDEMIA LDL GOAL <130     CKD (chronic kidney disease) stage 3, GFR 30-59 ml/min (H)     Intermittent asthma     Microalbuminuria     Essential hypertension, benign     Breast CA (H)     Previous back surgery     DJD (degenerative joint disease), ankle and foot     Ankle joint pain     Enthesopathy of ankle and tarsus     Abnormal Pap smear of cervix     Lung nodule     Esophageal reflux     6th nerve palsy     Hx of osteomyelitis     Hx of antibiotic allergy     Advanced directives, counseling/discussion     Hyponatremia     Leg hematoma     Fall from standing     Orbital fracture (H)     Subdural bleeding (H)     Physical deconditioning     Spinal stenosis, unspecified region other than cervical     Chronic pain     Parkinson's disease (H)     TIA (transient ischemic attack)     Carotid aneurysm non ruptured     Delirium     Cerebral aneurysm     Benign essential hypertension     CVA (cerebral vascular accident) (H)     Aphasia     Thyroid nodule     Fall     Closed fracture of right iliac wing with routine healing, subsequent encounter     Pain      Current Medications and Allergies:  See printed Medication Report

## 2019-03-19 NOTE — LETTER
[facility] CARE COORDINATION  [address]  [facility] [address]    March 21, 2019    Opal Sin  [address]    [facility] [address]      Dear Opal,    I am a clinic care coordinator who works with Damian Russ MD at [facility]. I wanted to introduce myself and provide you with my contact information so that you can call me with questions or concerns about your health care. Below is a description of clinic care coordination and how I can further assist you.     The clinic care coordinator is a registered nurse and/or  who understand the health care system. The goal of clinic care coordination is to help you manage your health and improve access to the [facility] in the most efficient manner. The registered nurse can assist you in meeting your health care goals by providing education, coordinating services, and strengthening the communication among your providers. The  can assist you with financial, behavioral, psychosocial, chemical dependency, counseling, and/or psychiatric resources.    Please feel free to contact me with any questions or concerns. We at [facility] are focused on providing you with the highest-quality healthcare experience possible and that all starts with you.     Sincerely,       Gabby Mir RN Care Coordinator  [facility]  Phone:  [phone] (Mondays, Wednesdays & Fridays)  Phone:  [phone] (Tuesdays & Thursdays)  Email: [email or web address]      Enclosed: I have enclosed a copy of a 24 Hour Access Plan. This has helpful phone numbers for you to call when needed. Please keep this in an easy to access place to use as needed.

## 2019-03-20 ENCOUNTER — DOCUMENTATION ONLY (OUTPATIENT)
Dept: FAMILY MEDICINE | Facility: CLINIC | Age: 84
End: 2019-03-20

## 2019-03-20 NOTE — PROGRESS NOTES
[facility] now requests orders and shares plan of care/discharge summaries for some patients through [employer].  Please REPLY TO THIS MESSAGE OR ROUTE BACK TO THE AUTHOR in order to give authorization for orders when needed.  This is considered a verbal order, you will still receive a faxed copy of orders for signature.  Thank you for your assistance in improving collaboration for our patients.    ORDER    SOC visit completed 3/20    Continue home care PT 2x/week for 4 weeks for pain management, functional strengthening, mobility and balance training following recent hospitalization with rehab stay.

## 2019-03-21 NOTE — PROGRESS NOTES
Clinic Care Coordination Contact  Care Coordination Communication    Clinical Data: Patient was hospitalized at [facility] from 2/20/19 to 2/24/19 with diagnoses of left hip strain; Parkinson's disease; chronic low back pain; benign essential hypertension; hyperlipidemia.   She was then transitioned to [facility], where she stayed from 2/24/19 until 3/16/19.    Assessment:  Home Care Contact:  Home Care Agency: [facility]    and phone number: Chrissie Rush PT,   Care Coordination contacted home care: Yes, note placed on Homecare Advisor.  Anticipated start of care date: 3/20/19    Patient Contact:   RN Care Coordinator was not able to contact patient until 3/21/19.   Follow up appointment is scheduled for: 3/22/19 at 2:00 pm.  Home care has contacted patient: Yes, according to [facility].    Plan:   RN Clinic Care Coordinator will mail care coordination introduction letter, Access Care Plan and current medication list to patient .  RN Care Coordinator will await notification from Home Care staff informing about patient's discharge plans/needs. RN Care Coordinator will not do any scheduled outreaches until then.    Gabby Mir RN Care Coordinator  [facility]  Phone:  [phone] (Mondays, Wednesdays & Fridays)  Phone:  [phone] (Tuesdays & Thursdays)  Email: [email or web address]

## 2019-03-22 ENCOUNTER — TELEPHONE (OUTPATIENT)
Dept: FAMILY MEDICINE | Facility: CLINIC | Age: 84
End: 2019-03-22

## 2019-03-22 ENCOUNTER — OFFICE VISIT (OUTPATIENT)
Dept: FAMILY MEDICINE | Facility: CLINIC | Age: 84
End: 2019-03-22
Payer: COMMERCIAL

## 2019-03-22 VITALS
DIASTOLIC BLOOD PRESSURE: 63 MMHG | BODY MASS INDEX: 22.86 KG/M2 | TEMPERATURE: 97 F | HEIGHT: 63 IN | OXYGEN SATURATION: 98 % | WEIGHT: 129 LBS | HEART RATE: 69 BPM | SYSTOLIC BLOOD PRESSURE: 124 MMHG

## 2019-03-22 DIAGNOSIS — G89.29 CHRONIC LEFT HIP PAIN: Primary | ICD-10-CM

## 2019-03-22 DIAGNOSIS — S32.301D CLOSED FRACTURE OF RIGHT ILIAC WING WITH ROUTINE HEALING, SUBSEQUENT ENCOUNTER: ICD-10-CM

## 2019-03-22 DIAGNOSIS — M25.552 CHRONIC LEFT HIP PAIN: Primary | ICD-10-CM

## 2019-03-22 DIAGNOSIS — R52 PAIN: ICD-10-CM

## 2019-03-22 PROCEDURE — 99214 OFFICE O/P EST MOD 30 MIN: CPT | Performed by: INTERNAL MEDICINE

## 2019-03-22 ASSESSMENT — MIFFLIN-ST. JEOR: SCORE: 1019.27

## 2019-03-22 NOTE — PROGRESS NOTES
SUBJECTIVE:   Opal Sin is a 83 year old female who presents to clinic today for the following health issues:          Hospital Follow-up Visit:    [facility]  Date of Admission: 3/24/19  Date of Discharge: 3/16/19  Reason(s) for Admission: thigh pain            Problems taking medications regularly:  None       Medication changes since discharge: None       Problems adhering to non-medication therapy:  None    Summary of hospitalization:  [facility] discharge summary reviewed  Diagnostic Tests/Treatments reviewed.  Follow up needed: orthopedics   Other Healthcare Providers Involved in Patient s Care:         Homecare  Update since discharge: improved.     Post Discharge Medication Reconciliation: discharge medications reconciled, continue medications without change.  Plan of care communicated with patient     Coding guidelines for this visit:  Type of Medical   Decision Making Face-to-Face Visit       within 7 Days of discharge Face-to-Face Visit        within 14 days of discharge   Moderate Complexity [id number]   High Complexity [id number]            [facility]  CLINIC PROGRESS NOTE    Subjective:  Left Hip Pain   Opal Sin was admitted for left hip pain.  Evaluation included CT lumbar spine that was negative for acute issue. Ultrasound showed no evidence of clot.  Aspiration of the hip was negative for infection.  She was recommended to continue physical therapy and consider outpatient injection.  She was recommended to follow up in orthopedics and continue tramadol and Tylenol.  She is using both heat and ice.  She has physical therapy in home twice per week.  She is also doing exercises on her home. She did have a bone scan that was negative for any areas of higher radiouptake.    Parkinsons   She has missed her last neurology appointment.  She notes that she is doing OK, but may benefit from an additional half-tablet.  "  Hypertension   She is doing well with regards to blood pressure.      Past medical history, medications, allergies, social history, family history reviewed and updated in [facility] as of 3/22/2019 .    ROS  CONSTITUTIONAL: no fatigue, no unexpected change in weight  SKIN: no worrisome rashes, no worrisome moles, no worrisome lesions  EYES: no acute vision problems or changes  ENT: no ear problems, no mouth problems, no throat problems  RESP: no significant cough, no shortness of breath  CV: no chest pain, no palpitations, no new or worsening peripheral edema  GI: no nausea, no vomiting, no constipation, no diarrhea  : no frequency, no dysuria, no hematuria  MS: as above   PSYCHIATRIC: no changes in mood or affect      Objective:  Vitals  /63 (BP Location: Right arm, Patient Position: Chair, Cuff Size: Adult Large)   Pulse 69   Temp 97  F (36.1  C) (Oral)   Ht 1.6 m (5' 3\")   Wt 58.5 kg (129 lb)   SpO2 98%   Breastfeeding? No   BMI 22.85 kg/m    GEN: Alert Oriented x3 NAD  HEENT: Atraumatic, normocephalic, neck supple, n   CV: RRR no murmur  PULM: CTA no wheezes or crackles  ABD: Soft, nontender nondistended, no hepatosplenomegally  SKIN: No visible skin lesion or ulcerations  MS: limited range of motion of the left hip.  Some pain on the right side with flection.    EXT: 1+ edema bilateral lower extremities  NEURO: Gait and station stable - using walker, No focal neurologic deficits  PSYCH: Mood good, affect mood congruent    No images are attached to the encounter.    No results found for this or any previous visit (from the past 24 hour(s)).    Assessment/Plan:  Patient Instructions   (M25.552,  G89.29) Chronic left hip pain  (primary encounter diagnosis)  Comment: We will try to help pain with use of Diclofenac patches.  We will also refer to pain clinic today.  Plan: PAIN MANAGEMENT REFERRAL            (V91.474Q) Closed fracture of right iliac wing with routine healing, subsequent encounter  Comment: " As above - continue home physical therapy   Plan: diclofenac (FLECTOR) 1.3 % patch, PAIN         MANAGEMENT REFERRAL            (R52) Pain  Comment: As above   Plan: diclofenac (FLECTOR) 1.3 % patch, PAIN         MANAGEMENT REFERRAL             Shingrix vaccine is now available.  I would call your insurance to see if a shingles vaccine is covered and get this at your pharmacy     Follow up in 3 months    Disclaimer: This note consists of symbols derived from keyboarding, dictation and/or voice recognition software. As a result, there may be errors in the script that have gone undetected. Please consider this when interpreting information found in this chart.    Damian Russ MD  [phone]

## 2019-03-22 NOTE — PATIENT INSTRUCTIONS
(M25.552,  G89.29) Chronic left hip pain  (primary encounter diagnosis)  Comment: We will try to help pain with use of Diclofenac patches.  We will also refer to pain clinic today.  Plan: PAIN MANAGEMENT REFERRAL            (S32.301D) Closed fracture of right iliac wing with routine healing, subsequent encounter  Comment: As above - continue home physical therapy   Plan: diclofenac (FLECTOR) 1.3 % patch, PAIN         MANAGEMENT REFERRAL            (R52) Pain  Comment: As above   Plan: diclofenac (FLECTOR) 1.3 % patch, PAIN         MANAGEMENT REFERRAL             Shingrix vaccine is now available.  I would call your insurance to see if a shingles vaccine is covered and get this at your pharmacy

## 2019-03-22 NOTE — TELEPHONE ENCOUNTER
Prior Authorization Retail Medication Request    Medication/Dose: flector 1.3%  ICD code (if different than what is on RX):  s32.301  Previously Tried and Failed:    Rationale:  Stable on current dose    Insurance Name:  [facility]  Insurance ID:  [id number]      Pharmacy Information (if different than what is on RX)  Name:  [facility]  Phone:  [phone]

## 2019-03-25 ENCOUNTER — MYC MEDICAL ADVICE (OUTPATIENT)
Dept: FAMILY MEDICINE | Facility: CLINIC | Age: 84
End: 2019-03-25

## 2019-03-25 DIAGNOSIS — M25.552 HIP PAIN, LEFT: Primary | ICD-10-CM

## 2019-03-25 NOTE — TELEPHONE ENCOUNTER
To PCP:     Please see message below.     Please let us know how we can assist,     Thank you,   Majo RODNEY RN

## 2019-03-26 RX ORDER — GABAPENTIN 100 MG/1
100 CAPSULE ORAL 3 TIMES DAILY
Qty: 450 CAPSULE | Refills: 3 | Status: SHIPPED | OUTPATIENT
Start: 2019-03-26 | End: 2020-01-27

## 2019-03-26 NOTE — TELEPHONE ENCOUNTER
I spoke to Opal and advised to have you put a diagnosis of chronic hip pain as it has been nearly a month, but that I can change it to just general hip pain.  I felt that the pain clinic consult might be useful however given her limited mobility she did not wish to come back to the clinic for office visit.  Instead we agreed to continue the gabapentin which I refilled.

## 2019-03-27 NOTE — TELEPHONE ENCOUNTER
CENTRAL PRIOR AUTHORIZATION  [phone]    PA Initiation    Medication:   diclofenac (FLECTOR) 1.3 % patch     --        Insurance Company: [employer] - Phone [phone] Fax [phone]  Pharmacy Filling the Rx: [facility] [id number] [place] [address] [id number] AT [facility] [address]  Filling Pharmacy Phone: [phone]  Filling Pharmacy Fax:    Start Date: 3/27/2019

## 2019-03-28 NOTE — TELEPHONE ENCOUNTER
PRIOR AUTHORIZATION DENIED    Medication: diclofenac (FLECTOR) 1.3 % patch60 patch - DENIED 03/28/2019    Denial Date: 3/27/2019    Denial Rational: THE PATIENT NEEDS TO HAVE TIRED/FAILED TWO NON-STEROIDAL ANTI-INFLAMMATORY DRUGS (NSAIDS), INFORMATION SUBMITTED DID NOT CONFIRM THAT; OR IF TWO NSAIDS HAVE NOT BEEN TRIED, AT LEAST ONE OTHER DRUG AVAILABLE ON THE FORMULARY TO TREAT DIAGNOSIS OR CONDITION HAD BEEN TRIED, OR THE PATIENT IS UNABLE TO SWALLOW PILLS. OTHER DRUGS THAT ARE FORMULARY: DICLOFENAC 1.5% TOPICAL SOLUTION.        Appeal Information: IF THE PROVIDER WOULD LIKE APPEAL THIS DENIAL, PLEASE HAVE THEM PROVIDE A LETTER OF MEDICAL NECESSITY ALONG WITH ANY DOCUMENTATION THAT STATES THERAPIES TRIED/OUTCOMES. ONCE IT HAS BEEN PLACED IN THE PATIENT'S CHART, PLEASE NOTIFY THE PA TEAM ONCE IT HAS BEEN COMPLETED AND WE CAN INITIATE THE APPEAL ON BEHALF OF THE PROVIDER AND PATIENT.

## 2019-03-29 NOTE — TELEPHONE ENCOUNTER
Can we ask if she would prefer to take the diclofenac topical gel rather than the patch at the gel might be covered by her insurance?

## 2019-03-29 NOTE — TELEPHONE ENCOUNTER
Patient declined prescription for Diclofenac patches. Will forward to Dr. Russ as an FYI.    Fred Hernandez CMA on 3/29/2019 at 1:12 PM

## 2019-04-15 ENCOUNTER — DOCUMENTATION ONLY (OUTPATIENT)
Dept: FAMILY MEDICINE | Facility: CLINIC | Age: 84
End: 2019-04-15

## 2019-04-15 NOTE — PROGRESS NOTES
[facility] now requests orders and shares plan of care/discharge summaries for some patients through "[employer].  Please REPLY TO THIS MESSAGE OR ROUTE BACK TO THE AUTHOR in order to give authorization for orders when needed.  This is considered a verbal order, you will still receive a faxed copy of orders for signature.  Thank you for your assistance in improving collaboration for our patients.    ORDER    Continue home care PT 2x/week for 4 weeks for pain management, functional strengthening and balance training due to acute L hip pain.

## 2019-05-01 DIAGNOSIS — Z79.2 PREVENTIVE ANTIBIOTIC: ICD-10-CM

## 2019-05-01 NOTE — TELEPHONE ENCOUNTER
Requested Prescriptions   Pending Prescriptions Disp Refills     azithromycin (ZITHROMAX) 500 MG tablet [Pharmacy Med Name: AZITHROMYCIN 500MG TABLETS] 1 tablet 0     Sig: TAKE 1 TABLET BY MOUTH FOR 1 DOSE       There is no refill protocol information for this order        Last Written Prescription Date:  Not on current med list  Last Fill Quantity: ,  # refills:    Last office visit: 3/22/2019 with prescribing provider:     Future Office Visit:      Mary Aggarwal MA

## 2019-05-03 RX ORDER — AZITHROMYCIN 500 MG/1
TABLET, FILM COATED ORAL
Qty: 1 TABLET | Refills: 0 | Status: SHIPPED | OUTPATIENT
Start: 2019-05-03 | End: 2019-08-17

## 2019-07-28 NOTE — TELEPHONE ENCOUNTER
ED GI/





- General


Chief Complaint: Possible Kidney Stone


Stated Complaint: ABDOMINAL PAIN


Time Seen by Provider: 07/28/19 01:20


Primary Care Provider: 


LYDIA FALK MD [NO LOCAL MD] - Follow up as needed


BART COWAN MD [Primary Care Provider] - Follow up as needed


Mode of Arrival: Ambulatory


Information source: Patient


Notes: 





Patient is a 53-year-old female presenting to the emergency department with 

right-sided flank pain.  Patient reports pain started 2 nights ago.  She does 

report a history of kidney stones, states that she usually drinks a lot of water

and tries to flush them out on her own.  She does report having to have 

lithotripsy twice.  She currently reports nausea but denies vomiting, diarrhea 

or fever.  Patient has not had any dysuria.  She was seen by the provider in 

triage prior to my evaluation.





TRAVEL OUTSIDE OF THE [place] IN LAST 30 DAYS: No





- Related Data


Allergies/Adverse Reactions: 


                                        





latex [Latex] Allergy (Verified 11/29/18 12:27)


   


prednisone [Prednisone] Allergy (Verified 11/29/18 12:27)


   











Past Medical History





- General


Information source: Patient





- Social History


Smoking Status: Current Every Day Smoker


Chew tobacco use (# tins/day): No


Frequency of alcohol use: Occasional


Drug Abuse: None


Family History: Reviewed & Not Pertinent, Other - Blood clot, daughter Son and 

mother kidney disease


Patient has suicidal ideation: No


Patient has homicidal ideation: No





- Past Medical History


Cardiac Medical History: Reports: Hx DVT, Hx Hypercholesterolemia, Hx 

Hypertension


Pulmonary Medical History: Reports: Hx Bronchitis, Hx COPD, Hx Pneumonia


Neurological Medical History: Reports: Hx Migraine.  Denies: Hx Seizures


Renal/ Medical History: Reports: Hx Kidney Stones.  Denies: Hx Peritoneal 

Dialysis


GI Medical History: Reports: Hx Gastroesophageal Reflux Disease, Hx Colonoscopy,

 Hx Endoscopy


Musculoskeletal Medical History: Reports Hx Arthritis, Reports Hx 

Musculoskeletal Trauma


Psychiatric Medical History: Reports: Hx Anxiety, Hx Depression


Traumatic Medical History: Reports: Hx Fractures - Foot


Past Surgical History: Reports: Hx Hysterectomy, Hx Orthopedic Surgery - back x 

6, right knee, Hx Tubal Ligation





- Immunizations


Immunizations up to date: Yes


Hx Diphtheria, Pertussis, Tetanus Vaccination: Yes





Review of Systems





- Review of Systems


Constitutional: No symptoms reported


EENT: No symptoms reported


Cardiovascular: No symptoms reported


Respiratory: No symptoms reported


Gastrointestinal: Nausea


Genitourinary: Flank pain


Female Genitourinary: No symptoms reported


Musculoskeletal: No symptoms reported


Skin: No symptoms reported


Hematologic/Lymphatic: No symptoms reported


Neurological/Psychological: No symptoms reported





Physical Exam





- Vital signs


Vitals: 


                                        











Temp Pulse Resp BP Pulse Ox


 


 97.4 F   82   14   146/81 H  100 


 


 07/28/19 00:19  07/28/19 00:19  07/28/19 00:19  07/28/19 00:19 07/28/19 00:19














- Notes


Notes: 





PHYSICAL EXAMINATION:





GENERAL: Well-appearing, well-nourished and in no acute distress.





HEAD: Atraumatic, normocephalic.





EYES: Pupils equal round and reactive to light, extraocular movements intact, 

conjunctiva are normal.





ENT: Nares patent, oropharynx clear without exudates.  Moist mucous membranes.





NECK: Normal range of motion, supple without lymphadenopathy





LUNGS: Breath sounds clear to auscultation bilaterally and equal.  No wheezes 

rales or rhonchi.





HEART: Regular rate and rhythm without murmurs





ABDOMEN: Soft, nontender, nondistended abdomen.  No guarding, no rebound.  No 

masses appreciated.





Female : Right CVA tenderness.





Musculoskeletal: Normal range of motion, no pitting or edema.  No cyanosis.





NEUROLOGICAL: Cranial nerves grossly intact.  Normal speech, normal gait.  

Normal sensory, motor exams





PSYCH: Normal mood, normal affect.





SKIN: Warm, Dry, normal turgor, no rashes or lesions noted.





Course





- Re-evaluation


Re-evalutation: 





At the time of my initial evaluation patient is resting with eyes closed with 

snoring respirations.  Patient was awoken and asked about her pain level, 

patient reports that the Toradol that was given helped but she wanted something 

stronger.  Patient's physical examination is unremarkable other than right-sided

 CVA tenderness.  Labs as recorded, urine shows hematuria but does not appear to

 be infected.  This was a clean-catch urine.  A renal ultrasound was ordered by 

the provider in triage, does show renal stones.  And mild hydronephrosis.  

Patient is requesting a CT scan.  CT scan ordered.





CT scan shows multiple renal calculi, largest one is 8.9 cm at the right UVJ.  I

 did discuss this with my attending physician.  I feel patient can safely be 

discharged home, she has been tolerating p.o., she has not had any vomiting 

appears to be very comfortable.  Patient is on chronic pain management and is 

declining any prescription for narcotics.  Will discharge home patient with 

antiemetics, Flomax and instructions to take ibuprofen as well as her oxycodone 

that is already prescribed to her by her pain management.  Patient is agreeable 

to this plan.  Patient does not have a urologist however she was given contact 

information for Dr. Falk with [facility] urology.  ED return precautions were

 also discussed and patient understands that she may need to return if she 

develops a fever or any worsening symptoms.








- Vital Signs


Vital signs: 


                                        











Temp Pulse Resp BP Pulse Ox


 


 98 F   68   18   130/68 H  99 


 


 07/28/19 06:58  07/28/19 06:58  07/28/19 06:58  07/28/19 06:58  07/28/19 06:58














- Laboratory


Laboratory results interpreted by me: 


                                        











  07/28/19





  01:40


 


Urine Protein  30 H


 


Urine Blood  SMALL H


 


Ur Leukocyte Esterase  TRACE H


 


Urine Ascorbic Acid  40 H














Discharge





- Discharge


Clinical Impression: 


 Kidney stone on right side





Condition: Stable


Disposition: HOME, SELF-CARE


Additional Instructions: 


Your symptoms should improve over the course of the next one week.  If you 

continue to have pain for greater than one week or your pain is not controlled 

with the pain medications that you have at home you need to return to the 

emergency department.  Please also return if you develop fever, persistent 

vomiting, or any other symptoms that are concerning to you.  You should take 

ibuprofen 600 mg every 6 hours.  You have declined a prescription for pain 

medication as you state that you are in pain management and are already taking 

oxycodone.  Please continue to take this as prescribed.  You are also been sent 

home with a medication called Flomax to help pass the stone.  You've been given 

Zofran to assist with nausea.  Please follow-up with urology in the next 2-3 

days and asked to be seen in their [place] office if possible.





Prescriptions: 


Ondansetron [Zofran Odt 4 mg Tablet] 1 - 2 tab PO Q4H PRN #15 tab.rapdis


 PRN Reason: For Nausea/Vomiting


Tamsulosin HCl [Flomax 0.4 mg Cap.sr] 0.4 mg PO DAILY #7 cap.sr.24h


Forms:  Return to Work


Referrals: 


BART COWAN MD [Primary Care Provider] - Follow up as needed


LYDIA FALK MD [NO LOCAL MD] - Follow up as needed Medication Detail         Disp Refills Start End YOLIE     azithromycin (ZITHROMAX) 500 MG tablet 1 tablet 0 10/30/2018 10/30/2018 No     Sig - Route: Take 1 tablet (500 mg) by mouth once for 1 dose - Oral     Class: E-Prescribe     Order: [id number]     E-Prescribing Status: Receipt confirmed by pharmacy (10/30/2018 10:52 AM CDT)       Printout Tracking      External Result Report       Pharmacy      [facility] [address] [place] [address] [address] AT [facility] OF [address]       Associated Diagnoses      Preventive antibiotic [Z79.2]           Source Order Set      Order Set Name Order ID      [id number]         Prescribing Provider's NPI: [id number]  Damian Russ MD

## 2019-08-17 ENCOUNTER — OFFICE VISIT (OUTPATIENT)
Dept: URGENT CARE | Facility: URGENT CARE | Age: 84
End: 2019-08-17
Payer: COMMERCIAL

## 2019-08-17 VITALS
WEIGHT: 135 LBS | TEMPERATURE: 97.7 F | HEART RATE: 57 BPM | SYSTOLIC BLOOD PRESSURE: 126 MMHG | DIASTOLIC BLOOD PRESSURE: 69 MMHG | BODY MASS INDEX: 23.91 KG/M2

## 2019-08-17 DIAGNOSIS — I89.0 LYMPHEDEMA OF LEFT ARM: ICD-10-CM

## 2019-08-17 DIAGNOSIS — L03.114 LEFT ARM CELLULITIS: Primary | ICD-10-CM

## 2019-08-17 PROCEDURE — 99213 OFFICE O/P EST LOW 20 MIN: CPT | Performed by: FAMILY MEDICINE

## 2019-08-17 RX ORDER — AZITHROMYCIN 250 MG/1
TABLET, FILM COATED ORAL
Qty: 6 TABLET | Refills: 0 | Status: SHIPPED | OUTPATIENT
Start: 2019-08-17 | End: 2019-08-26

## 2019-08-17 NOTE — PROGRESS NOTES
"SUBJECTIVE:  Chief Complaint   Patient presents with     Urgent Care     left arm swelling for two days.     Opal Sin is a 84 year old female who presents with a chief complaint of left arm swelling and pain.    Patient with history of breast cancer, s/p mastectomy left and has had intermittent left arm lymphedema.  Patient states that had infection once in left arm and caused swelling also.  Denies any fever.  Endorsed feeling more warm on arm.  Patient did try to elevate this all night long and did seem to be a little better today.  Patient has multiple drug allergies, states that only antibiotic she is able to take is azithromycin and this has worked for her before.    Patient denies any trauma or falls.  She thinks that may have had a small scratch on her left thumb a few days back but no other minor abrasions.  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 - MRSA     Osteoporosis, unspecified     bone density- 2011     Parkinson's disease (H) 2015     PONV (postoperative nausea and vomiting)     nausea     Spinal stenosis, unspecified region other than cervical      Unspecified cerebral artery occlusion with cerebral infarction      Current Outpatient Medications   Medication Sig Dispense Refill     acetaminophen (TYLENOL) 500 MG tablet Take 1,000 mg by mouth every 6 hours        amLODIPine (NORVASC) 5 MG tablet Take 1 tablet (5 mg) by mouth daily 90 tablet 3     aspirin EC 81 MG EC tablet Take 1 tablet (81 mg) by mouth daily       calcium 600 MG tablet Take 1 tablet by mouth 3 " times daily       carbidopa-levodopa (SINEMET)  MG per tablet Take 1.5 tablets by mouth 4 times daily Takes at 6am, 11am, 4pm, and 9 pm       carvedilol (COREG) 6.25 MG tablet TAKE 1 TABLET(6.25 MG) BY MOUTH TWICE DAILY WITH MEALS 180 tablet 3     Cholecalciferol (VITAMIN D3 PO) Take 400 Units by mouth daily        Cyanocobalamin (VITAMIN B 12 PO) Take 100 mcg by mouth daily        gabapentin (NEURONTIN) 100 MG capsule Take 1 capsule (100 mg) by mouth 3 times daily AND Give 200 mg by mouth one time a day for pain 450 capsule 3     Iron-Vitamins (GERITOL COMPLETE) TABS Take 1 tablet by mouth daily       losartan (COZAAR) 50 MG tablet TAKE 1 TABLET(50 MG) BY MOUTH DAILY 90 tablet 3     polyethylene glycol (MIRALAX/GLYCOLAX) packet Take 1 packet by mouth daily as needed for constipation       raloxifene (EVISTA) 60 MG tablet TAKE 1 TABLET BY MOUTH DAILY 90 tablet 3     ranitidine (ZANTAC) 150 MG tablet Take 75 mg by mouth 2 times daily       albuterol (PROAIR HFA/PROVENTIL HFA/VENTOLIN HFA) 108 (90 Base) MCG/ACT Inhaler Inhale 1-2 puffs into the lungs every 6 hours as needed for shortness of breath / dyspnea or wheezing (Patient not taking: Reported on 8/17/2019) 18 g 1     hydrOXYzine (ATARAX) 25 MG tablet Take 25 mg by mouth every 6 hours as needed for itching       loperamide (IMODIUM) 2 MG capsule Take 1 capsule (2 mg) by mouth 4 times daily as needed for diarrhea (Patient not taking: Reported on 8/17/2019) 20 capsule 0     senna-docusate (SENOKOT-S;PERICOLACE) 8.6-50 MG per tablet Take 1 tablet by mouth 2 times daily as needed for constipation (Patient not taking: Reported on 8/17/2019) 30 tablet 0     traMADol (ULTRAM) 50 MG tablet Take 50 mg by mouth every 6 hours as needed for severe pain       Social History     Tobacco Use     Smoking status: Never Smoker     Smokeless tobacco: Never Used   Substance Use Topics     Alcohol use: Yes     Alcohol/week: 0.0 oz     Comment: rare       ROS:  Review of systems  negative except as stated above.    EXAM:   /69   Pulse 57   Temp 97.7  F (36.5  C) (Oral)   Wt 61.2 kg (135 lb)   BMI 23.91 kg/m    M/S Exam:left arm - more swollen, slight pink noted in forearm, more noticeably warmer than right arm.  Decreased ROM due to discomfort  GENERAL APPEARANCE: healthy, alert and no distress  EXTREMITIES: peripheral pulses normal  PSYCH: alert, affect bright    X-RAY was not done.    ASSESSMENT/PLAN:  (L03.114) Left arm cellulitis  (primary encounter diagnosis)  Plan: azithromycin (ZITHROMAX) 250 MG tablet            (I89.0) Lymphedema of left arm  Plan: continue with arm sleeve, elevation      Patient with history of left breast mastectomy with intermittent lymphedema, multiple antibiotic allergies.  Discussed antibiotic option - possible Bactrim or Doxycycline as these were not listed as allergies but patient very hesitant to try any different antibiotic, cited that she thinks she is also allergic to Bactrim.  RX Zpak given today, close follow up with primary provider recommended within 2-3 days for recheck.  Encourage to continue with arm sleeve and elevation.    Follow up with primary provider in 2-3 days    Grey Lockhart MD  August 17, 2019 1:01 PM

## 2019-08-17 NOTE — PATIENT INSTRUCTIONS
Elevate arm  Okay to continue with arm sleeve  Take full course of antibiotic for infection.  Okay to take tylenol for discomfort.      Patient Education     Cellulitis  Cellulitis is an infection of the deep layers of skin. A break in the skin, such as a cut or scratch, can let bacteria under the skin. If the bacteria get to deep layers of the skin, it can be serious. If not treated, cellulitis can get into the bloodstream and lymph nodes. The infection can then spread throughout the body. This causes serious illness.  Cellulitis causes the affected skin to become red, swollen, warm, and sore. The reddened areas have a visible border. An open sore may leak fluid (pus). You may have a fever, chills, and pain.  Cellulitis is treated with antibiotics taken for 7 to 10 days. An open sore may be cleaned and covered with cool wet gauze. Symptoms should get better 1 to 2 days after treatment is started. Make sure to take all the antibiotics for the full number of days until they are gone. Keep taking the medicine even if your symptoms go away.  Home care  Follow these tips:    Limit the use of the part of your body with cellulitis.     If the infection is on your leg, keep your leg raised while sitting. This will help to reduce swelling.    Take all of the antibiotic medicine exactly as directed until it is gone. Do not miss any doses, especially during the first 7 days. Don t stop taking the medicine when your symptoms get better.    Keep the affected area clean and dry.    Wash your hands with soap and warm water before and after touching your skin. Anyone else who touches your skin should also wash his or her hands. Don't share towels.  Follow-up care  Follow up with your healthcare provider, or as advised. If your infection does not go away on the first antibiotic, your healthcare provider will prescribe a different one.  When to seek medical advice  Call your healthcare provider right away if any of these  occur:    Red areas that spread    Swelling or pain that gets worse    Fluid leaking from the skin (pus)    Fever higher of 100.4  F (38.0  C) or higher after 2 days on antibiotics  Date Last Reviewed: 9/1/2016 2000-2018 [employer]. [address] [place] [address]. All rights reserved. This information is not intended as a substitute for professional medical care. Always follow your healthcare professional's instructions.           Patient Education     Lymphedema  The lymphatic system is made up of lymph vessels and lymph nodes, which carry a fluid called lymph. Lymph consists of waste from the cells. This fluid drains through lymph vessels under the skin to nearby lymph nodes. Lymph nodes filter waste products from the cells and kill any bacteria present before returning the lymph fluid to your blood circulation.  When the lymph vessels are damaged, lymph fluid cannot drain from tissues. This causes the lymph fluid to back up leading to swelling. This most often affects the arms or legs. Signs of lymphedema include heaviness, stiffness, or aching in an arm or leg. The limb may swell. The skin might look red. Shoes and rings may feel tight. Ankles and wrists might become less flexible.  The most common cause of damage to the lymph system is surgery or radiation for breast or testicular cancer. Other causes include repeated skin infections (cellulitis), burns, or injury to the arms or legs. It can take many years for symptoms of lymphedema to appear. Once present, lymphedema can become a chronic condition. This means the problem can be managed but not cured.   Treatment often includes use of compression garments, massage, and special exercises. Talk to your healthcare provider about these therapies and best treatment plan for you.  Home care  You can help keep the condition from getting worse. Follow all instructions you have been given. Do your exercises and wear your compression  garments as recommended. Also, care for yourself as instructed.     Small skin injuries like a cut, burn, or insect bite are more likely to cause a skin infection. Take special care to avoid injury. If you have any signs of infection, call your healthcare provider right away.    Take care of your skin and nails. Moisturize dry skin. Wear protective gloves when doing chores such as gardening.     Don't wear tight clothing or jewelry on the affected arm or leg. Avoid carrying bags or other weight on the affected arm.    Save with an electric razor instead of a razor blade.    If at all possible, don t have blood pressure taken, get injections, or have blood drawn in the affected arm.    If a leg is involved, don t cross your legs when sitting. Don't go barefoot.    Avoid hot tubs, steam rooms, and saunas.  If you are at risk for lymphedema but have not developed it, these tips can help also help prevent it. Follow your healthcare provider's instructions.  Follow-up care  Follow up with your healthcare provider, or as advised.  Lymphedema can change the appearance of your body. This can be emotionally difficult to adjust to. You may benefit from a support group where practical advice and emotional support is offered. Individual counseling is another option.  When to seek medical advice  Call your healthcare provider right away if any of these occur:    Swelling worsens    Rash, blistering, or other skin changes on the affected limb    Area of skin becomes red, painful, or warm to the touch    A wound increases in pain, becomes warm, drains pus, or radiates red streaks    Fever of 100.4 F (38 C) or higher, or as directed by your healthcare provider  Date Last Reviewed: 10/1/2016    2848-2069 [employer]. [address] [place] [address], [place] [address]. All rights reserved. This information is not intended as a substitute for professional medical care. Always follow your healthcare professional's  instructions.

## 2019-08-26 ENCOUNTER — OFFICE VISIT (OUTPATIENT)
Dept: FAMILY MEDICINE | Facility: CLINIC | Age: 84
End: 2019-08-26
Payer: COMMERCIAL

## 2019-08-26 VITALS
HEIGHT: 63 IN | OXYGEN SATURATION: 96 % | SYSTOLIC BLOOD PRESSURE: 143 MMHG | HEART RATE: 62 BPM | DIASTOLIC BLOOD PRESSURE: 71 MMHG | WEIGHT: 136 LBS | BODY MASS INDEX: 24.1 KG/M2 | TEMPERATURE: 96.9 F

## 2019-08-26 DIAGNOSIS — N18.30 CKD (CHRONIC KIDNEY DISEASE) STAGE 3, GFR 30-59 ML/MIN (H): ICD-10-CM

## 2019-08-26 DIAGNOSIS — M25.512 ACUTE PAIN OF LEFT SHOULDER: ICD-10-CM

## 2019-08-26 DIAGNOSIS — E78.5 HYPERLIPIDEMIA LDL GOAL <100: ICD-10-CM

## 2019-08-26 DIAGNOSIS — L03.114 CELLULITIS OF LEFT ARM: Primary | ICD-10-CM

## 2019-08-26 DIAGNOSIS — R80.9 MICROALBUMINURIA: ICD-10-CM

## 2019-08-26 PROCEDURE — 99214 OFFICE O/P EST MOD 30 MIN: CPT | Performed by: INTERNAL MEDICINE

## 2019-08-26 PROCEDURE — 99207 C PAF COMPLETED  NO CHARGE: CPT | Performed by: INTERNAL MEDICINE

## 2019-08-26 ASSESSMENT — MIFFLIN-ST. JEOR: SCORE: 1046.02

## 2019-08-26 NOTE — PROGRESS NOTES
"Subjective     Opal Sin is a 84 year old female who presents to clinic today for the following health issues:    HPI   ED/UC Followup:    Facility:  08/17/2019  Date of visit: 08/17/2019  Reason for visit: Left arm cellulitis   Current Status:      [facility]  CLINIC PROGRESS NOTE    Subjective:  Celluliits left arm   Opal Sin was seen on 08/17 for feeling of redness and warmth in her left arm.  She had no previous trauma and no skin breaks other than a small scratch in her left thumb which has resolved.  She was treated a a course of azithromycin and has had near complete resolution of her symptoms.  She had been using a compression sleeve for swelling, but as swelling and inflammation have resolved she does not need the sleeve. She has no fevers.  Left Shoulder/Arm pain   Opal Sin has had noticeable pain but no weakness in the left arm.  Pain is noticed with left arm flexion and some forearm rotation.  Some pain in the cervical pain, but no radiating pain with neck rotation.  Pain in left forearm with rotation.        Past medical history, medications, allergies, social history, family history reviewed and updated in [facility] as of 8/26/2019 .    ROS  CONSTITUTIONAL: no fatigue, no unexpected change in weight  SKIN: no worrisome rashes, no worrisome moles, no worrisome lesions  EYES: no acute vision problems or changes  ENT: no ear problems, no mouth problems, no throat problems  RESP: no significant cough, no shortness of breath  CV: no chest pain, no palpitations, no new or worsening peripheral edema  GI: no nausea, no vomiting, no constipation, no diarrhea  : no frequency, no dysuria, no hematuria  MS: as above   PSYCHIATRIC: no changes in mood or affect      Objective:  Vitals  BP (!) 143/71 (BP Location: Right arm, Patient Position: Chair, Cuff Size: Adult Regular)   Pulse 62   Temp 96.9  F (36.1  C) (Oral)   Ht 1.6 m (5' 3\")   Wt 61.7 kg (136 lb)   SpO2 96%  "  BMI 24.09 kg/m    GEN: Alert Oriented x3 NAD  HEENT: Atraumatic, normocephalic, neck supple, no thyromegaly, negative cervical adenopathy  TM: TM bilaterally pearly and grey with normal light reflex  CV: RRR no murmurs or rubs  PULM: CTA no wheezes or crackles  ABD: Soft, nontender nondistended, no hepatosplenomegally  SKIN: No visible skin lesion or ulcerations  EXT:  no edema bilateral lower extremities  MS: full range of motion of the left shoulder without pain, tenderness in left forearm with elbow rotation  NEURO: Gait and station stable, No focal neurologic deficits  PSYCH: Mood good, affect mood congruent    No images are attached to the encounter.    No results found for this or any previous visit (from the past 24 hour(s)).    Assessment/Plan:  Patient Instructions   (L03.114) Cellulitis of left arm  (primary encounter diagnosis)  Comment: We will hold off any additional antibiotics as infection appears to have healed  Plan:         (E78.5) HYPERLIPIDEMIA LDL GOAL <130  Comment: Check labs when you are able  Plan: Lipid panel reflex to direct LDL Fasting            (R80.9) Microalbuminuria  Comment: as above   Plan: CBC with platelets, Comprehensive metabolic         panel            (N18.3) CKD (chronic kidney disease) stage 3, GFR 30-59 ml/min (H)  Comment: Continue to stay hydrated and monitor blood pressure closely  Plan: CBC with platelets, Comprehensive metabolic         panel               Follow up in 1 month    Disclaimer: This note consists of symbols derived from keyboarding, dictation and/or voice recognition software. As a result, there may be errors in the script that have gone undetected. Please consider this when interpreting information found in this chart.    Damian Russ MD  [phone]

## 2019-09-06 DIAGNOSIS — N18.30 CKD (CHRONIC KIDNEY DISEASE) STAGE 3, GFR 30-59 ML/MIN (H): ICD-10-CM

## 2019-09-06 DIAGNOSIS — M81.0 AGE RELATED OSTEOPOROSIS, UNSPECIFIED PATHOLOGICAL FRACTURE PRESENCE: ICD-10-CM

## 2019-09-06 DIAGNOSIS — R80.9 MICROALBUMINURIA: ICD-10-CM

## 2019-09-06 DIAGNOSIS — E78.5 HYPERLIPIDEMIA LDL GOAL <100: ICD-10-CM

## 2019-09-06 LAB
ALBUMIN SERPL-MCNC: 3.7 G/DL (ref 3.4–5)
ALP SERPL-CCNC: 55 U/L (ref 40–150)
ALT SERPL W P-5'-P-CCNC: 9 U/L (ref 0–50)
ANION GAP SERPL CALCULATED.3IONS-SCNC: 8 MMOL/L (ref 3–14)
AST SERPL W P-5'-P-CCNC: 15 U/L (ref 0–45)
BILIRUB SERPL-MCNC: 0.6 MG/DL (ref 0.2–1.3)
BUN SERPL-MCNC: 30 MG/DL (ref 7–30)
CALCIUM SERPL-MCNC: 8.7 MG/DL (ref 8.5–10.1)
CHLORIDE SERPL-SCNC: 106 MMOL/L (ref 94–109)
CHOLEST SERPL-MCNC: 113 MG/DL
CO2 SERPL-SCNC: 22 MMOL/L (ref 20–32)
CREAT SERPL-MCNC: 0.9 MG/DL (ref 0.52–1.04)
ERYTHROCYTE [DISTWIDTH] IN BLOOD BY AUTOMATED COUNT: 14.7 % (ref 10–15)
GFR SERPL CREATININE-BSD FRML MDRD: 60 ML/MIN/{1.73_M2}
GLUCOSE SERPL-MCNC: 96 MG/DL (ref 70–99)
HCT VFR BLD AUTO: 38.7 % (ref 35–47)
HDLC SERPL-MCNC: 49 MG/DL
HGB BLD-MCNC: 13.1 G/DL (ref 11.7–15.7)
LDLC SERPL CALC-MCNC: 48 MG/DL
MCH RBC QN AUTO: 31.9 PG (ref 26.5–33)
MCHC RBC AUTO-ENTMCNC: 33.9 G/DL (ref 31.5–36.5)
MCV RBC AUTO: 94 FL (ref 78–100)
NONHDLC SERPL-MCNC: 64 MG/DL
PLATELET # BLD AUTO: 190 10E9/L (ref 150–450)
POTASSIUM SERPL-SCNC: 4.6 MMOL/L (ref 3.4–5.3)
PROT SERPL-MCNC: 6.5 G/DL (ref 6.8–8.8)
RBC # BLD AUTO: 4.11 10E12/L (ref 3.8–5.2)
SODIUM SERPL-SCNC: 136 MMOL/L (ref 133–144)
TRIGL SERPL-MCNC: 82 MG/DL
WBC # BLD AUTO: 11.5 10E9/L (ref 4–11)

## 2019-09-06 PROCEDURE — 80061 LIPID PANEL: CPT | Performed by: INTERNAL MEDICINE

## 2019-09-06 PROCEDURE — 80053 COMPREHEN METABOLIC PANEL: CPT | Performed by: INTERNAL MEDICINE

## 2019-09-06 PROCEDURE — 36415 COLL VENOUS BLD VENIPUNCTURE: CPT | Performed by: INTERNAL MEDICINE

## 2019-09-06 PROCEDURE — 85027 COMPLETE CBC AUTOMATED: CPT | Performed by: INTERNAL MEDICINE

## 2019-09-07 NOTE — RESULT ENCOUNTER NOTE
Cristhian Joseph,    I had the opportunity to review your recent labs and a summary of your labs reads as follows:    Your complete blood counts show no sign of anemia, nonspecific elevation of your white blood cell count and platelets.  Your comprehensive metabolic panel showed stable renal function, normal liver function, and normal fasting blood glucose indicating no evidence of diabetes mellitus.  Your fasting lipid panel show  - normal HDL (good) cholesterol -as your goal is greater than 40  - low LDL (bad) cholesterol as your goal is less than 130  - normal triglyceride levels    Congratulaions on your excellent results       Sincerely,  Damian Russ MD

## 2019-09-09 ENCOUNTER — HOSPITAL ENCOUNTER (EMERGENCY)
Facility: CLINIC | Age: 84
Discharge: HOME | End: 2019-09-09
Attending: EMERGENCY MEDICINE | Admitting: EMERGENCY MEDICINE
Payer: COMMERCIAL

## 2019-09-09 ENCOUNTER — APPOINTMENT (OUTPATIENT)
Dept: CT IMAGING | Facility: CLINIC | Age: 84
End: 2019-09-09
Attending: EMERGENCY MEDICINE
Payer: COMMERCIAL

## 2019-09-09 VITALS
OXYGEN SATURATION: 96 % | SYSTOLIC BLOOD PRESSURE: 151 MMHG | DIASTOLIC BLOOD PRESSURE: 64 MMHG | RESPIRATION RATE: 16 BRPM | WEIGHT: 134 LBS | BODY MASS INDEX: 22.88 KG/M2 | HEIGHT: 64 IN | TEMPERATURE: 97.3 F

## 2019-09-09 DIAGNOSIS — H53.8 BLURRED VISION: ICD-10-CM

## 2019-09-09 PROCEDURE — 70450 CT HEAD/BRAIN W/O DYE: CPT

## 2019-09-09 PROCEDURE — 99284 EMERGENCY DEPT VISIT MOD MDM: CPT | Mod: 25

## 2019-09-09 PROCEDURE — 76512 OPH US DX B-SCAN: CPT | Mod: 50

## 2019-09-09 RX ORDER — TROPICAMIDE 5 MG/ML
2 SOLUTION/ DROPS OPHTHALMIC ONCE
Status: DISCONTINUED | OUTPATIENT
Start: 2019-09-09 | End: 2019-09-09 | Stop reason: HOSPADM

## 2019-09-09 RX ORDER — PROPARACAINE HYDROCHLORIDE 5 MG/ML
2 SOLUTION/ DROPS OPHTHALMIC ONCE
Status: DISCONTINUED | OUTPATIENT
Start: 2019-09-09 | End: 2019-09-09 | Stop reason: HOSPADM

## 2019-09-09 ASSESSMENT — ENCOUNTER SYMPTOMS
EYE PAIN: 0
WEAKNESS: 0
HEADACHES: 1
NUMBNESS: 0

## 2019-09-09 ASSESSMENT — MIFFLIN-ST. JEOR: SCORE: 1052.82

## 2019-09-09 NOTE — ED PROVIDER NOTES
History     Chief Complaint:    Eye Problem    HPI   Opal Sin is a 84 year old female with history of a stroke, TIA and cerebral aneurysm s/p coil embolization who presents to the emergency department today with an eye problem. The patient states that 2 days prior to her arrival in the ED she started having blurriness to her right eye with a football size shadow in her central visual field. She states that the blurriness causes her trouble reading. 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 She states that she had a cortisone shot on her hip and subsequently the rest of the week her blood pressure raised and started having a headache for the last 4 days. She denies eye pain, numbness, weakness, known retinal issues, glaucoma or trauma to the eye. She states she uses visine drops in her eyes. She denies any FB or FB sensation. She states she goes to [facility] for her eye issues and has had cataract surgery in both eyes about 6 years ago. She also states double vision corrective surgery, but that did not work. She states there is no issue dilating her eyes and she last had a dilated eye exam in January. She denies any other symptoms, numbness, tingling, or weakness or other concerns.      Allergies:  Bee Pollen  Cephalexin Monohydrate  Ciprofloxacin  Clindamycin  Multi Vitamin-Minerals [Hair-Vites]  Penicillin [Penicillins]  Thiazide-Type Diuretics  Tobramycin  Vancomycin  Atorvastatin  Cephalexin  Ciprofloxacin  Ibuprofen  Versed [Midazolam]  Zoloft [Sertraline]  Ace Inhibitors  Advil [Ibuprofen Micronized]  Metoprolol    Medications:    Albuterol (Proair Hfa/Proventil Hfa/Ventolin Hfa)   Amlodipine (Norvasc)   Aspirin Ec 81 Mg  Carbidopa-Levodopa (Sinemet)  Carvedilol (Coreg)   Gabapentin (Neurontin)  Iron-Vitamins (Geritol Complete)  "  Loperamide (Imodium)   Losartan (Cozaar)   Polyethylene Glycol (Miralax/Glycolax)  Raloxifene (Evista)   Ranitidine (Zantac)   Senna-Docusate (Senokot-S;Pericolace)   Tramadol (Ultram)     Past Medical History:    6Th Nerve Palsy  Abnormal Pap Smear Of Cervix  Advanced Directives, Counseling/Discussion  Aphasia  [place]  Benign Essential Hypertension  Breast Cancer  Cerebral Aneurysm  Chronic Pain  Ckd (Chronic Kidney Disease)  Closed Fracture Of Right Iliac Wing  Cva (Cerebral Vascular Accident)   Degeneration Of Intervertebral Disc  Delirium  Djd (Degenerative Joint Disease), Ankle And Foot  Enthesopathy Of Ankle And Tarsus  Esophageal Reflux  Essential Hypertension, Benign  Fall From Standing  Gerd  Hyponatremia  Intermittent Asthma  Internal Derangement Of Knee  Leg Hematoma  Lung Nodule  Microalbuminuria  Mixed Hyperlipidemia  Orbital Fracture   Osteomyelitis  Osteoporosis  Parkinson's Disease   Physical Deconditioning  Ponv  Spinal Stenosis  Subdural Bleeding  Thyroid Nodule  Tia (Transient Ischemic Attack)    Past Surgical History:    Biopsy  Breast surgery  Bunionectomy  Appendectomy  Back surgeries  Bilateral total knee replacement   Shoulder replacement   Mastectomy  Thoracoscopic wedge resection  TKR    Family History:    Mother - uterine cancer  Brother - lung cancer, heart disease, Alzheimer's   Sister - cerebrovascular disease    Social History:  The patient was accompanied to the ED by her Friend.  Smoking Status: Never  Smokeless Tobacco: Never  Alcohol Use: Yes    Marital Status:  Single     Review of Systems   Eyes: Positive for visual disturbance. Negative for pain.   Neurological: Positive for headaches. Negative for weakness and numbness.   All other systems reviewed and are negative.      Physical Exam     Patient Vitals for the past 24 hrs:   BP Temp Temp src Heart Rate Resp SpO2 Height Weight   09/09/19 0731 (!) 151/64 97.3  F (36.3  C) Temporal 62 16 96 % 1.626 m (5' 4\") 60.8 kg (134 lb) "       Physical Exam  General: Well appearing, nontoxic.  Resting comfortably  Head:  Scalp, face, and head appear normal  ENT:    The external nose is normal    Pinnae are normal    The oropharynx is normal, mucous membranes moist    Uvula is in the midline  Neck:  Trachea is in the midline  CV:  Regular rate and rhythm     Normal S1/S2, no S3/S4    No murmur or rub  Resp:  Lungs are clear and equal bilaterally    There is no tachypnea    No increased work of breathing    No rales, wheezing, or rhonchi  GI:  Abdomen is soft, no rigidity or guarding    No distension, or mass    No tenderness or rebound tenderness   MS:  Normal muscular tone    Symmetric motor strength    No lower extremity edema  Skin:  No rash or acute skin lesions noted  Neuro: Awake and alert    Speech is normal and fluent    Left eye 6th CN palsy. CN II-XII otherwise intact.    Strength 5/5 and intact throughout    Moves all extremities spontaneously  Psych:  Normal affect.  Appropriate interactions.     Comprehensive Eye Exam:  Right Eye:  Visual Acuity   Unable to see eye chart. Vision intact to counting fingers at 4     feet.  Visual Fields   Intact by finger counting  EOM    Intact without nystagmus  Lids/Lashes/Lacrimal  Normal without lesions or trauma  Conjunctiva and Sclera White and quiet, no FB or trauma  Cornea   No fluorescein uptake, no FB  Iris    Round and reactive, no lesions  Lens    Clear, s/p cataract surgery  Retina    Sharp disc margins, vessels normal  IOP    13 mmHg    Left Eye:  Visual Acuity   20/15  Visual Fields   Intact by finger counting  EOM    Unable to abduct left eye, EOM otherwise normal  Lids/Lashes/Lacrimal  Normal without lesions or trauma   Conjunctiva and Sclera White and quiet, no FB or trauma  Cornea   Clear  Iris    Round and reactive, no lesions  Lens    Clear s/p cataract surgery   IOP    11 mmHg     Emergency Department Course     Imaging:  Radiographic findings were communicated with the patient who  voiced understanding of the findings.    CT Head without contrast:   IMPRESSION:     1. No evidence of acute intracranial hemorrhage, mass, or herniation.  2. Sequela of previous aneurysm coiling causing streak artifact.  3. Mild diffuse parenchymal volume loss and white matter changes  likely due to chronic microvascular ischemic disease, as per radiology.      Procedures:    Limited Bedside Screening Ultrasound     PERFORMED BY: Dr. Simpson  BODY AREA IMAGED: Bilateral eyes  INDICATIONS: Right eye blurred vision. Evaluate for retinal or vitreous detachment or vitreous hemorrhage.  FINDINGS: IMPRESSION: Normal bilateral limited bedside occular ultrasound.      Interventions:  0746 Alcaine 2 drops both eyes  0746 Fluorescein 1 strip both eyes  0746 Tropicamide 2 drops right eye    Emergency Department Course:  Nursing notes and vitals reviewed. (0745) I performed an exam of the patient as documented above.     Medicine administered as documented above.    The patient was sent for a head CT while in the emergency department, findings above.     0815 I rechecked the patient and discussed the results of her workup thus far.     0935 A bedside US was performed as outlined in the procedure note above.  The patient tolerated well and there were no complications.    1102 I consulted with Dr. Strong, [facility], regarding the patient's history and presentation here in the emergency department.    1105 I rechecked the patient.    Findings and plan explained to the Patient. Patient discharged home with instructions regarding supportive care, medications, and reasons to return. The importance of close follow-up was reviewed.     I personally reviewed the laboratory results with the Patient and answered all related questions prior to discharge.      Impression & Plan      Medical Decision Making:  Opal Sin is a 84 year old female with history of prior stoke due to cerebral aneurism, s/p coiling,  presents for vision loss of the right eye described as central, blurriness and darkness to her vision. Symptoms are subacute with onset 2 days ago. On my evaluation, she is otherwise well appearing, hemodynamically stable. She has no other new focal neurological deficit. She has baseline inability to abduct the left eye, otherwise cranial nerves are fully intact. Broad differential diagnosis is considered including vitreal or retinal detachment, vitreous hemorrhage, glaucoma or elevated intraocular pressure, corneal abrasion or ulcer. Patient denies any trauma to the eye, no foreign bodies, sensation or pain. No pain eye movement. No significant headache. Central artery or vein occulusion was also considered. Patient is not anticoagulated. CT scan results were unchanged from baseline. No evidence of acute new intracranial process or hemorrhage. Bedside occular US did not reveal any gross evidence of retinal detachment, vitreous detachment or vitreal hemorrhage. The artificial lens appears normally located. No foreign body. Funduscopic exam was difficult, but patient's eye was dilated and a panoptic ophthalmoscope was used however due to the patient's inability to hold eye still, a thourough fundus exam was difficult. The visualized retinal vessels did appear normal. The retina appeared well perfused over the visualized portion. The anterior and posterior chambers were clear. The case was dicussed with the patient's ophthalmologist at [facility] who recommended the patient be seen in the clinic at 12:30 pm. This was discussed with the patient and she was agreeable with plan of care. Close return precautions were provided and she was discharged in stable condition for close follow up in the eye clinic today.    Diagnosis:    ICD-10-CM    1. Blurred vision H53.8        Disposition:  discharged to home    Scribe Disclosure:  Aldo BALDERRAMA am serving as a scribe at 7:46 AM on 9/9/2019 to  document services personally performed by Lio Simpson MD based on my observations and the provider's statements to me.     9/9/2019    EMERGENCY DEPARTMENT       Lio Simpson MD  09/09/19 2023

## 2019-09-09 NOTE — ED AVS SNAPSHOT
Emergency Department  [address] [facility] [address]  Phone:  [phone]  Fax:  [phone]                                    Opal Sin   MRN: [id number]    Department:   Emergency Department   Date of Visit:  9/9/2019           After Visit Summary Signature Page    I have received my discharge instructions, and my questions have been answered. I have discussed any challenges I see with this plan with the nurse or doctor.    ..........................................................................................................................................  Patient/Patient Representative Signature      ..........................................................................................................................................  Patient Representative Print Name and Relationship to Patient    ..................................................               ................................................  Date                                   Time    ..........................................................................................................................................  Reviewed by Signature/Title    ...................................................              ..............................................  Date                                               Time          22EPIC Rev 08/18

## 2019-09-10 ENCOUNTER — TRANSFERRED RECORDS (OUTPATIENT)
Dept: HEALTH INFORMATION MANAGEMENT | Facility: CLINIC | Age: 84
End: 2019-09-10

## 2019-09-16 DIAGNOSIS — I10 ESSENTIAL HYPERTENSION WITH GOAL BLOOD PRESSURE LESS THAN 140/90: ICD-10-CM

## 2019-09-16 RX ORDER — CARVEDILOL 6.25 MG/1
TABLET ORAL
Qty: 180 TABLET | Refills: 3 | Status: CANCELLED | OUTPATIENT
Start: 2019-09-16

## 2019-09-17 ENCOUNTER — OFFICE VISIT (OUTPATIENT)
Dept: FAMILY MEDICINE | Facility: CLINIC | Age: 84
End: 2019-09-17
Payer: COMMERCIAL

## 2019-09-17 VITALS
BODY MASS INDEX: 22.71 KG/M2 | SYSTOLIC BLOOD PRESSURE: 128 MMHG | OXYGEN SATURATION: 97 % | HEART RATE: 62 BPM | WEIGHT: 133 LBS | HEIGHT: 64 IN | TEMPERATURE: 98.1 F | DIASTOLIC BLOOD PRESSURE: 75 MMHG

## 2019-09-17 DIAGNOSIS — I10 ESSENTIAL HYPERTENSION WITH GOAL BLOOD PRESSURE LESS THAN 140/90: ICD-10-CM

## 2019-09-17 DIAGNOSIS — H35.30 MACULAR DEGENERATION OF BOTH EYES, UNSPECIFIED TYPE: Primary | ICD-10-CM

## 2019-09-17 DIAGNOSIS — M80.00XS OSTEOPOROSIS WITH CURRENT PATHOLOGICAL FRACTURE, UNSPECIFIED OSTEOPOROSIS TYPE, SEQUELA: ICD-10-CM

## 2019-09-17 DIAGNOSIS — F32.0 MILD MAJOR DEPRESSION (H): ICD-10-CM

## 2019-09-17 DIAGNOSIS — I10 BENIGN ESSENTIAL HYPERTENSION: ICD-10-CM

## 2019-09-17 PROCEDURE — 90662 IIV NO PRSV INCREASED AG IM: CPT | Performed by: INTERNAL MEDICINE

## 2019-09-17 PROCEDURE — G0008 ADMIN INFLUENZA VIRUS VAC: HCPCS | Performed by: INTERNAL MEDICINE

## 2019-09-17 PROCEDURE — 99214 OFFICE O/P EST MOD 30 MIN: CPT | Mod: 25 | Performed by: INTERNAL MEDICINE

## 2019-09-17 RX ORDER — SERTRALINE HYDROCHLORIDE 25 MG/1
25 TABLET, FILM COATED ORAL DAILY
Qty: 30 TABLET | Refills: 11 | Status: SHIPPED | OUTPATIENT
Start: 2019-09-17 | End: 2019-09-23

## 2019-09-17 RX ORDER — CARVEDILOL 6.25 MG/1
6.25 TABLET ORAL 2 TIMES DAILY WITH MEALS
Qty: 180 TABLET | Refills: 3 | Status: SHIPPED | OUTPATIENT
Start: 2019-09-17 | End: 2020-08-31

## 2019-09-17 RX ORDER — ALENDRONATE SODIUM 35 MG/1
TABLET ORAL
Qty: 12 TABLET | Refills: 3 | Status: SHIPPED | OUTPATIENT
Start: 2019-09-17 | End: 2020-03-27

## 2019-09-17 RX ORDER — AMLODIPINE BESYLATE 5 MG/1
5 TABLET ORAL DAILY
Qty: 90 TABLET | Refills: 3 | Status: SHIPPED | OUTPATIENT
Start: 2019-09-17 | End: 2020-01-28

## 2019-09-17 ASSESSMENT — ANXIETY QUESTIONNAIRES
2. NOT BEING ABLE TO STOP OR CONTROL WORRYING: NOT AT ALL
IF YOU CHECKED OFF ANY PROBLEMS ON THIS QUESTIONNAIRE, HOW DIFFICULT HAVE THESE PROBLEMS MADE IT FOR YOU TO DO YOUR WORK, TAKE CARE OF THINGS AT HOME, OR GET ALONG WITH OTHER PEOPLE: NOT DIFFICULT AT ALL
1. FEELING NERVOUS, ANXIOUS, OR ON EDGE: SEVERAL DAYS
5. BEING SO RESTLESS THAT IT IS HARD TO SIT STILL: NOT AT ALL
6. BECOMING EASILY ANNOYED OR IRRITABLE: NOT AT ALL
GAD7 TOTAL SCORE: 2
3. WORRYING TOO MUCH ABOUT DIFFERENT THINGS: NOT AT ALL
7. FEELING AFRAID AS IF SOMETHING AWFUL MIGHT HAPPEN: NOT AT ALL

## 2019-09-17 ASSESSMENT — MIFFLIN-ST. JEOR: SCORE: 1048.28

## 2019-09-17 ASSESSMENT — PATIENT HEALTH QUESTIONNAIRE - PHQ9
5. POOR APPETITE OR OVEREATING: SEVERAL DAYS
SUM OF ALL RESPONSES TO PHQ QUESTIONS 1-9: 2

## 2019-09-17 NOTE — PATIENT INSTRUCTIONS
(H35.30) Macular degeneration of both eyes, unspecified type  (primary encounter diagnosis)  Comment: We will plan to follow up in ophthalmology   Plan:     (I10) Benign essential hypertension  Comment: blood pressure is well controlled today  - continue amlodipine and carvedilol  Plan: amLODIPine (NORVASC) 5 MG tablet            (I10) Essential hypertension with goal blood pressure less than 140/90  Comment: blood pressure is excellent  Plan: carvedilol (COREG) 6.25 MG tablet            (M80.00XS) Osteoporosis with current pathological fracture, unspecified osteoporosis type, sequela  Comment: Continue alendronate as you have been taking it  Plan: alendronate (FOSAMAX) 35 MG tablet            (F32.0) Mild major depression (H)  Comment: We will start a medication of sertraline 25 mg daily to help with mood.  I've explained to her that drugs of the SSRI class can have side effects such as weight gain, sexual dysfunction, insomnia, headache, nausea. These medications are generally effective at alleviating symptoms of anxiety and/or depression. Let me know if significant side effects do occur.   Plan: sertraline (ZOLOFT) 25 MG tablet

## 2019-09-17 NOTE — TELEPHONE ENCOUNTER
"Last Written Prescription Date:  8/27/18  Last Fill Quantity: 180 tablet,  # refills: 3   Last office visit: 8/26/2019 with prescribing provider:  Petrona   Future Office Visit:   Next 5 appointments (look out 90 days)    Sep 17, 2019  2:00 PM CDT  Office Visit with Damian Russ MD  [facility]) [address] [facility] [address]  [phone]   Sep 26, 2019 11:00 AM CDT  Nurse Only with CS NURSE  [facility]) [address]  [facility] [address] [phone]         Requested Prescriptions   Pending Prescriptions Disp Refills     carvedilol (COREG) 6.25 MG tablet 180 tablet 3     Sig: TAKE 1 TABLET(6.25 MG) BY MOUTH TWICE DAILY WITH MEALS       Beta-Blockers Protocol Failed - 9/16/2019  6:43 PM        Failed - Blood pressure under 140/90 in past 12 months     BP Readings from Last 3 Encounters:   09/09/19 (!) 151/64   08/26/19 (!) 143/71   08/17/19 126/69                 Passed - Patient is age 6 or older        Passed - Recent (12 mo) or future (30 days) visit within the authorizing provider's specialty     Patient had office visit in the last 12 months or has a visit in the next 30 days with authorizing provider or within the authorizing provider's specialty.  See \"Patient Info\" tab in inbasket, or \"Choose Columns\" in Meds & Orders section of the refill encounter.              Passed - Medication is active on med list          "

## 2019-09-17 NOTE — PROGRESS NOTES
Subjective     Opal Sin is a 84 year old female who presents to clinic today for the following health issues:    HPI   ED/UC Followup:    Facility:   Emergency Department  Date of visit: 9/9/2019  Reason for visit: Eye Problem (Blurred Vision)  Current Status: still having issues.      [facility]  CLINIC PROGRESS NOTE    Subjective:  Wet Macular Degeneration   Opal Sin was recently noted to have recent macular degeneration.  She has has been followed in ophthalmology and recommended to have injections monthly.  She is not driving currently.  She is not noticing any balance changes related to the eye.  No pain in the right eye.    Benign essential hypertension   Blood pressure is well controlled today with current blood pressure medications.    Osteoporosis with current pathological fracture, unspecified osteoporosis type, sequela   She did have a follow up in orthopedics this past year with evaluation of the femur pain and noted that she did not have a pathologic fracture.  She is recommended to continue alendronate and has been.        Past medical history, medications, allergies, social history, family history reviewed and updated in [facility] as of 9/17/2019 .    ROS  CONSTITUTIONAL: no fatigue, no unexpected change in weight  SKIN: no worrisome rashes, no worrisome moles, no worrisome lesions  EYES: no acute vision problems or changes  ENT: no ear problems, no mouth problems, no throat problems  RESP: no significant cough, no shortness of breath  CV: no chest pain, no palpitations, no new or worsening peripheral edema  GI: no nausea, no vomiting, no constipation, no diarrhea  : + frequency, no dysuria, no hematuria  MS: ongoing shoulder and hip pain.    PSYCHIATRIC: frustrated by recent emergency department visit, sleep has been impaired      Objective:  Vitals  /75 (BP Location: Right arm, Cuff Size: Adult Regular)   Pulse 62   Temp 98.1  F (36.7  C) (Oral)   Ht 1.626  "m (5' 4\")   Wt 60.3 kg (133 lb)   SpO2 97%   Breastfeeding? No   BMI 22.83 kg/m    GEN: Alert Oriented x3 NAD  HEENT: Atraumatic, normocephalic,   SKIN: No visible skin lesion or ulcerations  EXT:  no edema bilateral lower extremities  NEURO: Gait and station stable - using cane  PSYCH: Mood good, affect mood congruent    No images are attached to the encounter.    No results found for this or any previous visit (from the past 24 hour(s)).    Assessment/Plan:  Patient Instructions   (H35.30) Macular degeneration of both eyes, unspecified type  (primary encounter diagnosis)  Comment: We will plan to follow up in ophthalmology   Plan:     (I10) Benign essential hypertension  Comment: blood pressure is well controlled today  - continue amlodipine and carvedilol  Plan: amLODIPine (NORVASC) 5 MG tablet            (I10) Essential hypertension with goal blood pressure less than 140/90  Comment: blood pressure is excellent  Plan: carvedilol (COREG) 6.25 MG tablet            (M80.00XS) Osteoporosis with current pathological fracture, unspecified osteoporosis type, sequela  Comment: Continue alendronate as you have been taking it  Plan: alendronate (FOSAMAX) 35 MG tablet            (F32.0) Mild major depression (H)  Comment: We will start a medication of sertraline 25 mg daily to help with mood.  I've explained to her that drugs of the SSRI class can have side effects such as weight gain, sexual dysfunction, insomnia, headache, nausea. These medications are generally effective at alleviating symptoms of anxiety and/or depression. Let me know if significant side effects do occur.   Plan: sertraline (ZOLOFT) 25 MG tablet               Follow up in 1 month    25 minutes spent with patient.  Over 50% of time counseling      Disclaimer: This note consists of symbols derived from keyboarding, dictation and/or voice recognition software. As a result, there may be errors in the script that have gone undetected. Please consider " this when interpreting information found in this chart.    Damian Russ MD  [phone]

## 2019-09-18 ASSESSMENT — ANXIETY QUESTIONNAIRES: GAD7 TOTAL SCORE: 2

## 2019-09-18 ASSESSMENT — ASTHMA QUESTIONNAIRES: ACT_TOTALSCORE: 25

## 2019-09-30 ENCOUNTER — HEALTH MAINTENANCE LETTER (OUTPATIENT)
Age: 84
End: 2019-09-30

## 2019-10-04 ENCOUNTER — OFFICE VISIT (OUTPATIENT)
Dept: FAMILY MEDICINE | Facility: CLINIC | Age: 84
End: 2019-10-04
Payer: COMMERCIAL

## 2019-10-04 VITALS
HEART RATE: 55 BPM | WEIGHT: 136 LBS | BODY MASS INDEX: 23.22 KG/M2 | HEIGHT: 64 IN | TEMPERATURE: 97.3 F | DIASTOLIC BLOOD PRESSURE: 75 MMHG | SYSTOLIC BLOOD PRESSURE: 152 MMHG | OXYGEN SATURATION: 95 %

## 2019-10-04 DIAGNOSIS — M19.029 ELBOW ARTHRITIS: Primary | ICD-10-CM

## 2019-10-04 DIAGNOSIS — I10 BENIGN ESSENTIAL HYPERTENSION: ICD-10-CM

## 2019-10-04 DIAGNOSIS — I72.0 ANEURYSM OF CAROTID ARTERY (H): ICD-10-CM

## 2019-10-04 DIAGNOSIS — F32.0 MILD MAJOR DEPRESSION (H): ICD-10-CM

## 2019-10-04 PROCEDURE — 99214 OFFICE O/P EST MOD 30 MIN: CPT | Performed by: INTERNAL MEDICINE

## 2019-10-04 RX ORDER — SERTRALINE HYDROCHLORIDE 25 MG/1
25 TABLET, FILM COATED ORAL DAILY
Qty: 30 TABLET | Refills: 11 | COMMUNITY
Start: 2019-10-04 | End: 2020-03-27

## 2019-10-04 RX ORDER — TRAMADOL HYDROCHLORIDE 50 MG/1
25-50 TABLET ORAL EVERY 6 HOURS PRN
Qty: 10 TABLET | Refills: 0 | Status: SHIPPED | OUTPATIENT
Start: 2019-10-04 | End: 2020-03-27

## 2019-10-04 ASSESSMENT — MIFFLIN-ST. JEOR: SCORE: 1061.89

## 2019-10-04 NOTE — PATIENT INSTRUCTIONS
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  (primary encounter diagnosis)  Comment: We will start a trial of tramadol of 25-50 mg dailies and we will consider cortisone injection pending discussion with ophthalmology    Plan: traMADol (ULTRAM) 50 MG tablet            (F32.0) Mild major depression (H)  Comment: We will start sertraline at a low dose today and follow up in 3 weeks to see how you are doing  Plan: sertraline (ZOLOFT) 25 MG tablet            (I72.0) Aneurysm of carotid artery (H)  Comment: Plan to follow up in vascular with CT angiogram this fall  Plan:     (I10) Benign essential hypertension  Comment: blood pressure is elevated today which may be due to pain   Plan:

## 2019-10-04 NOTE — PROGRESS NOTES
"[facility]  CLINIC PROGRESS NOTE    Subjective:  Rotator Cuff Tear  Elbow Arthritis   Opal Sin was recently seen in orthopedics and was recommended to consider a cortisone injection but would like to hold of on steroids given macular degeneration issues.  She will discuss with her ophthalmologist.  Depression   She is struggling to maintain a positive attitude and maintaining focus and positive frame of mind.   Data Unavailable    Past medical history, medications, allergies, social history, family history reviewed and updated in [facility] as of 10/4/2019 .    ROS  CONSTITUTIONAL: no fatigue, no unexpected change in weight  SKIN: no worrisome rashes, no worrisome moles, no worrisome lesions  EYES: no acute vision problems or changes  ENT: no ear problems, no mouth problems, no throat problems  RESP: no significant cough, no shortness of breath  CV: no chest pain, no palpitations, no new or worsening peripheral edema  GI: no nausea, no vomiting, no constipation, no diarrhea  : no frequency, no dysuria, no hematuria  MS: no claudication, no myalgias, no joint aches  PSYCHIATRIC: no changes in mood or affect      Objective:  Vitals  BP (!) 152/75 (BP Location: Right arm, Patient Position: Sitting, Cuff Size: Adult Regular)   Pulse 55   Temp 97.3  F (36.3  C) (Oral)   Ht 1.626 m (5' 4\")   Wt 61.7 kg (136 lb)   SpO2 95%   Breastfeeding? No   BMI 23.34 kg/m    GEN: Alert Oriented x3 NAD  HEENT: Atraumatic, normocephalic, neck stiffness  CV: RRR no murmurs or rubs  PULM: CTA no wheezes or crackles  ABD: Soft, nontender nondistended, no hepatosplenomegally  SKIN: No visible skin lesion or ulcerations  EXT: 2+ dorsal pedis pulses, no edema bilateral lower extremities  NEURO: Gait and station deferred, No focal neurologic deficits  PSYCH: Mood good, affect mood congruent    No images are attached to the encounter.    No results found for this or any previous visit (from the past 24 " hour(s)).    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  (primary encounter diagnosis)  Comment: We will start a trial of tramadol of 25-50 mg dailies and we will consider cortisone injection pending discussion with ophthalmology    Plan: traMADol (ULTRAM) 50 MG tablet            (F32.0) Mild major depression (H)  Comment: We will start sertraline at a low dose today and follow up in 3 weeks to see how you are doing  Plan: sertraline (ZOLOFT) 25 MG tablet            (I72.0) Aneurysm of carotid artery (H)  Comment: Plan to follow up in vascular with CT angiogram this fall  Plan:     (I10) Benign essential hypertension  Comment: blood pressure is elevated today which may be due to pain   Plan:        Follow up in 3 weeks    Disclaimer: This note consists of symbols derived from keyboarding, dictation and/or voice recognition software. As a result, there may be errors in the script that have gone undetected. Please consider this when interpreting information found in this chart.    Damian Russ MD  [phone]

## 2019-10-10 ENCOUNTER — TRANSFERRED RECORDS (OUTPATIENT)
Dept: HEALTH INFORMATION MANAGEMENT | Facility: CLINIC | Age: 84
End: 2019-10-10

## 2019-10-14 ENCOUNTER — TELEPHONE (OUTPATIENT)
Dept: FAMILY MEDICINE | Facility: CLINIC | Age: 84
End: 2019-10-14

## 2019-10-14 NOTE — TELEPHONE ENCOUNTER
Please inform Opal that I do not have a strong preference either direction.  If she is comfortable with continuing sertraline then that is just fine.

## 2019-10-14 NOTE — TELEPHONE ENCOUNTER
Reason for Call:  Other call back    Detailed comments: Patient called because her neurologist Dr. Ferreira recommended she start taking cymbalta  But patient preferred to keep taking zoloft      Phone Number Patient can be reached at: Cell number on file:    Telephone Information:   Mobile [phone]       Best Time: any  Can we leave a detailed message on this number? YES    Call taken on 10/14/2019 at 8:13 AM by Opal Garcia

## 2019-10-22 DIAGNOSIS — M81.0 AGE-RELATED OSTEOPOROSIS WITHOUT CURRENT PATHOLOGICAL FRACTURE: ICD-10-CM

## 2019-10-22 DIAGNOSIS — I10 ESSENTIAL HYPERTENSION WITH GOAL BLOOD PRESSURE LESS THAN 140/90: ICD-10-CM

## 2019-10-23 RX ORDER — LOSARTAN POTASSIUM 50 MG/1
TABLET ORAL
Qty: 90 TABLET | Refills: 0 | Status: SHIPPED | OUTPATIENT
Start: 2019-10-23 | End: 2020-01-23

## 2019-10-23 RX ORDER — RALOXIFENE HYDROCHLORIDE 60 MG/1
TABLET, FILM COATED ORAL
Qty: 90 TABLET | Refills: 0 | Status: SHIPPED | OUTPATIENT
Start: 2019-10-23 | End: 2020-01-23

## 2019-10-23 NOTE — TELEPHONE ENCOUNTER
"losartan (COZAAR) 50 MG tablet 90 tablet 3 9/11/2018  No   Sig: TAKE 1 TABLET(50 MG) BY MOUTH DAILY       raloxifene (EVISTA) 60 MG tablet 90 tablet 3 9/11/2018  No   Sig: TAKE 1 TABLET BY MOUTH DAILY     [facility] Last Written Prescription Date:  09/11/2018  Last Fill Quantity: 90,  # refills: 3   Last office visit: 10/4/2019 with prescribing provider:     Future Office Visit:     Requested Prescriptions   Pending Prescriptions Disp Refills     raloxifene (EVISTA) 60 MG tablet [Pharmacy Med Name: RALOXIFENE 60MG TABLETS] 90 tablet 0     Sig: TAKE 1 TABLET BY MOUTH DAILY       Bisphosphonates Passed - 10/22/2019  3:50 PM        Passed - Recent (12 mo) or future (30 days) visit within the authorizing provider's specialty     Patient has had an office visit with the authorizing provider or a provider within the authorizing providers department within the previous 12 mos or has a future within next 30 days. See \"Patient Info\" tab in inbasket, or \"Choose Columns\" in Meds & Orders section of the refill encounter.              Passed - Dexa on file within past 2 years     Please review last Dexa result.           Passed - Medication is active on med list        Passed - Patient is age 18 or older        Passed - Normal serum creatinine on file within past 12 months     Recent Labs   Lab Test 09/06/19  0818  10/13/14  1028   CR 0.90   < >  --    CREAT  --   --  1.1*    < > = values in this interval not displayed.             losartan (COZAAR) 50 MG tablet [Pharmacy Med Name: LOSARTAN 50MG TABLETS] 90 tablet 0     Sig: TAKE 1 TABLET(50 MG) BY MOUTH DAILY       Angiotensin-II Receptors Failed - 10/22/2019  3:50 PM        Failed - Last blood pressure under 140/90 in past 12 months     BP Readings from Last 3 Encounters:   10/04/19 (!) 152/75   09/17/19 128/75   09/09/19 (!) 151/64                 Passed - Recent (12 mo) or future (30 days) visit within the authorizing provider's specialty     Patient has had an office visit with " "the authorizing provider or a provider within the authorizing providers department within the previous 12 mos or has a future within next 30 days. See \"Patient Info\" tab in inbasket, or \"Choose Columns\" in Meds & Orders section of the refill encounter.              Passed - Medication is active on med list        Passed - Patient is age 18 or older        Passed - No active pregnancy on record        Passed - Normal serum creatinine on file in past 12 months     Recent Labs   Lab Test 09/06/19  0818  10/13/14  1028   CR 0.90   < >  --    CREAT  --   --  1.1*    < > = values in this interval not displayed.             Passed - Normal serum potassium on file in past 12 months     Recent Labs   Lab Test 09/06/19  0818   POTASSIUM 4.6                    Passed - No positive pregnancy test in past 12 months             "

## 2019-10-23 NOTE — TELEPHONE ENCOUNTER
Prescription approved per [facility] Refill Protocol.    Jose YOUNG RN

## 2019-10-28 DIAGNOSIS — Z79.2 PREVENTIVE ANTIBIOTIC: ICD-10-CM

## 2019-10-29 RX ORDER — AZITHROMYCIN 500 MG/1
TABLET, FILM COATED ORAL
Qty: 1 TABLET | Refills: 3 | Status: SHIPPED | OUTPATIENT
Start: 2019-10-29 | End: 2020-10-09

## 2019-10-29 NOTE — TELEPHONE ENCOUNTER
"Last Rx 5/3/19 #1 tab with 0 refills - for \"preventive antibiotic\"   Per prior encounter taking for dental premedication     Please advise     Sabina MARTIN RN        "

## 2019-10-29 NOTE — TELEPHONE ENCOUNTER
Requested Prescriptions   Pending Prescriptions Disp Refills     azithromycin (ZITHROMAX) 500 MG tablet [Pharmacy Med Name: AZITHROMYCIN 500MG TABLETS] 1 tablet 0     Sig: TAKE 1 TABLET BY MOUTH FOR 1 DOSE       There is no refill protocol information for this order        Last Written Prescription Date:  5/3/19  Last Fill Quantity: 1 tablet,  # refills: 0   Last office visit: 10/4/2019 with prescribing provider:  Petrona   Future Office Visit:      Routing refill request to provider for review/approval because:  Drug not on the [facility], [facility] or [facility] refill protocol or controlled substance  Drug not active on patient's medication list    Discontinued 8/17/2019

## 2019-11-12 ENCOUNTER — TRANSFERRED RECORDS (OUTPATIENT)
Dept: HEALTH INFORMATION MANAGEMENT | Facility: CLINIC | Age: 84
End: 2019-11-12

## 2019-11-15 ENCOUNTER — TRANSFERRED RECORDS (OUTPATIENT)
Dept: HEALTH INFORMATION MANAGEMENT | Facility: CLINIC | Age: 84
End: 2019-11-15

## 2020-01-24 ENCOUNTER — OFFICE VISIT (OUTPATIENT)
Dept: FAMILY MEDICINE | Facility: CLINIC | Age: 85
End: 2020-01-24
Payer: COMMERCIAL

## 2020-01-24 VITALS
HEART RATE: 66 BPM | DIASTOLIC BLOOD PRESSURE: 78 MMHG | TEMPERATURE: 97 F | SYSTOLIC BLOOD PRESSURE: 146 MMHG | WEIGHT: 134.9 LBS | BODY MASS INDEX: 23.03 KG/M2 | OXYGEN SATURATION: 96 % | HEIGHT: 64 IN

## 2020-01-24 DIAGNOSIS — R00.2 PALPITATIONS: Primary | ICD-10-CM

## 2020-01-24 DIAGNOSIS — G20.A1 PARKINSON'S DISEASE (H): ICD-10-CM

## 2020-01-24 DIAGNOSIS — E86.0 DEHYDRATION: ICD-10-CM

## 2020-01-24 DIAGNOSIS — I10 ESSENTIAL HYPERTENSION: ICD-10-CM

## 2020-01-24 DIAGNOSIS — R53.83 FATIGUE, UNSPECIFIED TYPE: ICD-10-CM

## 2020-01-24 LAB
ANION GAP SERPL CALCULATED.3IONS-SCNC: 7 MMOL/L (ref 3–14)
BUN SERPL-MCNC: 23 MG/DL (ref 7–30)
CALCIUM SERPL-MCNC: 8.6 MG/DL (ref 8.5–10.1)
CHLORIDE SERPL-SCNC: 106 MMOL/L (ref 94–109)
CO2 SERPL-SCNC: 23 MMOL/L (ref 20–32)
CREAT SERPL-MCNC: 0.75 MG/DL (ref 0.52–1.04)
GFR SERPL CREATININE-BSD FRML MDRD: 74 ML/MIN/{1.73_M2}
GLUCOSE SERPL-MCNC: 146 MG/DL (ref 70–99)
POTASSIUM SERPL-SCNC: 4.3 MMOL/L (ref 3.4–5.3)
SODIUM SERPL-SCNC: 136 MMOL/L (ref 133–144)
TSH SERPL DL<=0.005 MIU/L-ACNC: 0.97 MU/L (ref 0.4–4)

## 2020-01-24 PROCEDURE — 84443 ASSAY THYROID STIM HORMONE: CPT | Performed by: INTERNAL MEDICINE

## 2020-01-24 PROCEDURE — 80048 BASIC METABOLIC PNL TOTAL CA: CPT | Performed by: INTERNAL MEDICINE

## 2020-01-24 PROCEDURE — 93000 ELECTROCARDIOGRAM COMPLETE: CPT | Performed by: INTERNAL MEDICINE

## 2020-01-24 PROCEDURE — 36415 COLL VENOUS BLD VENIPUNCTURE: CPT | Performed by: INTERNAL MEDICINE

## 2020-01-24 PROCEDURE — 99214 OFFICE O/P EST MOD 30 MIN: CPT | Performed by: INTERNAL MEDICINE

## 2020-01-24 ASSESSMENT — MIFFLIN-ST. JEOR: SCORE: 1056.9
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
## 2020-01-26 DIAGNOSIS — I10 BENIGN ESSENTIAL HYPERTENSION: ICD-10-CM

## 2020-01-27 ENCOUNTER — OFFICE VISIT (OUTPATIENT)
Dept: FAMILY MEDICINE | Facility: CLINIC | Age: 85
End: 2020-01-27
Payer: COMMERCIAL

## 2020-01-27 VITALS
DIASTOLIC BLOOD PRESSURE: 64 MMHG | OXYGEN SATURATION: 96 % | HEART RATE: 59 BPM | HEIGHT: 64 IN | BODY MASS INDEX: 22.96 KG/M2 | WEIGHT: 134.5 LBS | SYSTOLIC BLOOD PRESSURE: 146 MMHG | TEMPERATURE: 96.8 F

## 2020-01-27 DIAGNOSIS — J98.01 ACUTE BRONCHOSPASM: ICD-10-CM

## 2020-01-27 DIAGNOSIS — I10 ESSENTIAL HYPERTENSION WITH GOAL BLOOD PRESSURE LESS THAN 140/90: ICD-10-CM

## 2020-01-27 DIAGNOSIS — N18.30 CKD (CHRONIC KIDNEY DISEASE) STAGE 3, GFR 30-59 ML/MIN (H): ICD-10-CM

## 2020-01-27 DIAGNOSIS — F32.0 MILD MAJOR DEPRESSION (H): ICD-10-CM

## 2020-01-27 DIAGNOSIS — I72.0 ANEURYSM OF CAROTID ARTERY (H): ICD-10-CM

## 2020-01-27 DIAGNOSIS — M25.552 HIP PAIN, LEFT: ICD-10-CM

## 2020-01-27 DIAGNOSIS — C50.912 MALIGNANT NEOPLASM OF LEFT FEMALE BREAST, UNSPECIFIED ESTROGEN RECEPTOR STATUS, UNSPECIFIED SITE OF BREAST (H): ICD-10-CM

## 2020-01-27 LAB
ERYTHROCYTE [DISTWIDTH] IN BLOOD BY AUTOMATED COUNT: 14.1 % (ref 10–15)
HBA1C MFR BLD: 5.5 % (ref 0–5.6)
HCT VFR BLD AUTO: 38.3 % (ref 35–47)
HGB BLD-MCNC: 12.9 G/DL (ref 11.7–15.7)
MCH RBC QN AUTO: 32.1 PG (ref 26.5–33)
MCHC RBC AUTO-ENTMCNC: 33.7 G/DL (ref 31.5–36.5)
MCV RBC AUTO: 95 FL (ref 78–100)
PLATELET # BLD AUTO: 215 10E9/L (ref 150–450)
RBC # BLD AUTO: 4.02 10E12/L (ref 3.8–5.2)
WBC # BLD AUTO: 9.3 10E9/L (ref 4–11)

## 2020-01-27 PROCEDURE — 85027 COMPLETE CBC AUTOMATED: CPT | Performed by: INTERNAL MEDICINE

## 2020-01-27 PROCEDURE — 99214 OFFICE O/P EST MOD 30 MIN: CPT | Performed by: INTERNAL MEDICINE

## 2020-01-27 PROCEDURE — 83036 HEMOGLOBIN GLYCOSYLATED A1C: CPT | Performed by: INTERNAL MEDICINE

## 2020-01-27 PROCEDURE — 36415 COLL VENOUS BLD VENIPUNCTURE: CPT | Performed by: INTERNAL MEDICINE

## 2020-01-27 RX ORDER — ALBUTEROL SULFATE 90 UG/1
1-2 AEROSOL, METERED RESPIRATORY (INHALATION) EVERY 6 HOURS PRN
Qty: 18 G | Refills: 1 | Status: SHIPPED | OUTPATIENT
Start: 2020-01-27 | End: 2021-12-15

## 2020-01-27 RX ORDER — LOSARTAN POTASSIUM 100 MG/1
100 TABLET ORAL DAILY
Qty: 90 TABLET | Refills: 3 | Status: SHIPPED | OUTPATIENT
Start: 2020-01-27 | End: 2020-09-23

## 2020-01-27 RX ORDER — GABAPENTIN 100 MG/1
100 CAPSULE ORAL
Qty: 90 CAPSULE | Refills: 3 | Status: SHIPPED | OUTPATIENT
Start: 2020-01-27 | End: 2021-03-19

## 2020-01-27 ASSESSMENT — MIFFLIN-ST. JEOR: SCORE: 1055.09

## 2020-01-27 NOTE — PROGRESS NOTES
"Subjective     Opal Sin is a 84 year old female who presents to clinic today for the following health issues:    HPI   Chief Complaint   Patient presents with     Follow Up     [facility]  CLINIC PROGRESS NOTE    Subjective:  Essential hypertension with goal blood pressure less than 140/90    Opal Sin was noted to have some recent increase in premature ventricular contractions and EKG did show left ventricular hypertrophy.  She has continued to take carvedilol, amlodipine and losartan  Parkinson's   She has been walking but has been more weak.  She is taking Sinemet qid. She has feels that the medication is working but has unwanted side effects of causing increased urination.  She does get up three times per night.       Past medical history, medications, allergies, social history, family history reviewed and updated in [facility] as of 1/27/2020 .    ROS  CONSTITUTIONAL: no fatigue, no unexpected change in weight  SKIN: no worrisome rashes, no worrisome moles, no worrisome lesions  EYES: macular degeneration  ENT: no ear problems, no mouth problems, no throat problems  RESP: no significant cough, no shortness of breath  CV: no chest pain, no new or worsening peripheral edema  GI: no nausea, no vomiting, no constipation, no diarrhea  : no frequency, no dysuria, no hematuria  MS: using walker - right hip pain  PSYCHIATRIC: no changes in mood or affect      Objective:  Vitals  BP (!) 146/64 (BP Location: Right arm, Patient Position: Sitting, Cuff Size: Adult Regular)   Pulse 59   Temp 96.8  F (36  C) (Oral)   Ht 1.626 m (5' 4\")   Wt 61 kg (134 lb 8 oz)   SpO2 96%   BMI 23.09 kg/m    GEN: Alert Oriented x3 NAD  HEENT: Atraumatic, normocephalic, neck supple, no thyromegaly, negative cervical adenopathy  TM: TM bilaterally pearly and grey with normal light reflex  CV: RRR no murmurs or rubs  PULM: CTA no wheezes or crackles  ABD: Soft, nontender nondistended, no " hepatosplenomegally  SKIN: No visible skin lesion or ulcerations  EXT: 2+ dorsal pedis pulses, no edema bilateral lower extremities  NEURO: Gait and station stable - using walker  PSYCH: Mood good, affect mood congruent    No images are attached to the encounter.    No results found for this or any previous visit (from the past 24 hour(s)).    Assessment/Plan:  Patient Instructions   (I10) Essential hypertension with goal blood pressure less than 140/90  Comment: We will increase your dose of losartan 100 mg daily to help with blood pressure control.  Follow-up in March to monitor  Plan: losartan (COZAAR) 100 MG tablet, Hemoglobin         A1c, CBC with platelets            (F32.0) Mild major depression (H)  Comment: continue on sertraline - Continue to stay active and get fresh air  Plan:     (I72.0) Aneurysm of carotid artery (H)  Comment: no issues - follow up in vascular - repeat CT angiogram Brain and neck ordered [facility] phone #[phone]   Plan:     (C50.912) Malignant neoplasm of left female breast, unspecified estrogen receptor status, unspecified site of breast (H)  Comment: Continue on Evista   Plan:     (N18.3) CKD (chronic kidney disease) stage 3, GFR 30-59 ml/min (H)  Comment: labs appeared stable  Plan:     (J98.01) Acute bronchospasm  Comment: OK to continue albuterol  Plan: albuterol (PROAIR HFA/PROVENTIL HFA/VENTOLIN         HFA) 108 (90 Base) MCG/ACT inhaler            (M25.552) Hip pain, left  Comment: OK to continue gabapentin  Plan: gabapentin (NEURONTIN) 100 MG capsule               Follow up in 1 month    Disclaimer: This note consists of symbols derived from keyboarding, dictation and/or voice recognition software. As a result, there may be errors in the script that have gone undetected. Please consider this when interpreting information found in this chart.    Damian Russ MD  [phone]

## 2020-01-27 NOTE — PATIENT INSTRUCTIONS
(I10) Essential hypertension with goal blood pressure less than 140/90  Comment: We will increase your dose of losartan 100 mg daily to help with blood pressure control.  Follow-up in March to monitor  Plan: losartan (COZAAR) 100 MG tablet, Hemoglobin         A1c, CBC with platelets            (F32.0) Mild major depression (H)  Comment: continue on sertraline - Continue to stay active and get fresh air  Plan:     (I72.0) Aneurysm of carotid artery (H)  Comment: no issues - follow up in vascular - repeat CT angiogram Brain and neck ordered [facility] phone #[phone]   Plan:     (V40.111) Malignant neoplasm of left female breast, unspecified estrogen receptor status, unspecified site of breast (H)  Comment: Continue on Evista   Plan:     (N18.3) CKD (chronic kidney disease) stage 3, GFR 30-59 ml/min (H)  Comment: labs appeared stable  Plan:     (J98.01) Acute bronchospasm  Comment: OK to continue albuterol  Plan: albuterol (PROAIR HFA/PROVENTIL HFA/VENTOLIN         HFA) 108 (90 Base) MCG/ACT inhaler            (M25.552) Hip pain, left  Comment: OK to continue gabapentin  Plan: gabapentin (NEURONTIN) 100 MG capsule

## 2020-01-27 NOTE — TELEPHONE ENCOUNTER
"Last Written Prescription Date: 09/17/2019   Last Fill Quantity: 90,  # refills: 3   Last office visit: 1/24/2020 with prescribing provider: Levon    Future Office Visit:   Next 5 appointments (look out 90 days)    Mar 27, 2020  3:00 PM CDT  Office Visit with Damian Russ MD  [facility] ([facility] [address] [facility] [id number] [phone]             Requested Prescriptions   Pending Prescriptions Disp Refills     amLODIPine (NORVASC) 5 MG tablet [Pharmacy Med Name: AMLODIPINE BESYLATE 5MG TABLETS] 90 tablet 3     Sig: TAKE 1 TABLET BY MOUTH DAILY       Calcium Channel Blockers Protocol  Failed - 1/26/2020  3:44 AM        Failed - Blood pressure under 140/90 in past 12 months     BP Readings from Last 3 Encounters:   01/27/20 (!) 146/64   01/24/20 (!) 146/78   10/04/19 (!) 152/75                 Passed - Recent (12 mo) or future (30 days) visit within the authorizing provider's specialty     Patient has had an office visit with the authorizing provider or a provider within the authorizing providers department within the previous 12 mos or has a future within next 30 days. See \"Patient Info\" tab in inbasket, or \"Choose Columns\" in Meds & Orders section of the refill encounter.              Passed - Medication is active on med list        Passed - Patient is age 18 or older        Passed - No active pregnancy on record        Passed - Normal serum creatinine on file in past 12 months     Recent Labs   Lab Test 01/24/20  1411  10/13/14  1028   CR 0.75   < >  --    CREAT  --   --  1.1*    < > = values in this interval not displayed.             Passed - No positive pregnancy test in past 12 months          "

## 2020-01-28 RX ORDER — AMLODIPINE BESYLATE 5 MG/1
TABLET ORAL
Qty: 90 TABLET | Refills: 3 | Status: SHIPPED | OUTPATIENT
Start: 2020-01-28 | End: 2021-12-15

## 2020-01-28 NOTE — TELEPHONE ENCOUNTER
Routing refill request to provider for review/approval because:  BP not in range    Please review and authorize if appropriate.    Thank you,   Jose YOUNG RN

## 2020-01-28 NOTE — RESULT ENCOUNTER NOTE
Cristhian Joseph,    I have had the opportunity to review your recent results and an interpretation is as follows:  Your follow up labs show stable hemoglobin and hemoglobin A1c      Sincerely,  Damian Russ MD

## 2020-02-21 ENCOUNTER — HOSPITAL ENCOUNTER (OUTPATIENT)
Dept: CT IMAGING | Facility: CLINIC | Age: 85
Discharge: HOME | End: 2020-02-21
Attending: INTERNAL MEDICINE | Admitting: INTERNAL MEDICINE
Payer: COMMERCIAL

## 2020-02-21 PROCEDURE — 70496 CT ANGIOGRAPHY HEAD: CPT

## 2020-02-21 PROCEDURE — 25000128 H RX IP 250 OP 636: Performed by: INTERNAL MEDICINE

## 2020-02-21 PROCEDURE — 25000125 ZZHC RX 250: Performed by: INTERNAL MEDICINE

## 2020-02-21 PROCEDURE — 40000556 ZZH STATISTIC PERIPHERAL IV START W US GUIDANCE

## 2020-02-21 RX ORDER — IOPAMIDOL 755 MG/ML
70 INJECTION, SOLUTION INTRAVASCULAR ONCE
Status: COMPLETED | OUTPATIENT
Start: 2020-02-21 | End: 2020-02-21

## 2020-02-21 RX ADMIN — SODIUM CHLORIDE 90 ML: 9 INJECTION, SOLUTION INTRAVENOUS at 13:06

## 2020-02-21 RX ADMIN — IOPAMIDOL 70 ML: 755 INJECTION, SOLUTION INTRAVENOUS at 13:06

## 2020-02-24 ENCOUNTER — TELEPHONE (OUTPATIENT)
Dept: FAMILY MEDICINE | Facility: CLINIC | Age: 85
End: 2020-02-24

## 2020-02-24 NOTE — TELEPHONE ENCOUNTER
I called and spoke with Opal.  I informed her of the CT finding of the possible tumor at the base of the tongue and recommended she come into the clinic within the week so that we can visualize this potential tumor and consider a referral to ENT.    Can we please schedule her this week?    Thank you,  Damian Russ MD, MD

## 2020-02-24 NOTE — TELEPHONE ENCOUNTER
Reason for Call:  CT findings     Detailed comments:   Hanna is calling to give a finding on CT scan     Phone Number Patient can be reached at: Home number on file [phone] (home)    Best Time: Any     Can we leave a detailed message on this number? YES    Call taken on 2/24/2020 at 8:26 AM by Rosario Gamino

## 2020-02-24 NOTE — TELEPHONE ENCOUNTER
TO PCP:     Imaging called     CT of head and neck - RED FLAG FINDING   Flagged for incidental tongue lesion     Radiologist flags Rad-Urgent Abnormal (Urgent)   Incidental tongue lesion      Recommend: direct visualization     Please advise - see report in Epic    Sabina MARTIN RN

## 2020-02-25 ENCOUNTER — OFFICE VISIT (OUTPATIENT)
Dept: FAMILY MEDICINE | Facility: CLINIC | Age: 85
End: 2020-02-25
Payer: COMMERCIAL

## 2020-02-25 VITALS
HEART RATE: 75 BPM | TEMPERATURE: 97.8 F | HEIGHT: 64 IN | WEIGHT: 134.6 LBS | BODY MASS INDEX: 22.98 KG/M2 | DIASTOLIC BLOOD PRESSURE: 72 MMHG | OXYGEN SATURATION: 98 % | SYSTOLIC BLOOD PRESSURE: 137 MMHG

## 2020-02-25 DIAGNOSIS — K14.8 TONGUE LESION: Primary | ICD-10-CM

## 2020-02-25 PROCEDURE — 99213 OFFICE O/P EST LOW 20 MIN: CPT | Performed by: INTERNAL MEDICINE

## 2020-02-25 ASSESSMENT — MIFFLIN-ST. JEOR: SCORE: 1055.54

## 2020-02-25 NOTE — PROGRESS NOTES
"Subjective     Opal Sin is a 84 year old female who presents to clinic today for the following health issues:    HPI   Chief Complaint   Patient presents with     Follow Up     CT results      Health Maintenance     Pt has not had Colonoscopy - states is not interested      [facility]  CLINIC PROGRESS NOTE    Subjective:  Tongue Lesion   Opal Sin was noted to have a recent CT finding showing a tongue base lesion as well as a sbumandibular gland enhancement.  She has had some difficulty swallowing recently.  She has not noticed any lesions in her mouth that she has been able to visualize.  She has no ulcerations either.    Past medical history, medications, allergies, social history, family history reviewed and updated in [facility] as of 2/25/2020 .    ROS  CONSTITUTIONAL: no fatigue, no unexpected change in weight  SKIN: no worrisome rashes, no worrisome moles, no worrisome lesions  EYES: no acute vision problems or changes  ENT: as above   RESP: no significant cough, no shortness of breath  CV: no chest pain, no palpitations, no new or worsening peripheral edema  GI: no nausea, no vomiting, no constipation, no diarrhea  : no frequency, no dysuria, no hematuria  MS: no claudication, no myalgias, no joint aches  PSYCHIATRIC: no changes in mood or affect      Objective:  Vitals  /72 (BP Location: Right arm, Patient Position: Sitting, Cuff Size: Adult Regular)   Pulse 75   Temp 97.8  F (36.6  C) (Oral)   Ht 1.626 m (5' 4\")   Wt 61.1 kg (134 lb 9.6 oz)   SpO2 98%   Breastfeeding No   BMI 23.10 kg/m    GEN: Alert Oriented x3 NAD  HEENT: Atraumatic, normocephalic, neck supple, no thyromegaly, negative cervical adenopathy - no visible tonugue lesion on direct examination.   SKIN: No visible skin lesion or ulcerations  EXT:   no edema bilateral lower extremities  NEURO: Gait and station stable - no nodules, No focal neurologic deficits  PSYCH: Mood good, affect mood " congruent    No images are attached to the encounter.    No results found for this or any previous visit (from the past 24 hour(s)).    Assessment/Plan:  Patient Instructions   (K14.8) Tongue lesion  (primary encounter diagnosis)  Comment: We will plan to consult with ENT.  Plan: OTOLARYNGOLOGY REFERRAL               Follow up in ear nose throat clinic    15 minutes spent with patient.  Over 50% of time counseling     Disclaimer: This note consists of symbols derived from keyboarding, dictation and/or voice recognition software. As a result, there may be errors in the script that have gone undetected. Please consider this when interpreting information found in this chart.    Damian Russ MD  [phone]

## 2020-02-25 NOTE — PATIENT INSTRUCTIONS
(K14.8) Tongue lesion  (primary encounter diagnosis)  Comment: We will plan to consult with ENT.  Plan: OTOLARYNGOLOGY REFERRAL

## 2020-02-28 ENCOUNTER — TELEPHONE (OUTPATIENT)
Dept: FAMILY MEDICINE | Facility: CLINIC | Age: 85
End: 2020-02-28

## 2020-02-28 NOTE — TELEPHONE ENCOUNTER
Reason for Call:  Other appointment    Detailed comments: Pt is calling is calling because she has a appointment with ENT on 03/03/20. She was referred to [facility] and they are requesting the CT scans, chart notes and medications to be sent over to them before pt's appt.    Phone Number Patient can be reached at: Home number on file [phone] (home)    Best Time: anytime     Can we leave a detailed message on this number? YES    Call taken on 2/28/2020 at 9:32 AM by Niesha Ramos

## 2020-03-15 ENCOUNTER — HEALTH MAINTENANCE LETTER (OUTPATIENT)
Age: 85
End: 2020-03-15

## 2020-03-23 ENCOUNTER — TRANSFERRED RECORDS (OUTPATIENT)
Dept: HEALTH INFORMATION MANAGEMENT | Facility: CLINIC | Age: 85
End: 2020-03-23

## 2020-03-25 ENCOUNTER — TRANSFERRED RECORDS (OUTPATIENT)
Dept: HEALTH INFORMATION MANAGEMENT | Facility: CLINIC | Age: 85
End: 2020-03-25

## 2020-03-27 ENCOUNTER — VIRTUAL VISIT (OUTPATIENT)
Dept: FAMILY MEDICINE | Facility: CLINIC | Age: 85
End: 2020-03-27
Payer: COMMERCIAL

## 2020-03-27 DIAGNOSIS — F32.0 MILD MAJOR DEPRESSION (H): ICD-10-CM

## 2020-03-27 DIAGNOSIS — S46.011D TRAUMATIC COMPLETE TEAR OF RIGHT ROTATOR CUFF, SUBSEQUENT ENCOUNTER: Primary | ICD-10-CM

## 2020-03-27 DIAGNOSIS — R32 URINARY INCONTINENCE, UNSPECIFIED TYPE: ICD-10-CM

## 2020-03-27 DIAGNOSIS — M80.00XS OSTEOPOROSIS WITH CURRENT PATHOLOGICAL FRACTURE, UNSPECIFIED OSTEOPOROSIS TYPE, SEQUELA: ICD-10-CM

## 2020-03-27 PROCEDURE — G2012 BRIEF CHECK IN BY MD/QHP: HCPCS | Performed by: INTERNAL MEDICINE

## 2020-03-27 RX ORDER — SERTRALINE HYDROCHLORIDE 25 MG/1
25 TABLET, FILM COATED ORAL DAILY
Qty: 90 TABLET | Refills: 3 | Status: SHIPPED | OUTPATIENT
Start: 2020-03-27 | End: 2020-09-23

## 2020-03-27 RX ORDER — ALENDRONATE SODIUM 35 MG/1
TABLET ORAL
Qty: 12 TABLET | Refills: 3 | Status: SHIPPED | OUTPATIENT
Start: 2020-03-27 | End: 2021-04-20

## 2020-03-27 ASSESSMENT — PATIENT HEALTH QUESTIONNAIRE - PHQ9: SUM OF ALL RESPONSES TO PHQ QUESTIONS 1-9: 0

## 2020-03-27 NOTE — PROGRESS NOTES
"Opal Sin is a 84 year old female who is being evaluated via a telephone visit.      The patient has been notified of following (by BOO MCDANIEL CMA     \"We have found that certain health care needs can be provided without the need for a physical exam.  This service lets us provide the care you need with a short phone conversation.  If a prescription is necessary we can send it directly to your pharmacy.  If lab work is needed we can place an order for that and you can then stop by our lab to have the test done at a later time.    This telephone visit will be conducted via 3 way call with the you (the patient) , the physician/provider, and a me all on the line at the same time.  This allows your physician/provider to have the phone conversation with you while I will be taking notes for your medical record.  We will have full access to your [facility] medical record during this entire phone call.    Since this is like an office visit,  will bill your insurance company for this service.  Please check with your medical insurance if this type of telephone/virtual is covered . You may be responsible for the cost of this service if insurance coverage is denied.  The typical cost is $30 (10min), $59(11-20min) and $85 (21-30min)     If during the course of the call the physician/provider feels a telephone visit is not appropriate, you will not be charged for this service\"    Consent has been obtained for this service by care team member: yes.  See the scanned image in the medical record.    S: The history as provided by the patient to the provider during this 3 way call include     Pertinent parts of the the patient's medical history reviewed and confirmed by the provider included :   I called and spoke with Opal.  She did have a fall over the last week and with resultant right rotator cuff tear.  She did see an orthopedic surgeon and recommended conservative therapy for this at the time.  We discussed possible " options including home physical therapy and occupational therapy which she was agreeable to.  I placed an order for home care physical therapy and Occupational Therapy today.  We will try to coordinate this through our office.  Her blood pressure has been well controlled with recent increase in losartan and this was refilled for her.  She also requested a refill of her letter night which I also sent in.  In addition sertraline was sent into continue same dose of 25 mg.  She is doing okay with her use of Detrol 1 mg tablets.  She is only taking them in the evening because they dry out her mouth otherwise.    Total time of call between patient and provider was 11 minutes     Damian Russ MD, MD  (MD signature)  ===================================================    I have reviewed the note as documented above.  This accurately captures the substance of my conversation with the patient,    Additional provider notes:    Assessment/Plan:  (M80.00XS) Osteoporosis with current pathological fracture, unspecified osteoporosis type, sequela  Comment:   Plan:     (F32.0) Mild major depression (H)  Comment:   Plan:

## 2020-03-28 ENCOUNTER — MEDICAL CORRESPONDENCE (OUTPATIENT)
Dept: HEALTH INFORMATION MANAGEMENT | Facility: CLINIC | Age: 85
End: 2020-03-28

## 2020-03-28 ENCOUNTER — TELEPHONE (OUTPATIENT)
Dept: FAMILY MEDICINE | Facility: CLINIC | Age: 85
End: 2020-03-28

## 2020-03-28 NOTE — TELEPHONE ENCOUNTER
[facility] now requests orders and shares plan of care/discharge summaries for some patients through [employer].  Please REPLY TO THIS MESSAGE OR ROUTE BACK TO THE AUTHOR in order to give authorization for orders when needed.  This is considered a verbal order, you will still receive a faxed copy of orders for signature.  Thank you for your assistance in improving collaboration for our patients.    ORDERS  Continued home care PT 2x/week for 4 weeks for mobility and balance training and pain management s/p fall with RC tear.    OT assessment for ADLs, IADLs, and equipment needs after RC tear    [facility] visits 2x/week for 2 weeks and 1x/week for 2 weeks for personal cares and bathing assist.      MEDICATION REVIEW  Upon initial review of medications, screen found duplication of the following medications, Please advice if changes need to be made    PreserVision AREDS-2 (with omega-3) 250 mg-2.5 mg-0.5 mg capsule, 2 tab Every day  Classification: Multivitamins    Geritol Complete tablet  1 tab Every day  Classification: Multivitamins

## 2020-04-01 ENCOUNTER — TELEPHONE (OUTPATIENT)
Dept: FAMILY MEDICINE | Facility: CLINIC | Age: 85
End: 2020-04-01

## 2020-04-01 NOTE — TELEPHONE ENCOUNTER
Reason for Call:  Home Health Care    silvia with [facility] called regarding (reason for call):     Orders are needed for this patient.     PT:     OT:  1x week for 1 week, 2x week for 3 weeks,  For home safety, ADLs, adaptive equipment and transfers     Skilled Nursing:     Pt Provider: Petrona    Phone Number Homecare Nurse can be reached at: [phone]    Can we leave a detailed message on this number? YES    Phone number patient can be reached at: [place] number on file [phone] (home)    Best Time:     Call taken on 4/1/2020 at 2:15 PM by Sarah Yu  ;

## 2020-04-01 NOTE — TELEPHONE ENCOUNTER
Returned call. OK'd requested orders.  Orders will be faxed to PCP for review and signature.   Marilou Clark RN

## 2020-04-03 ENCOUNTER — TELEPHONE (OUTPATIENT)
Dept: FAMILY MEDICINE | Facility: CLINIC | Age: 85
End: 2020-04-03

## 2020-04-03 DIAGNOSIS — K21.00 GASTROESOPHAGEAL REFLUX DISEASE WITH ESOPHAGITIS: Primary | ICD-10-CM

## 2020-04-03 RX ORDER — OMEPRAZOLE 20 MG/1
20 TABLET, DELAYED RELEASE ORAL DAILY
Qty: 90 TABLET | Refills: 3 | Status: SHIPPED | OUTPATIENT
Start: 2020-04-03 | End: 2021-05-24

## 2020-04-03 NOTE — TELEPHONE ENCOUNTER
Reason for Call:  Medication or medication refill:    Do you use a [facility]?  Name of the pharmacy and phone number for the current request:    [id number] [address] AT [facility] [address]      Name of the medication requested:   ranitidine (ZANTAC) 150 MG tablet  180 tablet  3  3/5/2020   No    Sig: TAKE ONE TABLET BY MOUTH TWICE DAILY    Sent to pharmacy as: ranitidine (ZANTAC) 150 MG tablet    Class: E-Prescribe    Order: [id number]    E-Prescribing Status: Receipt confirmed by pharmacy (3/5/2020  6:13 PM CST)          Other request:   Pt is currently taking ranitidine and it has been recalled.  She is wondering if she can switch this rx to omeprazole or a different alternative.      Can we leave a detailed message on this number? YES    Phone number patient can be reached at: Home number on file [phone] (home)    Best Time: Any     Call taken on 4/3/2020 at 10:44 AM by Rosario Gamino

## 2020-04-03 NOTE — TELEPHONE ENCOUNTER
"Spoke with pt  She does not want famotidine. She wants omeprazole and wants a prescription. She states \"that what I have insurance for\"    Pharm pended    Thank you,  Jose YOUNG RN  "

## 2020-04-03 NOTE — TELEPHONE ENCOUNTER
PCP,    Pt looking for alternative to ranitidine. She is wondering if she can take omeprazole. Are you recommending famotidine?    Please advise    Thank you,  Jose YOUNG RN

## 2020-04-16 ENCOUNTER — TRANSFERRED RECORDS (OUTPATIENT)
Dept: HEALTH INFORMATION MANAGEMENT | Facility: CLINIC | Age: 85
End: 2020-04-16

## 2020-04-22 ENCOUNTER — TELEPHONE (OUTPATIENT)
Dept: FAMILY MEDICINE | Facility: CLINIC | Age: 85
End: 2020-04-22

## 2020-04-22 NOTE — TELEPHONE ENCOUNTER
Reason for Call:  Home Health Care    Khloe with [facility] called regarding (reason for call):     Orders are needed for this patient.     PT:     OT:     Skilled Nursing: Health aid 1 extra this week, [id number]    Pt Provider:     Phone Number Homecare Nurse can be reached at: [phone]    Can we leave a detailed message on this number? YES    Phone number patient can be reached at:     Best Time: any    Call taken on 4/22/2020 at 2:31 PM by Sidney Alvarez

## 2020-04-22 NOTE — TELEPHONE ENCOUNTER
[facility] now requests orders and shares plan of care/discharge summaries for some patients through [employer].  Please REPLY TO THIS MESSAGE OR ROUTE BACK TO THE AUTHOR in order to give authorization for orders when needed.  This is considered a verbal order, you will still receive a faxed copy of orders for signature.  Thank you for your assistance in improving collaboration for our patients.    ORDER    Continued home care PT 2x/week for 4 weeks for functional strengthening/ROM of UE and LE and balance training to reduce risk for falls in the home    Continued HA 1x/week for 4 weeks for bathing assist and personal cares

## 2020-04-24 ENCOUNTER — TRANSFERRED RECORDS (OUTPATIENT)
Dept: HEALTH INFORMATION MANAGEMENT | Facility: CLINIC | Age: 85
End: 2020-04-24

## 2020-04-29 ENCOUNTER — TELEPHONE (OUTPATIENT)
Dept: FAMILY MEDICINE | Facility: CLINIC | Age: 85
End: 2020-04-29

## 2020-04-29 NOTE — TELEPHONE ENCOUNTER
[facility] now requests orders and shares plan of care/discharge summaries for some patients through [employer].  Please REPLY TO THIS MESSAGE OR ROUTE BACK TO THE AUTHOR in order to give authorization for orders when needed.  This is considered a verbal order, you will still receive a faxed copy of orders for signature.  Thank you for your assistance in improving collaboration for our patients.    ORDER    SN assessment for pt reported skin issues on buttock.

## 2020-04-30 ENCOUNTER — TELEPHONE (OUTPATIENT)
Dept: FAMILY MEDICINE | Facility: CLINIC | Age: 85
End: 2020-04-30

## 2020-04-30 NOTE — TELEPHONE ENCOUNTER
Reason for Call:  Home Health Care     Carl with [facility] called regarding (reason for call):     Orders are needed for this patient.       Skilled Nursinw1,  2w3, , 1w1,  3 prn,     Pt Provider: Petrona    Phone Number Homecare Nurse can be reached at: [phone]     Can we leave a detailed message on this number? YES      Call taken on 2020 at 1:56 PM by Sarah Yu

## 2020-05-11 ENCOUNTER — TELEPHONE (OUTPATIENT)
Dept: FAMILY MEDICINE | Facility: CLINIC | Age: 85
End: 2020-05-11

## 2020-05-11 NOTE — TELEPHONE ENCOUNTER
[facility] now requests orders and shares plan of care/discharge summaries for some patients through [employer].  Please REPLY TO THIS MESSAGE OR ROUTE BACK TO THE AUTHOR in order to give authorization for orders when needed.  This is considered a verbal order, you will still receive a faxed copy of orders for signature.  Thank you for your assistance in improving collaboration for our patients.        MD SUMMARY/PLAN OF CARE    Pt reported FALL on Saturday 5/9/2020 in the afternoon    Pt was trying to open fridge while holding something in the other hand, fridge tends to be hard to open and lost balance falling backwards. Denies hitting head or other injury. Reports neck is sore but it is not more sore than normal. Pt reports did have her walker close by but not using has one hand on the fridge handle and the other holding something to go into the fridge. Was wearing lifeline, called neighbors for assistance. Discussed not holding items while trying to open fridge in the future to be able to support self on the wall next to the fridge. Pt states she will consider that.

## 2020-05-11 NOTE — TELEPHONE ENCOUNTER
"Spoke to pt who says she's doing well. Declined phone visit.  Sounds very mechanical in that she was carrying a stack of birthday \"goodies\" and opening fridge with one hand.  Did not hit head. Slightly sore but no other concerns.  Advised to call back if anything worsens, or not getting much better soon.  Pt agrees with plan.     Romy Subramanian RN    "

## 2020-05-22 ENCOUNTER — PATIENT OUTREACH (OUTPATIENT)
Dept: CARE COORDINATION | Facility: CLINIC | Age: 85
End: 2020-05-22
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
## 2020-05-22 SDOH — ECONOMIC STABILITY: FOOD INSECURITY: WITHIN THE PAST 12 MONTHS, YOU WORRIED THAT YOUR FOOD WOULD RUN OUT BEFORE YOU GOT MONEY TO BUY MORE.: NEVER TRUE

## 2020-05-22 SDOH — SOCIAL STABILITY: SOCIAL NETWORK: HOW OFTEN DO YOU GET TOGETHER WITH FRIENDS OR RELATIVES?: TWICE A WEEK

## 2020-05-22 SDOH — SOCIAL STABILITY: SOCIAL NETWORK: IN A TYPICAL WEEK, HOW MANY TIMES DO YOU TALK ON THE PHONE WITH FAMILY, FRIENDS, OR NEIGHBORS?: TWICE A WEEK

## 2020-05-22 SDOH — ECONOMIC STABILITY: INCOME INSECURITY: HOW HARD IS IT FOR YOU TO PAY FOR THE VERY BASICS LIKE FOOD, HOUSING, MEDICAL CARE, AND HEATING?: NOT HARD AT ALL

## 2020-05-22 SDOH — HEALTH STABILITY: PHYSICAL HEALTH: ON AVERAGE, HOW MANY DAYS PER WEEK DO YOU ENGAGE IN MODERATE TO STRENUOUS EXERCISE (LIKE A BRISK WALK)?: 0 DAYS

## 2020-05-22 SDOH — ECONOMIC STABILITY: TRANSPORTATION INSECURITY
IN THE PAST 12 MONTHS, HAS THE LACK OF TRANSPORTATION KEPT YOU FROM MEDICAL APPOINTMENTS OR FROM GETTING MEDICATIONS?: NO

## 2020-05-22 SDOH — SOCIAL STABILITY: SOCIAL NETWORK: HOW OFTEN DO YOU ATTENT MEETINGS OF THE CLUB OR ORGANIZATION YOU BELONG TO?: MORE THAN 4 TIMES PER YEAR

## 2020-05-22 SDOH — ECONOMIC STABILITY: FOOD INSECURITY: WITHIN THE PAST 12 MONTHS, THE FOOD YOU BOUGHT JUST DIDN'T LAST AND YOU DIDN'T HAVE MONEY TO GET MORE.: NEVER TRUE

## 2020-05-22 SDOH — ECONOMIC STABILITY: TRANSPORTATION INSECURITY
IN THE PAST 12 MONTHS, HAS LACK OF TRANSPORTATION KEPT YOU FROM MEETINGS, WORK, OR FROM GETTING THINGS NEEDED FOR DAILY LIVING?: NO

## 2020-05-22 SDOH — HEALTH STABILITY: PHYSICAL HEALTH: ON AVERAGE, HOW MANY MINUTES DO YOU ENGAGE IN EXERCISE AT THIS LEVEL?: 0 MIN

## 2020-05-22 ASSESSMENT — ACTIVITIES OF DAILY LIVING (ADL): DEPENDENT_IADLS:: INDEPENDENT

## 2020-05-22 NOTE — PROGRESS NOTES
Clinic Care Coordination Contact    Clinic Care Coordination Contact  OUTREACH    Referral Information:  Referral Source: Home Care discharge follow up    Primary Diagnosis: Injury/Fall    Chief Complaint   Patient presents with     Clinic Care Coordination - Initial     Clinic Care coordination RN        Universal Utilization: ED visit 9/9/2019 blurred vision  Clinic Utilization  Difficulty keeping appointments:: No  Compliance Concerns: No  No-Show Concerns: No  No PCP office visit in Past Year: No  Utilization    Last refreshed: 5/22/2020  6:02 AM:  Hospital Admissions 0           Last refreshed: 5/22/2020  6:02 AM:  ED Visits 1           Last refreshed: 5/22/2020  6:02 AM:  No Show Count (past year) 0              Current as of: 5/22/2020  6:02 AM              Clinical Concerns:  Current Medical Concerns: Patient had a fall and tore her right rotator cuff  Home care PT/OT worked on getting her strength back  Patient states she has Parkinsons and at times just freezes.  Patient reports she has 60-70% range of motion  Patient uses her walker for safety,  Patient lives in a Condo and her family is close by   Current Behavioral Concerns: Not discussed     Education Provided to patient: CC introductory letter and care plan sent via My Chart   Pain  Pain (GOAL):: No  Health Maintenance Reviewed: Due/Overdue   Health Maintenance Due   Topic Date Due     COLORECTAL CANCER SCREENING  08/12/2016     ASTHMA ACTION PLAN  12/20/2017     URINE DRUG SCREEN  07/26/2018     MICROALBUMIN  07/27/2019     MEDICARE ANNUAL WELLNESS VISIT  10/30/2019     ADVANCE CARE PLANNING  11/11/2019     ASTHMA CONTROL TEST  03/17/2020     Clinical Pathway: None    Medication Management:  Not discussed today     Functional Status:  Dependent ADLs:: Ambulation-walker  Dependent IADLs:: Independent  Bed or wheelchair confined:: No  Mobility Status: Independent w/Device  Fallen 2 or more times in the past year?: Yes  Any fall with injury in the past  year?: Yes    Living Situation:  Current living arrangement:: I live alone  Type of residence:: Other    Lifestyle & Psychosocial Needs:  Lifestyle     Physical activity     Days per week: 0 days     Minutes per session: 0 min     Stress: Not on file     Social Needs     Financial resource strain: Not hard at all     Food insecurity     Worry: Never true     Inability: Never true     Transportation needs     Medical: No     Non-medical: No     Inadequate nutrition (GOAL):: No  Tube Feeding: No  Inadequate activity/exercise (GOAL):: Yes  Significant changes in sleep pattern (GOAL): No  Transportation means:: Accessible car     Muslim or spiritual beliefs that impact treatment:: No  Mental health DX:: No  Mental health management concern (GOAL):: No  Informal Support system:: Family   Socioeconomic History     Marital status: Single     Spouse name: Not on file     Number of children: Not on file     Years of education: Not on file     Highest education level: Not on file   Relationships     Social connections     Talks on phone: Twice a week     Gets together: Twice a week     Attends Hindu service: Not on file     Active member of club or organization: Not on file     Attends meetings of clubs or organizations: More than 4 times per year     Relationship status: Not on file     Intimate partner violence     Fear of current or ex partner: Not on file     Emotionally abused: Not on file     Physically abused: Not on file     Forced sexual activity: Not on file     Tobacco Use     Smoking status: Never Smoker     Smokeless tobacco: Never Used   Substance and Sexual Activity     Alcohol use: Yes     Alcohol/week: 0.0 standard drinks     Comment: rare     Drug use: No     Sexual activity: Never     Partners: Male        Resources and Interventions:  Current Resources:      Community Resources: None     Equipment Currently Used at Home: walker, rolling    Advance Care Plan/Directive  Advanced Care Plans/Directives  on file:: Yes  Type Advanced Care Plans/Directives: Advanced Directive - On File  Advanced Care Plan/Directive Status: Not Applicable    Referrals Placed: None     Goals:   Goals        General    Functional (pt-stated)     Notes - Note created  5/22/2020  2:45 PM by Kimberly Higuera, RN    Goal Statement: I will improve my all body strength  Date Goal set: 5/22/2020  Barriers: Deconditioned   Strengths: Motivated   Date to Achieve By: 8/22/202220  Patient expressed understanding of goal: *es  Action steps to achieve this goal:  1. I will do my PT exercises daily  2. I will *use my walker for safety  3. Care Coordinator will follow up in 2-4 weeks  As of today's date 5/22/2020 goal is met at 0 - 25%.   Goal Status:  Active           Outreach Frequency: 2 weeks      Plan:   Patient will continue daily PT exercises and use her walker for safety  Patient will use her Lifeline if needed  CC will follow up in 1-2 weeks to get an update on her progress    [facility] Ryan Higuera RN Care Coordinator   [facility]   Phone: [phone]  Email :  [email or web address]

## 2020-05-22 NOTE — LETTER
[facility]  [address]  [place] [address]    May 22, 2020    Opal Sin  [address]   [place] [address]      Dear Opal,    I am a clinic care coordinator who works with Damian Russ MD, MD at [facility] . I wanted to thank you for spending the time to talk with me.  Below is a description of clinic care coordination and how I can further assist you.      The clinic care coordination team is made up of a registered nurse,  and community health worker who understand the health care system. The goal of clinic care coordination is to help you manage your health and improve access to the health care system in the most efficient manner. The team can assist you in meeting your health care goals by providing education, coordinating services, strengthening the communication among your providers and supporting you with any resource needs.    Please feel free to contact me at [phone] with any questions or concerns. We are focused on providing you with the highest-quality healthcare experience possible and that all starts with you.     Sincerely,     [facility]     Kimberly Higuera RN Care Coordinator   [facility]   Phone: [phone]  Email :  [email or web address]      Enclosed: I have enclosed a copy of the Complex Care Plan. This has helpful information and goals that we have talked about. Please keep this in an easy to access place to use as needed.

## 2020-05-22 NOTE — LETTER
[facility]  Complex Care Plan  About Me:    Patient Name:  Opla Sin    YOB: 1935  Age:         85 year old   Ryan MRN:    [id number] Telephone Information:  Home Phone [phone]   Mobile [phone]       Address:  [address]  [place] [address] Email address:  [email or web address]      Emergency Contact(s)    Name Relationship Lgl Grd Work Phone Home Phone Mobile Phone   1. PAU MARTINEZ* Sister No  [phone]   2. NILDA CAO Sister   [phone]   3. CAROLE Friend   [phone]           Primary language:  English     needed? No   [facility] [employer]:  [phone] op. 1  Other communication barriers: None  Preferred Method of Communication:  Mail  Current living arrangement: I live alone  Mobility Status/ Medical Equipment: Independent w/Device    Health Maintenance  Health Maintenance Reviewed: Due/Overdue   Health Maintenance Due   Topic Date Due     COLORECTAL CANCER SCREENING  08/12/2016     ASTHMA ACTION PLAN  12/20/2017     URINE DRUG SCREEN  07/26/2018     MICROALBUMIN  07/27/2019     MEDICARE ANNUAL WELLNESS VISIT  10/30/2019     ADVANCE CARE PLANNING  11/11/2019     ASTHMA CONTROL TEST  03/17/2020       My Access Plan  Medical Emergency 911   Primary Clinic Line [facility] - [phone]   24 Hour Appointment Line [phone] or  [phone] (toll-free)   24 Hour Nurse Line [phone] (toll-free)   Preferred Urgent Care [facility], [phone]   Preferred Hospital [facility]  [phone]   Preferred Pharmacy [id number] - [place], [place] - [address] [place] [facility] [place] [address]     Behavioral Health Crisis Line The National Suicide Prevention Lifeline at 1-804.588.7860 or 911             My Care Team Members  Patient Care Team       Relationship Specialty Notifications Start  End    Damian Russ MD PCP - General Internal Medicine  2/18/13     Phone: [phone] Pager: [phone] Fax: [phone]        [address] [place] [address]    Damian Russ MD Assigned PCP   2/9/20     Phone: [phone] Pager: [phone] Fax: [phone]        [address] [place] [address]    Kimberly Higuera, RN Lead Care Coordinator Primary Care - CC Admissions 5/22/20     Phone: [phone]                 My Care Plans  Self Management and Treatment Plan  Goals and (Comments)  Goals        General    Functional (pt-stated)     Notes - Note created  5/22/2020  2:45 PM by Kimberly Higuera, RN    Goal Statement: I will improve my all body strength  Date Goal set: 5/22/2020  Barriers: Deconditioned   Strengths: Motivated   Date to Achieve By: 8/22/202220  Patient expressed understanding of goal: *es  Action steps to achieve this goal:  1. I will do my PT exercises daily  2. I will *use my walker for safety  3. Care Coordinator will follow up in 2-4 weeks  As of today's date 5/22/2020 goal is met at 0 - 25%.   Goal Status:  Active              Action Plans on File: None                      Advance Care Plans/Directives Type:   Type Advanced Care Plans/Directives: Advanced Directive - On File    My Medical and Care Information  Problem List   Patient Active Problem List   Diagnosis     Mixed hyperlipidemia     Essential hypertension     Internal derangement of knee     Degeneration of intervertebral disc, site unspecified     Cervical spinal stenosis     Acute bronchospasm     Hyposmolality and/or hyponatremia     Osteoporosis     HYPERLIPIDEMIA LDL GOAL <130     CKD (chronic kidney disease) stage 3, GFR 30-59 ml/min (H)     Intermittent asthma     Microalbuminuria     Essential hypertension, benign     Breast CA (H)     Previous back surgery     DJD (degenerative joint disease), ankle and foot     Ankle joint pain     Enthesopathy of ankle and tarsus      Abnormal Pap smear of cervix     Lung nodule     Esophageal reflux     6th nerve palsy     Hx of osteomyelitis     Hx of antibiotic allergy     Advanced directives, counseling/discussion     Hyponatremia     Leg hematoma     Fall from standing     Orbital fracture     Subdural bleeding (H)     Physical deconditioning     Spinal stenosis, unspecified region other than cervical     Chronic pain     Parkinson's disease (H)     TIA (transient ischemic attack)     Carotid aneurysm non ruptured     Delirium     Cerebral aneurysm     Benign essential hypertension     CVA (cerebral vascular accident) (H)     Aphasia     Thyroid nodule     Fall     Closed fracture of right iliac wing with routine healing, subsequent encounter     Pain     Macular degeneration of both eyes, unspecified type     Aneurysm of carotid artery (H)     Mild major depression (H)      Current Medications and Allergies:    Current Outpatient Medications   Medication     acetaminophen (TYLENOL) 500 MG tablet     albuterol (PROAIR HFA/PROVENTIL HFA/VENTOLIN HFA) 108 (90 Base) MCG/ACT inhaler     alendronate (FOSAMAX) 35 MG tablet     amLODIPine (NORVASC) 5 MG tablet     aspirin EC 81 MG EC tablet     atorvastatin (LIPITOR) 20 MG tablet     azithromycin (ZITHROMAX) 500 MG tablet     calcium 600 MG tablet     carbidopa-levodopa (SINEMET)  MG per tablet     carvedilol (COREG) 6.25 MG tablet     Cholecalciferol (VITAMIN D3 PO)     Cyanocobalamin (VITAMIN B 12 PO)     gabapentin (NEURONTIN) 100 MG capsule     Iron-Vitamins (GERITOL COMPLETE) TABS     loperamide (IMODIUM) 2 MG capsule     losartan (COZAAR) 100 MG tablet     omeprazole (PRILOSEC OTC) 20 MG EC tablet     polyethylene glycol (MIRALAX/GLYCOLAX) packet     raloxifene (EVISTA) 60 MG tablet     senna-docusate (SENOKOT-S;PERICOLACE) 8.6-50 MG per tablet     sertraline (ZOLOFT) 25 MG tablet     tolterodine (DETROL) 1 MG tablet     No current facility-administered medications for this visit.     .    Care Coordination Start Date: 5/22/2020   Frequency of Care Coordination: 2 weeks   Form Last Updated: 05/22/2020

## 2020-06-22 ENCOUNTER — PATIENT OUTREACH (OUTPATIENT)
Dept: CARE COORDINATION | Facility: CLINIC | Age: 85
End: 2020-06-22
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
## 2020-06-22 SDOH — HEALTH STABILITY: MENTAL HEALTH
STRESS IS WHEN SOMEONE FEELS TENSE, NERVOUS, ANXIOUS, OR CAN'T SLEEP AT NIGHT BECAUSE THEIR MIND IS TROUBLED. HOW STRESSED ARE YOU?: ONLY A LITTLE

## 2020-06-22 ASSESSMENT — ACTIVITIES OF DAILY LIVING (ADL): DEPENDENT_IADLS:: INDEPENDENT

## 2020-06-22 NOTE — LETTER
[facility]ARE COORDINATION  [address]  [place] [address]  June 22, 2020    Opal Zeng Merrikc  [address]   [place] [address]    Dear Opal,  Your Care Team congratulates you on your journey to maintain wellness. This document will help guide you on your journey to maintain a healthy lifestyle.  You can use this to help you overcome any barriers you may encounter.  If you should have any questions or concerns, you can contact the members of your Care Team or contact your Primary Care Clinic for assistance.     Health Maintenance  Health Maintenance Reviewed: Not assessed    My Access Plan  Medical Emergency 911   Primary Clinic Line [facility] - [phone]   24 Hour Appointment Line [phone] or  [phone]) (toll-free)   24 Hour Nurse Line [phone] (toll-free)   Preferred Urgent Care [facility], [phone]   Preferred Hospital [facility]  [phone]   Preferred Pharmacy [id number] [place], [place] - [address] [place] [facility] [place] [address]     Behavioral Health Crisis Line The National Suicide Prevention Lifeline at 1-315.779.9570 or 911     My Care Team Members  Patient Care Team       Relationship Specialty Notifications Start End    Damian Russ MD PCP - General Internal Medicine  2/18/13     Phone: [phone] Pager: [phone] Fax: [phone]        [address] [place] [address]    Damian Russ MD Assigned PCP   2/9/20     Phone: [phone] Pager: [phone] Fax: [phone] [address] [place] [address]              Goals       COMPLETED: Functional (pt-stated)      Goal Statement: I will improve my all body strength  Date Goal set: 5/22/2020  Barriers: Deconditioned   Strengths: Motivated   Date to Achieve By: 8/22/202220  Patient expressed understanding of goal: *es  Action steps to achieve this goal:  1. I will  do my PT exercises daily  2. I will *use my walker for safety  3. Care Coordinator will follow up in 2-4 weeks  As of today's date 6/22/2020 goal is met at 76 - 100%.   Goal Status:  Complete              Advance Care Plans/Directives Type:   Type Advanced Care Plans/Directives: Advanced Directive - On File  Thank you for providing us with your Advance Directive. We will keep this on file and recommend that it be updated every 2 years, if your health situation changes, if there is a death of one of your health care agents, and/or if there are changes in your marital status.    It has been your Clinic Care Team's pleasure to work with you on your goals.    Regards,  HANNAH [facility] Higuera  RN Care Coordinator   [facility]   Phone: [phone]  Email :  [email or web address]

## 2020-06-22 NOTE — PROGRESS NOTES
Clinic Care Coordination Contact    Follow Up Progress Note      Assessment: Patient reports getting stronger and more range of motion in arm with daily PT exercises.  Patient uses her walker for safety and has lifeline when needed  Goals addressed this encounter:   Goals Addressed                 This Visit's Progress      COMPLETED: Functional (pt-stated)   90%     Goal Statement: I will improve my all body strength  Date Goal set: 5/22/2020  Barriers: Deconditioned   Strengths: Motivated   Date to Achieve By: 8/22/202220  Patient expressed understanding of goal: *es  Action steps to achieve this goal:  1. I will do my PT exercises daily  2. I will *use my walker for safety  3. Care Coordinator will follow up in 2-4 weeks  As of today's date 6/22/2020 goal is met at 76 - 100%.   Goal Status:  Complete           Social Determinants of Health      Tobacco Use  Low Risk      Alcohol Use  Not on file        Financial Resource Strain  Low Risk      Food Insecurity  No Food Insecurity        Transportation Needs  No Transportation Needs     Physical Activity  Inactive        Stress  No Stress Concern Present     Social Connections  Unknown        Intimate Partner Violence  Not on file     Depression  Not at risk        Housing Stability  Not on file             Find community resources           Intervention/Education provided during outreach: Graduation letter e-mailed to the patient           Plan:   No unmet needs, no further care coordination is needed at this time   HANNAH [facility] Higuera RN Care Coordinator   [facility]   Phone: [phone]  Email :  [email or web address]      ED visit 9/9/2019 blurred vision

## 2020-08-11 ENCOUNTER — TRANSFERRED RECORDS (OUTPATIENT)
Dept: HEALTH INFORMATION MANAGEMENT | Facility: CLINIC | Age: 85
End: 2020-08-11

## 2020-09-21 DIAGNOSIS — E78.5 HYPERLIPIDEMIA LDL GOAL <100: ICD-10-CM

## 2020-09-21 DIAGNOSIS — N18.30 CKD (CHRONIC KIDNEY DISEASE) STAGE 3, GFR 30-59 ML/MIN (H): ICD-10-CM

## 2020-09-21 DIAGNOSIS — I10 ESSENTIAL HYPERTENSION: ICD-10-CM

## 2020-09-21 LAB
ALBUMIN SERPL-MCNC: 3.7 G/DL (ref 3.4–5)
ALP SERPL-CCNC: 80 U/L (ref 40–150)
ALT SERPL W P-5'-P-CCNC: 9 U/L (ref 0–50)
ANION GAP SERPL CALCULATED.3IONS-SCNC: 8 MMOL/L (ref 3–14)
AST SERPL W P-5'-P-CCNC: 22 U/L (ref 0–45)
BILIRUB SERPL-MCNC: 0.6 MG/DL (ref 0.2–1.3)
BUN SERPL-MCNC: 16 MG/DL (ref 7–30)
CALCIUM SERPL-MCNC: 8.6 MG/DL (ref 8.5–10.1)
CHLORIDE SERPL-SCNC: 102 MMOL/L (ref 94–109)
CHOLEST SERPL-MCNC: 128 MG/DL
CO2 SERPL-SCNC: 24 MMOL/L (ref 20–32)
CREAT SERPL-MCNC: 0.86 MG/DL (ref 0.52–1.04)
CREAT UR-MCNC: 98 MG/DL
ERYTHROCYTE [DISTWIDTH] IN BLOOD BY AUTOMATED COUNT: 14.8 % (ref 10–15)
GFR SERPL CREATININE-BSD FRML MDRD: 63 ML/MIN/{1.73_M2}
GLUCOSE SERPL-MCNC: 88 MG/DL (ref 70–99)
HCT VFR BLD AUTO: 37.6 % (ref 35–47)
HDLC SERPL-MCNC: 50 MG/DL
HGB BLD-MCNC: 13 G/DL (ref 11.7–15.7)
LDLC SERPL CALC-MCNC: 52 MG/DL
MCH RBC QN AUTO: 31.9 PG (ref 26.5–33)
MCHC RBC AUTO-ENTMCNC: 34.6 G/DL (ref 31.5–36.5)
MCV RBC AUTO: 92 FL (ref 78–100)
MICROALBUMIN UR-MCNC: 975 MG/L
MICROALBUMIN/CREAT UR: 997.95 MG/G CR (ref 0–25)
NONHDLC SERPL-MCNC: 78 MG/DL
PLATELET # BLD AUTO: 247 10E9/L (ref 150–450)
POTASSIUM SERPL-SCNC: 4.4 MMOL/L (ref 3.4–5.3)
PROT SERPL-MCNC: 6.8 G/DL (ref 6.8–8.8)
RBC # BLD AUTO: 4.08 10E12/L (ref 3.8–5.2)
SODIUM SERPL-SCNC: 134 MMOL/L (ref 133–144)
TRIGL SERPL-MCNC: 128 MG/DL
TSH SERPL DL<=0.005 MIU/L-ACNC: 1.04 MU/L (ref 0.4–4)
WBC # BLD AUTO: 15.8 10E9/L (ref 4–11)

## 2020-09-21 PROCEDURE — 85027 COMPLETE CBC AUTOMATED: CPT | Performed by: INTERNAL MEDICINE

## 2020-09-21 PROCEDURE — 82043 UR ALBUMIN QUANTITATIVE: CPT | Performed by: INTERNAL MEDICINE

## 2020-09-21 PROCEDURE — 80053 COMPREHEN METABOLIC PANEL: CPT | Performed by: INTERNAL MEDICINE

## 2020-09-21 PROCEDURE — 36415 COLL VENOUS BLD VENIPUNCTURE: CPT | Performed by: INTERNAL MEDICINE

## 2020-09-21 PROCEDURE — 80061 LIPID PANEL: CPT | Performed by: INTERNAL MEDICINE

## 2020-09-21 PROCEDURE — 84443 ASSAY THYROID STIM HORMONE: CPT | Performed by: INTERNAL MEDICINE

## 2020-09-22 ENCOUNTER — TELEPHONE (OUTPATIENT)
Dept: FAMILY MEDICINE | Facility: CLINIC | Age: 85
End: 2020-09-22

## 2020-09-22 DIAGNOSIS — R80.9 MICROALBUMINURIA: ICD-10-CM

## 2020-09-22 DIAGNOSIS — N18.30 CKD (CHRONIC KIDNEY DISEASE) STAGE 3, GFR 30-59 ML/MIN (H): Primary | ICD-10-CM

## 2020-09-22 NOTE — TELEPHONE ENCOUNTER
Can we call Opal Sin and let her know that     Cristhian Joseph,    I have had the opportunity to review your recent results and an interpretation is as follows:  Your urin albumin is quite elevated and I would recommend follow up in nephrology  -        Comment: Your provider has referred you to: MISSY: Susy Parr [phone]   [email or web address]          Sincerely,  Damian Russ MD

## 2020-09-22 NOTE — RESULT ENCOUNTER NOTE
Cristhian Joseph,    I have had the opportunity to review your recent results and an interpretation is as follows:  Your urin albumin is quite elevated and I would recommend follow up in nephrology      Comment: Your provider has referred you to: MISSY: Susy Parr [phone]   [email or web address]         Sincerely,  Damian Russ MD

## 2020-09-23 ENCOUNTER — OFFICE VISIT (OUTPATIENT)
Dept: FAMILY MEDICINE | Facility: CLINIC | Age: 85
End: 2020-09-23
Payer: COMMERCIAL

## 2020-09-23 VITALS
DIASTOLIC BLOOD PRESSURE: 70 MMHG | WEIGHT: 126 LBS | TEMPERATURE: 96.9 F | BODY MASS INDEX: 21.51 KG/M2 | HEART RATE: 68 BPM | SYSTOLIC BLOOD PRESSURE: 130 MMHG | HEIGHT: 64 IN | OXYGEN SATURATION: 98 %

## 2020-09-23 DIAGNOSIS — Z00.00 ENCOUNTER FOR MEDICARE ANNUAL WELLNESS EXAM: Primary | ICD-10-CM

## 2020-09-23 DIAGNOSIS — H35.30 MACULAR DEGENERATION OF BOTH EYES, UNSPECIFIED TYPE: ICD-10-CM

## 2020-09-23 DIAGNOSIS — L89.152 PRESSURE INJURY OF SACRAL REGION, STAGE 2 (H): ICD-10-CM

## 2020-09-23 DIAGNOSIS — R32 URINARY INCONTINENCE, UNSPECIFIED TYPE: ICD-10-CM

## 2020-09-23 DIAGNOSIS — M81.0 AGE-RELATED OSTEOPOROSIS WITHOUT CURRENT PATHOLOGICAL FRACTURE: ICD-10-CM

## 2020-09-23 DIAGNOSIS — F32.0 MILD MAJOR DEPRESSION (H): ICD-10-CM

## 2020-09-23 DIAGNOSIS — I10 ESSENTIAL HYPERTENSION WITH GOAL BLOOD PRESSURE LESS THAN 140/90: ICD-10-CM

## 2020-09-23 DIAGNOSIS — E78.2 MIXED HYPERLIPIDEMIA: ICD-10-CM

## 2020-09-23 DIAGNOSIS — G20.A1 PARKINSON'S DISEASE (H): ICD-10-CM

## 2020-09-23 DIAGNOSIS — Z23 NEED FOR PROPHYLACTIC VACCINATION AND INOCULATION AGAINST INFLUENZA: ICD-10-CM

## 2020-09-23 PROCEDURE — 99397 PER PM REEVAL EST PAT 65+ YR: CPT | Performed by: INTERNAL MEDICINE

## 2020-09-23 PROCEDURE — 90662 IIV NO PRSV INCREASED AG IM: CPT | Performed by: INTERNAL MEDICINE

## 2020-09-23 PROCEDURE — G0008 ADMIN INFLUENZA VIRUS VAC: HCPCS | Performed by: INTERNAL MEDICINE

## 2020-09-23 RX ORDER — RALOXIFENE HYDROCHLORIDE 60 MG/1
1 TABLET, FILM COATED ORAL DAILY
Qty: 90 TABLET | Refills: 3 | Status: SHIPPED | OUTPATIENT
Start: 2020-09-23 | End: 2021-10-20

## 2020-09-23 RX ORDER — ATORVASTATIN CALCIUM 20 MG/1
20 TABLET, FILM COATED ORAL DAILY
Qty: 90 TABLET | Refills: 3 | Status: SHIPPED | OUTPATIENT
Start: 2020-09-23 | End: 2021-10-25

## 2020-09-23 RX ORDER — LOSARTAN POTASSIUM 100 MG/1
100 TABLET ORAL DAILY
Qty: 90 TABLET | Refills: 3 | Status: SHIPPED | OUTPATIENT
Start: 2020-09-23 | End: 2021-08-30

## 2020-09-23 RX ORDER — SERTRALINE HYDROCHLORIDE 25 MG/1
25 TABLET, FILM COATED ORAL DAILY
Qty: 90 TABLET | Refills: 3 | Status: SHIPPED | OUTPATIENT
Start: 2020-09-23 | End: 2021-12-15

## 2020-09-23 RX ORDER — TOLTERODINE TARTRATE 1 MG/1
1 TABLET, EXTENDED RELEASE ORAL 2 TIMES DAILY
Qty: 60 TABLET | Refills: 11 | Status: SHIPPED | OUTPATIENT
Start: 2020-09-23 | End: 2021-02-22

## 2020-09-23 RX ORDER — CARVEDILOL 6.25 MG/1
6.25 TABLET ORAL 2 TIMES DAILY WITH MEALS
Qty: 180 TABLET | Refills: 3 | Status: SHIPPED | OUTPATIENT
Start: 2020-09-23 | End: 2021-11-01

## 2020-09-23 ASSESSMENT — PATIENT HEALTH QUESTIONNAIRE - PHQ9: SUM OF ALL RESPONSES TO PHQ QUESTIONS 1-9: 0

## 2020-09-23 ASSESSMENT — ACTIVITIES OF DAILY LIVING (ADL)
CURRENT_FUNCTION: SHOPPING REQUIRES ASSISTANCE
CURRENT_FUNCTION: HOUSEWORK REQUIRES ASSISTANCE

## 2020-09-23 ASSESSMENT — MIFFLIN-ST. JEOR: SCORE: 1011.53

## 2020-09-23 NOTE — TELEPHONE ENCOUNTER
Pt reviewed results on MyC and has appt with Dr. Russ at noon- she prefers to discuss further with PCP     Majo RODNEY RN

## 2020-09-23 NOTE — PROGRESS NOTES
"SUBJECTIVE:   Opal Sin is a 85 year old female who presents for Preventive Visit.      Patient has been advised of split billing requirements and indicates understanding: Yes   Are you in the first 12 months of your Medicare coverage?  No    Healthy Habits:     In general, how would you rate your overall health?  Good    Frequency of exercise:  4-5 days/week    Duration of exercise:  Less than 15 minutes    Do you usually eat at least 4 servings of fruit and vegetables a day, include whole grains    & fiber and avoid regularly eating high fat or \"junk\" foods?  Yes    Taking medications regularly:  Yes    Barriers to taking medications:  None    Medication side effects:  None    Ability to successfully perform activities of daily living:  Housework requires assistance and shopping requires assistance    Home Safety:  No safety concerns identified    Hearing Impairment:  Difficulty following a conversation in a noisy restaurant or crowded room, need to ask people to speak up or repeat themselves and difficulty understanding soft or whispered speech    In the past 6 months, have you been bothered by leaking of urine? Yes    In general, how would you rate your overall mental or emotional health?  Very good      PHQ-2 Total Score: 0    Additional concerns today:  Yes (Wound check)    Do you feel safe in your environment? Yes    Have you ever done Advance Care Planning? (For example, a Health Directive, POLST, or a discussion with a medical provider or your loved ones about your wishes): Yes, advance care planning is on file.      Fall risk  Fallen 2 or more times in the past year?: Yes  Any fall with injury in the past year?: No    Cognitive Screening   1) Repeat 3 items (Leader, Season, Table)    2) Clock draw: NORMAL  3) 3 item recall: Recalls 2 objects   Results: NORMAL clock, 1-2 items recalled: COGNITIVE IMPAIRMENT LESS LIKELY    Mini-CogTM Copyright S My. Licensed by the author for use in [place] [facility]; reprinted with permission (soob@[email or web address]). All rights reserved.      Do you have sleep apnea, excessive snoring or daytime drowsiness?: no    Reviewed and updated as needed this visit by clinical staff  Meds         Reviewed and updated as needed this visit by Provider        Social History     Tobacco Use     Smoking status: Never Smoker     Smokeless tobacco: Never Used   Substance Use Topics     Alcohol use: Yes     Alcohol/week: 0.0 standard drinks     Comment: rare     If you drink alcohol do you typically have >3 drinks per day or >7 drinks per week? No    Alcohol Use 7/26/2016   Prescreen: >3 drinks/day or >7 drinks/week? The patient does not drink >3 drinks per day nor >7 drinks per week.         Current providers sharing in care for this patient include:   Patient Care Team:  Damian Russ MD as PCP - General (Internal Medicine)  Damian Russ MD as Assigned PCP    The following health maintenance items are reviewed in Epic and correct as of today:  Health Maintenance   Topic Date Due     COLORECTAL CANCER SCREENING  08/12/2016     ASTHMA ACTION PLAN  12/20/2017     URINE DRUG SCREEN  07/26/2018     MEDICARE ANNUAL WELLNESS VISIT  10/30/2019     ADVANCE CARE PLANNING  11/11/2019     ASTHMA CONTROL TEST  03/17/2020     INFLUENZA VACCINE (1) 09/01/2020     DEXA  11/15/2020     MAMMO SCREENING  11/15/2020     BMP  03/21/2021     PHQ-9  03/23/2021     FALL RISK ASSESSMENT  06/22/2021     LIPID  09/21/2021     MICROALBUMIN  09/21/2021     DTAP/TDAP/TD IMMUNIZATION (4 - Td) 04/17/2025     DEPRESSION ACTION PLAN  Completed     PNEUMOCOCCAL IMMUNIZATION 65+ LOW/MEDIUM RISK  Completed     ZOSTER IMMUNIZATION  Completed     IPV IMMUNIZATION  Aged Out     MENINGITIS IMMUNIZATION  Aged Out     HEPATITIS B IMMUNIZATION  Aged Out       Lab work is in process  Labs reviewed in EPIC      Review of Systems  Constitutional, HEENT, cardiovascular, pulmonary, GI, , musculoskeletal,  "neuro, skin, endocrine and psych systems are negative, except as otherwise noted.    OBJECTIVE:   /70 (BP Location: Right arm, Patient Position: Sitting, Cuff Size: Adult Regular)   Pulse 68   Temp 96.9  F (36.1  C) (Temporal)   Ht 1.626 m (5' 4\")   Wt 57.2 kg (126 lb)   SpO2 98%   BMI 21.63 kg/m   Estimated body mass index is 21.63 kg/m  as calculated from the following:    Height as of this encounter: 1.626 m (5' 4\").    Weight as of this encounter: 57.2 kg (126 lb).  Physical Exam  GENERAL: healthy, alert and no distress  EYES: Eyes grossly normal to inspection, PERRL and conjunctivae and sclerae normal  HENT: ear canals and TM's normal, nose and mouth without ulcers or lesions  NECK: no adenopathy, no asymmetry, masses, or scars and thyroid normal to palpation  RESP: lungs clear to auscultation - no rales, rhonchi or wheezes  BREAST: deferred  CV: regular rate and rhythm, normal S1 S2, no S3 or S4, no murmur, click or rub, no peripheral edema and peripheral pulses strong  ABDOMEN: soft, nontender,  MS: limited range of motion of right and left shoulders with difficulty and tenderness with passive extension and flexion of the shoulders  SKIN: 1-2 cm open ulcer over right buttock.  No exudate visible  NEURO: gait stable with use of walker, mentation in tact  PSYCH: mentation appears normal, affect normal/bright    Diagnostic Test Results:  Labs reviewed in Epic    ASSESSMENT / PLAN:     Patient Instructions   (Z00.00) Encounter for Medicare annual wellness exam  (primary encounter diagnosis)  Comment: For routine exam, we reviewed labs as ordered, cholesterol, diabetes mellitus check, liver function, renal function,   We will also update vaccination history.  Plan: Fecal colorectal cancer screen (FIT)            (E78.2) Mixed hyperlipidemia  Comment: Continue atorvastatin  Plan: atorvastatin (LIPITOR) 20 MG tablet            (I10) Essential hypertension with goal blood pressure less than " "140/90  Comment: Continue carvedilol  Plan: carvedilol (COREG) 6.25 MG tablet, losartan         (COZAAR) 100 MG tablet            (M81.0) Age-related osteoporosis without current pathological fracture  Comment: Continue Evista  Plan: raloxifene (EVISTA) 60 MG tablet            (F32.0) Mild major depression (H)  Comment: Continue sertraline  Plan: sertraline (ZOLOFT) 25 MG tablet            (R32) Urinary incontinence, unspecified type  Comment: Continue Detrol  Plan: tolterodine (DETROL) 1 MG tablet              Sacral ulcer  Comment; recommend wound care through home nursing - referral palced    Patient has been advised of split billing requirements and indicates understanding: Yes  COUNSELING:  Reviewed preventive health counseling, as reflected in patient instructions    Estimated body mass index is 23.1 kg/m  as calculated from the following:    Height as of 2/25/20: 1.626 m (5' 4\").    Weight as of 2/25/20: 61.1 kg (134 lb 9.6 oz).        She reports that she has never smoked. She has never used smokeless tobacco.      Appropriate preventive services were discussed with this patient, including applicable screening as appropriate for cardiovascular disease, diabetes, osteopenia/osteoporosis, and glaucoma.  As appropriate for age/gender, discussed screening for colorectal cancer, prostate cancer, breast cancer, and cervical cancer. Checklist reviewing preventive services available has been given to the patient.    Reviewed patients plan of care and provided an AVS. The Basic Care Plan (routine screening as documented in Health Maintenance) for Opal meets the Care Plan requirement. This Care Plan has been established and reviewed with the Patient.    Counseling Resources:  ATP IV Guidelines  Pooled Cohorts Equation Calculator  Breast Cancer Risk Calculator  Breast Cancer: Medication to Reduce Risk  FRAX Risk Assessment  ICSI Preventive Guidelines  Dietary Guidelines for Americans, 2010  [employer]'s MyPlate  ASA " Prophylaxis  Lung CA Screening    Damian Russ MD, MD  [facility]    Identified Health Risks:

## 2020-09-23 NOTE — PATIENT INSTRUCTIONS
(Z00.00) Encounter for Medicare annual wellness exam  (primary encounter diagnosis)  Comment: For routine exam, we reviewed labs as ordered, cholesterol, diabetes mellitus check, liver function, renal function,   We will also update vaccination history.  Plan: Fecal colorectal cancer screen (FIT)            (E78.2) Mixed hyperlipidemia  Comment: Continue atorvastatin  Plan: atorvastatin (LIPITOR) 20 MG tablet            (I10) Essential hypertension with goal blood pressure less than 140/90  Comment: Continue carvedilol  Plan: carvedilol (COREG) 6.25 MG tablet, losartan         (COZAAR) 100 MG tablet            (M81.0) Age-related osteoporosis without current pathological fracture  Comment: Continue Evista  Plan: raloxifene (EVISTA) 60 MG tablet            (F32.0) Mild major depression (H)  Comment: Continue sertraline  Plan: sertraline (ZOLOFT) 25 MG tablet            (R32) Urinary incontinence, unspecified type  Comment: Continue Detrol  Plan: tolterodine (DETROL) 1 MG tablet

## 2020-09-24 ASSESSMENT — ASTHMA QUESTIONNAIRES: ACT_TOTALSCORE: 24

## 2020-10-01 DIAGNOSIS — Z00.00 ENCOUNTER FOR MEDICARE ANNUAL WELLNESS EXAM: ICD-10-CM

## 2020-10-01 PROCEDURE — 82274 ASSAY TEST FOR BLOOD FECAL: CPT | Performed by: INTERNAL MEDICINE

## 2020-10-06 ENCOUNTER — TELEPHONE (OUTPATIENT)
Dept: FAMILY MEDICINE | Facility: CLINIC | Age: 85
End: 2020-10-06

## 2020-10-06 LAB — HEMOCCULT STL QL IA: POSITIVE

## 2020-10-07 NOTE — RESULT ENCOUNTER NOTE
Cristhian Joseph,    I have had the opportunity to review your recent results and an interpretation is as follows:  Your FIT test was positive.  I would recommend we schedule a virtual visit to review this and discuss next steps for further workup of blood in stool     Sincerely,  Damian Russ MD

## 2020-10-07 NOTE — TELEPHONE ENCOUNTER
Can we call Opal Zeng Merrick and let her know that     Hi Opal,    I have had the opportunity to review your recent results and an interpretation is as follows:  Your FIT test was positive.  I would recommend we schedule a virtual visit to review this and discuss next steps for further workup of blood in stool     Sincerely,  Damian Russ MD

## 2020-10-07 NOTE — TELEPHONE ENCOUNTER
Pt has reviewed results on MyC. Marking to call to f/u and assist with scheduling VV     Majo RODNEY RN

## 2020-10-09 ENCOUNTER — VIRTUAL VISIT (OUTPATIENT)
Dept: FAMILY MEDICINE | Facility: CLINIC | Age: 85
End: 2020-10-09
Payer: COMMERCIAL

## 2020-10-09 DIAGNOSIS — R19.5 POSITIVE FIT (FECAL IMMUNOCHEMICAL TEST): ICD-10-CM

## 2020-10-09 DIAGNOSIS — I67.1 CEREBRAL ANEURYSM: Primary | ICD-10-CM

## 2020-10-09 DIAGNOSIS — Z79.2 PREVENTIVE ANTIBIOTIC: ICD-10-CM

## 2020-10-09 PROCEDURE — 99442 PR PHYSICIAN TELEPHONE EVALUATION 11-20 MIN: CPT | Mod: 95 | Performed by: INTERNAL MEDICINE

## 2020-10-09 RX ORDER — AZITHROMYCIN 500 MG/1
TABLET, FILM COATED ORAL
Qty: 1 TABLET | Refills: 3 | Status: SHIPPED | OUTPATIENT
Start: 2020-10-09 | End: 2021-12-01

## 2020-10-09 RX ORDER — AZITHROMYCIN 500 MG/1
TABLET, FILM COATED ORAL
Qty: 1 TABLET | Refills: 3 | Status: CANCELLED | OUTPATIENT
Start: 2020-10-09

## 2020-10-09 NOTE — PROGRESS NOTES
"Opal Sin is a 85 year old female who is being evaluated via a billable telephone visit.      The patient has been notified of following:     \"This telephone visit will be conducted via a call between you and your physician/provider. We have found that certain health care needs can be provided without the need for a physical exam.  This service lets us provide the care you need with a short phone conversation.  If a prescription is necessary we can send it directly to your pharmacy.  If lab work is needed we can place an order for that and you can then stop by our lab to have the test done at a later time.    Telephone visits are billed at different rates depending on your insurance coverage. During this emergency period, for some insurers they may be billed the same as an in-person visit.  Please reach out to your insurance provider with any questions.    If during the course of the call the physician/provider feels a telephone visit is not appropriate, you will not be charged for this service.\"    Patient has given verbal consent for Telephone visit?  Yes    What phone number would you like to be contacted at? [phone]    How would you like to obtain your AVS? MyChart    Subjective     Opal Sin is a 85 year old female who presents via phone visit today for the following health issues:    HPI       Positive FIT test   Opal Sin did have a positive FIT test.  She is curious as to the cause.  She had a CT scan in 2019 that showed no concerning findings.  She is not interested in pursuing a colonoscopy.  She is having some loose stools and some lower abdominal pain.     Review of Systems   Constitutional, HEENT, cardiovascular, pulmonary, GI, , musculoskeletal, neuro, skin, endocrine and psych systems are negative, except as otherwise noted.       Objective          Vitals:  No vitals were obtained today due to virtual visit.    healthy, alert and no distress  PSYCH: Alert and " oriented times 3; coherent speech, normal   rate and volume, able to articulate logical thoughts, able   to abstract reason, no tangential thoughts, no hallucinations   or delusions  Her affect is normal  RESP: No cough, no audible wheezing, able to talk in full sentences  Remainder of exam unable to be completed due to telephone visits    No results found for this or any previous visit (from the past 24 hour(s)).        Assessment/Plan:    Assessment & Plan     Preventive antibiotic  Ok to refill  azithromycin  - azithromycin (ZITHROMAX) 500 MG tablet; Take 500 mg 30-60 minutes prior to dental procedure    Cerebral aneurysm  We discussed the need for recheck of her CT angiogram.  I recommended she discuss with neurology team.  I will also send a [facility] reminder in January to check back in with her and see if a CT scan would be recommended for January February next year as her last scan was in February 2020    Positive FIT (fecal immunochemical test)  I did recommend she discuss further with gastroenterology as to the source of her positive fit test.  Whether or not she would be a good candidate for colonoscopy and upper endoscopy is to be determined.  - GASTROENTEROLOGY ADULT REF CONSULT ONLY; Future            No follow-ups on file.    Damian Russ MD, MD  [facility]    Phone call duration:  11 minutes

## 2020-10-19 ENCOUNTER — HOSPITAL ENCOUNTER (OUTPATIENT)
Dept: ULTRASOUND IMAGING | Facility: CLINIC | Age: 85
Discharge: HOME | End: 2020-10-19
Attending: INTERNAL MEDICINE | Admitting: INTERNAL MEDICINE
Payer: COMMERCIAL

## 2020-10-19 DIAGNOSIS — I10 HYPERTENSION: ICD-10-CM

## 2020-10-19 DIAGNOSIS — N18.2 CHRONIC RENAL DISEASE, STAGE II: ICD-10-CM

## 2020-10-19 DIAGNOSIS — R80.9 PROTEINURIA: ICD-10-CM

## 2020-10-19 PROCEDURE — 76770 US EXAM ABDO BACK WALL COMP: CPT

## 2020-10-22 ENCOUNTER — TRANSFERRED RECORDS (OUTPATIENT)
Dept: HEALTH INFORMATION MANAGEMENT | Facility: CLINIC | Age: 85
End: 2020-10-22

## 2020-11-16 ENCOUNTER — TRANSFERRED RECORDS (OUTPATIENT)
Dept: HEALTH INFORMATION MANAGEMENT | Facility: CLINIC | Age: 85
End: 2020-11-16

## 2021-01-06 ENCOUNTER — TELEPHONE (OUTPATIENT)
Dept: FAMILY MEDICINE | Facility: CLINIC | Age: 86
End: 2021-01-06

## 2021-01-06 DIAGNOSIS — I67.1 CEREBRAL ANEURYSM, NONRUPTURED: Primary | ICD-10-CM

## 2021-01-06 NOTE — TELEPHONE ENCOUNTER
Reason for Call: Request for an order or referral:    Order or referral being requested:  CT Angiogram of your known cerebral aneurysm.    Date needed: as soon as possible    Has the patient been seen by the PCP for this problem? YES    Additional comments: Patient would like to have this at [facility] in the building so please send order there when placed. Per a message patient received    My Chart message sent to patient from Dr Russ :This is a reminder to check back with the clinic to schedule a CT Angiogram of your known cerebral aneurysm.    Phone number Patient can be reached at:  Home number on file [phone] (home)    Best Time:  anytime    Can we leave a detailed message on this number?  YES    Call taken on 1/6/2021 at 12:19 PM by Khushbu Mchugh

## 2021-01-07 NOTE — TELEPHONE ENCOUNTER
See other encounter regarding this   Faxed to [facility]     Pt was notified via MyChart    Sabina MARTIN RN

## 2021-01-07 NOTE — TELEPHONE ENCOUNTER
"I called pt to tell  ordered at [facility].  Patient said \"No I want to talk to him.\"     I see below mentions [facility]   "

## 2021-01-13 ENCOUNTER — TRANSFERRED RECORDS (OUTPATIENT)
Dept: HEALTH INFORMATION MANAGEMENT | Facility: CLINIC | Age: 86
End: 2021-01-13

## 2021-01-14 NOTE — TELEPHONE ENCOUNTER
Called patient, no answer. LVM asking her to call back     CT results are not in chart since pt went to [facility] - called [facility] med records to have copy faxed here to 5th floor       Obtained fax, sent to [facility] (per their staff fax to: [phone])    Placed fax in scan bin.     Sabina MARTIN RN

## 2021-01-14 NOTE — TELEPHONE ENCOUNTER
I called Opal Zeng Merrick and she has been in contact with Dr. Crawford and has been now referred to [facility] - Dr. Shelton.  She is expecting a phone call back from [facility] to schedule sometime today.    Can we call this afternoon to check in and make sure she connected with the neuro-interventional-radiologist?    [facility] [phone]    Damian Russ MD, MD

## 2021-01-14 NOTE — TELEPHONE ENCOUNTER
"Patient called clinic and is asking that Dr Russ call her today to discuss CTA findings.     Informed patient that Dr Russ wanting patient to be seen by Interventional Neurology---and that PCP called to have patient seen by her NeurologistDr Crawford tomorrow.     Patient states \"that's not who I should see---I need to talk with Dr Russ about this.\".     Please call patient at  [phone] .   Patient plans to be home the rest of the day.     Thank you,  Marilou IGLESIAS, RN    "

## 2021-01-14 NOTE — TELEPHONE ENCOUNTER
"PCP,    [facility] called with urgent finding from head CTA. [facility]  No records in chart, tech read message from radiologist that stated \"recurrent aneurysm\".   Tech will print report and fax    Thank you,  Jose YOUNG RN  "

## 2021-01-14 NOTE — TELEPHONE ENCOUNTER
Called patient - states she has not gotten a call yet from anyone     Called [facility] - was unable to connect with representative     Will recall tomorrow   Sabina MARTIN RN

## 2021-01-14 NOTE — TELEPHONE ENCOUNTER
I tried calling patient, but no answer.    She should be seen in Interventional Neurology asap.  I called [facility] and they will work to schedule her tomorrow with her neurologist Dr Crawford.      Can we fax report of recent CT to [facility] as well?    Can we also call patient and make sure she is informed of this finding and need to see neurology tomorrow?        Damian Russ MD, MD

## 2021-01-15 NOTE — TELEPHONE ENCOUNTER
Thank you,    Can you let me know if there is anything I might need to assist with?    Damian Russ MD, MD

## 2021-01-15 NOTE — TELEPHONE ENCOUNTER
Yes, I think it would be reasonable at this time to keep her appointment, but should wait to hear from radiology as to best plan of action    Damian Russ MD, MD

## 2021-01-15 NOTE — TELEPHONE ENCOUNTER
Called [facility] and was transferred to neuro-interventional-radiologist scheduling     Spoke with  there, they will reach out to patient today to assist her with scheduling     Sabina MARTIN RN

## 2021-01-15 NOTE — TELEPHONE ENCOUNTER
Patient calling,     Read notes below and advised to await by the phone for a call from [facility] to assist with scheduled    PCP please advise, patient wants to know if should she keep her appointment for her eye injections, which is scheduled for next Tuesday on both eyes.    Surekha GRANTN, RN, PHN

## 2021-01-15 NOTE — TELEPHONE ENCOUNTER
Pt notified of PCP's response below    Dr Espana's office did run some labs recently per patient and she wanted the results. Advised to call their office, we cannot see them in her chart     Sabina MARTIN RN

## 2021-01-15 NOTE — TELEPHONE ENCOUNTER
Tried returning call to patient.   line busy.   Previous message not complete.  Not sure what patient is needing. Will continue to try to reach patient     Marilou IGLESIAS, RN

## 2021-01-30 ENCOUNTER — IMMUNIZATION (OUTPATIENT)
Dept: NURSING | Facility: CLINIC | Age: 86
End: 2021-01-30
Payer: COMMERCIAL

## 2021-01-30 PROCEDURE — 91300 PR COVID VAC PFIZER DIL RECON 30 MCG/0.3 ML IM: CPT

## 2021-01-30 PROCEDURE — 0001A PR COVID VAC PFIZER DIL RECON 30 MCG/0.3 ML IM: CPT

## 2021-02-07 ENCOUNTER — MYC MEDICAL ADVICE (OUTPATIENT)
Dept: FAMILY MEDICINE | Facility: CLINIC | Age: 86
End: 2021-02-07

## 2021-02-20 ENCOUNTER — IMMUNIZATION (OUTPATIENT)
Dept: NURSING | Facility: CLINIC | Age: 86
End: 2021-02-20
Attending: PEDIATRICS
Payer: COMMERCIAL

## 2021-02-20 PROCEDURE — 0002A PR COVID VAC PFIZER DIL RECON 30 MCG/0.3 ML IM: CPT

## 2021-02-20 PROCEDURE — 91300 PR COVID VAC PFIZER DIL RECON 30 MCG/0.3 ML IM: CPT

## 2021-02-21 DIAGNOSIS — R32 URINARY INCONTINENCE, UNSPECIFIED TYPE: ICD-10-CM

## 2021-02-22 RX ORDER — TOLTERODINE TARTRATE 1 MG/1
TABLET, EXTENDED RELEASE ORAL
Qty: 60 TABLET | Refills: 8 | Status: SHIPPED | OUTPATIENT
Start: 2021-02-22 | End: 2021-12-21

## 2021-03-17 DIAGNOSIS — M25.552 HIP PAIN, LEFT: ICD-10-CM

## 2021-03-17 NOTE — TELEPHONE ENCOUNTER
Reason for Call:  Medication or medication refill:    Do you use a [facility]?  Name of the pharmacy and phone number for the current request:       [id number] [address] AT [facility] [address]    Name of the medication requested: gabapentin (NEURONTIN) 100 MG capsule    Other request: Please refill    Can we leave a detailed message on this number? YES    Phone number patient can be reached at: Home number on file [phone] (home)    Best Time: anytime    Call taken on 3/17/2021 at 10:37 AM by Khushbu Mchugh

## 2021-03-18 NOTE — TELEPHONE ENCOUNTER
gabapentin (NEURONTIN) 100 MG capsule 90 capsule 3 1/27/2020  No   Sig - Route: Take 1 capsule (100 mg) by mouth nightly as needed AND Give 200 mg by mouth one time a day for pain - Oral   Sent to pharmacy as: gabapentin (NEURONTIN) 100 MG capsule   Class: E-Prescribe   Notes to Pharmacy: For Profile Only - patient will call to fill   Order: [id number]   E-Prescribing Status: Receipt confirmed by pharmacy (1/27/2020  3:08 PM CST)     Routing refill request to provider for review/approval because:  Drug not on the [facility] refill protocol

## 2021-03-19 RX ORDER — GABAPENTIN 100 MG/1
100 CAPSULE ORAL
Qty: 90 CAPSULE | Refills: 3 | Status: SHIPPED | OUTPATIENT
Start: 2021-03-19 | End: 2021-12-15

## 2021-04-22 DIAGNOSIS — I10 ESSENTIAL HYPERTENSION, MALIGNANT: ICD-10-CM

## 2021-04-22 DIAGNOSIS — N18.31 CHRONIC KIDNEY DISEASE (CKD) STAGE G3A/A1, MODERATELY DECREASED GLOMERULAR FILTRATION RATE (GFR) BETWEEN 45-59 ML/MIN/1.73 SQUARE METER AND ALBUMINURIA CREATININE RATIO LESS THAN 30 MG/G (H): ICD-10-CM

## 2021-04-22 DIAGNOSIS — R80.9 PROTEINURIA: ICD-10-CM

## 2021-04-22 LAB
ALBUMIN SERPL-MCNC: 3.8 G/DL (ref 3.4–5)
ANION GAP SERPL CALCULATED.3IONS-SCNC: 6 MMOL/L (ref 3–14)
BUN SERPL-MCNC: 25 MG/DL (ref 7–30)
CALCIUM SERPL-MCNC: 8.5 MG/DL (ref 8.5–10.1)
CHLORIDE SERPL-SCNC: 102 MMOL/L (ref 94–109)
CO2 SERPL-SCNC: 23 MMOL/L (ref 20–32)
CREAT SERPL-MCNC: 0.94 MG/DL (ref 0.52–1.04)
GFR SERPL CREATININE-BSD FRML MDRD: 56 ML/MIN/{1.73_M2}
GLUCOSE SERPL-MCNC: 100 MG/DL (ref 70–99)
HGB BLD-MCNC: 12.2 G/DL (ref 11.7–15.7)
PHOSPHATE SERPL-MCNC: 3 MG/DL (ref 2.5–4.5)
POTASSIUM SERPL-SCNC: 4.4 MMOL/L (ref 3.4–5.3)
PTH-INTACT SERPL-MCNC: 100 PG/ML (ref 18–80)
SODIUM SERPL-SCNC: 131 MMOL/L (ref 133–144)

## 2021-04-22 PROCEDURE — 36415 COLL VENOUS BLD VENIPUNCTURE: CPT | Performed by: INTERNAL MEDICINE

## 2021-04-22 PROCEDURE — 83970 ASSAY OF PARATHORMONE: CPT | Performed by: INTERNAL MEDICINE

## 2021-04-22 PROCEDURE — 82306 VITAMIN D 25 HYDROXY: CPT | Performed by: INTERNAL MEDICINE

## 2021-04-22 PROCEDURE — 85018 HEMOGLOBIN: CPT | Performed by: INTERNAL MEDICINE

## 2021-04-22 PROCEDURE — 80069 RENAL FUNCTION PANEL: CPT | Performed by: INTERNAL MEDICINE

## 2021-04-23 LAB — DEPRECATED CALCIDIOL+CALCIFEROL SERPL-MC: 32 UG/L (ref 20–75)

## 2021-04-30 DIAGNOSIS — M80.00XS OSTEOPOROSIS WITH CURRENT PATHOLOGICAL FRACTURE, UNSPECIFIED OSTEOPOROSIS TYPE, SEQUELA: ICD-10-CM

## 2021-05-03 RX ORDER — ALENDRONATE SODIUM 35 MG/1
TABLET ORAL
Qty: 12 TABLET | Refills: 1 | Status: SHIPPED | OUTPATIENT
Start: 2021-05-03 | End: 2021-07-19

## 2021-08-28 DIAGNOSIS — I10 ESSENTIAL HYPERTENSION WITH GOAL BLOOD PRESSURE LESS THAN 140/90: ICD-10-CM

## 2021-08-30 RX ORDER — LOSARTAN POTASSIUM 100 MG/1
TABLET ORAL
Qty: 30 TABLET | Refills: 0 | Status: SHIPPED | OUTPATIENT
Start: 2021-08-30 | End: 2021-11-23

## 2021-10-18 DIAGNOSIS — M81.0 AGE-RELATED OSTEOPOROSIS WITHOUT CURRENT PATHOLOGICAL FRACTURE: ICD-10-CM

## 2021-10-19 PROBLEM — F32.9 MAJOR DEPRESSION: Status: ACTIVE | Noted: 2020-01-27

## 2021-10-20 RX ORDER — RALOXIFENE HYDROCHLORIDE 60 MG/1
TABLET, FILM COATED ORAL
Qty: 90 TABLET | Refills: 3 | Status: SHIPPED | OUTPATIENT
Start: 2021-10-20 | End: 2022-10-13

## 2021-10-20 NOTE — TELEPHONE ENCOUNTER
Routing refill request to provider for review/approval because:  Patient needs to be seen because it has been more than 1 year since last office visit.     Dexa on file within past 2 years     Naomie Marie RN

## 2021-10-23 DIAGNOSIS — E78.2 MIXED HYPERLIPIDEMIA: ICD-10-CM

## 2021-10-24 ENCOUNTER — HEALTH MAINTENANCE LETTER (OUTPATIENT)
Age: 86
End: 2021-10-24

## 2021-10-25 RX ORDER — ATORVASTATIN CALCIUM 20 MG/1
TABLET, FILM COATED ORAL
Qty: 90 TABLET | Refills: 0 | Status: SHIPPED | OUTPATIENT
Start: 2021-10-25 | End: 2021-12-15

## 2021-10-25 NOTE — TELEPHONE ENCOUNTER
Medication is being filled for 1 time refill only due to:  Patient needs to be seen because it has been more than one year since last visit.       Emilee POTTER RN  EP Triage

## 2021-10-28 ENCOUNTER — MYC MEDICAL ADVICE (OUTPATIENT)
Dept: FAMILY MEDICINE | Facility: CLINIC | Age: 86
End: 2021-10-28

## 2021-10-31 DIAGNOSIS — E78.2 MIXED HYPERLIPIDEMIA: ICD-10-CM

## 2021-10-31 DIAGNOSIS — Z00.00 ENCOUNTER FOR MEDICARE ANNUAL WELLNESS EXAM: Primary | ICD-10-CM

## 2021-10-31 DIAGNOSIS — I10 ESSENTIAL HYPERTENSION WITH GOAL BLOOD PRESSURE LESS THAN 140/90: ICD-10-CM

## 2021-11-01 RX ORDER — CARVEDILOL 6.25 MG/1
TABLET ORAL
Qty: 180 TABLET | Refills: 0 | Status: SHIPPED | OUTPATIENT
Start: 2021-11-01 | End: 2021-12-15

## 2021-11-01 RX ORDER — ATORVASTATIN CALCIUM 20 MG/1
TABLET, FILM COATED ORAL
Qty: 90 TABLET | Refills: 0 | OUTPATIENT
Start: 2021-11-01

## 2021-11-01 NOTE — TELEPHONE ENCOUNTER
Patient called to request Rx Carvedilol refill.     Last Written Prescription Date:  9-  Last Fill Quantity: 180,  # refills: 3   Last office visit: 10-9-2020with prescribing provider:   Dr Russ    Future Office Visit:      During phone conversation, advised patient to schedule annual fasting physical appointment .  Patient stated understanding, however wants separate fasting lab appointment prior to clinic physical.     Scheduled fasting lab appointment 12-1-21.     Dr Russ ---  1. Some labs pended. Please review and add others, if needed and sign.     2. OK to schedule patient's clinic physical with you within a week after labs?  Only available appts: VV and SAME/NEXT DAY.     Triage will follow up with patient and schedule appointment.     [phone]  May leave detailed msg/orders on voice mail.      Thank you,  Marilou IGLESIAS, RN      November 1, 2021  2:56 PM

## 2021-11-02 NOTE — TELEPHONE ENCOUNTER
Next 5 appointments (look out 90 days)    Dec 15, 2021  9:30 AM  PHYSICAL with Damian Russ MD  [facility] [address] [facility], [address]  [place] [address]  [phone]        Pt has physical already scheduled     Kyra Micheel RN  [facility]

## 2021-11-30 DIAGNOSIS — Z79.2 PREVENTIVE ANTIBIOTIC: ICD-10-CM

## 2021-12-01 ENCOUNTER — LAB (OUTPATIENT)
Dept: LAB | Facility: CLINIC | Age: 86
End: 2021-12-01
Payer: COMMERCIAL

## 2021-12-01 DIAGNOSIS — I10 ESSENTIAL HYPERTENSION WITH GOAL BLOOD PRESSURE LESS THAN 140/90: ICD-10-CM

## 2021-12-01 DIAGNOSIS — E78.2 MIXED HYPERLIPIDEMIA: ICD-10-CM

## 2021-12-01 DIAGNOSIS — Z00.00 ENCOUNTER FOR MEDICARE ANNUAL WELLNESS EXAM: ICD-10-CM

## 2021-12-01 LAB
ERYTHROCYTE [DISTWIDTH] IN BLOOD BY AUTOMATED COUNT: 14.9 % (ref 10–15)
HCT VFR BLD AUTO: 37.9 % (ref 35–47)
HGB BLD-MCNC: 12.9 G/DL (ref 11.7–15.7)
MCH RBC QN AUTO: 32.3 PG (ref 26.5–33)
MCHC RBC AUTO-ENTMCNC: 34 G/DL (ref 31.5–36.5)
MCV RBC AUTO: 95 FL (ref 78–100)
PLATELET # BLD AUTO: 237 10E3/UL (ref 150–450)
RBC # BLD AUTO: 3.99 10E6/UL (ref 3.8–5.2)
WBC # BLD AUTO: 9.9 10E3/UL (ref 4–11)

## 2021-12-01 PROCEDURE — 80061 LIPID PANEL: CPT

## 2021-12-01 PROCEDURE — 80053 COMPREHEN METABOLIC PANEL: CPT

## 2021-12-01 PROCEDURE — 84443 ASSAY THYROID STIM HORMONE: CPT

## 2021-12-01 PROCEDURE — 85027 COMPLETE CBC AUTOMATED: CPT

## 2021-12-01 PROCEDURE — 36415 COLL VENOUS BLD VENIPUNCTURE: CPT

## 2021-12-01 RX ORDER — AZITHROMYCIN 500 MG/1
TABLET, FILM COATED ORAL
Qty: 1 TABLET | Refills: 3 | Status: SHIPPED | OUTPATIENT
Start: 2021-12-01 | End: 2022-12-06

## 2021-12-01 NOTE — TELEPHONE ENCOUNTER
Routing refill request to provider for review/approval because:    Drug not on the [facility] refill protocol .    Haylee Hernandez RN  [facility] Triage

## 2021-12-02 LAB
ALBUMIN SERPL-MCNC: 3.7 G/DL (ref 3.4–5)
ALP SERPL-CCNC: 70 U/L (ref 40–150)
ALT SERPL W P-5'-P-CCNC: 10 U/L (ref 0–50)
ANION GAP SERPL CALCULATED.3IONS-SCNC: 7 MMOL/L (ref 3–14)
AST SERPL W P-5'-P-CCNC: 15 U/L (ref 0–45)
BILIRUB SERPL-MCNC: 0.5 MG/DL (ref 0.2–1.3)
BUN SERPL-MCNC: 24 MG/DL (ref 7–30)
CALCIUM SERPL-MCNC: 8.5 MG/DL (ref 8.5–10.1)
CHLORIDE BLD-SCNC: 108 MMOL/L (ref 94–109)
CHOLEST SERPL-MCNC: 123 MG/DL
CO2 SERPL-SCNC: 21 MMOL/L (ref 20–32)
CREAT SERPL-MCNC: 0.8 MG/DL (ref 0.52–1.04)
FASTING STATUS PATIENT QL REPORTED: YES
GFR SERPL CREATININE-BSD FRML MDRD: 68 ML/MIN/1.73M2
GLUCOSE BLD-MCNC: 98 MG/DL (ref 70–99)
HDLC SERPL-MCNC: 53 MG/DL
LDLC SERPL CALC-MCNC: 58 MG/DL
NONHDLC SERPL-MCNC: 70 MG/DL
POTASSIUM BLD-SCNC: 4.6 MMOL/L (ref 3.4–5.3)
PROT SERPL-MCNC: 6.8 G/DL (ref 6.8–8.8)
SODIUM SERPL-SCNC: 136 MMOL/L (ref 133–144)
TRIGL SERPL-MCNC: 62 MG/DL
TSH SERPL DL<=0.005 MIU/L-ACNC: 1.04 MU/L (ref 0.4–4)

## 2021-12-07 NOTE — RESULT ENCOUNTER NOTE
Cristhian Joseph,    I had the opportunity to review your recent labs and a summary of your labs reads as follows:    Your complete blood counts show no sign of anemia, normal white blood cell count and platelets.  Your comprehensive metabolic panel showed normal renal function, normal liver function, and normal fasting blood glucose indicating no evidence of diabetes mellitus.  Your fasting lipid panel show  - normal HDL (good) cholesterol -as your goal is greater than 40  - low LDL (bad) cholesterol as your goal is less than 100  - normal triglyceride levels  Your thyroid function is stable     Congratulations on your excellent results        Sincerely,  Damian Russ MD

## 2021-12-09 ASSESSMENT — ENCOUNTER SYMPTOMS
ARTHRALGIAS: 1
HEADACHES: 0
FREQUENCY: 1
HEMATURIA: 0
SORE THROAT: 0
NAUSEA: 0
EYE PAIN: 0
ABDOMINAL PAIN: 0
DIARRHEA: 0
CHILLS: 0
NERVOUS/ANXIOUS: 0
FEVER: 0
DIZZINESS: 1
PARESTHESIAS: 0
PALPITATIONS: 0
MYALGIAS: 1
WEAKNESS: 0
BREAST MASS: 0
HEARTBURN: 1
COUGH: 0
CONSTIPATION: 0
JOINT SWELLING: 0
DYSURIA: 0

## 2021-12-09 ASSESSMENT — ACTIVITIES OF DAILY LIVING (ADL): CURRENT_FUNCTION: TRANSPORTATION REQUIRES ASSISTANCE

## 2021-12-09 NOTE — RESULT ENCOUNTER NOTE
Cristhian Joseph,    I had the opportunity to review your recent labs and a summary of your labs reads as follows:    Your complete blood counts show no sign of anemia, normal white blood cell count and platelets.  Your comprehensive metabolic panel showed normal renal function, normal liver function, and normal fasting blood glucose indicating no evidence of diabetes mellitus.  Your fasting lipid panel show  - normal HDL (good) cholesterol -as your goal is greater than 40  - low LDL (bad) cholesterol as your goal is less than 100  - normal triglyceride levels  Your thyroid function is stable    Congratulaions on your excellent results     Sincerely,  Damian Russ MD Writer spoke with mom, and informed her on the information below. No further questions

## 2021-12-15 ENCOUNTER — OFFICE VISIT (OUTPATIENT)
Dept: FAMILY MEDICINE | Facility: CLINIC | Age: 86
End: 2021-12-15
Payer: COMMERCIAL

## 2021-12-15 VITALS
OXYGEN SATURATION: 96 % | WEIGHT: 126.8 LBS | BODY MASS INDEX: 21.65 KG/M2 | TEMPERATURE: 97.1 F | DIASTOLIC BLOOD PRESSURE: 74 MMHG | HEIGHT: 64 IN | RESPIRATION RATE: 20 BRPM | HEART RATE: 59 BPM | SYSTOLIC BLOOD PRESSURE: 137 MMHG

## 2021-12-15 DIAGNOSIS — M25.552 HIP PAIN, LEFT: ICD-10-CM

## 2021-12-15 DIAGNOSIS — N18.30 STAGE 3 CHRONIC KIDNEY DISEASE, UNSPECIFIED WHETHER STAGE 3A OR 3B CKD (H): ICD-10-CM

## 2021-12-15 DIAGNOSIS — G20.A1 PARKINSON'S DISEASE (H): ICD-10-CM

## 2021-12-15 DIAGNOSIS — E78.2 MIXED HYPERLIPIDEMIA: ICD-10-CM

## 2021-12-15 DIAGNOSIS — C50.912 MALIGNANT NEOPLASM OF LEFT FEMALE BREAST, UNSPECIFIED ESTROGEN RECEPTOR STATUS, UNSPECIFIED SITE OF BREAST (H): ICD-10-CM

## 2021-12-15 DIAGNOSIS — Z00.00 ROUTINE GENERAL MEDICAL EXAMINATION AT A HEALTH CARE FACILITY: Primary | ICD-10-CM

## 2021-12-15 DIAGNOSIS — J98.01 ACUTE BRONCHOSPASM: ICD-10-CM

## 2021-12-15 DIAGNOSIS — K21.9 GASTROESOPHAGEAL REFLUX DISEASE WITHOUT ESOPHAGITIS: ICD-10-CM

## 2021-12-15 DIAGNOSIS — I10 ESSENTIAL HYPERTENSION WITH GOAL BLOOD PRESSURE LESS THAN 140/90: ICD-10-CM

## 2021-12-15 DIAGNOSIS — Z12.31 ENCOUNTER FOR SCREENING MAMMOGRAM FOR MALIGNANT NEOPLASM OF BREAST: ICD-10-CM

## 2021-12-15 DIAGNOSIS — I72.0 ANEURYSM OF CAROTID ARTERY (H): ICD-10-CM

## 2021-12-15 DIAGNOSIS — M81.0 AGE-RELATED OSTEOPOROSIS WITHOUT CURRENT PATHOLOGICAL FRACTURE: ICD-10-CM

## 2021-12-15 DIAGNOSIS — F32.0 MILD MAJOR DEPRESSION (H): ICD-10-CM

## 2021-12-15 DIAGNOSIS — I10 BENIGN ESSENTIAL HYPERTENSION: ICD-10-CM

## 2021-12-15 PROCEDURE — 99397 PER PM REEVAL EST PAT 65+ YR: CPT | Performed by: INTERNAL MEDICINE

## 2021-12-15 PROCEDURE — 99212 OFFICE O/P EST SF 10 MIN: CPT | Mod: 25 | Performed by: INTERNAL MEDICINE

## 2021-12-15 RX ORDER — CARVEDILOL 6.25 MG/1
6.25 TABLET ORAL 2 TIMES DAILY WITH MEALS
Qty: 180 TABLET | Refills: 3 | Status: SHIPPED | OUTPATIENT
Start: 2021-12-15 | End: 2023-01-17

## 2021-12-15 RX ORDER — GABAPENTIN 100 MG/1
100 CAPSULE ORAL
Qty: 90 CAPSULE | Refills: 3 | Status: ON HOLD | OUTPATIENT
Start: 2021-12-15 | End: 2024-05-07

## 2021-12-15 RX ORDER — ALBUTEROL SULFATE 90 UG/1
1-2 AEROSOL, METERED RESPIRATORY (INHALATION) EVERY 6 HOURS PRN
Qty: 18 G | Refills: 1 | Status: SHIPPED | OUTPATIENT
Start: 2021-12-15 | End: 2024-05-15

## 2021-12-15 RX ORDER — AMLODIPINE BESYLATE 5 MG/1
5 TABLET ORAL DAILY
Qty: 90 TABLET | Refills: 3 | Status: ON HOLD | OUTPATIENT
Start: 2021-12-15 | End: 2024-06-01

## 2021-12-15 RX ORDER — ATORVASTATIN CALCIUM 20 MG/1
20 TABLET, FILM COATED ORAL DAILY
Qty: 90 TABLET | Refills: 3 | Status: SHIPPED | OUTPATIENT
Start: 2021-12-15 | End: 2023-01-17

## 2021-12-15 RX ORDER — SERTRALINE HYDROCHLORIDE 25 MG/1
25 TABLET, FILM COATED ORAL DAILY
Qty: 90 TABLET | Refills: 3 | Status: SHIPPED | OUTPATIENT
Start: 2021-12-15 | End: 2023-01-02

## 2021-12-15 ASSESSMENT — ACTIVITIES OF DAILY LIVING (ADL): CURRENT_FUNCTION: TRANSPORTATION REQUIRES ASSISTANCE

## 2021-12-15 ASSESSMENT — PAIN SCALES - GENERAL: PAINLEVEL: NO PAIN (0)

## 2021-12-15 ASSESSMENT — MIFFLIN-ST. JEOR: SCORE: 1010.16

## 2021-12-15 NOTE — PROGRESS NOTES
"SUBJECTIVE:   Opal Sin is a 86 year old female who presents for Preventive Visit.      Patient has been advised of split billing requirements and indicates understanding: Yes   Are you in the first 12 months of your Medicare coverage?  No    Healthy Habits:     In general, how would you rate your overall health?  Good    Frequency of exercise:  4-5 days/week    Duration of exercise:  15-30 minutes    Do you usually eat at least 4 servings of fruit and vegetables a day, include whole grains    & fiber and avoid regularly eating high fat or \"junk\" foods?  Yes    Taking medications regularly:  Yes    Medication side effects:  None    Ability to successfully perform activities of daily living:  Transportation requires assistance    Home Safety:  No safety concerns identified    Hearing Impairment:  No hearing concerns    In the past 6 months, have you been bothered by leaking of urine? Yes    In general, how would you rate your overall mental or emotional health?  Excellent      PHQ-2 Total Score: 0    Additional concerns today:  No    Do you feel safe in your environment? Yes    Have you ever done Advance Care Planning? (For example, a Health Directive, POLST, or a discussion with a medical provider or your loved ones about your wishes): Yes, advance care planning is on file.       Fall risk  Fallen 2 or more times in the past year?: Yes  Any fall with injury in the past year?: No    Cognitive Screening   1) Repeat 3 items (Leader, Season, Table)    2) Clock draw: NORMAL  3) 3 item recall: Recalls 3 objects  Results: 3 items recalled: COGNITIVE IMPAIRMENT LESS LIKELY    Mini-CogTM Copyright SANDY Pepe. Licensed by the author for use in [facility]; reprinted with permission (jimenez@.[facility]). All rights reserved.      Do you have sleep apnea, excessive snoring or daytime drowsiness?: no    Reviewed and updated as needed this visit by clinical staff  Tobacco  Allergies  Meds             Reviewed " and updated as needed this visit by Provider               Social History     Tobacco Use     Smoking status: Never Smoker     Smokeless tobacco: Never Used   Substance Use Topics     Alcohol use: Yes     Alcohol/week: 0.0 standard drinks     Comment: rare     If you drink alcohol do you typically have >3 drinks per day or >7 drinks per week? Not applicable    Alcohol Use 12/9/2021   Prescreen: >3 drinks/day or >7 drinks/week? Not Applicable   Prescreen: >3 drinks/day or >7 drinks/week? -         Current providers sharing in care for this patient include:   Patient Care Team:  Damian Russ MD as PCP - General (Internal Medicine)  Damian Russ MD as Assigned PCP    The following health maintenance items are reviewed in Epic and correct as of today:  Health Maintenance Due   Topic Date Due     ANNUAL REVIEW OF HM ORDERS  Never done     ASTHMA ACTION PLAN  12/20/2017     URINE DRUG SCREEN  07/26/2018     DEXA  11/15/2020     ASTHMA CONTROL TEST  03/23/2021     PHQ-9  03/23/2021     MICROALBUMIN  09/21/2021     COLORECTAL CANCER SCREENING  10/01/2021     FALL RISK ASSESSMENT  10/09/2021     MAMMO SCREENING  11/16/2021        Acute bronchospasm   Doing well with albuterol.  Notes neighbors smoking so will use albuterol once per night  Benign essential hypertension   Blood pressure has been checked at home and readings have been 133/80.  Mixed hyperlipidemia   Also taking atorvastatin daily.  No issues.  Also taking coenzyme q-10 to help with cramps  Gastroesophageal reflux disease without esophagitis   Continues on omeprazole   Age-related osteoporosis without current pathological fracture   Last bone density scan was 2018.  Taking fosamax and calcium and vitamin D.  Mild major depression (H)   Mood has been stable.  She continues on low dose of 25 mg daily.    Parkinson's disease (H)   Has had follow up in neurology and continues on sinemet at current dose.  Aneurysm of carotid artery (H)   She  "is due for CT angiogram this year with interventional radiology - [facility].  She had last CT January 2021.   Malignant neoplasm of left female breast, unspecified estrogen receptor status, unspecified site of breast (H)   No recurrence.    Stage 3 chronic kidney disease, unspecified whether stage 3a or 3b CKD (H)   Has been following in nephrology        Mammogram Screening - Patient over age 75, has elected to continue with screening.  Pertinent mammograms are reviewed under the imaging tab.    Review of Systems  Constitutional, HEENT, cardiovascular, pulmonary, GI, , musculoskeletal, neuro, skin, endocrine and psych systems are negative, except as otherwise noted.    OBJECTIVE:   BP (!) 160/79 (BP Location: Right arm, Patient Position: Sitting, Cuff Size: Adult Small)   Pulse 59   Temp 97.1  F (36.2  C) (Temporal)   Resp 20   Ht 1.626 m (5' 4\")   Wt 57.5 kg (126 lb 12.8 oz)   SpO2 96%   BMI 21.77 kg/m   Estimated body mass index is 21.77 kg/m  as calculated from the following:    Height as of this encounter: 1.626 m (5' 4\").    Weight as of this encounter: 57.5 kg (126 lb 12.8 oz).  Physical Exam  GENERAL: healthy, alert and no distress  EYES: Eyes grossly normal to inspection, P   HENT: ear canals and TM's normal, nose and mouth without ulcers or lesions  NECK: no adenopathy, no asymmetry, masses, or scars and thyroid normal to palpation  RESP: lungs clear to auscultation - no rales, rhonchi or wheezes  CV: regular rate and rhythm, normal S1 S2, no S3 or S4, no murmur, click or rub, no peripheral edema    ABDOMEN: soft, nontender, no hepatosplenomegaly,    MS: no gross musculoskeletal defects noted, no edema  SKIN: no suspicious lesions or rashes  NEURO: + increased muscle tone, tremor bilateral upper extremity, gait shuffling with use of walker  PSYCH: mentation appears normal, affect normal/bright    Diagnostic Test Results:  Labs reviewed in Epic    ASSESSMENT / PLAN:     Patient " Instructions   (Z00.00) Routine general medical examination at a health care facility  (primary encounter diagnosis)  Comment: For routine exam, we reviewed labs as ordered, cholesterol, diabetes mellitus check, liver function, renal function,  We will also update vaccination history.  Plan:     (J98.01) Acute bronchospasm  Comment: I will refill albuterol   Plan: albuterol (PROAIR HFA/PROVENTIL HFA/VENTOLIN         HFA) 108 (90 Base) MCG/ACT inhaler            (I10) Benign essential hypertension  Comment: blood pressure is elevated.  We will recheck before you leave.  No changes made today  Plan: amLODIPine (NORVASC) 5 MG tablet            (E78.2) Mixed hyperlipidemia  Comment: Continue atorvastatin and coenzyme Q10  Plan: atorvastatin (LIPITOR) 20 MG tablet            (I10) Essential hypertension with goal blood pressure less than 140/90  Comment:   Plan: carvedilol (COREG) 6.25 MG tablet            (M25.552) Hip pain, left  Comment: Refilled gabapentin  Plan: gabapentin (NEURONTIN) 100 MG capsule            (K21.9) Gastroesophageal reflux disease without esophagitis  Comment:   Plan: omeprazole (PRILOSEC) 20 MG DR capsule            (M81.0) Age-related osteoporosis without current pathological fracture  Comment:  [facility] (also performs diagnostic mammogram, ultrasound and biopsy) [phone]. For bone density scan and mammogram  Plan: DX Hip/Pelvis/Spine            (F32.0) Mild major depression (H)  Comment: Continue sertraline   Plan: sertraline (ZOLOFT) 25 MG tablet            (G20) Parkinson's disease (H)  Comment: continue sinemet and follow up in neurology   Plan:     (I72.0) Aneurysm of carotid artery (H)  Comment: Recommend follow up in interventional radiology - [facility] for annual scans as recommended by interventional radiology   Plan:     (C50.389) Malignant neoplasm of left female breast, unspecified estrogen receptor status, unspecified site of breast (H)  Comment:  " [facility] [place] (also performs diagnostic mammogram, ultrasound and biopsy) [phone].   Plan: *MA Screening Digital Bilateral            (N18.30) Stage 3 chronic kidney disease, unspecified whether stage 3a or 3b CKD (H)  Comment: follow up in nephrology - labs look very good  Plan:     (Z12.31) Encounter for screening mammogram for malignant neoplasm of breast   Comment:   Plan: *MA Screening Digital Bilateral                  Patient has been advised of split billing requirements and indicates understanding: Yes  COUNSELING:  Reviewed preventive health counseling, as reflected in patient instructions    Estimated body mass index is 21.77 kg/m  as calculated from the following:    Height as of this encounter: 1.626 m (5' 4\").    Weight as of this encounter: 57.5 kg (126 lb 12.8 oz).        She reports that she has never smoked. She has never used smokeless tobacco.      Appropriate preventive services were discussed with this patient, including applicable screening as appropriate for cardiovascular disease, diabetes, osteopenia/osteoporosis, and glaucoma.  As appropriate for age/gender, discussed screening for colorectal cancer, prostate cancer, breast cancer, and cervical cancer. Checklist reviewing preventive services available has been given to the patient.    Reviewed patients plan of care and provided an AVS. The Basic Care Plan (routine screening as documented in Health Maintenance) for Opal meets the Care Plan requirement. This Care Plan has been established and reviewed with the Patient.    Counseling Resources:  ATP IV Guidelines  Pooled Cohorts Equation Calculator  Breast Cancer Risk Calculator  Breast Cancer: Medication to Reduce Risk  FRAX Risk Assessment  ICSI Preventive Guidelines  Dietary Guidelines for Americans, 2010  Happy Hour Pal's MyPlate  ASA Prophylaxis  Lung CA Screening    Damian Russ MD, MD  [facility]    Identified Health Risks:  "

## 2021-12-15 NOTE — PATIENT INSTRUCTIONS
(Z00.00) Routine general medical examination at a health care facility  (primary encounter diagnosis)  Comment: For routine exam, we reviewed labs as ordered, cholesterol, diabetes mellitus check, liver function, renal function,  We will also update vaccination history.  Plan:     (J98.01) Acute bronchospasm  Comment: I will refill albuterol   Plan: albuterol (PROAIR HFA/PROVENTIL HFA/VENTOLIN         HFA) 108 (90 Base) MCG/ACT inhaler            (I10) Benign essential hypertension  Comment: blood pressure is elevated.  We will recheck before you leave.  No changes made today  Plan: amLODIPine (NORVASC) 5 MG tablet            (E78.2) Mixed hyperlipidemia  Comment: Continue atorvastatin and coenzyme Q10  Plan: atorvastatin (LIPITOR) 20 MG tablet            (I10) Essential hypertension with goal blood pressure less than 140/90  Comment:   Plan: carvedilol (COREG) 6.25 MG tablet            (M25.552) Hip pain, left  Comment: Refilled gabapentin  Plan: gabapentin (NEURONTIN) 100 MG capsule            (K21.9) Gastroesophageal reflux disease without esophagitis  Comment:   Plan: omeprazole (PRILOSEC) 20 MG DR capsule            (M81.0) Age-related osteoporosis without current pathological fracture  Comment:  [facility] (also performs diagnostic mammogram, ultrasound and biopsy) [phone]. For bone density scan and mammogram  Plan: DX Hip/Pelvis/Spine            (F32.0) Mild major depression (H)  Comment: Continue sertraline   Plan: sertraline (ZOLOFT) 25 MG tablet            (G20) Parkinson's disease (H)  Comment: continue sinemet and follow up in neurology   Plan:     (I72.0) Aneurysm of carotid artery (H)  Comment: Recommend follow up in interventional radiology - [facility] for annual scans as recommended by interventional radiology   Plan:     (C50.239) Malignant neoplasm of left female breast, unspecified estrogen receptor status, unspecified site of breast (H)  Comment:  [facility] (also performs diagnostic mammogram, ultrasound and biopsy) [phone].   Plan: *MA Screening Digital Bilateral            (N18.30) Stage 3 chronic kidney disease, unspecified whether stage 3a or 3b CKD (H)  Comment: follow up in nephrology - labs look very good  Plan:     (Z12.31) Encounter for screening mammogram for malignant neoplasm of breast   Comment:   Plan: *MA Screening Digital Bilateral

## 2022-02-07 ENCOUNTER — TRANSFERRED RECORDS (OUTPATIENT)
Dept: HEALTH INFORMATION MANAGEMENT | Facility: CLINIC | Age: 87
End: 2022-02-07

## 2022-02-07 ENCOUNTER — LAB (OUTPATIENT)
Dept: LAB | Facility: CLINIC | Age: 87
End: 2022-02-07
Payer: COMMERCIAL

## 2022-02-07 ENCOUNTER — MEDICAL CORRESPONDENCE (OUTPATIENT)
Dept: HEALTH INFORMATION MANAGEMENT | Facility: CLINIC | Age: 87
End: 2022-02-07

## 2022-02-07 DIAGNOSIS — N18.2 CHRONIC KIDNEY DISEASE, STAGE II (MILD): ICD-10-CM

## 2022-02-07 DIAGNOSIS — I10 ESSENTIAL HYPERTENSION, MALIGNANT: ICD-10-CM

## 2022-02-07 DIAGNOSIS — E55.9 VITAMIN D DEFICIENCY: ICD-10-CM

## 2022-02-07 DIAGNOSIS — R80.9 PROTEINURIA: Primary | ICD-10-CM

## 2022-02-07 PROCEDURE — 80069 RENAL FUNCTION PANEL: CPT

## 2022-02-07 PROCEDURE — 82306 VITAMIN D 25 HYDROXY: CPT

## 2022-02-07 PROCEDURE — 36415 COLL VENOUS BLD VENIPUNCTURE: CPT

## 2022-02-07 PROCEDURE — 83970 ASSAY OF PARATHORMONE: CPT

## 2022-02-07 NOTE — LETTER
February 14, 2022      Opal Sin  [address]   [facility] [id number]          Hi Opal,     I have had the opportunity to review your recent results and an interpretation is as follows:   Your bone density scan shows stable osteopenia and no changes needed in medications     Sincerely,       Damian Russ MD

## 2022-02-08 LAB
ALBUMIN SERPL-MCNC: 4.2 G/DL (ref 3.4–5)
ANION GAP SERPL CALCULATED.3IONS-SCNC: 4 MMOL/L (ref 3–14)
BUN SERPL-MCNC: 29 MG/DL (ref 7–30)
CALCIUM SERPL-MCNC: 9 MG/DL (ref 8.5–10.1)
CHLORIDE BLD-SCNC: 102 MMOL/L (ref 94–109)
CO2 SERPL-SCNC: 25 MMOL/L (ref 20–32)
CREAT SERPL-MCNC: 0.86 MG/DL (ref 0.52–1.04)
DEPRECATED CALCIDIOL+CALCIFEROL SERPL-MC: 40 UG/L (ref 20–75)
GFR SERPL CREATININE-BSD FRML MDRD: 66 ML/MIN/1.73M2
GLUCOSE BLD-MCNC: 99 MG/DL (ref 70–99)
PHOSPHATE SERPL-MCNC: 3.7 MG/DL (ref 2.5–4.5)
POTASSIUM BLD-SCNC: 4.5 MMOL/L (ref 3.4–5.3)
PTH-INTACT SERPL-MCNC: 64 PG/ML (ref 18–80)
SODIUM SERPL-SCNC: 131 MMOL/L (ref 133–144)

## 2022-02-11 ENCOUNTER — TELEPHONE (OUTPATIENT)
Dept: FAMILY MEDICINE | Facility: CLINIC | Age: 87
End: 2022-02-11
Payer: COMMERCIAL

## 2022-02-11 NOTE — TELEPHONE ENCOUNTER
Please abstract the following data from this visit with this patient into the appropriate field in Epic:    Tests that can be patient reported without a hard copy:    Mammogram done on this date: 02/07/2022 (approximately), by this group: [facility], results were Normal.

## 2022-02-13 ENCOUNTER — HEALTH MAINTENANCE LETTER (OUTPATIENT)
Age: 87
End: 2022-02-13

## 2022-02-14 NOTE — RESULT ENCOUNTER NOTE
Cristhian Joseph,    I have had the opportunity to review your recent results and an interpretation is as follows:  Your bone density scan shows stable osteopenia and no changes needed in medications     Sincerely,  Damian Russ MD

## 2022-02-17 DIAGNOSIS — I10 ESSENTIAL HYPERTENSION WITH GOAL BLOOD PRESSURE LESS THAN 140/90: ICD-10-CM

## 2022-02-18 RX ORDER — LOSARTAN POTASSIUM 100 MG/1
TABLET ORAL
Qty: 90 TABLET | Refills: 2 | Status: SHIPPED | OUTPATIENT
Start: 2022-02-18 | End: 2022-11-17

## 2022-02-18 NOTE — TELEPHONE ENCOUNTER
*last creatinine normal on 2/7/22     Prescription approved per [facility] Refill Protocol.    Creatinine   Date Value Ref Range Status   02/07/2022 0.86 0.52 - 1.04 mg/dL Final   04/22/2021 0.94 0.52 - 1.04 mg/dL Kyra Orlando RN  [facility]

## 2022-03-03 ENCOUNTER — MYC MEDICAL ADVICE (OUTPATIENT)
Dept: FAMILY MEDICINE | Facility: CLINIC | Age: 87
End: 2022-03-03
Payer: COMMERCIAL

## 2022-03-03 NOTE — TELEPHONE ENCOUNTER
Patient requesting referral to see Dr. Willi Russ as previous neurologist has left [facility].    Fred Hernandez CMA on 3/3/2022 at 9:26 AM

## 2022-05-27 NOTE — PLAN OF CARE
A&Ox4. VSS on RA. Regular diet. CMS intact. Pain controlled w/ oxycodone and repositioning. LLE WBAT w/ walker and GB, will attempt to ambulated later tonight. Denies n/t, nausea, SOB. Midline in RUE, saline locked. XR aspiration of LLE hip ordered, updated MD, will try to have procedure done tomorrow pending if on call radiologist @ hospital and can schedule a time for procedure. LLE larger than RLE, no discoloration, pain, n/t in either extremities. Using bedpan to void. PT consult scheduled. Ortho following. Continue to monitor.   Assessment/Plan:     No problem-specific Assessment & Plan notes found for this encounter  Diagnoses and all orders for this visit:    SIRS (systemic inflammatory response syndrome) ([facility])    Was given IV antibiotics at the hospital   The cause of the infection was not known    Altered mental status, unspecified altered mental status type  She is feeling better now     Chronic diastolic congestive heart failure ([address] )  She has not been taking the torsemide as advised  Type 2 diabetes mellitus with stage 3 chronic kidney disease, with long-term current use of insulin, unspecified whether stage 3a or 3b CKD ([facility])  -     POCT hemoglobin A1c  The A1c is 7 4 in the office  However she has not been taking the insulin or the metformin since she left the hospital       Type 2 diabetes mellitus with stage 2 chronic kidney disease, with long-term current use of insulin ([facility])    Other orders  -     celecoxib (CeleBREX) 200 mg capsule  -     predniSONE 2 5 mg tablet; TAKE 3 TABLETS (7 5 MG TOTAL) BY MOUTH DAILY  USE FOR PREDNISONE TAPER         Subjective:     Patient ID: Isaura Paul is a 70 y o  female  HPI   Patient was recently in the hospital after a fall and altered mental status and was found to have SIRS  She was treated for the same  She also was in congestive heart failure and at discharge was told to take torsemide 40 mg twice a day  In the office she was accompanied by her son and has been fighting with him and says that she does not want to take any medication  Since she left the hospital she has not been taking any medication and does not want to do so  Continues to complain of pain everywhere    Review of Systems   Constitutional: Negative for chills, diaphoresis, fatigue and fever  HENT: Negative for congestion, ear discharge, ear pain, hearing loss, postnasal drip, rhinorrhea, sinus pressure, sinus pain, sneezing, sore throat and voice change      Eyes: Negative for pain, discharge, redness and visual disturbance  Respiratory: Negative for cough, chest tightness, shortness of breath and wheezing  Cardiovascular: Negative for chest pain, palpitations and leg swelling  Gastrointestinal: Negative for abdominal distention, abdominal pain, blood in stool, constipation, diarrhea, nausea and vomiting  Endocrine: Negative for cold intolerance, heat intolerance, polydipsia, polyphagia and polyuria  Genitourinary: Negative for dysuria, flank pain, frequency, hematuria and urgency  Musculoskeletal: Positive for arthralgias, back pain, gait problem, myalgias and neck pain  Negative for joint swelling and neck stiffness  Skin: Negative for rash  Neurological: Negative for dizziness, tremors, syncope, facial asymmetry, speech difficulty, weakness, light-headedness, numbness and headaches  Hematological: Does not bruise/bleed easily  Psychiatric/Behavioral: Negative for behavioral problems, confusion and sleep disturbance  The patient is not nervous/anxious  Objective:     Physical Exam  Constitutional:       General: She is not in acute distress  Appearance: She is well-developed  She is not diaphoretic  HENT:      Head: Normocephalic and atraumatic  Right Ear: External ear normal       Left Ear: External ear normal       Nose: Nose normal    Eyes:      General: No scleral icterus  Right eye: No discharge  Left eye: No discharge  Conjunctiva/sclera: Conjunctivae normal    Neck:      Thyroid: No thyromegaly  Vascular: No JVD  Trachea: No tracheal deviation  Cardiovascular:      Rate and Rhythm: Normal rate and regular rhythm  Heart sounds: Normal heart sounds  No murmur heard  No friction rub  No gallop  Pulmonary:      Effort: Pulmonary effort is normal  No respiratory distress  Breath sounds: Normal breath sounds  No wheezing or rales  Chest:      Chest wall: No tenderness     Abdominal:      General: Bowel sounds are normal  There is no distension  Palpations: Abdomen is soft  Tenderness: There is no abdominal tenderness  There is no guarding or rebound  Musculoskeletal:         General: No tenderness  Normal range of motion  Cervical back: Normal range of motion and neck supple  Lymphadenopathy:      Cervical: No cervical adenopathy  Skin:     General: Skin is warm and dry  Findings: No erythema or rash  Neurological:      Mental Status: She is alert and oriented to person, place, and time  Cranial Nerves: No cranial nerve deficit  Motor: No abnormal muscle tone  Coordination: Coordination normal    Psychiatric:         Judgment: Judgment normal            Vitals:    05/27/22 1415   BP: 114/54   BP Location: Left arm   Patient Position: Sitting   Cuff Size: Standard   Pulse: 92   Resp: 18   Temp: (!) 97 °F (36 1 °C)   TempSrc: Tympanic   SpO2: 95%   Height: 4' 10" (1 473 m)       Transitional Care Management Review:  Michael Green is a 70 y o  female here for TCM follow up  During the TCM phone call patient stated:    TCM Call (since 4/26/2022)     Date and time call was made  5/25/2022 11:28 AM    Hospital care reviewed  Records reviewed    Patient was hospitialized at  [facility]    Date of Admission  05/22/22    Date of discharge  05/24/22    Diagnosis  FALL,  SIRS (systemic inflammatory response syndrome) ([facility]       TCM Call (since 4/26/2022)     Scheduled for follow up?   Yes    Are you recieving home care services  Yes    Types of home care services  Nurse visit; Jack Villeda MD

## 2022-07-06 ENCOUNTER — IMMUNIZATION (OUTPATIENT)
Dept: NURSING | Facility: CLINIC | Age: 87
End: 2022-07-06
Payer: COMMERCIAL

## 2022-07-06 PROCEDURE — 0054A COVID-19,PF,PFIZER (12+ YRS): CPT

## 2022-07-06 PROCEDURE — 91305 COVID-19,PF,PFIZER (12+ YRS): CPT

## 2022-09-02 ENCOUNTER — MYC MEDICAL ADVICE (OUTPATIENT)
Dept: FAMILY MEDICINE | Facility: CLINIC | Age: 87
End: 2022-09-02

## 2022-09-14 ENCOUNTER — TRANSFERRED RECORDS (OUTPATIENT)
Dept: HEALTH INFORMATION MANAGEMENT | Facility: CLINIC | Age: 87
End: 2022-09-14

## 2022-10-10 DIAGNOSIS — M81.0 AGE-RELATED OSTEOPOROSIS WITHOUT CURRENT PATHOLOGICAL FRACTURE: ICD-10-CM

## 2022-10-13 RX ORDER — RALOXIFENE HYDROCHLORIDE 60 MG/1
TABLET, FILM COATED ORAL
Qty: 90 TABLET | Refills: 0 | Status: SHIPPED | OUTPATIENT
Start: 2022-10-13 | End: 2023-01-13

## 2022-10-15 ENCOUNTER — HEALTH MAINTENANCE LETTER (OUTPATIENT)
Age: 87
End: 2022-10-15

## 2022-11-02 ENCOUNTER — LAB (OUTPATIENT)
Dept: LAB | Facility: CLINIC | Age: 87
End: 2022-11-02
Payer: COMMERCIAL

## 2022-11-02 DIAGNOSIS — R79.89 ABNORMAL THYROID SCREEN (BLOOD): ICD-10-CM

## 2022-11-02 DIAGNOSIS — H49.20 ABDUCENS NERVE PALSY, UNSPECIFIED LATERALITY: ICD-10-CM

## 2022-11-02 DIAGNOSIS — E78.2 MIXED HYPERLIPIDEMIA: ICD-10-CM

## 2022-11-02 DIAGNOSIS — R80.9 MICROALBUMINURIA: ICD-10-CM

## 2022-11-02 DIAGNOSIS — R42 DIZZINESS: Primary | ICD-10-CM

## 2022-11-02 DIAGNOSIS — E04.1 THYROID NODULE: ICD-10-CM

## 2022-11-02 LAB
CRP SERPL HS-MCNC: 0.86 MG/L
ERYTHROCYTE [DISTWIDTH] IN BLOOD BY AUTOMATED COUNT: 14.5 % (ref 10–15)
ERYTHROCYTE [SEDIMENTATION RATE] IN BLOOD BY WESTERGREN METHOD: 8 MM/HR (ref 0–30)
HBA1C MFR BLD: 5.6 % (ref 0–5.6)
HCT VFR BLD AUTO: 36.4 % (ref 35–47)
HGB BLD-MCNC: 12.2 G/DL (ref 11.7–15.7)
MCH RBC QN AUTO: 31.6 PG (ref 26.5–33)
MCHC RBC AUTO-ENTMCNC: 33.5 G/DL (ref 31.5–36.5)
MCV RBC AUTO: 94 FL (ref 78–100)
PLATELET # BLD AUTO: 277 10E3/UL (ref 150–450)
RBC # BLD AUTO: 3.86 10E6/UL (ref 3.8–5.2)
WBC # BLD AUTO: 9.2 10E3/UL (ref 4–11)

## 2022-11-02 PROCEDURE — 86141 C-REACTIVE PROTEIN HS: CPT

## 2022-11-02 PROCEDURE — 80053 COMPREHEN METABOLIC PANEL: CPT

## 2022-11-02 PROCEDURE — 84443 ASSAY THYROID STIM HORMONE: CPT

## 2022-11-02 PROCEDURE — 83036 HEMOGLOBIN GLYCOSYLATED A1C: CPT

## 2022-11-02 PROCEDURE — 36415 COLL VENOUS BLD VENIPUNCTURE: CPT

## 2022-11-02 PROCEDURE — 85652 RBC SED RATE AUTOMATED: CPT

## 2022-11-02 PROCEDURE — 84550 ASSAY OF BLOOD/URIC ACID: CPT

## 2022-11-02 PROCEDURE — 85027 COMPLETE CBC AUTOMATED: CPT

## 2022-11-03 LAB
ALBUMIN SERPL-MCNC: 3.8 G/DL (ref 3.4–5)
ALP SERPL-CCNC: 66 U/L (ref 40–150)
ALT SERPL W P-5'-P-CCNC: 11 U/L (ref 0–50)
ANION GAP SERPL CALCULATED.3IONS-SCNC: 4 MMOL/L (ref 3–14)
AST SERPL W P-5'-P-CCNC: 17 U/L (ref 0–45)
BILIRUB SERPL-MCNC: 0.3 MG/DL (ref 0.2–1.3)
BUN SERPL-MCNC: 35 MG/DL (ref 7–30)
CALCIUM SERPL-MCNC: 8.7 MG/DL (ref 8.5–10.1)
CHLORIDE BLD-SCNC: 106 MMOL/L (ref 94–109)
CO2 SERPL-SCNC: 25 MMOL/L (ref 20–32)
CREAT SERPL-MCNC: 1.28 MG/DL (ref 0.52–1.04)
GFR SERPL CREATININE-BSD FRML MDRD: 41 ML/MIN/1.73M2
GLUCOSE BLD-MCNC: 108 MG/DL (ref 70–99)
POTASSIUM BLD-SCNC: 4.6 MMOL/L (ref 3.4–5.3)
PROT SERPL-MCNC: 6.8 G/DL (ref 6.8–8.8)
SODIUM SERPL-SCNC: 135 MMOL/L (ref 133–144)
TSH SERPL DL<=0.005 MIU/L-ACNC: 1.07 MU/L (ref 0.4–4)
URATE SERPL-MCNC: 4.8 MG/DL (ref 2.6–6)

## 2022-11-15 DIAGNOSIS — I10 ESSENTIAL HYPERTENSION WITH GOAL BLOOD PRESSURE LESS THAN 140/90: ICD-10-CM

## 2022-11-16 NOTE — TELEPHONE ENCOUNTER
Pending Prescriptions:                       Disp   Refills    losartan (COZAAR) 100 MG tablet [Pharmacy *90 tab*2        Sig: TAKE 1 TABLET(100 MG) BY MOUTH DAILY      Routing refill request to provider for review/approval because:      Eulalia Hicks, RN BSN MSN  [facility]

## 2022-11-17 RX ORDER — LOSARTAN POTASSIUM 100 MG/1
TABLET ORAL
Qty: 90 TABLET | Refills: 2 | Status: SHIPPED | OUTPATIENT
Start: 2022-11-17 | End: 2023-08-13

## 2022-12-06 DIAGNOSIS — Z79.2 PREVENTIVE ANTIBIOTIC: ICD-10-CM

## 2022-12-06 RX ORDER — AZITHROMYCIN 500 MG/1
TABLET, FILM COATED ORAL
Qty: 1 TABLET | Refills: 3 | Status: SHIPPED | OUTPATIENT
Start: 2022-12-06

## 2022-12-24 DIAGNOSIS — M80.00XS OSTEOPOROSIS WITH CURRENT PATHOLOGICAL FRACTURE, UNSPECIFIED OSTEOPOROSIS TYPE, SEQUELA: ICD-10-CM

## 2022-12-24 DIAGNOSIS — R32 URINARY INCONTINENCE, UNSPECIFIED TYPE: ICD-10-CM

## 2022-12-26 RX ORDER — ALENDRONATE SODIUM 35 MG/1
TABLET ORAL
Qty: 12 TABLET | Refills: 3 | Status: ON HOLD | OUTPATIENT
Start: 2022-12-26 | End: 2024-05-02

## 2022-12-26 RX ORDER — TOLTERODINE TARTRATE 1 MG/1
TABLET, EXTENDED RELEASE ORAL
Qty: 180 TABLET | Refills: 3 | Status: SHIPPED | OUTPATIENT
Start: 2022-12-26 | End: 2023-07-10

## 2023-01-02 DIAGNOSIS — F32.0 MILD MAJOR DEPRESSION (H): ICD-10-CM

## 2023-01-02 RX ORDER — SERTRALINE HYDROCHLORIDE 25 MG/1
TABLET, FILM COATED ORAL
Qty: 90 TABLET | Refills: 3 | Status: SHIPPED | OUTPATIENT
Start: 2023-01-02 | End: 2024-01-17

## 2023-01-13 DIAGNOSIS — M81.0 AGE-RELATED OSTEOPOROSIS WITHOUT CURRENT PATHOLOGICAL FRACTURE: ICD-10-CM

## 2023-01-13 RX ORDER — RALOXIFENE HYDROCHLORIDE 60 MG/1
TABLET, FILM COATED ORAL
Qty: 90 TABLET | Refills: 0 | Status: SHIPPED | OUTPATIENT
Start: 2023-01-13 | End: 2023-04-10

## 2023-01-16 DIAGNOSIS — I10 ESSENTIAL HYPERTENSION WITH GOAL BLOOD PRESSURE LESS THAN 140/90: ICD-10-CM

## 2023-01-16 DIAGNOSIS — E78.2 MIXED HYPERLIPIDEMIA: ICD-10-CM

## 2023-01-17 RX ORDER — ATORVASTATIN CALCIUM 20 MG/1
TABLET, FILM COATED ORAL
Qty: 90 TABLET | Refills: 3 | Status: SHIPPED | OUTPATIENT
Start: 2023-01-17 | End: 2024-02-02

## 2023-01-17 RX ORDER — CARVEDILOL 6.25 MG/1
TABLET ORAL
Qty: 180 TABLET | Refills: 3 | Status: SHIPPED | OUTPATIENT
Start: 2023-01-17 | End: 2023-01-18

## 2023-01-18 ENCOUNTER — TELEPHONE (OUTPATIENT)
Dept: FAMILY MEDICINE | Facility: CLINIC | Age: 88
End: 2023-01-18
Payer: COMMERCIAL

## 2023-01-18 DIAGNOSIS — I10 ESSENTIAL HYPERTENSION WITH GOAL BLOOD PRESSURE LESS THAN 140/90: ICD-10-CM

## 2023-01-18 RX ORDER — CARVEDILOL 6.25 MG/1
6.25 TABLET ORAL 2 TIMES DAILY WITH MEALS
Qty: 180 TABLET | Refills: 3 | Status: SHIPPED | OUTPATIENT
Start: 2023-01-18 | End: 2024-01-08

## 2023-01-18 NOTE — TELEPHONE ENCOUNTER
Received call from the patient. Pt reports that her carvedilol is normally oval shaped by the  [employer]. On her most recent refill, pt received a different  of of carvedilol and the pill is circular in shape. Pt reports that's she has macular degeneration and it makes it difficult to differentiate with her tolterodine.     Pt unable to inform writer of  for current carvedilol tablets.     Advised pt to contact her pharmacy as they would be able to best assist her. Pt reports that she contacted her pharmacy and she was instructed to call her clinic.      Can  be specified in Rx? Routing to provider for review.     Jose Meeks RN

## 2023-01-19 NOTE — TELEPHONE ENCOUNTER
Writer called patient and notified of rx with specifications being sent to pharmacy.    Patient appreciative of callback and will follow up with pharmacy.    No further questions or concerns at this time.    Signing encounter.    Scot Conway RN  [facility]

## 2023-02-13 ENCOUNTER — NURSE TRIAGE (OUTPATIENT)
Dept: FAMILY MEDICINE | Facility: CLINIC | Age: 88
End: 2023-02-13
Payer: COMMERCIAL

## 2023-02-13 NOTE — TELEPHONE ENCOUNTER
Patient has declince treatment for COVID at this time r/t does not want to do any type of visit with a provider.  She will attempt to care for self. Writer advised patient to seek medical attention immediately if symptoms worsen. Patient verbalized understanding.     Sandra Thomason RN on 2/13/2023 at 11:08 AM        RN COVID TREATMENT VISIT  02/13/23    Opal Sin  87 year old  Current weight? 120    Has the patient been seen by a primary care provider at an [facility] or [facility] within the past two years? Yes.   Have you been in close proximity to/do you have a known exposure to a person with a confirmed case of influenza? No.     Date of positive COVID test (PCR or at home)?  02/12/23    Current COVID symptoms: muscle or body aches    Date COVID symptoms began: 02/11/23    Do you have any of the following conditions that place you at risk of being very sick from COVID-19? 65 years or older    Is patient eligible to continue? Yes, established patient, 12 years or older weighing at least 88.2 lbs, who has COVID symptoms that started in the past 5 days and is at risk for being very sick from COVID-19.       Have you received monoclonal antibodies or oral antiviral medications since testing positive to COVID-19? No    Are you currently hospitalized for COVID-19? No    Do you have a history of hepatitis? No    Are you currently pregnant or nursing? No    Do you have a clinically significant hypersensitivity to nirmatrelvir, ritonavir, or molnupiravir? No    Do you have any history of severe renal impairment (eGFR < 30mL/min)? YES    Do you have any history of hepatic impairment or abnormalities (e.g. hepatic panel, ALT, AST, ALK Phos, bilirubin)? No    Have you had a coronary stent placed in the previous 6 months? No    Is patient eligible to continue? No, patient does not meet all eligibility requirements for the RN COVID treatment (as denoted by yes response(s) above).  Patient informed they will need a virtual provider visit to assess treatment options.  Patient will be transferred to a  at the end of this call.   Sandra Thomason RN                  Reason for Disposition    [1] COVID-19 diagnosed by positive lab test (e.g., PCR, rapid self-test kit) AND [2] mild symptoms (e.g., cough, fever, others) AND [3] no complications or SOB    Additional Information    Negative: SEVERE difficulty breathing (e.g., struggling for each breath, speaks in single words)    Negative: Difficult to awaken or acting confused (e.g., disoriented, slurred speech)    Negative: Bluish (or gray) lips or face now    Negative: Shock suspected (e.g., cold/pale/clammy skin, too weak to stand, low BP, rapid pulse)    Negative: Sounds like a life-threatening emergency to the triager    Negative: [1] Diagnosed or suspected COVID-19 AND [2] symptoms lasting 3 or more weeks    Negative: [1] COVID-19 exposure AND [2] no symptoms    Negative: COVID-19 vaccine reaction suspected (e.g., fever, headache, muscle aches) occurring 1 to 3 days after getting vaccine    Negative: COVID-19 vaccine, questions about    Negative: [1] Lives with someone known to have influenza (flu test positive) AND [2] flu-like symptoms (e.g., cough, runny nose, sore throat, SOB; with or without fever)    Negative: [1] Adult with possible COVID-19 symptoms AND [2] triager concerned about severity of symptoms or other causes    Negative: COVID-19 and breastfeeding, questions about    Negative: SEVERE or constant chest pain or pressure  (Exception: Mild central chest pain, present only when coughing.)    Negative: MODERATE difficulty breathing (e.g., speaks in phrases, SOB even at rest, pulse 100-120)    Negative: Headache and stiff neck (can't touch chin to chest)    Negative: Oxygen level (e.g., pulse oximetry) 90 percent or lower    Negative: Chest pain or pressure  (Exception: MILD central chest pain, present only when coughing)     "Negative: Patient sounds very sick or weak to the triager    Negative: MILD difficulty breathing (e.g., minimal/no SOB at rest, SOB with walking, pulse <100)    Negative: Fever > 103 F (39.4 C)    Negative: [1] Fever > 101 F (38.3 C) AND [2] over 60 years of age    Negative: [1] Fever > 100.0 F (37.8 C) AND [2] bedridden (e.g., nursing home patient, CVA, chronic illness, recovering from surgery)    Negative: [1] HIGH RISK for severe COVID complications (e.g., weak immune system, age > 64 years, obesity with BMI of 30 or higher, pregnant, chronic lung disease or other chronic medical condition) AND [2] COVID symptoms (e.g., cough, fever)  (Exceptions: Already seen by PCP and no new or worsening symptoms.)    Negative: [1] HIGH RISK patient AND [2] influenza is widespread in the community AND [3] ONE OR MORE respiratory symptoms: cough, sore throat, runny or stuffy nose    Negative: [1] HIGH RISK patient AND [2] influenza exposure within the last 7 days AND [3] ONE OR MORE respiratory symptoms: cough, sore throat, runny or stuffy nose    Negative: Oxygen level (e.g., pulse oximetry) 91 to 94 percent    Negative: [1] COVID-19 infection suspected by caller or triager AND [2] mild symptoms (cough, fever, or others) AND [3] negative COVID-19 rapid test    Negative: Fever present > 3 days (72 hours)    Negative: [1] Fever returns after gone for over 24 hours AND [2] symptoms worse or not improved    Negative: [1] Continuous (nonstop) coughing interferes with work or school AND [2] no improvement using cough treatment per Care Advice    Negative: Cough present > 3 weeks    Negative: [1] COVID-19 diagnosed by positive lab test (e.g., PCR, rapid self-test kit) AND [2] NO symptoms (e.g., cough, fever, others)    Answer Assessment - Initial Assessment Questions  1. COVID-19 DIAGNOSIS: \"Who made your COVID-19 diagnosis?\" \"Was it confirmed by a positive lab test or self-test?\" If not diagnosed by a doctor (or NP/PA), ask \"Are " "there lots of cases (community spread) where you live?\" Note: See public health department website, if unsure.      At home test  2. COVID-19 EXPOSURE: \"Was there any known exposure to COVID before the symptoms began?\" CDC Definition of close contact: within 6 feet (2 meters) for a total of 15 minutes or more over a 24-hour period.       No  3. ONSET: \"When did the COVID-19 symptoms start?\"       02/11/23  4. WORST SYMPTOM: \"What is your worst symptom?\" (e.g., cough, fever, shortness of breath, muscle aches)      Sore throat  5. COUGH: \"Do you have a cough?\" If Yes, ask: \"How bad is the cough?\"        cough  6. FEVER: \"Do you have a fever?\" If Yes, ask: \"What is your temperature, how was it measured, and when did it start?\"      no  7. RESPIRATORY STATUS: \"Describe your breathing?\" (e.g., shortness of breath, wheezing, unable to speak)       A little bit of shortness of breath but no wheezing  8. BETTER-SAME-WORSE: \"Are you getting better, staying the same or getting worse compared to yesterday?\"  If getting worse, ask, \"In what way?\"      same  9. HIGH RISK DISEASE: \"Do you have any chronic medical problems?\" (e.g., asthma, heart or lung disease, weak immune system, obesity, etc.)      no  10. VACCINE: \"Have you had the COVID-19 vaccine?\" If Yes, ask: \"Which one, how many shots, when did you get it?\"        Yes  11. BOOSTER: \"Have you received your COVID-19 booster?\" If Yes, ask: \"Which one and when did you get it?\"        Two  12. PREGNANCY: \"Is there any chance you are pregnant?\" \"When was your last menstrual period?\"        N/A  13. OTHER SYMPTOMS: \"Do you have any other symptoms?\"  (e.g., chills, fatigue, headache, loss of smell or taste, muscle pain, sore throat)        Fatigue, chills  14. O2 SATURATION MONITOR:  \"Do you use an oxygen saturation monitor (pulse oximeter) at home?\" If Yes, ask \"What is your reading (oxygen level) today?\" \"What is your usual oxygen saturation reading?\" (e.g., 95%)        " No    Protocols used: CORONAVIRUS (COVID-19) DIAGNOSED OR [id number]

## 2023-03-02 ENCOUNTER — TRANSFERRED RECORDS (OUTPATIENT)
Dept: HEALTH INFORMATION MANAGEMENT | Facility: CLINIC | Age: 88
End: 2023-03-02

## 2023-03-25 DIAGNOSIS — K21.9 GASTROESOPHAGEAL REFLUX DISEASE WITHOUT ESOPHAGITIS: ICD-10-CM

## 2023-03-26 ENCOUNTER — HEALTH MAINTENANCE LETTER (OUTPATIENT)
Age: 88
End: 2023-03-26

## 2023-06-01 ENCOUNTER — HEALTH MAINTENANCE LETTER (OUTPATIENT)
Age: 88
End: 2023-06-01

## 2023-06-07 DIAGNOSIS — N18.2 CHRONIC KIDNEY DISEASE, STAGE II (MILD): Primary | ICD-10-CM

## 2023-06-07 DIAGNOSIS — R80.9 PROTEINURIA: ICD-10-CM

## 2023-06-07 DIAGNOSIS — E55.9 VITAMIN D DEFICIENCY: ICD-10-CM

## 2023-06-27 ENCOUNTER — TRANSFERRED RECORDS (OUTPATIENT)
Dept: HEALTH INFORMATION MANAGEMENT | Facility: CLINIC | Age: 88
End: 2023-06-27
Payer: COMMERCIAL

## 2023-07-07 ENCOUNTER — LAB (OUTPATIENT)
Dept: LAB | Facility: CLINIC | Age: 88
End: 2023-07-07
Payer: COMMERCIAL

## 2023-07-07 DIAGNOSIS — N18.2 CHRONIC KIDNEY DISEASE, STAGE II (MILD): ICD-10-CM

## 2023-07-07 DIAGNOSIS — E78.5 HYPERLIPIDEMIA LDL GOAL <100: ICD-10-CM

## 2023-07-07 DIAGNOSIS — E55.9 VITAMIN D DEFICIENCY: ICD-10-CM

## 2023-07-07 DIAGNOSIS — R80.9 PROTEINURIA: ICD-10-CM

## 2023-07-07 LAB
ALBUMIN SERPL BCG-MCNC: 4 G/DL (ref 3.5–5.2)
ANION GAP SERPL CALCULATED.3IONS-SCNC: 12 MMOL/L (ref 7–15)
BASOPHILS # BLD AUTO: 0 10E3/UL (ref 0–0.2)
BASOPHILS NFR BLD AUTO: 0 %
BUN SERPL-MCNC: 30.9 MG/DL (ref 8–23)
CALCIUM SERPL-MCNC: 9.2 MG/DL (ref 8.8–10.2)
CHLORIDE SERPL-SCNC: 104 MMOL/L (ref 98–107)
CREAT SERPL-MCNC: 1.01 MG/DL (ref 0.51–0.95)
CREAT UR-MCNC: 134 MG/DL
DEPRECATED CALCIDIOL+CALCIFEROL SERPL-MC: 46 UG/L (ref 20–75)
DEPRECATED HCO3 PLAS-SCNC: 22 MMOL/L (ref 22–29)
EOSINOPHIL # BLD AUTO: 0 10E3/UL (ref 0–0.7)
EOSINOPHIL NFR BLD AUTO: 0 %
ERYTHROCYTE [DISTWIDTH] IN BLOOD BY AUTOMATED COUNT: 14.4 % (ref 10–15)
GFR SERPL CREATININE-BSD FRML MDRD: 54 ML/MIN/1.73M2
GLUCOSE SERPL-MCNC: 102 MG/DL (ref 70–99)
HCT VFR BLD AUTO: 36 % (ref 35–47)
HGB BLD-MCNC: 11.6 G/DL (ref 11.7–15.7)
IMM GRANULOCYTES # BLD: 0 10E3/UL
IMM GRANULOCYTES NFR BLD: 0 %
LYMPHOCYTES # BLD AUTO: 1.1 10E3/UL (ref 0.8–5.3)
LYMPHOCYTES NFR BLD AUTO: 9 %
MCH RBC QN AUTO: 31 PG (ref 26.5–33)
MCHC RBC AUTO-ENTMCNC: 32.2 G/DL (ref 31.5–36.5)
MCV RBC AUTO: 96 FL (ref 78–100)
MICROALBUMIN UR-MCNC: 319 MG/L
MICROALBUMIN/CREAT UR: 238.06 MG/G CR (ref 0–25)
MONOCYTES # BLD AUTO: 0.9 10E3/UL (ref 0–1.3)
MONOCYTES NFR BLD AUTO: 8 %
NEUTROPHILS # BLD AUTO: 9.9 10E3/UL (ref 1.6–8.3)
NEUTROPHILS NFR BLD AUTO: 83 %
PHOSPHATE SERPL-MCNC: 3.5 MG/DL (ref 2.5–4.5)
PLATELET # BLD AUTO: 247 10E3/UL (ref 150–450)
POTASSIUM SERPL-SCNC: 4.9 MMOL/L (ref 3.4–5.3)
PTH-INTACT SERPL-MCNC: 70 PG/ML (ref 15–65)
RBC # BLD AUTO: 3.74 10E6/UL (ref 3.8–5.2)
SODIUM SERPL-SCNC: 138 MMOL/L (ref 136–145)
WBC # BLD AUTO: 11.9 10E3/UL (ref 4–11)

## 2023-07-07 PROCEDURE — 85025 COMPLETE CBC W/AUTO DIFF WBC: CPT

## 2023-07-07 PROCEDURE — 80069 RENAL FUNCTION PANEL: CPT

## 2023-07-07 PROCEDURE — 83970 ASSAY OF PARATHORMONE: CPT

## 2023-07-07 PROCEDURE — 82570 ASSAY OF URINE CREATININE: CPT

## 2023-07-07 PROCEDURE — 82043 UR ALBUMIN QUANTITATIVE: CPT

## 2023-07-07 PROCEDURE — 82306 VITAMIN D 25 HYDROXY: CPT

## 2023-07-07 PROCEDURE — 36415 COLL VENOUS BLD VENIPUNCTURE: CPT

## 2023-07-07 PROCEDURE — 80061 LIPID PANEL: CPT

## 2023-07-09 ASSESSMENT — ENCOUNTER SYMPTOMS: BREAST MASS: 0

## 2023-07-09 ASSESSMENT — PATIENT HEALTH QUESTIONNAIRE - PHQ9
SUM OF ALL RESPONSES TO PHQ QUESTIONS 1-9: 0
SUM OF ALL RESPONSES TO PHQ QUESTIONS 1-9: 0

## 2023-07-09 ASSESSMENT — ASTHMA QUESTIONNAIRES: ACT_TOTALSCORE: 25

## 2023-07-09 ASSESSMENT — ACTIVITIES OF DAILY LIVING (ADL): CURRENT_FUNCTION: SHOPPING REQUIRES ASSISTANCE

## 2023-07-10 ENCOUNTER — OFFICE VISIT (OUTPATIENT)
Dept: FAMILY MEDICINE | Facility: CLINIC | Age: 88
End: 2023-07-10
Payer: COMMERCIAL

## 2023-07-10 ENCOUNTER — MEDICAL CORRESPONDENCE (OUTPATIENT)
Dept: FAMILY MEDICINE | Facility: CLINIC | Age: 88
End: 2023-07-10

## 2023-07-10 VITALS
WEIGHT: 118 LBS | OXYGEN SATURATION: 96 % | HEART RATE: 59 BPM | HEIGHT: 62 IN | DIASTOLIC BLOOD PRESSURE: 66 MMHG | BODY MASS INDEX: 21.71 KG/M2 | RESPIRATION RATE: 18 BRPM | SYSTOLIC BLOOD PRESSURE: 126 MMHG

## 2023-07-10 DIAGNOSIS — Z12.31 VISIT FOR SCREENING MAMMOGRAM: ICD-10-CM

## 2023-07-10 DIAGNOSIS — F33.0 MILD EPISODE OF RECURRENT MAJOR DEPRESSIVE DISORDER (H): ICD-10-CM

## 2023-07-10 DIAGNOSIS — G20.A1 PARKINSON'S DISEASE (H): ICD-10-CM

## 2023-07-10 DIAGNOSIS — R32 URINARY INCONTINENCE, UNSPECIFIED TYPE: ICD-10-CM

## 2023-07-10 DIAGNOSIS — M81.0 AGE RELATED OSTEOPOROSIS, UNSPECIFIED PATHOLOGICAL FRACTURE PRESENCE: ICD-10-CM

## 2023-07-10 DIAGNOSIS — Z00.00 ROUTINE GENERAL MEDICAL EXAMINATION AT A HEALTH CARE FACILITY: Primary | ICD-10-CM

## 2023-07-10 DIAGNOSIS — E78.5 HYPERLIPIDEMIA LDL GOAL <100: ICD-10-CM

## 2023-07-10 DIAGNOSIS — K21.9 GASTROESOPHAGEAL REFLUX DISEASE WITHOUT ESOPHAGITIS: ICD-10-CM

## 2023-07-10 DIAGNOSIS — N18.32 STAGE 3B CHRONIC KIDNEY DISEASE (H): ICD-10-CM

## 2023-07-10 DIAGNOSIS — I10 ESSENTIAL HYPERTENSION: ICD-10-CM

## 2023-07-10 DIAGNOSIS — I63.9 CEREBROVASCULAR ACCIDENT (CVA), UNSPECIFIED MECHANISM (H): ICD-10-CM

## 2023-07-10 DIAGNOSIS — Z12.11 SCREEN FOR COLON CANCER: ICD-10-CM

## 2023-07-10 LAB
ALBUMIN UR-MCNC: 100 MG/DL
APPEARANCE UR: CLEAR
BACTERIA #/AREA URNS HPF: ABNORMAL /HPF
BILIRUB UR QL STRIP: NEGATIVE
CHOLEST SERPL-MCNC: 115 MG/DL
COLOR UR AUTO: YELLOW
GLUCOSE UR STRIP-MCNC: NEGATIVE MG/DL
HDLC SERPL-MCNC: 50 MG/DL
HGB UR QL STRIP: NEGATIVE
KETONES UR STRIP-MCNC: ABNORMAL MG/DL
LDLC SERPL CALC-MCNC: 51 MG/DL
LEUKOCYTE ESTERASE UR QL STRIP: NEGATIVE
NITRATE UR QL: NEGATIVE
NONHDLC SERPL-MCNC: 65 MG/DL
PH UR STRIP: 5.5 [PH] (ref 5–7)
RBC #/AREA URNS AUTO: ABNORMAL /HPF
SP GR UR STRIP: 1.01 (ref 1–1.03)
SQUAMOUS #/AREA URNS AUTO: ABNORMAL /LPF
TRIGL SERPL-MCNC: 72 MG/DL
UROBILINOGEN UR STRIP-ACNC: 0.2 E.U./DL
WBC #/AREA URNS AUTO: ABNORMAL /HPF
WBC CLUMPS #/AREA URNS HPF: PRESENT /HPF
YEAST #/AREA URNS HPF: ABNORMAL /HPF

## 2023-07-10 PROCEDURE — 81001 URINALYSIS AUTO W/SCOPE: CPT | Performed by: INTERNAL MEDICINE

## 2023-07-10 PROCEDURE — 87086 URINE CULTURE/COLONY COUNT: CPT | Performed by: INTERNAL MEDICINE

## 2023-07-10 PROCEDURE — 99214 OFFICE O/P EST MOD 30 MIN: CPT | Mod: 25 | Performed by: INTERNAL MEDICINE

## 2023-07-10 PROCEDURE — G0439 PPPS, SUBSEQ VISIT: HCPCS | Performed by: INTERNAL MEDICINE

## 2023-07-10 RX ORDER — TOLTERODINE TARTRATE 2 MG/1
2 TABLET, EXTENDED RELEASE ORAL 2 TIMES DAILY
Qty: 180 TABLET | Refills: 3 | Status: SHIPPED | OUTPATIENT
Start: 2023-07-10 | End: 2024-05-28

## 2023-07-10 ASSESSMENT — PAIN SCALES - GENERAL: PAINLEVEL: MODERATE PAIN (4)

## 2023-07-10 ASSESSMENT — ACTIVITIES OF DAILY LIVING (ADL): CURRENT_FUNCTION: SHOPPING REQUIRES ASSISTANCE

## 2023-07-10 NOTE — PATIENT INSTRUCTIONS
(Z00.00) Routine general medical examination at a health care facility  (primary encounter diagnosis)  Comment: For routine exam, we did review labs as ordered, cholesterol, diabetes mellitus check, liver function, renal function, and discussed need for colonoscopy and mammogram which were declined.  Bone density scan next  year.   We will also update vaccination history.  Plan: Primary Care - Care Coordination Referral            (Z12.11) Screen for colon cancer  Comment: Declined  Plan:     (E78.5) Hyperlipidemia LDL goal <100  Comment: Continue atorvastatin and check fasting lipid panel   Plan: Lipid panel reflex to direct LDL Non-fasting            (Z12.31) Visit for screening mammogram  Comment: Declined  Plan:      (G20) Parkinson's disease (H)  Comment: Continue Sinemet as ordered by neurology   Plan: Primary Care - Care Coordination Referral            (I63.9) Cerebrovascular accident (CVA), unspecified mechanism (H)  Comment: Continue aspirin, statin and beta blocker  Plan:     (K21.9) Gastroesophageal reflux disease without esophagitis  Comment: Continue proton pump inhibitor   Plan:     (I10) Essential hypertension  Comment: blood pressure is excellent  Plan: Primary Care - Care Coordination Referral            (M81.0) Age related osteoporosis, unspecified pathological fracture presence  Comment: Recommend bone density scan next year and continue calcium and vitamin D   Plan:     (N18.32) Stage 3b chronic kidney disease (H)  Comment: follow up in nephrology - maintatin adequate hydration  Plan:     (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

## 2023-07-10 NOTE — PROGRESS NOTES
"SUBJECTIVE:   Opal is a 88 year old who presents for Preventive Visit.    Are you in the first 12 months of your Medicare coverage?  No    Healthy Habits:     In general, how would you rate your overall health?  Excellent    Frequency of exercise:  4-5 days/week    Do you usually eat at least 4 servings of fruit and vegetables a day, include whole grains    & fiber and avoid regularly eating high fat or \"junk\" foods?  Yes    Taking medications regularly:  Yes    Ability to successfully perform activities of daily living:  Shopping requires assistance    Home Safety:  No safety concerns identified    Hearing Impairment:  No hearing concerns    In the past 6 months, have you been bothered by leaking of urine? Yes    In general, how would you rate your overall mental or emotional health?  Good      Have you ever done Advance Care Planning? (For example, a Health Directive, POLST, or a discussion with a medical provider or your loved ones about your wishes): Yes, advance care planning is on file.     Fall risk  Fallen 2 or more times in the past year?: Yes  Any fall with injury in the past year?: Yes    Cognitive Screening   1) Repeat 3 items (Leader, Season, Table)    2) Clock draw: ABNORMAL time  3) 3 item recall: Recalls 3 objects  Results: 3 items recalled: COGNITIVE IMPAIRMENT LESS LIKELY    Mini-CogTM Copyright S My. Licensed by the author for use in [facility]; reprinted with permission ([email or web address]@.[email or web address]). All rights reserved.        Reviewed and updated as needed this visit by clinical staff   Tobacco  Allergies  Meds              Reviewed and updated as needed this visit by Provider                 Social History     Tobacco Use     Smoking status: Never     Smokeless tobacco: Never   Substance Use Topics     Alcohol use: Yes     Comment: rare             7/9/2023    10:59 AM   Alcohol Use   Prescreen: >3 drinks/day or >7 drinks/week? Not Applicable          No data to display              Do " you have a current opioid prescription? No  Do you use any other controlled substances or medications that are not prescribed by a provider? None      Current providers sharing in care for this patient include:   Patient Care Team:  Damian Russ MD as PCP - General (Internal Medicine)  Damian Russ MD as Assigned PCP    The following health maintenance items are reviewed in Epic and correct as of today:  Health Maintenance   Topic Date Due     ANNUAL REVIEW OF HM ORDERS  Never done     ASTHMA ACTION PLAN  12/20/2017     URINE DRUG SCREEN  07/26/2018     COLORECTAL CANCER SCREENING  10/01/2021     LIPID  12/01/2022     MEDICARE ANNUAL WELLNESS VISIT  12/15/2022     MAMMO SCREENING  02/07/2023     COVID-19 Vaccine (6 - Pfizer series) 04/19/2023     INFLUENZA VACCINE (1) 09/01/2023     BMP  01/07/2024     ASTHMA CONTROL TEST  01/10/2024     PHQ-9  01/10/2024     DEXA  02/07/2024     MICROALBUMIN  07/07/2024     HEMOGLOBIN  07/07/2024     FALL RISK ASSESSMENT  07/10/2024     DTAP/TDAP/TD IMMUNIZATION (3 - Td or Tdap) 04/17/2025     ADVANCE CARE PLANNING  07/10/2028     DEPRESSION ACTION PLAN  Completed     Pneumococcal Vaccine: 65+ Years  Completed     URINALYSIS  Completed     ZOSTER IMMUNIZATION  Completed     IPV IMMUNIZATION  Aged Out     MENINGITIS IMMUNIZATION  Aged Out     Lab work is in process  Labs reviewed in [facility]      Hyperlipidemia LDL goal <100   Continues on atorvastatin and no side effects  Parkinson's disease (H)   Has had follow up in neurology and doing well with current dose of sinemet  Cerebrovascular accident (CVA), unspecified mechanism (H)   She is watching her sodium to keep blood pressure under control.  Continue amlodipine, carvedilol and losartan.  She is notes that amlodipine did cause hair loss, and dose was decreased to 2.5 mg daily at times  Gastroesophageal reflux disease without esophagitis   Continues on omeprazole   Essential hypertension   As above - notes  "amlodipine did cause swelling in ankles as well as hair loss  Age related osteoporosis, unspecified pathological fracture presence   She did have a bone density scan last year.  Also had a fall last October - falling backwards on kitchen floor.  She notes pain in her left elbow since then she notes sensitive to barometric pressure.   Stage 3b chronic kidney disease (H)   Has been following in nephrology and notes that she is having trouble staying hydrated.  Mild episode of recurrent major depressive disorder (H)   Mood has been good with current dose of sertraline 25 mg daily   Urinary incontinence, unspecified type   Her most pressing concern is that of urinary incontinence that is not well controlled with current dose of tolterodine.        Mammogram Screening - Patient over age 75, has elected to discontinue screenings.  Pertinent mammograms are reviewed under the imaging tab.    Review of Systems  Constitutional, HEENT, cardiovascular, pulmonary, GI,  - as above, musculoskeletal + hip and elbow pain, neurology -= as above parkinsons, skin, endocrine and psych systems are negative, except as otherwise noted.    OBJECTIVE:   /66 (BP Location: Left arm, Patient Position: Sitting, Cuff Size: Adult Regular)   Pulse 59   Resp 18   Ht 1.568 m (5' 1.75\")   Wt 53.5 kg (118 lb)   SpO2 96%   BMI 21.76 kg/m   Estimated body mass index is 21.76 kg/m  as calculated from the following:    Height as of this encounter: 1.568 m (5' 1.75\").    Weight as of this encounter: 53.5 kg (118 lb).  Physical Exam  GENERAL: healthy, alert and no distress  EYES: Eyes grossly normal to inspection,   HENT: ear canals and TM's normal,   NECK: no adenopathy, no asymmetry, masses, or scars and thyroid normal to palpation  RESP: lungs clear to auscultation - no rales, rhonchi or wheezes  CV: regular rate and rhythm, normal S1 S2, no S3 or S4, no murmur, click or rub, no peripheral edema   ABDOMEN: soft, nontender, no " hepatosplenomegaly, no masses and bowel sounds normal  MS: no gross musculoskeletal defects noted, no edema  SKIN: no suspicious lesions or rashes  NEURO: + increased tone, + tremor bilateral hands, mentation intact and speech normal  PSYCH: mentation appears normal, affect normal/bright    Diagnostic Test Results:  Labs reviewed in Epic    ASSESSMENT / PLAN:     Patient Instructions   (Z00.00) Routine general medical examination at a health care facility  (primary encounter diagnosis)  Comment: For routine exam, we did review labs as ordered, cholesterol, diabetes mellitus check, liver function, renal function, and discussed need for colonoscopy and mammogram which were declined.  Bone density scan next  year.   We will also update vaccination history.  Plan: Primary Care - Care Coordination Referral            (Z12.11) Screen for colon cancer  Comment: Declined  Plan:     (E78.5) Hyperlipidemia LDL goal <100  Comment: Continue atorvastatin and check fasting lipid panel   Plan: Lipid panel reflex to direct LDL Non-fasting            (Z12.31) Visit for screening mammogram  Comment: Declined  Plan:      (G20) Parkinson's disease (H)  Comment: Continue Sinemet as ordered by neurology   Plan: Primary Care - Care Coordination Referral            (I63.9) Cerebrovascular accident (CVA), unspecified mechanism (H)  Comment: Continue aspirin, statin and beta blocker  Plan:     (K21.9) Gastroesophageal reflux disease without esophagitis  Comment: Continue proton pump inhibitor   Plan:     (I10) Essential hypertension  Comment: blood pressure is excellent  Plan: Primary Care - Care Coordination Referral            (M81.0) Age related osteoporosis, unspecified pathological fracture presence  Comment: Recommend bone density scan next year and continue calcium and vitamin D   Plan:     (N18.32) Stage 3b chronic kidney disease (H)  Comment: follow up in nephrology - maintatin adequate hydration  Plan:     (F33.0) Mild episode of  recurrent major depressive disorder (H)  Comment: OK to continue sertraline 25 mg dialy   Plan:     (R32) Urinary incontinence, unspecified type  Comment: Recommend physical therapy for pelvic floor therapy and OK to increase tolterodine to 2 mg twice per day   Plan: tolterodine (DETROL) 2 MG tablet, UA         Macroscopic with reflex to Microscopic and         Culture - Lab Collect, Physical Therapy         Referral                  Patient has been advised of split billing requirements and indicates understanding: Yes      COUNSELING:  Reviewed preventive health counseling, as reflected in patient instructions        She reports that she has never smoked. She has never used smokeless tobacco.      Appropriate preventive services were discussed with this patient, including applicable screening as appropriate for cardiovascular disease, diabetes, osteopenia/osteoporosis, and glaucoma.  As appropriate for age/gender, discussed screening for colorectal cancer, prostate cancer, breast cancer, and cervical cancer. Checklist reviewing preventive services available has been given to the patient.    Reviewed patients plan of care and provided an AVS. The Basic Care Plan (routine screening as documented in Health Maintenance) for Opal meets the Care Plan requirement. This Care Plan has been established and reviewed with the Patient.      Damian Russ MD, MD  [facility]    Identified Health Risks:    I have reviewed Opioid Use Disorder and Substance Use Disorder risk factors and made any needed referrals.

## 2023-07-11 ENCOUNTER — PATIENT OUTREACH (OUTPATIENT)
Dept: CARE COORDINATION | Facility: CLINIC | Age: 88
End: 2023-07-11
Payer: COMMERCIAL

## 2023-07-11 NOTE — PROGRESS NOTES
Clinic Care Coordination Contact  The Community Health Worker called for a Care Coordination outreach to offer the CC program.    Patient stated she is not able to talk and requested a call back.    SHEBA Kincaid  Clinic Care Coordination  [facility] Clinics: [facility] [place] [place], and [facility]  Phone: [phone]

## 2023-07-11 NOTE — PROGRESS NOTES
Clinic Care Coordination Contact  Community Health Worker Initial Outreach    Reason for Referral:    Patient / Caregiver Support    Home Safety    Navigation of Long Term Care / [facility] Services    Resources for Support    CHW Initial Information Gathering:  Referral Source: PCP  Preferred Hospital: [facility]  [phone]  Current living arrangement:: I live alone  Type of residence:: Other (Condo)  Community Resources: None  Supplies Currently Used at Home: Hearing Aid Batteries  Equipment Currently Used at Home: walker, rolling  Informal Support system:: Family  No PCP office visit in Past Year: No  Transportation means:: Family  CHW Additional Questions  If ED/Hospital discharge, follow-up appointment scheduled as recommended?: N/A  Medication changes made following ED/Hospital discharge?: N/A  MyChart active?: Yes  Patient sent Social Determinants of Health questionnaire?: Yes    Patient accepts CC: Yes. Patient scheduled for assessment with POOJA Rausch on 7/13/23 at 11:00am. Patient noted desire to discuss resources for support, patient would like to continue living in her condo, transportation resources, [facility] Resources, and CC support.      SHEBA Kincaid  Clinic Care Coordination  [facility] Clinics: [place], [place], [place], and [facility]  Phone: [phone]

## 2023-07-12 LAB — BACTERIA UR CULT: NO GROWTH

## 2023-07-13 ENCOUNTER — PATIENT OUTREACH (OUTPATIENT)
Dept: NURSING | Facility: CLINIC | Age: 88
End: 2023-07-13
Payer: COMMERCIAL

## 2023-07-13 SDOH — ECONOMIC STABILITY: INCOME INSECURITY: IN THE LAST 12 MONTHS, WAS THERE A TIME WHEN YOU WERE NOT ABLE TO PAY THE MORTGAGE OR RENT ON TIME?: NO

## 2023-07-13 ASSESSMENT — SOCIAL DETERMINANTS OF HEALTH (SDOH)
HOW OFTEN DO YOU ATTEND CHURCH OR RELIGIOUS SERVICES?: MORE THAN 4 TIMES PER YEAR
IN A TYPICAL WEEK, HOW MANY TIMES DO YOU TALK ON THE PHONE WITH FAMILY, FRIENDS, OR NEIGHBORS?: THREE TIMES A WEEK
HOW OFTEN DO YOU ATTENT MEETINGS OF THE CLUB OR ORGANIZATION YOU BELONG TO?: MORE THAN 4 TIMES PER YEAR
DO YOU BELONG TO ANY CLUBS OR ORGANIZATIONS SUCH AS CHURCH GROUPS UNIONS, FRATERNAL OR ATHLETIC GROUPS, OR SCHOOL GROUPS?: YES
HOW OFTEN DO YOU GET TOGETHER WITH FRIENDS OR RELATIVES?: THREE TIMES A WEEK

## 2023-07-13 ASSESSMENT — ACTIVITIES OF DAILY LIVING (ADL): DEPENDENT_IADLS:: INDEPENDENT

## 2023-07-13 NOTE — PROGRESS NOTES
"Clinic Care Coordination Contact    Clinic Care Coordination Contact  OUTREACH    Referral Information:  Referral Source: PCP  SW and pt discussed her needs and priorities.  Pt states she is very happy in her condo in [place] and has friends in her complex and family close by.  Pt no longer drives, and family provides transportation.  Pt sets up her own meds.  Pt gets help with household tasks occasionally from friends and family.  Pt manages her own paperwork and finances.  Pt was interested in chore resources, and SW mentions that these chore agencies also have transportation and other supports available.  Pt glad to have that information in case she needs it in the future.  Pt says \"Maybe I am doing better than I think I am\" and when SW asked to explain, pt didn't have any particular issue in mind.  Pt states that she is fine with SW reaching out to her in a month to check-in.           Chief Complaint   Patient presents with     Clinic Care Coordination - Initial        Universal Utilization:      Utilization    Hospital Admissions  0             ED Visits  0             No Show Count (past year)  0                Current as of: 7/13/2023 12:51 AM              Clinical Concerns:  Current Medical Concerns:  Chronic conditions/aging    Current Behavioral Concerns: None    Education Provided to patient: [facility] at Your [facility], [facility], [facility], [facility]      Health Maintenance Reviewed:    Clinical Pathway: None    Medication Management:  Medication review status: Medications reviewed and no changes reported per patient.             Functional Status:  Dependent ADLs:: Ambulation-walker  Dependent IADLs:: Independent  Bed or wheelchair confined:: No  Mobility Status: Independent w/Device  Fallen 2 or more times in the past year?: Yes  Any fall with injury in the past year?: Yes    Living Situation:  Current living arrangement:: I live alone  Type of residence:: Other (Condo)    Lifestyle & " Psychosocial Needs:    Social Determinants of Health     Tobacco Use: Low Risk  (7/10/2023)    Patient History      Smoking Tobacco Use: Never      Smokeless Tobacco Use: Never      Passive Exposure: Not on file   Alcohol Use: Not on file   Financial Resource Strain: Low Risk  (5/22/2020)    Overall Financial Resource Strain (CARDIA)      Difficulty of Paying Living Expenses: Not hard at all   Food Insecurity: No Food Insecurity (5/22/2020)    Hunger Vital Sign      Worried About Running Out of Food in the Last Year: Never true      Ran Out of Food in the Last Year: Never true   Transportation Needs: No Transportation Needs (5/22/2020)    PRAPARE - Transportation      Lack of Transportation (Medical): No      Lack of Transportation (Non-Medical): No   Physical Activity: Inactive (5/22/2020)    Exercise Vital Sign      Days of Exercise per Week: 0 days      Minutes of Exercise per Session: 0 min   Stress: No Stress Concern Present (6/22/2020)    [employer] [employer] [place] [employer] - Occupational Stress Questionnaire      Feeling of Stress : Only a little   Social Connections: Unknown (7/13/2023)    Social Connection and Isolation Panel [NHANES]      Frequency of Communication with Friends and Family: Three times a week      Frequency of Social Gatherings with Friends and Family: Three times a week      Attends Christianity Services: More than 4 times per year      Active Member of Clubs or Organizations: Yes      Attends Club or Organization Meetings: More than 4 times per year      Marital Status: Not on file   Intimate Partner Violence: Not on file   Depression: Not at risk (7/10/2023)    PHQ-2      PHQ-2 Score: 0   Housing Stability: Unknown (7/13/2023)    Housing Stability Vital Sign      Unable to Pay for Housing in the Last Year: No      Number of Places Lived in the Last Year: Not on file      Unstable Housing in the Last Year: Not on file              Christianity or spiritual beliefs that impact treatment::  No  Mental health DX:: No  Mental health management concern (GOAL):: No  Chemical Dependency Status: No Current Concerns  Informal Support system:: Family, Friends, Neighbors             Resources and Interventions:  Current Resources:      Community Resources: None  Supplies Currently Used at Home: Hearing Aid Batteries  Equipment Currently Used at Home: walker, rolling  Employment Status: retired         Advance Care Plan/Directive  Advanced Care Plans/Directives on file:: Yes  Advanced Care Plan/Directive Status: Declined Further Information    Referrals Placed: Community Resources         Care Plan:  Care Plan: Community Resources     Problem: Insufficient In-home support     Note:     Goal Statement: Opal will continue working with Care Coordination to ensure her needs are met for overall health and wellbeing.  Date Goal Set: 7/13/23  Barriers: Does not drive   Strengths: Nurse by background, able to advocate for self well  Date to Achieve By: 1/13/24  Patient expressed understanding of goal: yes  Action steps to achieve this goal:  1. I will continue to follow up with medical team as needed  2. I will review resources for household assistance that  is sending me  3. I will outreach to Care Coordination  for further questions or concerns        Goal: Establish adequate home support                       Patient/Caregiver understanding: yes    Miracle Tobar  [facility]  Clinic Care Coordinator  [facility]  [phone]  [email or web address]      Outreach Frequency: monthly  Future Appointments              [facility] 2 [facility]    In 1 year Damian Russ MD [facility] MICHAEL [place]          Plan: SW to follow up in one month.       Miracle Tobar  [facility]  Clinic Care Coordinator  [facility]  [facility]  [phone]  [email or web address]

## 2023-07-13 NOTE — LETTER
[facility]  Patient Centered Plan of Care  About Me:        Patient Name:  Opal Sin    YOB: 1935  Age:         88 year old   Ryan MRN:    [id number] Telephone Information:  Home Phone [phone]   Mobile [phone]       Address:  [address] [place] [address] [address]  [facility] [address] Email address:  [email or web address]      Emergency Contact(s)    Name Relationship Lgl Grd Work Phone Home Phone Mobile Phone   1. PAU MARTINEZ* Sister No  [phone]   2. NILDA CAO Sister   [phone]           Primary language:  English     needed? No   [facility]:  [phone] op. 1  Other communication barriers:No data recorded  Preferred Method of Communication:  Mail  Current living arrangement: I live alone    Mobility Status/ Medical Equipment: Independent w/Device        Health Maintenance  Health Maintenance Reviewed: No data recorded    My Access Plan  Medical Emergency 911   Primary Clinic Line [facility] [phone]   24 Hour Appointment Line [phone] or  [phone]) (toll-free)   24 Hour Nurse Line [phone] (toll-free)   Preferred Urgent Care [facility], [phone]     Preferred Hospital [facility]  [phone]     [id number] [place], [place] - [address] AT [address]     Behavioral Health Crisis Line The National Suicide Prevention Lifeline at 1-398.945.6774 or Text/Call 138             My Care Team Members  Patient Care Team         Relationship Specialty Notifications Start End    Damian Russ MD PCP - General Internal Medicine  2/18/13     Phone: [phone] Pager: [phone] Fax: [phone] [address] [place] [address]    Damian Russ MD Assigned PCP   2/9/20     Phone: [phone] Pager: [phone] Fax:  [phone]        [address] [facility] [address] [place] [address]    Miracle Tobar, [facility] Lead Care Coordinator  Admissions 7/11/23               My Care Plans  Self Management and Treatment Plan  Care Plan  Care Plan: Community Resources       Problem: Insufficient In-home support       Note:     Goal Statement: Opal will continue working with Care Coordination to ensure her needs are met for overall health and wellbeing.  Date Goal Set: 7/13/23  Barriers: Does not drive   Strengths: Nurse by background, able to advocate for self well  Date to Achieve By: 1/13/24  Patient expressed understanding of goal: yes  Action steps to achieve this goal:  1. I will continue to follow up with medical team as needed  2. I will review resources for household assistance that  is sending me  3. I will outreach to Care Coordination  for further questions or concerns          Goal: Establish adequate home support                              Action Plans on File:                       Advance Care Plans/Directives Type:   No data recorded    My Medical and Care Information  Problem List   Patient Active Problem List   Diagnosis    Mixed hyperlipidemia    Essential hypertension    Internal derangement of knee    Degeneration of intervertebral disc, site unspecified    Cervical spinal stenosis    Acute bronchospasm    Hyposmolality and/or hyponatremia    Osteoporosis    HYPERLIPIDEMIA LDL GOAL <130    CKD (chronic kidney disease) stage 3, GFR 30-59 ml/min (H)    Intermittent asthma    Microalbuminuria    Essential hypertension, benign    Breast CA (H)    Previous back surgery    DJD (degenerative joint disease), ankle and foot    Ankle joint pain    Enthesopathy of ankle and tarsus    Abnormal Pap smear of cervix    Lung nodule    Esophageal reflux    6th nerve palsy    Hx of osteomyelitis    Hx of antibiotic allergy    Advanced directives, counseling/discussion    Hyponatremia    Leg hematoma    Fall from standing     Orbital fracture (H)    Subdural bleeding (H)    Physical deconditioning    Spinal stenosis, unspecified region other than cervical    Chronic pain    Parkinson's disease (H)    TIA (transient ischemic attack)    Carotid aneurysm non ruptured    Delirium    Cerebral aneurysm    Benign essential hypertension    CVA (cerebral vascular accident) (H)    Aphasia    Thyroid nodule    Fall    Closed fracture of right iliac wing with routine healing, subsequent encounter    Pain    Macular degeneration of both eyes, unspecified type    Aneurysm of carotid artery (H)    Mild major depression (H)      Current Medications and Allergies:  See printed Medication Report.    Care Coordination Start Date: 7/10/2023   Frequency of Care Coordination: monthly     Form Last Updated: 07/13/2023

## 2023-07-13 NOTE — LETTER
[facility]  Patient Centered Plan of Care  About Me:        Patient Name:  Opal Sin    YOB: 1935  Age:         88 year old   Ryan MRN:    [id number] Telephone Information:  Home Phone [phone]   Mobile [phone]       Address:  [address] [place] [address] [address]  [facility] [address] Email address:  [email or web address]      Emergency Contact(s)    Name Relationship Lgl Grd Work Phone Home Phone Mobile Phone   1. PAU MARTINEZ* Sister No  [phone]   2. NILDA CAO Sister   [phone]           Primary language:  English     needed? No   [employer]:  [phone] op. 1  Other communication barriers:No data recorded  Preferred Method of Communication:  Mail  Current living arrangement: I live alone    Mobility Status/ Medical Equipment: Independent w/Device        Health Maintenance  Health Maintenance Reviewed: No data recorded    My Access Plan  Medical Emergency 911   Primary Clinic Line [facility] [phone]   24 Hour Appointment Line [phone] or  [phone]) (toll-free)   24 Hour Nurse Line [phone] (toll-free)   Preferred Urgent Care [facility], [phone]     Preferred Hospital [facility]  [phone]     Preferred [facility]acy [id number] [place], [place] - [address] AT [address]     Behavioral Health Crisis Line The National Suicide Prevention Lifeline at 1-164.847.8395 or Text/Call 658             My Care Team Members  Patient Care Team         Relationship Specialty Notifications Start End    Damian Russ MD PCP - General Internal Medicine  2/18/13     Phone: [phone] Pager: [phone] Fax: [phone] [address] [place] [address]    Damian Russ MD Assigned PCP   2/9/20     Phone: [phone] Pager: [phone] Fax:  [phone]        [address] [facility] [address] [place] [address]    Miracle Tobar, [facility] Lead Care Coordinator  Admissions 7/11/23               My Care Plans  Self Management and Treatment Plan  Care Plan  Care Plan: Community Resources       Problem: Insufficient In-home support       Note:     Goal Statement: Opal will continue working with Care Coordination to ensure her needs are met for overall health and wellbeing.  Date Goal Set: 7/13/23  Barriers: Does not drive   Strengths: Nurse by background, able to advocate for self well  Date to Achieve By: 1/13/24  Patient expressed understanding of goal: yes  Action steps to achieve this goal:  1. I will continue to follow up with medical team as needed  2. I will review resources for household assistance that  is sending me  3. I will outreach to Care Coordination  for further questions or concerns          Goal: Establish adequate home support                              Action Plans on File:                       Advance Care Plans/Directives Type:   No data recorded    My Medical and Care Information  Problem List   Patient Active Problem List   Diagnosis     Mixed hyperlipidemia     Essential hypertension     Internal derangement of knee     Degeneration of intervertebral disc, site unspecified     Cervical spinal stenosis     Acute bronchospasm     Hyposmolality and/or hyponatremia     Osteoporosis     HYPERLIPIDEMIA LDL GOAL <130     CKD (chronic kidney disease) stage 3, GFR 30-59 ml/min (H)     Intermittent asthma     Microalbuminuria     Essential hypertension, benign     Breast CA (H)     Previous back surgery     DJD (degenerative joint disease), ankle and foot     Ankle joint pain     Enthesopathy of ankle and tarsus     Abnormal Pap smear of cervix     Lung nodule     Esophageal reflux     6th nerve palsy     Hx of osteomyelitis     Hx of antibiotic allergy     Advanced directives, counseling/discussion     Hyponatremia     Leg hematoma      Fall from standing     Orbital fracture (H)     Subdural bleeding (H)     Physical deconditioning     Spinal stenosis, unspecified region other than cervical     Chronic pain     Parkinson's disease (H)     TIA (transient ischemic attack)     Carotid aneurysm non ruptured     Delirium     Cerebral aneurysm     Benign essential hypertension     CVA (cerebral vascular accident) (H)     Aphasia     Thyroid nodule     Fall     Closed fracture of right iliac wing with routine healing, subsequent encounter     Pain     Macular degeneration of both eyes, unspecified type     Aneurysm of carotid artery (H)     Mild major depression (H)      Current Medications and Allergies:  See printed Medication Report.    Care Coordination Start Date: 7/10/2023   Frequency of Care Coordination: monthly     Form Last Updated: 07/13/2023

## 2023-07-13 NOTE — LETTER
[facility] CARE COORDINATION  [address]  [facility] [address]    July 13, 2023    Opal Sin  [address]   [facility] [address]      Dear Opal,        I am a  clinic care coordinator who works with Damian Russ MD, MD with the [facility]. I wanted to thank you for spending the time to talk with me.  Below is a description of clinic care coordination and how I can further assist you.       The clinic care coordination team is made up of a registered nurse, , financial resource worker and community health worker who understand the health care system. The goal of clinic care coordination is to help you manage your health and improve access to the health care system. Our team works alongside your provider to assist you in determining your health and social needs. We can help you obtain health care and community resources, providing you with necessary information and education. We can work with you through any barriers and develop a care plan that helps coordinate and strengthen the communication between you and your care team.  Our services are voluntary and are offered without charge to you personally.    Please feel free to contact me with any questions or concerns regarding care coordination and what we can offer.      We are focused on providing you with the highest-quality healthcare experience possible.    Sincerely,     Miracle Tobar, [facility]  Clinic Care Coordinator  [facility]  [phone]      Enclosed: Community resources        Help at Your Door   [phone]  Grocery Assistance: $15  We provide access to nutritious food, companionship, and a helpful hand in your kitchen.  Grocery Assistance includes:    Easy-to-use product catalog that includes food items and household essentials     Orders can be placed online or over the phone     Delivery of grocery items and help with putting items away      Accept EBT payments/SNAP benefits  STEP 1: The process is easy. To register call our office at [phone] or Request a Service. You will be enrolled within three business days and your first delivery will be within one week of time of enrollment.  STEP 2: You will then have the choice to either place your order online or with a volunteer who will call you to take your order at a specific day and time each week. Do you prefer to place orders online? We have the option for clients to login and create their own orders.  STEP 3: Your first order will include an easy-to-use, grocery product catalog. Groceries are delivered to you on the same day each week.  STEP 4: Payment for groceries and service happens upon delivery when our drivers bring the groceries into your kitchen. We even help with the extras - like unpacking bags, opening jars, or putting away heavy items. We make payment for your groceries easy for you. We accept checks, credit and debit cards and SNAP benefits via EBT. We cannot accept cash nor tips.    Home Support: $30*   per hour rate, two-hour minimum  *Rates for larger projects are available.  You can schedule our Home Support Services today by calling [phone] or Request a Service.Transportation: $20   30-minute rate, 10 miles total  With a team of personal drivers, we can get you where you want to go -- even if you just want to get out of the house.     We can take you to appointments and anywhere else within the seven counties we serve.     Do you have a list of errands to run? No problem. We can make multiple stops and wait for you.      [employer] #[phone]? - [employer]flux - neutrinity  Order rides, grocery delivery, pharmacy delivery, meals, home chores and more by calling our convenient phone number. We intercept GPS issues,  communication troubles and more to oversee trips from request to fulfillment with 100% reliability.    [facility] Services #[phone]    Indoor and Outdoor Chore & Home Maintenance   The HOME program takes care of the person by taking care of the home.   Our dedicated staff and volunteers provide indoor and outdoor chores. As a nonprofit, we offer affordable services to [place] age 60+, regardless of income.

## 2023-08-11 DIAGNOSIS — I10 ESSENTIAL HYPERTENSION WITH GOAL BLOOD PRESSURE LESS THAN 140/90: ICD-10-CM

## 2023-08-13 RX ORDER — LOSARTAN POTASSIUM 100 MG/1
TABLET ORAL
Qty: 90 TABLET | Refills: 3 | Status: SHIPPED | OUTPATIENT
Start: 2023-08-13 | End: 2024-07-12

## 2023-08-22 ENCOUNTER — PATIENT OUTREACH (OUTPATIENT)
Dept: CARE COORDINATION | Facility: CLINIC | Age: 88
End: 2023-08-22
Payer: COMMERCIAL

## 2023-08-22 NOTE — PROGRESS NOTES
Clinic Care Coordination Contact  Follow Up Progress Note      Assessment: SW spoke to pt about overall wellbeing.  Pt states she has not yet looked into resources SW sent.  Pt has asked her sisters to help her with cleaning, and they have assisted her.  Pt states she has had issues with an open sore on her buttocks from incontinence, and she has an appointment with a doctor soon to evaluate that.  Pt states she could use assistance with showers, but is not wanting to have assistance from her sisters with showers.  Pt states that homecare used to pay for this type of help, and wonders if it still does.  SW says that homecare might pay if pt was needing homecare for some other type of need, and SW encourages pt to discuss her wound with provider.  Pt mentions that a friend has a HHA that charges $35/hr, and SW mentions that this is a very good rate compared to what SW hears from others, and that pt may want to consider paying this rate privately if she can afford to do so and not able to get assistance via insurance.    Pt in agreement that SW can follow up next month, pt states she got her eyes evaluated earlier today and is not feeling very well at the moment.     Care Gaps:    Health Maintenance Due   Topic Date Due    ASTHMA ACTION PLAN  12/20/2017    URINE DRUG SCREEN  07/26/2018    COVID-19 Vaccine (6 - Pfizer series) 04/19/2023           Care Plans  Care Plan: Community Resources       Problem: Insufficient In-home support       Note:     Goal Statement: Opal will continue working with Care Coordination to ensure her needs are met for overall health and wellbeing.  Date Goal Set: 7/13/23  Barriers: Does not drive   Strengths: Nurse by background, able to advocate for self well  Date to Achieve By: 1/13/24  Patient expressed understanding of goal: yes  Action steps to achieve this goal:  1. I will continue to follow up with medical team as needed  2. I will review resources for household assistance that SW is  sending me  3. I will outreach to Care Coordination  for further questions or concerns          Goal: Establish adequate home support                             Intervention/Education provided during outreach: Homecare, Help at Your Door               Plan:   Pt will discuss homecare needs with MD2  Care Coordinator will follow up in one month.      Miarcle Tobar,  [facility]  Clinic Care Coordinator  [facility]  [phone]  [email or web address]

## 2023-09-11 ENCOUNTER — THERAPY VISIT (OUTPATIENT)
Dept: PHYSICAL THERAPY | Facility: CLINIC | Age: 88
End: 2023-09-11
Attending: INTERNAL MEDICINE
Payer: COMMERCIAL

## 2023-09-11 DIAGNOSIS — R32 URINARY INCONTINENCE, UNSPECIFIED TYPE: ICD-10-CM

## 2023-09-11 DIAGNOSIS — M99.05 SOMATIC DYSFUNCTION OF PELVIC REGION: Primary | ICD-10-CM

## 2023-09-11 PROCEDURE — 97140 MANUAL THERAPY 1/> REGIONS: CPT | Mod: GP

## 2023-09-11 PROCEDURE — 97162 PT EVAL MOD COMPLEX 30 MIN: CPT | Mod: GP

## 2023-09-11 PROCEDURE — 97530 THERAPEUTIC ACTIVITIES: CPT | Mod: GP

## 2023-09-11 PROCEDURE — 97112 NEUROMUSCULAR REEDUCATION: CPT | Mod: GP

## 2023-09-11 NOTE — PROGRESS NOTES
PHYSICAL THERAPY EVALUATION  Type of Visit: Evaluation    See electronic medical record for Abuse and Falls Screening details.    Subjective       Presenting condition or subjective complaint: Urinary incontinence  Date of onset: 07/10/23    Relevant medical history: Bladder or bowel problems; Cancer. PMHx: 1968 inserted thoracic stimulator from a pain clinic, 2016 PD dx.  Dates & types of surgery: bilateral knee replacement 2009,bilateral shoulder replacementreplacements    Prior diagnostic imaging/testing results:     no  Prior therapy history for the same diagnosis, illness or injury: No      Patient presents to therapy reporting urinary incontinence for about 1 year that is preventing her from activities such as going to physical therapy for her PD. Believes symptoms may be connected to a fall she had late October of 2021 where she fell backwards and heard a crack in her thoracic region and hurt her L elbow/shoulder. Feels that symptoms are getting worse.     Pt reports self limiting fluid intake to reduce leaking. Leaks 3-4 times per day. Reports a wound on her bottom that she is managing with zinc oxide and epsome salt baths, states it is heeling, believes it was caused by wet skin after sleeping in her bed on wet pads. Pt is taking medication for her UI, increased medication to 2x/day with physician's recommendation, that resulted in increased exhaustion so she went back to 1x/day. Is not certain the medication helps much.     Her apartment has 2 bathrooms and 1 commode, pt states is not able to make it to the bathroom without leaks, typically requiring a change of clothes. Reports feeling an urge and feels that she must go to the bathroom at the initial urge or she will not make it due to increased mobility difficulties.      States she has recently felt that her right hip is going to give out.      Patient was accompanied by her sister for the duration of the treatment session.     Prior Level of  Function  Transfers: Independent, Assistive equipment  Ambulation: Assistive equipment  ADL: Independent, Assistive equipment  IADL:   Current transfers: Pt states she requires assistance to get out of bed. Has been sleeping on her lift recliner.     Living Environment  Social support:  Gets support from her 2 sisters and her neighbors.   Type of home: Apartment/condo; 1 level   Stairs to enter the home: Yes       Ramp: No   Stairs inside the home: No       Help at home: Assist for driving and community activities  Equipment owned: Walker with wheels; Grab bars; Commode; Raised toilet seat; Bath bench; Lift chair     Employment: No    Hobbies/Interests: ,movies    cards   Condo activities    Patient goals for therapy: most physical and social activities    Pain assessment:      Objective      PELVIC EVALUATION  ADDITIONAL HISTORY:  Sex assigned at birth: Female  Gender identity: Female    Pronouns: She/Her Hers      Bladder History:  Feels bladder filling: No  Triggers for feeling of inability to wait to go to the bathroom: Yes running water,  drinking liquids,  How long can you wait to urinate: unable towait  Gets up at night to urinate: Yes 3-4  Can stop the flow of urine when urinating: No  Volume of urine usually released: Medium   Other issues: Dribbling after urinating  Number of bladder infections in last 12 months:    Fluid intake per day: water 2000cc  caffeine 1-2 cups  no alcohol      Medications taken for bladder: Yes Tolterodine 1mg.twice aday   Activities causing urine leak: Cough    Amount of urine typically leaked:  Once she starts to leak, she looses all urine   Pads used to help with leaking: Yes I use this many pads per day: 6-8 I use this type/brand: Depends #5, and up to 4 layers of chucks pads    Bowel History:  Frequency of bowel movement: usually daily  Consistency of stool: Soft-formed  . May have hard stools and constipation from time to time, increases fruit intake if feeling  constipated  Ignores the urge to defecate: No  Other bowel issues: Straining to have bowel movement  Length of time spent trying to have a bowel movement:      Sexual Function History:  Sexual orientation: Straight    Sexually active: No  Lubrication used:      Pelvic pain:    denies  Pain or difficulty with orgasms/erection/ejaculation:      State of menopause: Post-menopause (I am done with menopause)  Hormone medications: No      Are you currently pregnant: No, Number of previous pregnancies: 0, Do you get regular exercise: Yes, I do this type of exercise: Walking, Have you tried pelvic floor strengthening exercises for 4 weeks: No, Do you have any history of trauma that is relevant to your care that you d like to share: No    Pt Goals:  Not leak when feeling urge, get back to walking for exercise    Discussed reason for referral regarding pelvic health needs and external/internal pelvic floor muscle examination with patient/guardian.  Opportunity provided to ask questions and verbal consent for assessment and intervention was given.    MMT: hip flex L3+/5, R 4/5 (P)  Forward bend seated: R hip pain, able to reach knees  Hip IR: L 4-/5, R unable due to pain  Hip ER: L 4-/5, R unable due to pain  R Femoral pain with hip flexion.     AROM: moderately reduced B hip flexion, compensates with poster trunk lean  PROM: L hip moderately reduced ER  R hip highly reduced IR (pain), moderately reduced ER     Functional Mobility:   Gait: with 4WW, reduced L hip flexion, step to pattern, difficulty initiating ambulation  Sit<>stand: independent to 4WW, requires use of B UE, reduced forward flexion, slow to stand    PAIN:   POSTURE: Standing Posture: Rounded shoulders, Forward head, Lordosis decreased, Thoracic kyphosis increased, lateral shift right, L LE IR with knee flexion, R genu valgus  Sitting Posture: Rounded shoulders, Forward head, Lordosis decreased, Thoracic kyphosis increased,    LUMBAR SCREEN:   HIP  SCREEN:  Strength:    Functional Strength Testing:     PELVIC/SI SCREEN:     PAIN PROVOCATION TEST:   PELVIS/SI SPECIAL TESTS:   BREATHING SYMMETRY:     PELVIC EXAM  External Visual Inspection:  At rest: Distended perineal body  With voluntary pelvic floor contraction: Absent  Relaxation of PFM: Yes  With intra-abdominal pressure: Cough: Perineal descent  Bearing down as defecation: No change    Integumentary:   Introitus: Unremarkable    External Digital Palpation per Perineum:   Ischiocavernosis: Unremarkable  Bulbo cavernosis: Unremarkable  Transverse perineal: Unremarkable  Levator ani: Tenderness, soreness at third layer  Perineal body: Unremarkable    Scar:   Location/Type:   Mobility:     Internal Digital Palpation:  Per Vagina:  Digital Muscle Performance: P (Power): 2/5  E (Endurance): 3  F (Fast Twitch): 6  Compensations: Abdominals, Adductors  Relaxation Post-Contraction: Normal    Per Rectum:        Pelvic Organ Prolapse:       ABDOMINAL ASSESSMENT  Diastasis Rectus Abdominis (TIMA):      Abdominal Activation/Strength:     Scar:   Location/Type:   Mobility:     Fascial Tension/Restriction/Tone:     BIOFEEDBACK:  Position:   Surface Electrodes:     Abdominals:     Perianals:       DERMATOMES:   DTR S:     Assessment & Plan   CLINICAL IMPRESSIONS  Medical Diagnosis: Urinary incontinence, unspecified type    Treatment Diagnosis: Somatic dysfunction of pelvic region   Impression/Assessment: Patient is a 88 year old female with urinary incontinence and muscle weakness complaints.  The following significant findings have been identified: Decreased strength, Impaired gait, Impaired muscle performance, and Decreased activity tolerance. These impairments interfere with their ability to perform self care tasks, recreational activities, household chores, household mobility, and community mobility as compared to previous level of function.     Clinical Decision Making (Complexity):  Clinical Presentation:  Evolving/Changing  Clinical Presentation Rationale: based on medical and personal factors listed in PT evaluation  Clinical Decision Making (Complexity): Moderate complexity    PLAN OF CARE  Treatment Interventions:  Modalities: Biofeedback, Cryotherapy, Hot Pack  Interventions: Manual Therapy, Neuromuscular Re-education, Therapeutic Activity, Therapeutic Exercise, Self-Care/Home Management    Long Term Goals     PT Goal 1  Goal Identifier: self management  Goal Description: Patient will report ability to self manage condition independently  Rationale: to maximize safety and independence with performance of ADLs and functional tasks;to maximize safety and independence within the home;to maximize safety and independence within the community;to maximize safety and independence with transportation;to maximize safety and independence with self cares  PT Goal 2  Goal Identifier: Pelvic floor muscle strength  Goal Description: Patient will acheive at least 4/5 strength of pelvic floor  Rationale: to maximize safety and independence with performance of ADLs and functional tasks;to maximize safety and independence within the home;to maximize safety and independence within the community;to maximize safety and independence with transportation;to maximize safety and independence with self cares  Goal Progress: 12/9/23  PT Goal 3  Goal Identifier: Urinary leakage  Goal Description: Patient will report at least 75% reduction in urinary leakage  Rationale: to maximize safety and independence with performance of ADLs and functional tasks;to maximize safety and independence within the home;to maximize safety and independence within the community;to maximize safety and independence with transportation;to maximize safety and independence with self cares  Goal Progress: 12/9/23      Frequency of Treatment: 1x per week for 3 weeks, 1x per 3 weeks as indicated by pt presentation  Duration of Treatment: 12 weeks    Recommended Referrals  to Other Professionals:   Education Assessment:        Risks and benefits of evaluation/treatment have been explained.   Patient/Family/caregiver agrees with Plan of Care.     Evaluation Time:     PT Eval, Moderate Complexity Minutes (20485): 30       Signing Clinician: Alyce Barrett, PT      [facility]                                                                                   OUTPATIENT PHYSICAL THERAPY      PLAN OF TREATMENT FOR OUTPATIENT REHABILITATION   Patient's Last Name, First Name, Opal Anderson YOB: 1935   Provider's Name   [facility]   Medical Record No.  [id number]     Onset Date: 07/10/23  Start of Care Date: 09/11/23     Medical Diagnosis:  Urinary incontinence, unspecified type      PT Treatment Diagnosis:  Somatic dysfunction of pelvic region Plan of Treatment  Frequency/Duration: 1x per week for 3 weeks, 1x per 3 weeks as indicated by pt presentation/ 12 weeks    Certification date from 09/11/23 to 12/09/23         See note for plan of treatment details and functional goals     Alyce Barrett, PT                         I CERTIFY THE NEED FOR THESE SERVICES FURNISHED UNDER        THIS PLAN OF TREATMENT AND WHILE UNDER MY CARE     (Physician attestation of this document indicates review and certification of the therapy plan).                Referring Provider:  Damian Russ      Initial Assessment  See Epic Evaluation- Start of Care Date: 09/11/23

## 2023-09-12 PROBLEM — R32 URINARY INCONTINENCE, UNSPECIFIED TYPE: Status: ACTIVE | Noted: 2023-09-12

## 2023-09-12 PROBLEM — M99.05 SOMATIC DYSFUNCTION OF PELVIC REGION: Status: ACTIVE | Noted: 2023-09-12

## 2023-09-21 ENCOUNTER — PATIENT OUTREACH (OUTPATIENT)
Dept: CARE COORDINATION | Facility: CLINIC | Age: 88
End: 2023-09-21
Payer: COMMERCIAL

## 2023-09-21 NOTE — PROGRESS NOTES
Clinic Care Coordination Contact  Follow Up Progress Note      Assessment:Spoke briefly with pt. She stated she was not able to complete conversation at this time.  I asked if she had contacted any resources ROSANA CC had provided. She did say she is seeing rehab for Bladder issues. Then said she could not talk.   Chart review indicates she has cancelled her appt with PCP .    Care Gaps:    Health Maintenance Due   Topic Date Due    ASTHMA ACTION PLAN  12/20/2017    URINE DRUG SCREEN  07/26/2018    COVID-19 Vaccine (6 - Pfizer series) 04/19/2023    INFLUENZA VACCINE (1) 09/01/2023       Care Plans  Care Plan: Community Resources       Problem: Insufficient In-home support       Note:     Goal Statement: Opal will continue working with Care Coordination to ensure her needs are met for overall health and wellbeing.  Date Goal Set: 7/13/23  Barriers: Does not drive   Strengths: Nurse by background, able to advocate for self well  Date to Achieve By: 1/13/24  Patient expressed understanding of goal: yes  Action steps to achieve this goal:  1. I will continue to follow up with medical team as needed  2. I will review resources for household assistance that ROSANA is sending me  3. I will outreach to Care Coordination  for further questions or concerns          Goal: Establish adequate home support                             Intervention/Education provided during outreach:introduction Pt ended conversation     Outreach Frequency: monthly    Plan: Pt will call clinic or CC with any needs. Will review resources sent by ROSANA PATTON.  Care Coordinator will follow up in 7-10 business days.      LESLY Reyes / yao Tobar [facility]   Care Coordination  [facility]  9/21/2023 12:38 PM

## 2023-09-28 ENCOUNTER — PATIENT OUTREACH (OUTPATIENT)
Dept: CARE COORDINATION | Facility: CLINIC | Age: 88
End: 2023-09-28
Payer: COMMERCIAL

## 2023-09-28 NOTE — PROGRESS NOTES
Clinic Care Coordination Contact  [facility]/Voicemail       Clinical Data: Care Coordinator Outreach  Outreach attempted x 1.  Left message on patient's voicemail with call back information and requested return call.  Plan: Care Coordinator will try to reach patient again in 1 month.    Miracle Tobar  [facility]  Clinic Care Coordinator  [facility]  [phone]  [email or web address]

## 2023-09-28 NOTE — LETTER
[facility]  Patient Centered Plan of Care  About Me:        Patient Name:  Opal Sin    YOB: 1935  Age:         88 year old   Ryan MRN:    [id number] Telephone Information:  Home Phone [phone]   Mobile [phone]       Address:  [address] [place] [address] [address]  [facility] [address] Email address:  [email or web address]      Emergency Contact(s)    Name Relationship Lgl Grd Work Phone Home Phone Mobile Phone   1. PAU MARTINEZ* Sister No  [phone]   2. NILDA CAO Sister   [phone]           Primary language:  English     needed? No   [employer] [employer] [facility]:  [phone] op. 1  Other communication barriers:No data recorded  Preferred Method of Communication:  Mail  Current living arrangement: I live alone    Mobility Status/ Medical Equipment: Independent w/Device        Health Maintenance  Health Maintenance Reviewed:   Health Maintenance Due   Topic Date Due    RSV VACCINE 60+ (1 - 1-dose 60+ series) Never done    ASTHMA ACTION PLAN  12/20/2017    URINE DRUG SCREEN  07/26/2018    INFLUENZA VACCINE (1) 09/01/2023    COVID-19 Vaccine (7 - 2023-24 season) 09/01/2023         My Access Plan  Medical Emergency 911   Primary Clinic Line [facility] [phone]   24 Hour Appointment Line [phone] or  [phone]) (toll-free)   24 Hour Nurse Line [phone] (toll-free)   Preferred Urgent Care [facility], [phone]     Preferred Hospital [facility]  [phone]     Preferred Pharmacy [id number] - [place], [place] - [address] AT [facility] [address]     Behavioral Health Crisis Line The National Suicide Prevention Lifeline at 1-710.478.5337 or Text/Call 834             My Care Team Members  Patient Care Team         Relationship Specialty Notifications Start End    Damian Russ MD PCP - General Internal  Medicine  2/18/13     Phone: [phone] Pager: [phone] Fax: [phone] [address] [place] [address]    Damian Russ MD Assigned PCP   2/9/20     Phone: [phone] Pager: [phone] Fax: [phone] [address] [place] [address]    Miracle Tobar, [facility] Lead Care Coordinator  Admissions 7/11/23               My Care Plans  Self Management and Treatment Plan  Care Plan  Care Plan: Community Resources       Problem: Insufficient In-home support       Note:     Goal Statement: Opal will continue working with Care Coordination to ensure her needs are met for overall health and wellbeing.  Date Goal Set: 7/13/23  Barriers: Does not drive   Strengths: Nurse by background, able to advocate for self well  Date to Achieve By: 1/13/24  Patient expressed understanding of goal: yes  Action steps to achieve this goal:  1. I will continue to follow up with medical team as needed  2. I will review resources for household assistance that  is sending me  3. I will outreach to Care Coordination  for further questions or concerns          Goal: Establish adequate home support                              Action Plans on File:                       Advance Care Plans/Directives Type:   No data recorded    My Medical and Care Information  Problem List   Patient Active Problem List   Diagnosis    Mixed hyperlipidemia    Essential hypertension    Internal derangement of knee    Degeneration of intervertebral disc, site unspecified    Cervical spinal stenosis    Acute bronchospasm    Hyposmolality and/or hyponatremia    Osteoporosis    HYPERLIPIDEMIA LDL GOAL <130    CKD (chronic kidney disease) stage 3, GFR 30-59 ml/min (H)    Intermittent asthma    Microalbuminuria    Essential hypertension, benign    Breast CA (H)    Previous back surgery    DJD (degenerative joint disease), ankle and foot    Ankle joint pain    Enthesopathy of ankle and tarsus    Abnormal  Pap smear of cervix    Lung nodule    Esophageal reflux    6th nerve palsy    Hx of osteomyelitis    Hx of antibiotic allergy    Advanced directives, counseling/discussion    Hyponatremia    Leg hematoma    Fall from standing    Orbital fracture (H)    Subdural bleeding (H)    Physical deconditioning    Spinal stenosis, unspecified region other than cervical    Chronic pain    Parkinson's disease    TIA (transient ischemic attack)    Carotid aneurysm non ruptured    Delirium    Cerebral aneurysm    Benign essential hypertension    CVA (cerebral vascular accident) (H)    Aphasia    Thyroid nodule    Fall    Closed fracture of right iliac wing with routine healing, subsequent encounter    Pain    Macular degeneration of both eyes, unspecified type    Aneurysm of carotid artery (H24)    Mild major depression (H)    Somatic dysfunction of pelvic region    Urinary incontinence, unspecified type      Current Medications and Allergies:  See printed Medication Report.    Care Coordination Start Date: 7/10/2023   Frequency of Care Coordination: monthly     Form Last Updated: 10/12/2023

## 2023-10-17 ENCOUNTER — MEDICAL CORRESPONDENCE (OUTPATIENT)
Dept: HEALTH INFORMATION MANAGEMENT | Facility: CLINIC | Age: 88
End: 2023-10-17

## 2023-10-18 ENCOUNTER — MEDICAL CORRESPONDENCE (OUTPATIENT)
Dept: FAMILY MEDICINE | Facility: CLINIC | Age: 88
End: 2023-10-18
Payer: COMMERCIAL

## 2023-10-23 ENCOUNTER — TELEPHONE (OUTPATIENT)
Dept: FAMILY MEDICINE | Facility: CLINIC | Age: 88
End: 2023-10-23
Payer: COMMERCIAL

## 2023-10-24 NOTE — TELEPHONE ENCOUNTER
Katherine noted some additional items were needed and gave back to Dr. Russ this morning.  Not in his in basket.  Katherine will watch the to-be-faxed bin tomorrow.     Tiki DIAZ MA

## 2023-10-30 ENCOUNTER — PATIENT OUTREACH (OUTPATIENT)
Dept: CARE COORDINATION | Facility: CLINIC | Age: 88
End: 2023-10-30
Payer: COMMERCIAL

## 2023-10-30 NOTE — PROGRESS NOTES
"Clinic Care Coordination Contact  Follow Up Progress Note      Assessment: SW spoke to pt about her overall wellbeing.  Pt states she is moving into [facility].  She states this is due largely due to issues related to incontinence.  Pt states they are moving \"as we speak\" and she liked what she saw at [facility] and will be in their retirement.  SW explained that once she is in their retirement, she will no longer be eligible for CCC, and pt expresses understanding.       Care Gaps:    Health Maintenance Due   Topic Date Due    RSV VACCINE 60+ (1 - 1-dose 60+ series) Never done    ASTHMA ACTION PLAN  12/20/2017    URINE DRUG SCREEN  07/26/2018    INFLUENZA VACCINE (1) 09/01/2023    COVID-19 Vaccine (7 - 2023-24 season) 09/01/2023       Care Gap Goal set: No    Care Plans  Care Plan: Community Resources       Problem: Insufficient In-home support       Note:     Goal Statement: Opal will continue working with Care Coordination to ensure her needs are met for overall health and wellbeing.  Date Goal Set: 7/13/23  Barriers: Does not drive   Strengths: Nurse by background, able to advocate for self well  Date to Achieve By: 1/13/24  Patient expressed understanding of goal: yes  Action steps to achieve this goal:  1. I will continue to follow up with medical team as needed  2. I will review resources for household assistance that ROSANA is sending me  3. I will outreach to Care Coordination  for further questions or concerns          Goal: Establish adequate home support                             Intervention/Education provided during outreach: CCC         Plan:   None  Care Coordinator will follow up in N/A    Miracle Tobar  [facility]  Clinic Care Coordinator  [facility]  [phone]  [email or web address]    "

## 2023-11-08 ENCOUNTER — TELEPHONE (OUTPATIENT)
Dept: FAMILY MEDICINE | Facility: CLINIC | Age: 88
End: 2023-11-08
Payer: COMMERCIAL

## 2023-11-08 DIAGNOSIS — R53.81 PHYSICAL DECONDITIONING: ICD-10-CM

## 2023-11-08 DIAGNOSIS — G20.B2 PARKINSON'S DISEASE WITH DYSKINESIA AND FLUCTUATING MANIFESTATIONS (H): Primary | ICD-10-CM

## 2023-11-08 NOTE — TELEPHONE ENCOUNTER
Kiera with [facility] (residential) called stating that the pt has overtime become unable to do ADLs with ease (using a wheelchair to go down to dinner, etc.), and the residential is hoping that she can start therapies to assist with this.     They are requesting a PT and OT referral, and they are hoping she can do these right in the building at [facility] in [place].     Routing to PCP for review, referrals pended.   Of note - pt already has an active PT referral, however it was for urinary incontinence, so a new one is pended.     Once approved, please fax referrals to [phone] attn: Kiera     Thank you,  Sarahi Pressley RN

## 2023-11-09 NOTE — TELEPHONE ENCOUNTER
Orders faxed to Ericka (Attn: Kiera) to # [phone].      Eliz WARD MA on 11/9/2023 at 1:16 PM

## 2023-11-10 NOTE — TELEPHONE ENCOUNTER
Patient's sister called to request new PHYSICAL THERAPY, OT orders for patient.   No C2C on file.     Reports orders were placed and faxed to Ericka SEGURA, however requesting these services  be done on site at [facility], not outplacement thru  [facility].     Pended new orders for PCP to please review and sign.   New PHYSICAL THERAPY and OT orders can be faxed:  Ericka (Attn: Kiera) to # [phone].     Thank you,  Marilou IGLESIAS, RN      November 10, 2023  2:27 PM

## 2023-11-13 ENCOUNTER — TRANSFERRED RECORDS (OUTPATIENT)
Dept: HEALTH INFORMATION MANAGEMENT | Facility: CLINIC | Age: 88
End: 2023-11-13
Payer: COMMERCIAL

## 2023-11-13 NOTE — TELEPHONE ENCOUNTER
Called Kiera at [facility] to let know about the orders being faxed to the facility. Will rout to MICHAEL calvillo to fax. Please fax the following orders to the number below.     KT Leal  [facility]

## 2023-12-01 DIAGNOSIS — R32 URINARY INCONTINENCE, UNSPECIFIED TYPE: ICD-10-CM

## 2023-12-01 RX ORDER — TOLTERODINE TARTRATE 1 MG/1
TABLET, EXTENDED RELEASE ORAL
Qty: 180 TABLET | Refills: 1 | Status: SHIPPED | OUTPATIENT
Start: 2023-12-01 | End: 2024-04-01

## 2023-12-01 NOTE — TELEPHONE ENCOUNTER
"Pt called to check on this   Prescription approved per [facility] Refill Protocol.      Requested Prescriptions   Pending Prescriptions Disp Refills    tolterodine (DETROL) 1 MG tablet [Pharmacy Med Name: TOLTERODINE TARTRATE 1MG TABLETS] 180 tablet 3     Sig: TAKE 1 TABLET(1 MG) BY MOUTH TWICE DAILY       Muscarinic Antagonists (Urinary Incontinence Agents) Passed - 12/1/2023  2:04 PM        Passed - Recent (12 mo) or future (30 days) visit within the authorizing provider's specialty     Patient has had an office visit with the authorizing provider or a provider within the authorizing providers department within the previous 12 mos or has a future within next 30 days. See \"Patient Info\" tab in inbasket, or \"Choose Columns\" in Meds & Orders section of the refill encounter.              Passed - Patient does not have a diagnosis of glaucoma on the problem list     If glaucoma diagnosis is new, refer refill to physician.          Passed - Medication is active on med list        Passed - Patient is 18 years of age or older             "

## 2023-12-08 ENCOUNTER — NURSE TRIAGE (OUTPATIENT)
Dept: SCHEDULING | Facility: CLINIC | Age: 88
End: 2023-12-08
Payer: COMMERCIAL

## 2023-12-08 DIAGNOSIS — L89.159 PRESSURE INJURY OF SKIN OF SACRAL REGION, UNSPECIFIED INJURY STAGE: Primary | ICD-10-CM

## 2023-12-08 NOTE — TELEPHONE ENCOUNTER
Reason for Call:  Appointment Request    Patient requesting this type of appt: Chronic Diease Management/Medication/Follow-Up    Requested provider: Damian Russ    Reason patient unable to be scheduled: Not within requested timeframe    When does patient want to be seen/preferred time: 1-2 days    Comments: Pt's sister is calling to request an appointment with PCP asap, in regards to a rash on pt's bottom due to incontinence that is getting worse each day. Instructed by care team at assisted [facility] to be seen asap.    Could we send this information to you in [facility] or would you prefer to receive a phone call?:   Patient would prefer a phone call   Okay to leave a detailed message?: Yes at Cell number on file:    Telephone Information:   Mobile [phone]       Call taken on 12/8/2023 at 1:00 PM by Kaylee Flynn

## 2023-12-08 NOTE — TELEPHONE ENCOUNTER
"Kiera, nurse at [facility], returning call regardng patient's wound.     Described as approx $0.25 size, however wound is deep on upper area of buttocks.    OTC \"butt cream\" no longer helping to heal wound.     Requested referral to Wound clinic.   Dr Russ placed referral to Wound [place].   Information given to Kiera.     Also, placed call to patient's sister, Corrie, with scheduling information for the wound clinic.   Corrie stated understanding and agreement with advise/plan.    Will call to schedule patient .     Marilou IGLESIAS, RN      December 8, 2023  4:07 PM        "

## 2023-12-08 NOTE — TELEPHONE ENCOUNTER
Pt gave triage verbal approval to speak to her sister Corrie for this call. Pt is at a facility activity and caller is unable to provide symptom details. Caller states pt has a rash on her bottom that is painful and getting infected. Pt has urinary incontinence. Pt has a wound and facility RN advised pt may need referral to a wound specialist. Barrier cream has not helped.     Triage advised UC visit today. Corrie states stephen is unable to come in today. She is requesting an appt next week.     Triage left voice message at [facility] today requesting a call back from facility RN to get more information on concerns.    PCP, please advice. Ok to schedule appt Monday? Caller states pt is unable to see UC today.       Reason for Disposition   Localized rash is very painful (no fever)    Additional Information   Negative: Sounds like a life-threatening emergency to the triager   Negative: Athlete's Foot suspected (i.e., itchy rash between the toes)   Negative: Insect bite(s) suspected   Negative: Jock Itch suspected (i.e., itchy rash on inner thighs near genital area)   Negative: Localized lump (or swelling) without redness or rash   Negative: MPOX SUSPECTED (e.g., direct skin contact such as sex, recent travel to [place] or [place]) and any SYMPTOMS OF MPOX (e.g., rash, fever, muscle aches, or swollen lymph nodes)   Negative: At risk for Mpox (men-who-have-sex-with-men) and possible exposure (e.g., multiple sex partners in past 21 days) and ANY SYMPTOMS OF MPOX (e.g., rash, fever, muscle aches, or swollen lymph nodes)   Negative: Poison ivy, oak, or sumac rash suspected (e.g., itchy rash after contact with poison ivy)   Negative: Rash of female genital area (e.g., labia, vagina, vulva)   Negative: Rash of male genital area (e.g., penis, scrotum)   Negative: Redness of immunization site   Negative: Shingles suspected (i.e., painful rash, multiple small blisters grouped together in one area of body;  dermatomal distribution)   Negative: Small spot, skin growth, or mole   Negative: Wound infection suspected (i.e., pain, spreading redness, or pus; in a cut, puncture, scrape or sutured wound)   Negative: Fever and localized purple or blood-colored spots or dots that are not from injury or friction   Negative: Fever and localized rash is very painful   Negative: Patient sounds very sick or weak to the triager   Negative: Looks like a boil, infected sore, deep ulcer, or other infected rash (spreading redness, pus)   Negative: Painful rash with multiple small blisters grouped together (i.e., dermatomal distribution or 'band' or 'stripe')    Protocols used: Rash or Redness - Eucoyonfx-Y-LD

## 2023-12-08 NOTE — TELEPHONE ENCOUNTER
Damian Russ MD         12/8/23  3:49 PM  Unsigned Note  Wound care referral signed     Damian Russ MD

## 2023-12-11 ENCOUNTER — TELEPHONE (OUTPATIENT)
Dept: WOUND CARE | Facility: CLINIC | Age: 88
End: 2023-12-11
Payer: COMMERCIAL

## 2023-12-11 NOTE — TELEPHONE ENCOUNTER
Patient's sister called to follow up on wound care referral for a buttock wound. Please call Corrie at [phone]

## 2023-12-11 NOTE — TELEPHONE ENCOUNTER
Consult received via Workqueue from Damian Russ MD in  CENTRAL SCHEDULING  for wound of the buttocks.    Please schedule with Sadia Perez PA-C, Alva Talley NP, Jhonathan Kaur M.D., or Gurpreet Manzanares M.D. at [facility] [place] for next available appointment.    **For all providers, Julienne Tomas PA-C, Dr. Brown, Alva Talley NP or Dr. Manzanares, please schedule a follow up 2-3 weeks after initial appointment.**  --If unable to schedule within 2-3 weeks then please place on cancellation list--    Is the patient able to make their own medical decisions? Yes    Can the patient be scheduled on a Thursday? No    Is patient a [place] lift? PLEASE INQUIRE WHEN MAKING THE APPOINTMENT AND PUT IN APPOINTMENT NOTES    Routing to  Wound Healing Scheduling.

## 2023-12-15 ENCOUNTER — HOSPITAL ENCOUNTER (OUTPATIENT)
Dept: WOUND CARE | Facility: CLINIC | Age: 88
Discharge: HOME | End: 2023-12-15
Payer: COMMERCIAL

## 2023-12-15 VITALS
DIASTOLIC BLOOD PRESSURE: 75 MMHG | HEART RATE: 70 BPM | SYSTOLIC BLOOD PRESSURE: 157 MMHG | TEMPERATURE: 97.1 F | BODY MASS INDEX: 21.85 KG/M2 | WEIGHT: 118.5 LBS

## 2023-12-15 DIAGNOSIS — L98.412: Primary | ICD-10-CM

## 2023-12-15 PROCEDURE — 99203 OFFICE O/P NEW LOW 30 MIN: CPT

## 2023-12-15 PROCEDURE — G0463 HOSPITAL OUTPT CLINIC VISIT: HCPCS | Mod: 25

## 2023-12-15 PROCEDURE — 97602 WOUND(S) CARE NON-SELECTIVE: CPT

## 2023-12-15 NOTE — DISCHARGE INSTRUCTIONS
Opal Zeng Merrick      1935    A DME order for supplies has been placed to [facility], [address]. If there are any issues with your order including not receiving the order please call [facility] at [phone] option 1. They can also provide a tracking number for you if you had supplies shipped to you.    Dressing changes outside of clinic are being performed by Patient and assisted living    Assisted Living: [facility]  Phone: [phone]  Fax:[phone]     PLAN:  -Look into getting a high profile Roho cushion on [employer] to offload pressure. Will need inflated properly. Inflate it and put your hand underneath the cushion and above the wheelchair tap with your finger and if you feel a bony prominence it will need to be inflated more.   -Call the clinic if wounds worsen  -If your facility has wound care provider that rounds you can certainly follow with them. Give us a call if this is the route you wish to take.       Wound Dressing Change: Right and left gluteal  -Wash your hands with soap and water before you begin your dressing change and prepare a clean surface for dressings.  -Cleanse with mild unscented soap and water (such as Cetaphil, Cerave or Dove)   -Apply zinc oxide to both areas (Desitin cream). Only remove the soiled layer of cream when changing the dressing if needed and then apply a thin layer over the remaining. If wishing to remove all use baby oil to remove.   -Apply 1 6x6 Cutimed sorbian border dressing to cover both wounds  -Change daily. If Incontinent and the dressing is soiled you will need to remove and re-apply.       Repositioning:  Bed: Reposition MINIMALLY every 1-2 hours in bed or your chair to relieve pressure and promote perfusion to tissue.  Sit on a chair cushion when up to the chair. Don't use your neck pillow as a cushion. Look into getting a roho cushion.      A diet high in protein is important for wound healing, we recommend getting  90 grams of protein per day. Taking protein shakes or bars are a good way to get extra protein in your diet.     Good sources of protein:  Pork 26g per 3 oz  Whey protein powder - 24g per scoop (on average)  Greek yogurt - 23g per 8oz   Chicken or Turkey - 23g per 3oz  Fish - 20-25g per 3oz  Beef - 18-23g per 3oz  Navy beans - 20g per cup  Cottage cheese - 14g per 1/2 cup   Lentils - 13g per 1/4 cup  Beef jerky 13g per 1oz  2% milk - 8g per cup  Peanut butter - 8g per 2 tablespoons  Eggs - 6g per egg  Mixed nuts - 6g per 2oz       Alva Talley NP December 15, 2023    Call us at [phone] if you have any questions about your wounds, have redness or swelling around your wound, have a fever of 101 degrees Fahrenheit or greater or if you have any other problems or concerns. We answer the phone Monday through Friday 8 am to 4 pm, please leave a message as we check the voicemail frequently throughout the day.     If you had a positive experience please indicate that on your patient satisfaction survey form that [facility] will be sending you.    It was a pleasure meeting with you today.  Thank you for allowing me and my team the privilege of caring for you today.  YOU are the reason we are here, and I truly hope we provided you with the excellent service you deserve.  Please let us know if there is anything else we can do for you so that we can be sure you are leaving completely satisfied with your care experience.      If you have any billing related questions please call the [facility] Business office at [phone]. The clinic staff does not handle billing related matters.    If you are scheduled to have a follow up appointment, you will receive a reminder call the day before your visit. On the appointment day please arrive 15 minutes prior to your appointment time. If you are unable to keep that appointment, please call the clinic to cancel or reschedule. If you are more than 10 minutes late or greater for  your scheduled appointment time, the clinic policy is that you may be asked to reschedule.

## 2023-12-15 NOTE — PROGRESS NOTES
Patient arrived for wound care visit. Certified Wound Care Nurse time spent evaluating patient record, completed a full evaluation and documented wound(s) & robert-wound skin; provided recommendation based on treatment plan. Applied dressing, reviewed discharge instructions, patient education, and discussed plan of care with appropriate medical team staff members and patient and/or family members.

## 2023-12-15 NOTE — PROGRESS NOTES
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 Wound beds are clean, no drainage, and superficial. Minimal area opened. Wounds have not worsened in the past month.   Is utilizing sitting in a chair more, discussed ordering an offloading cushion such as a ROHO to help mitigate pressure on the wounds, and future preventative therapy  She just started seeing pelvic floor therapy, it is hard for her to leave her assisted living and having trouble making all of her appointments  Excellent appetite with protein intake.   Wound care plan: Barrier cream with Cutamed dressings. Dressing will be performed by facility staff See bottom of note for detailed wound care and patient instructions  Dietary recommendations discussed, see AVS       HISTORY OF PRESENT ILLNESS:   Opal Sin is a 88 year old female with a history of Parkinson's disease and urinary incontinence who noticed to have two small wounds on her buttocks in November of 2023. Was applying barrier paste to the area, reports the wounds have not worsened. Of note, she has been sitting in a chair more.     She denies fevers, chills. Excellent appetite.   Patient Active Problem List   Diagnosis    Mixed hyperlipidemia    Essential hypertension    Internal derangement of knee    Degeneration of intervertebral disc, site unspecified    Cervical spinal stenosis    Acute bronchospasm    Hyposmolality and/or hyponatremia    Osteoporosis    HYPERLIPIDEMIA LDL GOAL <130    CKD (chronic kidney disease) stage 3, GFR 30-59 ml/min (H)    Intermittent asthma    Microalbuminuria    Essential hypertension, benign    Breast CA (H)    Previous back surgery    DJD (degenerative joint disease), ankle and foot    Ankle joint pain    Enthesopathy of ankle and tarsus    Abnormal Pap smear of cervix    Lung  nodule    Esophageal reflux    6th nerve palsy    Hx of osteomyelitis    Hx of antibiotic allergy    Advanced directives, counseling/discussion    Hyponatremia    Leg hematoma    Fall from standing    Orbital fracture (H)    Subdural bleeding (H)    Physical deconditioning    Spinal stenosis, unspecified region other than cervical    Chronic pain    Parkinson's disease    TIA (transient ischemic attack)    Carotid aneurysm non ruptured    Delirium    Cerebral aneurysm    Benign essential hypertension    CVA (cerebral vascular accident) (H)    Aphasia    Thyroid nodule    Fall    Closed fracture of right iliac wing with routine healing, subsequent encounter    Pain    Macular degeneration of both eyes, unspecified type    Aneurysm of carotid artery (H24)    Mild major depression (H)    Somatic dysfunction of pelvic region    Urinary incontinence, unspecified type       TREATMENT COURSE:  12/15/2023 : Presents for initial visit at our clinic.   URINE MANAGEMENT: incontinent, using diapers  BOWEL MANAGEMENT: continent    MEDICATIONS:   Current Outpatient Medications   Medication    acetaminophen (TYLENOL) 500 MG tablet    albuterol (PROAIR HFA/PROVENTIL HFA/VENTOLIN HFA) 108 (90 Base) MCG/ACT inhaler    alendronate (FOSAMAX) 35 MG tablet    amLODIPine (NORVASC) 5 MG tablet    aspirin EC 81 MG EC tablet    atorvastatin (LIPITOR) 20 MG tablet    azithromycin (ZITHROMAX) 500 MG tablet    calcium 600 MG tablet    carbidopa-levodopa (SINEMET)  MG per tablet    carvedilol (COREG) 6.25 MG tablet    Cholecalciferol (VITAMIN D3 PO)    Cyanocobalamin (VITAMIN B 12 PO)    gabapentin (NEURONTIN) 100 MG capsule    Iron-Vitamins (GERITOL COMPLETE) TABS    loperamide (IMODIUM) 2 MG capsule    losartan (COZAAR) 100 MG tablet    omeprazole (PRILOSEC) 20 MG DR capsule    polyethylene glycol (MIRALAX/GLYCOLAX) packet    raloxifene (EVISTA) 60 MG tablet    senna-docusate (SENOKOT-S;PERICOLACE) 8.6-50 MG per tablet    sertraline  (ZOLOFT) 25 MG tablet    tolterodine (DETROL) 1 MG tablet    tolterodine (DETROL) 2 MG tablet     No current facility-administered medications for this encounter.       VITALS: BP (!) 157/75   Pulse 70   Temp 97.1  F (36.2  C) (Temporal)   Wt 53.8 kg (118 lb 8 oz)   BMI 21.85 kg/m       PHYSICAL EXAM:  GENERAL: Patient is alert and oriented and in no acute distress  INTEGUMENTARY:   Wound (used by OP [facility] only) 12/15/23 1121 Right gluteal MASD (moisture associated skin damage) (Active)   Thickness/Stage full thickness 12/15/23 1100   Base red 12/15/23 1100   Periwound macerated 12/15/23 1100   Periwound Temperature warm 12/15/23 1100   Periwound Skin Turgor soft 12/15/23 1100   Edges rolled/closed 12/15/23 1100   Length (cm) 1.1 12/15/23 1100   Width (cm) 0.6 12/15/23 1100   Depth (cm) 0.3 12/15/23 1100   Wound (cm^2) 0.66 cm^2 12/15/23 1100   Wound Volume (cm^3) 0.2 cm^3 12/15/23 1100   Drainage Characteristics/Odor serosanguineous 12/15/23 1100   Drainage Amount moderate 12/15/23 1100   Care, Wound non-select wound debridement performed. 12/15/23 1100       Wound (used by OP [facility] only) 12/15/23 1122 Left gluteal MASD (moisture associated skin damage) (Active)   Thickness/Stage full thickness 12/15/23 1100   Base pink;red 12/15/23 1100   Periwound intact 12/15/23 1100   Periwound Temperature warm 12/15/23 1100   Periwound Skin Turgor soft 12/15/23 1100   Edges open 12/15/23 1100   Length (cm) 0.2 12/15/23 1100   Width (cm) 0.6 12/15/23 1100   Depth (cm) 0.2 12/15/23 1100   Wound (cm^2) 0.12 cm^2 12/15/23 1100   Wound Volume (cm^3) 0.02 cm^3 12/15/23 1100   Drainage Characteristics/Odor serosanguineous 12/15/23 1100   Drainage Amount moderate 12/15/23 1100   Care, Wound non-select wound debridement performed. 12/15/23 1100             MDM: 30 minutes were spent on the date of the visit reviewing previous chart notes, evaluating patient and developing the treatment plan, this excludes any time spent on  procedures    PATIENT INSTRUCTIONS      Further instructions from your care team         Opal Sin      1935    A DME order for supplies has been placed to [facility], [address]. If there are any issues with your order including not receiving the order please call [facility] at [phone] option 1. They can also provide a tracking number for you if you had supplies shipped to you.    Dressing changes outside of clinic are being performed by Patient and assisted living    Assisted Living: [facility]  Phone: [phone]  Fax:[phone]     PLAN:  -Look into getting a high profile Roho cushion on [employer] to offload pressure. Will need inflated properly. Inflate it and put your hand underneath the cushion and above the wheelchair tap with your finger and if you feel a bony prominence it will need to be inflated more.   -Call the clinic if wounds worsen  -If your facility has wound care provider that rounds you can certainly follow with them. Give us a call if this is the route you wish to take.       Wound Dressing Change: Right and left gluteal  -Wash your hands with soap and water before you begin your dressing change and prepare a clean surface for dressings.  -Cleanse with mild unscented soap and water (such as Cetaphil, Cerave or Dove)   -Apply zinc oxide to both areas (Desitin cream). Only remove the soiled layer of cream when changing the dressing if needed and then apply a thin layer over the remaining. If wishing to remove all use baby oil to remove.   -Apply 1 6x6 Cutimed sorbian border dressing to cover both wounds  -Change daily. If Incontinent and the dressing is soiled you will need to remove and re-apply.       Repositioning:  Bed: Reposition MINIMALLY every 1-2 hours in bed or your chair to relieve pressure and promote perfusion to tissue.  Sit on a chair cushion when up to the chair. Don't use your neck pillow as a cushion. Look into getting a roho  cushion.      A diet high in protein is important for wound healing, we recommend getting 90 grams of protein per day. Taking protein shakes or bars are a good way to get extra protein in your diet.     Good sources of protein:  Pork 26g per 3 oz  Whey protein powder - 24g per scoop (on average)  Greek yogurt - 23g per 8oz   Chicken or Turkey - 23g per 3oz  Fish - 20-25g per 3oz  Beef - 18-23g per 3oz  Navy beans - 20g per cup  Cottage cheese - 14g per 1/2 cup   Lentils - 13g per 1/4 cup  Beef jerky 13g per 1oz  2% milk - 8g per cup  Peanut butter - 8g per 2 tablespoons  Eggs - 6g per egg  Mixed nuts - 6g per 2oz       Alva Talley NP December 15, 2023    Call us at [phone] if you have any questions about your wounds, have redness or swelling around your wound, have a fever of 101 degrees Fahrenheit or greater or if you have any other problems or concerns. We answer the phone Monday through Friday 8 am to 4 pm, please leave a message as we check the voicemail frequently throughout the day.     If you had a positive experience please indicate that on your patient satisfaction survey form that [facility] will be sending you.    It was a pleasure meeting with you today.  Thank you for allowing me and my team the privilege of caring for you today.  YOU are the reason we are here, and I truly hope we provided you with the excellent service you deserve.  Please let us know if there is anything else we can do for you so that we can be sure you are leaving completely satisfied with your care experience.      If you have any billing related questions please call the [facility] Business office at [phone]. The clinic staff does not handle billing related matters.    If you are scheduled to have a follow up appointment, you will receive a reminder call the day before your visit. On the appointment day please arrive 15 minutes prior to your appointment time. If you are unable to keep that appointment, please call  the clinic to cancel or reschedule. If you are more than 10 minutes late or greater for your scheduled appointment time, the clinic policy is that you may be asked to reschedule.          Electronically signed by Alva Talley CNP on December 15, 2023

## 2023-12-18 ENCOUNTER — TELEPHONE (OUTPATIENT)
Dept: FAMILY MEDICINE | Facility: CLINIC | Age: 88
End: 2023-12-18
Payer: COMMERCIAL

## 2023-12-18 NOTE — TELEPHONE ENCOUNTER
Lul[facility], home care PT called with a Medication question:     On her med list, Carvedilol and Ventolin is showing a drug-drug potential interaction. Reviewed med list, no interaction was overridden or noted in Epic between these two meds    PT asking if okay to continue both as prescribed?      Sabina MARTIN, Triage RN  [facility]

## 2023-12-19 NOTE — TELEPHONE ENCOUNTER
Detailed message left for home care OT notifying her of PCP response below     Sabina MARTIN, Triage RN  [facility]

## 2023-12-22 DIAGNOSIS — M80.00XS OSTEOPOROSIS WITH CURRENT PATHOLOGICAL FRACTURE, UNSPECIFIED OSTEOPOROSIS TYPE, SEQUELA: ICD-10-CM

## 2023-12-24 NOTE — TELEPHONE ENCOUNTER
Will route to medical therapeutic management to assist with dosing of alendronate given history of chronic kidney disease     Damian Russ MD

## 2023-12-26 NOTE — TELEPHONE ENCOUNTER
Can we please follow-up with Opal and inform her that as she has been on alendronate for more than 5 years and that she is currently taking Evista may be recommended to take a holiday from bisphosphonate and that this medication could be discontinued.  We can discuss in a virtual visit if she wishes?

## 2023-12-26 NOTE — TELEPHONE ENCOUNTER
Hello -     There are no dose adjustments for bisphosphonates. Alendronate should be stopped if CrCl is <35ml/min. Based on her most recent BMP (which was done in July) her creatinine clearance is currently below this. I also see she is on Evista and she is being treated for osteopenia (I did not look to see if she had a fracture).  It appears that she has been on alendronate since 2015, since it's been more than 5+ years if she has not had worsening bone density or a fracture, perhaps a bisphosphonate holiday would make sense. If she is high-risk for fracture, then changing to Prolia is the next step. Could also get an updated SCr to see if it has improved and safe to stay on therapy.     I think an appointment with [facility] or Dr. Russ makes the most sense to discuss next options.     Estimated Creatinine Clearance: 34 mL/min (A) (based on SCr of 1.01 mg/dL (H)).    Andra Wyatt, JasmyneD, JORGE, BCACP  MTM Pharmacist, [facility]

## 2023-12-28 RX ORDER — ALENDRONATE SODIUM 35 MG/1
TABLET ORAL
Qty: 12 TABLET | Refills: 3 | OUTPATIENT
Start: 2023-12-28

## 2024-01-08 DIAGNOSIS — I10 ESSENTIAL HYPERTENSION WITH GOAL BLOOD PRESSURE LESS THAN 140/90: ICD-10-CM

## 2024-01-08 RX ORDER — CARVEDILOL 6.25 MG/1
6.25 TABLET ORAL 2 TIMES DAILY WITH MEALS
Qty: 180 TABLET | Refills: 3 | Status: ON HOLD | OUTPATIENT
Start: 2024-01-08 | End: 2024-05-07

## 2024-01-15 NOTE — PROGRESS NOTES
DISCHARGE  Reason for Discharge: Patient chooses to discontinue therapy and has not been treated beyond initial encounter.    Equipment Issued: none    Discharge Plan: Patient to continue home program. Due to patient not presenting beyond initial evaluation, discharge status and progress towards goals is unknown.     Referring Provider:  Damian Russ         09/11/23 0500   Appointment Info   Signing clinician's name / credentials Alyce Barrett, PT, DPT   Total/Authorized Visits 12   Visits Used 1   Medical Diagnosis Urinary incontinence, unspecified type   PT Tx Diagnosis Somatic dysfunction of pelvic region   Quick Adds Certification;Pelvic Consent   Progress Note/Certification   Start of Care Date 09/11/23   Onset of illness/injury or Date of Surgery 07/10/23   Therapy Frequency 1x per week for 3 weeks, 1x per 3 weeks as indicated by pt presentation   Predicted Duration 12 weeks   Certification date from 09/11/23   Certification date to 12/09/23   GOALS   PT Goals 3;2   PT Goal 1   Goal Identifier self management   Goal Description Patient will report ability to self manage condition independently   Rationale to maximize safety and independence with performance of ADLs and functional tasks;to maximize safety and independence within the home;to maximize safety and independence within the community;to maximize safety and independence with transportation;to maximize safety and independence with self cares   PT Goal 2   Goal Identifier Pelvic floor muscle strength   Goal Description Patient will acheive at least 4/5 strength of pelvic floor   Rationale to maximize safety and independence with performance of ADLs and functional tasks;to maximize safety and independence within the home;to maximize safety and independence within the community;to maximize safety and independence with transportation;to maximize safety and independence with self cares   Goal Progress 12/9/23   PT Goal 3   Goal Identifier  Urinary leakage   Goal Description Patient will report at least 75% reduction in urinary leakage   Rationale to maximize safety and independence with performance of ADLs and functional tasks;to maximize safety and independence within the home;to maximize safety and independence within the community;to maximize safety and independence with transportation;to maximize safety and independence with self cares   Goal Progress 12/9/23   Subjective Report   Subjective Report See eval   Objective Measures   Objective Measures Objective Measure 1   Objective Measure 1   Objective Measure See eval   Treatment Interventions (PT)   Interventions Therapeutic Procedure/Exercise;Therapeutic Activity;Neuromuscular Re-education;Manual Therapy   Therapeutic Activity   Therapeutic Activities: dynamic activities to improve functional performance minutes (01760) 15   Therapeutic Activities Ther Act 2   Ther Act 1 Patient education   Ther Act 1 - Details Discussion on pelvic floor anatomy and function as it relates to pt condition to increase understanding, to review evaluation findings, and to discuss POC. Utilized 2D and 3D pelvic model for increased understanding. Answered patient questions to her satisfaction.   PTRx Ther Act 1 Bladder Diary   PTRx Ther Act 1 - Details Reviewed and instructed patient to complete 2 days of bladder diary before next session   Ther Act 2 Extra time taken for patient transfers and rest breaks as needed   Neuromuscular Re-education   Neuromuscular re-ed of mvmt, balance, coord, kinesthetic sense, posture, proprioception minutes (36477) 15   Neuromuscular Re-education Neuro Re-ed 2   Neuro Re-ed 1 Supine Pelvic Floor Muscle Strengthening   Neuro Re-ed 1 - Details 1x8 with internal palpation for feedback to patient utilized a variety of cues for optimal contraction without leg and abdominal compensation   Neuro Re-ed 2 Pelvic Floor Muscle Strengthening Quick Flicks   Neuro Re-ed 2 - Details 1x4 in supine  with internal palpation for quality VC to reduce contraction to 50% with reduction in compensation   Manual Therapy   Manual Therapy: Mobilization, MFR, MLD, friction massage minutes (22976) 13   Manual Therapy Manual Therapy 2   Manual Therapy 1 STM to right hip   Manual Therapy 1 - Details Mild to moderate pressure delivered to right iliacus along iliac crest following internal assessment and to right quad insertion to femur with sustained pressure and génesis oscilation.   Patient Response/Progress Pt reported decrease of pain following   Manual Therapy 2 STM to pelvic floor muscles   Manual Therapy 2 - Details Min to mod pressure to R LA muscles with sustained pressure to reduce tension   Intervention (Other)   PTRx Other  1 General Bladder Information   PTRx Other 1 - Details No Notes   Eval/Assessments   PT Eval, Moderate Complexity Minutes (53470) 30   Plan   Plan for next session Review bladder diary, initiate core stabilization exercise, initiate hip strengthening   Comments   Pelvic Health Informed Consent Statement Discussed with patient/guardian reason for referral regarding pelvic health needs and external/internal pelvic floor muscle examination.  Opportunity provided to ask questions and verbal consent for assessment and intervention was given.   Total Session Time   Timed Code Treatment Minutes 43   Total Treatment Time (sum of timed and untimed services) 73

## 2024-01-17 DIAGNOSIS — F32.0 MILD MAJOR DEPRESSION (H): ICD-10-CM

## 2024-01-17 RX ORDER — SERTRALINE HYDROCHLORIDE 25 MG/1
TABLET, FILM COATED ORAL
Qty: 90 TABLET | Refills: 0 | Status: SHIPPED | OUTPATIENT
Start: 2024-01-17 | End: 2024-05-13

## 2024-01-17 NOTE — TELEPHONE ENCOUNTER
Prescription approved per [facility] Refill Protocol.  Gina Babb, RN  [facility] Triage Nurse

## 2024-01-24 ENCOUNTER — TELEPHONE (OUTPATIENT)
Dept: FAMILY MEDICINE | Facility: CLINIC | Age: 89
End: 2024-01-24
Payer: COMMERCIAL

## 2024-01-24 DIAGNOSIS — C50.912 MALIGNANT NEOPLASM OF LEFT FEMALE BREAST, UNSPECIFIED ESTROGEN RECEPTOR STATUS, UNSPECIFIED SITE OF BREAST (H): Primary | ICD-10-CM

## 2024-01-24 NOTE — TELEPHONE ENCOUNTER
"Patient calling clinic to request new order for Mastectomy Bras. Order details pended, please fax order to:    [facility], Fax Number: [phone].    Patient is also requesting to be restarted on Gabapentin. Patient stated she had \"taken herself off of gabapentin\" a while ago. Patient stated she has been having more shoulder recently and stated she fell one month ago in her assisted living, patient denied falling recently or any severe pain.     Routing to PCP for review.   "

## 2024-01-24 NOTE — TELEPHONE ENCOUNTER
Called patient regarding writer samuel salcedo to [facility] at below number at [phone]. She verbalized understanding.     Discussed starting gabapentin. She wants to hold on this because she is seeing an orthopedic doctor regarding this.

## 2024-02-01 DIAGNOSIS — E78.2 MIXED HYPERLIPIDEMIA: ICD-10-CM

## 2024-02-02 RX ORDER — ATORVASTATIN CALCIUM 20 MG/1
TABLET, FILM COATED ORAL
Qty: 90 TABLET | Refills: 3 | Status: SHIPPED | OUTPATIENT
Start: 2024-02-02 | End: 2024-07-12

## 2024-02-05 ENCOUNTER — TRANSFERRED RECORDS (OUTPATIENT)
Dept: HEALTH INFORMATION MANAGEMENT | Facility: CLINIC | Age: 89
End: 2024-02-05
Payer: COMMERCIAL

## 2024-03-29 DIAGNOSIS — R32 URINARY INCONTINENCE, UNSPECIFIED TYPE: ICD-10-CM

## 2024-04-01 RX ORDER — TOLTERODINE TARTRATE 1 MG/1
TABLET, EXTENDED RELEASE ORAL
Qty: 180 TABLET | Refills: 0 | Status: SHIPPED | OUTPATIENT
Start: 2024-04-01 | End: 2024-07-12

## 2024-04-25 DIAGNOSIS — M81.0 AGE-RELATED OSTEOPOROSIS WITHOUT CURRENT PATHOLOGICAL FRACTURE: ICD-10-CM

## 2024-04-25 RX ORDER — RALOXIFENE HYDROCHLORIDE 60 MG/1
TABLET, FILM COATED ORAL
Qty: 90 TABLET | Refills: 3 | Status: SHIPPED | OUTPATIENT
Start: 2024-04-25

## 2024-05-01 ENCOUNTER — HOSPITAL ENCOUNTER (INPATIENT)
Facility: CLINIC | Age: 89
LOS: 5 days | Discharge: HOME HEALTH | DRG: 291 | End: 2024-05-07
Attending: EMERGENCY MEDICINE | Admitting: INTERNAL MEDICINE
Payer: COMMERCIAL

## 2024-05-01 ENCOUNTER — APPOINTMENT (OUTPATIENT)
Dept: GENERAL RADIOLOGY | Facility: CLINIC | Age: 89
DRG: 291 | End: 2024-05-01
Attending: EMERGENCY MEDICINE
Payer: COMMERCIAL

## 2024-05-01 DIAGNOSIS — I10 BENIGN ESSENTIAL HYPERTENSION: Primary | ICD-10-CM

## 2024-05-01 DIAGNOSIS — J45.20 INTERMITTENT ASTHMA WITHOUT COMPLICATION, UNSPECIFIED ASTHMA SEVERITY: ICD-10-CM

## 2024-05-01 DIAGNOSIS — J81.0 ACUTE PULMONARY EDEMA (H): ICD-10-CM

## 2024-05-01 DIAGNOSIS — D64.9 ANEMIA, UNSPECIFIED TYPE: ICD-10-CM

## 2024-05-01 DIAGNOSIS — R79.89 ELEVATED TROPONIN: ICD-10-CM

## 2024-05-01 DIAGNOSIS — E87.1 HYPONATREMIA: ICD-10-CM

## 2024-05-01 DIAGNOSIS — J15.9 BACTERIAL PNEUMONIA: ICD-10-CM

## 2024-05-01 DIAGNOSIS — J96.01 ACUTE RESPIRATORY FAILURE WITH HYPOXIA (H): ICD-10-CM

## 2024-05-01 LAB
ATRIAL RATE - MUSE: 85 BPM
DIASTOLIC BLOOD PRESSURE - MUSE: NORMAL MMHG
INTERPRETATION ECG - MUSE: NORMAL
P AXIS - MUSE: 77 DEGREES
PR INTERVAL - MUSE: 188 MS
QRS DURATION - MUSE: 86 MS
QT - MUSE: 364 MS
QTC - MUSE: 433 MS
R AXIS - MUSE: 8 DEGREES
SYSTOLIC BLOOD PRESSURE - MUSE: NORMAL MMHG
T AXIS - MUSE: 70 DEGREES
VENTRICULAR RATE- MUSE: 85 BPM

## 2024-05-01 PROCEDURE — 80053 COMPREHEN METABOLIC PANEL: CPT | Performed by: EMERGENCY MEDICINE

## 2024-05-01 PROCEDURE — 82803 BLOOD GASES ANY COMBINATION: CPT

## 2024-05-01 PROCEDURE — 93005 ELECTROCARDIOGRAM TRACING: CPT

## 2024-05-01 PROCEDURE — 96374 THER/PROPH/DIAG INJ IV PUSH: CPT

## 2024-05-01 PROCEDURE — 36415 COLL VENOUS BLD VENIPUNCTURE: CPT | Performed by: EMERGENCY MEDICINE

## 2024-05-01 PROCEDURE — 84484 ASSAY OF TROPONIN QUANT: CPT | Performed by: EMERGENCY MEDICINE

## 2024-05-01 PROCEDURE — 250N000009 HC RX 250

## 2024-05-01 PROCEDURE — 83880 ASSAY OF NATRIURETIC PEPTIDE: CPT | Performed by: EMERGENCY MEDICINE

## 2024-05-01 PROCEDURE — 999N000157 HC STATISTIC RCP TIME EA 10 MIN

## 2024-05-01 PROCEDURE — 99291 CRITICAL CARE FIRST HOUR: CPT | Mod: 25

## 2024-05-01 PROCEDURE — 94660 CPAP INITIATION&MGMT: CPT

## 2024-05-01 PROCEDURE — 94640 AIRWAY INHALATION TREATMENT: CPT

## 2024-05-01 PROCEDURE — 84145 PROCALCITONIN (PCT): CPT | Performed by: EMERGENCY MEDICINE

## 2024-05-01 PROCEDURE — 250N000011 HC RX IP 250 OP 636: Performed by: EMERGENCY MEDICINE

## 2024-05-01 PROCEDURE — 71045 X-RAY EXAM CHEST 1 VIEW: CPT

## 2024-05-01 PROCEDURE — 87637 SARSCOV2&INF A&B&RSV AMP PRB: CPT | Performed by: EMERGENCY MEDICINE

## 2024-05-01 PROCEDURE — 85025 COMPLETE CBC W/AUTO DIFF WBC: CPT | Performed by: EMERGENCY MEDICINE

## 2024-05-01 RX ORDER — IPRATROPIUM BROMIDE AND ALBUTEROL SULFATE 2.5; .5 MG/3ML; MG/3ML
SOLUTION RESPIRATORY (INHALATION)
Status: COMPLETED
Start: 2024-05-01 | End: 2024-05-01

## 2024-05-01 RX ORDER — IPRATROPIUM BROMIDE AND ALBUTEROL SULFATE 2.5; .5 MG/3ML; MG/3ML
SOLUTION RESPIRATORY (INHALATION)
Status: COMPLETED
Start: 2024-05-01 | End: 2024-05-02

## 2024-05-01 RX ORDER — METHYLPREDNISOLONE SODIUM SUCCINATE 125 MG/2ML
125 INJECTION, POWDER, LYOPHILIZED, FOR SOLUTION INTRAMUSCULAR; INTRAVENOUS ONCE
Status: COMPLETED | OUTPATIENT
Start: 2024-05-01 | End: 2024-05-01

## 2024-05-01 RX ADMIN — METHYLPREDNISOLONE SODIUM SUCCINATE 125 MG: 125 INJECTION, POWDER, FOR SOLUTION INTRAMUSCULAR; INTRAVENOUS at 23:58

## 2024-05-01 RX ADMIN — IPRATROPIUM BROMIDE AND ALBUTEROL SULFATE 3 ML: .5; 3 SOLUTION RESPIRATORY (INHALATION) at 23:46

## 2024-05-02 ENCOUNTER — APPOINTMENT (OUTPATIENT)
Dept: CARDIOLOGY | Facility: CLINIC | Age: 89
DRG: 291 | End: 2024-05-02
Attending: INTERNAL MEDICINE
Payer: COMMERCIAL

## 2024-05-02 LAB
ALBUMIN SERPL BCG-MCNC: 3.3 G/DL (ref 3.5–5.2)
ALP SERPL-CCNC: 113 U/L (ref 40–150)
ALT SERPL W P-5'-P-CCNC: <5 U/L (ref 0–50)
ANION GAP SERPL CALCULATED.3IONS-SCNC: 10 MMOL/L (ref 7–15)
ANION GAP SERPL CALCULATED.3IONS-SCNC: 13 MMOL/L (ref 7–15)
AST SERPL W P-5'-P-CCNC: 13 U/L (ref 0–45)
BASE EXCESS BLDV CALC-SCNC: -1 MMOL/L (ref -3–3)
BASOPHILS # BLD AUTO: 0.1 10E3/UL (ref 0–0.2)
BASOPHILS NFR BLD AUTO: 0 %
BILIRUB SERPL-MCNC: 0.4 MG/DL
BUN SERPL-MCNC: 28.8 MG/DL (ref 8–23)
BUN SERPL-MCNC: 29.2 MG/DL (ref 8–23)
CALCIUM SERPL-MCNC: 8.9 MG/DL (ref 8.8–10.2)
CALCIUM SERPL-MCNC: 9 MG/DL (ref 8.8–10.2)
CHLORIDE SERPL-SCNC: 100 MMOL/L (ref 98–107)
CHLORIDE SERPL-SCNC: 102 MMOL/L (ref 98–107)
CREAT SERPL-MCNC: 0.91 MG/DL (ref 0.51–0.95)
CREAT SERPL-MCNC: 0.92 MG/DL (ref 0.51–0.95)
CRP SERPL-MCNC: 148.13 MG/L
DEPRECATED HCO3 PLAS-SCNC: 21 MMOL/L (ref 22–29)
DEPRECATED HCO3 PLAS-SCNC: 23 MMOL/L (ref 22–29)
EGFRCR SERPLBLD CKD-EPI 2021: 60 ML/MIN/1.73M2
EGFRCR SERPLBLD CKD-EPI 2021: 61 ML/MIN/1.73M2
EOSINOPHIL # BLD AUTO: 0 10E3/UL (ref 0–0.7)
EOSINOPHIL NFR BLD AUTO: 0 %
ERYTHROCYTE [DISTWIDTH] IN BLOOD BY AUTOMATED COUNT: 14.7 % (ref 10–15)
ERYTHROCYTE [DISTWIDTH] IN BLOOD BY AUTOMATED COUNT: 14.9 % (ref 10–15)
FLUAV RNA SPEC QL NAA+PROBE: NEGATIVE
FLUBV RNA RESP QL NAA+PROBE: NEGATIVE
GLUCOSE SERPL-MCNC: 124 MG/DL (ref 70–99)
GLUCOSE SERPL-MCNC: 162 MG/DL (ref 70–99)
HCO3 BLDV-SCNC: 23 MMOL/L (ref 21–28)
HCT VFR BLD AUTO: 30.7 % (ref 35–47)
HCT VFR BLD AUTO: 31.5 % (ref 35–47)
HGB BLD-MCNC: 10.4 G/DL (ref 11.7–15.7)
HGB BLD-MCNC: 10.8 G/DL (ref 11.7–15.7)
HOLD SPECIMEN: NORMAL
HOLD SPECIMEN: NORMAL
IMM GRANULOCYTES # BLD: 0.1 10E3/UL
IMM GRANULOCYTES NFR BLD: 1 %
LACTATE BLD-SCNC: 0.6 MMOL/L
LVEF ECHO: NORMAL
LYMPHOCYTES # BLD AUTO: 1 10E3/UL (ref 0.8–5.3)
LYMPHOCYTES NFR BLD AUTO: 6 %
MAGNESIUM SERPL-MCNC: 1.9 MG/DL (ref 1.7–2.3)
MCH RBC QN AUTO: 30.2 PG (ref 26.5–33)
MCH RBC QN AUTO: 30.3 PG (ref 26.5–33)
MCHC RBC AUTO-ENTMCNC: 33.9 G/DL (ref 31.5–36.5)
MCHC RBC AUTO-ENTMCNC: 34.3 G/DL (ref 31.5–36.5)
MCV RBC AUTO: 88 FL (ref 78–100)
MCV RBC AUTO: 89 FL (ref 78–100)
MONOCYTES # BLD AUTO: 1.4 10E3/UL (ref 0–1.3)
MONOCYTES NFR BLD AUTO: 8 %
NEUTROPHILS # BLD AUTO: 15.9 10E3/UL (ref 1.6–8.3)
NEUTROPHILS NFR BLD AUTO: 85 %
NRBC # BLD AUTO: 0 10E3/UL
NRBC BLD AUTO-RTO: 0 /100
NT-PROBNP SERPL-MCNC: 4044 PG/ML (ref 0–1800)
PCO2 BLDV: 35 MM HG (ref 40–50)
PH BLDV: 7.42 [PH] (ref 7.32–7.43)
PLATELET # BLD AUTO: 263 10E3/UL (ref 150–450)
PLATELET # BLD AUTO: 273 10E3/UL (ref 150–450)
PO2 BLDV: 25 MM HG (ref 25–47)
POTASSIUM SERPL-SCNC: 4.3 MMOL/L (ref 3.4–5.3)
POTASSIUM SERPL-SCNC: 4.5 MMOL/L (ref 3.4–5.3)
POTASSIUM SERPL-SCNC: 4.6 MMOL/L (ref 3.4–5.3)
PROCALCITONIN SERPL IA-MCNC: 0.06 NG/ML
PROT SERPL-MCNC: 6.2 G/DL (ref 6.4–8.3)
RBC # BLD AUTO: 3.44 10E6/UL (ref 3.8–5.2)
RBC # BLD AUTO: 3.57 10E6/UL (ref 3.8–5.2)
RSV RNA SPEC NAA+PROBE: NEGATIVE
SAO2 % BLDV: 47 % (ref 70–75)
SARS-COV-2 RNA RESP QL NAA+PROBE: NEGATIVE
SODIUM SERPL-SCNC: 133 MMOL/L (ref 135–145)
SODIUM SERPL-SCNC: 136 MMOL/L (ref 135–145)
TROPONIN T SERPL HS-MCNC: 22 NG/L
TROPONIN T SERPL HS-MCNC: 24 NG/L
WBC # BLD AUTO: 17.3 10E3/UL (ref 4–11)
WBC # BLD AUTO: 18.5 10E3/UL (ref 4–11)

## 2024-05-02 PROCEDURE — 99207 PR APP CREDIT; MD BILLING SHARED VISIT: CPT | Performed by: INTERNAL MEDICINE

## 2024-05-02 PROCEDURE — 250N000011 HC RX IP 250 OP 636: Performed by: INTERNAL MEDICINE

## 2024-05-02 PROCEDURE — 99418 PROLNG IP/OBS E/M EA 15 MIN: CPT | Performed by: INTERNAL MEDICINE

## 2024-05-02 PROCEDURE — 255N000002 HC RX 255 OP 636: Performed by: INTERNAL MEDICINE

## 2024-05-02 PROCEDURE — 99223 1ST HOSP IP/OBS HIGH 75: CPT | Performed by: INTERNAL MEDICINE

## 2024-05-02 PROCEDURE — 120N000001 HC R&B MED SURG/OB

## 2024-05-02 PROCEDURE — C8929 TTE W OR WO FOL WCON,DOPPLER: HCPCS

## 2024-05-02 PROCEDURE — 84132 ASSAY OF SERUM POTASSIUM: CPT | Performed by: INTERNAL MEDICINE

## 2024-05-02 PROCEDURE — 999N000208 ECHOCARDIOGRAM COMPLETE

## 2024-05-02 PROCEDURE — 250N000011 HC RX IP 250 OP 636: Performed by: EMERGENCY MEDICINE

## 2024-05-02 PROCEDURE — 86140 C-REACTIVE PROTEIN: CPT | Performed by: INTERNAL MEDICINE

## 2024-05-02 PROCEDURE — 36415 COLL VENOUS BLD VENIPUNCTURE: CPT | Performed by: INTERNAL MEDICINE

## 2024-05-02 PROCEDURE — 84484 ASSAY OF TROPONIN QUANT: CPT | Performed by: INTERNAL MEDICINE

## 2024-05-02 PROCEDURE — 93306 TTE W/DOPPLER COMPLETE: CPT | Mod: 26 | Performed by: INTERNAL MEDICINE

## 2024-05-02 PROCEDURE — 85027 COMPLETE CBC AUTOMATED: CPT | Performed by: INTERNAL MEDICINE

## 2024-05-02 PROCEDURE — 83735 ASSAY OF MAGNESIUM: CPT | Performed by: INTERNAL MEDICINE

## 2024-05-02 PROCEDURE — 250N000013 HC RX MED GY IP 250 OP 250 PS 637: Performed by: INTERNAL MEDICINE

## 2024-05-02 PROCEDURE — 80048 BASIC METABOLIC PNL TOTAL CA: CPT | Performed by: INTERNAL MEDICINE

## 2024-05-02 RX ORDER — LOSARTAN POTASSIUM 100 MG/1
100 TABLET ORAL EVERY EVENING
Status: DISCONTINUED | OUTPATIENT
Start: 2024-05-02 | End: 2024-05-07 | Stop reason: HOSPADM

## 2024-05-02 RX ORDER — ACETAMINOPHEN 650 MG/1
650 SUPPOSITORY RECTAL EVERY 4 HOURS PRN
Status: DISCONTINUED | OUTPATIENT
Start: 2024-05-02 | End: 2024-05-07 | Stop reason: HOSPADM

## 2024-05-02 RX ORDER — PANTOPRAZOLE SODIUM 40 MG/1
40 TABLET, DELAYED RELEASE ORAL DAILY
Status: DISCONTINUED | OUTPATIENT
Start: 2024-05-02 | End: 2024-05-07 | Stop reason: HOSPADM

## 2024-05-02 RX ORDER — AMOXICILLIN 250 MG
1 CAPSULE ORAL 2 TIMES DAILY PRN
Status: DISCONTINUED | OUTPATIENT
Start: 2024-05-02 | End: 2024-05-07 | Stop reason: HOSPADM

## 2024-05-02 RX ORDER — AZITHROMYCIN 250 MG/1
250 TABLET, FILM COATED ORAL DAILY
Status: COMPLETED | OUTPATIENT
Start: 2024-05-03 | End: 2024-05-06

## 2024-05-02 RX ORDER — AMOXICILLIN 250 MG
2 CAPSULE ORAL 2 TIMES DAILY PRN
Status: DISCONTINUED | OUTPATIENT
Start: 2024-05-02 | End: 2024-05-07 | Stop reason: HOSPADM

## 2024-05-02 RX ORDER — MULTIPLE VITAMINS W/ MINERALS TAB 9MG-400MCG
1 TAB ORAL DAILY
COMMUNITY
End: 2024-06-03

## 2024-05-02 RX ORDER — ASPIRIN 81 MG/1
81 TABLET ORAL DAILY
Status: DISCONTINUED | OUTPATIENT
Start: 2024-05-02 | End: 2024-05-07 | Stop reason: HOSPADM

## 2024-05-02 RX ORDER — ATORVASTATIN CALCIUM 20 MG/1
20 TABLET, FILM COATED ORAL DAILY
Status: DISCONTINUED | OUTPATIENT
Start: 2024-05-02 | End: 2024-05-07 | Stop reason: HOSPADM

## 2024-05-02 RX ORDER — POLYETHYLENE GLYCOL 3350 17 G/17G
17 POWDER, FOR SOLUTION ORAL 2 TIMES DAILY PRN
Status: DISCONTINUED | OUTPATIENT
Start: 2024-05-02 | End: 2024-05-07 | Stop reason: HOSPADM

## 2024-05-02 RX ORDER — LIDOCAINE 40 MG/G
CREAM TOPICAL
Status: DISCONTINUED | OUTPATIENT
Start: 2024-05-02 | End: 2024-05-07 | Stop reason: HOSPADM

## 2024-05-02 RX ORDER — CARBIDOPA AND LEVODOPA 25; 100 MG/1; MG/1
1 TABLET ORAL ONCE
Status: COMPLETED | OUTPATIENT
Start: 2024-05-02 | End: 2024-05-02

## 2024-05-02 RX ORDER — FUROSEMIDE 10 MG/ML
40 INJECTION INTRAMUSCULAR; INTRAVENOUS ONCE
Status: COMPLETED | OUTPATIENT
Start: 2024-05-02 | End: 2024-05-02

## 2024-05-02 RX ORDER — CARVEDILOL 6.25 MG/1
6.25 TABLET ORAL 2 TIMES DAILY WITH MEALS
Status: DISCONTINUED | OUTPATIENT
Start: 2024-05-02 | End: 2024-05-02

## 2024-05-02 RX ORDER — VIT A/VIT C/VIT E/ZINC/COPPER 4296-226
1 CAPSULE ORAL 2 TIMES DAILY
COMMUNITY

## 2024-05-02 RX ORDER — ACETAMINOPHEN 325 MG/1
650 TABLET ORAL EVERY 4 HOURS PRN
Status: DISCONTINUED | OUTPATIENT
Start: 2024-05-02 | End: 2024-05-07 | Stop reason: HOSPADM

## 2024-05-02 RX ORDER — HYDRALAZINE HYDROCHLORIDE 10 MG/1
10 TABLET, FILM COATED ORAL EVERY 4 HOURS PRN
Status: DISCONTINUED | OUTPATIENT
Start: 2024-05-02 | End: 2024-05-07 | Stop reason: HOSPADM

## 2024-05-02 RX ORDER — SERTRALINE HYDROCHLORIDE 25 MG/1
25 TABLET, FILM COATED ORAL DAILY
Status: DISCONTINUED | OUTPATIENT
Start: 2024-05-02 | End: 2024-05-07 | Stop reason: HOSPADM

## 2024-05-02 RX ORDER — AZITHROMYCIN 250 MG/1
500 TABLET, FILM COATED ORAL ONCE
Status: COMPLETED | OUTPATIENT
Start: 2024-05-02 | End: 2024-05-02

## 2024-05-02 RX ORDER — FUROSEMIDE 10 MG/ML
40 INJECTION INTRAMUSCULAR; INTRAVENOUS EVERY 12 HOURS
Status: DISCONTINUED | OUTPATIENT
Start: 2024-05-02 | End: 2024-05-04

## 2024-05-02 RX ORDER — CARBIDOPA AND LEVODOPA 25; 100 MG/1; MG/1
2 TABLET ORAL 4 TIMES DAILY
Status: DISCONTINUED | OUTPATIENT
Start: 2024-05-02 | End: 2024-05-07 | Stop reason: HOSPADM

## 2024-05-02 RX ORDER — TOLTERODINE TARTRATE 2 MG/1
2 TABLET, EXTENDED RELEASE ORAL 2 TIMES DAILY
Status: DISCONTINUED | OUTPATIENT
Start: 2024-05-02 | End: 2024-05-07 | Stop reason: HOSPADM

## 2024-05-02 RX ORDER — HYDRALAZINE HYDROCHLORIDE 20 MG/ML
10 INJECTION INTRAMUSCULAR; INTRAVENOUS EVERY 4 HOURS PRN
Status: DISCONTINUED | OUTPATIENT
Start: 2024-05-02 | End: 2024-05-07 | Stop reason: HOSPADM

## 2024-05-02 RX ORDER — AMLODIPINE BESYLATE 5 MG/1
5 TABLET ORAL DAILY
Status: DISCONTINUED | OUTPATIENT
Start: 2024-05-02 | End: 2024-05-07 | Stop reason: HOSPADM

## 2024-05-02 RX ORDER — CARVEDILOL 12.5 MG/1
12.5 TABLET ORAL 2 TIMES DAILY WITH MEALS
Status: DISCONTINUED | OUTPATIENT
Start: 2024-05-02 | End: 2024-05-07 | Stop reason: HOSPADM

## 2024-05-02 RX ADMIN — TOLTERODINE TARTRATE 2 MG: 2 TABLET, FILM COATED ORAL at 13:17

## 2024-05-02 RX ADMIN — PANTOPRAZOLE SODIUM 40 MG: 40 TABLET, DELAYED RELEASE ORAL at 12:01

## 2024-05-02 RX ADMIN — CARBIDOPA AND LEVODOPA 2 TABLET: 25; 100 TABLET ORAL at 13:17

## 2024-05-02 RX ADMIN — TOLTERODINE TARTRATE 2 MG: 2 TABLET, FILM COATED ORAL at 20:22

## 2024-05-02 RX ADMIN — ASPIRIN 81 MG: 81 TABLET, COATED ORAL at 12:01

## 2024-05-02 RX ADMIN — CARBIDOPA AND LEVODOPA 1 TABLET: 25; 100 TABLET ORAL at 06:20

## 2024-05-02 RX ADMIN — CARVEDILOL 6.25 MG: 6.25 TABLET, FILM COATED ORAL at 12:01

## 2024-05-02 RX ADMIN — CARBIDOPA AND LEVODOPA 2 TABLET: 25; 100 TABLET ORAL at 20:22

## 2024-05-02 RX ADMIN — FUROSEMIDE 40 MG: 10 INJECTION, SOLUTION INTRAMUSCULAR; INTRAVENOUS at 09:23

## 2024-05-02 RX ADMIN — CARBIDOPA AND LEVODOPA 1 HALF-TAB: 25; 100 TABLET ORAL at 06:20

## 2024-05-02 RX ADMIN — FUROSEMIDE 40 MG: 10 INJECTION, SOLUTION INTRAMUSCULAR; INTRAVENOUS at 01:32

## 2024-05-02 RX ADMIN — FUROSEMIDE 40 MG: 10 INJECTION, SOLUTION INTRAMUSCULAR; INTRAVENOUS at 20:22

## 2024-05-02 RX ADMIN — CARVEDILOL 12.5 MG: 12.5 TABLET, FILM COATED ORAL at 17:30

## 2024-05-02 RX ADMIN — HUMAN ALBUMIN MICROSPHERES AND PERFLUTREN 9 ML: 10; .22 INJECTION, SOLUTION INTRAVENOUS at 15:22

## 2024-05-02 RX ADMIN — SERTRALINE HYDROCHLORIDE 25 MG: 25 TABLET ORAL at 13:17

## 2024-05-02 RX ADMIN — ATORVASTATIN CALCIUM 20 MG: 20 TABLET, FILM COATED ORAL at 12:01

## 2024-05-02 RX ADMIN — CARBIDOPA AND LEVODOPA 2 TABLET: 25; 100 TABLET ORAL at 17:28

## 2024-05-02 RX ADMIN — LOSARTAN POTASSIUM 100 MG: 100 TABLET, FILM COATED ORAL at 20:22

## 2024-05-02 RX ADMIN — AZITHROMYCIN DIHYDRATE 500 MG: 250 TABLET ORAL at 09:25

## 2024-05-02 RX ADMIN — ACETAMINOPHEN 650 MG: 325 TABLET, FILM COATED ORAL at 20:21

## 2024-05-02 ASSESSMENT — ACTIVITIES OF DAILY LIVING (ADL)
ADLS_ACUITY_SCORE: 60
ADLS_ACUITY_SCORE: 61
ADLS_ACUITY_SCORE: 60
ADLS_ACUITY_SCORE: 61
ADLS_ACUITY_SCORE: 61
ADLS_ACUITY_SCORE: 60
ADLS_ACUITY_SCORE: 56
ADLS_ACUITY_SCORE: 56
ADLS_ACUITY_SCORE: 58
ADLS_ACUITY_SCORE: 38
ADLS_ACUITY_SCORE: 49
ADLS_ACUITY_SCORE: 38
ADLS_ACUITY_SCORE: 60
ADLS_ACUITY_SCORE: 58
ADLS_ACUITY_SCORE: 38
ADLS_ACUITY_SCORE: 61
ADLS_ACUITY_SCORE: 60
ADLS_ACUITY_SCORE: 56
ADLS_ACUITY_SCORE: 61

## 2024-05-02 NOTE — ED PROVIDER NOTES
History   Chief Complaint:  SOB    HPI  Opal Sin is a 88 year old female who presents by EMS for evaluation of shortness of breath.  History limited due to patient acuity and the fact that she has BiPAP ongoing.    Independent Historian: EMS, who reports that the patient lives in a home alone, remote history of breast cancer status post left mastectomy, she called 911 tonight based on 3 days of progressive shortness of breath and a cough.  She was found to have an initial blood pressure of 190/110 with a room air oxygen saturation of 84% and respiratory rate of 40.  B-lines were noted on her prehospital ultrasound and she was given 4 sublingual nitroglycerin with some improvement of her breathing and blood pressure change down to 160/80.  She was put on CPAP which she has been tolerating for the past half hour with improvement in her work of breathing and symptoms.  No fevers.  She is not normally on oxygen.    Review of External Notes: I reviewed her prior records including her renal note from August of last year, she is followed for hypertension and modest renal insufficiency at baseline.    Medications:    acetaminophen (TYLENOL) 500 MG tablet  albuterol (PROAIR HFA/PROVENTIL HFA/VENTOLIN HFA) 108 (90 Base) MCG/ACT inhaler  alendronate (FOSAMAX) 35 MG tablet  amLODIPine (NORVASC) 5 MG tablet  aspirin EC 81 MG EC tablet  atorvastatin (LIPITOR) 20 MG tablet  azithromycin (ZITHROMAX) 500 MG tablet  calcium 600 MG tablet  carbidopa-levodopa (SINEMET)  MG per tablet  carvedilol (COREG) 6.25 MG tablet  Cholecalciferol (VITAMIN D3 PO)  Cyanocobalamin (VITAMIN B 12 PO)  gabapentin (NEURONTIN) 100 MG capsule  Iron-Vitamins (GERITOL COMPLETE) TABS  loperamide (IMODIUM) 2 MG capsule  losartan (COZAAR) 100 MG tablet  omeprazole (PRILOSEC) 20 MG DR capsule  polyethylene glycol (MIRALAX/GLYCOLAX) packet  raloxifene (EVISTA) 60 MG tablet  senna-docusate (SENOKOT-S;PERICOLACE) 8.6-50 MG per tablet  sertraline  (ZOLOFT) 25 MG tablet  tolterodine (DETROL) 1 MG tablet  tolterodine (DETROL) 2 MG tablet    Past Medical History:    Past Medical History:   Diagnosis Date    Asthma 5 yrs    Breast CA (H) 1987    Degeneration of intervertebral disc, site unspecified     Essential hypertension, benign     Gastro-oesophageal reflux disease     History of blood transfusion 2003    Hx of antibiotic allergy     Hyperlipidaemia     Osteomyelitis (H)     Osteoporosis, unspecified     Parkinson's disease 2015    PONV (postoperative nausea and vomiting)     Spinal stenosis, unspecified region other than cervical     Unspecified cerebral artery occlusion with cerebral infarction      Patient Active Problem List    Diagnosis Date Noted    Somatic dysfunction of pelvic region 09/12/2023     Priority: Medium    Urinary incontinence, unspecified type 09/12/2023     Priority: Medium    Mild major depression (H) 01/27/2020     Priority: Medium    Aneurysm of carotid artery (H24) 10/04/2019     Priority: Medium    Macular degeneration of both eyes, unspecified type 09/17/2019     Priority: Medium    Pain 02/21/2019     Priority: Medium    Closed fracture of right iliac wing with routine healing, subsequent encounter 07/27/2018     Priority: Medium    Fall 06/27/2018     Priority: Medium    Thyroid nodule 12/28/2017     Priority: Medium    Aphasia 08/09/2017     Priority: Medium    CVA (cerebral vascular accident) (H) 08/08/2017     Priority: Medium    Benign essential hypertension 11/23/2016     Priority: Medium    Cerebral aneurysm 11/21/2016     Priority: Medium     successful endovascular stent/coil treatment of the left cavernous ICA aneurysm 11/2016      Carotid aneurysm non ruptured 11/18/2016     Priority: Medium    Delirium 11/18/2016     Priority: Medium    TIA (transient ischemic attack) 11/15/2016     Priority: Medium    Parkinson's disease (H) 07/26/2016     Priority: Medium    Chronic pain 03/29/2016     Priority: Medium       reviewed 3-29-16      Spinal stenosis, unspecified region other than cervical      Priority: Medium     Implantable spinal stimulator      Leg hematoma 04/23/2015     Priority: Medium    Fall from standing 04/23/2015     Priority: Medium    Orbital fracture (H) 04/23/2015     Priority: Medium    Subdural bleeding (H) 04/23/2015     Priority: Medium    Physical deconditioning 04/23/2015     Priority: Medium    Hyponatremia 02/10/2015     Priority: Medium    Advanced directives, counseling/discussion 11/11/2014     Priority: Medium     Advance Care Planning:   Receipt of ACP document:  Received: Health Care Directive which was witnessed or notarized on 03-.  Document not previously scanned.  Validation form completed and sent with document to be scanned.  Code Status needs to be updated to reflect choices in most recent ACP document. Notification sent to Dr. Russ for followup.  Confirmed/documented designated decision maker(s). See permanent comments section of demographics in clinical tab. View document(s) and details by clicking on code status.   Added by Margo Jovel on 11/11/2014.            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 Tillman  A. Endocervix, curettage - No pathologic abnormality.    B. Endometrium, curettage - Squamous and endocervical tissue and scanty  atrophic endometrium without pathologic abnormalities.    C. Endometrium, polypectomy - Benign endometrial polyp without  additional pathologic abnormalities.    D. Cervix, cone biopsy - No pathologic abnormality.            Enthesopathy  of ankle and tarsus 05/22/2013     Priority: Medium     Problem list name updated by automated process. Provider to review      Ankle joint pain 05/09/2013     Priority: Medium    Previous back surgery 02/26/2013     Priority: Medium    DJD (degenerative joint disease), ankle and foot 02/26/2013     Priority: Medium    Breast CA (H)      Priority: Medium     L , radical mastectomy       Essential hypertension, benign      Priority: Medium    Microalbuminuria 05/23/2012     Priority: Medium    Intermittent asthma 05/16/2012     Priority: Medium    CKD (chronic kidney disease) stage 3, GFR 30-59 ml/min (H) 12/07/2011     Priority: Medium    HYPERLIPIDEMIA LDL GOAL <130 10/31/2010     Priority: Medium    Osteoporosis 03/28/2008     Priority: Medium     Problem list name updated by automated process. Provider to review      Acute bronchospasm 08/16/2007     Priority: Medium    Hyposmolality and/or hyponatremia 08/16/2007     Priority: Medium    Degeneration of intervertebral disc, site unspecified 01/26/2006     Priority: Medium    Cervical spinal stenosis 01/26/2006     Priority: Medium    Mixed hyperlipidemia 12/08/2004     Priority: Medium    Essential hypertension 12/08/2004     Priority: Medium     Problem list name updated by automated process. Provider to review      Internal derangement of knee 12/08/2004     Priority: Medium     Problem list name updated by automated process. Provider to review        Physical Exam   Patient Vitals for the past 24 hrs:   BP Temp Temp src Pulse Resp SpO2 Weight   05/02/24 0300 (!) 142/72 -- -- 65 24 91 % --   05/02/24 0158 (!) 148/81 -- -- 76 27 92 % --   05/02/24 0130 (!) 148/76 -- -- 79 (!) 32 95 % --   05/02/24 0100 (!) 152/72 -- -- 78 (!) 31 96 % --   05/02/24 0036 -- -- -- 81 15 97 % --   05/02/24 0032 -- -- -- 81 24 97 % --   05/02/24 0030 (!) 159/74 -- -- 74 22 96 % --   05/02/24 0011 -- -- -- 81 16 97 % --   05/02/24 0006 -- -- -- 82 30 97 % --   05/02/24 0001 (!) 168/90  -- -- 77 22 97 % --   05/01/24 2356 (!) 163/85 -- -- 81 (!) 35 98 % --   05/01/24 2351 (!) 164/91 -- -- 82 30 98 % 68.1 kg (150 lb 2.1 oz)   05/01/24 2346 (!) 122/98 -- -- 80 -- 97 % --   05/01/24 2342 -- 97  F (36.1  C) Oral 78 (!) 38 96 % --      Physical Exam  General: Somewhat ill-appearing woman sitting upright in stabilization room 3  HENT: mucous membranes moist  CV: rate as above, regular rhythm, moderate symmetric pitting lower extremity edema  Resp: Tachypneic with increased respiratory effort, BiPAP in place, moderate diffuse expiratory wheezing, no stridor  GI: abdomen soft and nontender, no guarding  MSK: no bony tenderness  Skin: appropriately warm and dry  Neuro: alert, clear speech, oriented though poor historian, no nuchal rigidity  Psych: cooperative, no apparent hallucinations    Emergency Department Course   Electrocardiogram  ECG taken at 2347, ECG interpreted at 2357 by ABBEY Ward MD  Sinus rhythm, voltage is compatible with LVH, 2 isolated PVCs  Rate 85 bpm. AR interval 188. QRS duration 86. QTc 433    Imaging:  XR Chest Port 1 View   Final Result   IMPRESSION: Cardiomediastinal silhouette within normal limits. Pulmonary vascular congestion with bilateral interstitial infiltrates. Atelectasis or infiltrate left lung base. Small left and trace right effusion. Bilateral shoulder arthroplasty. Post    surgical change lumbar spine. Lead overlying central chest unchanged         Laboratory:  Labs Ordered and Resulted from Time of ED Arrival to Time of ED Departure   COMPREHENSIVE METABOLIC PANEL - Abnormal       Result Value    Sodium 133 (*)     Potassium 4.5      Carbon Dioxide (CO2) 23      Anion Gap 10      Urea Nitrogen 29.2 (*)     Creatinine 0.91      GFR Estimate 61      Calcium 9.0      Chloride 100      Glucose 124 (*)     Alkaline Phosphatase 113      AST 13      ALT <5      Protein Total 6.2 (*)     Albumin 3.3 (*)     Bilirubin Total 0.4     TROPONIN T, HIGH SENSITIVITY - Abnormal     Troponin T, High Sensitivity 24 (*)    NT PROBNP INPATIENT - Abnormal    N terminal Pro BNP Inpatient 4,044 (*)    CBC WITH PLATELETS AND DIFFERENTIAL - Abnormal    WBC Count 18.5 (*)     RBC Count 3.44 (*)     Hemoglobin 10.4 (*)     Hematocrit 30.7 (*)     MCV 89      MCH 30.2      MCHC 33.9      RDW 14.9      Platelet Count 273      % Neutrophils 85      % Lymphocytes 6      % Monocytes 8      % Eosinophils 0      % Basophils 0      % Immature Granulocytes 1      NRBCs per 100 WBC 0      Absolute Neutrophils 15.9 (*)     Absolute Lymphocytes 1.0      Absolute Monocytes 1.4 (*)     Absolute Eosinophils 0.0      Absolute Basophils 0.1      Absolute Immature Granulocytes 0.1      Absolute NRBCs 0.0     ISTAT GASES LACTATE VENOUS POCT - Abnormal    Lactic Acid POCT 0.6      Bicarbonate Venous POCT 23      O2 Sat, Venous POCT 47 (*)     pCO2 Venous POCT 35 (*)     pH Venous POCT 7.42      pO2 Venous POCT 25      Base Excess/Deficit (+/-) POCT -1.0     INFLUENZA A/B, RSV, & SARS-COV2 PCR - Normal    Influenza A PCR Negative      Influenza B PCR Negative      RSV PCR Negative      SARS CoV2 PCR Negative     PROCALCITONIN - Normal    Procalcitonin 0.06          Emergency Department Course:  Reviewed:  I reviewed nursing notes, vitals, and past medical history    Assessments/Consultations/Discussion of Management or Tests :  I obtained history and examined the patient as noted above.   ED Course as of 05/02/24 0307   u May 02, 2024   0000 I rechecked patient   0030 I rechecked patient.  She feels her breathing is easier, she would like to try removing BiPAP.  I spoke with nurse.   0055 I rechecked patient, discussed test results.   0112 I spoke with Dr. Pardo, Hospitalist, who accepts care.       Independent Interpretation (X-rays, CTs, rhythm strip):  I personally reviewed CXR images, I see bilateral haziness c/w pulmonary edema.    Interventions:  Medications   ipratropium - albuterol 0.5 mg/2.5 mg/3 mL (DUONEB)  0.5-2.5 (3) MG/3ML neb solution (3 mLs  $Given 5/1/24 3876)   methylPREDNISolone sodium succinate (solu-MEDROL) injection 125 mg (125 mg Intravenous $Given 5/1/24 3498)   ipratropium - albuterol 0.5 mg/2.5 mg/3 mL (DUONEB) 0.5-2.5 (3) MG/3ML neb solution (  Not Given 5/2/24 0012)   furosemide (LASIX) injection 40 mg (40 mg Intravenous $Given 5/2/24 0132)      Social Determinants of Health affecting care:   Healthcare Access/Compliance and Social Connections/Isolation    Disposition:  Admit to Dr. Pardo    Impression & Plan    Medical Decision Making:  I think her acute hypoxic respiratory failure is primarily secondary to pulmonary edema from CHF exacerbation.  This is supported by her bilateral leg edema, elevated BNP, and chest x-ray findings.  She was on BiPAP earlier in her ED course though I reassessed her on multiple occasions and she was able to ultimately be weaned from this, though she does still have a new oxygen requirement.  She is not elevated troponin, this will need to be trended though I do not think that an acute myocardial infarction is likely the primary process.  She is noted to have mild hyponatremia as well as anemia which will need to be monitored as well.  Infection considered, she has noted to have leukocytosis though she is afebrile.  I discussed the possibility of antibiotics with the accepting hospitalist who wished to defer these pending further evaluation and clinical monitoring.  She shows no signs of tiring and work of breathing has improved here in the emergency department, such that I do not think that ongoing BiPAP or intubation are indicated that given her age and comorbidities, she is at high risk for deterioration.  She is admitted to a monitored bed under the care of the hospitalist service after discussion of the above.    Diagnosis:    ICD-10-CM    1. Acute respiratory failure with hypoxia (H)  J96.01       2. Acute pulmonary edema (H)  J81.0       3. Elevated troponin   R79.89       4. Hyponatremia  E87.1       5. Anemia, unspecified type  D64.9          Critical Care Time:  35 minutes, independent of procedures.  This included time spent obtaining a history and physical, reviewing the patient's chart, interpreting lab and imaging studies, re-examining the patient, coordinating care with other providers, and documenting ED care provided.  She has acute hypoxic respiratory failure due to acute pulmonary edema and CHF exacerbation.  She was treated with IV diuresis as well as BiPAP, settings titrated and ultimately weaned, as well as steroids to treat any component of bronchospasm given documented history of asthma.  Her condition carries high morbidity and mortality.      5/1/2024   MD Sylvia Apodaca Jeffrey Alan, MD  05/02/24 9037

## 2024-05-02 NOTE — PROGRESS NOTES
[facility] 2L NC. Total cares, external female catheter. Q2 hour turns. X Once sinemet given this AM, will need PTA meds ordered after pharmacy med reconciliation. Non-blancheable heels, mepilex. Sacral mepilex. K and Mg protocols. L-side restricted for L mastectomy. Plan for echocardiogram, diuresis.

## 2024-05-02 NOTE — PLAN OF CARE
Alert and oriented x 4. -160's today, Coreg increased, weaned off of oxygen and tolerating room air well. Tele SR with rates in the 70's. She did have two ventricular runs this morning (one 8 beats and one 9 beats), she did not have symptoms. Hospitalist made aware. She was up to the chair once today and was able to pivot to and from bed with walker and belt. Heels and coccyx have minor blanchable redness, all sites intact dressings replaced or sites checked.

## 2024-05-02 NOTE — PHARMACY-ADMISSION MEDICATION HISTORY
Pharmacist Admission Medication History    Admission medication history is complete. The information provided in this note is only as accurate as the sources available at the time of the update.    Information Source(s): Patient and CareEverywhere/SureScripts via in-person    Pertinent Information:   Patient states she would like to take gabapentin, but she is not d/t drowsiness  States amlodipine dose reduced d/t leg swelling     Changes made to PTA medication list:  Added: multivitamin, preservision  Deleted: alendronate, Vitamin B12, Geritol Complete tablets  Changed: carbidopa-levodopa  mg (from 1.5 tablets 4x/day to 2 tablets 4x day)    Allergies reviewed with patient and updates made in EHR: unable to assess    Medication History Completed By: EVA NOBLES [facility] 5/2/2024 7:54 AM    PTA Med List   Medication Sig Last Dose    albuterol (PROAIR HFA/PROVENTIL HFA/VENTOLIN HFA) 108 (90 Base) MCG/ACT inhaler Inhale 1-2 puffs into the lungs every 6 hours as needed for shortness of breath / dyspnea or wheezing Past Week    amLODIPine (NORVASC) 5 MG tablet Take 1 tablet (5 mg) by mouth daily Past Week    aspirin EC 81 MG EC tablet Take 1 tablet (81 mg) by mouth daily Past Week    atorvastatin (LIPITOR) 20 MG tablet TAKE 1 TABLET(20 MG) BY MOUTH DAILY Past Week    azithromycin (ZITHROMAX) 500 MG tablet TAKE 1 TABLET BY MOUTH 30 TO 60 MINUTES PRIOR TO DENTAL PROCEDURE Unknown    carbidopa-levodopa (SINEMET)  MG per tablet Take 2 tablets by mouth 4 times daily Takes at 6am, 11am, 4pm, and 9 pm Past Week    carvedilol (COREG) 6.25 MG tablet TAKE 1 TABLET(6.25 MG) BY MOUTH TWICE DAILY WITH MEALS Past Week    Cholecalciferol (VITAMIN D3 PO) Take 400 Units by mouth daily  Past Week    loperamide (IMODIUM) 2 MG capsule Take 1 capsule (2 mg) by mouth 4 times daily as needed for diarrhea Unknown    losartan (COZAAR) 100 MG tablet TAKE 1 TABLET(100 MG) BY MOUTH DAILY (Patient taking differently: Take 100 mg by mouth  every evening) Past Week    Multiple Vitamins-Minerals (PRESERVISION AREDS) CAPS Take 1 capsule by mouth 2 times daily Past Week    multivitamin w/minerals (THERA-VIT-M) tablet Take 1 tablet by mouth daily Past Week    omeprazole (PRILOSEC) 20 MG DR capsule TAKE 1 CAPSULE(20 MG) BY MOUTH DAILY Past Week    polyethylene glycol (MIRALAX/GLYCOLAX) packet Take 1 packet by mouth daily as needed for constipation Unknown    raloxifene (EVISTA) 60 MG tablet TAKE 1 TABLET(60 MG) BY MOUTH DAILY Past Week    senna-docusate (SENOKOT-S;PERICOLACE) 8.6-50 MG per tablet Take 1 tablet by mouth 2 times daily as needed for constipation Unknown    sertraline (ZOLOFT) 25 MG tablet TAKE 1 TABLET(25 MG) BY MOUTH DAILY Past Week    tolterodine (DETROL) 1 MG tablet TAKE 1 TABLET(1 MG) BY MOUTH TWICE DAILY Past Week

## 2024-05-02 NOTE — PROGRESS NOTES
SW:  D: Writer called and left a VM for JARED Landon at [facility] asking for a return call to confirm patient's level of care/services.    MARTINA Best, [facility]   Social Work   [facility]

## 2024-05-02 NOTE — ED NOTES
[facility]  ED Nurse Handoff Report    ED Chief complaint: Shortness of Breath      ED Diagnosis:   Final diagnoses:   Acute respiratory failure with hypoxia (H)   Acute pulmonary edema (H)   Elevated troponin   Hyponatremia   Anemia, unspecified type       Code Status: To be addressed by admitting provider     Allergies:   Allergies   Allergen Reactions    Bee Pollen Hives     If MVI contains this - she will break out in a rash.     Cephalexin Monohydrate Hives    Ciprofloxacin Hives    Clindamycin Hives    Multi Vitamin-Minerals [Hair-Vites] Other (See Comments)     (If MV contains bee pollen she will break out in hives)     Penicillin [Penicillins] Hives     All antibiotics except zpak??????    Thiazide-Type Diuretics Other (See Comments)     Very low sodium levels    Tobramycin Hives    Vancomycin Hives    Atorvastatin      Leg cramps    Cephalexin Hives    Ciprofloxacin Hives    Ibuprofen Nausea and Vomiting and Nausea     stomach pain    Multiple Vitamin Hives    Pollen Extract Hives    Versed [Midazolam] Other (See Comments)     Confused, agiitated, for 6-7 hrs after anngiogram sedation    Zoloft [Sertraline] Other (See Comments)     Headache, elevated BP    Ace Inhibitors Cough    Advil [Ibuprofen Micronized] Nausea    Metoprolol Diarrhea      tolerates Inderal       Patient Story: Patient presents via EMS from [facility] for evaluation of SOB. Patient placed on CPAP by EMS and given 4 nitroglycerin. Patient placed on BiPAP in the ED and given solu medrol and a duo abdoulaye. Patient improved and was placed on oxy mask then nasal cannula. Hx parkinson's   Focused Assessment:  Resp: basilar crackles, SOB, frequent nonproductive cough, 92% on 2L nasal canula    Aox4, forgetful, generalized weakness worse on left side, incontinent of urine    Treatments and/or interventions provided: PIV, labs, meds, oxygen  Patient's response to treatments and/or interventions: patients status  improved  Labs Ordered and Resulted from Time of ED Arrival to Time of ED Departure   COMPREHENSIVE METABOLIC PANEL - Abnormal       Result Value    Sodium 133 (*)     Potassium 4.5      Carbon Dioxide (CO2) 23      Anion Gap 10      Urea Nitrogen 29.2 (*)     Creatinine 0.91      GFR Estimate 61      Calcium 9.0      Chloride 100      Glucose 124 (*)     Alkaline Phosphatase 113      AST 13      ALT <5      Protein Total 6.2 (*)     Albumin 3.3 (*)     Bilirubin Total 0.4     TROPONIN T, HIGH SENSITIVITY - Abnormal    Troponin T, High Sensitivity 24 (*)    NT PROBNP INPATIENT - Abnormal    N terminal Pro BNP Inpatient 4,044 (*)    CBC WITH PLATELETS AND DIFFERENTIAL - Abnormal    WBC Count 18.5 (*)     RBC Count 3.44 (*)     Hemoglobin 10.4 (*)     Hematocrit 30.7 (*)     MCV 89      MCH 30.2      MCHC 33.9      RDW 14.9      Platelet Count 273      % Neutrophils 85      % Lymphocytes 6      % Monocytes 8      % Eosinophils 0      % Basophils 0      % Immature Granulocytes 1      NRBCs per 100 WBC 0      Absolute Neutrophils 15.9 (*)     Absolute Lymphocytes 1.0      Absolute Monocytes 1.4 (*)     Absolute Eosinophils 0.0      Absolute Basophils 0.1      Absolute Immature Granulocytes 0.1      Absolute NRBCs 0.0     ISTAT GASES LACTATE VENOUS POCT - Abnormal    Lactic Acid POCT 0.6      Bicarbonate Venous POCT 23      O2 Sat, Venous POCT 47 (*)     pCO2 Venous POCT 35 (*)     pH Venous POCT 7.42      pO2 Venous POCT 25      Base Excess/Deficit (+/-) POCT -1.0     INFLUENZA A/B, RSV, & SARS-COV2 PCR - Normal    Influenza A PCR Negative      Influenza B PCR Negative      RSV PCR Negative      SARS CoV2 PCR Negative     PROCALCITONIN - Normal    Procalcitonin 0.06       XR Chest Port 1 View   Final Result   IMPRESSION: Cardiomediastinal silhouette within normal limits. Pulmonary vascular congestion with bilateral interstitial infiltrates. Atelectasis or infiltrate left lung base. Small left and trace right effusion.  Bilateral shoulder arthroplasty. Post    surgical change lumbar spine. Lead overlying central chest unchanged          To be done/followed up on inpatient unit:  n/a    Does this patient have any cognitive concerns?: Forgetful    Activity level - Baseline/Home:  Total Care  Activity Level - Current:   Total Care    Patient's Preferred language: English   Needed?: No    Isolation: None  Infection: Not Applicable  Patient tested for COVID 19 prior to admission: YES  Bariatric?: No    Vital Signs:   Vitals:    05/02/24 0036 05/02/24 0100 05/02/24 0130 05/02/24 0158   BP:  (!) 152/72 (!) 148/76 (!) 148/81   Pulse: 81 78 79 76   Resp: 15 (!) 31 (!) 32 27   Temp:       TempSrc:       SpO2: 97% 96% 95% 92%   Weight:           Cardiac Rhythm:     Was the PSS-3 completed:   Yes  What interventions are required if any?               Family Comments: none      For the majority of the shift this patient's behavior was Green.   Behavioral interventions performed were n/a.    ED NURSE PHONE NUMBER: *[id number]

## 2024-05-02 NOTE — PROGRESS NOTES
MD Notification    Notified Person: MD Edvin  Notification Date/Time: 5-2-24 6204  Notification Interaction: Page  Purpose of Notification:     Pt asking about her PTA 6 AM Sinemet. Can that get ordered for her? Thank you, Magdi Jovel [phone]    Orders Received:  MD needs dosage verified    Writer spoke with pharmacist: they will do her med rec this AM. I spoke to patient and she understands there may be a delay on her 6 AM sinemet. Pharmacist does state that MD could order a one-time dose, but otherwise they will try and expedite her med rec this AM.    One-time dose ordered    Comments:

## 2024-05-02 NOTE — PROGRESS NOTES
[facility]    Medicine Progress Note - Hospitalist Service    Date of Admission:  5/1/2024    Assessment & Plan     Opal Sin is an 88-year-old female with history of Parkinson's disease, previous CVA, hypertension, GERD from care facility who presented on 5/1/2024 with several days of shortness of breath and cough.  No fever.  Workup showed acute CHF exacerbation.    Acute diastolic CHF exacerbation with preserved EF 60-65%, echo 2017  - Noted to have lower extremity edema, proBNP 4044  - Chest x-ray 5/1 showed pulmonary vascular congestion with bilateral interstitial infiltrates, atelectasis/infiltrate left lung base, small left and trace right effusion  - Previous echo in 2017 showed normal LVEF of 60-65%, grade 1 diastolic dysfunction, normal RV size and function, valves were not visualized, bubble study was negative  -Diuresing well with IV Lasix 40 mg twice daily, continue  -Echo pending  -Continue Coreg, losartan  -Supplemental oxygen as needed      Probable left lower lobe community-acquired pneumonia, organism unspecified  -Has cough, shortness of breath and x-ray finding of left lower lobe infiltrate which could be atelectasis or pneumonia.  No fever, normal procalcitonin but has leukocytosis of 17-18  -Based on patient's multiple allergies, will treat with course of azithromycin  -COVID-19/influenza/RSV negative    Hypervolemic hyponatremia  -Sodium improved from 133 to 136 with diuresis    Parkinson's disease  -Continue Sinemet  -PT/OT evaluation    Essential hypertension  -Continue carvedilol and losartan.  Hold amlodipine  -Monitor blood pressures    GERD  -Continue PPI    Chronic normocytic anemia  -Hemoglobin decreased from 11.6 (7/2023) to 10.8.  Stable since admission.  No history or evidence of bleeding  -Monitor.  Outpatient follow-up with PCP    History of breast cancer  -On raloxifene.  Hold for now, resume at discharge            Diet: Combination Diet Low  Saturated Fat Na <2400mg Diet, No Caffeine Diet    DVT Prophylaxis: Pneumatic Compression Devices  [place] Catheter: Not present  Lines: None     Cardiac Monitoring: ACTIVE order. Indication: Acute decompensated heart failure (48 hours)  Code Status: No CPR- Do NOT Intubate      Clinically Significant Risk Factors Present on Admission              # Hypoalbuminemia: Lowest albumin = 3.3 g/dL at 5/1/2024 11:52 PM, will monitor as appropriate   # Drug Induced Platelet Defect: home medication list includes an antiplatelet medication   # Hypertension: Noted on problem list          # Asthma: noted on problem list        Disposition Plan       Medically Ready for Discharge: Anticipated Tomorrow             Dania Francisco MD  Hospitalist Service  [facility]  Securely message with iHigh (more info)  Text page via [email or web address] Paging/Directory   ______________________________________________________________________    Interval History     Patient reports she feels better than yesterday.  Breathing has improved, not on oxygen this morning  No other complaints.  Blood pressure elevated    Physical Exam   Vital Signs: Temp: 97.5  F (36.4  C) Temp src: Oral BP: (!) (P) 151/76 Pulse: 75   Resp: 16 SpO2: 94 % O2 Device: Nasal cannula Oxygen Delivery: 2 LPM  Weight: 150 lbs 2.13 oz    Constitutional - awake and alert, resting in bed, in no acute distress  Cardiovascular - regular rate and rhythm, no murmurs, 1+ edema  Pulmonary -mild wheezing bilaterally  GI - abdomen is soft, nontender, nondistended, no hepatosplenomegaly or masses  Integumentary - skin is warm and dry, no rashes or ulcers  Neurological - awake, alert and oriented x3.  Moving all 4 extremities, normal speech, no focal deficits    Medical Decision Making       45 MINUTES SPENT BY ME on the date of service doing chart review, history, exam, documentation & further activities per the note.      Data     I have personally reviewed the following data  over the past 24 hrs:    17.3 (H)  \   10.8 (L)   / 263     136 102 28.8 (H) /  162 (H)   4.3 21 (L) 0.92 \     ALT: <5 AST: 13 AP: 113 TBILI: 0.4   ALB: 3.3 (L) TOT PROTEIN: 6.2 (L) LIPASE: N/A     Trop: 22 (H) BNP: 4,044 (H)     Procal: 0.06 CRP: N/A Lactic Acid: 0.6         Imaging results reviewed over the past 24 hrs:   Recent Results (from the past 24 hour(s))   XR Chest Port 1 View    Narrative    EXAM: XR CHEST PORT 1 VIEW  LOCATION: [facility]  DATE: 5/1/2024    INDICATION: SOB x 3 days, wheezing, bilat leg swelling  COMPARISON: 11/10/2014      Impression    IMPRESSION: Cardiomediastinal silhouette within normal limits. Pulmonary vascular congestion with bilateral interstitial infiltrates. Atelectasis or infiltrate left lung base. Small left and trace right effusion. Bilateral shoulder arthroplasty. Post   surgical change lumbar spine. Lead overlying central chest unchanged

## 2024-05-02 NOTE — ED NOTES
Bed: [facility]  Expected date: 5/1/24  Expected time: 11:35 PM  Means of arrival: Ambulance  Comments:  HEMS [id number] CHF/cpap
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
## 2024-05-02 NOTE — PROGRESS NOTES
RECEIVING UNIT ED HANDOFF REVIEW    ED Nurse Handoff Report was reviewed by: Magdi Jovel RN on May 2, 2024 at 2:45 AM

## 2024-05-02 NOTE — ED TRIAGE NOTES
Arrived via ems from [facility] via ems for c/o sob. Patient had h/o stroke, carotid aneurysm and breast ca. Patient had mastectomy on left breast. Initially patient had bp of 190/10, 84 percent on RA and RR 40. Patient was on cpap for 30 mins. Nitroglycerin 4 was given by ems. Ecg showed pvc's. Edema was noted on lower legs. Patient was Aox4. Per ems patient is lethargic at first. Patient denies chf diagnosis.        Triage Assessment (Adult)       Row Name 05/01/24 8830          Triage Assessment    Airway WDL X;airway symptoms        Respiratory WDL    Respiratory WDL X;rhythm/pattern        Cardiac WDL    Cardiac WDL WDL        Peripheral/Neurovascular WDL    Peripheral Neurovascular WDL X;neurovascular assessment lower        Cognitive/Neuro/Behavioral WDL    Cognitive/Neuro/Behavioral WDL WDL

## 2024-05-03 ENCOUNTER — APPOINTMENT (OUTPATIENT)
Dept: OCCUPATIONAL THERAPY | Facility: CLINIC | Age: 89
DRG: 291 | End: 2024-05-03
Attending: INTERNAL MEDICINE
Payer: COMMERCIAL

## 2024-05-03 ENCOUNTER — APPOINTMENT (OUTPATIENT)
Dept: PHYSICAL THERAPY | Facility: CLINIC | Age: 89
DRG: 291 | End: 2024-05-03
Attending: INTERNAL MEDICINE
Payer: COMMERCIAL

## 2024-05-03 ENCOUNTER — APPOINTMENT (OUTPATIENT)
Dept: SPEECH THERAPY | Facility: CLINIC | Age: 89
DRG: 291 | End: 2024-05-03
Attending: INTERNAL MEDICINE
Payer: COMMERCIAL

## 2024-05-03 LAB
ANION GAP SERPL CALCULATED.3IONS-SCNC: 13 MMOL/L (ref 7–15)
BUN SERPL-MCNC: 43.8 MG/DL (ref 8–23)
CALCIUM SERPL-MCNC: 9.1 MG/DL (ref 8.8–10.2)
CHLORIDE SERPL-SCNC: 98 MMOL/L (ref 98–107)
CREAT SERPL-MCNC: 0.92 MG/DL (ref 0.51–0.95)
CRP SERPL-MCNC: 84.58 MG/L
DEPRECATED HCO3 PLAS-SCNC: 22 MMOL/L (ref 22–29)
EGFRCR SERPLBLD CKD-EPI 2021: 60 ML/MIN/1.73M2
ERYTHROCYTE [DISTWIDTH] IN BLOOD BY AUTOMATED COUNT: 14.6 % (ref 10–15)
GLUCOSE SERPL-MCNC: 117 MG/DL (ref 70–99)
HCT VFR BLD AUTO: 29.5 % (ref 35–47)
HGB BLD-MCNC: 10 G/DL (ref 11.7–15.7)
MAGNESIUM SERPL-MCNC: 1.9 MG/DL (ref 1.7–2.3)
MCH RBC QN AUTO: 29.9 PG (ref 26.5–33)
MCHC RBC AUTO-ENTMCNC: 33.9 G/DL (ref 31.5–36.5)
MCV RBC AUTO: 88 FL (ref 78–100)
PLATELET # BLD AUTO: 282 10E3/UL (ref 150–450)
POTASSIUM SERPL-SCNC: 3.7 MMOL/L (ref 3.4–5.3)
RBC # BLD AUTO: 3.35 10E6/UL (ref 3.8–5.2)
SODIUM SERPL-SCNC: 133 MMOL/L (ref 135–145)
WBC # BLD AUTO: 14.7 10E3/UL (ref 4–11)

## 2024-05-03 PROCEDURE — 92526 ORAL FUNCTION THERAPY: CPT | Mod: GN

## 2024-05-03 PROCEDURE — 36415 COLL VENOUS BLD VENIPUNCTURE: CPT | Performed by: INTERNAL MEDICINE

## 2024-05-03 PROCEDURE — 99233 SBSQ HOSP IP/OBS HIGH 50: CPT | Performed by: INTERNAL MEDICINE

## 2024-05-03 PROCEDURE — 80048 BASIC METABOLIC PNL TOTAL CA: CPT | Performed by: INTERNAL MEDICINE

## 2024-05-03 PROCEDURE — 83735 ASSAY OF MAGNESIUM: CPT | Performed by: INTERNAL MEDICINE

## 2024-05-03 PROCEDURE — 120N000001 HC R&B MED SURG/OB

## 2024-05-03 PROCEDURE — 85041 AUTOMATED RBC COUNT: CPT | Performed by: INTERNAL MEDICINE

## 2024-05-03 PROCEDURE — 97162 PT EVAL MOD COMPLEX 30 MIN: CPT | Mod: GP | Performed by: PHYSICAL THERAPIST

## 2024-05-03 PROCEDURE — 92610 EVALUATE SWALLOWING FUNCTION: CPT | Mod: GN

## 2024-05-03 PROCEDURE — 97535 SELF CARE MNGMENT TRAINING: CPT | Mod: GO | Performed by: OCCUPATIONAL THERAPIST

## 2024-05-03 PROCEDURE — 250N000013 HC RX MED GY IP 250 OP 250 PS 637: Performed by: INTERNAL MEDICINE

## 2024-05-03 PROCEDURE — 97530 THERAPEUTIC ACTIVITIES: CPT | Mod: GP | Performed by: PHYSICAL THERAPIST

## 2024-05-03 PROCEDURE — 97165 OT EVAL LOW COMPLEX 30 MIN: CPT | Mod: GO | Performed by: OCCUPATIONAL THERAPIST

## 2024-05-03 PROCEDURE — 250N000011 HC RX IP 250 OP 636: Performed by: INTERNAL MEDICINE

## 2024-05-03 PROCEDURE — 86140 C-REACTIVE PROTEIN: CPT | Performed by: INTERNAL MEDICINE

## 2024-05-03 RX ORDER — POTASSIUM CHLORIDE 1500 MG/1
20 TABLET, EXTENDED RELEASE ORAL DAILY
Status: DISCONTINUED | OUTPATIENT
Start: 2024-05-03 | End: 2024-05-06

## 2024-05-03 RX ADMIN — ACETAMINOPHEN 650 MG: 325 TABLET, FILM COATED ORAL at 01:34

## 2024-05-03 RX ADMIN — CARBIDOPA AND LEVODOPA 2 TABLET: 25; 100 TABLET ORAL at 06:20

## 2024-05-03 RX ADMIN — ASPIRIN 81 MG: 81 TABLET, COATED ORAL at 09:04

## 2024-05-03 RX ADMIN — LOSARTAN POTASSIUM 100 MG: 100 TABLET, FILM COATED ORAL at 21:18

## 2024-05-03 RX ADMIN — FUROSEMIDE 40 MG: 10 INJECTION, SOLUTION INTRAMUSCULAR; INTRAVENOUS at 09:04

## 2024-05-03 RX ADMIN — PANTOPRAZOLE SODIUM 40 MG: 40 TABLET, DELAYED RELEASE ORAL at 09:04

## 2024-05-03 RX ADMIN — TOLTERODINE TARTRATE 2 MG: 2 TABLET, FILM COATED ORAL at 22:47

## 2024-05-03 RX ADMIN — CARBIDOPA AND LEVODOPA 2 TABLET: 25; 100 TABLET ORAL at 16:51

## 2024-05-03 RX ADMIN — CARVEDILOL 12.5 MG: 12.5 TABLET, FILM COATED ORAL at 18:29

## 2024-05-03 RX ADMIN — AMLODIPINE BESYLATE 5 MG: 5 TABLET ORAL at 09:04

## 2024-05-03 RX ADMIN — CARBIDOPA AND LEVODOPA 2 TABLET: 25; 100 TABLET ORAL at 11:10

## 2024-05-03 RX ADMIN — CARVEDILOL 12.5 MG: 12.5 TABLET, FILM COATED ORAL at 09:04

## 2024-05-03 RX ADMIN — CARBIDOPA AND LEVODOPA 2 TABLET: 25; 100 TABLET ORAL at 21:17

## 2024-05-03 RX ADMIN — SERTRALINE HYDROCHLORIDE 25 MG: 25 TABLET ORAL at 09:04

## 2024-05-03 RX ADMIN — ATORVASTATIN CALCIUM 20 MG: 20 TABLET, FILM COATED ORAL at 09:04

## 2024-05-03 RX ADMIN — FUROSEMIDE 40 MG: 10 INJECTION, SOLUTION INTRAMUSCULAR; INTRAVENOUS at 21:18

## 2024-05-03 RX ADMIN — POLYETHYLENE GLYCOL 3350 17 G: 17 POWDER, FOR SOLUTION ORAL at 01:34

## 2024-05-03 RX ADMIN — AZITHROMYCIN DIHYDRATE 250 MG: 250 TABLET ORAL at 09:04

## 2024-05-03 RX ADMIN — ACETAMINOPHEN 650 MG: 325 TABLET, FILM COATED ORAL at 21:18

## 2024-05-03 RX ADMIN — TOLTERODINE TARTRATE 2 MG: 2 TABLET, FILM COATED ORAL at 11:10

## 2024-05-03 RX ADMIN — POTASSIUM CHLORIDE 20 MEQ: 1500 TABLET, EXTENDED RELEASE ORAL at 11:13

## 2024-05-03 ASSESSMENT — ACTIVITIES OF DAILY LIVING (ADL)
ADLS_ACUITY_SCORE: 56
ADLS_ACUITY_SCORE: 56
ADLS_ACUITY_SCORE: 60
ADLS_ACUITY_SCORE: 56
ADLS_ACUITY_SCORE: 57
ADLS_ACUITY_SCORE: 56

## 2024-05-03 NOTE — PLAN OF CARE
Goal Outcome Evaluation:    Admitted w/ cough, shortness of breath and x-ray finding of left lower lobe infiltrate which could be atelectasis or pneumonia.      A&Ox4. VSS ex BP, on RA. Eek, bilateral hearing aids at bedside. Tele SR with rates in the 70's. Pain managed w/ tylenol.  Not OOB this shift. Turn & repo. Purewick in place w/ adequate output. PRN miralax given for constipation. No result yet. CMS intact ex LE edema. On IV lasix. Heels and coccyx have minor blanchable redness w/ mepilex on. On cardiac diet. PIV SL. SW following. Discharge pending.

## 2024-05-03 NOTE — PROGRESS NOTES
"   05/03/24 0900   Appointment Info   Signing Clinician's Name / Credentials (PT) Marge Soni, PT, DPT   Living Environment   Living Environment Comments Somewhat difficult historian. From [facility], pt reports she receives Ax1 w/ all fn mobility and ADLs. Feeds herself. Uses 2WW to ambulate and/or manual w/c. Reports \"they always help me.\" Sleeps in a lift chair at baseline.   Self-Care   Equipment Currently Used at Home wheelchair, manual;walker, rolling   Fall history within last six months yes   Number of times patient has fallen within last six months 3   General Information   Onset of Illness/Injury or Date of Surgery 05/01/24   Referring Physician Dania Francisco MD   Patient/Family Therapy Goals Statement (PT) Feel better   Pertinent History of Current Problem (include personal factors and/or comorbidities that impact the POC) Opal Sin is a 88 year old female with a history of Asthma, CVA, GERD, HTN, Parkinson's who is admitted on 5/1/2024 with acute hypoxic respiratory failure and CHF exacerbation.   Existing Precautions/Restrictions fall   Cognition   Affect/Mental Status (Cognition) low arousal/lethargic;anxious   Orientation Status (Cognition) oriented x 4   Follows Commands (Cognition) follows two-step commands;50-74% accuracy   Safety Deficit (Cognition) problem-solving;judgment;insight into deficits/self-awareness;minimal deficit   Memory Deficit (Cognition) short-term memory;minimal deficit   Pain Assessment   Patient Currently in Pain No   Integumentary/Edema   Integumentary/Edema no deficits were identifed   Posture    Posture Kyphosis;Protracted shoulders;Forward head position   Range of Motion (ROM)   Range of Motion ROM deficits secondary to weakness   Strength (Manual Muscle Testing)   Strength (Manual Muscle Testing) Deficits observed during functional mobility   Strength Comments Grossly 4/5 B LEs   Bed Mobility   Comment, (Bed Mobility) mod A   Transfers   Comment, (Transfers) SST " w/ Chauncey   Gait/Stairs (Locomotion)   Comment, (Gait/Stairs) Unable to 2/2 fatigue and safety concerns   Balance   Balance Comments Impaired dynamic and static balance   Sensory Examination   Sensory Perception patient reports no sensory changes   Coordination   Coordination no deficits were identified   Muscle Tone   Muscle Tone Comments Incr rigidity, no tremors noted   Clinical Impression   Criteria for Skilled Therapeutic Intervention Yes, treatment indicated   PT Diagnosis (PT) Impaired indep w/fn mob   Influenced by the following impairments Strength, balance, tone, ROM, cognition, cardiovasc endurance   Functional limitations due to impairments Bed mob, transfers, amb   Clinical Presentation (PT Evaluation Complexity) evolving   Clinical Presentation Rationale Multiple affecting comorbidities, assessment incl strength, balance, fn mob.   Clinical Decision Making (Complexity) moderate complexity   Planned Therapy Interventions (PT) balance training;bed mobility training;gait training;neuromuscular re-education;patient/family education;postural re-education;strengthening;transfer training   Risk & Benefits of therapy have been explained evaluation/treatment results reviewed;care plan/treatment goals reviewed;current/potential barriers reviewed;participants voiced agreement with care plan;participants included;patient;risks/benefits reviewed   PT Total Evaluation Time   PT Eval, Moderate Complexity Minutes (04629) 10   Physical Therapy Goals   PT Frequency Daily   PT Predicted Duration/Target Date for Goal Attainment 05/13/24   PT Goals Bed Mobility;Transfers;Gait;Aerobic Activity   PT: Bed Mobility Minimal assist   PT: Transfers Supervision/stand-by assist   PT: Gait Supervision/stand-by assist   PT: Perform aerobic activity with stable cardiovascular response 10 minutes;5 minutes;continuous activity;intermittent activity   Interventions   Interventions Quick Adds Therapeutic Activity   Therapeutic Activity    Therapeutic Activities: dynamic activities to improve functional performance Minutes (08947) 28   Symptoms Noted During/After Treatment Fatigue   Treatment Detail/Skilled Intervention Supine at start - soft spoken, difficulty tracking with R eye, baseline per pt. Completed bed mobility w/modA, HOB elevated, bed rails. Incr rigidity noted in B UEs and LEs, needed physical assist for scooting and reaching rails. Incr time, pt particular about each movement. Completed sit>stand w/Chauncey at 2WW, heavy cues for bending knees to get feet behind her knees, pushing from bed and not pulling up on walker. Completed ~5ft amb and pivot to recliner chair. Freezing of gait noted. Able to unfreeze relatively quickly with cues for weightshifting L/R x 4 and then taking a BIG step forwards. Needed Chauncey for the transfer. Shuffling feet, freezing of gait, last received PD meds 3hrs prior to session. Fatigued, VSS. Assisted to scoot higher in recliner using drawstrings of repo sheet. Chair alarm on, call light in reach. Robust conv about d/c rec, TCU vs back to [facility] w/HHPT, pt considering her options.   PT Discharge Planning   PT Plan repeated sit<>stands, amb with turns, balance includ sensory integration, anticipatory/reactive tasks   PT Discharge Recommendation (DC Rec) Transitional Care Facility;home with assist;home with home care physical therapy   PT Rationale for DC Rec Pt presents from [facility] where she reports she receives Ax1 w/ all fn mobilty and ADLs. She completed transfers with min-modA, and amb ~5ft w/Chauncey. Notable for Parkinson's gait - shuffling feet, freezing of gait. High falls risk and risk of re-hospitalization. Would benefit from TCU following hospitalization to improve strength, balance, cardiovasc endurance and safety w/fn mobilty prior to returning back to [facility]. If d/c back to [facility] at this time, pt requires Ax1 24/7 with all fn mobility and ADLs, and would benefit from HHPT for aforementioned impairments.   PT Brief  overview of current status Nursing staff please use Ax1 with 2WW   Total Session Time   Timed Code Treatment Minutes 28   Total Session Time (sum of timed and untimed services) 38

## 2024-05-03 NOTE — PROGRESS NOTES
Alert and oriented x4. On RA, sating at 96%. Diminished lungs sounds. Tolerates cardiac diet, denies nausea and vomiting. Active bowel sounds. Assist of 1 GB/W. TCU placement pending. Denies pain.

## 2024-05-03 NOTE — PROGRESS NOTES
Clinical Swallow Evaluation (CSE):     05/03/24 6878   Appointment Info   Signing Clinician's Name / Credentials (SLP) Sherri Poon MS CCC-SLP   General Information   Onset of Illness/Injury or Date of Surgery 05/01/24   Referring Physician MD Nolan   Patient/Family Therapy Goal Statement (SLP) To go home tomorrow   Pertinent History of Current Problem   Per provider Opla Sin is an 88-year-old female with history of Parkinson's disease, previous CVA, hypertension, GERD from care facility who presented on 5/1/2024 with several days of shortness of breath and cough.  No fever.  Workup showed acute CHF exacerbation and left lower lobe community-acquired pneumonia.     General Observations Pt alert, pleasant, upright   Pain Assessment   Patient Currently in Pain No   Type of Evaluation   Type of Evaluation Swallow Evaluation   Oral Motor   Oral Musculature   (generalized reduced ROM, no asymmetry or focal weakness)   Dentition (Oral Motor)   Dentition (Oral Motor) natural dentition;adequate dentition   Vocal Quality/Secretion Management (Oral Motor)   Vocal Quality (Oral Motor) hypophonic   Secretion Management (Oral Motor) WNL   General Swallowing Observations   Past History of Dysphagia   SLP services in 2017 rec regular/thin. Pt reports eructation and occassional difficulty wtih harder meats but largely denies any difficulty or choking/aspiration.     Respiratory Support room air   Current Diet/Method of Nutritional Intake (General Swallowing Observations, NIS) regular diet;thin liquids (level 0)   Swallowing Evaluation Clinical swallow evaluation   Clinical Swallow Evaluation   Feeding Assistance no assistance needed   Clinical Swallow Evaluation Textures Trialed thin liquids;solid foods   Clinical Swallow Eval: Thin Liquid Texture Trial   Mode of Presentation, Thin Liquids straw;self-fed   Volume of Liquid or Food Presented 2 oz   Oral Phase of Swallow WFL   Pharyngeal Phase of Swallow intact    Diagnostic Statement no overt signs/sx aspiration noted   Clinical Swallow Evaluation: Solid Food Texture Trial   Mode of Presentation self-fed   Volume Presented 1/2 jey cracker   Oral Phase   (prolonged mastication/oral clearance - complete with time)   Pharyngeal Phase intact   Diagnostic Statement no overt signs/sx aspiration noted   Esophageal Phase of Swallow   Patient reports or presents with symptoms of esophageal dysphagia Yes   Esophageal comments sticking, difficulty with meats, belching   Swallowing Recommendations   Diet Consistency Recommendations regular diet;thin liquids (level 0)   Supervision Level for Intake patient independent   Mode of Delivery Recommendations bolus size, small;slow rate of intake   Swallowing Maneuver Recommendations alternate food and liquid intake   Monitoring/Assistance Required (Eating/Swallowing) check mouth frequently for oral residue/pocketing;cue for finger/lingual sweep if oral pocketing present;stop eating activities when fatigue is present;monitor for cough or change in vocal quality with intake   Recommended Feeding/Eating Techniques (Swallow Eval) maintain upright sitting position for eating;maintain upright posture during/after eating for 30 minutes;minimize distractions during oral intake;set-up and prepare tray   Medication Administration Recommendations, Swallowing (SLP) per pt preference   General Therapy Interventions   Planned Therapy Interventions Dysphagia Treatment   Clinical Impression   Criteria for Skilled Therapeutic Interventions Met (SLP Eval) Yes, treatment indicated   SLP Diagnosis minimal oral dysphagia; potential pharyngeal vs esophageal dysphagia   Risks & Benefits of therapy have been explained evaluation/treatment results reviewed;care plan/treatment goals reviewed;risks/benefits reviewed;current/potential barriers reviewed;participants voiced agreement with care plan;participants included;patient   Clinical Impression Comments    Clinical swallow evaluation completed with thin liquids, regular solids. Pt currently presents with  minimal oral dysphagia; potential pharyngeal vs esophageal dysphagia. Mastication and clearance mildly prolonged, complete with pt independent use of liquid wash. Notable laryngeal elevation to palpation. No overt clinical signs/sx aspiration noted.     SLP Total Evaluation Time   Eval: oral/pharyngeal swallow function, clinical swallow Minutes (25295) 8   SLP Goals   Therapy Frequency (SLP Eval) 3 times/week   SLP Predicted Duration/Target Date for Goal Attainment 05/10/24   SLP Goals Swallow   SLP: Safely tolerate diet without signs/symptoms of aspiration Regular diet;Thin liquids;With use of swallow precautions;Independently   Interventions   Interventions Quick Adds Swallowing Dysfunction   Swallowing Dysfunction &/or Oral Function for Feeding   Treatment of Swallowing Dysfunction &/or Oral Function for Feeding Minutes (88002) 8   Symptoms Noted During/After Treatment None   Treatment Detail/Skilled Intervention   Trained pt in general safe swallow strategies, pharyngoesophageal clearance strategies and anti-reflux precautions given potential symptoms of esophageal dysphagia per her report. Pt Verbalized understanding.     SLP Discharge Planning   SLP Plan PO tolerance, strategies   SLP Discharge Recommendation home   SLP Rationale for DC Rec pt near baseline, good awareness/insight   SLP Brief overview of current status  regular (self-selecting softer/moister foods, small bites) with thin liquids (small sips) when fully upright, slow rate, alternate between solids/liquids, upright for PO/30-60 minutes following   Total Session Time   Total Session Time (sum of timed and untimed services) 16

## 2024-05-03 NOTE — PROGRESS NOTES
05/03/24 1355   Appointment Info   Signing Clinician's Name / Credentials (OT) Tianna Singh OTR/L   Rehab Comments (OT) intial eval   Living Environment   People in Home alone   Current Living Arrangements assisted living   Home Accessibility no concerns   Transportation Anticipated family or friend will provide   Living Environment Comments transport to dining room in w/c or to activities. gets AM/PM cares, A with showers, calls for help with toileting   Self-Care   Usual Activity Tolerance good   Current Activity Tolerance moderate   Equipment Currently Used at Home wheelchair, manual;walker, rolling   Fall history within last six months yes   Number of times patient has fallen within last six months 3   Activity/Exercise/Self-Care Comment pt sits and stands for grooming. can feed herself   Instrumental Activities of Daily Living (IADL)   IADL Comments has assist with all IADLs   General Information   Onset of Illness/Injury or Date of Surgery 05/01/24   Referring Physician Dania Francisco MD   Patient/Family Therapy Goal Statement (OT) I am hoping I can get back home   Additional Occupational Profile Info/Pertinent History of Current Problem Opal Sin is an 88-year-old female with history of Parkinson's disease, previous CVA, hypertension, GERD from care facility who presented on 5/1/2024 with several days of shortness of breath and cough.  No fever.  Workup showed acute CHF exacerbation.   Existing Precautions/Restrictions fall   General Observations and Info pt sitting up in chair, agreeable to OT eval. pt familiar with OT, was a former RN   Cognitive Status Examination   Orientation Status orientation to person, place and time   Visual Perception   Visual Impairment/Limitations corrective lenses full-time   Sensory   Sensory Quick Adds sensation intact   Pain Assessment   Patient Currently in Pain Yes, see Vital Sign flowsheet  (pt reports pain in L elbow)   Posture   Posture forward head  position   Range of Motion Comprehensive   Comment, General Range of Motion limited ROM with shoulders   Strength Comprehensive (MMT)   Comment, General Manual Muscle Testing (MMT) Assessment functional with ADLs   Muscle Tone Assessment   Muscle Tone Quick Adds No deficits were identified   Coordination   Upper Extremity Coordination No deficits were identified   Bed Mobility   Bed Mobility sit-supine   Sit-Supine Early (Bed Mobility) minimum assist (75% patient effort)   Assistive Device (Bed Mobility) bed rails   Transfers   Transfers toilet transfer;bed-chair transfer   Transfer Skill: Bed to Chair/Chair to Bed   Bed-Chair Early (Transfers) contact guard   Toilet Transfer   Type (Toilet Transfer) sit-stand;stand-sit   Early Level (Toilet Transfer) contact guard   Assistive Device (Toilet Transfer) grab bars/safety frame   Toilet Transfer Comments able to manage her hygiene   Balance   Balance Comments defer to PT   Activities of Daily Living   BADL Assessment/Intervention lower body dressing;upper body dressing;grooming;toileting   Upper Body Dressing Assessment/Training   Early Level (Upper Body Dressing) upper body dressing skills;moderate assist (50% patient effort)  (to change gown)   Lower Body Dressing Assessment/Training   Position (Lower Body Dressing) edge of bed sitting   Early Level (Lower Body Dressing) maximum assist (25% patient effort)   Grooming Assessment/Training   Early Level (Grooming) contact guard assist   Toileting   Assistive Devices (Toileting) grab bar, toilet   Early Level (Toileting) toileting skills;moderate assist (50% patient effort)   Clinical Impression   Criteria for Skilled Therapeutic Interventions Met (OT) Yes, treatment indicated   OT Diagnosis decreased I with ADLs and functional mobility   OT Problem List-Impairments impacting ADL problems related to;activity tolerance impaired;strength   Assessment of Occupational  Performance 1-3 Performance Deficits   Identified Performance Deficits decreased endurance and social skills   Planned Therapy Interventions (OT) ADL retraining;transfer training;progressive activity/exercise   Clinical Decision Making Complexity (OT) problem focused assessment/low complexity   Risk & Benefits of therapy have been explained evaluation/treatment results reviewed;care plan/treatment goals reviewed;risks/benefits reviewed;current/potential barriers reviewed;participants voiced agreement with care plan;participants included;patient   OT Total Evaluation Time   OT Eval, Low Complexity Minutes (44194) 10   OT Goals   Therapy Frequency (OT) Daily  (pt is hoping to get home so put daily, if this plan changes could change to 5x/wk)   OT Predicted Duration/Target Date for Goal Attainment 05/07/24   OT Goals Hygiene/Grooming;Toilet Transfer/Toileting   OT: Hygiene/Grooming supervision/stand-by assist;from wheelchair;while standing   OT: Toilet Transfer/Toileting Minimal assist;toilet transfer;cleaning and garment management;using adaptive equipment   Interventions   Interventions Quick Adds Self-Care/Home Management   Self-Care/Home Management   Self-Care/Home Mgmt/ADL, Compensatory, Meal Prep Minutes (95063) 30   Symptoms Noted During/After Treatment (Meal Preparation/Planning Training) fatigue;shortness of breath   Treatment Detail/Skilled Intervention pt seen for OT eval and treatment. pt was up in recliner, needing to use the bathroom and states she called 1.5 hours ago and no one has come. pt was incontinent of urine and very wet gown and pad. sit to stand with SBA, used FWW to amb to bathroom. needs cues for BIG step with L, sometimes needing physical help to move L LE. used FWW and CGA. toilet transfer with verbal cues and CGA. needing max A to doff diaper. helped wipe off back and change gown with mod A due to heart monitor and buttons. stood from toilet using grab bar and SBA, pt able to manage  cleaning and pulled up depends with CGA. decreased balance with dynamic standing tasks. amb to sink with increased time. washed hands with min A for soap and towel.  pt then amb out of bathroom and to room door. slow pace and CGA. took standing rest break and complains of feeling SOB. returned to EOB, mod A to transfer sit to supine. pt usualyl sleeps in lift chair. all needs in reach, pillows in place, alarm on.   OT Discharge Planning   OT Plan grooming at sink in sit and stand, toileting, standing tolerance   OT Discharge Recommendation (DC Rec) home with assist;Transitional Care Facility   OT Rationale for DC Rec pt seen for OT eval and treat. pt has A at [facility] with all mobility, AM/PM cares, meals, transport to meals and activities and A with showers and med mgmt. Pt is really hoping to go home with A vs TCU. She may be able to progress to home with A and home care. currently she is a little weak and deconditioned so at this time rec TCU.   OT Brief overview of current status CGA with mobility, decreased activity tolerance   Total Session Time   Timed Code Treatment Minutes 30   Total Session Time (sum of timed and untimed services) 40

## 2024-05-03 NOTE — PROGRESS NOTES
Care provided [id number]  A&Ox4, SR, RA, turn repo, external cath in place with good output. Report called to ortho spine, pt transferred at [phone].

## 2024-05-03 NOTE — PROGRESS NOTES
[facility]    Medicine Progress Note - Hospitalist Service    Date of Admission:  5/1/2024    Assessment & Plan     Opal Sin is an 88-year-old female with history of Parkinson's disease, previous CVA, hypertension, GERD from care facility who presented on 5/1/2024 with several days of shortness of breath and cough.  No fever.  Workup showed acute CHF exacerbation.    Acute diastolic CHF exacerbation with preserved EF 60-65%, echo 2017  - Noted to have lower extremity edema, proBNP 4044  - Chest x-ray 5/1 showed pulmonary vascular congestion with bilateral interstitial infiltrates, atelectasis/infiltrate left lung base, small left and trace right effusion  - Echo 5/2/2024 showed LVEF of 50-55% (was 60-65% in 2017), grade 2 diastolic dysfunction, RV not well-visualized but appeared normal or mildly reduced EF, elevated LV filling pressures, dilated IVC, elevated RV systolic pressure consistent with mild pulmonary hypertension   - Diuresing well with IV Lasix 40 mg twice daily, continue  - Continue Coreg, losartan  - Supplemental oxygen as needed    Left lower lobe community-acquired pneumonia, organism unspecified  - Has cough, shortness of breath and x-ray finding of left lower lobe infiltrate.  No fever, normal procalcitonin but had leukocytosis of 17-18 and elevated CRP of 148  - Based on patient's multiple allergies, started on azithromycin on 5/2, continue.  Leukocytosis and CRP improved  - COVID-19/influenza/RSV negative  - Will consult speech therapy to evaluate for aspiration    Hypervolemic hyponatremia  - Sodium remained stable at 133    Parkinson's disease  - Continue Sinemet  - PT/OT evaluation.    Essential hypertension  - Continue carvedilol and losartan.  Resume amlodipine  - Monitor blood pressures    GERD  - Continue PPI    Chronic normocytic anemia  - Hemoglobin decreased from 11.6 (7/2023) to 10.  Stable since admission.  No history or evidence of bleeding  -  Monitor.  Outpatient follow-up with PCP    History of breast cancer  - On raloxifene.  Hold for now, resume at discharge            Diet: Combination Diet Low Saturated Fat Na <2400mg Diet, No Caffeine Diet    DVT Prophylaxis: Pneumatic Compression Devices  [place] Catheter: Not present  Lines: None     Cardiac Monitoring: ACTIVE order. Indication: Acute decompensated heart failure (48 hours)  Code Status: No CPR- Do NOT Intubate      Clinically Significant Risk Factors              # Hypoalbuminemia: Lowest albumin = 3.3 g/dL at 5/1/2024 11:52 PM, will monitor as appropriate     # Hypertension: Noted on problem list            # Asthma: noted on problem list        Disposition Plan       Medically Ready for Discharge: Anticipated Tomorrow             Dania Francisco MD  Hospitalist Service  [facility]  Securely message with [employer] (more info)  Text page via [employer] Paging/Directory   ______________________________________________________________________    Interval History     Patient reports she feels better than yesterday.  Breathing has improved, not on oxygen but appears to have some shortness of breath.  Blood pressures are elevated in 160s  Cough has improved.  Labs are improving.  Had good diuresis in last 24 hours.     Physical Exam   Vital Signs: Temp: 97.5  F (36.4  C) Temp src: Oral BP: (!) 168/86 Pulse: 65   Resp: 18 SpO2: 93 % O2 Device: None (Room air)    Weight: 150 lbs 2.13 oz    Constitutional - awake and alert, resting in bed, in no acute distress  Cardiovascular - regular rate and rhythm, no murmurs, 1+ edema  Pulmonary -clear bilaterally, wheezing has resolved   GI - abdomen is soft, nontender, nondistended, no hepatosplenomegaly or masses  Integumentary - skin is warm and dry, no rashes or ulcers  Neurological - awake, alert and oriented x3.  Moving all 4 extremities, normal speech, no focal deficits    Medical Decision Making       45 MINUTES SPENT BY ME on the date of  service doing chart review, history, exam, documentation & further activities per the note.      Data     I have personally reviewed the following data over the past 24 hrs:    14.7 (H)  \   10.0 (L)   / 282     133 (L) 98 43.8 (H) /  117 (H)   3.7 22 0.92 \     Procal: N/A CRP: 84.58 (H) Lactic Acid: N/A         Imaging results reviewed over the past 24 hrs:   Recent Results (from the past 24 hour(s))   Echocardiogram Complete   Result Value    LVEF  50-55%    Narrative    [id number]  [id number]  [id number]  [id number]     [facility]  Echocardiography Laboratory  [facility] [place] [address]     Name: BENJI KENT  MRN: [id number]  : 1935  Study Date: 2024 01:51 PM  Age: 88 yrs  Gender: Female  Patient Location: [facility]  Reason For Study: CHF  Ordering Physician: GENEVIEVE CHINO  Referring Physician: Marquis Russ  Performed By: Yomi Lerma     BSA: 1.7 m2  Height: 61 in  Weight: 150 lb  HR: 72  BP: 122/72 mmHg  ______________________________________________________________________________  Procedure  Complete Portable Echo Adult. Optison (NDC #9321-2196) given intravenously.  ______________________________________________________________________________  Interpretation Summary     Poor parstenal views, patient has breast implant that made the echo difficult.  There is mild concentric left ventricular hypertrophy.  The visual ejection fraction is 50-55%.  Grade II or moderate diastolic dysfunction.  Diastolic Doppler findings (E/E' ratio and/or other parameters) suggest left  ventricular filling pressures are increased.  Left ventricular systolic function is low normal.  RV not well seen. Grossly normal size, EF is likely normal or mildly reduced  IVC diameter >2.1 cm collapsing <50% with sniff suggests a high RA pressure  estimated at 15 mmHg or greater.  Right ventricular systolic pressure is elevated, consistent with mild  pulmonary  hypertension.  ______________________________________________________________________________  Left Ventricle  The left ventricle is normal in size. There is mild concentric left  ventricular hypertrophy. The visual ejection fraction is 50-55%. Grade II or  moderate diastolic dysfunction. Diastolic Doppler findings (E/E' ratio and/or  other parameters) suggest left ventricular filling pressures are increased.  Left ventricular systolic function is low normal. Normal left ventricular wall  motion. There is no thrombus seen in the left ventricle.     Right Ventricle  RV not well seen. Grossly normal size, EF is likely normal or mildly reduced.  The right ventricle is not well visualized.     Atria  The left atrium is mildly dilated. Right atrial size is normal.     Mitral Valve  There is mild mitral annular calcification. There is trace mitral  regurgitation.     Tricuspid Valve  The tricuspid valve is not well visualized, but is grossly normal. There is  trace tricuspid regurgitation. The right ventricular systolic pressure is  approximated at 20.8 mmHg plus the right atrial pressure. IVC diameter >2.1 cm  collapsing <50% with sniff suggests a high RA pressure estimated at 15 mmHg or  greater. Right ventricular systolic pressure is elevated, consistent with mild  pulmonary hypertension.     Aortic Valve  The aortic valve is not well visualized. There is trace aortic regurgitation.  No hemodynamically significant valvular aortic stenosis.     Pulmonic Valve  The pulmonic valve is not well seen, but is grossly normal.     Vessels  The aortic root is normal size.     Pericardium  The pericardium appears normal.     Rhythm  Sinus rhythm was noted.  ______________________________________________________________________________  MMode/2D Measurements & Calculations  IVSd: 0.93 cm     LVIDd: 4.3 cm  LVIDs: 3.4 cm  LVPWd: 1.4 cm  FS: 20.4 %  LV mass(C)d: 175.4 grams  LV mass(C)dI: 104.9 grams/m2  Ao root diam: 3.4 cm  asc  Aorta Diam: 3.3 cm  LVOT diam: 2.2 cm  LVOT area: 3.7 cm2  Ao root diam index Ht(cm/m): 2.2  Ao root diam index BSA (cm/m2): 2.1  Asc Ao diam index BSA (cm/m2): 2.0  Asc Ao diam index Ht(cm/m): 2.1  LA Volume (BP): 64.5 ml     LA Volume Index (BP): 38.6 ml/m2  RV Base: 3.9 cm  RWT: 0.65  TAPSE: 3.1 cm     Doppler Measurements & Calculations  MV E max richar: 89.1 cm/sec  MV A max richar: 78.4 cm/sec  MV E/A: 1.1  MV dec time: 0.16 sec  PA acc time: 0.11 sec  TR max richar: 228.0 cm/sec  TR max P.8 mmHg  E/E' av.9  Lateral E/e': 16.4  Medial E/e': 27.5  RV S Richar: 14.4 cm/sec     ______________________________________________________________________________  Report approved by: Elias Ferraro 2024 03:59 PM

## 2024-05-04 ENCOUNTER — APPOINTMENT (OUTPATIENT)
Dept: PHYSICAL THERAPY | Facility: CLINIC | Age: 89
DRG: 291 | End: 2024-05-04
Payer: COMMERCIAL

## 2024-05-04 LAB
ANION GAP SERPL CALCULATED.3IONS-SCNC: 16 MMOL/L (ref 7–15)
BUN SERPL-MCNC: 49 MG/DL (ref 8–23)
CALCIUM SERPL-MCNC: 9.5 MG/DL (ref 8.8–10.2)
CHLORIDE SERPL-SCNC: 96 MMOL/L (ref 98–107)
CREAT SERPL-MCNC: 1.04 MG/DL (ref 0.51–0.95)
CRP SERPL-MCNC: 47.26 MG/L
DEPRECATED HCO3 PLAS-SCNC: 22 MMOL/L (ref 22–29)
EGFRCR SERPLBLD CKD-EPI 2021: 52 ML/MIN/1.73M2
ERYTHROCYTE [DISTWIDTH] IN BLOOD BY AUTOMATED COUNT: 14.6 % (ref 10–15)
GLUCOSE SERPL-MCNC: 138 MG/DL (ref 70–99)
HCT VFR BLD AUTO: 35.1 % (ref 35–47)
HGB BLD-MCNC: 11.7 G/DL (ref 11.7–15.7)
MAGNESIUM SERPL-MCNC: 1.7 MG/DL (ref 1.7–2.3)
MCH RBC QN AUTO: 29.7 PG (ref 26.5–33)
MCHC RBC AUTO-ENTMCNC: 33.3 G/DL (ref 31.5–36.5)
MCV RBC AUTO: 89 FL (ref 78–100)
PLATELET # BLD AUTO: 335 10E3/UL (ref 150–450)
POTASSIUM SERPL-SCNC: 4 MMOL/L (ref 3.4–5.3)
RBC # BLD AUTO: 3.94 10E6/UL (ref 3.8–5.2)
SODIUM SERPL-SCNC: 134 MMOL/L (ref 135–145)
WBC # BLD AUTO: 15.1 10E3/UL (ref 4–11)

## 2024-05-04 PROCEDURE — 94640 AIRWAY INHALATION TREATMENT: CPT | Mod: 76

## 2024-05-04 PROCEDURE — 250N000009 HC RX 250: Performed by: INTERNAL MEDICINE

## 2024-05-04 PROCEDURE — 120N000001 HC R&B MED SURG/OB

## 2024-05-04 PROCEDURE — 83735 ASSAY OF MAGNESIUM: CPT | Performed by: INTERNAL MEDICINE

## 2024-05-04 PROCEDURE — 86140 C-REACTIVE PROTEIN: CPT | Performed by: INTERNAL MEDICINE

## 2024-05-04 PROCEDURE — 94640 AIRWAY INHALATION TREATMENT: CPT

## 2024-05-04 PROCEDURE — 99233 SBSQ HOSP IP/OBS HIGH 50: CPT | Performed by: INTERNAL MEDICINE

## 2024-05-04 PROCEDURE — 250N000011 HC RX IP 250 OP 636: Performed by: INTERNAL MEDICINE

## 2024-05-04 PROCEDURE — 80048 BASIC METABOLIC PNL TOTAL CA: CPT | Performed by: INTERNAL MEDICINE

## 2024-05-04 PROCEDURE — 999N000157 HC STATISTIC RCP TIME EA 10 MIN

## 2024-05-04 PROCEDURE — 97530 THERAPEUTIC ACTIVITIES: CPT | Mod: GP

## 2024-05-04 PROCEDURE — 85049 AUTOMATED PLATELET COUNT: CPT | Performed by: INTERNAL MEDICINE

## 2024-05-04 PROCEDURE — 250N000013 HC RX MED GY IP 250 OP 250 PS 637: Performed by: INTERNAL MEDICINE

## 2024-05-04 PROCEDURE — 97116 GAIT TRAINING THERAPY: CPT | Mod: GP

## 2024-05-04 PROCEDURE — 36415 COLL VENOUS BLD VENIPUNCTURE: CPT | Performed by: INTERNAL MEDICINE

## 2024-05-04 RX ORDER — FUROSEMIDE 40 MG
40 TABLET ORAL DAILY
Status: DISCONTINUED | OUTPATIENT
Start: 2024-05-05 | End: 2024-05-07 | Stop reason: HOSPADM

## 2024-05-04 RX ORDER — ALBUTEROL SULFATE 0.83 MG/ML
2.5 SOLUTION RESPIRATORY (INHALATION)
Status: DISCONTINUED | OUTPATIENT
Start: 2024-05-04 | End: 2024-05-07 | Stop reason: HOSPADM

## 2024-05-04 RX ORDER — IPRATROPIUM BROMIDE AND ALBUTEROL SULFATE 2.5; .5 MG/3ML; MG/3ML
3 SOLUTION RESPIRATORY (INHALATION)
Status: DISCONTINUED | OUTPATIENT
Start: 2024-05-04 | End: 2024-05-07 | Stop reason: HOSPADM

## 2024-05-04 RX ADMIN — LOSARTAN POTASSIUM 100 MG: 100 TABLET, FILM COATED ORAL at 21:20

## 2024-05-04 RX ADMIN — CARBIDOPA AND LEVODOPA 2 TABLET: 25; 100 TABLET ORAL at 11:21

## 2024-05-04 RX ADMIN — ASPIRIN 81 MG: 81 TABLET, COATED ORAL at 09:02

## 2024-05-04 RX ADMIN — FUROSEMIDE 40 MG: 10 INJECTION, SOLUTION INTRAMUSCULAR; INTRAVENOUS at 09:02

## 2024-05-04 RX ADMIN — ACETAMINOPHEN 650 MG: 325 TABLET, FILM COATED ORAL at 21:42

## 2024-05-04 RX ADMIN — AZITHROMYCIN DIHYDRATE 250 MG: 250 TABLET ORAL at 09:02

## 2024-05-04 RX ADMIN — PANTOPRAZOLE SODIUM 40 MG: 40 TABLET, DELAYED RELEASE ORAL at 09:01

## 2024-05-04 RX ADMIN — TOLTERODINE TARTRATE 2 MG: 2 TABLET, FILM COATED ORAL at 23:29

## 2024-05-04 RX ADMIN — POTASSIUM CHLORIDE 20 MEQ: 1500 TABLET, EXTENDED RELEASE ORAL at 09:01

## 2024-05-04 RX ADMIN — IPRATROPIUM BROMIDE AND ALBUTEROL SULFATE 3 ML: .5; 3 SOLUTION RESPIRATORY (INHALATION) at 20:39

## 2024-05-04 RX ADMIN — CARVEDILOL 12.5 MG: 12.5 TABLET, FILM COATED ORAL at 18:55

## 2024-05-04 RX ADMIN — CARBIDOPA AND LEVODOPA 2 TABLET: 25; 100 TABLET ORAL at 05:39

## 2024-05-04 RX ADMIN — ACETAMINOPHEN 650 MG: 325 TABLET, FILM COATED ORAL at 11:25

## 2024-05-04 RX ADMIN — ATORVASTATIN CALCIUM 20 MG: 20 TABLET, FILM COATED ORAL at 09:01

## 2024-05-04 RX ADMIN — AMLODIPINE BESYLATE 5 MG: 5 TABLET ORAL at 09:02

## 2024-05-04 RX ADMIN — IPRATROPIUM BROMIDE AND ALBUTEROL SULFATE 3 ML: .5; 3 SOLUTION RESPIRATORY (INHALATION) at 15:15

## 2024-05-04 RX ADMIN — TOLTERODINE TARTRATE 2 MG: 2 TABLET, FILM COATED ORAL at 11:21

## 2024-05-04 RX ADMIN — CARBIDOPA AND LEVODOPA 2 TABLET: 25; 100 TABLET ORAL at 16:10

## 2024-05-04 RX ADMIN — CARVEDILOL 12.5 MG: 12.5 TABLET, FILM COATED ORAL at 09:02

## 2024-05-04 RX ADMIN — SERTRALINE HYDROCHLORIDE 25 MG: 25 TABLET ORAL at 09:02

## 2024-05-04 RX ADMIN — CARBIDOPA AND LEVODOPA 2 TABLET: 25; 100 TABLET ORAL at 21:20

## 2024-05-04 ASSESSMENT — ACTIVITIES OF DAILY LIVING (ADL)
ADLS_ACUITY_SCORE: 60

## 2024-05-04 NOTE — PROGRESS NOTES
Pt alert and oriented x4. VSS, ex elevated BP. Sating 96% on 1L NC. Noted diminished lung sounds. Intermittent productive cough.  Ambulated to restroom with A1GBW.  Tolerating cardiac diet, denies N/V/Abdominal pain. Pending placement to TCU. Plan of care ongong.

## 2024-05-04 NOTE — PROGRESS NOTES
Alert and oriented x4. On RA, sating at 95%. Desats upon exertion. Diminished lungs sounds. Was encouraged to use the IS. Tolerates cardiac diet, denies nausea and vomiting. Active bowel sounds. Assist of 1 GB/W. TCU placement pending. Denies pain.

## 2024-05-04 NOTE — PROGRESS NOTES
[facility]    Medicine Progress Note - Hospitalist Service    Date of Admission:  5/1/2024    Assessment & Plan     Opal Sin is an 88-year-old female with history of Parkinson's disease, previous CVA, hypertension, GERD from care facility who presented on 5/1/2024 with several days of shortness of breath and cough.  No fever.  Workup showed acute CHF exacerbation.    Acute diastolic CHF exacerbation with preserved EF 60-65%, echo 2017  - Noted to have lower extremity edema, proBNP 4044  - Chest x-ray 5/1 showed pulmonary vascular congestion with bilateral interstitial infiltrates, atelectasis/infiltrate left lung base, small left and trace right effusion  - Echo 5/2/2024 showed LVEF of 50-55% (was 60-65% in 2017), grade 2 diastolic dysfunction, RV not well-visualized but appeared normal or mildly reduced EF, elevated LV filling pressures, dilated IVC, elevated RV systolic pressure consistent with mild pulmonary hypertension   - Diuresed well with IV Lasix 40 mg twice daily. Transition to po lasix on 5/5  - Continue Coreg, losartan  - Supplemental oxygen as needed    Left lower lobe community-acquired pneumonia, organism unspecified  - Has cough, shortness of breath and x-ray finding of left lower lobe infiltrate.  No fever, normal procalcitonin but had leukocytosis of 17-18 and elevated CRP of 148  - Based on patient's multiple allergies, started on azithromycin on 5/2, continue.  Leukocytosis and CRP improved  - COVID-19/influenza/RSV negative    Bilateral diffuse wheezing-secondary to reactive airway disease from pneumonia or CHF  -Start on DuoNebs 4 times daily and as needed AS needed    CKD stage II/III   - baseline creatinine 1.0 mg/dL, stable. Monitor  - followed by nephrology    Minimal oral dysphagia; potential pharyngeal vs esophageal dysphagia   - speech therapy consulted. No overt clinical signs/sx aspiration noted.  Has minimal oral dysphagia.  May have pharyngeal or  esophageal dysphagia   - Continue regular diet with thin liquid    Hypervolemic hyponatremia  - Sodium remained stable at 134    Parkinson's disease  - Continue Sinemet  - PT/OT .    Essential hypertension  - Continue carvedilol, losartan and amlodipine  - Monitor blood pressures    GERD  - Continue PPI    Chronic normocytic anemia  - Hemoglobin decreased from 11.6 (7/2023) to 10.  Stable since admission.  No history or evidence of bleeding  - Monitor.  Outpatient follow-up with PCP    History of breast cancer  - On raloxifene.  Hold for now, resume at discharge            Diet: Combination Diet Low Saturated Fat Na <2400mg Diet, No Caffeine Diet    DVT Prophylaxis: Pneumatic Compression Devices  Santana Catheter: Not present  Lines: None     Cardiac Monitoring: None  Code Status: No CPR- Do NOT Intubate      Clinically Significant Risk Factors              # Hypoalbuminemia: Lowest albumin = 3.3 g/dL at 5/1/2024 11:52 PM, will monitor as appropriate     # Hypertension: Noted on problem list            # Asthma: noted on problem list        Disposition Plan       Medically Ready for Discharge: Anticipated in 2-4 Days             Dania Francisco MD  Hospitalist Service  [facility]  Securely message with [employer] (more info)  Text page via [email or web address] Paging/Directory   ______________________________________________________________________    Interval History     Multiple family members present in room.  Patient reports she feels better than before  On exam she has bilateral diffuse wheezing    Physical Exam   Vital Signs: Temp: 99.5  F (37.5  C) Temp src: Oral BP: (!) 157/83 Pulse: 66   Resp: 20 SpO2: 94 % O2 Device: None (Room air)    Weight: 150 lbs 2.13 oz    Constitutional - awake and alert, resting in bed, in no acute distress  Cardiovascular - regular rate and rhythm, no murmurs, no edema  Pulmonary -bilateral diffuse wheezing    GI - abdomen is soft, nontender, nondistended, no  hepatosplenomegaly or masses  Integumentary - skin is warm and dry, no rashes or ulcers  Neurological - awake, alert and oriented x3.  Moving all 4 extremities, normal speech, no focal deficits    Medical Decision Making       45 MINUTES SPENT BY ME on the date of service doing chart review, history, exam, documentation & further activities per the note.      Data     I have personally reviewed the following data over the past 24 hrs:    15.1 (H)  \   11.7   / 335     134 (L) 96 (L) 49.0 (H) /  138 (H)   4.0 22 1.04 (H) \     Procal: N/A CRP: 47.26 (H) Lactic Acid: N/A         Imaging results reviewed over the past 24 hrs:   No results found for this or any previous visit (from the past 24 hour(s)).

## 2024-05-05 LAB
ANION GAP SERPL CALCULATED.3IONS-SCNC: 15 MMOL/L (ref 7–15)
BUN SERPL-MCNC: 58.6 MG/DL (ref 8–23)
CALCIUM SERPL-MCNC: 9.3 MG/DL (ref 8.8–10.2)
CHLORIDE SERPL-SCNC: 97 MMOL/L (ref 98–107)
CREAT SERPL-MCNC: 1.08 MG/DL (ref 0.51–0.95)
DEPRECATED HCO3 PLAS-SCNC: 24 MMOL/L (ref 22–29)
EGFRCR SERPLBLD CKD-EPI 2021: 50 ML/MIN/1.73M2
ERYTHROCYTE [DISTWIDTH] IN BLOOD BY AUTOMATED COUNT: 14.6 % (ref 10–15)
GLUCOSE SERPL-MCNC: 116 MG/DL (ref 70–99)
HCT VFR BLD AUTO: 37.6 % (ref 35–47)
HGB BLD-MCNC: 12.6 G/DL (ref 11.7–15.7)
MCH RBC QN AUTO: 29.3 PG (ref 26.5–33)
MCHC RBC AUTO-ENTMCNC: 33.5 G/DL (ref 31.5–36.5)
MCV RBC AUTO: 87 FL (ref 78–100)
PLATELET # BLD AUTO: 385 10E3/UL (ref 150–450)
POTASSIUM SERPL-SCNC: 4.4 MMOL/L (ref 3.4–5.3)
RBC # BLD AUTO: 4.3 10E6/UL (ref 3.8–5.2)
SODIUM SERPL-SCNC: 136 MMOL/L (ref 135–145)
WBC # BLD AUTO: 12.6 10E3/UL (ref 4–11)

## 2024-05-05 PROCEDURE — 999N000157 HC STATISTIC RCP TIME EA 10 MIN

## 2024-05-05 PROCEDURE — 120N000001 HC R&B MED SURG/OB

## 2024-05-05 PROCEDURE — 36415 COLL VENOUS BLD VENIPUNCTURE: CPT | Performed by: INTERNAL MEDICINE

## 2024-05-05 PROCEDURE — 250N000013 HC RX MED GY IP 250 OP 250 PS 637: Performed by: INTERNAL MEDICINE

## 2024-05-05 PROCEDURE — 85027 COMPLETE CBC AUTOMATED: CPT | Performed by: INTERNAL MEDICINE

## 2024-05-05 PROCEDURE — 99232 SBSQ HOSP IP/OBS MODERATE 35: CPT | Performed by: INTERNAL MEDICINE

## 2024-05-05 PROCEDURE — 250N000009 HC RX 250: Performed by: INTERNAL MEDICINE

## 2024-05-05 PROCEDURE — 94640 AIRWAY INHALATION TREATMENT: CPT | Mod: 76

## 2024-05-05 PROCEDURE — 94640 AIRWAY INHALATION TREATMENT: CPT

## 2024-05-05 PROCEDURE — 80048 BASIC METABOLIC PNL TOTAL CA: CPT | Performed by: INTERNAL MEDICINE

## 2024-05-05 RX ORDER — BUDESONIDE 0.5 MG/2ML
0.5 INHALANT ORAL 2 TIMES DAILY
Status: DISCONTINUED | OUTPATIENT
Start: 2024-05-05 | End: 2024-05-07 | Stop reason: HOSPADM

## 2024-05-05 RX ADMIN — BUDESONIDE 0.5 MG: 0.5 INHALANT RESPIRATORY (INHALATION) at 20:42

## 2024-05-05 RX ADMIN — CARBIDOPA AND LEVODOPA 2 TABLET: 25; 100 TABLET ORAL at 06:05

## 2024-05-05 RX ADMIN — ASPIRIN 81 MG: 81 TABLET, COATED ORAL at 08:26

## 2024-05-05 RX ADMIN — PANTOPRAZOLE SODIUM 40 MG: 40 TABLET, DELAYED RELEASE ORAL at 08:26

## 2024-05-05 RX ADMIN — POTASSIUM CHLORIDE 20 MEQ: 1500 TABLET, EXTENDED RELEASE ORAL at 08:26

## 2024-05-05 RX ADMIN — CARBIDOPA AND LEVODOPA 2 TABLET: 25; 100 TABLET ORAL at 21:27

## 2024-05-05 RX ADMIN — ACETAMINOPHEN 650 MG: 325 TABLET, FILM COATED ORAL at 21:43

## 2024-05-05 RX ADMIN — IPRATROPIUM BROMIDE AND ALBUTEROL SULFATE 3 ML: .5; 3 SOLUTION RESPIRATORY (INHALATION) at 20:41

## 2024-05-05 RX ADMIN — CARVEDILOL 12.5 MG: 12.5 TABLET, FILM COATED ORAL at 08:26

## 2024-05-05 RX ADMIN — FUROSEMIDE 40 MG: 40 TABLET ORAL at 08:26

## 2024-05-05 RX ADMIN — ATORVASTATIN CALCIUM 20 MG: 20 TABLET, FILM COATED ORAL at 08:26

## 2024-05-05 RX ADMIN — TOLTERODINE TARTRATE 2 MG: 2 TABLET, FILM COATED ORAL at 21:28

## 2024-05-05 RX ADMIN — SERTRALINE HYDROCHLORIDE 25 MG: 25 TABLET ORAL at 08:26

## 2024-05-05 RX ADMIN — CARBIDOPA AND LEVODOPA 2 TABLET: 25; 100 TABLET ORAL at 11:26

## 2024-05-05 RX ADMIN — LOSARTAN POTASSIUM 100 MG: 100 TABLET, FILM COATED ORAL at 21:27

## 2024-05-05 RX ADMIN — CARBIDOPA AND LEVODOPA 2 TABLET: 25; 100 TABLET ORAL at 16:38

## 2024-05-05 RX ADMIN — IPRATROPIUM BROMIDE AND ALBUTEROL SULFATE 3 ML: .5; 3 SOLUTION RESPIRATORY (INHALATION) at 07:06

## 2024-05-05 RX ADMIN — IPRATROPIUM BROMIDE AND ALBUTEROL SULFATE 3 ML: .5; 3 SOLUTION RESPIRATORY (INHALATION) at 10:52

## 2024-05-05 RX ADMIN — IPRATROPIUM BROMIDE AND ALBUTEROL SULFATE 3 ML: .5; 3 SOLUTION RESPIRATORY (INHALATION) at 15:01

## 2024-05-05 RX ADMIN — AZITHROMYCIN DIHYDRATE 250 MG: 250 TABLET ORAL at 08:26

## 2024-05-05 RX ADMIN — CARVEDILOL 12.5 MG: 12.5 TABLET, FILM COATED ORAL at 18:33

## 2024-05-05 RX ADMIN — TOLTERODINE TARTRATE 2 MG: 2 TABLET, FILM COATED ORAL at 08:26

## 2024-05-05 RX ADMIN — AMLODIPINE BESYLATE 5 MG: 5 TABLET ORAL at 08:26

## 2024-05-05 ASSESSMENT — ACTIVITIES OF DAILY LIVING (ADL)
ADLS_ACUITY_SCORE: 63
ADLS_ACUITY_SCORE: 60
ADLS_ACUITY_SCORE: 63
ADLS_ACUITY_SCORE: 60
ADLS_ACUITY_SCORE: 63
ADLS_ACUITY_SCORE: 60
ADLS_ACUITY_SCORE: 60
ADLS_ACUITY_SCORE: 63
ADLS_ACUITY_SCORE: 63
ADLS_ACUITY_SCORE: 60
ADLS_ACUITY_SCORE: 63
ADLS_ACUITY_SCORE: 60
ADLS_ACUITY_SCORE: 62

## 2024-05-05 NOTE — PROGRESS NOTES
[facility]    Medicine Progress Note - Hospitalist Service    Date of Admission:  5/1/2024    Assessment & Plan     Opal Sin is an 88-year-old female with history of Parkinson's disease, previous CVA, hypertension, GERD from care facility who presented on 5/1/2024 with several days of shortness of breath and cough.  No fever.  Workup showed acute CHF exacerbation.    Acute diastolic CHF exacerbation with preserved EF 60-65%, echo 2017  - Noted to have lower extremity edema, proBNP 4044  - Chest x-ray 5/1 showed pulmonary vascular congestion with bilateral interstitial infiltrates, atelectasis/infiltrate left lung base, small left and trace right effusion  - Echo 5/2/2024 showed LVEF of 50-55% (was 60-65% in 2017), grade 2 diastolic dysfunction, RV not well-visualized but appeared normal or mildly reduced EF, elevated LV filling pressures, dilated IVC, elevated RV systolic pressure consistent with mild pulmonary hypertension   - Diuresed well with IV Lasix 40 mg twice daily. Transitioned to po lasix on 5/5  - Continue Coreg, losartan  - Supplemental oxygen as needed    Left lower lobe community-acquired pneumonia, organism unspecified  - Has cough, shortness of breath and x-ray finding of left lower lobe infiltrate.  No fever, normal procalcitonin but had leukocytosis of 17-18 and elevated CRP of 148  - Based on patient's multiple allergies, started on azithromycin on 5/2, continue.  Leukocytosis and CRP improved  - COVID-19/influenza/RSV negative    Bilateral diffuse wheezing-secondary to reactive airway disease from pneumonia or CHF  - continue DuoNebs 4 times daily and albuterol neb prn  - add pulmicort neb BID  - prescribe inhaler at discharge     CKD stage II/III   - baseline creatinine 1.0 mg/dL, stable. Monitor  - followed by nephrology    Minimal oral dysphagia; potential pharyngeal vs esophageal dysphagia   - speech therapy consulted. No overt clinical signs/sx  aspiration noted.  Has minimal oral dysphagia.  May have pharyngeal or esophageal dysphagia   - Continue regular diet with thin liquid    Hypervolemic hyponatremia  - Sodium remained stable at 136    Parkinson's disease  - Continue Sinemet  - PT/OT .    Essential hypertension  - Continue carvedilol, losartan and amlodipine  - Monitor blood pressures    GERD  - Continue PPI    Chronic normocytic anemia  - Hemoglobin decreased from 11.6 (7/2023) to 10.  Stable since admission.  No history or evidence of bleeding  - Monitor.  Outpatient follow-up with PCP    History of breast cancer  - On raloxifene.  Hold for now, resume at discharge            Diet: Combination Diet Low Saturated Fat Na <2400mg Diet, No Caffeine Diet    DVT Prophylaxis: Pneumatic Compression Devices  Santana Catheter: Not present  Lines: None     Cardiac Monitoring: None  Code Status: No CPR- Do NOT Intubate      Clinically Significant Risk Factors              # Hypoalbuminemia: Lowest albumin = 3.3 g/dL at 5/1/2024 11:52 PM, will monitor as appropriate     # Hypertension: Noted on problem list            # Asthma: noted on problem list        Disposition Plan       Medically Ready for Discharge: Anticipated Tomorrow             Dania Francisco MD  Hospitalist Service  [facility]  Securely message with [employer] (more info)  Text page via [email or web address] Paging/Directory   ______________________________________________________________________    Interval History     Patient reports she feels better than before. Reports nebs have helped   No other complains     Physical Exam   Vital Signs: Temp: 97.4  F (36.3  C) Temp src: Oral BP: (!) 150/61 Pulse: 61   Resp: 17 SpO2: 94 % O2 Device: None (Room air)    Weight: 150 lbs 2.13 oz    Constitutional - awake and alert, resting in bed, in no acute distress  Cardiovascular - regular rate and rhythm, no murmurs, no edema  Pulmonary -bilateral diffuse wheezing improved   GI - abdomen is soft,  nontender, nondistended  Integumentary - skin is warm and dry, no rashes or ulcers  Neurological - awake, alert and oriented x3.  Moving all 4 extremities, normal speech, no focal deficits    Medical Decision Making       35 MINUTES SPENT BY ME on the date of service doing chart review, history, exam, documentation & further activities per the note.      Data     I have personally reviewed the following data over the past 24 hrs:    12.6 (H)  \   12.6   / 385     136 97 (L) 58.6 (H) /  116 (H)   4.4 24 1.08 (H) \       Imaging results reviewed over the past 24 hrs:   No results found for this or any previous visit (from the past 24 hour(s)).

## 2024-05-05 NOTE — PROGRESS NOTES
Pt alert and oriented x4. Pleasant/cooperative. VSS, ex elevated BP. Sating 96% on RA. Pt ambulated from chair to bed, desats with ambulation/exertion. A1 GBW. Denies SOB, CP, N/V. Lung sounds. Pain managed with prn tylenol. Pending TCU placement. Plan of care ongoing.

## 2024-05-06 ENCOUNTER — APPOINTMENT (OUTPATIENT)
Dept: OCCUPATIONAL THERAPY | Facility: CLINIC | Age: 89
DRG: 291 | End: 2024-05-06
Payer: COMMERCIAL

## 2024-05-06 ENCOUNTER — APPOINTMENT (OUTPATIENT)
Dept: PHYSICAL THERAPY | Facility: CLINIC | Age: 89
DRG: 291 | End: 2024-05-06
Payer: COMMERCIAL

## 2024-05-06 ENCOUNTER — APPOINTMENT (OUTPATIENT)
Dept: SPEECH THERAPY | Facility: CLINIC | Age: 89
DRG: 291 | End: 2024-05-06
Payer: COMMERCIAL

## 2024-05-06 LAB
ANION GAP SERPL CALCULATED.3IONS-SCNC: 16 MMOL/L (ref 7–15)
BUN SERPL-MCNC: 60.9 MG/DL (ref 8–23)
CALCIUM SERPL-MCNC: 9.1 MG/DL (ref 8.8–10.2)
CHLORIDE SERPL-SCNC: 97 MMOL/L (ref 98–107)
CREAT SERPL-MCNC: 1.04 MG/DL (ref 0.51–0.95)
DEPRECATED HCO3 PLAS-SCNC: 20 MMOL/L (ref 22–29)
EGFRCR SERPLBLD CKD-EPI 2021: 52 ML/MIN/1.73M2
ERYTHROCYTE [DISTWIDTH] IN BLOOD BY AUTOMATED COUNT: 14.6 % (ref 10–15)
GLUCOSE SERPL-MCNC: 117 MG/DL (ref 70–99)
HCT VFR BLD AUTO: 36.2 % (ref 35–47)
HGB BLD-MCNC: 12 G/DL (ref 11.7–15.7)
MAGNESIUM SERPL-MCNC: 2.1 MG/DL (ref 1.7–2.3)
MCH RBC QN AUTO: 29.4 PG (ref 26.5–33)
MCHC RBC AUTO-ENTMCNC: 33.1 G/DL (ref 31.5–36.5)
MCV RBC AUTO: 89 FL (ref 78–100)
PLATELET # BLD AUTO: 349 10E3/UL (ref 150–450)
POTASSIUM SERPL-SCNC: 4.9 MMOL/L (ref 3.4–5.3)
RBC # BLD AUTO: 4.08 10E6/UL (ref 3.8–5.2)
SODIUM SERPL-SCNC: 133 MMOL/L (ref 135–145)
WBC # BLD AUTO: 15.3 10E3/UL (ref 4–11)

## 2024-05-06 PROCEDURE — 99232 SBSQ HOSP IP/OBS MODERATE 35: CPT | Performed by: INTERNAL MEDICINE

## 2024-05-06 PROCEDURE — 250N000013 HC RX MED GY IP 250 OP 250 PS 637: Performed by: INTERNAL MEDICINE

## 2024-05-06 PROCEDURE — 83735 ASSAY OF MAGNESIUM: CPT | Performed by: INTERNAL MEDICINE

## 2024-05-06 PROCEDURE — 999N000157 HC STATISTIC RCP TIME EA 10 MIN

## 2024-05-06 PROCEDURE — 97116 GAIT TRAINING THERAPY: CPT | Mod: GP | Performed by: PHYSICAL THERAPIST

## 2024-05-06 PROCEDURE — 97530 THERAPEUTIC ACTIVITIES: CPT | Mod: GO

## 2024-05-06 PROCEDURE — 80048 BASIC METABOLIC PNL TOTAL CA: CPT | Performed by: INTERNAL MEDICINE

## 2024-05-06 PROCEDURE — 85027 COMPLETE CBC AUTOMATED: CPT | Performed by: INTERNAL MEDICINE

## 2024-05-06 PROCEDURE — 250N000009 HC RX 250: Performed by: INTERNAL MEDICINE

## 2024-05-06 PROCEDURE — 94640 AIRWAY INHALATION TREATMENT: CPT | Mod: 76

## 2024-05-06 PROCEDURE — 36415 COLL VENOUS BLD VENIPUNCTURE: CPT | Performed by: INTERNAL MEDICINE

## 2024-05-06 PROCEDURE — 97530 THERAPEUTIC ACTIVITIES: CPT | Mod: GP | Performed by: PHYSICAL THERAPIST

## 2024-05-06 PROCEDURE — 120N000001 HC R&B MED SURG/OB

## 2024-05-06 PROCEDURE — 92526 ORAL FUNCTION THERAPY: CPT | Mod: GN

## 2024-05-06 PROCEDURE — 94640 AIRWAY INHALATION TREATMENT: CPT

## 2024-05-06 RX ADMIN — IPRATROPIUM BROMIDE AND ALBUTEROL SULFATE 3 ML: .5; 3 SOLUTION RESPIRATORY (INHALATION) at 16:06

## 2024-05-06 RX ADMIN — CARBIDOPA AND LEVODOPA 2 TABLET: 25; 100 TABLET ORAL at 06:14

## 2024-05-06 RX ADMIN — FUROSEMIDE 40 MG: 40 TABLET ORAL at 08:39

## 2024-05-06 RX ADMIN — CARBIDOPA AND LEVODOPA 2 TABLET: 25; 100 TABLET ORAL at 21:25

## 2024-05-06 RX ADMIN — ACETAMINOPHEN 650 MG: 325 TABLET, FILM COATED ORAL at 21:25

## 2024-05-06 RX ADMIN — TOLTERODINE TARTRATE 2 MG: 2 TABLET, FILM COATED ORAL at 21:25

## 2024-05-06 RX ADMIN — CARBIDOPA AND LEVODOPA 2 TABLET: 25; 100 TABLET ORAL at 16:18

## 2024-05-06 RX ADMIN — TOLTERODINE TARTRATE 2 MG: 2 TABLET, FILM COATED ORAL at 08:38

## 2024-05-06 RX ADMIN — ASPIRIN 81 MG: 81 TABLET, COATED ORAL at 08:38

## 2024-05-06 RX ADMIN — BUDESONIDE 0.5 MG: 0.5 INHALANT RESPIRATORY (INHALATION) at 20:44

## 2024-05-06 RX ADMIN — IPRATROPIUM BROMIDE AND ALBUTEROL SULFATE 3 ML: .5; 3 SOLUTION RESPIRATORY (INHALATION) at 12:24

## 2024-05-06 RX ADMIN — IPRATROPIUM BROMIDE AND ALBUTEROL SULFATE 3 ML: .5; 3 SOLUTION RESPIRATORY (INHALATION) at 20:44

## 2024-05-06 RX ADMIN — SERTRALINE HYDROCHLORIDE 25 MG: 25 TABLET ORAL at 08:39

## 2024-05-06 RX ADMIN — CARVEDILOL 12.5 MG: 12.5 TABLET, FILM COATED ORAL at 08:38

## 2024-05-06 RX ADMIN — AMLODIPINE BESYLATE 5 MG: 5 TABLET ORAL at 08:39

## 2024-05-06 RX ADMIN — CARVEDILOL 12.5 MG: 12.5 TABLET, FILM COATED ORAL at 18:36

## 2024-05-06 RX ADMIN — IPRATROPIUM BROMIDE AND ALBUTEROL SULFATE 3 ML: .5; 3 SOLUTION RESPIRATORY (INHALATION) at 07:36

## 2024-05-06 RX ADMIN — AZITHROMYCIN DIHYDRATE 250 MG: 250 TABLET ORAL at 08:38

## 2024-05-06 RX ADMIN — CARBIDOPA AND LEVODOPA 2 TABLET: 25; 100 TABLET ORAL at 11:01

## 2024-05-06 RX ADMIN — ATORVASTATIN CALCIUM 20 MG: 20 TABLET, FILM COATED ORAL at 08:38

## 2024-05-06 RX ADMIN — PANTOPRAZOLE SODIUM 40 MG: 40 TABLET, DELAYED RELEASE ORAL at 08:38

## 2024-05-06 RX ADMIN — BUDESONIDE 0.5 MG: 0.5 INHALANT RESPIRATORY (INHALATION) at 07:36

## 2024-05-06 ASSESSMENT — ACTIVITIES OF DAILY LIVING (ADL)
ADLS_ACUITY_SCORE: 63
ADLS_ACUITY_SCORE: 64
ADLS_ACUITY_SCORE: 63
ADLS_ACUITY_SCORE: 64
ADLS_ACUITY_SCORE: 63
ADLS_ACUITY_SCORE: 63
ADLS_ACUITY_SCORE: 64
ADLS_ACUITY_SCORE: 63
ADLS_ACUITY_SCORE: 64
ADLS_ACUITY_SCORE: 63

## 2024-05-06 NOTE — PLAN OF CARE
"Speech Language Therapy Discharge Summary    Reason for therapy discharge:    All goals and outcomes met, no further needs identified.    Progress towards therapy goal(s). See goals on Care Plan in Epic electronic health record for goal details.  Goals met - pt tolerating regular/thin diet, good awareness and independence with swallow strategies.     Therapy recommendation(s):    No further therapy is recommended.    SLP recs: \"regular (self-selecting softer/moister foods, small bites) with thin liquids (small sips) when fully upright, slow rate, alternate between solids/liquids, upright for PO/30-60 minutes following.\"  "

## 2024-05-06 NOTE — PLAN OF CARE
"Goal Outcome Evaluation:             Pt alert and oriented x4. Able to make needs known. Denied SOB, CP, N/V. Pt has productive cough, pale green sputum, \"much better\" than before per pt. No electrolyte replacement today, labs ordered for tomorrow. To discharge back to [facility], no rides available today. A1 GB walker.            "

## 2024-05-06 NOTE — PROGRESS NOTES
Pt alert and oriented x4. Pleasant/cooperative. VSS, ex elevated BP. Sat 93-96% on RA. Denies SOB, chest pain, nausea/vomiting. A1 w/ GBW. Tolerating regular diet with thin liquids.  Pain managed with prn tylenol. Pending TCU placement. Plan of care ongoing.

## 2024-05-06 NOTE — PROGRESS NOTES
Message left at 3:00 pm with nursing at [facility] to discuss patient's return to facility.    Amanda Kyle, RN  Inpatient Float Care Coordinator  [facility]

## 2024-05-06 NOTE — PROGRESS NOTES
[facility]    Medicine Progress Note - Hospitalist Service    Date of Admission:  5/1/2024    Assessment & Plan     Opal Sin is an 88-year-old female with history of Parkinson's disease, previous CVA, hypertension, GERD from care facility who presented on 5/1/2024 with several days of shortness of breath and cough.  No fever.  Workup showed acute CHF exacerbation.    Acute diastolic CHF exacerbation with preserved EF 60-65%, echo 2017  - Noted to have lower extremity edema, proBNP 4044  - Chest x-ray 5/1 showed pulmonary vascular congestion with bilateral interstitial infiltrates, atelectasis/infiltrate left lung base, small left and trace right effusion  - Echo 5/2/2024 showed LVEF of 50-55% (was 60-65% in 2017), grade 2 diastolic dysfunction, RV not well-visualized but appeared normal or mildly reduced EF, elevated LV filling pressures, dilated IVC, elevated RV systolic pressure consistent with mild pulmonary hypertension   - Diuresed well with IV Lasix 40 mg twice daily. Transitioned to po lasix on 5/5  - Continue Coreg, losartan  - Supplemental oxygen as needed    Left lower lobe community-acquired pneumonia, organism unspecified  - Has cough, shortness of breath and x-ray finding of left lower lobe infiltrate.  No fever, normal procalcitonin but had leukocytosis of 17-18 and elevated CRP of 148  - Based on patient's multiple allergies, treated with course of azithromycin.  Leukocytosis and CRP improved  - COVID-19/influenza/RSV negative    Bilateral diffuse wheezing-secondary to reactive airway disease from pneumonia or CHF  - continue DuoNebs 4 times daily,  pulmicort neb BID, albuterol neb prn  - prescribe inhaler at discharge     CKD stage II/III   - baseline creatinine 1.0 mg/dL, stable. Monitor  - followed by nephrology    Minimal oral dysphagia; potential pharyngeal vs esophageal dysphagia   - speech therapy consulted. No overt clinical signs/sx aspiration noted.  Has  minimal oral dysphagia.  May have pharyngeal or esophageal dysphagia   - Continue regular diet with thin liquid    Hypervolemic hyponatremia  - Sodium remained stable at 133    Parkinson's disease  - Continue Sinemet  - PT/OT .    Essential hypertension  - Continue carvedilol, losartan and amlodipine  - Monitor blood pressures    GERD  - Continue PPI    Chronic normocytic anemia  - Hemoglobin decreased from 11.6 (7/2023) to 10.  Stable since admission.  No history or evidence of bleeding  - Monitor.  Outpatient follow-up with PCP    History of breast cancer  - On raloxifene.  Hold for now, resume at discharge            Diet: Combination Diet Low Saturated Fat Na <2400mg Diet, No Caffeine Diet    DVT Prophylaxis: Pneumatic Compression Devices  Santana Catheter: Not present  Lines: None     Cardiac Monitoring: None  Code Status: No CPR- Do NOT Intubate      Clinically Significant Risk Factors              # Hypoalbuminemia: Lowest albumin = 3.3 g/dL at 5/1/2024 11:52 PM, will monitor as appropriate     # Hypertension: Noted on problem list            # Asthma: noted on problem list        Disposition Plan       Medically Ready for Discharge: Anticipated Tomorrow             Dania Francisco MD  Hospitalist Service  [facility]  Securely message with [employer] (more info)  Text page via [email or web address] Paging/Directory   ______________________________________________________________________    Interval History     Patient reports she feels better than before. Reports nebs have helped   No other complains     Physical Exam   Vital Signs: Temp: 97.8  F (36.6  C) Temp src: Oral BP: (!) 143/68 Pulse: 65   Resp: 17 SpO2: 93 % O2 Device: None (Room air)    Weight: 150 lbs 2.13 oz    Constitutional - awake and alert, resting in bed, in no acute distress  Cardiovascular - regular rate and rhythm, no murmurs, no edema  Pulmonary -bilateral diffuse wheezing improved   GI - abdomen is soft, nontender,  nondistended  Integumentary - skin is warm and dry, no rashes or ulcers  Neurological - awake, alert and oriented x3.  Moving all 4 extremities, normal speech, no focal deficits    Medical Decision Making       35 MINUTES SPENT BY ME on the date of service doing chart review, history, exam, documentation & further activities per the note.      Data     I have personally reviewed the following data over the past 24 hrs:    15.3 (H)  \   12.0   / 349     133 (L) 97 (L) 60.9 (H) /  117 (H)   4.9 20 (L) 1.04 (H) \       Imaging results reviewed over the past 24 hrs:   No results found for this or any previous visit (from the past 24 hour(s)).

## 2024-05-07 ENCOUNTER — APPOINTMENT (OUTPATIENT)
Dept: OCCUPATIONAL THERAPY | Facility: CLINIC | Age: 89
DRG: 291 | End: 2024-05-07
Payer: COMMERCIAL

## 2024-05-07 VITALS
RESPIRATION RATE: 16 BRPM | DIASTOLIC BLOOD PRESSURE: 66 MMHG | WEIGHT: 150.13 LBS | SYSTOLIC BLOOD PRESSURE: 126 MMHG | BODY MASS INDEX: 27.68 KG/M2 | TEMPERATURE: 98.4 F | HEART RATE: 60 BPM | OXYGEN SATURATION: 98 %

## 2024-05-07 DIAGNOSIS — K21.9 GASTROESOPHAGEAL REFLUX DISEASE WITHOUT ESOPHAGITIS: ICD-10-CM

## 2024-05-07 LAB
ANION GAP SERPL CALCULATED.3IONS-SCNC: 12 MMOL/L (ref 7–15)
BUN SERPL-MCNC: 58.7 MG/DL (ref 8–23)
CALCIUM SERPL-MCNC: 9.2 MG/DL (ref 8.8–10.2)
CHLORIDE SERPL-SCNC: 98 MMOL/L (ref 98–107)
CREAT SERPL-MCNC: 0.95 MG/DL (ref 0.51–0.95)
DEPRECATED HCO3 PLAS-SCNC: 24 MMOL/L (ref 22–29)
EGFRCR SERPLBLD CKD-EPI 2021: 58 ML/MIN/1.73M2
ERYTHROCYTE [DISTWIDTH] IN BLOOD BY AUTOMATED COUNT: 14.8 % (ref 10–15)
GLUCOSE SERPL-MCNC: 131 MG/DL (ref 70–99)
HCT VFR BLD AUTO: 34.8 % (ref 35–47)
HGB BLD-MCNC: 11.6 G/DL (ref 11.7–15.7)
MAGNESIUM SERPL-MCNC: 2.3 MG/DL (ref 1.7–2.3)
MCH RBC QN AUTO: 29.6 PG (ref 26.5–33)
MCHC RBC AUTO-ENTMCNC: 33.3 G/DL (ref 31.5–36.5)
MCV RBC AUTO: 89 FL (ref 78–100)
PLATELET # BLD AUTO: 357 10E3/UL (ref 150–450)
POTASSIUM SERPL-SCNC: 4.5 MMOL/L (ref 3.4–5.3)
RBC # BLD AUTO: 3.92 10E6/UL (ref 3.8–5.2)
SODIUM SERPL-SCNC: 134 MMOL/L (ref 135–145)
WBC # BLD AUTO: 12.7 10E3/UL (ref 4–11)

## 2024-05-07 PROCEDURE — 999N000157 HC STATISTIC RCP TIME EA 10 MIN

## 2024-05-07 PROCEDURE — 80048 BASIC METABOLIC PNL TOTAL CA: CPT | Performed by: INTERNAL MEDICINE

## 2024-05-07 PROCEDURE — 99207 PR NO BILLABLE SERVICE THIS VISIT: CPT | Performed by: INTERNAL MEDICINE

## 2024-05-07 PROCEDURE — 94640 AIRWAY INHALATION TREATMENT: CPT | Mod: 76

## 2024-05-07 PROCEDURE — 36415 COLL VENOUS BLD VENIPUNCTURE: CPT | Performed by: INTERNAL MEDICINE

## 2024-05-07 PROCEDURE — 250N000009 HC RX 250: Performed by: INTERNAL MEDICINE

## 2024-05-07 PROCEDURE — 85048 AUTOMATED LEUKOCYTE COUNT: CPT | Performed by: INTERNAL MEDICINE

## 2024-05-07 PROCEDURE — 250N000013 HC RX MED GY IP 250 OP 250 PS 637: Performed by: INTERNAL MEDICINE

## 2024-05-07 PROCEDURE — 97535 SELF CARE MNGMENT TRAINING: CPT | Mod: GO

## 2024-05-07 PROCEDURE — 99239 HOSP IP/OBS DSCHRG MGMT >30: CPT | Performed by: INTERNAL MEDICINE

## 2024-05-07 PROCEDURE — 83735 ASSAY OF MAGNESIUM: CPT | Performed by: INTERNAL MEDICINE

## 2024-05-07 PROCEDURE — 94640 AIRWAY INHALATION TREATMENT: CPT

## 2024-05-07 RX ORDER — IPRATROPIUM BROMIDE AND ALBUTEROL SULFATE 2.5; .5 MG/3ML; MG/3ML
3 SOLUTION RESPIRATORY (INHALATION) 3 TIMES DAILY
Qty: 270 ML | Refills: 0 | Status: SHIPPED | OUTPATIENT
Start: 2024-05-07 | End: 2024-07-12

## 2024-05-07 RX ORDER — BUDESONIDE 0.5 MG/2ML
0.5 INHALANT ORAL 2 TIMES DAILY
Qty: 180 ML | Refills: 0 | Status: SHIPPED | OUTPATIENT
Start: 2024-05-07 | End: 2024-05-15

## 2024-05-07 RX ORDER — CARVEDILOL 12.5 MG/1
12.5 TABLET ORAL 2 TIMES DAILY WITH MEALS
Qty: 60 TABLET | Refills: 0 | Status: SHIPPED | OUTPATIENT
Start: 2024-05-07 | End: 2024-06-28

## 2024-05-07 RX ADMIN — CARVEDILOL 12.5 MG: 12.5 TABLET, FILM COATED ORAL at 08:30

## 2024-05-07 RX ADMIN — AMLODIPINE BESYLATE 5 MG: 5 TABLET ORAL at 08:31

## 2024-05-07 RX ADMIN — BUDESONIDE 0.5 MG: 0.5 INHALANT RESPIRATORY (INHALATION) at 07:13

## 2024-05-07 RX ADMIN — CARBIDOPA AND LEVODOPA 2 TABLET: 25; 100 TABLET ORAL at 06:15

## 2024-05-07 RX ADMIN — IPRATROPIUM BROMIDE AND ALBUTEROL SULFATE 3 ML: .5; 3 SOLUTION RESPIRATORY (INHALATION) at 07:13

## 2024-05-07 RX ADMIN — ASPIRIN 81 MG: 81 TABLET, COATED ORAL at 08:30

## 2024-05-07 RX ADMIN — IPRATROPIUM BROMIDE AND ALBUTEROL SULFATE 3 ML: .5; 3 SOLUTION RESPIRATORY (INHALATION) at 10:43

## 2024-05-07 RX ADMIN — CARBIDOPA AND LEVODOPA 2 TABLET: 25; 100 TABLET ORAL at 11:23

## 2024-05-07 RX ADMIN — ATORVASTATIN CALCIUM 20 MG: 20 TABLET, FILM COATED ORAL at 08:31

## 2024-05-07 RX ADMIN — SERTRALINE HYDROCHLORIDE 25 MG: 25 TABLET ORAL at 08:30

## 2024-05-07 RX ADMIN — PANTOPRAZOLE SODIUM 40 MG: 40 TABLET, DELAYED RELEASE ORAL at 08:30

## 2024-05-07 RX ADMIN — TOLTERODINE TARTRATE 2 MG: 2 TABLET, FILM COATED ORAL at 08:31

## 2024-05-07 ASSESSMENT — ACTIVITIES OF DAILY LIVING (ADL)
ADLS_ACUITY_SCORE: 64
DEPENDENT_IADLS:: CLEANING;COOKING;LAUNDRY;SHOPPING;MEAL PREPARATION;TRANSPORTATION
ADLS_ACUITY_SCORE: 62
ADLS_ACUITY_SCORE: 64
ADLS_ACUITY_SCORE: 62
ADLS_ACUITY_SCORE: 64
ADLS_ACUITY_SCORE: 64
ADLS_ACUITY_SCORE: 62
ADLS_ACUITY_SCORE: 64
ADLS_ACUITY_SCORE: 62

## 2024-05-07 NOTE — DISCHARGE SUMMARY
[facility]  Hospitalist Discharge Summary      Date of Admission:  5/1/2024  Date of Discharge:  5/7/2024  Discharging Provider: Dania Francisco MD  Discharge Service: Hospitalist Service    Discharge Diagnoses     Acute diastolic CHF exacerbation with preserved EF 50-55%, echo 5/2/2024    Left lower lobe community-acquired pneumonia, organism unspecified     Acute asthma/COPD exacerbation      CKD stage II/III      Minimal oral dysphagia; potential pharyngeal vs esophageal dysphagia      Hypervolemic hyponatremia     Parkinson's disease     Essential hypertension     GERD     Chronic normocytic anemia     History of breast cancer    Clinically Significant Risk Factors          Follow-ups Needed After Discharge   Follow-up Appointments     Follow-up and recommended labs and tests       Follow up with primary care provider, Damian Russ MD, within   7 days for hospital follow- up.  No follow up labs or test are needed.        {Additional follow-up instructions/to-do's for PCP    :    Unresulted Labs Ordered in the Past 30 Days of this Admission       No orders found from 4/1/2024 to 5/2/2024.            Discharge Disposition   Discharged to assisted living with home care-PT/OT/RN  Condition at discharge: Stable    Hospital Course     Opal Sin is an 88-year-old female with history of Parkinson's disease, previous CVA, hypertension, GERD, who presented on 5/1/2024 with several days of shortness of breath and cough.  No fever.  Workup showed acute CHF exacerbation, left lower lobe pneumonia and acute asthma/COPD exacerbation.    She improved with diuresis, antibiotics and nebs.  She completed antibiotic course in hospital.  Diuretics were not prescribed at discharge and she will need continued monitoring of volume status as outpatient.  She continues to have mild bilateral wheezing for which she was prescribed 3 times daily DuoNebs and twice daily Pulmicort nebs.    She  was discharged to her assisted living facility with home care-PT/OT/RN     Acute diastolic CHF exacerbation with preserved EF 60-65%, echo 2017  - Noted to have lower extremity edema, proBNP 4044  - Chest x-ray 5/1 showed pulmonary vascular congestion with bilateral interstitial infiltrates, atelectasis/infiltrate left lung base, small left and trace right effusion  - Echo 5/2/2024 showed LVEF of 50-55% (was 60-65% in 2017), grade 2 diastolic dysfunction, RV not well-visualized but appeared normal or mildly reduced EF, elevated LV filling pressures, dilated IVC, elevated RV systolic pressure consistent with mild pulmonary hypertension   - Diuresed well with IV Lasix 40 mg twice daily. Transitioned to po lasix on 5/5.  Subsequently felt to have been over diuresed and diuretics were discontinued on 5/5.  - Continue outpatient monitoring of volume status as per PCP and home RN.  Diuretics not prescribed at discharge  - Continue Coreg, losartan     Left lower lobe community-acquired pneumonia, organism unspecified  - Had cough, shortness of breath and x-ray finding of left lower lobe infiltrate.  No fever, normal procalcitonin. Dad leukocytosis of 18 and elevated CRP of 148  - Based on patient's multiple allergies, treated with course of azithromycin.  Leukocytosis and CRP improved  - COVID-19/influenza/RSV were negative     Acute asthma/COPD exacerbation   - had bilateral diffuse wheezing that persisted even after adequate diuresis.  This was felt to be due to asthma exacerbation secondary to pneumonia  - Improved with DuoNebs and Pulmicort nebs.  These were prescribed at discharge     CKD stage II/III   - baseline creatinine 1.0 mg/dL.  Remained stable  - followed by nephrology     Minimal oral dysphagia; potential pharyngeal vs esophageal dysphagia   - speech therapy consulted. No overt clinical signs/sx aspiration noted.  Has minimal oral dysphagia.  May have pharyngeal or esophageal dysphagia   - Continue regular  diet with thin liquid     Hypervolemic hyponatremia  - Sodium remained stable at 134     Parkinson's disease  - Continue Sinemet     Essential hypertension  - Continue carvedilol, losartan and amlodipine     GERD  - Continue PPI     Chronic normocytic anemia  - Hemoglobin decreased from 11.6 (7/2023) to 10.  Stable since admission.  No history or evidence of bleeding  - Monitor.  Outpatient follow-up with PCP     History of breast cancer  - On raloxifene.  During hospital stay.  Resumed at discharge     Consultations This Hospital Stay   PHYSICAL THERAPY ADULT IP CONSULT  OCCUPATIONAL THERAPY ADULT IP CONSULT  SPEECH LANGUAGE PATH ADULT IP CONSULT  CARE MANAGEMENT / SOCIAL WORK IP CONSULT    Code Status   No CPR- Do NOT Intubate    Time Spent on this Encounter   I, Dania Francisco MD, personally saw the patient today and spent greater than 30 minutes discharging this patient.       Dania Francisco MD  [facility]  [address] [facility] [address]  Phone: [phone]  Fax: [phone]  ______________________________________________________________________    Physical Exam   Vital Signs: Temp: 98.4  F (36.9  C) Temp src: Oral BP: 126/66 Pulse: 60   Resp: 16 SpO2: 98 % O2 Device: None (Room air)    Weight: 150 lbs 2.13 oz    Constitutional - awake and alert, resting in bed, in no acute distress  Cardiovascular - regular rate and rhythm, no murmurs, no edema  Pulmonary -bilateral diffuse wheezing improved   GI - abdomen is soft, nontender, nondistended  Integumentary - skin is warm and dry, no rashes or ulcers  Neurological - awake, alert and oriented x3.  Moving all 4 extremities, normal speech, no focal deficits         Primary Care Physician   Damian Russ MD    Discharge Orders      Home Care Referral      Primary Care - Care Coordination Referral      Reason for your hospital stay    Pneumonia, shortness of breath     Follow-up and recommended labs and tests      Follow up with primary care provider, Damian Russ MD, within 7 days for hospital follow- up.  No follow up labs or test are needed.     Activity    Your activity upon discharge: activity as tolerated     Nebulizer and Nebulizer Supplies    Nebulizer Documentation  I attest that I have seen and evaluated Opal Sin. She has a diagnosis of J45.901 - Unspecified asthma with (acute) exacerbation and a nebulizer machine is needed to administer medication to improve breathing passages.     I, the undersigned, certify that the above prescribed supplies are medically necessary for this patient and is both reasonable and necessary in reference to accepted standards of medical and necessary in reference to accepted standards of medical practice in the treatment of this patient's condition and is not prescribed as a convenience.     Diet    Follow this diet upon discharge: 2g salt       Significant Results and Procedures   Most Recent 3 CBC's:  Recent Labs   Lab Test 05/07/24  0826 05/06/24  0831 05/05/24  0806   WBC 12.7* 15.3* 12.6*   HGB 11.6* 12.0 12.6   MCV 89 89 87    349 385     Most Recent 3 BMP's:  Recent Labs   Lab Test 05/07/24  0826 05/06/24  0831 05/05/24  0806   * 133* 136   POTASSIUM 4.5 4.9 4.4   CHLORIDE 98 97* 97*   CO2 24 20* 24   BUN 58.7* 60.9* 58.6*   CR 0.95 1.04* 1.08*   ANIONGAP 12 16* 15   CHRIS 9.2 9.1 9.3   * 117* 116*     Most Recent 3 BNP's:  Recent Labs   Lab Test 05/01/24  2352   NTBNPI 4,044*     Most Recent ESR & CRP:  Recent Labs   Lab Test 05/04/24  0850 05/02/24  1123 11/02/22  1428 02/22/19  1115   SED  --   --  8  --    CRP  --   --   --  18.3*   CRPI 47.26*   < >  --   --     < > = values in this interval not displayed.   ,   Results for orders placed or performed during the hospital encounter of 05/01/24   XR Chest Port 1 View    Narrative    EXAM: XR CHEST PORT 1 VIEW  LOCATION: [facility]  DATE:  2024    INDICATION: SOB x 3 days, wheezing, bilat leg swelling  COMPARISON: 11/10/2014      Impression    IMPRESSION: Cardiomediastinal silhouette within normal limits. Pulmonary vascular congestion with bilateral interstitial infiltrates. Atelectasis or infiltrate left lung base. Small left and trace right effusion. Bilateral shoulder arthroplasty. Post   surgical change lumbar spine. Lead overlying central chest unchanged   Echocardiogram Complete     Value    LVEF  50-55%    [facility]    [id number]  [id number]  [id number]  [id number]^[email or web address]     [facility]  Echocardiography Laboratory  [address] [place], [place] [address]     Name: BENJI KENT  MRN: [id number]  : 1935  Study Date: 2024 01:51 PM  Age: 88 yrs  Gender: Female  Patient Location: [facility]  Reason For Study: CHF  Ordering Physician: GENEVIEVE CHINO  Referring Physician: Marquis Russ nj  Performed By: Yomi Lerma     BSA: 1.7 m2  Height: 61 in  Weight: 150 lb  HR: 72  BP: 122/72 mmHg  ______________________________________________________________________________  Procedure  Complete Portable Echo Adult. Optison (NDC #[id number]) given intravenously.  ______________________________________________________________________________  Interpretation Summary     Poor parstenal views, patient has breast implant that made the echo difficult.  There is mild concentric left ventricular hypertrophy.  The visual ejection fraction is 50-55%.  Grade II or moderate diastolic dysfunction.  Diastolic Doppler findings (E/E' ratio and/or other parameters) suggest left  ventricular filling pressures are increased.  Left ventricular systolic function is low normal.  RV not well seen. Grossly normal size, EF is likely normal or mildly reduced  IVC diameter >2.1 cm collapsing <50% with sniff suggests a high RA pressure  estimated at 15 mmHg or greater.  Right ventricular systolic pressure is elevated, consistent with  mild  pulmonary hypertension.  ______________________________________________________________________________  Left Ventricle  The left ventricle is normal in size. There is mild concentric left  ventricular hypertrophy. The visual ejection fraction is 50-55%. Grade II or  moderate diastolic dysfunction. Diastolic Doppler findings (E/E' ratio and/or  other parameters) suggest left ventricular filling pressures are increased.  Left ventricular systolic function is low normal. Normal left ventricular wall  motion. There is no thrombus seen in the left ventricle.     Right Ventricle  RV not well seen. Grossly normal size, EF is likely normal or mildly reduced.  The right ventricle is not well visualized.     Atria  The left atrium is mildly dilated. Right atrial size is normal.     Mitral Valve  There is mild mitral annular calcification. There is trace mitral  regurgitation.     Tricuspid Valve  The tricuspid valve is not well visualized, but is grossly normal. There is  trace tricuspid regurgitation. The right ventricular systolic pressure is  approximated at 20.8 mmHg plus the right atrial pressure. IVC diameter >2.1 cm  collapsing <50% with sniff suggests a high RA pressure estimated at 15 mmHg or  greater. Right ventricular systolic pressure is elevated, consistent with mild  pulmonary hypertension.     Aortic Valve  The aortic valve is not well visualized. There is trace aortic regurgitation.  No hemodynamically significant valvular aortic stenosis.     Pulmonic Valve  The pulmonic valve is not well seen, but is grossly normal.     Vessels  The aortic root is normal size.     Pericardium  The pericardium appears normal.     Rhythm  Sinus rhythm was noted.  ______________________________________________________________________________  MMode/2D Measurements & Calculations  IVSd: 0.93 cm     LVIDd: 4.3 cm  LVIDs: 3.4 cm  LVPWd: 1.4 cm  FS: 20.4 %  LV mass(C)d: 175.4 grams  LV mass(C)dI: 104.9 grams/m2  Ao root  diam: 3.4 cm  asc Aorta Diam: 3.3 cm  LVOT diam: 2.2 cm  LVOT area: 3.7 cm2  Ao root diam index Ht(cm/m): 2.2  Ao root diam index BSA (cm/m2): 2.1  Asc Ao diam index BSA (cm/m2): 2.0  Asc Ao diam index Ht(cm/m): 2.1  LA Volume (BP): 64.5 ml     LA Volume Index (BP): 38.6 ml/m2  RV Base: 3.9 cm  RWT: 0.65  TAPSE: 3.1 cm     Doppler Measurements & Calculations  MV E max richar: 89.1 cm/sec  MV A max richar: 78.4 cm/sec  MV E/A: 1.1  MV dec time: 0.16 sec  PA acc time: 0.11 sec  TR max richar: 228.0 cm/sec  TR max P.8 mmHg  E/E' av.9  Lateral E/e': 16.4  Medial E/e': 27.5  RV S Richar: 14.4 cm/sec     ______________________________________________________________________________  Report approved by: Elias Ferraro 2024 03:59 PM           *Note: Due to a large number of results and/or encounters for the requested time period, some results have not been displayed. A complete set of results can be found in Results Review.       Discharge Medications   Current Discharge Medication List        START taking these medications    Details   budesonide (PULMICORT) 0.5 MG/2ML neb solution Take 2 mLs (0.5 mg) by nebulization 2 times daily  Qty: 180 mL, Refills: 0    Associated Diagnoses: Intermittent asthma without complication, unspecified asthma severity      ipratropium - albuterol 0.5 mg/2.5 mg/3 mL (DUONEB) 0.5-2.5 (3) MG/3ML neb solution Take 1 vial (3 mLs) by nebulization 3 times daily  Qty: 270 mL, Refills: 0    Associated Diagnoses: Intermittent asthma without complication, unspecified asthma severity           CONTINUE these medications which have CHANGED    Details   carvedilol (COREG) 12.5 MG tablet Take 1 tablet (12.5 mg) by mouth 2 times daily (with meals)  Qty: 60 tablet, Refills: 0    Associated Diagnoses: Benign essential hypertension           CONTINUE these medications which have NOT CHANGED    Details   albuterol (PROAIR HFA/PROVENTIL HFA/VENTOLIN HFA) 108 (90 Base) MCG/ACT inhaler Inhale 1-2 puffs  into the lungs every 6 hours as needed for shortness of breath / dyspnea or wheezing  Qty: 18 g, Refills: 1    Comments: Pharmacy may dispense brand covered by insurance (Proair, or proventil or ventolin or generic albuterol inhaler) - For Profile Only - patient will call to fill  Associated Diagnoses: Acute bronchospasm      amLODIPine (NORVASC) 5 MG tablet Take 1 tablet (5 mg) by mouth daily  Qty: 90 tablet, Refills: 3    Comments: For Profile Only - patient will call to fill  Associated Diagnoses: Benign essential hypertension      aspirin EC 81 MG EC tablet Take 1 tablet (81 mg) by mouth daily    Associated Diagnoses: Transient cerebral ischemia, unspecified type      atorvastatin (LIPITOR) 20 MG tablet TAKE 1 TABLET(20 MG) BY MOUTH DAILY  Qty: 90 tablet, Refills: 3    Associated Diagnoses: Mixed hyperlipidemia      azithromycin (ZITHROMAX) 500 MG tablet TAKE 1 TABLET BY MOUTH 30 TO 60 MINUTES PRIOR TO DENTAL PROCEDURE  Qty: 1 tablet, Refills: 3    Associated Diagnoses: Preventive antibiotic      carbidopa-levodopa (SINEMET)  MG per tablet Take 2 tablets by mouth 4 times daily Takes at 6am, 11am, 4pm, and 9 pm      Cholecalciferol (VITAMIN D3 PO) Take 400 Units by mouth daily       loperamide (IMODIUM) 2 MG capsule Take 1 capsule (2 mg) by mouth 4 times daily as needed for diarrhea  Qty: 20 capsule, Refills: 0    Associated Diagnoses: Viral gastroenteritis      losartan (COZAAR) 100 MG tablet TAKE 1 TABLET(100 MG) BY MOUTH DAILY  Qty: 90 tablet, Refills: 3    Associated Diagnoses: Essential hypertension with goal blood pressure less than 140/90      Multiple Vitamins-Minerals (PRESERVISION AREDS) CAPS Take 1 capsule by mouth 2 times daily      multivitamin w/minerals (THERA-VIT-M) tablet Take 1 tablet by mouth daily      omeprazole (PRILOSEC) 20 MG DR capsule TAKE 1 CAPSULE(20 MG) BY MOUTH DAILY  Qty: 90 capsule, Refills: 3    Associated Diagnoses: Gastroesophageal reflux disease without esophagitis       polyethylene glycol (MIRALAX/GLYCOLAX) packet Take 1 packet by mouth daily as needed for constipation      raloxifene (EVISTA) 60 MG tablet TAKE 1 TABLET(60 MG) BY MOUTH DAILY  Qty: 90 tablet, Refills: 3    Associated Diagnoses: Age-related osteoporosis without current pathological fracture      senna-docusate (SENOKOT-S;PERICOLACE) 8.6-50 MG per tablet Take 1 tablet by mouth 2 times daily as needed for constipation  Qty: 30 tablet, Refills: 0    Associated Diagnoses: Closed displaced fracture of right ilium, unspecified fracture morphology, initial encounter (H)      sertraline (ZOLOFT) 25 MG tablet TAKE 1 TABLET(25 MG) BY MOUTH DAILY  Qty: 90 tablet, Refills: 0    Associated Diagnoses: Mild major depression (H24)      !! tolterodine (DETROL) 1 MG tablet TAKE 1 TABLET(1 MG) BY MOUTH TWICE DAILY  Qty: 180 tablet, Refills: 0    Associated Diagnoses: Urinary incontinence, unspecified type      !! tolterodine (DETROL) 2 MG tablet Take 1 tablet (2 mg) by mouth 2 times daily  Qty: 180 tablet, Refills: 3    Comments: For Profile Only - patient will call to fill  Associated Diagnoses: Urinary incontinence, unspecified type       !! - Potential duplicate medications found. Please discuss with provider.        STOP taking these medications       gabapentin (NEURONTIN) 100 MG capsule Comments:   Reason for Stopping:             Allergies   Allergies   Allergen Reactions    Bee Pollen Hives     If MVI contains this - she will break out in a rash.     Cephalexin Monohydrate Hives    Ciprofloxacin Hives    Clindamycin Hives    Multi Vitamin-Minerals [Hair-Vites] Other (See Comments)     (If MV contains bee pollen she will break out in hives)     Penicillin [Penicillins] Hives     All antibiotics except zpak??????    Thiazide-Type Diuretics Other (See Comments)     Very low sodium levels    Tobramycin Hives    Vancomycin Hives    Atorvastatin      Leg cramps    Ibuprofen Nausea and Vomiting and Nausea     stomach pain     Multiple Vitamin Hives    Pollen Extract Hives    Versed [Midazolam] Other (See Comments)     Confused, agiitated, for 6-7 hrs after anngiogram sedation    Zoloft [Sertraline] Other (See Comments)     Headache, elevated BP    Ace Inhibitors Cough    Advil [Ibuprofen Micronized] Nausea    Metoprolol Diarrhea      tolerates Inderal

## 2024-05-07 NOTE — PROGRESS NOTES
Nebulizer Documentation      I attest that I have seen and evaluated Opal Sin. She has a diagnosis of J45.901 - Unspecified asthma with (acute) exacerbation and a nebulizer machine is needed to administer medication to improve breathing passages.     I, the undersigned, certify that the above prescribed supplies are medically necessary for this patient and is both reasonable and necessary in reference to accepted standards of medical and necessary in reference to accepted standards of medical practice in the treatment of this patient's condition and is not prescribed as a convenience.    Dania Francisco MD on 5/7/2024 at 12:46 PM

## 2024-05-07 NOTE — CONSULTS
Care Management Initial Consult    General Information  Assessment completed with: Patient, Care Team Member, -chart review, patient at bedside; JARED Qureshi  Type of CM/SW Visit: Initial Assessment    Primary Care Provider verified and updated as needed: Yes   Readmission within the last 30 days: no previous admission in last 30 days      Reason for Consult: discharge planning  Advance Care Planning: Advance Care Planning Reviewed: present on chart          Communication Assessment  Patient's communication style: spoken language (English or Bilingual)    Hearing Difficulty or Deaf: yes   Wear Glasses or Blind: yes    Cognitive  Cognitive/Neuro/Behavioral: WDL                      Living Environment:   People in home: facility resident     Current living Arrangements: assisted living  Name of Facility: [facility]   Able to return to prior arrangements: yes       Family/Social Support:  Care provided by: self, other (see comments) (staff)  Provides care for: no one, unable/limited ability to care for self  Marital Status: Single             Description of Support System:           Current Resources:   Patient receiving home care services: No     Community Resources: None  Equipment currently used at home: walker, rolling, wheelchair, manual  Supplies currently used at home:      Employment/Financial:  Employment Status: retired        Financial Concerns:             Does the patient's insurance plan have a 3 day qualifying hospital stay waiver?  Yes     Which insurance plan 3 day waiver is available? Alternative insurance waiver    Will the waiver be used for post-acute placement? Undetermined at this time    Lifestyle & Psychosocial Needs:  Social Determinants of Health     Food Insecurity: No Food Insecurity (7/26/2023)    Hunger Vital Sign     Worried About Running Out of Food in the Last Year: Never true     Ran Out of Food in the Last Year: Never true   Depression: Not at risk (7/10/2023)    PHQ-2      PHQ-2 Score: 0   Housing Stability: Low Risk  (7/26/2023)    Housing Stability Vital Sign     Unable to Pay for Housing in the Last Year: No     Number of Places Lived in the Last Year: 1     Unstable Housing in the Last Year: No   Tobacco Use: Low Risk  (8/28/2023)    Received from [employer] [employer]    Patient History     Smoking Tobacco Use: Never     Smokeless Tobacco Use: Never     Passive Exposure: Not on file   Financial Resource Strain: Low Risk  (7/26/2023)    Overall Financial Resource Strain (CARDIA)     Difficulty of Paying Living Expenses: Not hard at all   Alcohol Use: Not At Risk (7/26/2023)    AUDIT-C     Frequency of Alcohol Consumption: Never     Average Number of Drinks: Patient does not drink     Frequency of Binge Drinking: Never   Transportation Needs: No Transportation Needs (7/26/2023)    PRAPARE - Transportation     Lack of Transportation (Medical): No     Lack of Transportation (Non-Medical): No   Physical Activity: Insufficiently Active (7/26/2023)    Exercise Vital Sign     Days of Exercise per Week: 3 days     Minutes of Exercise per Session: 20 min   Interpersonal Safety: Not on file   Stress: No Stress Concern Present (7/26/2023)    [employer] - Occupational Stress Questionnaire     Feeling of Stress : Not at all   Social Connections: Moderately Integrated (7/26/2023)    Social Connection and Isolation Panel [NHANES]     Frequency of Communication with Friends and Family: Three times a week     Frequency of Social Gatherings with Friends and Family: More than three times a week     Attends Restorationist Services: More than 4 times per year     Active Member of Clubs or Organizations: Yes     Attends Club or Organization Meetings: More than 4 times per year     Marital Status: Never    Health Literacy: Not on file       Functional Status:  Prior to admission patient needed assistance:   Dependent ADLs::  Ambulation-walker, Wheelchair-with assist, Bathing, Dressing, Toileting, Transfers  Dependent IADLs:: Cleaning, Cooking, Laundry, Shopping, Meal Preparation, Transportation (she has been managing her medications)       Mental Health Status:          Chemical Dependency Status:                Values/Beliefs:  Spiritual, Cultural Beliefs, Islam Practices, Values that affect care: no               Additional Information:  Met with patient at bedside; spoke with Kiera COLEMAN at the [facility] over the phone; explained role in discharge planning.    Patient lives at [facility].  Main number is [phone].  Deniz COLEMAN [phone].      Patient's baseline is assist of 1, gait belt and walker and with transfers to wheelchair.  She can ambulate short distances in her room, otherwise staff pushes her in her wheelchair.  She is assisted with am and pm cares, toileting and meals.  She manages her own medications and her dexterity makes it difficult for her to open her pill containers.  She had home care in the past but no longer enrolled.\    She has two sisters who are involved and Kiera states they would like her to go to a TCU to increase her mobility.  Deniz would like OT to increase her dexterity as well so she can manage her pill containers more easily.  Patient states she prefers to go home to the [facility] but will talk with her sisters.  Writer will check back with her at 10 am.    She is potentially ready for discharge today home with home care versus TCU.  Care management will continue to follow for discharge needs.    Addendum at 11:15 am:  checked back with patient.  She is preferring to go home to her [facility] with home care.  Accepting home care agency is [facility].  Left message with nursing at the [facility] with update.  Sent message to hospitalist asking if she is ready for discharge today.    Amanda Kyle RN  Inpatient Float Care Coordinator  [facility]

## 2024-05-07 NOTE — PLAN OF CARE
Goal Outcome Evaluation:      Shift Summary [id number]    Admitting Diagnosis: Hyponatremia [E87.1]  Acute pulmonary edema (H) [J81.0]  Elevated troponin [R79.89]  Acute respiratory failure with hypoxia (H) [J96.01]  Anemia, unspecified type [D64.9]   Vitals stable on room air  Pain 3/10. Taking  PRN Tylenol  A&Ox4  Voiding via purewick in place at bedtime  Mobility 1 assist   Tele N/A  CMS intact  Lung Sounds clear on room air     Orders Placed This Encounter      Combination Diet Low Saturated Fat Na <2400mg Diet, No Caffeine Diet

## 2024-05-07 NOTE — PLAN OF CARE
Goal Outcome Evaluation:      Plan of Care Reviewed With: patient    Overall Patient Progress: improvingOverall Patient Progress: improving     A/Ox4. On Ra. Incontinent of bladder. Up with 1/wlk/gb to BR.Ambulate in RM x1. Pain managed with prn Tylenol. Pt requested for purewick @ bedtime and it was placed. Call light with reach. Plan of care ongoing.

## 2024-05-07 NOTE — PROGRESS NOTES
Care Management Discharge Note    Discharge Date: 05/07/2024       Discharge Disposition:  home to [facility]    Discharge Services:  Home care    Discharge DME:      Discharge Transportation: family or friend will provide    Private pay costs discussed: Not applicable    Does the patient's insurance plan have a 3 day qualifying hospital stay waiver?  Yes     Which insurance plan 3 day waiver is available? Alternative insurance waiver    Will the waiver be used for post-acute placement? No    PAS Confirmation Code:    Patient/family educated on Medicare website which has current facility and service quality ratings:      Education Provided on the Discharge Plan:    Persons Notified of Discharge Plans: bedside nurse, patient  Patient/Family in Agreement with the Plan:      Handoff Referral Completed: Yes    Additional Information:    Patient discharging today; returning to [facility].    Accepting home care agency is [facility] for RN/PT/OT.  The liaison has been updated on today's discharge.    PCP appointment has been scheduled for Wednesday May 15 at 10:10 am; is on the AVS.    She has nebulizer medications ordered.  She is accepting and believes she can manage.  DME on the order.  [facility] was notified.    She states her sister Corrie will be picking her up this afternoon.    Left message with Nursing at [facility] with discharge update for today and requested their fax number.    Addendum:  -JARED Qureshi would like the home care changed to [facility].  [facility] will send referral to them.    - [facility] cannot delivery neb machine today (they need 1 day notice).  Family will have to  on the way home.  Updated patient.    Addendum at 1:24:  sister Oli is here.  She was okay with picking up neb machine at [facility] next door.  Also okay with the appointment timing (as she is her transport).  And the home care agency is listed as [facility], [facility] has not accepted yet, so has [facility] if  [facility] does not accept.  She will be contacted by the agency.  She wants which ever can see her soonest.  Faxed orders to [facility] via [employer].    Addendum at 2:58 pm:  [facility] did accept for home care.  Faxed orders/discharge to them via Epic.        Amanda Kyle RN  Inpatient Float Care Coordinator  [facility]

## 2024-05-07 NOTE — DISCHARGE SUMMARY
Patient A&Ox4. VSS, on RA. CMS inatct. Denied pain SOB,CP. Up with x1GB/W. Pt discharging to [facility] via family. Discharge package and medications handed to Family plus belongings.

## 2024-05-08 ENCOUNTER — PATIENT OUTREACH (OUTPATIENT)
Dept: CARE COORDINATION | Facility: CLINIC | Age: 89
End: 2024-05-08
Payer: COMMERCIAL

## 2024-05-08 NOTE — TELEPHONE ENCOUNTER
Prescription approved per [facility] Refill Protocol.  Gina Babb, RN  [facility] Triage Nurse

## 2024-05-08 NOTE — PROGRESS NOTES
Clinic Care Coordination Contact  Care Coordination Clinician Chart Review    Situation: Patient chart reviewed by care coordinator.    Background: Clinic Care Coordination Referral received from inpatient care team for transition handoff communication following hospital admission.    Assessment: Upon chart review, patient is not a candidate for Primary Care Clinic Care Coordination enrollment due to reason stated below:  Patient is receiving duplicative services from [facility].  .    Plan/Recommendations: Clinic Care Coordination Referral/order cancelled. RN/SW CC will perform no further monitoring/outreaches at this time and will remain available as needed. If new needs arise, a new Care Coordination Referral may be placed.    Miracle Tobar,  [facility]  Clinic Care Coordinator  [facility]  [phone]  [email or web address]

## 2024-05-08 NOTE — PLAN OF CARE
Occupational Therapy Discharge Summary    Reason for therapy discharge:    Discharged to home with home therapy.    Progress towards therapy goal(s). See goals on Care Plan in [facility] electronic health record for goal details.  Goals partially met.  Barriers to achieving goals:   discharge from facility.    Therapy recommendation(s):    Continued therapy is recommended.  Rationale/Recommendations:  Pt has A at [facility] with all mobility, AM/PM cares, meals, transport to meals and activities and A with showers and med mgmt. Pt is really hoping to go home with A which appears reasonable at this time. Recommend continued A of 1 with self-cares and HH OT to advance indep w/ADLs and maximize.       Controlled with current regime

## 2024-05-10 ASSESSMENT — ASTHMA QUESTIONNAIRES
QUESTION_5 LAST FOUR WEEKS HOW WOULD YOU RATE YOUR ASTHMA CONTROL: SOMEWHAT CONTROLLED
QUESTION_3 LAST FOUR WEEKS HOW OFTEN DID YOUR ASTHMA SYMPTOMS (WHEEZING, COUGHING, SHORTNESS OF BREATH, CHEST TIGHTNESS OR PAIN) WAKE YOU UP AT NIGHT OR EARLIER THAN USUAL IN THE MORNING: ONCE OR TWICE
ACT_TOTALSCORE: 13
QUESTION_4 LAST FOUR WEEKS HOW OFTEN HAVE YOU USED YOUR RESCUE INHALER OR NEBULIZER MEDICATION (SUCH AS ALBUTEROL): THREE OR MORE TIMES PER DAY
ACT_TOTALSCORE: 13
QUESTION_1 LAST FOUR WEEKS HOW MUCH OF THE TIME DID YOUR ASTHMA KEEP YOU FROM GETTING AS MUCH DONE AT WORK, SCHOOL OR AT HOME: A LITTLE OF THE TIME
QUESTION_2 LAST FOUR WEEKS HOW OFTEN HAVE YOU HAD SHORTNESS OF BREATH: MORE THAN ONCE A DAY

## 2024-05-11 DIAGNOSIS — F32.0 MILD MAJOR DEPRESSION (H): ICD-10-CM

## 2024-05-13 ENCOUNTER — TELEPHONE (OUTPATIENT)
Dept: FAMILY MEDICINE | Facility: CLINIC | Age: 89
End: 2024-05-13
Payer: COMMERCIAL

## 2024-05-13 ENCOUNTER — MEDICAL CORRESPONDENCE (OUTPATIENT)
Dept: HEALTH INFORMATION MANAGEMENT | Facility: CLINIC | Age: 89
End: 2024-05-13

## 2024-05-13 RX ORDER — SERTRALINE HYDROCHLORIDE 25 MG/1
TABLET, FILM COATED ORAL
Qty: 90 TABLET | Refills: 0 | Status: SHIPPED | OUTPATIENT
Start: 2024-05-13 | End: 2024-07-12

## 2024-05-13 NOTE — TELEPHONE ENCOUNTER
Jennifer PT with [facility] called reporting medication interactions.     Medication interactions reported are:   Albuterol Sulfate HFA 90mcg interaction with carvedilol 12.5mg. This is a warning level 2.      Carvedilol 12.5 interacts with ipratropium albuterol. This is a warning level 2.     Routing to PCP to review and advise.     Please call Jennifer back with PCPs recommendations.

## 2024-05-15 ENCOUNTER — TRANSFERRED RECORDS (OUTPATIENT)
Dept: HEALTH INFORMATION MANAGEMENT | Facility: CLINIC | Age: 89
End: 2024-05-15

## 2024-05-15 ENCOUNTER — OFFICE VISIT (OUTPATIENT)
Dept: FAMILY MEDICINE | Facility: CLINIC | Age: 89
End: 2024-05-15
Payer: COMMERCIAL

## 2024-05-15 VITALS
RESPIRATION RATE: 10 BRPM | OXYGEN SATURATION: 98 % | BODY MASS INDEX: 21.88 KG/M2 | WEIGHT: 115.9 LBS | TEMPERATURE: 96.9 F | HEART RATE: 63 BPM | DIASTOLIC BLOOD PRESSURE: 86 MMHG | SYSTOLIC BLOOD PRESSURE: 182 MMHG | HEIGHT: 61 IN

## 2024-05-15 DIAGNOSIS — M25.511 ACUTE PAIN OF RIGHT SHOULDER: ICD-10-CM

## 2024-05-15 DIAGNOSIS — J45.20 MILD INTERMITTENT ASTHMA WITHOUT COMPLICATION: ICD-10-CM

## 2024-05-15 DIAGNOSIS — I10 ESSENTIAL HYPERTENSION, BENIGN: ICD-10-CM

## 2024-05-15 DIAGNOSIS — G20.B2 PARKINSON'S DISEASE WITH DYSKINESIA AND FLUCTUATING MANIFESTATIONS (H): ICD-10-CM

## 2024-05-15 DIAGNOSIS — I50.31 ACUTE DIASTOLIC HEART FAILURE (H): Primary | ICD-10-CM

## 2024-05-15 DIAGNOSIS — M81.6 LOCALIZED OSTEOPOROSIS (LEQUESNE): ICD-10-CM

## 2024-05-15 DIAGNOSIS — J98.01 ACUTE BRONCHOSPASM: ICD-10-CM

## 2024-05-15 DIAGNOSIS — J45.20 INTERMITTENT ASTHMA WITHOUT COMPLICATION, UNSPECIFIED ASTHMA SEVERITY: ICD-10-CM

## 2024-05-15 DIAGNOSIS — M80.00XS AGE-RELATED OSTEOPOROSIS WITH CURRENT PATHOLOGICAL FRACTURE, SEQUELA: ICD-10-CM

## 2024-05-15 DIAGNOSIS — N18.32 STAGE 3B CHRONIC KIDNEY DISEASE (H): ICD-10-CM

## 2024-05-15 LAB
ERYTHROCYTE [DISTWIDTH] IN BLOOD BY AUTOMATED COUNT: 15.2 % (ref 10–15)
HCT VFR BLD AUTO: 36.7 % (ref 35–47)
HGB BLD-MCNC: 11.9 G/DL (ref 11.7–15.7)
MCH RBC QN AUTO: 29.3 PG (ref 26.5–33)
MCHC RBC AUTO-ENTMCNC: 32.4 G/DL (ref 31.5–36.5)
MCV RBC AUTO: 90 FL (ref 78–100)
PLATELET # BLD AUTO: 289 10E3/UL (ref 150–450)
RBC # BLD AUTO: 4.06 10E6/UL (ref 3.8–5.2)
WBC # BLD AUTO: 11.9 10E3/UL (ref 4–11)

## 2024-05-15 PROCEDURE — 85027 COMPLETE CBC AUTOMATED: CPT | Performed by: INTERNAL MEDICINE

## 2024-05-15 PROCEDURE — 99214 OFFICE O/P EST MOD 30 MIN: CPT | Performed by: INTERNAL MEDICINE

## 2024-05-15 PROCEDURE — 80048 BASIC METABOLIC PNL TOTAL CA: CPT | Performed by: INTERNAL MEDICINE

## 2024-05-15 PROCEDURE — 36415 COLL VENOUS BLD VENIPUNCTURE: CPT | Performed by: INTERNAL MEDICINE

## 2024-05-15 RX ORDER — BUDESONIDE 0.5 MG/2ML
0.5 INHALANT ORAL 2 TIMES DAILY
Qty: 180 ML | Refills: 11 | Status: SHIPPED | OUTPATIENT
Start: 2024-05-15

## 2024-05-15 RX ORDER — ALBUTEROL SULFATE 90 UG/1
1-2 AEROSOL, METERED RESPIRATORY (INHALATION) EVERY 6 HOURS PRN
Qty: 18 G | Refills: 1 | Status: SHIPPED | OUTPATIENT
Start: 2024-05-15

## 2024-05-15 ASSESSMENT — PAIN SCALES - GENERAL: PAINLEVEL: NO PAIN (0)

## 2024-05-15 ASSESSMENT — PATIENT HEALTH QUESTIONNAIRE - PHQ9: SUM OF ALL RESPONSES TO PHQ QUESTIONS 1-9: 0

## 2024-05-15 NOTE — PROGRESS NOTES
Harper Joseph is a 89 year old, presenting for the following health issues:  Hospital F/U    HPI       Hospital Follow-up Visit:    Hospital/Nursing Home/IP Rehab Facility: [facility]  Date of Admission: 5/1/2024  Date of Discharge: 5/7/2024  Reason(s) for Admission: SOB  Was the patient in the ICU or did the patient experience delirium during hospitalization?  No  Do you have any other stressors you would like to discuss with your provider? No    Problems taking medications regularly:  None  Medication changes since discharge: None  Problems adhering to non-medication therapy:  None    Summary of hospitalization:  [facility] discharge summary reviewed  Diagnostic Tests/Treatments reviewed.  Follow up needed: neurology and cardiology  Other Healthcare Providers Involved in Patient s Care:         None  Update since discharge: improved.         Plan of care communicated with patient and sister              Acute diastolic heart failure (H)   Opal Sin has been checking blood pressure at home.  She was noticing elevated readings at home with systolic 140's.  She has been trying to monitor sodium at her assisted living but may corby having difficulty.   No swelling in her ankles.  She has continued on losartan and carvedilol.   Age-related osteoporosis with current pathological fracture, sequela   She is due for bone density scan   Parkinson's disease with dyskinesia and fluctuating manifestations (H)   She is doing well with sinemet.  No recent changes  Mild intermittent asthma without complication   She has continued on pulmicort twice per day and duonebs three times daily.  She is not using an inhaler at this time. She has ample supply of nebulizers  Essential hypertension, benign   Blood pressure is quite elevated today   Stage 3b chronic kidney disease (H)    Labs were stable during the hospitalization and maintaining hydration      Review of  "Systems  Constitutional, HEENT, cardiovascular - as above, pulmonary - as above, GI, , musculoskeletal = right shoulder and left elbow pain, neuro, skin, endocrine and psych systems are negative, except as otherwise noted.      Objective    BP (!) 182/86   Pulse 63   Temp 96.9  F (36.1  C) (Tympanic)   Resp 10   Ht 1.549 m (5' 1\")   Wt 52.6 kg (115 lb 14.4 oz)   SpO2 98%   BMI 21.90 kg/m    Body mass index is 21.9 kg/m .  Physical Exam   GENERAL: alert and no distress  EYES: Eyes grossly normal to inspection,     NECK: no adenopathy, no asymmetry, masses, or scars  RESP: lungs clear to auscultation - no rales, rhonchi or wheezes  CV: regular rate and rhythm, 3/6 systolic murmur, click or rub, no peripheral edema  ABDOMEN: soft, nontender, no hepatosplenomegaly,   MS: no gross musculoskeletal defects noted, no edema  SKIN: no suspicious lesions or rashes  NEURO: + increased tone, + wheel chair bound  PSYCH: mentation appears normal, affect normal/bright    Results for orders placed or performed in visit on 05/15/24   CBC with platelets     Status: Abnormal   Result Value Ref Range    WBC Count 11.9 (H) 4.0 - 11.0 10e3/uL    RBC Count 4.06 3.80 - 5.20 10e6/uL    Hemoglobin 11.9 11.7 - 15.7 g/dL    Hematocrit 36.7 35.0 - 47.0 %    MCV 90 78 - 100 fL    MCH 29.3 26.5 - 33.0 pg    MCHC 32.4 31.5 - 36.5 g/dL    RDW 15.2 (H) 10.0 - 15.0 %    Platelet Count 289 150 - 450 10e3/uL        Patient Instructions   (I50.31) Acute diastolic heart failure (H)  (primary encounter diagnosis)  Comment: Noted improvement in breathing.  Echocardiogram from May 2 did show preserved ejection fraction with grade 2 diastolic dysfunction.  Note that Lasix has been discontinued we will continue with beta-blocker and losartan  Plan:     (M80.00XS) Age-related osteoporosis with current pathological fracture, sequela  Comment: Due for repeat bone density scan  Plan:     (G20.B2) Parkinson's disease with dyskinesia and fluctuating " manifestations (H)  Comment: No change in Sinemet dosing  Plan:     (J45.20) Mild intermittent asthma without complication  Comment: Noted x-ray showing left lower lobe infiltrate.  Treated with azithromycin.  Wheezing has improved.  Continue on Pulmicort and DuoNebs will recheck x-ray in 1 month  Plan:     (I10) Essential hypertension, benign  Comment: Blood pressure was stable during hospitalization with carvedilol losartan and amlodipine.  Notably elevated today.  Will recheck again before you leave  Plan:     (N18.32) Stage 3b chronic kidney disease (H)  Comment: Renal function remained stable during hospitalization.  Will recheck labs again today  Plan:                   Signed Electronically by: Damian Russ MD, MD

## 2024-05-15 NOTE — PATIENT INSTRUCTIONS
(I50.31) Acute diastolic heart failure (H)  (primary encounter diagnosis)  Comment: Noted improvement in breathing.  Echocardiogram from May 2 did show preserved ejection fraction with grade 2 diastolic dysfunction.  Note that Lasix has been discontinued we will continue with beta-blocker and losartan  Plan:     (M80.00XS) Age-related osteoporosis with current pathological fracture, sequela  Comment: Due for repeat bone density scan  Plan:     (G20.B2) Parkinson's disease with dyskinesia and fluctuating manifestations (H)  Comment: No change in Sinemet dosing  Plan:     (J45.20) Mild intermittent asthma without complication  Comment: Noted x-ray showing left lower lobe infiltrate.  Treated with azithromycin.  Wheezing has improved.  Continue on Pulmicort and DuoNebs will recheck x-ray in 1 month  Plan:     (I10) Essential hypertension, benign  Comment: Blood pressure was stable during hospitalization with carvedilol losartan and amlodipine.  Notably elevated today.  Will recheck again before you leave  Plan:     (N18.32) Stage 3b chronic kidney disease (H)  Comment: Renal function remained stable during hospitalization.  Will recheck labs again today  Plan:

## 2024-05-16 LAB
ANION GAP SERPL CALCULATED.3IONS-SCNC: 13 MMOL/L (ref 7–15)
BUN SERPL-MCNC: 31.4 MG/DL (ref 8–23)
CALCIUM SERPL-MCNC: 10.2 MG/DL (ref 8.8–10.2)
CHLORIDE SERPL-SCNC: 104 MMOL/L (ref 98–107)
CREAT SERPL-MCNC: 0.91 MG/DL (ref 0.51–0.95)
DEPRECATED HCO3 PLAS-SCNC: 21 MMOL/L (ref 22–29)
EGFRCR SERPLBLD CKD-EPI 2021: 61 ML/MIN/1.73M2
GLUCOSE SERPL-MCNC: 92 MG/DL (ref 70–99)
POTASSIUM SERPL-SCNC: 5 MMOL/L (ref 3.4–5.3)
SODIUM SERPL-SCNC: 138 MMOL/L (ref 135–145)

## 2024-05-16 NOTE — RESULT ENCOUNTER NOTE
Cristhian Joseph,    I have had the opportunity to review your recent results and an interpretation is as follows:  Your complete blood counts showed stable hemoglobin and white blood cell count      Sincerely,  Damian Russ MD

## 2024-05-16 NOTE — RESULT ENCOUNTER NOTE
Cristhian Joseph,    I have had the opportunity to review your recent results and an interpretation is as follows:  Your basic metabolic panel shows stable renal function      Sincerely,  Damian Russ MD

## 2024-05-26 ENCOUNTER — MEDICAL CORRESPONDENCE (OUTPATIENT)
Dept: HEALTH INFORMATION MANAGEMENT | Facility: CLINIC | Age: 89
End: 2024-05-26

## 2024-05-28 ENCOUNTER — HOSPITAL ENCOUNTER (INPATIENT)
Facility: CLINIC | Age: 89
LOS: 4 days | Discharge: NURSING, REHAB OR LONG-TERM CARE | DRG: 291 | End: 2024-06-01
Attending: EMERGENCY MEDICINE | Admitting: HOSPITALIST
Payer: COMMERCIAL

## 2024-05-28 ENCOUNTER — APPOINTMENT (OUTPATIENT)
Dept: CT IMAGING | Facility: CLINIC | Age: 89
DRG: 291 | End: 2024-05-28
Attending: EMERGENCY MEDICINE
Payer: COMMERCIAL

## 2024-05-28 ENCOUNTER — APPOINTMENT (OUTPATIENT)
Dept: GENERAL RADIOLOGY | Facility: CLINIC | Age: 89
DRG: 291 | End: 2024-05-28
Attending: EMERGENCY MEDICINE
Payer: COMMERCIAL
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
## 2024-05-28 DIAGNOSIS — J45.909 UNCOMPLICATED ASTHMA, UNSPECIFIED ASTHMA SEVERITY, UNSPECIFIED WHETHER PERSISTENT: ICD-10-CM

## 2024-05-28 DIAGNOSIS — R41.0 DELIRIUM: ICD-10-CM

## 2024-05-28 DIAGNOSIS — I10 BENIGN ESSENTIAL HYPERTENSION: Primary | ICD-10-CM

## 2024-05-28 DIAGNOSIS — E78.5 HYPERLIPIDEMIA LDL GOAL <100: ICD-10-CM

## 2024-05-28 DIAGNOSIS — I10 ESSENTIAL HYPERTENSION: ICD-10-CM

## 2024-05-28 DIAGNOSIS — R53.81 PHYSICAL DECONDITIONING: ICD-10-CM

## 2024-05-28 DIAGNOSIS — R06.02 SHORTNESS OF BREATH: ICD-10-CM

## 2024-05-28 DIAGNOSIS — R52 PAIN: ICD-10-CM

## 2024-05-28 DIAGNOSIS — R47.01 APHASIA: ICD-10-CM

## 2024-05-28 DIAGNOSIS — E78.2 MIXED HYPERLIPIDEMIA: ICD-10-CM

## 2024-05-28 DIAGNOSIS — F32.9 MAJOR DEPRESSION: ICD-10-CM

## 2024-05-28 DIAGNOSIS — R09.02 HYPOXIA: ICD-10-CM

## 2024-05-28 DIAGNOSIS — G45.9 TIA (TRANSIENT ISCHEMIC ATTACK): ICD-10-CM

## 2024-05-28 DIAGNOSIS — I50.31 ACUTE DIASTOLIC HEART FAILURE (H): ICD-10-CM

## 2024-05-28 DIAGNOSIS — R05.9 COUGH, UNSPECIFIED TYPE: ICD-10-CM

## 2024-05-28 DIAGNOSIS — R32 URINARY INCONTINENCE, UNSPECIFIED TYPE: ICD-10-CM

## 2024-05-28 DIAGNOSIS — Z71.89 ADVANCED DIRECTIVES, COUNSELING/DISCUSSION: ICD-10-CM

## 2024-05-28 DIAGNOSIS — E87.1 HYPONATREMIA: ICD-10-CM

## 2024-05-28 DIAGNOSIS — I10 ESSENTIAL HYPERTENSION, BENIGN: ICD-10-CM

## 2024-05-28 DIAGNOSIS — G20.A1 PARKINSON'S DISEASE (H): ICD-10-CM

## 2024-05-28 LAB
ALBUMIN SERPL BCG-MCNC: 3.5 G/DL (ref 3.5–5.2)
ALP SERPL-CCNC: 120 U/L (ref 40–150)
ALT SERPL W P-5'-P-CCNC: <5 U/L (ref 0–50)
ANION GAP SERPL CALCULATED.3IONS-SCNC: 13 MMOL/L (ref 7–15)
AST SERPL W P-5'-P-CCNC: 13 U/L (ref 0–45)
ATRIAL RATE - MUSE: 67 BPM
BASE EXCESS BLDV CALC-SCNC: -0.6 MMOL/L (ref -3–3)
BASOPHILS # BLD AUTO: 0 10E3/UL (ref 0–0.2)
BASOPHILS NFR BLD AUTO: 0 %
BILIRUB SERPL-MCNC: 0.4 MG/DL
BUN SERPL-MCNC: 26.2 MG/DL (ref 8–23)
CALCIUM SERPL-MCNC: 9.7 MG/DL (ref 8.8–10.2)
CHLORIDE SERPL-SCNC: 102 MMOL/L (ref 98–107)
CREAT SERPL-MCNC: 0.81 MG/DL (ref 0.51–0.95)
DEPRECATED HCO3 PLAS-SCNC: 22 MMOL/L (ref 22–29)
DIASTOLIC BLOOD PRESSURE - MUSE: NORMAL MMHG
EGFRCR SERPLBLD CKD-EPI 2021: 70 ML/MIN/1.73M2
EOSINOPHIL # BLD AUTO: 0 10E3/UL (ref 0–0.7)
EOSINOPHIL NFR BLD AUTO: 0 %
ERYTHROCYTE [DISTWIDTH] IN BLOOD BY AUTOMATED COUNT: 16.2 % (ref 10–15)
FLUAV RNA SPEC QL NAA+PROBE: NEGATIVE
FLUBV RNA RESP QL NAA+PROBE: NEGATIVE
GLUCOSE SERPL-MCNC: 104 MG/DL (ref 70–99)
HCO3 BLDV-SCNC: 24 MMOL/L (ref 21–28)
HCT VFR BLD AUTO: 29.6 % (ref 35–47)
HGB BLD-MCNC: 9.9 G/DL (ref 11.7–15.7)
HOLD SPECIMEN: NORMAL
IMM GRANULOCYTES # BLD: 0.1 10E3/UL
IMM GRANULOCYTES NFR BLD: 1 %
INTERPRETATION ECG - MUSE: NORMAL
LACTATE SERPL-SCNC: 0.8 MMOL/L (ref 0.7–2)
LYMPHOCYTES # BLD AUTO: 0.9 10E3/UL (ref 0.8–5.3)
LYMPHOCYTES NFR BLD AUTO: 8 %
MCH RBC QN AUTO: 30.1 PG (ref 26.5–33)
MCHC RBC AUTO-ENTMCNC: 33.4 G/DL (ref 31.5–36.5)
MCV RBC AUTO: 90 FL (ref 78–100)
MONOCYTES # BLD AUTO: 0.8 10E3/UL (ref 0–1.3)
MONOCYTES NFR BLD AUTO: 6 %
NEUTROPHILS # BLD AUTO: 10.2 10E3/UL (ref 1.6–8.3)
NEUTROPHILS NFR BLD AUTO: 85 %
NRBC # BLD AUTO: 0 10E3/UL
NRBC BLD AUTO-RTO: 0 /100
NT-PROBNP SERPL-MCNC: 3077 PG/ML (ref 0–1800)
O2/TOTAL GAS SETTING VFR VENT: 30 %
OXYHGB MFR BLDV: 42 % (ref 70–75)
P AXIS - MUSE: 90 DEGREES
PCO2 BLDV: 40 MM HG (ref 40–50)
PH BLDV: 7.39 [PH] (ref 7.32–7.43)
PLATELET # BLD AUTO: 262 10E3/UL (ref 150–450)
PO2 BLDV: 27 MM HG (ref 25–47)
POTASSIUM SERPL-SCNC: 4.2 MMOL/L (ref 3.4–5.3)
PR INTERVAL - MUSE: 184 MS
PROCALCITONIN SERPL IA-MCNC: 0.08 NG/ML
PROT SERPL-MCNC: 6.8 G/DL (ref 6.4–8.3)
QRS DURATION - MUSE: 90 MS
QT - MUSE: 412 MS
QTC - MUSE: 435 MS
R AXIS - MUSE: -41 DEGREES
RBC # BLD AUTO: 3.29 10E6/UL (ref 3.8–5.2)
RSV RNA SPEC NAA+PROBE: NEGATIVE
SAO2 % BLDV: 42.8 % (ref 70–75)
SARS-COV-2 RNA RESP QL NAA+PROBE: NEGATIVE
SODIUM SERPL-SCNC: 137 MMOL/L (ref 135–145)
SYSTOLIC BLOOD PRESSURE - MUSE: NORMAL MMHG
T AXIS - MUSE: 46 DEGREES
TROPONIN T SERPL HS-MCNC: 37 NG/L
TROPONIN T SERPL HS-MCNC: 38 NG/L
VENTRICULAR RATE- MUSE: 67 BPM
WBC # BLD AUTO: 12 10E3/UL (ref 4–11)

## 2024-05-28 PROCEDURE — 120N000001 HC R&B MED SURG/OB

## 2024-05-28 PROCEDURE — 250N000013 HC RX MED GY IP 250 OP 250 PS 637: Performed by: HOSPITALIST

## 2024-05-28 PROCEDURE — 94640 AIRWAY INHALATION TREATMENT: CPT

## 2024-05-28 PROCEDURE — 94640 AIRWAY INHALATION TREATMENT: CPT | Mod: 76

## 2024-05-28 PROCEDURE — 99291 CRITICAL CARE FIRST HOUR: CPT | Mod: 25

## 2024-05-28 PROCEDURE — 96366 THER/PROPH/DIAG IV INF ADDON: CPT

## 2024-05-28 PROCEDURE — 84145 PROCALCITONIN (PCT): CPT | Performed by: HOSPITALIST

## 2024-05-28 PROCEDURE — 258N000003 HC RX IP 258 OP 636: Performed by: EMERGENCY MEDICINE

## 2024-05-28 PROCEDURE — 87637 SARSCOV2&INF A&B&RSV AMP PRB: CPT | Performed by: EMERGENCY MEDICINE

## 2024-05-28 PROCEDURE — 96365 THER/PROPH/DIAG IV INF INIT: CPT

## 2024-05-28 PROCEDURE — 71046 X-RAY EXAM CHEST 2 VIEWS: CPT

## 2024-05-28 PROCEDURE — 250N000009 HC RX 250: Performed by: EMERGENCY MEDICINE

## 2024-05-28 PROCEDURE — 36415 COLL VENOUS BLD VENIPUNCTURE: CPT | Performed by: EMERGENCY MEDICINE

## 2024-05-28 PROCEDURE — 82805 BLOOD GASES W/O2 SATURATION: CPT | Performed by: EMERGENCY MEDICINE

## 2024-05-28 PROCEDURE — 250N000011 HC RX IP 250 OP 636: Performed by: HOSPITALIST

## 2024-05-28 PROCEDURE — 71275 CT ANGIOGRAPHY CHEST: CPT

## 2024-05-28 PROCEDURE — 84484 ASSAY OF TROPONIN QUANT: CPT | Performed by: EMERGENCY MEDICINE

## 2024-05-28 PROCEDURE — 80053 COMPREHEN METABOLIC PANEL: CPT | Performed by: EMERGENCY MEDICINE

## 2024-05-28 PROCEDURE — 250N000011 HC RX IP 250 OP 636: Performed by: EMERGENCY MEDICINE

## 2024-05-28 PROCEDURE — 99223 1ST HOSP IP/OBS HIGH 75: CPT | Performed by: HOSPITALIST

## 2024-05-28 PROCEDURE — 250N000013 HC RX MED GY IP 250 OP 250 PS 637: Performed by: EMERGENCY MEDICINE

## 2024-05-28 PROCEDURE — 250N000009 HC RX 250: Performed by: HOSPITALIST

## 2024-05-28 PROCEDURE — 83605 ASSAY OF LACTIC ACID: CPT | Performed by: EMERGENCY MEDICINE

## 2024-05-28 PROCEDURE — 83880 ASSAY OF NATRIURETIC PEPTIDE: CPT | Performed by: EMERGENCY MEDICINE

## 2024-05-28 PROCEDURE — 85025 COMPLETE CBC W/AUTO DIFF WBC: CPT | Performed by: EMERGENCY MEDICINE

## 2024-05-28 PROCEDURE — 999N000157 HC STATISTIC RCP TIME EA 10 MIN

## 2024-05-28 PROCEDURE — 96375 TX/PRO/DX INJ NEW DRUG ADDON: CPT

## 2024-05-28 PROCEDURE — 93005 ELECTROCARDIOGRAM TRACING: CPT

## 2024-05-28 RX ORDER — AMOXICILLIN 250 MG
1 CAPSULE ORAL 2 TIMES DAILY PRN
Status: DISCONTINUED | OUTPATIENT
Start: 2024-05-28 | End: 2024-06-01 | Stop reason: HOSPADM

## 2024-05-28 RX ORDER — ALBUTEROL SULFATE 0.83 MG/ML
2.5 SOLUTION RESPIRATORY (INHALATION) EVERY 4 HOURS PRN
Status: DISCONTINUED | OUTPATIENT
Start: 2024-05-28 | End: 2024-06-01 | Stop reason: HOSPADM

## 2024-05-28 RX ORDER — ONDANSETRON 2 MG/ML
4 INJECTION INTRAMUSCULAR; INTRAVENOUS EVERY 6 HOURS PRN
Status: DISCONTINUED | OUTPATIENT
Start: 2024-05-28 | End: 2024-06-01 | Stop reason: HOSPADM

## 2024-05-28 RX ORDER — ONDANSETRON 4 MG/1
4 TABLET, ORALLY DISINTEGRATING ORAL EVERY 6 HOURS PRN
Status: DISCONTINUED | OUTPATIENT
Start: 2024-05-28 | End: 2024-06-01 | Stop reason: HOSPADM

## 2024-05-28 RX ORDER — AMLODIPINE BESYLATE 5 MG/1
5 TABLET ORAL DAILY
Status: DISCONTINUED | OUTPATIENT
Start: 2024-05-29 | End: 2024-05-31

## 2024-05-28 RX ORDER — CARBIDOPA AND LEVODOPA 25; 100 MG/1; MG/1
2 TABLET ORAL 4 TIMES DAILY
Status: DISCONTINUED | OUTPATIENT
Start: 2024-05-28 | End: 2024-06-01 | Stop reason: HOSPADM

## 2024-05-28 RX ORDER — RALOXIFENE HYDROCHLORIDE 60 MG/1
60 TABLET, FILM COATED ORAL DAILY
Status: DISCONTINUED | OUTPATIENT
Start: 2024-05-29 | End: 2024-06-01 | Stop reason: HOSPADM

## 2024-05-28 RX ORDER — METHYLPREDNISOLONE SODIUM SUCCINATE 125 MG/2ML
60 INJECTION, POWDER, LYOPHILIZED, FOR SOLUTION INTRAMUSCULAR; INTRAVENOUS EVERY 12 HOURS
Status: COMPLETED | OUTPATIENT
Start: 2024-05-29 | End: 2024-05-29

## 2024-05-28 RX ORDER — ASPIRIN 81 MG/1
81 TABLET ORAL DAILY
Status: DISCONTINUED | OUTPATIENT
Start: 2024-05-29 | End: 2024-06-01 | Stop reason: HOSPADM

## 2024-05-28 RX ORDER — FUROSEMIDE 10 MG/ML
40 INJECTION INTRAMUSCULAR; INTRAVENOUS ONCE
Status: COMPLETED | OUTPATIENT
Start: 2024-05-28 | End: 2024-05-28

## 2024-05-28 RX ORDER — ACETAMINOPHEN 650 MG/1
650 SUPPOSITORY RECTAL EVERY 4 HOURS PRN
Status: DISCONTINUED | OUTPATIENT
Start: 2024-05-28 | End: 2024-06-01 | Stop reason: HOSPADM

## 2024-05-28 RX ORDER — CARBOXYMETHYLCELLULOSE SODIUM 5 MG/ML
1 SOLUTION/ DROPS OPHTHALMIC 4 TIMES DAILY PRN
Status: DISCONTINUED | OUTPATIENT
Start: 2024-05-28 | End: 2024-06-01 | Stop reason: HOSPADM

## 2024-05-28 RX ORDER — ACETAMINOPHEN 325 MG/1
650 TABLET ORAL EVERY 4 HOURS PRN
Status: DISCONTINUED | OUTPATIENT
Start: 2024-05-28 | End: 2024-06-01 | Stop reason: HOSPADM

## 2024-05-28 RX ORDER — ATORVASTATIN CALCIUM 20 MG/1
20 TABLET, FILM COATED ORAL DAILY
Status: DISCONTINUED | OUTPATIENT
Start: 2024-05-29 | End: 2024-06-01 | Stop reason: HOSPADM

## 2024-05-28 RX ORDER — CARVEDILOL 12.5 MG/1
12.5 TABLET ORAL 2 TIMES DAILY WITH MEALS
Status: DISCONTINUED | OUTPATIENT
Start: 2024-05-28 | End: 2024-06-01 | Stop reason: HOSPADM

## 2024-05-28 RX ORDER — MAGNESIUM SULFATE HEPTAHYDRATE 40 MG/ML
2 INJECTION, SOLUTION INTRAVENOUS ONCE
Status: COMPLETED | OUTPATIENT
Start: 2024-05-28 | End: 2024-05-28

## 2024-05-28 RX ORDER — PANTOPRAZOLE SODIUM 40 MG/1
40 TABLET, DELAYED RELEASE ORAL
Status: DISCONTINUED | OUTPATIENT
Start: 2024-05-29 | End: 2024-06-01 | Stop reason: HOSPADM

## 2024-05-28 RX ORDER — UBIDECARENONE 30 MG
1 CAPSULE ORAL DAILY
COMMUNITY
Start: 2024-06-03

## 2024-05-28 RX ORDER — IPRATROPIUM BROMIDE AND ALBUTEROL SULFATE 2.5; .5 MG/3ML; MG/3ML
3 SOLUTION RESPIRATORY (INHALATION)
Status: DISCONTINUED | OUTPATIENT
Start: 2024-05-28 | End: 2024-05-29

## 2024-05-28 RX ORDER — LIDOCAINE 40 MG/G
CREAM TOPICAL
Status: DISCONTINUED | OUTPATIENT
Start: 2024-05-28 | End: 2024-06-01 | Stop reason: HOSPADM

## 2024-05-28 RX ORDER — POLYETHYLENE GLYCOL 3350 17 G/17G
17 POWDER, FOR SOLUTION ORAL 2 TIMES DAILY PRN
Status: DISCONTINUED | OUTPATIENT
Start: 2024-05-28 | End: 2024-06-01 | Stop reason: HOSPADM

## 2024-05-28 RX ORDER — METHYLPREDNISOLONE SODIUM SUCCINATE 125 MG/2ML
125 INJECTION, POWDER, LYOPHILIZED, FOR SOLUTION INTRAMUSCULAR; INTRAVENOUS ONCE
Status: COMPLETED | OUTPATIENT
Start: 2024-05-28 | End: 2024-05-28

## 2024-05-28 RX ORDER — ENOXAPARIN SODIUM 100 MG/ML
40 INJECTION SUBCUTANEOUS EVERY 24 HOURS
Status: DISCONTINUED | OUTPATIENT
Start: 2024-05-28 | End: 2024-06-01 | Stop reason: HOSPADM

## 2024-05-28 RX ORDER — DOCUSATE SODIUM 100 MG/1
100 CAPSULE, LIQUID FILLED ORAL 2 TIMES DAILY
Status: DISCONTINUED | OUTPATIENT
Start: 2024-05-28 | End: 2024-06-01 | Stop reason: HOSPADM

## 2024-05-28 RX ORDER — AMOXICILLIN 250 MG
2 CAPSULE ORAL 2 TIMES DAILY PRN
Status: DISCONTINUED | OUTPATIENT
Start: 2024-05-28 | End: 2024-06-01 | Stop reason: HOSPADM

## 2024-05-28 RX ORDER — PREDNISONE 20 MG/1
40 TABLET ORAL DAILY
Status: COMPLETED | OUTPATIENT
Start: 2024-05-30 | End: 2024-06-01

## 2024-05-28 RX ORDER — LOSARTAN POTASSIUM 100 MG/1
100 TABLET ORAL EVERY EVENING
Status: DISCONTINUED | OUTPATIENT
Start: 2024-05-28 | End: 2024-06-01 | Stop reason: HOSPADM

## 2024-05-28 RX ORDER — SERTRALINE HYDROCHLORIDE 25 MG/1
25 TABLET, FILM COATED ORAL DAILY
Status: DISCONTINUED | OUTPATIENT
Start: 2024-05-29 | End: 2024-06-01 | Stop reason: HOSPADM

## 2024-05-28 RX ORDER — TOLTERODINE TARTRATE 1 MG/1
1 TABLET, EXTENDED RELEASE ORAL 2 TIMES DAILY
Status: DISCONTINUED | OUTPATIENT
Start: 2024-05-28 | End: 2024-06-01 | Stop reason: HOSPADM

## 2024-05-28 RX ORDER — GUAIFENESIN 200 MG/10ML
200 LIQUID ORAL EVERY 4 HOURS PRN
Status: DISCONTINUED | OUTPATIENT
Start: 2024-05-28 | End: 2024-06-01 | Stop reason: HOSPADM

## 2024-05-28 RX ORDER — IPRATROPIUM BROMIDE AND ALBUTEROL SULFATE 2.5; .5 MG/3ML; MG/3ML
3 SOLUTION RESPIRATORY (INHALATION)
Status: DISCONTINUED | OUTPATIENT
Start: 2024-05-28 | End: 2024-06-01

## 2024-05-28 RX ORDER — IOPAMIDOL 755 MG/ML
56 INJECTION, SOLUTION INTRAVASCULAR ONCE
Status: COMPLETED | OUTPATIENT
Start: 2024-05-28 | End: 2024-05-28

## 2024-05-28 RX ADMIN — METHYLPREDNISOLONE SODIUM SUCCINATE 125 MG: 125 INJECTION, POWDER, FOR SOLUTION INTRAMUSCULAR; INTRAVENOUS at 10:26

## 2024-05-28 RX ADMIN — IPRATROPIUM BROMIDE AND ALBUTEROL SULFATE 3 ML: .5; 3 SOLUTION RESPIRATORY (INHALATION) at 13:30

## 2024-05-28 RX ADMIN — FUROSEMIDE 40 MG: 10 INJECTION, SOLUTION INTRAMUSCULAR; INTRAVENOUS at 18:42

## 2024-05-28 RX ADMIN — DOCUSATE SODIUM 100 MG: 100 CAPSULE, LIQUID FILLED ORAL at 20:08

## 2024-05-28 RX ADMIN — SODIUM CHLORIDE 83 ML: 9 INJECTION, SOLUTION INTRAVENOUS at 11:58

## 2024-05-28 RX ADMIN — CARVEDILOL 12.5 MG: 12.5 TABLET, FILM COATED ORAL at 17:48

## 2024-05-28 RX ADMIN — ACETAMINOPHEN 650 MG: 325 TABLET, FILM COATED ORAL at 18:06

## 2024-05-28 RX ADMIN — IOPAMIDOL 56 ML: 755 INJECTION, SOLUTION INTRAVENOUS at 11:55

## 2024-05-28 RX ADMIN — TOLTERODINE TARTRATE 1 MG: 1 TABLET ORAL at 20:08

## 2024-05-28 RX ADMIN — CARBIDOPA AND LEVODOPA 2 TABLET: 25; 100 TABLET ORAL at 17:48

## 2024-05-28 RX ADMIN — NITROGLYCERIN 15 MG: 20 OINTMENT TOPICAL at 15:28

## 2024-05-28 RX ADMIN — FUROSEMIDE 40 MG: 10 INJECTION, SOLUTION INTRAMUSCULAR; INTRAVENOUS at 13:24

## 2024-05-28 RX ADMIN — SODIUM CHLORIDE 500 ML: 9 INJECTION, SOLUTION INTRAVENOUS at 11:08

## 2024-05-28 RX ADMIN — CARBIDOPA AND LEVODOPA 2 TABLET: 25; 100 TABLET ORAL at 20:08

## 2024-05-28 RX ADMIN — MAGNESIUM SULFATE HEPTAHYDRATE 2 G: 40 INJECTION, SOLUTION INTRAVENOUS at 10:37

## 2024-05-28 RX ADMIN — IPRATROPIUM BROMIDE AND ALBUTEROL SULFATE 3 ML: .5; 3 SOLUTION RESPIRATORY (INHALATION) at 11:35

## 2024-05-28 RX ADMIN — IPRATROPIUM BROMIDE AND ALBUTEROL SULFATE 3 ML: .5; 3 SOLUTION RESPIRATORY (INHALATION) at 19:26

## 2024-05-28 RX ADMIN — LOSARTAN POTASSIUM 100 MG: 100 TABLET, FILM COATED ORAL at 20:08

## 2024-05-28 RX ADMIN — CARBOXYMETHYLCELLULOSE SODIUM 1 DROP: 5 SOLUTION/ DROPS OPHTHALMIC at 23:11

## 2024-05-28 RX ADMIN — ENOXAPARIN SODIUM 40 MG: 40 INJECTION SUBCUTANEOUS at 17:48

## 2024-05-28 ASSESSMENT — ACTIVITIES OF DAILY LIVING (ADL)
ADLS_ACUITY_SCORE: 58
ADLS_ACUITY_SCORE: 49
ADLS_ACUITY_SCORE: 38
ADLS_ACUITY_SCORE: 49
ADLS_ACUITY_SCORE: 38
ADLS_ACUITY_SCORE: 36
ADLS_ACUITY_SCORE: 58
ADLS_ACUITY_SCORE: 38
ADLS_ACUITY_SCORE: 38
ADLS_ACUITY_SCORE: 58
ADLS_ACUITY_SCORE: 38
ADLS_ACUITY_SCORE: 38
ADLS_ACUITY_SCORE: 58
ADLS_ACUITY_SCORE: 38
ADLS_ACUITY_SCORE: 58
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
## 2024-05-28 NOTE — PROGRESS NOTES
RECEIVING UNIT ED HANDOFF REVIEW    ED Nurse Handoff Report was reviewed by: Alessio Caballero RN on May 28, 2024 at 3:18 PM
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
## 2024-05-28 NOTE — PHARMACY-ADMISSION MEDICATION HISTORY
Pharmacist Admission Medication History    Admission medication history is complete. The information provided in this note is only as accurate as the sources available at the time of the update.    Information Source(s): Patient, Family member, Hospital records, and CareEverywhere/SureScripts via in-person    Pertinent Information: Patient states she plans to resume taking Aspirin after not taking it for a while.    Changes made to PTA medication list:  Added: Calcium, co-q 10  Deleted: None  Changed: None      Medication History Completed By: Kye Rey [facility] 5/28/2024 1:01 PM    PTA Med List   Medication Sig Last Dose    albuterol (PROAIR HFA/PROVENTIL HFA/VENTOLIN HFA) 108 (90 Base) MCG/ACT inhaler Inhale 1-2 puffs into the lungs every 6 hours as needed for shortness of breath or wheezing Unknown    amLODIPine (NORVASC) 5 MG tablet Take 1 tablet (5 mg) by mouth daily 5/28/2024 at AM    atorvastatin (LIPITOR) 20 MG tablet TAKE 1 TABLET(20 MG) BY MOUTH DAILY 5/28/2024    azithromycin (ZITHROMAX) 500 MG tablet TAKE 1 TABLET BY MOUTH 30 TO 60 MINUTES PRIOR TO DENTAL PROCEDURE Unknown    budesonide (PULMICORT) 0.5 MG/2ML neb solution Take 2 mLs (0.5 mg) by nebulization 2 times daily (Patient taking differently: Take 0.5 mg by nebulization 2 times daily as needed) Past Week    calcium carbonate (OS-CHRIS) 1500 (600 Ca) MG tablet Take 600 mg by mouth daily 5/28/2024    carbidopa-levodopa (SINEMET)  MG per tablet Take 2 tablets by mouth 4 times daily Takes at 6am, 11am, 4pm, and 9 pm 5/28/2024 at 1200    carvedilol (COREG) 12.5 MG tablet Take 1 tablet (12.5 mg) by mouth 2 times daily (with meals) 5/28/2024    Cholecalciferol (VITAMIN D3 PO) Take 400 Units by mouth daily  5/28/2024    coenzyme Q-10 capsule Take 1 capsule by mouth daily 5/28/2024    ipratropium - albuterol 0.5 mg/2.5 mg/3 mL (DUONEB) 0.5-2.5 (3) MG/3ML neb solution Take 1 vial (3 mLs) by nebulization 3 times daily (Patient taking differently: Take  3 mLs by nebulization 3 times daily as needed for shortness of breath or wheezing) Past Week at prn    loperamide (IMODIUM) 2 MG capsule Take 1 capsule (2 mg) by mouth 4 times daily as needed for diarrhea Unknown    losartan (COZAAR) 100 MG tablet TAKE 1 TABLET(100 MG) BY MOUTH DAILY (Patient taking differently: Take 100 mg by mouth every evening) 5/27/2024 at PM    Multiple Vitamins-Minerals (PRESERVISION AREDS) CAPS Take 1 capsule by mouth 2 times daily 5/28/2024 at AM    multivitamin w/minerals (THERA-VIT-M) tablet Take 1 tablet by mouth daily 5/28/2024 at AM    omeprazole (PRILOSEC) 20 MG DR capsule TAKE 1 CAPSULE(20 MG) BY MOUTH DAILY 5/28/2024    polyethylene glycol (MIRALAX/GLYCOLAX) packet Take 1 packet by mouth daily as needed for constipation Unknown at prn    raloxifene (EVISTA) 60 MG tablet TAKE 1 TABLET(60 MG) BY MOUTH DAILY 5/28/2024    senna-docusate (SENOKOT-S;PERICOLACE) 8.6-50 MG per tablet Take 1 tablet by mouth 2 times daily as needed for constipation Unknown at prn    sertraline (ZOLOFT) 25 MG tablet TAKE 1 TABLET(25 MG) BY MOUTH DAILY 5/28/2024    tolterodine (DETROL) 1 MG tablet TAKE 1 TABLET(1 MG) BY MOUTH TWICE DAILY 5/28/2024

## 2024-05-29 ENCOUNTER — APPOINTMENT (OUTPATIENT)
Dept: PHYSICAL THERAPY | Facility: CLINIC | Age: 89
DRG: 291 | End: 2024-05-29
Attending: HOSPITALIST
Payer: COMMERCIAL

## 2024-05-29 LAB
ANION GAP SERPL CALCULATED.3IONS-SCNC: 15 MMOL/L (ref 7–15)
BASOPHILS # BLD AUTO: 0 10E3/UL (ref 0–0.2)
BASOPHILS NFR BLD AUTO: 0 %
BUN SERPL-MCNC: 28.9 MG/DL (ref 8–23)
CALCIUM SERPL-MCNC: 9.3 MG/DL (ref 8.8–10.2)
CHLORIDE SERPL-SCNC: 99 MMOL/L (ref 98–107)
CREAT SERPL-MCNC: 0.85 MG/DL (ref 0.51–0.95)
DEPRECATED HCO3 PLAS-SCNC: 20 MMOL/L (ref 22–29)
EGFRCR SERPLBLD CKD-EPI 2021: 66 ML/MIN/1.73M2
EOSINOPHIL # BLD AUTO: 0 10E3/UL (ref 0–0.7)
EOSINOPHIL NFR BLD AUTO: 0 %
ERYTHROCYTE [DISTWIDTH] IN BLOOD BY AUTOMATED COUNT: 16.3 % (ref 10–15)
GLUCOSE SERPL-MCNC: 185 MG/DL (ref 70–99)
HCT VFR BLD AUTO: 28.9 % (ref 35–47)
HGB BLD-MCNC: 9.3 G/DL (ref 11.7–15.7)
IMM GRANULOCYTES # BLD: 0.1 10E3/UL
IMM GRANULOCYTES NFR BLD: 1 %
LYMPHOCYTES # BLD AUTO: 0.6 10E3/UL (ref 0.8–5.3)
LYMPHOCYTES NFR BLD AUTO: 5 %
MCH RBC QN AUTO: 28.7 PG (ref 26.5–33)
MCHC RBC AUTO-ENTMCNC: 32.2 G/DL (ref 31.5–36.5)
MCV RBC AUTO: 89 FL (ref 78–100)
MONOCYTES # BLD AUTO: 0.3 10E3/UL (ref 0–1.3)
MONOCYTES NFR BLD AUTO: 3 %
NEUTROPHILS # BLD AUTO: 11.2 10E3/UL (ref 1.6–8.3)
NEUTROPHILS NFR BLD AUTO: 91 %
NRBC # BLD AUTO: 0 10E3/UL
NRBC BLD AUTO-RTO: 0 /100
PLATELET # BLD AUTO: 267 10E3/UL (ref 150–450)
POTASSIUM SERPL-SCNC: 4.3 MMOL/L (ref 3.4–5.3)
PROCALCITONIN SERPL IA-MCNC: 0.09 NG/ML
RBC # BLD AUTO: 3.24 10E6/UL (ref 3.8–5.2)
SODIUM SERPL-SCNC: 134 MMOL/L (ref 135–145)
WBC # BLD AUTO: 12.2 10E3/UL (ref 4–11)

## 2024-05-29 PROCEDURE — 80048 BASIC METABOLIC PNL TOTAL CA: CPT | Performed by: HOSPITALIST

## 2024-05-29 PROCEDURE — 36415 COLL VENOUS BLD VENIPUNCTURE: CPT | Performed by: HOSPITALIST

## 2024-05-29 PROCEDURE — 120N000001 HC R&B MED SURG/OB

## 2024-05-29 PROCEDURE — 250N000009 HC RX 250: Performed by: INTERNAL MEDICINE

## 2024-05-29 PROCEDURE — 99232 SBSQ HOSP IP/OBS MODERATE 35: CPT | Performed by: INTERNAL MEDICINE

## 2024-05-29 PROCEDURE — 94640 AIRWAY INHALATION TREATMENT: CPT

## 2024-05-29 PROCEDURE — 250N000011 HC RX IP 250 OP 636: Performed by: INTERNAL MEDICINE

## 2024-05-29 PROCEDURE — 250N000011 HC RX IP 250 OP 636: Performed by: HOSPITALIST

## 2024-05-29 PROCEDURE — 97162 PT EVAL MOD COMPLEX 30 MIN: CPT | Mod: GP

## 2024-05-29 PROCEDURE — 250N000013 HC RX MED GY IP 250 OP 250 PS 637: Performed by: HOSPITALIST

## 2024-05-29 PROCEDURE — 85025 COMPLETE CBC W/AUTO DIFF WBC: CPT | Performed by: HOSPITALIST

## 2024-05-29 PROCEDURE — 94640 AIRWAY INHALATION TREATMENT: CPT | Mod: 76

## 2024-05-29 PROCEDURE — 250N000009 HC RX 250: Performed by: HOSPITALIST

## 2024-05-29 PROCEDURE — 84145 PROCALCITONIN (PCT): CPT | Performed by: HOSPITALIST

## 2024-05-29 PROCEDURE — 97530 THERAPEUTIC ACTIVITIES: CPT | Mod: GP

## 2024-05-29 PROCEDURE — 999N000157 HC STATISTIC RCP TIME EA 10 MIN

## 2024-05-29 RX ORDER — FUROSEMIDE 10 MG/ML
40 INJECTION INTRAMUSCULAR; INTRAVENOUS ONCE
Status: COMPLETED | OUTPATIENT
Start: 2024-05-29 | End: 2024-05-29

## 2024-05-29 RX ORDER — VIT C/E/ZN/COPPR/LUTEIN/ZEAXAN 60 MG-6 MG
1 CAPSULE ORAL 2 TIMES DAILY
Status: DISCONTINUED | OUTPATIENT
Start: 2024-05-30 | End: 2024-06-01 | Stop reason: HOSPADM

## 2024-05-29 RX ORDER — BUDESONIDE 0.5 MG/2ML
0.5 INHALANT ORAL 2 TIMES DAILY
Status: DISCONTINUED | OUTPATIENT
Start: 2024-05-29 | End: 2024-06-01 | Stop reason: HOSPADM

## 2024-05-29 RX ADMIN — BUDESONIDE 0.5 MG: 0.5 INHALANT RESPIRATORY (INHALATION) at 20:04

## 2024-05-29 RX ADMIN — IPRATROPIUM BROMIDE AND ALBUTEROL SULFATE 3 ML: .5; 3 SOLUTION RESPIRATORY (INHALATION) at 07:54

## 2024-05-29 RX ADMIN — FUROSEMIDE 40 MG: 10 INJECTION, SOLUTION INTRAMUSCULAR; INTRAVENOUS at 16:13

## 2024-05-29 RX ADMIN — SERTRALINE HYDROCHLORIDE 25 MG: 25 TABLET ORAL at 09:26

## 2024-05-29 RX ADMIN — IPRATROPIUM BROMIDE AND ALBUTEROL SULFATE 3 ML: .5; 3 SOLUTION RESPIRATORY (INHALATION) at 11:52

## 2024-05-29 RX ADMIN — ACETAMINOPHEN 650 MG: 325 TABLET, FILM COATED ORAL at 21:05

## 2024-05-29 RX ADMIN — ASPIRIN 81 MG: 81 TABLET, COATED ORAL at 09:26

## 2024-05-29 RX ADMIN — CARBIDOPA AND LEVODOPA 2 TABLET: 25; 100 TABLET ORAL at 21:00

## 2024-05-29 RX ADMIN — IPRATROPIUM BROMIDE AND ALBUTEROL SULFATE 3 ML: .5; 3 SOLUTION RESPIRATORY (INHALATION) at 20:04

## 2024-05-29 RX ADMIN — ACETAMINOPHEN 650 MG: 325 TABLET, FILM COATED ORAL at 13:24

## 2024-05-29 RX ADMIN — DOCUSATE SODIUM 100 MG: 100 CAPSULE, LIQUID FILLED ORAL at 09:25

## 2024-05-29 RX ADMIN — METHYLPREDNISOLONE SODIUM SUCCINATE 62.5 MG: 125 INJECTION, POWDER, FOR SOLUTION INTRAMUSCULAR; INTRAVENOUS at 18:36

## 2024-05-29 RX ADMIN — CARBIDOPA AND LEVODOPA 2 TABLET: 25; 100 TABLET ORAL at 06:23

## 2024-05-29 RX ADMIN — CARBIDOPA AND LEVODOPA 2 TABLET: 25; 100 TABLET ORAL at 16:13

## 2024-05-29 RX ADMIN — IPRATROPIUM BROMIDE AND ALBUTEROL SULFATE 3 ML: .5; 3 SOLUTION RESPIRATORY (INHALATION) at 16:09

## 2024-05-29 RX ADMIN — ATORVASTATIN CALCIUM 20 MG: 20 TABLET, FILM COATED ORAL at 09:25

## 2024-05-29 RX ADMIN — METHYLPREDNISOLONE SODIUM SUCCINATE 62.5 MG: 125 INJECTION, POWDER, FOR SOLUTION INTRAMUSCULAR; INTRAVENOUS at 06:23

## 2024-05-29 RX ADMIN — LOSARTAN POTASSIUM 100 MG: 100 TABLET, FILM COATED ORAL at 21:00

## 2024-05-29 RX ADMIN — CARVEDILOL 12.5 MG: 12.5 TABLET, FILM COATED ORAL at 09:27

## 2024-05-29 RX ADMIN — PANTOPRAZOLE SODIUM 40 MG: 40 TABLET, DELAYED RELEASE ORAL at 06:23

## 2024-05-29 RX ADMIN — CARVEDILOL 12.5 MG: 12.5 TABLET, FILM COATED ORAL at 18:33

## 2024-05-29 RX ADMIN — RALOXIFENE HYDROCHLORIDE 60 MG: 60 TABLET, FILM COATED ORAL at 09:26

## 2024-05-29 RX ADMIN — ENOXAPARIN SODIUM 40 MG: 40 INJECTION SUBCUTANEOUS at 18:33

## 2024-05-29 RX ADMIN — TOLTERODINE TARTRATE 1 MG: 1 TABLET ORAL at 09:26

## 2024-05-29 RX ADMIN — CARBIDOPA AND LEVODOPA 2 TABLET: 25; 100 TABLET ORAL at 11:29

## 2024-05-29 RX ADMIN — AMLODIPINE BESYLATE 5 MG: 5 TABLET ORAL at 09:25

## 2024-05-29 RX ADMIN — TOLTERODINE TARTRATE 1 MG: 1 TABLET ORAL at 21:00

## 2024-05-29 ASSESSMENT — ACTIVITIES OF DAILY LIVING (ADL)
ADLS_ACUITY_SCORE: 64
ADLS_ACUITY_SCORE: 58
ADLS_ACUITY_SCORE: 64
ADLS_ACUITY_SCORE: 58
ADLS_ACUITY_SCORE: 64
ADLS_ACUITY_SCORE: 58
ADLS_ACUITY_SCORE: 64
ADLS_ACUITY_SCORE: 58
ADLS_ACUITY_SCORE: 64
ADLS_ACUITY_SCORE: 58
ADLS_ACUITY_SCORE: 64
ADLS_ACUITY_SCORE: 58
ADLS_ACUITY_SCORE: 64
ADLS_ACUITY_SCORE: 58
ADLS_ACUITY_SCORE: 64
ADLS_ACUITY_SCORE: 58
ADLS_ACUITY_SCORE: 64
ADLS_ACUITY_SCORE: 64
ADLS_ACUITY_SCORE: 58
ADLS_ACUITY_SCORE: 64
ADLS_ACUITY_SCORE: 58
ADLS_ACUITY_SCORE: 64
ADLS_ACUITY_SCORE: 64

## 2024-05-29 NOTE — PROGRESS NOTES
"   05/29/24 1400   Appointment Info   Signing Clinician's Name / Credentials (PT) Chey Olivares DPT   Living Environment   People in Home facility resident   Current Living Arrangements assisted living   Home Accessibility no concerns   Self-Care   Usual Activity Tolerance fair   Current Activity Tolerance fair   Equipment Currently Used at Home wheelchair, manual;walker, rolling;grab bar, toilet;grab bar, tub/shower;shower chair   Fall history within last six months yes   Number of times patient has fallen within last six months 3   Activity/Exercise/Self-Care Comment Pt lives at [facility]. Has assist of 1 for AM cares. Usually uses transport chair to get into BR or for rides down to dining dee. Can walk short distances but reports this depends on the day, related to barometric pressure. Manages meds IND, has assist for dressing bathing, toileting.   General Information   Onset of Illness/Injury or Date of Surgery 05/28/24   Referring Physician Lakhwinder Ariza MD   Pertinent History of Current Problem (include personal factors and/or comorbidities that impact the POC) \"Opal Sin is an extremely pleasant 89 year old female with history of asthma, breast cancer, HTN, HL, CKD III, Parkinson's, GERD, osteoporosis, and degenerative disc disease among others who presented to the ED from assisted living facility with shortness of breath and productive cough for the past week. It has worsened in the past 3 days particularly and staff found her to be with O2 sat in the 80s. She is not oxygen dependent. She was recently hospitalized 5/1 - 5/7 with diastolic HF exacerbation and LLL CAP for which she was treated with azithromycin, diuresis and nebs - PCP follow up on 5/15 was completed and they were planning on repeat CXR in a couple weeks. She denies chest pain, fevers, chills, abdominal pain, nausea, vomiting, diarrhea, or change in urinary habits. She is being treated for asthma and diastolic heart failure " "exacerbations.\"   Existing Precautions/Restrictions fall;oxygen therapy device and L/min  (2 lpm via NC, does not wear O2 at home)   Cognition   Affect/Mental Status (Cognition) WFL;anxious   Orientation Status (Cognition) oriented x 3   Follows Commands (Cognition) WFL   Pain Assessment   Patient Currently in Pain Yes, see Vital Sign flowsheet  (neck and R shoulde pain 6/10)   Posture    Posture Forward head position;Protracted shoulders   Range of Motion (ROM)   Range of Motion ROM is WFL   Strength (Manual Muscle Testing)   Strength (Manual Muscle Testing) Deficits observed during functional mobility   Strength Comments decreased functional LE strength L>R   Bed Mobility   Comment, (Bed Mobility) Chauncey sup>sit with HOB elevated and use of bedrail   Transfers   Transfers sit-stand transfer   Comment, (Transfers) Chauncey sit>stand to FWW   Gait/Stairs (Locomotion)   Comment, (Gait/Stairs) ~3 ft extended pivot tx with Chauncey at FWW, LLE \"freezing\" initially   Balance   Balance Comments falls risk, currently requires Ax1 and use of AD for safe upright mobility   Sensory Examination   Sensory Perception WFL   Clinical Impression   Criteria for Skilled Therapeutic Intervention Yes, treatment indicated   PT Diagnosis (PT) impaired IND with functional mobility   Influenced by the following impairments impaired fucntional strength, balance, activity tolerance   Functional limitations due to impairments impaired bed mobility, transfers, ambulation   Clinical Presentation (PT Evaluation Complexity) stable   Clinical Presentation Rationale Based on current presentation, PMH, social support   Clinical Decision Making (Complexity) low complexity   Planned Therapy Interventions (PT) balance training;bed mobility training;gait training;neuromuscular re-education;patient/family education;postural re-education;strengthening;transfer training   Risk & Benefits of therapy have been explained evaluation/treatment results reviewed;care " "plan/treatment goals reviewed;current/potential barriers reviewed;participants voiced agreement with care plan;participants included;patient;risks/benefits reviewed   PT Total Evaluation Time   PT Eval, Moderate Complexity Minutes (26903) 10   Physical Therapy Goals   PT Frequency 5x/week   PT Predicted Duration/Target Date for Goal Attainment 06/05/24   PT Goals Bed Mobility;Transfers;Gait   PT: Bed Mobility Minimal assist;Supine to/from sit   PT: Transfers Supervision/stand-by assist;Sit to/from stand;Bed to/from chair;Assistive device   PT: Gait Supervision/stand-by assist;Assistive device;Rolling walker;25 feet   Therapeutic Activity   Therapeutic Activities: dynamic activities to improve functional performance Minutes (19455) 25   Treatment Detail/Skilled Intervention Increased time spent to gather equipment and prepare room for safe OOB mobility. Following eval, further sit<>Stands x3 with CGA-Chauncey to FWW. Cued for safe hand placement, pt with good carryover of this throughout session. Completed standing march 3 x30s bouts with CGA-Chauncey at FWW. Pt having increased difficulty lifting LLE. Benefits from rhythmic \"right, left, right,left\" cuing. On 2 L O2 throughout session, sats stable on RA. Pt positioned in chair with pillows for comfort. Remained seated in chair, alarm armed and needs in reach.   Gait Training   Treatment Detail/Skilled Intervention gait initiated, see eval above   PT Discharge Planning   PT Plan repeated sit<>stands, trial further gait distance with chair follow for safety   PT Discharge Recommendation (DC Rec) Transitional Care Facility;home with assist;home with home care physical therapy   PT Rationale for DC Rec Pt presents from [facility] where she reports she receives Ax1 w/ all mobilty and ADLs. Per PT notes from prior hospital stay, had been able to ambulate up to 50; ~3 wks ago. Pt requiring Ax1 for pivot transfers with FWW, unable to ambulate further than 3' today. Would benefit from TCU " stay to maximize safety and IND with mobility. If senior care able to provide Ax1 for all transfers, WC rides for any ambulation distance, could consider DC home with HHPT.   PT Brief overview of current status Min A for transfers   Total Session Time   Timed Code Treatment Minutes 25   Total Session Time (sum of timed and untimed services) 35

## 2024-05-29 NOTE — PROGRESS NOTES
[facility]    Medicine Progress Note - Hospitalist Service    Date of Admission:  5/28/2024    Assessment & Plan   Opal Sin is an extremely pleasant 89 year old female with history of asthma, breast cancer, HTN, HL, CKD III, Parkinson's, GERD, osteoporosis, and degenerative disc disease among others who presented to the ED from assisted living facility with shortness of breath and productive cough for the past week. It has worsened in the past 3 days particularly and staff found her to be with O2 sat in the 80s. She is not oxygen dependent. She was recently hospitalized 5/1 - 5/7 with diastolic HF exacerbation and LLL CAP for which she was treated with azithromycin, diuresis and nebs - PCP follow up on 5/15 was completed and they were planning on repeat CXR in a couple weeks. She denies chest pain, fevers, chills, abdominal pain, nausea, vomiting, diarrhea, or change in urinary habits. She is being treated for asthma and diastolic heart failure exacerbations.        Mild intermittent asthma exacerbation and/or acute diastolic heart failure exacerbation  Acute hypoxic respiratory failure 2/2 above  Recent L basilar pneumonia, treated  Small bilateral pleural effusions  About a week of cough and worsening SOB, found to be hypoxic in 80s at living facility. She is not O2 dependent. Recent adm 5/1 - 5/7 for acute DHF and LLL pneumonia and she was treated with azithromycin. WBC 12k, procal 0.08, LA 0.8, viral panel NEG for RSV, flu, COVID. BNP 3k. Trop 38 --> 37. EKG without overt acute ischemic changes. Of  note, recent echo Echo 5/2/2024 showed LVEF of 50-55% (was 60-65% in 2017), G2 diastolic dysfunction and findings of mild PHTN; patient was diuresed in hospital but not discharged on diuretic.  *s/p IVF, solumedrol 125, magnesium, and nebs. Lasix 40 mg IV was given as well.   *CXR fairly unremarkable - L base infiltrate improved  *CT PE - no PE, mod pulmonary edema w/ small  pleural effusions, mildly enlarged PA c/w pulm HTN  *has received Lasix 40mg IV x 2 doses on admission, good diuresis  *recheck procal unchanged at 0.09  - continue supplemental oxygen to keep 90% or better - currently on 2-3L NC  - scheduled and prn nebs  - resume PTA budesonide  - continue with solumedrol 60 bid today, then prednisone 40mg daily x 3 days 5/30 and on  - incentive spirometry  - I/O, daily weights  - will hold off from abx given no fever, no new infiltrate on imaging, and procalc 0.09 and unchanged on recheck  - wean oxygen as able    Indeterminate 1.6 cm lesion in the posterior left kidney  *likely a cyst containing hemorrhagic or proteinaceous material.  - Nonemergent follow-up renal ultrasound can be considered. There are  other left renal cysts.     Hypertension  Hyperlipidemia  BP elevated in 190s systolic in ED. Has been following with PCP.  - PTA amlodipine, carvedilol, losartan and atorvastatin noted - continue     CKD II/III  Creat WNL at 0.8 on adm. BUN 26 of note. Prior BUN 60-> 58-> 31 and creat 1.04 ->0.9 over that stretch earlier this month.  - follow BMP periodically vs as outpatient with PCP     GERD  Stable.  - PTA      Chronic normocytic anemia  Recent hgb levels 10-11. Hgb 9.9 on adm. No report of bleeding concerns.  - hgb 9.3 on 5/29/2024  -  monitor     Parkinson's disease  - continue PTA sinemet     Hx of breast cancer  Noted. Continue with outpatient follow up as previously planned.  - PTA raloxifene to continue           Diet: Fluid restriction 1800 ML FLUID  Combination Diet 2 gm NA Diet    DVT Prophylaxis: Enoxaparin (Lovenox) SQ  Santana Catheter: Not present  Lines: None     Cardiac Monitoring: ACTIVE order. Indication: Acute decompensated heart failure (48 hours)  Code Status: No CPR- Do NOT Intubate      Clinically Significant Risk Factors                  # Hypertension: Noted on problem list            # Financial/Environmental Concerns: none  # Asthma: noted on problem  list        Disposition Plan     Medically Ready for Discharge: Anticipated in 2-4 Days             Keegan Tong MD  Hospitalist Service  [facility]  Securely message with [employer] (more info)  Text page via [facility] Paging/Directory   ______________________________________________________________________    Interval History   Patient reports breathing is a bit better today. Endorses cough, but not productive today.   Denies n/v or abdominal pain. She is afebrile.   Currently on 2L supplemental oxygen.     Physical Exam   Vital Signs: Temp: 98  F (36.7  C) Temp src: Oral BP: (!) 150/80 Pulse: 72   Resp: 23 SpO2: 97 % O2 Device: Nasal cannula Oxygen Delivery: 2 LPM  Weight: 129 lbs 13.62 oz    General Appearance: Alert, awake and no apparent distress  Respiratory: diffuse expiratory wheezing bilaterally  Cardiovascular: regula rate and rhythm  GI: soft and non-tender  Skin: warm and dry      Medical Decision Making       42 MINUTES SPENT BY ME on the date of service doing chart review, history, exam, documentation & further activities per the note.  MANAGEMENT DISCUSSED with the following over the past 24 hours: patient, RN   NOTE(S)/MEDICAL RECORDS REVIEWED over the past 24 hours: nursing notes, recent hospital discharge summary  Tests ORDERED & REVIEWED in the past 24 hours:  - BMP  - CBC  Tests personally interpreted in the past 24 hours:          Data     I have personally reviewed the following data over the past 24 hrs:    12.2 (H)  \   9.3 (L)   / 267     134 (L) 99 28.9 (H) /  185 (H)   4.3 20 (L) 0.85 \     Procal: 0.09 CRP: N/A Lactic Acid: N/A         Imaging results reviewed over the past 24 hrs:   No results found for this or any previous visit (from the past 24 hour(s)).

## 2024-05-29 NOTE — CONSULTS
"BRIEF NUTRITION ASSESSMENT      REASON FOR ASSESSMENT:  Opal Sin is a 89 year old female seen by Registered Dietitian for Admission Nutrition Risk Screen:  Have you recently lost weight without trying? \"YES, UNSURE AMOUNT\"  Have you been eating poorly because of a decreased appetite? \"YES\"      NUTRITION HISTORY:  Chart reviewed  Visited with pt this morning  Pt lives in assisted living with meals provided  Notes that she is a good breakfast eater - likes hot cereal, fruit, toast/sweet bread  Feels a bit frustrated with her meal options - \"they say the food is low sodium, but I can taste that it is not.  They served me a [place] hot dog the other day\"  Pt tends to eat smaller portions when the food served is higher Na  She does not consume nutrition supplements    Pt notes that her appetite has been down with current health issue    CURRENT DIET AND INTAKE:  Diet:  2gm Na, 1800 mL fluid restriction              Pt reports eating her breakfast - cream of wheat, toast, fruit, milk  \"The food is good!\"  Pt requested I order her a banana and bowl of fresh fruit    ANTHROPOMETRICS:  Height: 5' 1\"  Weight:(5/29) 58.9 kg /  129 lbs 13.62 oz  Body mass index is 24.54 kg/m .   Weight Status: Normal BMI  IBW:  47.7 kg  %IBW: 123%  Weight History:   Pt notes that today's wt sounds right.    Wt from (5/1/24) was admit wt - p[t with CHF/BLE edema.- likely falsely elevated    Wt Readings from Last 10 Encounters:   05/29/24 58.9 kg (129 lb 13.6 oz)   05/15/24 52.6 kg (115 lb 14.4 oz)   05/01/24 68.1 kg (150 lb 2.1 oz)   12/15/23 53.8 kg (118 lb 8 oz)   07/10/23 53.5 kg (118 lb)   12/15/21 57.5 kg (126 lb 12.8 oz)   09/23/20 57.2 kg (126 lb)   02/25/20 61.1 kg (134 lb 9.6 oz)   01/27/20 61 kg (134 lb 8 oz)   01/24/20 61.2 kg (134 lb 14.4 oz)         LABS:  Labs noted    MALNUTRITION:  Patient does not meet two of the following criteria necessary for diagnosing malnutrition.     % Weight Loss:  Weight loss does not " meet criteria for malnutrition  - some wt fluctuation with edema  % Intake:  Decreased intake does not meet criteria for malnutrition  - decreased appetite past few days  Subcutaneous Fat Loss:  Upper arm region mild depletion and Thoracic region mild depletion  Muscle Loss:  Clavicle bone region moderate depletion (suspect age related sarcopenia)  Fluid Retention:  None noted    NUTRITION INTERVENTION:  Nutrition Diagnosis:  No nutrition diagnosis at this time.    Implementation:  Nutrition Education:  reviewed diet order and meal ordering process.  Encouraged pt to speak with facility about need for low Na meal options.    FOLLOW UP/MONITORING:   Will re-evaluate in 7 - 10 days, or sooner, if re-consulted.

## 2024-05-29 NOTE — CONSULTS
Care Management Initial Consult    General Information  Assessment completed with: Patient, patient  Resides at [facility]  Main#[phone]  DON Phone#[phone] (Majo RN)  Fax# [phone]  If returning to [facility] required before 15:00 no weekend admission staff available  Homecare Open to [facility] PT/OT   Phone#[phone]  Fax#[phone]  Discharge RX: Patient preference    Primary Care Provider verified and updated as needed: Yes   Readmission within the last 30 days: current reason for admission unrelated to previous admission   Return Category: New Diagnosis (Asthma and heart failure)  Reason for Consult: discharge planning  Advance Care Planning:            Communication Assessment  Patient's communication style: spoken language (English or Bilingual)    Hearing Difficulty or Deaf: yes   Wear Glasses or Blind: yes    Cognitive  Cognitive/Neuro/Behavioral: WDL                      Living Environment:   People in home: alone     Current living Arrangements: assisted living  Name of Facility: [facility]   Able to return to prior arrangements: yes       Family/Social Support:  Care provided by: self, homecare agency, other (see comments) (Assist of 1 with cares, Sister brings to appointments, homecare RN/PT/OT)  Provides care for: no one, unable/limited ability to care for self  Marital Status: Single  Sibling(s), Facility resident(s)/Staff          Description of Support System: Supportive, Involved    Support Assessment: Adequate family and caregiver support, Adequate social supports, Other (see comments) ([facility] concerned john is not taking all meds such as nebs they are working with the patient to possibly add this service on.)    Current Resources:   Patient receiving home care services: Yes  Skilled Home Care Services: Skilled Nursing, Physical Therapy, Occupational Therapy  Community Resources: None  Equipment currently used at home: wheelchair, manual  Supplies currently  used at home: Nebulizer tubing    Employment/Financial:  Employment Status: retired        Financial Concerns: none   Referral to Financial Worker: No       Does the patient's insurance plan have a 3 day qualifying hospital stay waiver?  Yes     Which insurance plan 3 day waiver is available? Alternative insurance waiver    Will the waiver be used for post-acute placement? Undetermined at this time    Lifestyle & Psychosocial Needs:  Social Determinants of Health     Food Insecurity: No Food Insecurity (7/26/2023)    Hunger Vital Sign     Worried About Running Out of Food in the Last Year: Never true     Ran Out of Food in the Last Year: Never true   Depression: Not at risk (5/15/2024)    PHQ-2     PHQ-2 Score: 0   Housing Stability: Low Risk  (7/26/2023)    Housing Stability Vital Sign     Unable to Pay for Housing in the Last Year: No     Number of Places Lived in the Last Year: 1     Unstable Housing in the Last Year: No   Tobacco Use: Low Risk  (5/15/2024)    Patient History     Smoking Tobacco Use: Never     Smokeless Tobacco Use: Never     Passive Exposure: Not on file   Financial Resource Strain: Low Risk  (7/26/2023)    Overall Financial Resource Strain (CARDIA)     Difficulty of Paying Living Expenses: Not hard at all   Alcohol Use: Not At Risk (7/26/2023)    AUDIT-C     Frequency of Alcohol Consumption: Never     Average Number of Drinks: Patient does not drink     Frequency of Binge Drinking: Never   Transportation Needs: No Transportation Needs (7/26/2023)    PRAPARE - Transportation     Lack of Transportation (Medical): No     Lack of Transportation (Non-Medical): No   Physical Activity: Insufficiently Active (7/26/2023)    Exercise Vital Sign     Days of Exercise per Week: 3 days     Minutes of Exercise per Session: 20 min   Interpersonal Safety: Not on file   Stress: No Stress Concern Present (7/26/2023)    [employer] - Occupational Stress Questionnaire     Feeling of  Stress : Not at all   Social Connections: Moderately Integrated (7/26/2023)    Social Connection and Isolation Panel [NHANES]     Frequency of Communication with Friends and Family: Three times a week     Frequency of Social Gatherings with Friends and Family: More than three times a week     Attends Anabaptism Services: More than 4 times per year     Active Member of Clubs or Organizations: Yes     Attends Club or Organization Meetings: More than 4 times per year     Marital Status: Never    Health Literacy: Not on file       Functional Status:  Prior to admission patient needed assistance: Per patient and call back from Majo MERAZ at [facility] prior to this admission patient was requiring assist of one with all cares, she unfortunately was not using her walker for mobility due to weakness and was working with PT/OT.  Patient administers her own medications and nebulizer treatments. Sister and Brother in Law provide transportation to follow up's.               Mental Health Status:          Chemical Dependency Status:                Values/Beliefs:  Spiritual, Cultural Beliefs, Anabaptism Practices, Values that affect care:                 Additional Information:  Writer met with the patient at the bedside. Patient is A+Ox4. Went over role and scope of safe discharge planning. Prior to this admission, patient was receiving assist of one for all cares. She noted that she is working with PT/OT homecare with ambulating, but does not get enough assistance at [facility] to use her walker and is transferred into her wheelchair.  Patient noted that she is more accepting of a TCU if recommended during this stay as she declined it in her early May admission.  Patient is aware that our team will follow her for discharge planning. Patient is pending Physical Therapy consult if TCU if recommended patient noted she prefers the following  -[facility]  Writer briefly went over TCU and goal of TCU 7-10 days with  "goal of strengthening to return to baseline.  Patient is aware that once we have recommendations from therapy we would work on TCU placement. Patient is aware that we may need additional referrals based on bed availability etc.   Patient may require oxygen at discharge will need to be assessed for discharge and possible transportation needs.  Writer called [facility] and confirmed the services. Per Majo, they would require RN update if plan is for return to [facility], she also mentioned that they are concerned patient is not taking all of her medications or administering her neb treatments as prescribed and they will plan on continuing to discuss this as as \"add on service for the future.\"  Writer confirmed with [facility] that once we have a recommendation we will keep them updated on discharge.  Patient had no further questions at this time. Care Transitions will continue to follow for further discharge planning needs as identified.    Oneida Henriquez RN, BSN, ACM   Care Transitions Specialist  [facility]  Care Transitions Specialist  [id number] [id number]  [email or web address]  Office: [phone] Fax: [phone]  [facility]            "

## 2024-05-30 ENCOUNTER — APPOINTMENT (OUTPATIENT)
Dept: PHYSICAL THERAPY | Facility: CLINIC | Age: 89
DRG: 291 | End: 2024-05-30
Payer: COMMERCIAL

## 2024-05-30 LAB
ANION GAP SERPL CALCULATED.3IONS-SCNC: 14 MMOL/L (ref 7–15)
BUN SERPL-MCNC: 33.4 MG/DL (ref 8–23)
CALCIUM SERPL-MCNC: 9.4 MG/DL (ref 8.8–10.2)
CHLORIDE SERPL-SCNC: 98 MMOL/L (ref 98–107)
CREAT SERPL-MCNC: 0.76 MG/DL (ref 0.51–0.95)
DEPRECATED HCO3 PLAS-SCNC: 22 MMOL/L (ref 22–29)
EGFRCR SERPLBLD CKD-EPI 2021: 75 ML/MIN/1.73M2
ERYTHROCYTE [DISTWIDTH] IN BLOOD BY AUTOMATED COUNT: 16.1 % (ref 10–15)
GLUCOSE SERPL-MCNC: 230 MG/DL (ref 70–99)
HCT VFR BLD AUTO: 28.3 % (ref 35–47)
HGB BLD-MCNC: 9.3 G/DL (ref 11.7–15.7)
MCH RBC QN AUTO: 29.4 PG (ref 26.5–33)
MCHC RBC AUTO-ENTMCNC: 32.9 G/DL (ref 31.5–36.5)
MCV RBC AUTO: 90 FL (ref 78–100)
PLATELET # BLD AUTO: 267 10E3/UL (ref 150–450)
POTASSIUM SERPL-SCNC: 3.8 MMOL/L (ref 3.4–5.3)
RBC # BLD AUTO: 3.16 10E6/UL (ref 3.8–5.2)
SODIUM SERPL-SCNC: 134 MMOL/L (ref 135–145)
WBC # BLD AUTO: 12.2 10E3/UL (ref 4–11)

## 2024-05-30 PROCEDURE — 97530 THERAPEUTIC ACTIVITIES: CPT | Mod: GP

## 2024-05-30 PROCEDURE — 250N000011 HC RX IP 250 OP 636: Performed by: HOSPITALIST

## 2024-05-30 PROCEDURE — 250N000009 HC RX 250: Performed by: HOSPITALIST

## 2024-05-30 PROCEDURE — 250N000013 HC RX MED GY IP 250 OP 250 PS 637: Performed by: INTERNAL MEDICINE

## 2024-05-30 PROCEDURE — 999N000157 HC STATISTIC RCP TIME EA 10 MIN

## 2024-05-30 PROCEDURE — 250N000012 HC RX MED GY IP 250 OP 636 PS 637: Performed by: HOSPITALIST

## 2024-05-30 PROCEDURE — 36415 COLL VENOUS BLD VENIPUNCTURE: CPT | Performed by: INTERNAL MEDICINE

## 2024-05-30 PROCEDURE — 85048 AUTOMATED LEUKOCYTE COUNT: CPT | Performed by: INTERNAL MEDICINE

## 2024-05-30 PROCEDURE — 250N000009 HC RX 250: Performed by: INTERNAL MEDICINE

## 2024-05-30 PROCEDURE — 250N000013 HC RX MED GY IP 250 OP 250 PS 637: Performed by: HOSPITALIST

## 2024-05-30 PROCEDURE — 99232 SBSQ HOSP IP/OBS MODERATE 35: CPT | Performed by: INTERNAL MEDICINE

## 2024-05-30 PROCEDURE — 94640 AIRWAY INHALATION TREATMENT: CPT | Mod: 76

## 2024-05-30 PROCEDURE — 120N000001 HC R&B MED SURG/OB

## 2024-05-30 PROCEDURE — 97116 GAIT TRAINING THERAPY: CPT | Mod: GP

## 2024-05-30 PROCEDURE — 94640 AIRWAY INHALATION TREATMENT: CPT

## 2024-05-30 PROCEDURE — 80048 BASIC METABOLIC PNL TOTAL CA: CPT | Performed by: INTERNAL MEDICINE

## 2024-05-30 RX ORDER — FUROSEMIDE 40 MG
40 TABLET ORAL DAILY
Status: DISCONTINUED | OUTPATIENT
Start: 2024-05-30 | End: 2024-05-31

## 2024-05-30 RX ORDER — POTASSIUM CHLORIDE 750 MG/1
10 TABLET, EXTENDED RELEASE ORAL DAILY
Status: DISCONTINUED | OUTPATIENT
Start: 2024-05-30 | End: 2024-06-01 | Stop reason: HOSPADM

## 2024-05-30 RX ORDER — POTASSIUM CHLORIDE 1500 MG/1
20 TABLET, EXTENDED RELEASE ORAL DAILY
Status: DISCONTINUED | OUTPATIENT
Start: 2024-05-30 | End: 2024-05-30

## 2024-05-30 RX ADMIN — BUDESONIDE 0.5 MG: 0.5 INHALANT RESPIRATORY (INHALATION) at 07:17

## 2024-05-30 RX ADMIN — ATORVASTATIN CALCIUM 20 MG: 20 TABLET, FILM COATED ORAL at 08:34

## 2024-05-30 RX ADMIN — AMLODIPINE BESYLATE 5 MG: 5 TABLET ORAL at 08:33

## 2024-05-30 RX ADMIN — SERTRALINE HYDROCHLORIDE 25 MG: 25 TABLET ORAL at 08:33

## 2024-05-30 RX ADMIN — TOLTERODINE TARTRATE 1 MG: 1 TABLET ORAL at 21:07

## 2024-05-30 RX ADMIN — IPRATROPIUM BROMIDE AND ALBUTEROL SULFATE 3 ML: .5; 3 SOLUTION RESPIRATORY (INHALATION) at 15:32

## 2024-05-30 RX ADMIN — IPRATROPIUM BROMIDE AND ALBUTEROL SULFATE 3 ML: .5; 3 SOLUTION RESPIRATORY (INHALATION) at 18:58

## 2024-05-30 RX ADMIN — BUDESONIDE 0.5 MG: 0.5 INHALANT RESPIRATORY (INHALATION) at 18:58

## 2024-05-30 RX ADMIN — ACETAMINOPHEN 650 MG: 325 TABLET, FILM COATED ORAL at 06:02

## 2024-05-30 RX ADMIN — FUROSEMIDE 40 MG: 40 TABLET ORAL at 13:35

## 2024-05-30 RX ADMIN — CARBIDOPA AND LEVODOPA 2 TABLET: 25; 100 TABLET ORAL at 16:53

## 2024-05-30 RX ADMIN — ASPIRIN 81 MG: 81 TABLET, COATED ORAL at 08:33

## 2024-05-30 RX ADMIN — ACETAMINOPHEN 650 MG: 325 TABLET, FILM COATED ORAL at 21:10

## 2024-05-30 RX ADMIN — CARBIDOPA AND LEVODOPA 2 TABLET: 25; 100 TABLET ORAL at 06:02

## 2024-05-30 RX ADMIN — CARBIDOPA AND LEVODOPA 2 TABLET: 25; 100 TABLET ORAL at 21:06

## 2024-05-30 RX ADMIN — CARVEDILOL 12.5 MG: 12.5 TABLET, FILM COATED ORAL at 17:52

## 2024-05-30 RX ADMIN — PANTOPRAZOLE SODIUM 40 MG: 40 TABLET, DELAYED RELEASE ORAL at 06:02

## 2024-05-30 RX ADMIN — ALBUTEROL SULFATE 2.5 MG: 2.5 SOLUTION RESPIRATORY (INHALATION) at 06:02

## 2024-05-30 RX ADMIN — RALOXIFENE HYDROCHLORIDE 60 MG: 60 TABLET, FILM COATED ORAL at 08:34

## 2024-05-30 RX ADMIN — ENOXAPARIN SODIUM 40 MG: 40 INJECTION SUBCUTANEOUS at 17:51

## 2024-05-30 RX ADMIN — TOLTERODINE TARTRATE 1 MG: 1 TABLET ORAL at 08:34

## 2024-05-30 RX ADMIN — GUAIFENESIN 200 MG: 200 SOLUTION ORAL at 21:10

## 2024-05-30 RX ADMIN — GUAIFENESIN 200 MG: 200 SOLUTION ORAL at 05:02

## 2024-05-30 RX ADMIN — ACETAMINOPHEN 650 MG: 325 TABLET, FILM COATED ORAL at 11:40

## 2024-05-30 RX ADMIN — IPRATROPIUM BROMIDE AND ALBUTEROL SULFATE 3 ML: .5; 3 SOLUTION RESPIRATORY (INHALATION) at 11:12

## 2024-05-30 RX ADMIN — PREDNISONE 40 MG: 20 TABLET ORAL at 08:33

## 2024-05-30 RX ADMIN — Medication 1 CAPSULE: at 21:06

## 2024-05-30 RX ADMIN — CARBIDOPA AND LEVODOPA 2 TABLET: 25; 100 TABLET ORAL at 11:03

## 2024-05-30 RX ADMIN — CARVEDILOL 12.5 MG: 12.5 TABLET, FILM COATED ORAL at 08:33

## 2024-05-30 RX ADMIN — DOCUSATE SODIUM 100 MG: 100 CAPSULE, LIQUID FILLED ORAL at 08:33

## 2024-05-30 RX ADMIN — DOCUSATE SODIUM 100 MG: 100 CAPSULE, LIQUID FILLED ORAL at 21:06

## 2024-05-30 RX ADMIN — IPRATROPIUM BROMIDE AND ALBUTEROL SULFATE 3 ML: .5; 3 SOLUTION RESPIRATORY (INHALATION) at 07:17

## 2024-05-30 RX ADMIN — LOSARTAN POTASSIUM 100 MG: 100 TABLET, FILM COATED ORAL at 21:06

## 2024-05-30 RX ADMIN — POTASSIUM CHLORIDE 10 MEQ: 750 TABLET, EXTENDED RELEASE ORAL at 13:35

## 2024-05-30 ASSESSMENT — ACTIVITIES OF DAILY LIVING (ADL)
ADLS_ACUITY_SCORE: 63
ADLS_ACUITY_SCORE: 64
ADLS_ACUITY_SCORE: 64
ADLS_ACUITY_SCORE: 63
ADLS_ACUITY_SCORE: 63
ADLS_ACUITY_SCORE: 64
ADLS_ACUITY_SCORE: 63
ADLS_ACUITY_SCORE: 62
ADLS_ACUITY_SCORE: 63
ADLS_ACUITY_SCORE: 64
ADLS_ACUITY_SCORE: 64
ADLS_ACUITY_SCORE: 63
ADLS_ACUITY_SCORE: 64
ADLS_ACUITY_SCORE: 64
ADLS_ACUITY_SCORE: 62
ADLS_ACUITY_SCORE: 63

## 2024-05-30 NOTE — PROGRESS NOTES
[facility]    Medicine Progress Note - Hospitalist Service    Date of Admission:  5/28/2024    Assessment & Plan   Opal Sin is an extremely pleasant 89 year old female with history of asthma, breast cancer, HTN, HL, CKD III, Parkinson's, GERD, osteoporosis, and degenerative disc disease among others who presented to the ED from assisted living facility with shortness of breath and productive cough for the past week. It has worsened in the past 3 days particularly and staff found her to be with O2 sat in the 80s. She is not oxygen dependent. She was recently hospitalized 5/1 - 5/7 with diastolic HF exacerbation and LLL CAP for which she was treated with azithromycin, diuresis and nebs - PCP follow up on 5/15 was completed and they were planning on repeat CXR in a couple weeks. She denies chest pain, fevers, chills, abdominal pain, nausea, vomiting, diarrhea, or change in urinary habits. She is being treated for asthma and diastolic heart failure exacerbations.        Mild intermittent asthma exacerbation and/or acute diastolic heart failure exacerbation  Acute hypoxic respiratory failure 2/2 above  Recent L basilar pneumonia, treated  Small bilateral pleural effusions  About a week of cough and worsening SOB, found to be hypoxic in 80s at living facility. She is not O2 dependent. Recent adm 5/1 - 5/7 for acute DHF and LLL pneumonia and she was treated with azithromycin. WBC 12k, procal 0.08, LA 0.8, viral panel NEG for RSV, flu, COVID. BNP 3k. Trop 38 --> 37. EKG without overt acute ischemic changes. Of  note, recent echo Echo 5/2/2024 showed LVEF of 50-55% (was 60-65% in 2017), G2 diastolic dysfunction and findings of mild PHTN; patient was diuresed in hospital but not discharged on diuretic.  *s/p IVF, solumedrol 125, magnesium, and nebs. Lasix 40 mg IV was given as well.   *CXR fairly unremarkable - L base infiltrate improved  *CT PE - no PE, mod pulmonary edema w/ small  pleural effusions, mildly enlarged PA c/w pulm HTN  *has received Lasix 40mg IV x 2 doses on admission, good diuresis. Received additional 40mg IV lasix on 5/29.   *recheck procal unchanged at 0.09  - continue scheduled and prn nebs  - resume PTA budesonide  - IV solumedrol to PO prednisone 40mg daily x 3 days starting 5/30/2024   - start PO Lasix 40mg daily + 10mEq KCl on 5/30/2024   - incentive spirometry  - I/O, daily weights  - will hold off from abx given no fever, no new infiltrate on imaging, and procalc 0.09 and unchanged on recheck  - wean oxygen as able--weaned to RA at rest on 5/30, monitor and also need to check with activity prior to discharge  - increase upto chair activity as tolerated    Indeterminate 1.6 cm lesion in the posterior left kidney  *likely a cyst containing hemorrhagic or proteinaceous material.  - Nonemergent follow-up renal ultrasound can be considered. There are  other left renal cysts.     Hypertension  Hyperlipidemia  BP elevated in currently.  Has been following with PCP.  - PTA amlodipine, carvedilol, losartan and atorvastatin noted - continue  - Lasix added on 5/30, if bp still elevated, could consider increasing PTA amlodipine     CKD II/III  Creat WNL at 0.8 on adm. BUN 26 of note. Prior BUN 60-> 58-> 31 and creat 1.04 ->0.9 over that stretch earlier this month.  - cr stable at 0.76 after IV lasix  - monitor BMP     GERD  Stable.  - PTA      Chronic normocytic anemia  Recent hgb levels 10-11. Hgb 9.9 on adm. No report of bleeding concerns.  - hgb 9.3 on 5/30/2024  -  monitor     Parkinson's disease  - continue PTA sinemet     Hx of breast cancer  Noted. Continue with outpatient follow up as previously planned.  - PTA raloxifene to continue           Diet: Fluid restriction 1800 ML FLUID  Combination Diet 2 gm NA Diet  Room Service    DVT Prophylaxis: Enoxaparin (Lovenox) SQ  Santana Catheter: Not present  Lines: None     Cardiac Monitoring: ACTIVE order. Indication: Acute  decompensated heart failure (48 hours)  Code Status: No CPR- Do NOT Intubate      Clinically Significant Risk Factors                  # Hypertension: Noted on problem list            # Financial/Environmental Concerns: none  # Asthma: noted on problem list        Disposition Plan     Medically Ready for Discharge: Anticipated in 2-4 Days pending continued improvement in respiratory status             Keegan Tong MD  Hospitalist Service  [facility]  Securely message with [email or web address] (more info)  Text page via [employer] Paging/Directory   ______________________________________________________________________    Interval History   Patient states her breathing and cough is getting better.   She is afebrile.   On RA at rest this morning.     Physical Exam   Vital Signs: Temp: 98.1  F (36.7  C) Temp src: Oral BP: (!) 175/82 Pulse: 69   Resp: 21 SpO2: 95 % O2 Device: None (Room air) Oxygen Delivery: 1 LPM  Weight: 124 lbs 12.8 oz    General Appearance: Alert, awake and no apparent distress  Respiratory:  expiratory wheezing bilaterally  Cardiovascular: regula rate and rhythm  GI: soft and non-tender  Skin: warm and dry      Medical Decision Making       40 MINUTES SPENT BY ME on the date of service doing chart review, history, exam, documentation & further activities per the note.  MANAGEMENT DISCUSSED with the following over the past 24 hours: patient, RN   NOTE(S)/MEDICAL RECORDS REVIEWED over the past 24 hours: nursing notes, therapy note, care management note  Tests ORDERED & REVIEWED in the past 24 hours:  - BMP  - CBC  Tests personally interpreted in the past 24 hours:          Data     I have personally reviewed the following data over the past 24 hrs:    12.2 (H)  \   9.3 (L)   / 267     134 (L) 98 33.4 (H) /  230 (H)   3.8 22 0.76 \       Imaging results reviewed over the past 24 hrs:   No results found for this or any previous visit (from the past 24 hour(s)).

## 2024-05-30 NOTE — PROVIDER NOTIFICATION
MD Notification    Notified Person: MD    Notified Person Name: Dr. Togn    Notification Date/Time: 5/31/24 4593    Notification Interaction:vocera    Purpose of Notification: Hey, pt and family are wondering if you can reorder her PTA CoQ10 vitamin for her ?     Orders Received:    Comments:

## 2024-05-30 NOTE — PROGRESS NOTES
Care Management Follow Up    Length of Stay (days): 2    Expected Discharge Date: 05/31/2024     Concerns to be Addressed: discharge planning     Patient plan of care discussed at interdisciplinary rounds: Yes    Anticipated Discharge Disposition: Home Care, Assisted Living, Transitional Care     Anticipated Discharge Services: Transportation Services  Anticipated Discharge DME: Oxygen    Patient/family educated on Medicare website which has current facility and service quality ratings:    Education Provided on the Discharge Plan: Yes  Patient/Family in Agreement with the Plan: yes    Referrals Placed by CM/SW: External Care Coordination, Communication hand-offs to next level of Care Providers (updated DON at [facility])  Private pay costs discussed: Not applicable    Additional Information:  Writer sent TCU referrals to [facility] and [facility].     LESLY Lee

## 2024-05-30 NOTE — PROGRESS NOTES
Respiratory care note - SpO2 95% on R/A. Nebulizers given per orders. Breath sounds are diminished with expiratory wheezes on occasion.

## 2024-05-31 ENCOUNTER — APPOINTMENT (OUTPATIENT)
Dept: PHYSICAL THERAPY | Facility: CLINIC | Age: 89
DRG: 291 | End: 2024-05-31
Payer: COMMERCIAL

## 2024-05-31 LAB
ANION GAP SERPL CALCULATED.3IONS-SCNC: 12 MMOL/L (ref 7–15)
BUN SERPL-MCNC: 34.8 MG/DL (ref 8–23)
CALCIUM SERPL-MCNC: 9.5 MG/DL (ref 8.8–10.2)
CHLORIDE SERPL-SCNC: 98 MMOL/L (ref 98–107)
CREAT SERPL-MCNC: 0.81 MG/DL (ref 0.51–0.95)
DEPRECATED HCO3 PLAS-SCNC: 25 MMOL/L (ref 22–29)
EGFRCR SERPLBLD CKD-EPI 2021: 70 ML/MIN/1.73M2
ERYTHROCYTE [DISTWIDTH] IN BLOOD BY AUTOMATED COUNT: 15.9 % (ref 10–15)
GLUCOSE SERPL-MCNC: 141 MG/DL (ref 70–99)
HCT VFR BLD AUTO: 30.8 % (ref 35–47)
HGB BLD-MCNC: 10.1 G/DL (ref 11.7–15.7)
MCH RBC QN AUTO: 29.3 PG (ref 26.5–33)
MCHC RBC AUTO-ENTMCNC: 32.8 G/DL (ref 31.5–36.5)
MCV RBC AUTO: 89 FL (ref 78–100)
PLATELET # BLD AUTO: 303 10E3/UL (ref 150–450)
POTASSIUM SERPL-SCNC: 3.8 MMOL/L (ref 3.4–5.3)
RBC # BLD AUTO: 3.45 10E6/UL (ref 3.8–5.2)
SODIUM SERPL-SCNC: 135 MMOL/L (ref 135–145)
WBC # BLD AUTO: 11.6 10E3/UL (ref 4–11)

## 2024-05-31 PROCEDURE — 250N000013 HC RX MED GY IP 250 OP 250 PS 637: Performed by: HOSPITALIST

## 2024-05-31 PROCEDURE — 97530 THERAPEUTIC ACTIVITIES: CPT | Mod: GP

## 2024-05-31 PROCEDURE — 120N000001 HC R&B MED SURG/OB

## 2024-05-31 PROCEDURE — 85027 COMPLETE CBC AUTOMATED: CPT | Performed by: INTERNAL MEDICINE

## 2024-05-31 PROCEDURE — 250N000013 HC RX MED GY IP 250 OP 250 PS 637: Performed by: INTERNAL MEDICINE

## 2024-05-31 PROCEDURE — 99232 SBSQ HOSP IP/OBS MODERATE 35: CPT | Performed by: INTERNAL MEDICINE

## 2024-05-31 PROCEDURE — 94640 AIRWAY INHALATION TREATMENT: CPT | Mod: 76

## 2024-05-31 PROCEDURE — 36415 COLL VENOUS BLD VENIPUNCTURE: CPT | Performed by: INTERNAL MEDICINE

## 2024-05-31 PROCEDURE — 250N000009 HC RX 250: Performed by: HOSPITALIST

## 2024-05-31 PROCEDURE — 250N000011 HC RX IP 250 OP 636: Performed by: HOSPITALIST

## 2024-05-31 PROCEDURE — 97116 GAIT TRAINING THERAPY: CPT | Mod: GP

## 2024-05-31 PROCEDURE — 250N000012 HC RX MED GY IP 250 OP 636 PS 637: Performed by: HOSPITALIST

## 2024-05-31 PROCEDURE — 97110 THERAPEUTIC EXERCISES: CPT | Mod: GP

## 2024-05-31 PROCEDURE — 250N000009 HC RX 250: Performed by: INTERNAL MEDICINE

## 2024-05-31 PROCEDURE — 999N000157 HC STATISTIC RCP TIME EA 10 MIN

## 2024-05-31 PROCEDURE — 94640 AIRWAY INHALATION TREATMENT: CPT

## 2024-05-31 PROCEDURE — 80048 BASIC METABOLIC PNL TOTAL CA: CPT | Performed by: INTERNAL MEDICINE

## 2024-05-31 RX ORDER — AMLODIPINE BESYLATE 5 MG/1
5 TABLET ORAL ONCE
Status: COMPLETED | OUTPATIENT
Start: 2024-05-31 | End: 2024-05-31

## 2024-05-31 RX ORDER — UBIDECARENONE 100 MG
100 CAPSULE ORAL DAILY
Status: DISCONTINUED | OUTPATIENT
Start: 2024-05-31 | End: 2024-06-01 | Stop reason: HOSPADM

## 2024-05-31 RX ORDER — FUROSEMIDE 20 MG
20 TABLET ORAL DAILY
Status: DISCONTINUED | OUTPATIENT
Start: 2024-06-01 | End: 2024-06-01 | Stop reason: HOSPADM

## 2024-05-31 RX ORDER — AMLODIPINE BESYLATE 10 MG/1
10 TABLET ORAL DAILY
Status: DISCONTINUED | OUTPATIENT
Start: 2024-06-01 | End: 2024-06-01 | Stop reason: HOSPADM

## 2024-05-31 RX ADMIN — Medication 100 MG: at 13:25

## 2024-05-31 RX ADMIN — CARVEDILOL 12.5 MG: 12.5 TABLET, FILM COATED ORAL at 18:50

## 2024-05-31 RX ADMIN — POTASSIUM CHLORIDE 10 MEQ: 750 TABLET, EXTENDED RELEASE ORAL at 08:35

## 2024-05-31 RX ADMIN — FUROSEMIDE 40 MG: 40 TABLET ORAL at 08:35

## 2024-05-31 RX ADMIN — Medication 1 CAPSULE: at 08:36

## 2024-05-31 RX ADMIN — ENOXAPARIN SODIUM 40 MG: 40 INJECTION SUBCUTANEOUS at 18:50

## 2024-05-31 RX ADMIN — ACETAMINOPHEN 650 MG: 325 TABLET, FILM COATED ORAL at 16:23

## 2024-05-31 RX ADMIN — PREDNISONE 40 MG: 20 TABLET ORAL at 08:35

## 2024-05-31 RX ADMIN — LOSARTAN POTASSIUM 100 MG: 100 TABLET, FILM COATED ORAL at 20:52

## 2024-05-31 RX ADMIN — AMLODIPINE BESYLATE 5 MG: 5 TABLET ORAL at 08:35

## 2024-05-31 RX ADMIN — CARBIDOPA AND LEVODOPA 2 TABLET: 25; 100 TABLET ORAL at 11:09

## 2024-05-31 RX ADMIN — BUDESONIDE 0.5 MG: 0.5 INHALANT RESPIRATORY (INHALATION) at 07:11

## 2024-05-31 RX ADMIN — PANTOPRAZOLE SODIUM 40 MG: 40 TABLET, DELAYED RELEASE ORAL at 06:39

## 2024-05-31 RX ADMIN — BUDESONIDE 0.5 MG: 0.5 INHALANT RESPIRATORY (INHALATION) at 19:14

## 2024-05-31 RX ADMIN — Medication 1 CAPSULE: at 20:52

## 2024-05-31 RX ADMIN — CARBIDOPA AND LEVODOPA 2 TABLET: 25; 100 TABLET ORAL at 06:34

## 2024-05-31 RX ADMIN — TOLTERODINE TARTRATE 1 MG: 1 TABLET ORAL at 08:35

## 2024-05-31 RX ADMIN — CARVEDILOL 12.5 MG: 12.5 TABLET, FILM COATED ORAL at 08:35

## 2024-05-31 RX ADMIN — ACETAMINOPHEN 650 MG: 325 TABLET, FILM COATED ORAL at 20:52

## 2024-05-31 RX ADMIN — ACETAMINOPHEN 650 MG: 325 TABLET, FILM COATED ORAL at 06:34

## 2024-05-31 RX ADMIN — IPRATROPIUM BROMIDE AND ALBUTEROL SULFATE 3 ML: .5; 3 SOLUTION RESPIRATORY (INHALATION) at 19:14

## 2024-05-31 RX ADMIN — IPRATROPIUM BROMIDE AND ALBUTEROL SULFATE 3 ML: .5; 3 SOLUTION RESPIRATORY (INHALATION) at 12:14

## 2024-05-31 RX ADMIN — IPRATROPIUM BROMIDE AND ALBUTEROL SULFATE 3 ML: .5; 3 SOLUTION RESPIRATORY (INHALATION) at 15:55

## 2024-05-31 RX ADMIN — ASPIRIN 81 MG: 81 TABLET, COATED ORAL at 08:35

## 2024-05-31 RX ADMIN — CARBIDOPA AND LEVODOPA 2 TABLET: 25; 100 TABLET ORAL at 16:23

## 2024-05-31 RX ADMIN — AMLODIPINE BESYLATE 5 MG: 5 TABLET ORAL at 13:25

## 2024-05-31 RX ADMIN — ATORVASTATIN CALCIUM 20 MG: 20 TABLET, FILM COATED ORAL at 08:35

## 2024-05-31 RX ADMIN — SERTRALINE HYDROCHLORIDE 25 MG: 25 TABLET ORAL at 08:35

## 2024-05-31 RX ADMIN — TOLTERODINE TARTRATE 1 MG: 1 TABLET ORAL at 20:52

## 2024-05-31 RX ADMIN — CARBIDOPA AND LEVODOPA 2 TABLET: 25; 100 TABLET ORAL at 20:52

## 2024-05-31 RX ADMIN — RALOXIFENE HYDROCHLORIDE 60 MG: 60 TABLET, FILM COATED ORAL at 08:36

## 2024-05-31 RX ADMIN — DOCUSATE SODIUM 100 MG: 100 CAPSULE, LIQUID FILLED ORAL at 20:52

## 2024-05-31 ASSESSMENT — ACTIVITIES OF DAILY LIVING (ADL)
ADLS_ACUITY_SCORE: 62
ADLS_ACUITY_SCORE: 63
ADLS_ACUITY_SCORE: 64
ADLS_ACUITY_SCORE: 62
ADLS_ACUITY_SCORE: 62
ADLS_ACUITY_SCORE: 65
ADLS_ACUITY_SCORE: 65
ADLS_ACUITY_SCORE: 62
ADLS_ACUITY_SCORE: 65
ADLS_ACUITY_SCORE: 65
ADLS_ACUITY_SCORE: 63
ADLS_ACUITY_SCORE: 63
ADLS_ACUITY_SCORE: 64
ADLS_ACUITY_SCORE: 64
ADLS_ACUITY_SCORE: 62
ADLS_ACUITY_SCORE: 64
ADLS_ACUITY_SCORE: 64
ADLS_ACUITY_SCORE: 62
ADLS_ACUITY_SCORE: 62
ADLS_ACUITY_SCORE: 64
ADLS_ACUITY_SCORE: 62

## 2024-05-31 NOTE — PROGRESS NOTES
Orientation: A&Ox4, forgetful  Activity: 1A GBW  Diet/BS Checks: low sodium, 1800 ml FR  Tele:  NSR  IV Access/Drains: right piv, SL  Pain Management: reports pain to right shoulder, given prn tylenol x1  Abnormal VS/Results: HTN otherwise VSS on RA  Bowel/Bladder: incontinent, purewick in use while in bed. No BM  Skin/Wounds: dry skin with scattered bruises. Mepilex in place to sacrum  Consults: SW, PT  D/C Disposition: tomorrow at 1300, family to transport to [facility]   Other Info: worked with PT. Needs encouragement to ambulate

## 2024-05-31 NOTE — PROGRESS NOTES
Care Management Follow Up    Length of Stay (days): 3    Expected Discharge Date: 06/01/2024     Concerns to be Addressed: discharge planning     Patient plan of care discussed at interdisciplinary rounds: Yes    Anticipated Discharge Disposition: Home Care, Assisted Living, Transitional Care     Anticipated Discharge Services: Transportation Services  Anticipated Discharge DME: Oxygen    Patient/family educated on Medicare website which has current facility and service quality ratings:    Education Provided on the Discharge Plan: Yes  Patient/Family in Agreement with the Plan: yes    Referrals Placed by CM/SW: External Care Coordination, Communication hand-offs to next level of Care Providers (updated DON at [facility])  Private pay costs discussed: Not applicable    Additional Information:  Patient was accepted at [facility] for tomorrow. Patients family would like to transport at 1300. Patient will update family. MD aware. Writer updated Brit at [facility]     PAS-RR    D: Per DHS regulation, SW completed and submitted PAS-RR to [place] [employer] on [employer]ging Direct Connect via the Senior LinkAge Line.  PAS-RR confirmation # is : [id number]    P: Further questions may be directed to Senior LinkAge Line at #[phone], option #4 for PAS-RR staff.      LESLY Lee

## 2024-05-31 NOTE — PROGRESS NOTES
[facility]    Medicine Progress Note - Hospitalist Service    Date of Admission:  5/28/2024    Assessment & Plan   Opal Sin is an extremely pleasant 89 year old female with history of asthma, breast cancer, HTN, HL, CKD III, Parkinson's, GERD, osteoporosis, and degenerative disc disease among others who presented to the ED from assisted living facility with shortness of breath and productive cough for the past week. It has worsened in the past 3 days particularly and staff found her to be with O2 sat in the 80s. She is not oxygen dependent. She was recently hospitalized 5/1 - 5/7 with diastolic HF exacerbation and LLL CAP for which she was treated with azithromycin, diuresis and nebs - PCP follow up on 5/15 was completed and they were planning on repeat CXR in a couple weeks. She denies chest pain, fevers, chills, abdominal pain, nausea, vomiting, diarrhea, or change in urinary habits. She is being treated for asthma and diastolic heart failure exacerbations.        Mild intermittent asthma exacerbation and/or acute diastolic heart failure exacerbation  Acute hypoxic respiratory failure 2/2 above  Recent L basilar pneumonia, treated  Small bilateral pleural effusions  About a week of cough and worsening SOB, found to be hypoxic in 80s at living facility. She is not O2 dependent. Recent adm 5/1 - 5/7 for acute DHF and LLL pneumonia and she was treated with azithromycin. WBC 12k, procal 0.08, LA 0.8, viral panel NEG for RSV, flu, COVID. BNP 3k. Trop 38 --> 37. EKG without overt acute ischemic changes. Of  note, recent echo Echo 5/2/2024 showed LVEF of 50-55% (was 60-65% in 2017), G2 diastolic dysfunction and findings of mild PHTN; patient was diuresed in hospital but not discharged on diuretic.  *s/p IVF, solumedrol 125, magnesium, and nebs. Lasix 40 mg IV was given as well.   *CXR fairly unremarkable - L base infiltrate improved  *CT PE - no PE, mod pulmonary edema w/ small  pleural effusions, mildly enlarged PA c/w pulm HTN  *has received Lasix 40mg IV x 2 doses on admission, good diuresis. Received additional 40mg IV lasix on 5/29.   *recheck procal unchanged at 0.09  - continue scheduled and prn nebs  - resume PTA budesonide  - IV solumedrol to PO prednisone 40mg daily starting 5/30/2024, may need to do taper if ongoing wheezing  - started PO Lasix 40mg daily + 10mEq KCl on 5/30/2024, reduce Lasix dose to 20mg starting on 6/1 given increasing bilirubin  - incentive spirometry  - I/O, daily weights  - will hold off from abx given no fever, no new infiltrate on imaging, and procalc 0.09 and unchanged on recheck  - wean oxygen as able--weaned to RA at rest on 5/30, monitor and also need to check with activity prior to discharge  - increase upto chair activity as tolerated    Indeterminate 1.6 cm lesion in the posterior left kidney  *likely a cyst containing hemorrhagic or proteinaceous material.  - Nonemergent follow-up renal ultrasound can be considered. There are  other left renal cysts.     Hypertension  Hyperlipidemia  BP continues to be elevated   - Increase PTA amlodipine from 5mg to 10mg daily on 5/31/2024  - continue with PTA carvedilol, losartan and atorvastatin  - continue with lasix as above.      CKD II/III  Creat WNL at 0.8 on adm. BUN 26 of note. Prior BUN 60-> 58-> 31 and creat 1.04 ->0.9 over that stretch earlier this month.  - cr stable, 0.81  - monitor BMP     GERD  Stable.  - PTA PPI     Chronic normocytic anemia  Recent hgb levels 10-11. Hgb 9.9 on adm. No report of bleeding concerns.  - hgb 9.3 on 5/30/2024, 10.1 on 5/31/2024  -  monitor     Parkinson's disease  - continue PTA sinemet     Hx of breast cancer  Noted. Continue with outpatient follow up as previously planned.  - PTA raloxifene to continue           Diet: Fluid restriction 1800 ML FLUID  Combination Diet 2 gm NA Diet  Room Service    DVT Prophylaxis: Enoxaparin (Lovenox) SQ  [place] Catheter: Not  present  Lines: None     Cardiac Monitoring: ACTIVE order. Indication: Acute decompensated heart failure (48 hours)  Code Status: No CPR- Do NOT Intubate      Clinically Significant Risk Factors                  # Hypertension: Noted on problem list            # Financial/Environmental Concerns: none  # Asthma: noted on problem list        Disposition Plan     Medically Ready for Discharge: Anticipated Tomorrow if  continued improvement in respiratory status             Keegan Tong MD  Hospitalist Service  [facility]  Securely message with [employer] (more info)  Text page via [employer] Paging/Directory   ______________________________________________________________________    Interval History   Patient is weaned off oxygen today. She is feeling improved and feels not quite there yet.   She denies nausea or vomiting. She is afebrile.     Physical Exam   Vital Signs: Temp: 97.8  F (36.6  C) Temp src: Oral BP: (!) 178/78 Pulse: 64   Resp: 18 SpO2: 91 % O2 Device: None (Room air)    Weight: 122 lbs 2.16 oz    General Appearance: Alert, awake and no apparent distress  Respiratory:  expiratory wheezing bilaterally  Cardiovascular: regula rate and rhythm  GI: soft and non-tender  Skin: warm and dry      Medical Decision Making       38 MINUTES SPENT BY ME on the date of service doing chart review, history, exam, documentation & further activities per the note.  MANAGEMENT DISCUSSED with the following over the past 24 hours: patient, RN   NOTE(S)/MEDICAL RECORDS REVIEWED over the past 24 hours: nursing notes, therapy note, care management note  Tests ORDERED & REVIEWED in the past 24 hours:  - BMP  - CBC  Tests personally interpreted in the past 24 hours:          Data     I have personally reviewed the following data over the past 24 hrs:    11.6 (H)  \   10.1 (L)   / 303     135 98 34.8 (H) /  141 (H)   3.8 25 0.81 \       Imaging results reviewed over the past 24 hrs:   No results found for  this or any previous visit (from the past 24 hour(s)).

## 2024-06-01 VITALS
RESPIRATION RATE: 16 BRPM | BODY MASS INDEX: 21.94 KG/M2 | OXYGEN SATURATION: 92 % | HEART RATE: 67 BPM | SYSTOLIC BLOOD PRESSURE: 148 MMHG | WEIGHT: 116.18 LBS | HEIGHT: 61 IN | DIASTOLIC BLOOD PRESSURE: 69 MMHG | TEMPERATURE: 97.8 F

## 2024-06-01 PROCEDURE — 250N000013 HC RX MED GY IP 250 OP 250 PS 637: Performed by: HOSPITALIST

## 2024-06-01 PROCEDURE — 250N000009 HC RX 250: Performed by: INTERNAL MEDICINE

## 2024-06-01 PROCEDURE — 250N000013 HC RX MED GY IP 250 OP 250 PS 637: Performed by: INTERNAL MEDICINE

## 2024-06-01 PROCEDURE — 999N000157 HC STATISTIC RCP TIME EA 10 MIN

## 2024-06-01 PROCEDURE — 99239 HOSP IP/OBS DSCHRG MGMT >30: CPT | Performed by: INTERNAL MEDICINE

## 2024-06-01 PROCEDURE — 250N000009 HC RX 250: Performed by: HOSPITALIST

## 2024-06-01 PROCEDURE — 94640 AIRWAY INHALATION TREATMENT: CPT

## 2024-06-01 PROCEDURE — 250N000012 HC RX MED GY IP 250 OP 636 PS 637: Performed by: HOSPITALIST

## 2024-06-01 RX ORDER — FUROSEMIDE 20 MG
20 TABLET ORAL DAILY
DISCHARGE
Start: 2024-06-01 | End: 2024-06-27

## 2024-06-01 RX ORDER — AMLODIPINE BESYLATE 10 MG/1
10 TABLET ORAL DAILY
DISCHARGE
Start: 2024-06-01 | End: 2024-07-12

## 2024-06-01 RX ORDER — IPRATROPIUM BROMIDE AND ALBUTEROL SULFATE 2.5; .5 MG/3ML; MG/3ML
3 SOLUTION RESPIRATORY (INHALATION)
Status: DISCONTINUED | OUTPATIENT
Start: 2024-06-01 | End: 2024-06-01 | Stop reason: HOSPADM

## 2024-06-01 RX ORDER — GUAIFENESIN 200 MG/10ML
200 LIQUID ORAL EVERY 4 HOURS PRN
DISCHARGE
Start: 2024-06-01 | End: 2024-06-17

## 2024-06-01 RX ORDER — PREDNISONE 10 MG/1
TABLET ORAL
DISCHARGE
Start: 2024-06-02 | End: 2024-06-06

## 2024-06-01 RX ADMIN — CARBIDOPA AND LEVODOPA 2 TABLET: 25; 100 TABLET ORAL at 11:35

## 2024-06-01 RX ADMIN — PREDNISONE 40 MG: 20 TABLET ORAL at 09:07

## 2024-06-01 RX ADMIN — ACETAMINOPHEN 650 MG: 325 TABLET, FILM COATED ORAL at 11:35

## 2024-06-01 RX ADMIN — POTASSIUM CHLORIDE 10 MEQ: 750 TABLET, EXTENDED RELEASE ORAL at 09:07

## 2024-06-01 RX ADMIN — RALOXIFENE HYDROCHLORIDE 60 MG: 60 TABLET, FILM COATED ORAL at 09:07

## 2024-06-01 RX ADMIN — AMLODIPINE BESYLATE 10 MG: 10 TABLET ORAL at 09:07

## 2024-06-01 RX ADMIN — FUROSEMIDE 20 MG: 20 TABLET ORAL at 09:07

## 2024-06-01 RX ADMIN — ASPIRIN 81 MG: 81 TABLET, COATED ORAL at 09:08

## 2024-06-01 RX ADMIN — BUDESONIDE 0.5 MG: 0.5 INHALANT RESPIRATORY (INHALATION) at 06:45

## 2024-06-01 RX ADMIN — DOCUSATE SODIUM 100 MG: 100 CAPSULE, LIQUID FILLED ORAL at 09:08

## 2024-06-01 RX ADMIN — CARVEDILOL 12.5 MG: 12.5 TABLET, FILM COATED ORAL at 09:08

## 2024-06-01 RX ADMIN — PANTOPRAZOLE SODIUM 40 MG: 40 TABLET, DELAYED RELEASE ORAL at 06:15

## 2024-06-01 RX ADMIN — IPRATROPIUM BROMIDE AND ALBUTEROL SULFATE 3 ML: .5; 3 SOLUTION RESPIRATORY (INHALATION) at 06:45

## 2024-06-01 RX ADMIN — TOLTERODINE TARTRATE 1 MG: 1 TABLET ORAL at 09:08

## 2024-06-01 RX ADMIN — CARBIDOPA AND LEVODOPA 2 TABLET: 25; 100 TABLET ORAL at 06:15

## 2024-06-01 RX ADMIN — SERTRALINE HYDROCHLORIDE 25 MG: 25 TABLET ORAL at 09:07

## 2024-06-01 RX ADMIN — ATORVASTATIN CALCIUM 20 MG: 20 TABLET, FILM COATED ORAL at 09:07

## 2024-06-01 RX ADMIN — Medication 100 MG: at 09:08

## 2024-06-01 RX ADMIN — Medication 1 CAPSULE: at 09:07

## 2024-06-01 RX ADMIN — ACETAMINOPHEN 650 MG: 325 TABLET, FILM COATED ORAL at 06:16

## 2024-06-01 ASSESSMENT — ACTIVITIES OF DAILY LIVING (ADL)
ADLS_ACUITY_SCORE: 63
ADLS_ACUITY_SCORE: 65

## 2024-06-01 NOTE — DISCHARGE SUMMARY
[facility]  Hospitalist Discharge Summary      Date of Admission:  5/28/2024  Date of Discharge:  6/1/2024  Discharging Provider: Keegan Tong MD  Discharge Service: Hospitalist Service    Discharge Diagnoses   See below    Clinically Significant Risk Factors          Follow-ups Needed After Discharge   Follow-up Appointments     Follow Up and recommended labs and tests      1.Follow up with Nursing home physician.  The following labs/tests are   recommended: complete blood count and basic metabolic panel in 1 week.  2. Recommend kidney ultrasound to evaluate for the kidney lesion noted on   CT imaging. This can be completed once recovered from acute illness.            Unresulted Labs Ordered in the Past 30 Days of this Admission       No orders found from 4/28/2024 to 5/29/2024.            Discharge Disposition   Discharged to short-term care facility  Condition at discharge: Stable    Hospital Course   Opal Sin is an extremely pleasant 89 year old female with history of asthma, breast cancer, HTN, HL, CKD III, Parkinson's, GERD, osteoporosis, and degenerative disc disease among others who presented to the ED from assisted living facility with shortness of breath and productive cough for the past week. It has worsened in the past 3 days particularly and staff found her to be with O2 sat in the 80s. She is not oxygen dependent. She was recently hospitalized 5/1 - 5/7 with diastolic HF exacerbation and LLL CAP for which she was treated with azithromycin, diuresis and nebs - PCP follow up on 5/15 was completed and they were planning on repeat CXR in a couple weeks. She denies chest pain, fevers, chills, abdominal pain, nausea, vomiting, diarrhea, or change in urinary habits. She is being treated for asthma and diastolic heart failure exacerbations.        Mild intermittent asthma exacerbation and/or acute diastolic heart failure exacerbation  Acute hypoxic respiratory  failure 2/2 above  Recent L basilar pneumonia, treated  Small bilateral pleural effusions  About a week of cough and worsening SOB, found to be hypoxic in 80s at living facility. She is not O2 dependent. Recent adm 5/1 - 5/7 for acute DHF and LLL pneumonia and she was treated with azithromycin. WBC 12k, procal 0.08, LA 0.8, viral panel NEG for RSV, flu, COVID. BNP 3k. Trop 38 --> 37. EKG without overt acute ischemic changes. Of  note, recent echo Echo 5/2/2024 showed LVEF of 50-55% (was 60-65% in 2017), G2 diastolic dysfunction and findings of mild PHTN; patient was diuresed in hospital but not discharged on diuretic.  *s/p IVF, solumedrol 125, magnesium, and nebs. Lasix 40 mg IV was given as well.   *CXR fairly unremarkable - L base infiltrate improved  *CT PE - no PE, mod pulmonary edema w/ small pleural effusions, mildly enlarged PA c/w pulm HTN  *has received Lasix 40mg IV x 2 doses on admission, good diuresis. Received additional 40mg IV lasix on 5/29.   *recheck procal unchanged at 0.09 and ongoing improving resp status, antibiotics not started in hospital  - c/w PTA budesonide neb  - received IV solumedrol initially, transitioned to PO prednisone 40mg daily on 5/30, will  discharge on quick taper of Prednisone 20mg daily x 2 more days, then 10mg daily x 2 more days since she still has some wheezing   - started PO Lasix 40mg daily, reduce Lasix dose to 20mg starting on 6/1 given increased BUN  - incentive spirometry  - weaned off oxygen on 5/30. Currently on RA and feels overall improved  - recommend BMP, CBC in a week    Indeterminate 1.6 cm lesion in the posterior left kidney  *likely a cyst containing hemorrhagic or proteinaceous material.  - recommend follow up renal US as outpatient once recovered from acute illness.       Hypertension  Hyperlipidemia  BP continues to be elevated   - Increase PTA amlodipine from 5mg to 10mg daily on 5/31/2024  - continue with PTA carvedilol, losartan and atorvastatin  -  continue with lasix as above.      CKD II/III  Creat WNL at 0.8 on adm. BUN 26 of note. Prior BUN 60-> 58-> 31 and creat 1.04 ->0.9 over that stretch earlier this month.  - cr stable, 0.81  - recommend f/up BMP in a week     GERD  Stable.  - PTA PPI     Chronic normocytic anemia  Recent hgb levels 10-11. Hgb 9.9 on adm. No report of bleeding concerns.  - hgb 9.3 on 5/30/2024, 10.1 on 5/31/2024       Parkinson's disease  - continue PTA sinemet     Hx of breast cancer  Noted. Continue with outpatient follow up as previously planned.  - PTA raloxifene to continue     Consultations This Hospital Stay   PHYSICAL THERAPY ADULT IP CONSULT  CARE MANAGEMENT / SOCIAL WORK IP CONSULT  PHYSICAL THERAPY ADULT IP CONSULT  OCCUPATIONAL THERAPY ADULT IP CONSULT    Code Status   No CPR- Do NOT Intubate    Time Spent on this Encounter   I, Kegean Tong MD, personally saw the patient today and spent greater than 30 minutes discharging this patient.       Keegan Tong MD  [place] [address] [facility]  [address] [place] [address], [address]  [facility] [address]  Phone: [phone]  ______________________________________________________________________    Physical Exam   Vital Signs: Temp: 97.8  F (36.6  C) Temp src: Oral BP: (!) 169/79 Pulse: 59   Resp: 16 SpO2: 92 % O2 Device: None (Room air)    Weight: 116 lbs 2.92 oz  General Appearance: Alert, awake and no apparent distress  Respiratory: scattered exp wheezing  Cardiovascular: regular rate and rhythm  GI: soft and non-tender  Skin: warm and dry         Primary Care Physician   Damian Russ MD    Discharge Orders      General info for SNF    Length of Stay Estimate: Short Term Care: Estimated # of Days 31-90  Condition at Discharge: Stable  Level of care:skilled   Rehabilitation Potential: Good  Admission H&P remains valid and up-to-date: Yes  Recent Chemotherapy: N/A  Use Nursing Home Standing Orders: Yes     Mantoux instructions    Give two-step Mantoux  (PPD) Per Facility Policy Yes     Reason for your hospital stay    Admitted for Asthma exacerbation and congestive heart failure     Activity - Up with assistive device     Activity - Up with nursing assistance     Follow Up and recommended labs and tests    1.Follow up with Nursing home physician.  The following labs/tests are recommended: complete blood count and basic metabolic panel in 1 week.  2. Recommend kidney ultrasound to evaluate for the kidney lesion noted on CT imaging. This can be completed once recovered from acute illness.     Physical Therapy Adult Consult    Evaluate and treat as clinically indicated.    Reason:  physical deconditioning     Occupational Therapy Adult Consult    Evaluate and treat as clinically indicated.    Reason:  physical deconditioning     Fall precautions     Diet    Follow this diet upon discharge: Orders Placed This Encounter      Combination Diet 2 gm NA Diet       Significant Results and Procedures   Most Recent 3 CBC's:  Recent Labs   Lab Test 05/31/24  0941 05/30/24  0917 05/29/24  0949   WBC 11.6* 12.2* 12.2*   HGB 10.1* 9.3* 9.3*   MCV 89 90 89    267 267     Most Recent 3 BMP's:  Recent Labs   Lab Test 05/31/24  0941 05/30/24  0917 05/29/24  0949    134* 134*   POTASSIUM 3.8 3.8 4.3   CHLORIDE 98 98 99   CO2 25 22 20*   BUN 34.8* 33.4* 28.9*   CR 0.81 0.76 0.85   ANIONGAP 12 14 15   CHRIS 9.5 9.4 9.3   * 230* 185*   ,   Results for orders placed or performed during the hospital encounter of 05/28/24   XR Chest 2 Views    Narrative    CHEST TWO VIEWS  5/28/2024 11:05 AM     HISTORY: Shortness of breath.    COMPARISON: May 1, 2024       Impression    IMPRESSION: Improved atelectasis and/or infiltrate at the left base  compared to previous. Midlung infiltrates bilaterally appear similar  to previous. Overlying device stable. Minimal bilateral effusions. The  cardiac silhouette is not enlarged. Pulmonary vasculature is  unremarkable.     HAYDEN FORREST MD          SYSTEM ID:  [id number]   CT Chest Pulmonary Embolism w Contrast    Narrative    CT CHEST PULMONARY EMBOLISM W CONTRAST 5/28/2024 12:12 PM    CLINICAL HISTORY: Chest pain and SOB, hypoxia please query PE  TECHNIQUE: CT angiogram chest during arterial phase injection IV  contrast. 2D and 3D MIP reconstructions were performed by the CT  technologist. Dose reduction techniques were used.     CONTRAST: 56 mL [id number]    COMPARISON: Chest CT on 10/7/2014 chest x-ray earlier today    FINDINGS:  ANGIOGRAM CHEST: No pulmonary emboli. Mildly enlarged main pulmonary  artery measuring 3.5 cm suggestive of pulmonary hypertension. Thoracic  aorta is negative for dissection. No CT evidence of right heart  strain.    LUNGS AND PLEURA: Diffuse linear interlobular septal thickening and  scattered groundglass opacities in the lungs consistent with pulmonary  edema. Mild bronchial wall thickening at the lung bases, likely  inflammatory. Small bilateral pleural effusions. Small calcified  granuloma in the left lower lobe. No pulmonary nodules or masses.    MEDIASTINUM/AXILLAE: Mildly enlarged mediastinal lymph node measuring  11 mm (series 6, image 30) nonspecific and likely reactive. No  thoracic aortic aneurysm. Severe coronary artery calcifications. Trace  pericardial fluid.    UPPER ABDOMEN: A 1.6 cm lesion in the left kidney posterior medial  cortex (series 6, image 83) is indeterminate but favored to be a cyst  containing hemorrhagic or proteinaceous material. A few other left  renal cysts.    MUSCULOSKELETAL: Bilateral total shoulder arthroplasty. Instrumented  fusion in the lower thoracic and visualized lumbar spine. Degenerative  changes of the spine. No suspicious lesions in the bones.      Impression    IMPRESSION:  1.  No pulmonary emboli.  2.  Moderate pulmonary edema. Small bilateral pleural effusions.  3.  Mildly enlarged main pulmonary artery consistent with pulmonary  hypertension.  4.  An indeterminate 1.6 cm  lesion in the posterior left kidney,  likely a cyst containing hemorrhagic or proteinaceous material.  Nonemergent follow-up renal ultrasound can be considered. There are  other left renal cysts.    SRINI BURT MD         SYSTEM ID:  [id number]     *Note: Due to a large number of results and/or encounters for the requested time period, some results have not been displayed. A complete set of results can be found in Results Review.       Discharge Medications   Current Discharge Medication List        START taking these medications    Details   furosemide (LASIX) 20 MG tablet Take 1 tablet (20 mg) by mouth daily    Associated Diagnoses: Acute diastolic heart failure (H)      guaiFENesin (ROBITUSSIN) 20 mg/mL liquid Take 10 mLs (200 mg) by mouth every 4 hours as needed for other (secretion expectorant)    Associated Diagnoses: Uncomplicated asthma, unspecified asthma severity, unspecified whether persistent      predniSONE (DELTASONE) 10 MG tablet Take 2 tablets (20 mg) by mouth daily for 2 days, THEN 1 tablet (10 mg) daily for 2 days.    Associated Diagnoses: Uncomplicated asthma, unspecified asthma severity, unspecified whether persistent           CONTINUE these medications which have CHANGED    Details   amLODIPine (NORVASC) 10 MG tablet Take 1 tablet (10 mg) by mouth daily    Associated Diagnoses: Benign essential hypertension           CONTINUE these medications which have NOT CHANGED    Details   albuterol (PROAIR HFA/PROVENTIL HFA/VENTOLIN HFA) 108 (90 Base) MCG/ACT inhaler Inhale 1-2 puffs into the lungs every 6 hours as needed for shortness of breath or wheezing  Qty: 18 g, Refills: 1    Comments: Pharmacy may dispense brand covered by insurance (Proair, or proventil or ventolin or generic albuterol inhaler)  Associated Diagnoses: Acute bronchospasm      atorvastatin (LIPITOR) 20 MG tablet TAKE 1 TABLET(20 MG) BY MOUTH DAILY  Qty: 90 tablet, Refills: 3    Associated Diagnoses: Mixed hyperlipidemia       azithromycin (ZITHROMAX) 500 MG tablet TAKE 1 TABLET BY MOUTH 30 TO 60 MINUTES PRIOR TO DENTAL PROCEDURE  Qty: 1 tablet, Refills: 3    Associated Diagnoses: Preventive antibiotic      budesonide (PULMICORT) 0.5 MG/2ML neb solution Take 2 mLs (0.5 mg) by nebulization 2 times daily  Qty: 180 mL, Refills: 11    Comments: For Profile Only - patient will call to fill  Associated Diagnoses: Intermittent asthma without complication, unspecified asthma severity      calcium carbonate (OS-CHRIS) 1500 (600 Ca) MG tablet Take 600 mg by mouth daily      carbidopa-levodopa (SINEMET)  MG per tablet Take 2 tablets by mouth 4 times daily Takes at 6am, 11am, 4pm, and 9 pm      carvedilol (COREG) 12.5 MG tablet Take 1 tablet (12.5 mg) by mouth 2 times daily (with meals)  Qty: 60 tablet, Refills: 0    Associated Diagnoses: Benign essential hypertension      Cholecalciferol (VITAMIN D3 PO) Take 400 Units by mouth daily       coenzyme Q-10 capsule Take 1 capsule by mouth daily      ipratropium - albuterol 0.5 mg/2.5 mg/3 mL (DUONEB) 0.5-2.5 (3) MG/3ML neb solution Take 1 vial (3 mLs) by nebulization 3 times daily  Qty: 270 mL, Refills: 0    Associated Diagnoses: Intermittent asthma without complication, unspecified asthma severity      loperamide (IMODIUM) 2 MG capsule Take 1 capsule (2 mg) by mouth 4 times daily as needed for diarrhea  Qty: 20 capsule, Refills: 0    Associated Diagnoses: Viral gastroenteritis      losartan (COZAAR) 100 MG tablet TAKE 1 TABLET(100 MG) BY MOUTH DAILY  Qty: 90 tablet, Refills: 3    Associated Diagnoses: Essential hypertension with goal blood pressure less than 140/90      Multiple Vitamins-Minerals (PRESERVISION AREDS) CAPS Take 1 capsule by mouth 2 times daily      multivitamin w/minerals (THERA-VIT-M) tablet Take 1 tablet by mouth daily      omeprazole (PRILOSEC) 20 MG DR capsule TAKE 1 CAPSULE(20 MG) BY MOUTH DAILY  Qty: 90 capsule, Refills: 0    Associated Diagnoses: Gastroesophageal reflux  disease without esophagitis      polyethylene glycol (MIRALAX/GLYCOLAX) packet Take 1 packet by mouth daily as needed for constipation      raloxifene (EVISTA) 60 MG tablet TAKE 1 TABLET(60 MG) BY MOUTH DAILY  Qty: 90 tablet, Refills: 3    Associated Diagnoses: Age-related osteoporosis without current pathological fracture      senna-docusate (SENOKOT-S;PERICOLACE) 8.6-50 MG per tablet Take 1 tablet by mouth 2 times daily as needed for constipation  Qty: 30 tablet, Refills: 0    Associated Diagnoses: Closed displaced fracture of right ilium, unspecified fracture morphology, initial encounter (H)      sertraline (ZOLOFT) 25 MG tablet TAKE 1 TABLET(25 MG) BY MOUTH DAILY  Qty: 90 tablet, Refills: 0    Comments: ZERO refills remain on this prescription. Your patient is requesting advance approval of refills for this medication to PREVENT ANY MISSED DOSES  Associated Diagnoses: Mild major depression (H24)      tolterodine (DETROL) 1 MG tablet TAKE 1 TABLET(1 MG) BY MOUTH TWICE DAILY  Qty: 180 tablet, Refills: 0    Associated Diagnoses: Urinary incontinence, unspecified type      aspirin EC 81 MG EC tablet Take 1 tablet (81 mg) by mouth daily    Associated Diagnoses: Transient cerebral ischemia, unspecified type           Allergies   Allergies   Allergen Reactions    Bee Pollen Hives     If MVI contains this - she will break out in a rash.     Cephalexin Monohydrate Hives    Ciprofloxacin Hives    Clindamycin Hives    Multi Vitamin-Minerals [Hair-Vites] Other (See Comments)     (If MV contains bee pollen she will break out in hives)     Penicillin [Penicillins] Hives     All antibiotics except zpak??????    Thiazide-Type Diuretics Other (See Comments)     Very low sodium levels    Tobramycin Hives    Vancomycin Hives    Atorvastatin      Leg cramps    Ibuprofen Nausea and Vomiting and Nausea     stomach pain    Multiple Vitamin Hives     If MVI contains this - she will break out in a rash.     Pollen Extract Hives     Versed [Midazolam] Other (See Comments)     Confused, agiitated, for 6-7 hrs after anngiogram sedation    Zoloft [Sertraline] Other (See Comments)     Headache, elevated BP    Ace Inhibitors Cough    Metoprolol Diarrhea      tolerates Inderal

## 2024-06-01 NOTE — PROGRESS NOTES
Patient discharged at 11:58 AM to Discharged to rehabilitation facility  IV was discontinued. Pain at time of discharge was 4/10, tylenol was administered at pt's request. Belongings returned to patient.  Discharge instructions sent with sister. Pt aware of medications changes. Patient verbalized understanding and all questions were answered.  At time of discharge, patient condition was stable and left the unit via wheelchair escorted by GIDEON.

## 2024-06-01 NOTE — PROGRESS NOTES
Care Management Discharge Note    Discharge Date: 06/01/2024       Discharge Disposition: Home Care, Assisted Living, Transitional Care    Discharge Services: Transportation Services    Discharge DME: Oxygen    Discharge Transportation: family or friend will provide, health plan transportation (may require [facility] Wheelchair with possible oxygen)    Private pay costs discussed: Not applicable    Does the patient's insurance plan have a 3 day qualifying hospital stay waiver?  No    PAS Confirmation Code: [id number]  Patient/family educated on Medicare website which has current facility and service quality ratings:      Education Provided on the Discharge Plan: Yes  Persons Notified of Discharge Plans: Patient   Patient/Family in Agreement with the Plan: yes    Handoff Referral Completed: Yes    Additional Information:  Writer talked with Radha at [facility] about families request to move transport up to 1100. Radha asked for transport to be pushed back to 1130 to give them time to make sure the room is ready. Writer faxed orders to both [phone] and [phone] per Jack request at [facility]. PAS Completed. RN aware.     LESLY Lee

## 2024-06-03 ENCOUNTER — LAB REQUISITION (OUTPATIENT)
Dept: LAB | Facility: CLINIC | Age: 89
End: 2024-06-03
Payer: COMMERCIAL

## 2024-06-03 ENCOUNTER — TRANSITIONAL CARE UNIT VISIT (OUTPATIENT)
Dept: GERIATRICS | Facility: CLINIC | Age: 89
End: 2024-06-03
Payer: COMMERCIAL

## 2024-06-03 ENCOUNTER — DOCUMENTATION ONLY (OUTPATIENT)
Dept: GERIATRICS | Facility: CLINIC | Age: 89
End: 2024-06-03
Payer: COMMERCIAL

## 2024-06-03 ENCOUNTER — PATIENT OUTREACH (OUTPATIENT)
Dept: CARE COORDINATION | Facility: CLINIC | Age: 89
End: 2024-06-03

## 2024-06-03 VITALS
BODY MASS INDEX: 23.13 KG/M2 | TEMPERATURE: 97.4 F | RESPIRATION RATE: 16 BRPM | OXYGEN SATURATION: 94 % | HEIGHT: 61 IN | DIASTOLIC BLOOD PRESSURE: 68 MMHG | SYSTOLIC BLOOD PRESSURE: 115 MMHG | WEIGHT: 122.5 LBS | HEART RATE: 64 BPM

## 2024-06-03 DIAGNOSIS — R53.81 PHYSICAL DECONDITIONING: ICD-10-CM

## 2024-06-03 DIAGNOSIS — D64.9 NORMOCYTIC ANEMIA: ICD-10-CM

## 2024-06-03 DIAGNOSIS — K21.9 GASTROESOPHAGEAL REFLUX DISEASE, UNSPECIFIED WHETHER ESOPHAGITIS PRESENT: ICD-10-CM

## 2024-06-03 DIAGNOSIS — J90 BILATERAL PLEURAL EFFUSION: ICD-10-CM

## 2024-06-03 DIAGNOSIS — E78.2 MIXED HYPERLIPIDEMIA: ICD-10-CM

## 2024-06-03 DIAGNOSIS — I10 ESSENTIAL (PRIMARY) HYPERTENSION: ICD-10-CM

## 2024-06-03 DIAGNOSIS — I50.31 ACUTE DIASTOLIC HEART FAILURE (H): ICD-10-CM

## 2024-06-03 DIAGNOSIS — I10 ESSENTIAL HYPERTENSION: ICD-10-CM

## 2024-06-03 DIAGNOSIS — G20.A1 PARKINSON'S DISEASE, UNSPECIFIED WHETHER DYSKINESIA PRESENT, UNSPECIFIED WHETHER MANIFESTATIONS FLUCTUATE (H): ICD-10-CM

## 2024-06-03 DIAGNOSIS — J45.901 MILD ASTHMA WITH ACUTE EXACERBATION, UNSPECIFIED WHETHER PERSISTENT: Primary | ICD-10-CM

## 2024-06-03 DIAGNOSIS — N18.31 STAGE 3A CHRONIC KIDNEY DISEASE (H): ICD-10-CM

## 2024-06-03 DIAGNOSIS — J96.21 ACUTE AND CHRONIC RESPIRATORY FAILURE WITH HYPOXIA (H): ICD-10-CM

## 2024-06-03 DIAGNOSIS — I50.22 CHRONIC SYSTOLIC (CONGESTIVE) HEART FAILURE (H): ICD-10-CM

## 2024-06-03 DIAGNOSIS — B99.9 UNSPECIFIED INFECTIOUS DISEASE: ICD-10-CM

## 2024-06-03 PROCEDURE — 99310 SBSQ NF CARE HIGH MDM 45: CPT | Performed by: NURSE PRACTITIONER

## 2024-06-03 RX ORDER — ACETAMINOPHEN 500 MG
1000 TABLET ORAL
COMMUNITY
Start: 2024-06-03

## 2024-06-03 RX ORDER — ACETAMINOPHEN 500 MG
1000 TABLET ORAL 2 TIMES DAILY PRN
COMMUNITY
Start: 2024-06-03

## 2024-06-03 NOTE — PLAN OF CARE
05/30--2831    Summary: presented to the ED from assisted living facility with shortness of breath and productive cough for the past week. It has worsened in the past 3 days particularly and staff found her to be with O2 sat in the 80s.    Primary Diagnosis: Mild intermittent asthma exacerbation and/or acute diastolic heart failure exacerbation  Acute hypoxic respiratory failure   Recent L basilar pneumonia,   Small bilateral pleural effusions    Orientation: A&Ox4.     Activity: A-1 gb/w    Diet/BS Checks: 2g Na, 1800 FR     Tele:  NSR     IV Access/Drains: R PIV SL.     Pain Management: C/o pain in neck & shoulder pain, PRN tylenol     Abnormal VS/Results: VSS on RA, may need O2 at night    Bowel/Bladder: Purewick in place with adequate UOP     Skin/Wounds: Redness blanchable to sacrum/coccyx - sacal mepi applied.     Consults: PT, SW    D/C Disposition: Pending clinical improvement.     Other Info:   -PRN robitussin x1 for cough.    
5/31/24 -- 1129-2357    Orientation: A&Ox4, forgetful at times  Activity: A1 GBW  Diet/BS Checks: 2G Na, FR: 1800 ml   Tele:  NSR  IV Access/Drains: R PIV SL  Pain Management: reports pain to right shoulder, given prn tylenol x2 this shift  Abnormal VS/Results: VSS on RA except HTN  Bowel/Bladder: incontinent, purewick in use while in bed. No BM this shift  Skin/Wounds: dry skin with scattered bruises. Mepilex in place to sacrum  Consults: SW, PT  D/C Disposition: 6/1/24 @ 1300, family to transport to [facility]     Other Info:   - [facility], has hearing aids at bedside    
Goal Outcome Evaluation:       [id number]  Orientation: A&O x4  Activity: A1 GBW  Diet/BS Checks: 2 Na+ diet, 1800 mL FR  Tele:  NSR  IV Access/Drains: PIV SL  Pain Management: tylenol given x1 for 6/10 pain in right shoulder; patient declined additional pain medication   Abnormal VS/Results: VSS on RA  Bowel/Bladder: continent; per report can be incontinent overnight.   Skin/Wounds: blanchable redness to sacrum; mepliex applied  Consults: PT, SW  D/C Disposition: pending   Other Info:                  
Goal Outcome Evaluation:      [id number]  Orientation: AxOx4  Activity: Up w/assist of 1, GB and walker. Up in chair for meals  Diet/BS Checks: Na 2 gms, 1800 CC FR  Tele:  NSR  IV Access/Drains: R PIV SL  Pain Management:. Pain in neck and shoulder. PRN Tylenol given  Abnormal VS/Results: VSS on RA ex HTN. Pt weaned from 1L O2. LS wheezes, MORRELL at times. Now on PO Lasix  Bowel/ Bladder: Incont, purewick used, adequate UOP. No bm  Skin/Wounds: scattered bruises, mepilex on coccyx  Consults: PT/SW/Care Coordination  D/C Disposition: pending, possible Discharge to TCU 5/31. Referrals sent out by ROSANA                 
Orientation: A&O x4, Pleasant  Activity: Ax2 w lift, T&R q2hrs  Diet/BS Checks: 2g Na Diet, 1800 FR  Tele:  NSR  IV Access/Drains: R PIV SL  Pain Management: C/o of soreness in neck and R shoulder, PRN tylenol given x1  Abnormal VS/Results: VSS on on 3L O2 via NC ex HTN  Bowel/Bladder: Incontinent of B/B, purewick in place. Pt had no BM this shift  Skin/Wounds: Redness blanchable on sacrum/coccyx with minimal moisture breakdown, peeling/cracking in BLE.   Consults: PT  D/C Disposition: Discharge pending clinical improvement  Other Info: Pt had nitroglycerin ointment applied , plan to remove at 0300.   
Orientation: A&Ox4.   Activity: A2 lift, T/R   Diet/BS Checks: 2g Na, 1800 FR   Tele:  NSR   IV Access/Drains: PIV SL.   Pain Management: C/o pain in neck - refused pain medication. Has tylenol available.   Abnormal VS/Results: VSS on 2L NC ex HTN.   Bowel/Bladder: Incontinent of bowel, no BM this shift. Purewick in place with adequate UOP.   Skin/Wounds: Redness blanchable to sacrum/coccyx - sacal mepi applied.   Consults: PT   D/C Disposition: Pending clinical improvement.   Other Info:   -Nitroglycerin ointment removed this shift.     -PRN eye drops ordered per pt request   
Orientation: A&Ox4.   Activity: A2 lift, T/R   Diet/BS Checks: 2g Na, 1800 FR   Tele:  NSR   IV Access/Drains: R PIV SL.   Pain Management: C/o pain in neck & shoulder pain, PRN tylenol x1 - effective.   Abnormal VS/Results: VSS on 1L NC ex HTN.   Bowel/Bladder: No BM this shift. Purewick in place with adequate UOP overnight.   Skin/Wounds: Redness blanchable to sacrum/coccyx - sacal mepi applied.   Consults: PT, SW  D/C Disposition: Pending clinical improvement.   Other Info:   -PRN robitussin x1 for cough this AM, pt states chest feels tight - PRN neb x1.   
Orientation: AxOx4  Activity: Up w/assist of 1, GB and walker  Diet/BS Checks: Na 2 gms, 1800 CC FR  Tele:  NSR  IV Access/Drains: R PIV  Pain Management: no pain, has limited movement of right shoulder due to old injury.  Abnormal VS/Results:  VSS except HTN,O2@2L/NC sats 99%, try to wean overnight.  Wheezing and crackles to right lower lung, lasix 40 mg x 1 dose given and on scheduled nebs.    Bowel/ Bladder: Incont with stool x 1, purewick used, adequate UOP.  Skin/Wounds: scattered bruises, mepilex on coccyx, right foot great toe and second toe band aid applied per pt request.  Consults: PT/SW/Care Coordination  D/C Disposition: pending    
Physical Therapy Discharge Summary    Reason for therapy discharge:    Discharged to transitional care facility.    Progress towards therapy goal(s). See goals on Care Plan in [facility] electronic health record for goal details.  Goals not met.  Barriers to achieving goals:   discharge from facility.    Therapy recommendation(s):    Continued therapy is recommended.  Rationale/Recommendations:  To progress towards independence and safety with functional mobility.      
(0) independent

## 2024-06-03 NOTE — LETTER
[facility]   To:   [facility]          Please give to facility    From:   Miracle Tobar  [facility]  Care Coordinator   [facility]   P: [phone]   [email or web address]   Patient Name:  Opal Sin YOB: 1935   Admit date: 6/1/24      *Information Needed:  Please contact me when the patient will discharge (or if they will move to long term care)- include the discharge date, disposition, and main diagnosis   If the patient is discharged with home care services, please provide the name of the agency    Also- Please inform me if a care conference is being held.   Phone, Fax or Email with information                              Thank you

## 2024-06-03 NOTE — PATIENT INSTRUCTIONS
Current med list on 6/3/2024    Current Outpatient Medications   Medication Sig Dispense Refill    acetaminophen (TYLENOL) 500 MG tablet Take 1,000 mg by mouth 2 times daily as needed for mild pain      acetaminophen (TYLENOL) 500 MG tablet Take 1,000 mg by mouth daily (with breakfast)      polyethylene glycol-propylene glycol (SYSTANE ULTRA) 0.4-0.3 % SOLN ophthalmic solution Place 1 drop into both eyes every hour as needed for dry eyes May self admin and have at bedside.      albuterol (PROAIR HFA/PROVENTIL HFA/VENTOLIN HFA) 108 (90 Base) MCG/ACT inhaler Inhale 1-2 puffs into the lungs every 6 hours as needed for shortness of breath or wheezing 18 g 1    amLODIPine (NORVASC) 10 MG tablet Take 1 tablet (10 mg) by mouth daily      aspirin EC 81 MG EC tablet Take 1 tablet (81 mg) by mouth daily      atorvastatin (LIPITOR) 20 MG tablet TAKE 1 TABLET(20 MG) BY MOUTH DAILY 90 tablet 3    azithromycin (ZITHROMAX) 500 MG tablet TAKE 1 TABLET BY MOUTH 30 TO 60 MINUTES PRIOR TO DENTAL PROCEDURE 1 tablet 3    budesonide (PULMICORT) 0.5 MG/2ML neb solution Take 2 mLs (0.5 mg) by nebulization 2 times daily (Patient taking differently: Take 0.5 mg by nebulization 2 times daily as needed) 180 mL 11    calcium carbonate (OS-CHRIS) 1500 (600 Ca) MG tablet Take 600 mg by mouth 2 times daily (with meals) Staff may administer the Ca+ that pt has brought in      carbidopa-levodopa (SINEMET)  MG per tablet Take 2 tablets by mouth 4 times daily Takes at 6am, 11am, 4pm, and 9 pm      carvedilol (COREG) 12.5 MG tablet Take 1 tablet (12.5 mg) by mouth 2 times daily (with meals) 60 tablet 0    Cholecalciferol (VITAMIN D3 PO) Take 400 Units by mouth daily       coenzyme Q-10 capsule Take 1 capsule by mouth daily 100 mg dose      furosemide (LASIX) 20 MG tablet Take 1 tablet (20 mg) by mouth daily      guaiFENesin (ROBITUSSIN) 20 mg/mL liquid Take 10 mLs (200 mg) by mouth every 4 hours as needed for other (secretion expectorant)       ipratropium - albuterol 0.5 mg/2.5 mg/3 mL (DUONEB) 0.5-2.5 (3) MG/3ML neb solution Take 1 vial (3 mLs) by nebulization 3 times daily (Patient taking differently: Take 3 mLs by nebulization 3 times daily as needed for shortness of breath or wheezing) 270 mL 0    loperamide (IMODIUM) 2 MG capsule Take 1 capsule (2 mg) by mouth 4 times daily as needed for diarrhea 20 capsule 0    losartan (COZAAR) 100 MG tablet TAKE 1 TABLET(100 MG) BY MOUTH DAILY (Patient taking differently: Take 100 mg by mouth every evening) 90 tablet 3    Multiple Vitamins-Minerals (PRESERVISION AREDS) CAPS Take 1 capsule by mouth 2 times daily      omeprazole (PRILOSEC) 20 MG DR capsule TAKE 1 CAPSULE(20 MG) BY MOUTH DAILY 90 capsule 0    polyethylene glycol (MIRALAX/GLYCOLAX) packet Take 1 packet by mouth daily as needed for constipation      predniSONE (DELTASONE) 10 MG tablet Take 2 tablets (20 mg) by mouth daily for 2 days, THEN 1 tablet (10 mg) daily for 2 days. (Patient taking differently: Take 2 tablets (20 mg) by mouth daily for 2 days, THEN 1 tablet (10 mg) daily for 2 days.  Last dose to be on 6/5/2024)      raloxifene (EVISTA) 60 MG tablet TAKE 1 TABLET(60 MG) BY MOUTH DAILY 90 tablet 3    senna-docusate (SENOKOT-S;PERICOLACE) 8.6-50 MG per tablet Take 1 tablet by mouth 2 times daily as needed for constipation 30 tablet 0    sertraline (ZOLOFT) 25 MG tablet TAKE 1 TABLET(25 MG) BY MOUTH DAILY 90 tablet 0    tolterodine (DETROL) 1 MG tablet TAKE 1 TABLET(1 MG) BY MOUTH TWICE DAILY 180 tablet 0

## 2024-06-03 NOTE — PROGRESS NOTES
[facility]  PRIMARY CARE PROVIDER AND CLINIC:  Damian Russ MD, MD, [address] / [place] [address]  Chief Complaint   Patient presents with    Hospital F/U     [place] Medical Record Number:  [id number]  Place of Service where encounter took place:  [facility] ([facility]) [[id number]]    Opal Sin  is a 89 year old  (1935), admitted to the above facility from  [facility]. Hospital stay 5/28/24 through 6/1/24..  Admitted to this facility for  rehab, medical management, and nursing care.     Mild asthma with acute exacerbation, unspecified whether persistent  Acute diastolic heart failure (H)  Acute and chronic respiratory failure with hypoxia (H)  Bilateral pleural effusion  Essential hypertension  Mixed hyperlipidemia  Stage 3a chronic kidney disease (H)  Normocytic anemia  Gastroesophageal reflux disease, unspecified whether esophagitis present  Parkinson's disease, unspecified whether dyskinesia present, unspecified whether manifestations fluctuate (H)  Physical deconditioning    HPI:    HPI information obtained from: facility chart records, facility staff, patient report, [facility] chart review, and Care Everywhere [facility] chart review.   Brief Summary of Hospital Course: pt has a long medical history including left breast CA in the distant past--see below-- and was admitted after a 3 day history of O2 altagracia in the 80's, cough and tiredness.  Of note, she was recently int hospital the first part of May for a diastolic HR exac and LLL CAP. She  was diuresed, CXR showed an improvement in the previous LLL infiltrate. No PE, . Had mod pulmonary edema with small bilat PL effusions. She improved, was weaned off the O2, SBP was elevated so Norvasc was increased form 5 to 10 mg--no change in other BP meds. Hgb steady. Was found to have a 1.6cm lesion left kidney--rec'd F/u as outpt. Sent for rehab.    TODAY DURING  EXAM/ROS:  No CP, SOB,  dizziness, fevers, chills, HA, N/V, dysuria or Bowel Abnormalities. Appetite is fair, says hs dry eyes at baseline, would shireen Tylenol for neck and right shoulder discomfort.  Has occas cough. Feels tired with activity      CODE STATUS/ADVANCE DIRECTIVES DISCUSSION:   DNR / DNI  Patient's living condition: lives alone in [facility] [place]    ALLERGIES: Bee pollen, Cephalexin monohydrate, Ciprofloxacin, Clindamycin, Multi vitamin-minerals [hair-vites], Penicillin [penicillins], Thiazide-type diuretics, Tobramycin, Vancomycin, Ibuprofen, Multiple vitamin, Pollen extract, Versed [midazolam], Vitamin e, Ace inhibitors, and Metoprolol  PAST MEDICAL HISTORY:  has a past medical history of Asthma (5 yrs), Breast CA (H) (1987), Degeneration of intervertebral disc, site unspecified, Essential hypertension, benign, Gastro-oesophageal reflux disease, History of blood transfusion (2003), antibiotic allergy, Hyperlipidaemia, Osteomyelitis (H), Osteoporosis, unspecified, Parkinson's disease (H) (2015), PONV (postoperative nausea and vomiting), Spinal stenosis, unspecified region other than cervical, and Unspecified cerebral artery occlusion with cerebral infarction.    She has no past medical history of Congestive heart failure (H), COPD (chronic obstructive pulmonary disease) (H), Diabetes (H), or Heart disease.  PAST SURGICAL HISTORY:   has a past surgical history that includes NONSPECIFIC PROCEDURE; NONSPECIFIC PROCEDURE (1987); NONSPECIFIC PROCEDURE; Bunionectomy; replacement socket, shoulder disarticulation/interscapular thoracic, molded to; TKR; Breast surgery; Mastectomy (Left); Insert stimulator dorsal column (1968); Thoracoscopic wedge resection lung (Right, 10/28/2014); biopsy; TOTAL KNEE ARTHROPLASTY (2008); Recession resection (repair strabismus) (Left, 6/12/2015); appendectomy (1956); colonoscopy (2008); GYN surgery (breast removal 1987); Head and neck surgery (2016); hernia repair  (1976); and vascular surgery (2116).  FAMILY HISTORY: family history includes Alzheimer Disease (age of onset: 74) in her brother; Cancer (age of onset: 47) in her mother; Cancer (age of onset: 6) in her brother; Cardiovascular in her brother; Cerebrovascular Disease in her sister and sister; Coronary Artery Disease in her maternal half-brother; Heart Disease in her brother and brother; Heart Disease (age of onset: 60) in her brother; Prostate Cancer in her brother; Respiratory in her sister.  SOCIAL HISTORY:   reports that she has never smoked. She has never used smokeless tobacco. She reports current alcohol use. She reports that she does not use drugs.    Post Discharge Medication Reconciliation Status: discharge medications reconciled and changed, per note/orders     Current Outpatient Medications   Medication Sig Dispense Refill    acetaminophen (TYLENOL) 500 MG tablet Take 1,000 mg by mouth 2 times daily as needed for mild pain      acetaminophen (TYLENOL) 500 MG tablet Take 1,000 mg by mouth daily (with breakfast)      polyethylene glycol-propylene glycol (SYSTANE ULTRA) 0.4-0.3 % SOLN ophthalmic solution Place 1 drop into both eyes every hour as needed for dry eyes May self admin and have at bedside.      albuterol (PROAIR HFA/PROVENTIL HFA/VENTOLIN HFA) 108 (90 Base) MCG/ACT inhaler Inhale 1-2 puffs into the lungs every 6 hours as needed for shortness of breath or wheezing 18 g 1    amLODIPine (NORVASC) 10 MG tablet Take 1 tablet (10 mg) by mouth daily      aspirin EC 81 MG EC tablet Take 1 tablet (81 mg) by mouth daily      atorvastatin (LIPITOR) 20 MG tablet TAKE 1 TABLET(20 MG) BY MOUTH DAILY 90 tablet 3    azithromycin (ZITHROMAX) 500 MG tablet TAKE 1 TABLET BY MOUTH 30 TO 60 MINUTES PRIOR TO DENTAL PROCEDURE 1 tablet 3    budesonide (PULMICORT) 0.5 MG/2ML neb solution Take 2 mLs (0.5 mg) by nebulization 2 times daily (Patient taking differently: Take 0.5 mg by nebulization 2 times daily as needed) 180  mL 11    calcium carbonate (OS-CHRIS) 1500 (600 Ca) MG tablet Take 600 mg by mouth 2 times daily (with meals) Staff may administer the Ca+ that pt has brought in      carbidopa-levodopa (SINEMET)  MG per tablet Take 2 tablets by mouth 4 times daily Takes at 6am, 11am, 4pm, and 9 pm      carvedilol (COREG) 12.5 MG tablet Take 1 tablet (12.5 mg) by mouth 2 times daily (with meals) 60 tablet 0    Cholecalciferol (VITAMIN D3 PO) Take 400 Units by mouth daily       coenzyme Q-10 capsule Take 1 capsule by mouth daily 100 mg dose      furosemide (LASIX) 20 MG tablet Take 1 tablet (20 mg) by mouth daily      guaiFENesin (ROBITUSSIN) 20 mg/mL liquid Take 10 mLs (200 mg) by mouth every 4 hours as needed for other (secretion expectorant)      ipratropium - albuterol 0.5 mg/2.5 mg/3 mL (DUONEB) 0.5-2.5 (3) MG/3ML neb solution Take 1 vial (3 mLs) by nebulization 3 times daily (Patient taking differently: Take 3 mLs by nebulization 3 times daily as needed for shortness of breath or wheezing) 270 mL 0    loperamide (IMODIUM) 2 MG capsule Take 1 capsule (2 mg) by mouth 4 times daily as needed for diarrhea 20 capsule 0    losartan (COZAAR) 100 MG tablet TAKE 1 TABLET(100 MG) BY MOUTH DAILY (Patient taking differently: Take 100 mg by mouth every evening) 90 tablet 3    Multiple Vitamins-Minerals (PRESERVISION AREDS) CAPS Take 1 capsule by mouth 2 times daily      omeprazole (PRILOSEC) 20 MG DR capsule TAKE 1 CAPSULE(20 MG) BY MOUTH DAILY 90 capsule 0    polyethylene glycol (MIRALAX/GLYCOLAX) packet Take 1 packet by mouth daily as needed for constipation      predniSONE (DELTASONE) 10 MG tablet Take 2 tablets (20 mg) by mouth daily for 2 days, THEN 1 tablet (10 mg) daily for 2 days. (Patient taking differently: Take 2 tablets (20 mg) by mouth daily for 2 days, THEN 1 tablet (10 mg) daily for 2 days.  Last dose to be on 6/5/2024)      raloxifene (EVISTA) 60 MG tablet TAKE 1 TABLET(60 MG) BY MOUTH DAILY 90 tablet 3    senna-docusate  "(SENOKOT-S;PERICOLACE) 8.6-50 MG per tablet Take 1 tablet by mouth 2 times daily as needed for constipation 30 tablet 0    sertraline (ZOLOFT) 25 MG tablet TAKE 1 TABLET(25 MG) BY MOUTH DAILY 90 tablet 0    tolterodine (DETROL) 1 MG tablet TAKE 1 TABLET(1 MG) BY MOUTH TWICE DAILY 180 tablet 0            Vitals:  /68   Pulse 64   Temp 97.4  F (36.3  C)   Resp 16   Ht 1.549 m (5' 1\")   Wt 55.6 kg (122 lb 8 oz)   SpO2 94%   BMI 23.15 kg/m    Exam:  GENERAL APPEARANCE:  Alert, in no distress, oriented, cooperative  ENT:  Mouth and posterior oropharynx normal, moist mucous membranes, [place] IRA  EYES:  EOM, conjunctivae, lids, pupils and irises normal  RESP:  respiratory effort and palpation of chest normal, lungs clear to auscultation except a few crackles Left base noted , no respiratory distress, diminished breath sounds mildly  CV:  Palpation and auscultation of heart done , regular rate and rhythm, no murmur, rub, or gallop, no edema, +2 pedal pulses  ABDOMEN:  normal bowel sounds, soft, nontender, no hepatosplenomegaly or other masses  M/S:   GENAO and up with PT  SKIN:  Inspection of skin and subcutaneous tissue baseline  NEURO:   Cranial nerves 2-12 are normal tested and grossly at patient's baseline, no tremors or mask-like features  PSYCH:  oriented X 3, normal insight, judgement and memory, affect and mood normal    Lab/Diagnostic data:  Recent Labs   Lab Test 05/31/24  0941 05/30/24  0917    134*   POTASSIUM 3.8 3.8   CHLORIDE 98 98   CO2 25 22   ANIONGAP 12 14   * 230*   BUN 34.8* 33.4*   CR 0.81 0.76   CHRIS 9.5 9.4   CBC RESULTS:   Recent Labs   Lab Test 05/31/24  0941   WBC 11.6*   RBC 3.45*   HGB 10.1*   HCT 30.8*   MCV 89   MCH 29.3   MCHC 32.8   RDW 15.9*        ASSESSMENT / PLAN:  (J45.901) Mild asthma with acute exacerbation, unspecified whether persistent  (primary encounter diagnosis)  (I50.31) Acute diastolic heart failure (H)  (J96.21) Acute and chronic respiratory failure " with hypoxia (H)  (J90) Bilateral pleural effusion  Comment: improved from hospital, off O2, using IS at bedside, using Duonebs, change to prn when able. Also finishing Pred taper.    (I10) Essential hypertension  (E78.2) Mixed hyperlipidemia  (N18.31) Stage 3a chronic kidney disease (H)  Comment: check BMP  in am--cont Norvasc, lasix, coreg, arb, statin, F/u cards per their recs    (D64.9) Normocytic anemia  Comment: cbc in am--chronic anemia per chart    (K21.9) Gastroesophageal reflux disease, unspecified whether esophagitis present  Comment: quiescent, cont with PPI, monitor.    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      Total time spent with patient visit at the skilled nursing facility was 55 mins including patient visit, review of past records, and d/w sister Hattie with pt in room. Greater than 50% of total time spent with counseling and coordinating care.     Electronically signed by:  MIO Orellana CNP

## 2024-06-03 NOTE — PROGRESS NOTES
Clinic Care Coordination Contact  Care Coordination Transition Communication    Clinical Data: Patient was hospitalized at [facility] from 5/28/24 to 6/1/24 with diagnosis of Mild intermittent asthma exacerbation and/or acute diastolic heart failure exacerbation  Acute hypoxic respiratory failure 2/2 above  Recent L basilar pneumonia, treated  Small bilateral pleural effusions     Assessment: Patient has transitioned to TCU/ARU for short term rehabilitation:    Facility Name: [facility]  Transition Communication:  Notified facility of Primary Care- Care Coordination support via Epic fax.    Plan: Care Coordinator will await notification from facility staff informing of patient's discharge plans/needs. Care Coordinator will review chart and outreach to facility staff every 4 weeks and as needed.     Miracle Tobar,  [facility]  Clinic Care Coordinator  [facility]  [phone]  [email or web address]

## 2024-06-04 ENCOUNTER — TRANSITIONAL CARE UNIT VISIT (OUTPATIENT)
Dept: GERIATRICS | Facility: CLINIC | Age: 89
End: 2024-06-04
Payer: COMMERCIAL

## 2024-06-04 ENCOUNTER — DOCUMENTATION ONLY (OUTPATIENT)
Dept: OTHER | Facility: CLINIC | Age: 89
End: 2024-06-04

## 2024-06-04 VITALS
BODY MASS INDEX: 23.13 KG/M2 | TEMPERATURE: 97.5 F | OXYGEN SATURATION: 93 % | RESPIRATION RATE: 18 BRPM | WEIGHT: 122.5 LBS | SYSTOLIC BLOOD PRESSURE: 123 MMHG | HEIGHT: 61 IN | DIASTOLIC BLOOD PRESSURE: 68 MMHG | HEART RATE: 67 BPM

## 2024-06-04 DIAGNOSIS — I10 ESSENTIAL HYPERTENSION: ICD-10-CM

## 2024-06-04 DIAGNOSIS — I50.22 CHRONIC SYSTOLIC CONGESTIVE HEART FAILURE (H): Primary | ICD-10-CM

## 2024-06-04 DIAGNOSIS — B99.9 UNSPECIFIED INFECTIOUS DISEASE: ICD-10-CM

## 2024-06-04 PROBLEM — J45.901 MILD ASTHMA WITH ACUTE EXACERBATION, UNSPECIFIED WHETHER PERSISTENT: Status: ACTIVE | Noted: 2024-06-04

## 2024-06-04 PROBLEM — D64.9 NORMOCYTIC ANEMIA: Status: ACTIVE | Noted: 2024-06-04

## 2024-06-04 PROBLEM — J90 BILATERAL PLEURAL EFFUSION: Status: ACTIVE | Noted: 2024-06-04

## 2024-06-04 PROBLEM — J96.21 ACUTE AND CHRONIC RESPIRATORY FAILURE WITH HYPOXIA (H): Status: ACTIVE | Noted: 2024-06-04

## 2024-06-04 LAB — GLUCOSE SERPL-MCNC: 159 MG/DL (ref 70–99)

## 2024-06-04 PROCEDURE — P9604 ONE-WAY ALLOW PRORATED TRIP: HCPCS | Mod: ORL | Performed by: INTERNAL MEDICINE

## 2024-06-04 PROCEDURE — 99207 PR NO CHARGE LOS: CPT | Performed by: NURSE PRACTITIONER

## 2024-06-04 PROCEDURE — 80048 BASIC METABOLIC PNL TOTAL CA: CPT | Mod: ORL | Performed by: INTERNAL MEDICINE

## 2024-06-04 PROCEDURE — 36415 COLL VENOUS BLD VENIPUNCTURE: CPT | Mod: ORL | Performed by: INTERNAL MEDICINE

## 2024-06-04 PROCEDURE — 85027 COMPLETE CBC AUTOMATED: CPT | Mod: ORL | Performed by: INTERNAL MEDICINE

## 2024-06-04 NOTE — PROGRESS NOTES
CC: Lab Recheck     HPI:    Opal Sin is a 89 year old  (1935), who is being seen today for an episodic care visit at [facility] ([facility] Today's concern: lab recheck of BMP, CBC.      LABS NOT BACK THIS AM--SIGNED OUT TO ONCALL AND TRIAGE    ASSESSMENT / PLAN:  No diagnosis found.    Electronically Signed by:    Cha Lopez RN, CNP

## 2024-06-05 ENCOUNTER — TRANSITIONAL CARE UNIT VISIT (OUTPATIENT)
Dept: GERIATRICS | Facility: CLINIC | Age: 89
End: 2024-06-05
Payer: COMMERCIAL

## 2024-06-05 VITALS
HEIGHT: 61 IN | SYSTOLIC BLOOD PRESSURE: 139 MMHG | BODY MASS INDEX: 23.13 KG/M2 | WEIGHT: 122.5 LBS | HEART RATE: 66 BPM | TEMPERATURE: 97.8 F | RESPIRATION RATE: 18 BRPM | OXYGEN SATURATION: 98 % | DIASTOLIC BLOOD PRESSURE: 79 MMHG

## 2024-06-05 DIAGNOSIS — Z53.9 DIAGNOSIS NOT YET DEFINED: Primary | ICD-10-CM

## 2024-06-05 DIAGNOSIS — G20.A1 PARKINSON'S DISEASE, UNSPECIFIED WHETHER DYSKINESIA PRESENT, UNSPECIFIED WHETHER MANIFESTATIONS FLUCTUATE (H): ICD-10-CM

## 2024-06-05 DIAGNOSIS — G62.9 NEUROPATHY: ICD-10-CM

## 2024-06-05 DIAGNOSIS — D72.829 LEUKOCYTOSIS, UNSPECIFIED TYPE: ICD-10-CM

## 2024-06-05 DIAGNOSIS — J90 BILATERAL PLEURAL EFFUSION: ICD-10-CM

## 2024-06-05 DIAGNOSIS — I10 ESSENTIAL HYPERTENSION: ICD-10-CM

## 2024-06-05 DIAGNOSIS — F33.0 MILD EPISODE OF RECURRENT MAJOR DEPRESSIVE DISORDER (H): ICD-10-CM

## 2024-06-05 DIAGNOSIS — J45.901 MILD ASTHMA WITH ACUTE EXACERBATION, UNSPECIFIED WHETHER PERSISTENT: Primary | ICD-10-CM

## 2024-06-05 DIAGNOSIS — N18.31 STAGE 3A CHRONIC KIDNEY DISEASE (H): ICD-10-CM

## 2024-06-05 DIAGNOSIS — N28.1 RENAL CYST: ICD-10-CM

## 2024-06-05 DIAGNOSIS — I50.31 ACUTE DIASTOLIC HEART FAILURE (H): ICD-10-CM

## 2024-06-05 DIAGNOSIS — R53.81 PHYSICAL DECONDITIONING: ICD-10-CM

## 2024-06-05 PROBLEM — I72.0 ANEURYSM OF CAROTID ARTERY (H): Status: RESOLVED | Noted: 2019-10-04 | Resolved: 2024-06-05

## 2024-06-05 LAB
ANION GAP SERPL CALCULATED.3IONS-SCNC: 16 MMOL/L (ref 7–15)
BUN SERPL-MCNC: 58.1 MG/DL (ref 8–23)
CALCIUM SERPL-MCNC: 9.3 MG/DL (ref 8.8–10.2)
CHLORIDE SERPL-SCNC: 99 MMOL/L (ref 98–107)
CREAT SERPL-MCNC: 1.03 MG/DL (ref 0.51–0.95)
DEPRECATED HCO3 PLAS-SCNC: 18 MMOL/L (ref 22–29)
EGFRCR SERPLBLD CKD-EPI 2021: 52 ML/MIN/1.73M2
ERYTHROCYTE [DISTWIDTH] IN BLOOD BY AUTOMATED COUNT: 16.8 % (ref 10–15)
HCT VFR BLD AUTO: 36 % (ref 35–47)
HGB BLD-MCNC: 11.7 G/DL (ref 11.7–15.7)
MCH RBC QN AUTO: 29.8 PG (ref 26.5–33)
MCHC RBC AUTO-ENTMCNC: 32.5 G/DL (ref 31.5–36.5)
MCV RBC AUTO: 92 FL (ref 78–100)
PLATELET # BLD AUTO: 378 10E3/UL (ref 150–450)
POTASSIUM SERPL-SCNC: 4.2 MMOL/L (ref 3.4–5.3)
RBC # BLD AUTO: 3.92 10E6/UL (ref 3.8–5.2)
SODIUM SERPL-SCNC: 133 MMOL/L (ref 135–145)
WBC # BLD AUTO: 19.3 10E3/UL (ref 4–11)

## 2024-06-05 PROCEDURE — 99305 1ST NF CARE MODERATE MDM 35: CPT | Performed by: INTERNAL MEDICINE

## 2024-06-05 PROCEDURE — G0180 MD CERTIFICATION HHA PATIENT: HCPCS | Performed by: INTERNAL MEDICINE
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
## 2024-06-06 ENCOUNTER — LAB REQUISITION (OUTPATIENT)
Dept: LAB | Facility: CLINIC | Age: 89
End: 2024-06-06
Payer: COMMERCIAL

## 2024-06-06 DIAGNOSIS — N18.9 CHRONIC KIDNEY DISEASE, UNSPECIFIED: ICD-10-CM

## 2024-06-06 DIAGNOSIS — D72.829 ELEVATED WHITE BLOOD CELL COUNT, UNSPECIFIED: ICD-10-CM

## 2024-06-07 LAB
ANION GAP SERPL CALCULATED.3IONS-SCNC: 13 MMOL/L (ref 7–15)
BUN SERPL-MCNC: 65.5 MG/DL (ref 8–23)
CALCIUM SERPL-MCNC: 9.6 MG/DL (ref 8.8–10.2)
CHLORIDE SERPL-SCNC: 100 MMOL/L (ref 98–107)
CREAT SERPL-MCNC: 0.99 MG/DL (ref 0.51–0.95)
DEPRECATED HCO3 PLAS-SCNC: 21 MMOL/L (ref 22–29)
EGFRCR SERPLBLD CKD-EPI 2021: 55 ML/MIN/1.73M2
GLUCOSE SERPL-MCNC: 87 MG/DL (ref 70–99)
POTASSIUM SERPL-SCNC: 4.5 MMOL/L (ref 3.4–5.3)
SODIUM SERPL-SCNC: 134 MMOL/L (ref 135–145)
WBC # BLD AUTO: 20.5 10E3/UL (ref 4–11)

## 2024-06-07 PROCEDURE — 36415 COLL VENOUS BLD VENIPUNCTURE: CPT | Mod: ORL | Performed by: INTERNAL MEDICINE

## 2024-06-07 PROCEDURE — 80048 BASIC METABOLIC PNL TOTAL CA: CPT | Mod: ORL | Performed by: INTERNAL MEDICINE

## 2024-06-07 PROCEDURE — 85048 AUTOMATED LEUKOCYTE COUNT: CPT | Mod: ORL | Performed by: INTERNAL MEDICINE

## 2024-06-07 PROCEDURE — P9603 ONE-WAY ALLOW PRORATED MILES: HCPCS | Mod: ORL | Performed by: INTERNAL MEDICINE

## 2024-06-08 ENCOUNTER — TELEPHONE (OUTPATIENT)
Dept: GERIATRICS | Facility: CLINIC | Age: 89
End: 2024-06-08
Payer: COMMERCIAL

## 2024-06-08 DIAGNOSIS — B37.0 THRUSH: Primary | ICD-10-CM

## 2024-06-08 RX ORDER — NYSTATIN 100000/ML
1000000 SUSPENSION, ORAL (FINAL DOSE FORM) ORAL 4 TIMES DAILY
Qty: 840 ML | Refills: 0 | Status: SHIPPED | OUTPATIENT
Start: 2024-06-08 | End: 2024-06-29

## 2024-06-08 NOTE — TELEPHONE ENCOUNTER
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 Sore throat. She does utilize nebulizers. Suspect thrush    ASSESSMENT/PLAN    Nystatin swish and swallow QID x 3 weeks    Electronically signed by:   MIO So CNP

## 2024-06-10 ENCOUNTER — TELEPHONE (OUTPATIENT)
Dept: FAMILY MEDICINE | Facility: CLINIC | Age: 89
End: 2024-06-10
Payer: COMMERCIAL

## 2024-06-10 NOTE — TELEPHONE ENCOUNTER
Pts sister Hattie (no c2c) called the clinic asking if her 2 upcoming appointments (6/28/24 and 7/12/24) can be made into just 1? It is hard to get the pt out of the house. Sister is also wondering is the dexa scan necessary? It is hard to get pt out of the chair and onto the table.     Please call pt to get verbal consent to speak to her sister Hattie. Pts # [phone]. Then please call Coleen back (if pt gives consent) and relay message from provider.     Routing to PCP to review and advise.     If both appointments can be combined, they would like to keep the July appt.

## 2024-06-10 NOTE — TELEPHONE ENCOUNTER
Yes, OK to combine both appointments into one.    Also, we can discuss need for bone density scan at her upcoming appointment and whether she wishes to pursue this.    Damian Russ MD

## 2024-06-10 NOTE — TELEPHONE ENCOUNTER
FYI - Status Update    Who is Calling: family member, Sister     Update: Please send dexa scan referral to [facility]    Does caller want a call/response back: No

## 2024-06-11 ENCOUNTER — LAB REQUISITION (OUTPATIENT)
Dept: LAB | Facility: CLINIC | Age: 89
End: 2024-06-11
Payer: COMMERCIAL

## 2024-06-11 VITALS
SYSTOLIC BLOOD PRESSURE: 106 MMHG | BODY MASS INDEX: 23.26 KG/M2 | DIASTOLIC BLOOD PRESSURE: 64 MMHG | HEART RATE: 68 BPM | RESPIRATION RATE: 18 BRPM | WEIGHT: 123.2 LBS | OXYGEN SATURATION: 94 % | HEIGHT: 61 IN | TEMPERATURE: 97.9 F

## 2024-06-11 DIAGNOSIS — D72.829 ELEVATED WHITE BLOOD CELL COUNT, UNSPECIFIED: ICD-10-CM

## 2024-06-11 DIAGNOSIS — I10 ESSENTIAL (PRIMARY) HYPERTENSION: ICD-10-CM

## 2024-06-11 NOTE — PROGRESS NOTES
[place] [facility]  [facility] Record Number:  [id number]  Place of Service where encounter took place:  [facility] ([facility]) [[id number]]  Chief Complaint   Patient presents with    RECHECK       HPI:    Opal Sin  is a 89 year old (1935), who is being seen today for an episodic care visit.  HPI information obtained from: facility chart records, facility staff, and patient report. Today's concern is:    Patient Opal Sin is a 89 yr old female admitted to [facility] for rehabilitation s/post hospitalization [facility] 5/28-6/1/24 for asthma and diastolic heart failure exacerbations.    S/post recent hospitalized 5/1 - 5/7 with diastolic HF exacerbation and LLL CAP for which she was treated with azithromycin, diuresis and nebs.  EKG without overt acute ischemic changes. Of  note, recent echo Echo 5/2/2024 showed LVEF of 50-55% (was 60-65% in 2017), G2 diastolic dysfunction and findings of mild PHTN    PMHx asthma, breast cancer, HTN, HL, CKD III, Parkinson's, GERD, osteoporosis, and degenerative disc disease   Lives in [facility]    Today  Vitals stable room air  Not appear fluid up  Patient report they do not get her weight every day  Wt Readings from Last 2 Encounters:   06/11/24 55.9 kg (123 lb 3.2 oz)   06/05/24 55.6 kg (122 lb 8 oz)   Was out for eye appointment today  States she has been doing therapies   Denies pain or feeling ill   not need at baseline  Per consult with nurse, no acute concerns       Past Medical and Surgical History reviewed in Epic today.    MEDICATIONS:    Current Outpatient Medications   Medication Sig Dispense Refill    acetaminophen (TYLENOL) 500 MG tablet Take 1,000 mg by mouth 2 times daily as needed for mild pain      acetaminophen (TYLENOL) 500 MG tablet Take 1,000 mg by mouth daily (with breakfast)      albuterol (PROAIR HFA/PROVENTIL HFA/VENTOLIN HFA) 108 (90 Base) MCG/ACT inhaler Inhale 1-2 puffs into the  lungs every 6 hours as needed for shortness of breath or wheezing 18 g 1    amLODIPine (NORVASC) 10 MG tablet Take 1 tablet (10 mg) by mouth daily      aspirin EC 81 MG EC tablet Take 1 tablet (81 mg) by mouth daily      atorvastatin (LIPITOR) 20 MG tablet TAKE 1 TABLET(20 MG) BY MOUTH DAILY 90 tablet 3    azithromycin (ZITHROMAX) 500 MG tablet TAKE 1 TABLET BY MOUTH 30 TO 60 MINUTES PRIOR TO DENTAL PROCEDURE 1 tablet 3    budesonide (PULMICORT) 0.5 MG/2ML neb solution Take 2 mLs (0.5 mg) by nebulization 2 times daily (Patient taking differently: Take 0.5 mg by nebulization 2 times daily as needed) 180 mL 11    calcium carbonate (OS-CHRIS) 1500 (600 Ca) MG tablet Take 600 mg by mouth 2 times daily (with meals) Staff may administer the Ca+ that pt has brought in      carbidopa-levodopa (SINEMET)  MG per tablet Take 2 tablets by mouth 4 times daily Takes at 6am, 11am, 4pm, and 9 pm      carvedilol (COREG) 12.5 MG tablet Take 1 tablet (12.5 mg) by mouth 2 times daily (with meals) 60 tablet 0    Cholecalciferol (VITAMIN D3 PO) Take 400 Units by mouth daily       coenzyme Q-10 capsule Take 1 capsule by mouth daily 100 mg dose      furosemide (LASIX) 20 MG tablet Take 1 tablet (20 mg) by mouth daily      guaiFENesin (ROBITUSSIN) 20 mg/mL liquid Take 10 mLs (200 mg) by mouth every 4 hours as needed for other (secretion expectorant)      ipratropium - albuterol 0.5 mg/2.5 mg/3 mL (DUONEB) 0.5-2.5 (3) MG/3ML neb solution Take 1 vial (3 mLs) by nebulization 3 times daily (Patient taking differently: Take 3 mLs by nebulization 3 times daily as needed for shortness of breath or wheezing) 270 mL 0    loperamide (IMODIUM) 2 MG capsule Take 1 capsule (2 mg) by mouth 4 times daily as needed for diarrhea 20 capsule 0    losartan (COZAAR) 100 MG tablet TAKE 1 TABLET(100 MG) BY MOUTH DAILY (Patient taking differently: Take 100 mg by mouth every evening) 90 tablet 3    Multiple Vitamins-Minerals (PRESERVISION AREDS) CAPS Take 1  "capsule by mouth 2 times daily      nystatin (MYCOSTATIN) 373158 UNIT/ML suspension Take 10 mLs (1,000,000 Units) by mouth 4 times daily for 21 days Swish and swallow 840 mL 0    omeprazole (PRILOSEC) 20 MG DR capsule TAKE 1 CAPSULE(20 MG) BY MOUTH DAILY 90 capsule 0    polyethylene glycol (MIRALAX/GLYCOLAX) packet Take 1 packet by mouth daily as needed for constipation      polyethylene glycol-propylene glycol (SYSTANE ULTRA) 0.4-0.3 % SOLN ophthalmic solution Place 1 drop into both eyes every hour as needed for dry eyes May self admin and have at bedside.      raloxifene (EVISTA) 60 MG tablet TAKE 1 TABLET(60 MG) BY MOUTH DAILY 90 tablet 3    senna-docusate (SENOKOT-S;PERICOLACE) 8.6-50 MG per tablet Take 1 tablet by mouth 2 times daily as needed for constipation 30 tablet 0    sertraline (ZOLOFT) 25 MG tablet TAKE 1 TABLET(25 MG) BY MOUTH DAILY 90 tablet 0    tolterodine (DETROL) 1 MG tablet TAKE 1 TABLET(1 MG) BY MOUTH TWICE DAILY 180 tablet 0         REVIEW OF SYSTEMS:  4 point ROS including Respiratory, CV, GI and , other than that noted in the HPI,  is negative    Objective:  /64   Pulse 68   Temp 97.9  F (36.6  C)   Resp 18   Ht 1.549 m (5' 1\")   Wt 55.9 kg (123 lb 3.2 oz)   SpO2 94%   BMI 23.28 kg/m    Exam:  GENERAL APPEARANCE:  Alert, in no distress  RESP:  respiratory effort and palpation of chest normal, lungs clear to auscultation , no respiratory distress  CV:  Palpation and auscultation of heart done , regular rate and rhythm  ABDOMEN:  normal bowel sounds, soft, nontender  M/S:   sitting in chair  SKIN:  Inspection of skin and subcutaneous tissue baseline  NEURO:   Cranial nerves 2-12 are normal tested and grossly at patient's baseline  PSYCH:  oriented X 3    Labs:   Labs done in SNF are in [place] EPIC. Please refer to them using [facility]Care Everywhere.    Last Comprehensive Metabolic Panel:  Lab Results   Component Value Date     (L) 06/07/2024    POTASSIUM 4.5 06/07/2024    " "CHLORIDE 100 06/07/2024    CO2 21 (L) 06/07/2024    ANIONGAP 13 06/07/2024    GLC 87 06/07/2024    BUN 65.5 (H) 06/07/2024    CR 0.99 (H) 06/07/2024    GFRESTIMATED 55 (L) 06/07/2024    CHRIS 9.6 06/07/2024       Lab Results   Component Value Date    WBC 20.5 06/07/2024    WBC 15.8 09/21/2020     Lab Results   Component Value Date    RBC 3.92 06/04/2024    RBC 4.08 09/21/2020     Lab Results   Component Value Date    HGB 11.7 06/04/2024    HGB 12.2 04/22/2021     Lab Results   Component Value Date    HCT 36.0 06/04/2024    HCT 37.6 09/21/2020     No components found for: \"MCT\"  Lab Results   Component Value Date    MCV 92 06/04/2024    MCV 92 09/21/2020     Lab Results   Component Value Date    MCH 29.8 06/04/2024    MCH 31.9 09/21/2020     Lab Results   Component Value Date    MCHC 32.5 06/04/2024    MCHC 34.6 09/21/2020     Lab Results   Component Value Date    RDW 16.8 06/04/2024    RDW 14.8 09/21/2020     Lab Results   Component Value Date     06/04/2024     09/21/2020         ASSESSMENT/PLAN:     Mild intermittent asthma exacerbation and/or acute diastolic heart failure exacerbation  Acute hypoxic respiratory failure 2/2 above  Recent L basilar pneumonia, treated  Small bilateral pleural effusions  *CXR fairly unremarkable - L base infiltrate improved  *CT PE - no PE, mod pulmonary edema w/ small pleural effusions, mildly enlarged PA c/w pulm HTN  Continue lasix and nebs  Wt Readings from Last 2 Encounters:   06/11/24 55.9 kg (123 lb 3.2 oz)   06/05/24 55.6 kg (122 lb 8 oz)   Prednisone 20mg daily x 2 more days, then 10mg daily x 2 more days since she still has some wheezing   Prednisone taper on hospital discharge that was extended  Denies shortness of breath, lung sounds clear and no acute concerns  Not appear fluid up, continue low dose lasix  Monitor leukocytosis, on steroid    Indeterminate 1.6 cm lesion in the posterior left kidney  *likely a cyst containing hemorrhagic or proteinaceous " material.  - recommend follow up renal US as outpatient once recovered from acute illness.     Hypertension  Hyperlipidemia  Recent Labs   Lab Test 07/07/23  0850 12/01/21  0833   CHOL 115 123   HDL 50 53   LDL 51 58   TRIG 72 62     Blood pressure managed  Recent increase Norvasc 10mg daily  continue with carvedilol, losartan and atorvastatin  Continue lasix  Monitor      CKD II/III  Creatinine   Date Value Ref Range Status   06/07/2024 0.99 (H) 0.51 - 0.95 mg/dL Final   04/22/2021 0.94 0.52 - 1.04 mg/dL Final   Chronic managed   Monitor      GERD  Continue Omeprazole  Monitor      Chronic normocytic anemia  Recent hgb levels 10-11  Hemoglobin   Date Value Ref Range Status   06/04/2024 11.7 11.7 - 15.7 g/dL Final   04/22/2021 12.2 11.7 - 15.7 g/dL Final   Monitor      Parkinson's disease  continue sinemet  Monitor      Hx of breast cancer  raloxifene to continue   Follow up oncologist as advised    Deconditioned  Comment: d/t recent hospitalization & comorbidity, expect delay rehabilitation d/t age & comorbidity  Plan: PT/OT eval & treat, monitor    Jun 28, 2024  1:00 PM  (Arrive by 12:40 PM)  Provider Visit with Damian Russ MD  [facility] ([facility] ) [phone]     Jul 12, 2024 10:30 AM  (Arrive by 10:10 AM)  Annual Wellness Visit with Damian Russ MD  [facility] ([facility] ) [phone]                Electronically signed by:  MIO Heaton CNP

## 2024-06-12 ENCOUNTER — TRANSITIONAL CARE UNIT VISIT (OUTPATIENT)
Dept: GERIATRICS | Facility: CLINIC | Age: 89
End: 2024-06-12
Payer: COMMERCIAL

## 2024-06-12 DIAGNOSIS — J45.901 MILD ASTHMA WITH ACUTE EXACERBATION, UNSPECIFIED WHETHER PERSISTENT: Primary | ICD-10-CM

## 2024-06-12 DIAGNOSIS — I50.31 ACUTE DIASTOLIC HEART FAILURE (H): ICD-10-CM

## 2024-06-12 DIAGNOSIS — G20.A1 PARKINSON'S DISEASE, UNSPECIFIED WHETHER DYSKINESIA PRESENT, UNSPECIFIED WHETHER MANIFESTATIONS FLUCTUATE (H): ICD-10-CM

## 2024-06-12 DIAGNOSIS — I10 ESSENTIAL HYPERTENSION: ICD-10-CM

## 2024-06-12 DIAGNOSIS — R53.81 PHYSICAL DECONDITIONING: ICD-10-CM

## 2024-06-12 PROCEDURE — 99309 SBSQ NF CARE MODERATE MDM 30: CPT | Performed by: NURSE PRACTITIONER

## 2024-06-12 RX ORDER — GABAPENTIN 100 MG/1
100 CAPSULE ORAL 2 TIMES DAILY PRN
COMMUNITY

## 2024-06-13 LAB
ANION GAP SERPL CALCULATED.3IONS-SCNC: 13 MMOL/L (ref 7–15)
BASOPHILS # BLD AUTO: 0.1 10E3/UL (ref 0–0.2)
BASOPHILS NFR BLD AUTO: 0 %
BUN SERPL-MCNC: 47.9 MG/DL (ref 8–23)
CALCIUM SERPL-MCNC: 9 MG/DL (ref 8.8–10.2)
CHLORIDE SERPL-SCNC: 105 MMOL/L (ref 98–107)
CREAT SERPL-MCNC: 1.08 MG/DL (ref 0.51–0.95)
DEPRECATED HCO3 PLAS-SCNC: 19 MMOL/L (ref 22–29)
EGFRCR SERPLBLD CKD-EPI 2021: 49 ML/MIN/1.73M2
EOSINOPHIL # BLD AUTO: 0 10E3/UL (ref 0–0.7)
EOSINOPHIL NFR BLD AUTO: 0 %
ERYTHROCYTE [DISTWIDTH] IN BLOOD BY AUTOMATED COUNT: 17.6 % (ref 10–15)
GLUCOSE SERPL-MCNC: 83 MG/DL (ref 70–99)
HCT VFR BLD AUTO: 33.5 % (ref 35–47)
HGB BLD-MCNC: 10.9 G/DL (ref 11.7–15.7)
IMM GRANULOCYTES # BLD: 0.1 10E3/UL
IMM GRANULOCYTES NFR BLD: 1 %
LYMPHOCYTES # BLD AUTO: 1.6 10E3/UL (ref 0.8–5.3)
LYMPHOCYTES NFR BLD AUTO: 12 %
MCH RBC QN AUTO: 29.9 PG (ref 26.5–33)
MCHC RBC AUTO-ENTMCNC: 32.5 G/DL (ref 31.5–36.5)
MCV RBC AUTO: 92 FL (ref 78–100)
MONOCYTES # BLD AUTO: 1.1 10E3/UL (ref 0–1.3)
MONOCYTES NFR BLD AUTO: 8 %
NEUTROPHILS # BLD AUTO: 10.7 10E3/UL (ref 1.6–8.3)
NEUTROPHILS NFR BLD AUTO: 79 %
NRBC # BLD AUTO: 0 10E3/UL
NRBC BLD AUTO-RTO: 0 /100
PLATELET # BLD AUTO: 279 10E3/UL (ref 150–450)
POTASSIUM SERPL-SCNC: 4.3 MMOL/L (ref 3.4–5.3)
RBC # BLD AUTO: 3.65 10E6/UL (ref 3.8–5.2)
SODIUM SERPL-SCNC: 137 MMOL/L (ref 135–145)
WBC # BLD AUTO: 13.5 10E3/UL (ref 4–11)

## 2024-06-13 PROCEDURE — 80048 BASIC METABOLIC PNL TOTAL CA: CPT | Mod: ORL | Performed by: INTERNAL MEDICINE

## 2024-06-13 PROCEDURE — P9604 ONE-WAY ALLOW PRORATED TRIP: HCPCS | Mod: ORL | Performed by: INTERNAL MEDICINE

## 2024-06-13 PROCEDURE — 85025 COMPLETE CBC W/AUTO DIFF WBC: CPT | Mod: ORL | Performed by: INTERNAL MEDICINE

## 2024-06-13 PROCEDURE — 36415 COLL VENOUS BLD VENIPUNCTURE: CPT | Mod: ORL | Performed by: INTERNAL MEDICINE

## 2024-06-17 ENCOUNTER — DISCHARGE SUMMARY NURSING HOME (OUTPATIENT)
Dept: GERIATRICS | Facility: CLINIC | Age: 89
End: 2024-06-17
Payer: COMMERCIAL

## 2024-06-17 VITALS
HEIGHT: 61 IN | RESPIRATION RATE: 18 BRPM | SYSTOLIC BLOOD PRESSURE: 125 MMHG | OXYGEN SATURATION: 98 % | WEIGHT: 121.8 LBS | DIASTOLIC BLOOD PRESSURE: 89 MMHG | TEMPERATURE: 97.8 F | BODY MASS INDEX: 23 KG/M2 | HEART RATE: 72 BPM

## 2024-06-17 DIAGNOSIS — E78.2 MIXED HYPERLIPIDEMIA: ICD-10-CM

## 2024-06-17 DIAGNOSIS — K21.9 GASTROESOPHAGEAL REFLUX DISEASE, UNSPECIFIED WHETHER ESOPHAGITIS PRESENT: ICD-10-CM

## 2024-06-17 DIAGNOSIS — R53.81 PHYSICAL DECONDITIONING: ICD-10-CM

## 2024-06-17 DIAGNOSIS — N18.31 STAGE 3A CHRONIC KIDNEY DISEASE (H): ICD-10-CM

## 2024-06-17 DIAGNOSIS — I10 ESSENTIAL HYPERTENSION: ICD-10-CM

## 2024-06-17 DIAGNOSIS — J90 BILATERAL PLEURAL EFFUSION: ICD-10-CM

## 2024-06-17 DIAGNOSIS — J96.21 ACUTE AND CHRONIC RESPIRATORY FAILURE WITH HYPOXIA (H): ICD-10-CM

## 2024-06-17 DIAGNOSIS — J45.901 MILD ASTHMA WITH ACUTE EXACERBATION, UNSPECIFIED WHETHER PERSISTENT: Primary | ICD-10-CM

## 2024-06-17 DIAGNOSIS — D64.9 NORMOCYTIC ANEMIA: ICD-10-CM

## 2024-06-17 DIAGNOSIS — G20.A1 PARKINSON'S DISEASE, UNSPECIFIED WHETHER DYSKINESIA PRESENT, UNSPECIFIED WHETHER MANIFESTATIONS FLUCTUATE (H): ICD-10-CM

## 2024-06-17 DIAGNOSIS — I50.31 ACUTE DIASTOLIC HEART FAILURE (H): ICD-10-CM

## 2024-06-17 PROCEDURE — 99316 NF DSCHRG MGMT 30 MIN+: CPT | Performed by: NURSE PRACTITIONER

## 2024-06-17 NOTE — PROGRESS NOTES
[place] [facility] DISCHARGE SUMMARY    PATIENT'S NAME: Opal Sni  YOB: 1935    PRIMARY CARE PROVIDER AND CLINIC RESPONSIBLE AFTER TRANSFER: Damian Russ     CODE STATUS: DNR/DNI    TRANSFERRING PROVIDERS: MIO Orellana CNP, Dr. Mary Davies MD      DATE OF SNF ADMISSION:  June / 01 / 2024    DATE OF SNF DISCHARGE (including anticipating DC): June / 18 / 2024    DISCHARGE DISPOSITION: [facility] Provider    Nursing Facility: [facility] stay 5/28/24 to 6/1/24.     Condition on Discharge:  Improving.    Function:  Ambulates:  ft w/4ww, Transfers: cga  Cognitive Scores: SLUMS 21/30     Physical Function:  TBA  Equipment: walker  DME: Walker    DISCHARGE DIAGNOSIS:      Mild asthma with acute exacerbation, unspecified whether persistent  Acute diastolic heart failure (H)  Acute and chronic respiratory failure with hypoxia (H)  Bilateral pleural effusion  Essential hypertension  Mixed hyperlipidemia  Stage 3a chronic kidney disease (H)  Normocytic anemia  Gastroesophageal reflux disease, unspecified whether esophagitis present  Parkinson's disease, unspecified whether dyskinesia present, unspecified whether manifestations fluctuate (H)  Physical deconditioning        HOSPITAL COURSE: pt has a long medical history including left breast CA in the distant past--see below-- and was admitted after a 3 day history of O2 altagracia in the 80's, cough and tiredness.  Of note, she was recently in the  hospital the first part of May for a diastolic HF exac and LLL CAP. She  was diuresed, CXR showed an improvement in the previous LLL infiltrate. No PE, . Had mod pulmonary edema with small bilat PL effusions. She improved, was weaned off the O2, SBP was elevated so Norvasc was increased form 5 to 10 mg--no change in other BP meds. Hgb steady. Was found to have a 1.6cm lesion left kidney--rec'd F/u as outpt. Sent  "for rehab.      TCU/SNF COURSE: has progressed with PT and OT, has some pain at times in neck and shoulders--helped by prn Neurontin that was stated last week. Also stated on nystatin for thrush--improving and will go thru 6/29. Looking forward to going home to [place].      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    Osteoporosis, unspecified     bone density- 2011    Parkinson's disease (H) 2015    PONV (postoperative nausea and vomiting)     nausea    Spinal stenosis, unspecified region other than cervical     Unspecified cerebral artery occlusion with cerebral infarction        DISCHARGE MEDICATIONS:  Current Outpatient Medications   Medication Sig Dispense Refill    acetaminophen (TYLENOL) 500 MG tablet Take 1,000 mg by mouth 2 times daily as needed for mild pain      acetaminophen (TYLENOL) 500 MG tablet Take 1,000 mg by mouth daily (with breakfast)      albuterol (PROAIR HFA/PROVENTIL HFA/VENTOLIN HFA) 108 (90 Base) MCG/ACT inhaler Inhale 1-2 puffs into the lungs every 6 hours as needed for shortness of breath or wheezing 18 g 1    amLODIPine (NORVASC) 10 MG tablet Take 1 tablet (10 mg) by mouth daily      aspirin EC 81 MG EC tablet Take 1 tablet (81 mg) by mouth daily      atorvastatin (LIPITOR) 20 MG tablet TAKE 1 TABLET(20 MG) BY MOUTH DAILY 90 tablet 3    azithromycin (ZITHROMAX) 500 MG tablet TAKE 1 TABLET BY MOUTH 30 TO 60 MINUTES PRIOR TO DENTAL PROCEDURE 1 tablet 3    budesonide (PULMICORT) 0.5 MG/2ML neb solution Take 2 mLs (0.5 mg) by nebulization 2 times daily (Patient taking differently: Take 0.5 mg by " nebulization 2 times daily as needed) 180 mL 11    calcium carbonate (OS-CHRIS) 1500 (600 Ca) MG tablet Take 600 mg by mouth 2 times daily (with meals) Staff may administer the Ca+ that pt has brought in      carbidopa-levodopa (SINEMET)  MG per tablet Take 2 tablets by mouth 4 times daily Takes at 6am, 11am, 4pm, and 9 pm      carvedilol (COREG) 12.5 MG tablet Take 1 tablet (12.5 mg) by mouth 2 times daily (with meals) 60 tablet 0    Cholecalciferol (VITAMIN D3 PO) Take 400 Units by mouth daily       coenzyme Q-10 capsule Take 1 capsule by mouth daily 100 mg dose      furosemide (LASIX) 20 MG tablet Take 1 tablet (20 mg) by mouth daily      gabapentin (NEURONTIN) 100 MG capsule Take 100 mg by mouth 2 times daily as needed for neuropathic pain      ipratropium - albuterol 0.5 mg/2.5 mg/3 mL (DUONEB) 0.5-2.5 (3) MG/3ML neb solution Take 1 vial (3 mLs) by nebulization 3 times daily (Patient taking differently: Take 3 mLs by nebulization 3 times daily as needed for shortness of breath or wheezing) 270 mL 0    losartan (COZAAR) 100 MG tablet TAKE 1 TABLET(100 MG) BY MOUTH DAILY (Patient taking differently: Take 100 mg by mouth every evening) 90 tablet 3    Multiple Vitamins-Minerals (PRESERVISION AREDS) CAPS Take 1 capsule by mouth 2 times daily      nystatin (MYCOSTATIN) 446832 UNIT/ML suspension Take 10 mLs (1,000,000 Units) by mouth 4 times daily for 21 days Swish and swallow (Patient taking differently: Take 1,000,000 Units by mouth 4 times daily Started 6/8/24 so ends on  6/29/2024  Swish and swallow) 840 mL 0    omeprazole (PRILOSEC) 20 MG DR capsule TAKE 1 CAPSULE(20 MG) BY MOUTH DAILY 90 capsule 0    polyethylene glycol (MIRALAX/GLYCOLAX) packet Take 1 packet by mouth daily as needed for constipation      polyethylene glycol-propylene glycol (SYSTANE ULTRA) 0.4-0.3 % SOLN ophthalmic solution Place 1 drop into both eyes every hour as needed for dry eyes May self admin and have at bedside.      raloxifene  "(EVISTA) 60 MG tablet TAKE 1 TABLET(60 MG) BY MOUTH DAILY 90 tablet 3    senna-docusate (SENOKOT-S;PERICOLACE) 8.6-50 MG per tablet Take 1 tablet by mouth 2 times daily as needed for constipation 30 tablet 0    sertraline (ZOLOFT) 25 MG tablet TAKE 1 TABLET(25 MG) BY MOUTH DAILY 90 tablet 0    tolterodine (DETROL) 1 MG tablet TAKE 1 TABLET(1 MG) BY MOUTH TWICE DAILY 180 tablet 0     MED REC REQUIRED   Post Medication Reconciliation Status: discharge medications reconciled and changed, per note/orders     MEDICATION CHANGES/RATIONALE:   See discharge orders  /89   Pulse 72   Temp 97.8  F (36.6  C)   Resp 18   Ht 1.549 m (5' 1\")   Wt 55.2 kg (121 lb 12.8 oz)   SpO2 98%   BMI 23.01 kg/m      TODAY DURING EXAM/ROS:  No CP, SOB, Cough, dizziness, fevers, chills, HA, N/V, dysuria or Bowel Abnormalities. Appetite is good.  No pain except shoulders and neck at times--helped by Neurontin.      PHYSICAL EXAM Today:  A & O x 3, NAD. Lungs CTA, non labored. RRR, S1/S2 w/o murmur,gallop or rub.  Trace pedal edema.  Abdomen soft, nontender, +BT'S. No focal neurological deficits. GENAO and up with walker.       SNF LABS  Recent Labs   Lab Test 06/13/24  1003 06/07/24  0856    134*   POTASSIUM 4.3 4.5   CHLORIDE 105 100   CO2 19* 21*   ANIONGAP 13 13   GLC 83 87   BUN 47.9* 65.5*   CR 1.08* 0.99*   CHRIS 9.0 9.6     CBC RESULTS:   Recent Labs   Lab Test 06/13/24  1003   WBC 13.5*   RBC 3.65*   HGB 10.9*   HCT 33.5*   MCV 92   MCH 29.9   MCHC 32.5   RDW 17.6*                DISCHARGE PLAN:  Occupational Therapy, Physical Therapy, Registered Nurse, Home Health Aide, and From:  [place]  Follow-up with PCP in 7 days: 7 days.   Current [place] or other scheduled appointments:  Future Appointments   Date Time Provider Department Center   6/28/2024  1:00 PM Damian Russ MD [facility]   7/12/2024 10:30 AM Damian Russ MD [facility]        MTM referral needed and placed by this provider: " none    Pending labs: none     Discharge Treatments:none       TOTAL DISCHARGE TIME:   Greater than 30 minutes    MIO Orellana [facility]              Documentation of Face-to-Face and Certification for Home Health Services     Patient: Opal Sin   YOB: 1935  MR Number: [id number]  Today's Date: 6/17/2024    I certify that patient: Opal Sin is under my care and that I, or a nurse practitioner or physician's assistant working with me, had a face-to-face encounter that meets the physician face-to-face encounter requirements with this patient on: 6/17/2024.    This encounter with the patient was in whole, or in part, for the following medical condition, which is the primary reason for home health care:    Mild asthma with acute exacerbation, unspecified whether persistent  Acute diastolic heart failure (H)  Acute and chronic respiratory failure with hypoxia (H)  Bilateral pleural effusion  Essential hypertension  Mixed hyperlipidemia  Stage 3a chronic kidney disease (H)  Normocytic anemia  Gastroesophageal reflux disease, unspecified whether esophagitis present  Parkinson's disease, unspecified whether dyskinesia present, unspecified whether manifestations fluctuate (H)  Physical deconditioning  .    I certify that, based on my findings, the following services are medically necessary home health services: Nursing, Occupational Therapy, Physical Therapy, and HHA.    My clinical findings support the need for the above services because: Nurse is needed: To assess heart and lungs after changes in medications or other medical regimen. and has had 2 recent hospitalizations for HF., Occupational Therapy Services are needed to assess and treat cognitive ability and address ADL safety due to impairment in weakness, safety eval and deconditioning., and Physical Therapy Services are needed to assess and treat the following functional impairments:  deconditioning and weakness.    Further, I certify that my clinical findings support that this patient is homebound (i.e. absences from home require considerable and taxing effort and are for medical reasons or [facility] services or infrequently or of short duration when for other reasons) because: Requires assistance of another person or specialized equipment to access medical services because patient: Is unable to walk greater than  feet without rest...    Based on the above findings. I certify that this patient is confined to the home and needs intermittent skilled nursing care, physical therapy and/or speech therapy.  The patient is under my care, and I have initiated the establishment of the plan of care.  This patient will be followed by a physician who will periodically review the plan of care.  Physician/Provider to provide follow up care: Damian Russ    Responsible Medicare certified PEC[employer] Physician: Cha Lopez RN CNP  Physician Signature: See electronic signature associated with these discharge orders.  Date: 6/17/2024

## 2024-06-17 NOTE — PATIENT INSTRUCTIONS
[facility] Discharge Orders    Name: Opal Sin  : 1935  Planned Discharge Date: 2024  Discharged to: previous assisted living--Ericka SEGURA [facility]    MEDICAL FOLLOW UP  Follow up with PCP in 1-2 weeks  Follow up with Specialists per PCP  Next 5 appointments (look out 90 days)      2024  1:00 PM  (Arrive by 12:40 PM)  Provider Visit with Damian Russ MD  [facility] [address], [address]  [facility] [address]  [phone]     2024 10:30 AM  (Arrive by 10:10 AM)  Annual Wellness Visit with Damian Russ MD  [facility] ) [address], [address]  [facility] [address]  [phone]            FUTURE LABS: No labs orders/due    ORDER CHANGES:  Discontinue loperamide, robitussin, prn Miralax    DISCHARGE MEDICATIONS:  The patient s pharmacy is authorized to dispense a 30-day supply of medications. Refill requests should be directed to the primary provider, Damian Russ .   No narcotics are prescribed at time of discharge.   Current Outpatient Medications   Medication Sig Dispense Refill    acetaminophen (TYLENOL) 500 MG tablet Take 1,000 mg by mouth 2 times daily as needed for mild pain      acetaminophen (TYLENOL) 500 MG tablet Take 1,000 mg by mouth daily (with breakfast)      albuterol (PROAIR HFA/PROVENTIL HFA/VENTOLIN HFA) 108 (90 Base) MCG/ACT inhaler Inhale 1-2 puffs into the lungs every 6 hours as needed for shortness of breath or wheezing 18 g 1    amLODIPine (NORVASC) 10 MG tablet Take 1 tablet (10 mg) by mouth daily      aspirin EC 81 MG EC tablet Take 1 tablet (81 mg) by mouth daily      atorvastatin (LIPITOR) 20 MG tablet TAKE 1 TABLET(20 MG) BY MOUTH DAILY 90 tablet 3    azithromycin (ZITHROMAX) 500 MG tablet TAKE 1 TABLET BY MOUTH 30 TO 60 MINUTES PRIOR TO DENTAL PROCEDURE 1 tablet 3     budesonide (PULMICORT) 0.5 MG/2ML neb solution Take 2 mLs (0.5 mg) by nebulization 2 times daily (Patient taking differently: Take 0.5 mg by nebulization 2 times daily as needed) 180 mL 11    calcium carbonate (OS-CHRIS) 1500 (600 Ca) MG tablet Take 600 mg by mouth 2 times daily (with meals) Staff may administer the Ca+ that pt has brought in      carbidopa-levodopa (SINEMET)  MG per tablet Take 2 tablets by mouth 4 times daily Takes at 6am, 11am, 4pm, and 9 pm      carvedilol (COREG) 12.5 MG tablet Take 1 tablet (12.5 mg) by mouth 2 times daily (with meals) 60 tablet 0    Cholecalciferol (VITAMIN D3 PO) Take 400 Units by mouth daily       coenzyme Q-10 capsule Take 1 capsule by mouth daily 100 mg dose      furosemide (LASIX) 20 MG tablet Take 1 tablet (20 mg) by mouth daily      gabapentin (NEURONTIN) 100 MG capsule Take 100 mg by mouth 2 times daily as needed for neuropathic pain      ipratropium - albuterol 0.5 mg/2.5 mg/3 mL (DUONEB) 0.5-2.5 (3) MG/3ML neb solution Take 1 vial (3 mLs) by nebulization 3 times daily (Patient taking differently: Take 3 mLs by nebulization 3 times daily as needed for shortness of breath or wheezing) 270 mL 0    losartan (COZAAR) 100 MG tablet TAKE 1 TABLET(100 MG) BY MOUTH DAILY (Patient taking differently: Take 100 mg by mouth every evening) 90 tablet 3    Multiple Vitamins-Minerals (PRESERVISION AREDS) CAPS Take 1 capsule by mouth 2 times daily      nystatin (MYCOSTATIN) 409775 UNIT/ML suspension Take 10 mLs (1,000,000 Units) by mouth 4 times daily for 21 days Swish and swallow (Patient taking differently: Take 1,000,000 Units by mouth 4 times daily Started 6/8/24 so ends on  6/29/2024  Swish and swallow) 840 mL 0    omeprazole (PRILOSEC) 20 MG DR capsule TAKE 1 CAPSULE(20 MG) BY MOUTH DAILY 90 capsule 0    polyethylene glycol (MIRALAX/GLYCOLAX) packet Take 1 packet by mouth daily as needed for constipation      polyethylene glycol-propylene glycol (SYSTANE ULTRA) 0.4-0.3 %  SOLN ophthalmic solution Place 1 drop into both eyes every hour as needed for dry eyes May self admin and have at bedside.      raloxifene (EVISTA) 60 MG tablet TAKE 1 TABLET(60 MG) BY MOUTH DAILY 90 tablet 3    senna-docusate (SENOKOT-S;PERICOLACE) 8.6-50 MG per tablet Take 1 tablet by mouth 2 times daily as needed for constipation 30 tablet 0    sertraline (ZOLOFT) 25 MG tablet TAKE 1 TABLET(25 MG) BY MOUTH DAILY 90 tablet 0    tolterodine (DETROL) 1 MG tablet TAKE 1 TABLET(1 MG) BY MOUTH TWICE DAILY 180 tablet 0       SERVICES:  Home Care:  Occupational Therapy, Physical Therapy, Registered Nurse, Home Health Aide, and From:  [facility]    ADDITIONAL INSTRUCTIONS:  None    MIO Orellana CNP  This document was electronically signed on June 17, 2024

## 2024-06-18 ENCOUNTER — DOCUMENTATION ONLY (OUTPATIENT)
Dept: FAMILY MEDICINE | Facility: CLINIC | Age: 89
End: 2024-06-18
Payer: COMMERCIAL

## 2024-06-18 DIAGNOSIS — Z76.89 ENCOUNTER FOR SUPPORT AND COORDINATION OF TRANSITION OF CARE: Primary | ICD-10-CM

## 2024-06-24 ENCOUNTER — PATIENT OUTREACH (OUTPATIENT)
Dept: CARE COORDINATION | Facility: CLINIC | Age: 89
End: 2024-06-24
Payer: COMMERCIAL

## 2024-06-24 NOTE — PROGRESS NOTES
Clinic Care Coordination Contact  Care Coordination Clinician Chart Review    Situation: Patient chart reviewed by care coordinator.    Background: Clinic Care Coordination Referral received from inpatient care team for transition handoff communication following hospital admission.    Assessment: Upon chart review, patient is not a candidate for Primary Care Clinic Care Coordination enrollment due to reason stated below:  Patient is receiving duplicative services from [facility].  Pt is A/1 at [facility] according to hospital notes.    Plan/Recommendations: Clinic Care Coordination Referral/order cancelled. RN/SW CC will perform no further monitoring/outreaches at this time and will remain available as needed. If new needs arise, a new Care Coordination Referral may be placed.    Miracle Tobar,  [facility]  Clinic Care Coordinator  [facility]  [phone]  [email or web address]

## 2024-06-27 ENCOUNTER — NURSE TRIAGE (OUTPATIENT)
Dept: FAMILY MEDICINE | Facility: CLINIC | Age: 89
End: 2024-06-27
Payer: COMMERCIAL

## 2024-06-27 DIAGNOSIS — I10 BENIGN ESSENTIAL HYPERTENSION: ICD-10-CM

## 2024-06-27 DIAGNOSIS — I50.31 ACUTE DIASTOLIC HEART FAILURE (H): ICD-10-CM

## 2024-06-27 RX ORDER — FUROSEMIDE 20 MG
20 TABLET ORAL DAILY
Qty: 30 TABLET | Refills: 0 | Status: SHIPPED | OUTPATIENT
Start: 2024-06-27 | End: 2024-06-28

## 2024-06-27 RX ORDER — CARVEDILOL 12.5 MG/1
12.5 TABLET ORAL 2 TIMES DAILY WITH MEALS
Qty: 60 TABLET | Refills: 0 | Status: CANCELLED
Start: 2024-06-27

## 2024-06-27 NOTE — TELEPHONE ENCOUNTER
Nurse Triage SBAR    Is this a 2nd Level Triage? YES, LICENSED PRACTITIONER REVIEW IS REQUIRED    Situation: Pt calling in reporting +2 edema in lower legs. Pt states when she was in hospital and TCU she was on lasix but is not currently taking it. Pt also reports taking carvedilol 12mg in hospital and TCU but is now taking 6.25 again. Pt reports BP increasing denies sx.     Pt denies chest pain, SOB, redness or pain in legs.     Background: Hospitalized and medications changes with different providers.     Assessment: Edema    Protocol Recommended Disposition:   See in Office Today    Recommendation: Should pt be back on lasix and increased dose of carvedilol? Please advise.    Routed to provider    Does the patient meet one of the following criteria for ADS visit consideration? 16+ years old, with an [facility] PCP     TIP  Providers, please consider if this condition is appropriate for management at one of our Acute and Diagnostic Services sites.     If patient is a good candidate, please use dotphrase <dot>triageresponse and select Refer to ADS to document.   Reason for Disposition   MODERATE swelling of both ankles (e.g., swelling extends up to the knees) AND new-onset or worsening    Additional Information   Negative: SEVERE swelling (e.g., swelling extends above knee, entire leg is swollen, weeping fluid)   Negative: Cast on leg or ankle and has increasing pain   Negative: Can't walk or can barely stand (new-onset)   Negative: Fever and red area (or area very tender to touch)   Negative: Patient sounds very sick or weak to the triager   Negative: Difficulty breathing at rest   Negative: Entire foot is cool or blue in comparison to other side   Negative: Sounds like a life-threatening emergency to the triager   Negative: Chest pain   Negative: Followed an insect bite and has localized swelling (e.g., small area of puffy or swollen skin)   Negative: Followed a knee injury   Negative: Ankle or foot injury   Negative:  "Pregnant with leg swelling or edema   Negative: Swelling of face, arm or hands  (Exception: Slight puffiness of fingers during hot weather.)   Negative: Pregnant 20 or more weeks and sudden weight gain (i.e., > 2 lbs, 1 kg in one week)   Negative: Thigh or calf pain and only 1 side and present > 1 hour   Negative: Thigh, calf, or ankle swelling in only one leg   Negative: Thigh, calf, or ankle swelling in both legs, but one side is definitely more swollen (Exception: Longstanding difference between legs.)    Answer Assessment - Initial Assessment Questions  1. ONSET: \"When did the swelling start?\" (e.g., minutes, hours, days)      Yesterday     2. LOCATION: \"What part of the leg is swollen?\"  \"Are both legs swollen or just one leg?\"      Both legs     3. SEVERITY: \"How bad is the swelling?\" (e.g., localized; mild, moderate, severe)    - Localized: Small area of swelling localized to one leg.    - MILD pedal edema: Swelling limited to foot and ankle, pitting edema < 1/4 inch (6 mm) deep, rest and elevation eliminate most or all swelling.    - MODERATE edema: Swelling of lower leg to knee, pitting edema > 1/4 inch (6 mm) deep, rest and elevation only partially reduce swelling.    - SEVERE edema: Swelling extends above knee, facial or hand swelling present.       Moderate, lower legs and feet more on L than R    4. REDNESS: \"Does the swelling look red or infected?\"      No    5. PAIN: \"Is the swelling painful to touch?\" If Yes, ask: \"How painful is it?\"   (Scale 1-10; mild, moderate or severe)      No    6. FEVER: \"Do you have a fever?\" If Yes, ask: \"What is it, how was it measured, and when did it start?\"       No    7. CAUSE: \"What do you think is causing the leg swelling?\"      Not on lasix anymore     8. MEDICAL HISTORY: \"Do you have a history of blood clots (e.g., DVT), cancer, heart failure, kidney disease, or liver failure?\"      No    9. RECURRENT SYMPTOM: \"Have you had leg swelling before?\" If Yes, ask: \"When " "was the last time?\" \"What happened that time?\"      Yes in hospital     10. OTHER SYMPTOMS: \"Do you have any other symptoms?\" (e.g., chest pain, difficulty breathing)        Denies chest pain, difficulty breathing    11. PREGNANCY: \"Is there any chance you are pregnant?\" \"When was your last menstrual period?\"        No    Protocols used: Leg Swelling and Edema-A-OH    "

## 2024-06-27 NOTE — TELEPHONE ENCOUNTER
Could we schedule her for a video visit tomorrow - OK to overbook?  OK to resume lasix 20 mg x 1 dose tonight.  We can discuss carvedilol, losartan and amlodipine tomorrow.    Damian Russ MD

## 2024-06-28 ENCOUNTER — VIRTUAL VISIT (OUTPATIENT)
Dept: FAMILY MEDICINE | Facility: CLINIC | Age: 89
End: 2024-06-28
Payer: COMMERCIAL

## 2024-06-28 DIAGNOSIS — I10 BENIGN ESSENTIAL HYPERTENSION: ICD-10-CM

## 2024-06-28 DIAGNOSIS — I50.31 ACUTE DIASTOLIC HEART FAILURE (H): ICD-10-CM

## 2024-06-28 PROCEDURE — 99442 PR PHYSICIAN TELEPHONE EVALUATION 11-20 MIN: CPT | Mod: 93 | Performed by: INTERNAL MEDICINE

## 2024-06-28 RX ORDER — FUROSEMIDE 20 MG
20 TABLET ORAL DAILY
Qty: 90 TABLET | Refills: 3 | Status: SHIPPED | OUTPATIENT
Start: 2024-06-28 | End: 2024-07-12

## 2024-06-28 RX ORDER — CARVEDILOL 12.5 MG/1
12.5 TABLET ORAL 2 TIMES DAILY WITH MEALS
Qty: 180 TABLET | Refills: 3 | Status: SHIPPED | OUTPATIENT
Start: 2024-06-28

## 2024-06-28 NOTE — PROGRESS NOTES
Opal is a 89 year old who is being evaluated via a billable telephone visit.    What phone number would you like to be contacted at? [phone]  How would you like to obtain your AVS? [email or web address]  Originating Location (pt. Location): Home    Distant Location (provider location):  On-site        Subjective   Opal is a 89 year old, presenting for the following health issues:  Hypertension and Leg Swelling    HPI     Lower extremity edema   Opal Sin has noticed increased edema since discontinue from transitional care unit.  She is living in senior center that has some food with more sodium.  She was advised to discontinue lasix after discharge from transitional care unit and does not recall the reasoning.  She notes that after a dose of lasix this AM her blood pressure improved 136/77 and she is trying to minimize sodium.      Review of Systems  Constitutional, HEENT, cardiovascular, pulmonary, GI, , musculoskeletal, neuro, skin, endocrine and psych systems are negative, except as otherwise noted.      Objective    Vitals - Patient Reported  Systolic (Patient Reported): (!) 156  Diastolic (Patient Reported): (!) 90  Pain Score: No Pain (0)      Vitals:  No vitals were obtained today due to virtual visit.    Physical Exam   General: Alert and no distress //Respiratory: No audible wheeze, cough, or shortness of breath // Psychiatric:  Appropriate affect, tone, and pace of words      (I10) Benign essential hypertension  Comment: blood pressure is improved and we will continue carvedilol   Plan: carvedilol (COREG) 12.5 MG tablet            (I50.31) Acute diastolic heart failure (H)  Comment: also continue back on lasix 20 mg daily and we will recheck blood pressure and labs at our next office visit in 2 weeks  Plan: furosemide (LASIX) 20 MG tablet                 Phone call duration: 11 minutes  Signed Electronically by: Damian Russ MD, MD     [employer]

## 2024-07-11 SDOH — HEALTH STABILITY: PHYSICAL HEALTH: ON AVERAGE, HOW MANY DAYS PER WEEK DO YOU ENGAGE IN MODERATE TO STRENUOUS EXERCISE (LIKE A BRISK WALK)?: 0 DAYS

## 2024-07-11 SDOH — HEALTH STABILITY: PHYSICAL HEALTH: ON AVERAGE, HOW MANY MINUTES DO YOU ENGAGE IN EXERCISE AT THIS LEVEL?: 0 MIN

## 2024-07-11 ASSESSMENT — ASTHMA QUESTIONNAIRES
ACT_TOTALSCORE: 9
QUESTION_3 LAST FOUR WEEKS HOW OFTEN DID YOUR ASTHMA SYMPTOMS (WHEEZING, COUGHING, SHORTNESS OF BREATH, CHEST TIGHTNESS OR PAIN) WAKE YOU UP AT NIGHT OR EARLIER THAN USUAL IN THE MORNING: FOUR OR MORE NIGHTS A WEEK
HOSPITALIZATION_OVERNIGHT_LAST_YEAR_TOTAL: TWO
QUESTION_1 LAST FOUR WEEKS HOW MUCH OF THE TIME DID YOUR ASTHMA KEEP YOU FROM GETTING AS MUCH DONE AT WORK, SCHOOL OR AT HOME: SOME OF THE TIME
QUESTION_2 LAST FOUR WEEKS HOW OFTEN HAVE YOU HAD SHORTNESS OF BREATH: MORE THAN ONCE A DAY
ACT_TOTALSCORE: 9
QUESTION_4 LAST FOUR WEEKS HOW OFTEN HAVE YOU USED YOUR RESCUE INHALER OR NEBULIZER MEDICATION (SUCH AS ALBUTEROL): THREE OR MORE TIMES PER DAY
QUESTION_5 LAST FOUR WEEKS HOW WOULD YOU RATE YOUR ASTHMA CONTROL: SOMEWHAT CONTROLLED
EMERGENCY_ROOM_LAST_YEAR_TOTAL: TWO

## 2024-07-11 ASSESSMENT — PATIENT HEALTH QUESTIONNAIRE - PHQ9
SUM OF ALL RESPONSES TO PHQ QUESTIONS 1-9: 1
10. IF YOU CHECKED OFF ANY PROBLEMS, HOW DIFFICULT HAVE THESE PROBLEMS MADE IT FOR YOU TO DO YOUR WORK, TAKE CARE OF THINGS AT HOME, OR GET ALONG WITH OTHER PEOPLE: NOT DIFFICULT AT ALL
SUM OF ALL RESPONSES TO PHQ QUESTIONS 1-9: 1

## 2024-07-11 ASSESSMENT — SOCIAL DETERMINANTS OF HEALTH (SDOH): HOW OFTEN DO YOU GET TOGETHER WITH FRIENDS OR RELATIVES?: MORE THAN THREE TIMES A WEEK

## 2024-07-12 ENCOUNTER — OFFICE VISIT (OUTPATIENT)
Dept: FAMILY MEDICINE | Facility: CLINIC | Age: 89
End: 2024-07-12
Payer: COMMERCIAL

## 2024-07-12 VITALS
HEIGHT: 61 IN | DIASTOLIC BLOOD PRESSURE: 80 MMHG | OXYGEN SATURATION: 98 % | HEART RATE: 59 BPM | RESPIRATION RATE: 16 BRPM | TEMPERATURE: 97.5 F | SYSTOLIC BLOOD PRESSURE: 136 MMHG | BODY MASS INDEX: 25.68 KG/M2 | WEIGHT: 136 LBS

## 2024-07-12 DIAGNOSIS — M81.0 AGE-RELATED OSTEOPOROSIS WITHOUT CURRENT PATHOLOGICAL FRACTURE: ICD-10-CM

## 2024-07-12 DIAGNOSIS — I50.31 ACUTE DIASTOLIC HEART FAILURE (H): ICD-10-CM

## 2024-07-12 DIAGNOSIS — E78.2 MIXED HYPERLIPIDEMIA: ICD-10-CM

## 2024-07-12 DIAGNOSIS — I10 ESSENTIAL HYPERTENSION WITH GOAL BLOOD PRESSURE LESS THAN 140/90: ICD-10-CM

## 2024-07-12 DIAGNOSIS — N18.32 STAGE 3B CHRONIC KIDNEY DISEASE (H): ICD-10-CM

## 2024-07-12 DIAGNOSIS — M75.01 ADHESIVE CAPSULITIS OF RIGHT SHOULDER: ICD-10-CM

## 2024-07-12 DIAGNOSIS — Z12.31 VISIT FOR SCREENING MAMMOGRAM: ICD-10-CM

## 2024-07-12 DIAGNOSIS — Z00.00 ROUTINE GENERAL MEDICAL EXAMINATION AT A HEALTH CARE FACILITY: Primary | ICD-10-CM

## 2024-07-12 DIAGNOSIS — J45.20 INTERMITTENT ASTHMA WITHOUT COMPLICATION, UNSPECIFIED ASTHMA SEVERITY: ICD-10-CM

## 2024-07-12 DIAGNOSIS — R32 URINARY INCONTINENCE, UNSPECIFIED TYPE: ICD-10-CM

## 2024-07-12 DIAGNOSIS — I10 BENIGN ESSENTIAL HYPERTENSION: ICD-10-CM

## 2024-07-12 DIAGNOSIS — F32.0 MILD MAJOR DEPRESSION (H): ICD-10-CM

## 2024-07-12 DIAGNOSIS — E78.5 HYPERLIPIDEMIA LDL GOAL <100: ICD-10-CM

## 2024-07-12 LAB
ALBUMIN SERPL BCG-MCNC: 3.7 G/DL (ref 3.5–5.2)
ALBUMIN UR-MCNC: >=300 MG/DL
ALP SERPL-CCNC: 154 U/L (ref 40–150)
ALT SERPL W P-5'-P-CCNC: 5 U/L (ref 0–50)
ANION GAP SERPL CALCULATED.3IONS-SCNC: 11 MMOL/L (ref 7–15)
APPEARANCE UR: CLEAR
AST SERPL W P-5'-P-CCNC: 19 U/L (ref 0–45)
BACTERIA #/AREA URNS HPF: ABNORMAL /HPF
BILIRUB SERPL-MCNC: 0.3 MG/DL
BILIRUB UR QL STRIP: NEGATIVE
BUN SERPL-MCNC: 37.7 MG/DL (ref 8–23)
CALCIUM SERPL-MCNC: 9 MG/DL (ref 8.8–10.2)
CHLORIDE SERPL-SCNC: 107 MMOL/L (ref 98–107)
CHOLEST SERPL-MCNC: 119 MG/DL
COLOR UR AUTO: YELLOW
CREAT SERPL-MCNC: 0.85 MG/DL (ref 0.51–0.95)
CREAT UR-MCNC: 64.7 MG/DL
DEPRECATED HCO3 PLAS-SCNC: 19 MMOL/L (ref 22–29)
EGFRCR SERPLBLD CKD-EPI 2021: 66 ML/MIN/1.73M2
ERYTHROCYTE [DISTWIDTH] IN BLOOD BY AUTOMATED COUNT: 19.4 % (ref 10–15)
FASTING STATUS PATIENT QL REPORTED: NO
FASTING STATUS PATIENT QL REPORTED: NO
GLUCOSE SERPL-MCNC: 94 MG/DL (ref 70–99)
GLUCOSE UR STRIP-MCNC: NEGATIVE MG/DL
HCT VFR BLD AUTO: 33.1 % (ref 35–47)
HDLC SERPL-MCNC: 42 MG/DL
HGB BLD-MCNC: 10.4 G/DL (ref 11.7–15.7)
HGB UR QL STRIP: ABNORMAL
KETONES UR STRIP-MCNC: NEGATIVE MG/DL
LDLC SERPL CALC-MCNC: 52 MG/DL
LEUKOCYTE ESTERASE UR QL STRIP: ABNORMAL
MCH RBC QN AUTO: 29.9 PG (ref 26.5–33)
MCHC RBC AUTO-ENTMCNC: 31.4 G/DL (ref 31.5–36.5)
MCV RBC AUTO: 95 FL (ref 78–100)
MICROALBUMIN UR-MCNC: 655 MG/L
MICROALBUMIN/CREAT UR: 1012.36 MG/G CR (ref 0–25)
NITRATE UR QL: NEGATIVE
NONHDLC SERPL-MCNC: 77 MG/DL
PH UR STRIP: 6 [PH] (ref 5–7)
PLATELET # BLD AUTO: 269 10E3/UL (ref 150–450)
POTASSIUM SERPL-SCNC: 4.7 MMOL/L (ref 3.4–5.3)
PROT SERPL-MCNC: 6.7 G/DL (ref 6.4–8.3)
RBC # BLD AUTO: 3.48 10E6/UL (ref 3.8–5.2)
RBC #/AREA URNS AUTO: ABNORMAL /HPF
SODIUM SERPL-SCNC: 137 MMOL/L (ref 135–145)
SP GR UR STRIP: 1.02 (ref 1–1.03)
SQUAMOUS #/AREA URNS AUTO: ABNORMAL /LPF
TRIGL SERPL-MCNC: 126 MG/DL
UROBILINOGEN UR STRIP-ACNC: 0.2 E.U./DL
WBC # BLD AUTO: 11.7 10E3/UL (ref 4–11)
WBC #/AREA URNS AUTO: ABNORMAL /HPF
WBC CLUMPS #/AREA URNS HPF: PRESENT /HPF

## 2024-07-12 PROCEDURE — 80053 COMPREHEN METABOLIC PANEL: CPT | Performed by: INTERNAL MEDICINE

## 2024-07-12 PROCEDURE — 36415 COLL VENOUS BLD VENIPUNCTURE: CPT | Performed by: INTERNAL MEDICINE

## 2024-07-12 PROCEDURE — G0439 PPPS, SUBSEQ VISIT: HCPCS | Performed by: INTERNAL MEDICINE

## 2024-07-12 PROCEDURE — 85027 COMPLETE CBC AUTOMATED: CPT | Performed by: INTERNAL MEDICINE

## 2024-07-12 PROCEDURE — 82570 ASSAY OF URINE CREATININE: CPT | Performed by: INTERNAL MEDICINE

## 2024-07-12 PROCEDURE — 82043 UR ALBUMIN QUANTITATIVE: CPT | Performed by: INTERNAL MEDICINE

## 2024-07-12 PROCEDURE — 80061 LIPID PANEL: CPT | Performed by: INTERNAL MEDICINE

## 2024-07-12 PROCEDURE — 81001 URINALYSIS AUTO W/SCOPE: CPT | Performed by: INTERNAL MEDICINE

## 2024-07-12 PROCEDURE — 99214 OFFICE O/P EST MOD 30 MIN: CPT | Mod: 25 | Performed by: INTERNAL MEDICINE

## 2024-07-12 RX ORDER — ATORVASTATIN CALCIUM 20 MG/1
20 TABLET, FILM COATED ORAL DAILY
Qty: 90 TABLET | Refills: 3 | Status: SHIPPED | OUTPATIENT
Start: 2024-07-12

## 2024-07-12 RX ORDER — FUROSEMIDE 20 MG
20 TABLET ORAL DAILY
Qty: 90 TABLET | Refills: 3 | Status: SHIPPED | OUTPATIENT
Start: 2024-07-12 | End: 2024-07-29

## 2024-07-12 RX ORDER — SERTRALINE HYDROCHLORIDE 25 MG/1
25 TABLET, FILM COATED ORAL DAILY
Qty: 90 TABLET | Refills: 0 | Status: SHIPPED | OUTPATIENT
Start: 2024-07-12

## 2024-07-12 RX ORDER — AMLODIPINE BESYLATE 10 MG/1
10 TABLET ORAL DAILY
Qty: 90 TABLET | Refills: 3 | Status: SHIPPED | OUTPATIENT
Start: 2024-07-12

## 2024-07-12 RX ORDER — LOSARTAN POTASSIUM 100 MG/1
100 TABLET ORAL EVERY EVENING
Qty: 90 TABLET | Refills: 3 | Status: SHIPPED | OUTPATIENT
Start: 2024-07-12

## 2024-07-12 RX ORDER — IPRATROPIUM BROMIDE AND ALBUTEROL SULFATE 2.5; .5 MG/3ML; MG/3ML
3 SOLUTION RESPIRATORY (INHALATION) 3 TIMES DAILY
Qty: 270 ML | Refills: 3 | Status: SHIPPED | OUTPATIENT
Start: 2024-07-12

## 2024-07-12 RX ORDER — TOLTERODINE TARTRATE 1 MG/1
1 TABLET, EXTENDED RELEASE ORAL 2 TIMES DAILY
Qty: 180 TABLET | Refills: 3 | Status: SHIPPED | OUTPATIENT
Start: 2024-07-12

## 2024-07-12 ASSESSMENT — PAIN SCALES - GENERAL: PAINLEVEL: SEVERE PAIN (6)

## 2024-07-12 NOTE — PROGRESS NOTES
Preventive Care Visit  [facility] BAL Russ MD, MD, Internal Medicine  Jul 12, 2024          Harper Joseph is a 89 year old, presenting for the following:  Physical (Non fasting)          Via the Health Maintenance questionnaire, the patient has reported the following services have been completed -Mammogram: [facility] 2023-11-04, this information has not been sent to the abstraction team.  Health Care Directive  Patient has a Health Care Directive on file      HPI        7/11/2024   General Health   How would you rate your overall physical health? Good   Feel stress (tense, anxious, or unable to sleep) Only a little      (!) STRESS CONCERN      7/11/2024   Nutrition   Diet: Low salt            7/11/2024   Exercise   Days per week of moderate/strenous exercise 0 days   Average minutes spent exercising at this level 0 min      (!) EXERCISE CONCERN      7/11/2024   Social Factors   Frequency of gathering with friends or relatives More than three times a week   Worry food won't last until get money to buy more No   Food not last or not have enough money for food? No   Do you have housing? (Housing is defined as stable permanent housing and does not include staying ouside in a car, in a tent, in an abandoned building, in an overnight shelter, or couch-surfing.) Yes   Are you worried about losing your housing? No   Lack of transportation? No   Unable to get utilities (heat,electricity)? No            7/12/2024   Fall Risk   Reason Gait Speed Test Not Completed Patient does not tolerate an upright or standing position (e.g. wheelchair)             7/11/2024   Activities of Daily Living- Home Safety   Needs help with the following daily activites Transportation    Shopping    Housework    Bathing    Toileting    Dressing   Safety concerns in the home None of the above       Multiple values from one day are sorted in reverse-chronological order         7/11/2024   Dental   Dentist two  "times every year? Yes            7/11/2024   Hearing Screening   Hearing concerns? (!) IT'S HARDER TO UNDERSTAND WOMEN'S VOICES THAN MEN'S VOICES.            7/11/2024   Driving Risk Screening   Patient/family members have concerns about driving No            7/11/2024   General Alertness/Fatigue Screening   Have you been more tired than usual lately? (!) YES            7/11/2024   Urinary Incontinence Screening   Bothered by leaking urine in past 6 months Yes            7/11/2024   TB Screening   Were you born outside of the [place]? No          Today's PHQ-9 Score:       7/11/2024     6:49 PM   PHQ-9 SCORE   PHQ-9 Total Score MyChart 1 (Minimal depression)   PHQ-9 Total Score 1         7/11/2024   Substance Use   Alcohol more than 3/day or more than 7/wk Not Applicable   Do you have a current opioid prescription? No   How severe/bad is pain from 1 to 10? 6/10   Do you use any other substances recreationally? No        Social History     Tobacco Use    Smoking status: Never    Smokeless tobacco: Never   Vaping Use    Vaping status: Never Used   Substance Use Topics    Alcohol use: Yes     Comment: rare    Drug use: No          Mammogram Screening - After age 74- determine frequency with patient based on health status, life expectancy and patient goals          Reviewed and updated as needed this visit by Provider                    Past Medical History:   Diagnosis Date    Asthma 5 yrs    stable off medications     Breast CA (H) 1987    L , radical mastectomy     Degeneration of intervertebral disc, site unspecified     Essential hypertension, benign     Gastro-oesophageal reflux disease     History of blood transfusion 2003    Hx of antibiotic allergy     multiple abx caused allergy    Hyperlipidaemia     Osteomyelitis (H)     right foot \"99 - MRSA    Osteoporosis, unspecified     bone density- 2011    Parkinson's disease (H) 2015    PONV (postoperative nausea and vomiting)     nausea    Spinal stenosis, unspecified " region other than cervical     Unspecified cerebral artery occlusion with cerebral infarction      Current providers sharing in care for this patient include:  Patient Care Team:  Damian Russ MD as PCP - General (Internal Medicine)  Damian Russ MD as Assigned PCP    The following health maintenance items are reviewed in Epic and correct as of today:  Health Maintenance   Topic Date Due    HF ACTION PLAN  Never done    ASTHMA ACTION PLAN  12/20/2017    URINE DRUG SCREEN  07/26/2018    MAMMO SCREENING  02/07/2023    DEXA  02/07/2024    COVID-19 Vaccine (8 - 2023-24 season) 04/13/2024    LIPID  07/07/2024    MICROALBUMIN  07/07/2024    MEDICARE ANNUAL WELLNESS VISIT  07/10/2024    INFLUENZA VACCINE (1) 09/01/2024    BMP  11/30/2024    ASTHMA CONTROL TEST  01/12/2025    PHQ-9  01/12/2025    DTAP/TDAP/TD IMMUNIZATION (3 - Td or Tdap) 04/17/2025    ALT  05/28/2025    CBC  06/13/2025    HEMOGLOBIN  06/13/2025    ANNUAL REVIEW OF HM ORDERS  06/28/2025    FALL RISK ASSESSMENT  07/12/2025    ADVANCE CARE PLANNING  06/04/2029    TSH W/FREE T4 REFLEX  Completed    DEPRESSION ACTION PLAN  Completed    Pneumococcal Vaccine: 65+ Years  Completed    URINALYSIS  Completed    ZOSTER IMMUNIZATION  Completed    RSV VACCINE (Pregnancy & 60+)  Completed    IPV IMMUNIZATION  Aged Out    HPV IMMUNIZATION  Aged Out    MENINGITIS IMMUNIZATION  Aged Out    RSV MONOCLONAL ANTIBODY  Aged Out    COLORECTAL CANCER SCREENING  Discontinued         Adhesive capsulitis of right shoulder   Her right shoulder continues to bother her and has become more and more limiting.  She is unable to raise it up to 90 degrees.  She is unable to operate a manual wheelchair as result, and also unable to open doors with her right arm  Acute diastolic heart failure (H)   Notes fluid retention and worsening edema in legs and weigth gain.  No shortness of breath.  Has been taking lasix but missing doses due to urinary frequency.    Mild major  "depression (H24)   Mood has been good.  Comfortable with curren dose of sertraline 25 mg daily     Review of Systems  Constitutional, HEENT, cardiovascular, pulmonary, GI, , musculoskeletal, neuro, skin, endocrine and psych systems are negative, except as otherwise noted.     Objective    Exam  BP (!) 158/63 (BP Location: Right arm, Patient Position: Sitting, Cuff Size: Adult Regular)   Pulse 59   Temp 97.5  F (36.4  C)   Resp 16   Ht 1.549 m (5' 1\")   Wt 61.7 kg (136 lb)   SpO2 98%   BMI 25.70 kg/m     Estimated body mass index is 25.7 kg/m  as calculated from the following:    Height as of this encounter: 1.549 m (5' 1\").    Weight as of this encounter: 61.7 kg (136 lb).    Physical Exam  GENERAL: alert and no distress  EYES: Eyes grossly normal to inspection,   HENT: ear canals and TM's normal,   NECK: no adenopathy, no asymmetry, masses, or scars  RESP: lungs clear to auscultation - no rales, rhonchi or wheezes  CV: regular rate and rhythm, normal S1 S2, no S3 or S4, no murmur,    ABDOMEN: soft, nontender, n   MS: Limited range of motion of right upper extremity  SKIN: no suspicious lesions or rashes  NEURO: Increased muscle tone.  + tremor right hand.    PSYCH: mentation appears normal, affect normal/bright          7/12/2024   Mini Cog   3 Item Recall 3 objects recalled            Patient Instructions   (Z00.00) Routine general medical examination at a health care facility  (primary encounter diagnosis)  Comment: For routine exam, we will draw labs as ordered, cholesterol, diabetes mellitus check, liver function, renal function, and plan for bone density scan -  [facility] (also performs diagnostic mammogram, ultrasound and biopsy) [phone]. .  We will also update vaccination history.  Plan:     (Z12.31) Visit for screening mammogram  Comment: discussed risks/benefits of additional mammograms and declined   Plan:     (E78.5) Hyperlipidemia LDL goal <100  Comment: check " fasting lipid panel today and continue statin   Plan: Lipid panel reflex to direct LDL Non-fasting            (N18.32) Stage 3b chronic kidney disease (H)  Comment: Recheck labs and urine today   Plan: Albumin Random Urine Quantitative with Creat         Ratio            (I50.31) Acute diastolic heart failure (H)  Comment: Continue lasix 20 mg daily .  Recommend minimizing sodium to 2000 mg/day  Plan: furosemide (LASIX) 20 MG tablet            (J45.20) Intermittent asthma without complication, unspecified asthma severity  Comment: Continue Duonebs   Plan: ipratropium - albuterol 0.5 mg/2.5 mg/3 mL         (DUONEB) 0.5-2.5 (3) MG/3ML neb solution            (I10) Essential hypertension with goal blood pressure less than 140/90  Comment: blood pressure is a bit elevated today, but usually stable at home and we will make no changes   Plan: losartan (COZAAR) 100 MG tablet            (E78.2) Mixed hyperlipidemia  Comment: contineu statin   Plan: atorvastatin (LIPITOR) 20 MG tablet            (I10) Benign essential hypertension  Comment: as above   Plan: Comprehensive metabolic panel (BMP + Alb, Alk         Phos, ALT, AST, Total. 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

## 2024-07-12 NOTE — PATIENT INSTRUCTIONS
(Z00.00) Routine general medical examination at a health care facility  (primary encounter diagnosis)  Comment: For routine exam, we will draw labs as ordered, cholesterol, diabetes mellitus check, liver function, renal function, and plan for bone density scan -  [facility] (also performs diagnostic mammogram, ultrasound and biopsy) [phone]. .  We will also update vaccination history.  Plan:     (Z12.31) Visit for screening mammogram  Comment: discussed risks/benefits of additional mammograms and declined   Plan:     (E78.5) Hyperlipidemia LDL goal <100  Comment: check fasting lipid panel today and continue statin   Plan: Lipid panel reflex to direct LDL Non-fasting            (N18.32) Stage 3b chronic kidney disease (H)  Comment: Recheck labs and urine today   Plan: Albumin Random Urine Quantitative with Creat         Ratio            (I50.31) Acute diastolic heart failure (H)  Comment: Continue lasix 20 mg daily .  Recommend minimizing sodium to 2000 mg/day  Plan: furosemide (LASIX) 20 MG tablet            (J45.20) Intermittent asthma without complication, unspecified asthma severity  Comment: Continue Duonebs   Plan: ipratropium - albuterol 0.5 mg/2.5 mg/3 mL         (DUONEB) 0.5-2.5 (3) MG/3ML neb solution            (I10) Essential hypertension with goal blood pressure less than 140/90  Comment: blood pressure is a bit elevated today, but usually stable at home and we will make no changes   Plan: losartan (COZAAR) 100 MG tablet            (E78.2) Mixed hyperlipidemia  Comment: contineu statin   Plan: atorvastatin (LIPITOR) 20 MG tablet            (I10) Benign essential hypertension  Comment: as above   Plan: Comprehensive metabolic panel (BMP + Alb, Alk         Phos, ALT, AST, Total. 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

## 2024-07-14 NOTE — RESULT ENCOUNTER NOTE
Cristhian Joseph,    I have had the opportunity to review your recent results and an interpretation is as follows:  Your blood counts returned with stable anemia, and elevated white blood cell count  Your urinalysis shows and evidence of contamination, and there was evidence of some protein in the urine.  Your competence metabolic panel shows stable renal and liver function tests, with isolated elevation of your alkaline phosphatase level  Cholesterol levels also returned stable as well  Your urine protein level remains elevated -continue follow-up nephrology     Sincerely,  Damian Russ MD

## 2024-07-19 ENCOUNTER — MEDICAL CORRESPONDENCE (OUTPATIENT)
Dept: HEALTH INFORMATION MANAGEMENT | Facility: CLINIC | Age: 89
End: 2024-07-19

## 2024-07-19 DIAGNOSIS — Z53.9 DIAGNOSIS NOT YET DEFINED: Primary | ICD-10-CM

## 2024-07-19 PROCEDURE — G0179 MD RECERTIFICATION HHA PT: HCPCS | Performed by: INTERNAL MEDICINE

## 2024-07-23 NOTE — PROGRESS NOTES
CHIEF COMPLAINT:  No chief complaint on file.       HISTORY OF PRESENT ILLNESS  Ms. Sin is a pleasant 89 year old year old female with past medical history of Parkinson's disease, right and left total shoulder arthroplasty, osteoporosis who presents to clinic today with right shoulder pain.  Opal explains that her shoulder has been going on for about 8 months. She has had several falls in the last few months and she is now in assisted living and they lifted from under her arms and that exacerbated. She fell on her back about 10 months ago and this may have started the pain. She also states she fell last night and is having neck pain     Onset: rapid, gradual  Location: right shoulder  Quality:  sharp  Duration: 8 months   Severity: 8/10 at worst  Timing:intermittent episodes with movement  Modifying factors:  resting and non-use makes it better, movement and use makes it worse  Associated signs & symptoms: None  Previous similar pain: Yes, both shoulders have been replaced, right was done around 2014  Treatments to date:Ice, heat, tylenol, gabapentin    Additional history: Of note she is also venous.  Single left ankle pain since a fall 2 days ago.  She notes mild swelling of the ankle.  She has been able to stand for transfers with discomfort of the left ankle.  History of osteoporosis.  History of multiple fractures related to falls.    Review of Systems:  A 10-point review of systems was obtained and is negative except for as noted in the HPI.       MEDICAL HISTORY  Patient Active Problem List   Diagnosis    Mixed hyperlipidemia    Essential hypertension    Internal derangement of knee    Degeneration of intervertebral disc, site unspecified    Cervical spinal stenosis    Acute bronchospasm    Hyposmolality and/or hyponatremia    Osteoporosis    HYPERLIPIDEMIA LDL GOAL <130    CKD (chronic kidney disease) stage 3, GFR 30-59 ml/min (H)    Intermittent asthma    Microalbuminuria    Essential hypertension,  benign    Previous back surgery    DJD (degenerative joint disease), ankle and foot    Ankle joint pain    Enthesopathy of ankle and tarsus    Abnormal Pap smear of cervix    Lung nodule    Esophageal reflux    6th nerve palsy    Hx of osteomyelitis    Hx of antibiotic allergy    Hyponatremia    Leg hematoma    Fall from standing    Subdural bleeding (H)    Physical deconditioning    Spinal stenosis, unspecified region other than cervical    Chronic pain    Parkinson's disease (H)    TIA (transient ischemic attack)    Carotid aneurysm non ruptured    Delirium    Cerebral aneurysm    Benign essential hypertension    CVA (cerebral vascular accident) (H)    Aphasia    Thyroid nodule    Fall    Closed fracture of right iliac wing with routine healing, subsequent encounter    Pain    Macular degeneration of both eyes, unspecified type    Mild major depression (H)    Somatic dysfunction of pelvic region    Urinary incontinence, unspecified type    Acute diastolic heart failure (H)    Shortness of breath    Hypoxia    Uncomplicated asthma, unspecified asthma severity, unspecified whether persistent    Cough, unspecified type    Mild asthma with acute exacerbation, unspecified whether persistent    Acute and chronic respiratory failure with hypoxia (H)    Bilateral pleural effusion    Normocytic anemia    Mild episode of recurrent major depressive disorder (H24)       Current Outpatient Medications   Medication Sig Dispense Refill    acetaminophen (TYLENOL) 500 MG tablet Take 1,000 mg by mouth 2 times daily as needed for mild pain      acetaminophen (TYLENOL) 500 MG tablet Take 1,000 mg by mouth daily (with breakfast)      albuterol (PROAIR HFA/PROVENTIL HFA/VENTOLIN HFA) 108 (90 Base) MCG/ACT inhaler Inhale 1-2 puffs into the lungs every 6 hours as needed for shortness of breath or wheezing 18 g 1    amLODIPine (NORVASC) 10 MG tablet Take 1 tablet (10 mg) by mouth daily 90 tablet 3    aspirin EC 81 MG EC tablet Take 1  tablet (81 mg) by mouth daily      atorvastatin (LIPITOR) 20 MG tablet Take 1 tablet (20 mg) by mouth daily 90 tablet 3    azithromycin (ZITHROMAX) 500 MG tablet TAKE 1 TABLET BY MOUTH 30 TO 60 MINUTES PRIOR TO DENTAL PROCEDURE 1 tablet 3    budesonide (PULMICORT) 0.5 MG/2ML neb solution Take 2 mLs (0.5 mg) by nebulization 2 times daily (Patient taking differently: Take 0.5 mg by nebulization 2 times daily as needed) 180 mL 11    calcium carbonate (OS-CHRIS) 1500 (600 Ca) MG tablet Take 600 mg by mouth 2 times daily (with meals) Staff may administer the Ca+ that pt has brought in      carbidopa-levodopa (SINEMET)  MG per tablet Take 2 tablets by mouth 4 times daily Takes at 6am, 11am, 4pm, and 9 pm      carvedilol (COREG) 12.5 MG tablet Take 1 tablet (12.5 mg) by mouth 2 times daily (with meals) 180 tablet 3    Cholecalciferol (VITAMIN D3 PO) Take 400 Units by mouth daily       coenzyme Q-10 capsule Take 1 capsule by mouth daily 100 mg dose      furosemide (LASIX) 20 MG tablet Take 1 tablet (20 mg) by mouth daily 90 tablet 3    gabapentin (NEURONTIN) 100 MG capsule Take 100 mg by mouth 2 times daily as needed for neuropathic pain      ipratropium - albuterol 0.5 mg/2.5 mg/3 mL (DUONEB) 0.5-2.5 (3) MG/3ML neb solution Take 1 vial (3 mLs) by nebulization 3 times daily 270 mL 3    losartan (COZAAR) 100 MG tablet Take 1 tablet (100 mg) by mouth every evening 90 tablet 3    Multiple Vitamins-Minerals (PRESERVISION AREDS) CAPS Take 1 capsule by mouth 2 times daily      omeprazole (PRILOSEC) 20 MG DR capsule TAKE 1 CAPSULE(20 MG) BY MOUTH DAILY 90 capsule 0    polyethylene glycol (MIRALAX/GLYCOLAX) packet Take 1 packet by mouth daily as needed for constipation      polyethylene glycol-propylene glycol (SYSTANE ULTRA) 0.4-0.3 % SOLN ophthalmic solution Place 1 drop into both eyes every hour as needed for dry eyes May self admin and have at bedside.      raloxifene (EVISTA) 60 MG tablet TAKE 1 TABLET(60 MG) BY MOUTH  DAILY 90 tablet 3    senna-docusate (SENOKOT-S;PERICOLACE) 8.6-50 MG per tablet Take 1 tablet by mouth 2 times daily as needed for constipation 30 tablet 0    sertraline (ZOLOFT) 25 MG tablet Take 1 tablet (25 mg) by mouth daily 90 tablet 0    tolterodine (DETROL) 1 MG tablet Take 1 tablet (1 mg) by mouth 2 times daily 180 tablet 3       Allergies   Allergen Reactions    Bee Pollen Hives     If MVI contains this - she will break out in a rash.     Cephalexin Monohydrate Hives    Ciprofloxacin Hives    Clindamycin Hives    Multi Vitamin-Minerals [Hair-Vites] Other (See Comments)     (If MV contains bee pollen she will break out in hives)     Penicillin [Penicillins] Hives     All antibiotics except zpak??????    Thiazide-Type Diuretics Other (See Comments)     Very low sodium levels    Tobramycin Hives    Vancomycin Hives    Ibuprofen Nausea and Vomiting and Nausea     stomach pain    Multiple Vitamin Hives     If MVI contains this - she will break out in a rash.     Pollen Extract Hives    Versed [Midazolam] Other (See Comments)     Confused, agiitated, for 6-7 hrs after anngiogram sedation    Vitamin E Hives    Ace Inhibitors Cough    Metoprolol Diarrhea      tolerates Inderal       Family History   Problem Relation Age of Onset    Cancer Mother 47        uterine    Cancer Brother 6        lung,smoker    Cardiovascular Brother         stroke age 67    Cerebrovascular Disease Sister         stroke age 68    Heart Disease Brother     Heart Disease Brother     Heart Disease Brother 60        heart transplant    Respiratory Sister     Alzheimer Disease Brother 74    Coronary Artery Disease Maternal Half-Brother     Cerebrovascular Disease Sister     Prostate Cancer Brother     Breast Cancer No family hx of        Additional medical/Social/Surgical histories reviewed in [facility] and updated as appropriate.       PHYSICAL EXAM  There were no vitals taken for this visit.    General  - normal appearance, in no obvious  distress  Musculoskeletal -right short shoulder  - inspection: normal bone and joint alignment, no obvious deformity, no scapular winging, no AC step-off,   - palpation: Tenderness lateral and posterior rotator cuff, normal clavicle, non-tender AC.  Shoulder is not warm, no evidence for effusion.  No erythema  - ROM:  Shoulder hiking with abduction, painful and limited forward elevation, abduction and internal rotation. External rotation limited to 45 degrees.  - strength: 5/5  strength, 4/5 shoulder strength in abduction due to pain.  5/5 ER and IR  Neuro  - no sensory or motor deficit, grossly normal coordination, normal muscle tone  Skin  - no ecchymosis, erythema, warmth, or induration, no obvious rash  Psych  - interactive, appropriate, normal mood and affect       IMAGING : X-ray right shoulder 4 views. Final results and radiologist's interpretation, available in the [facility] health record. Images were reviewed with the patient/family members in the office today. My personal interpretation of the performed imaging is no acute osseous abnormality or periprosthetic fracture.  Components of total shoulder arthroplasty are well-seated without any evidence for loosening.    X-ray left ankle 3 views reveals no acute osseous abnormality.  Degenerative changes of the midfoot.     ASSESSMENT & PLAN  Ms. Sin is a 89 year old year old female past medical history of parkinsonism, bilateral shoulder arthroplasty, osteoporosis who presents to clinic today with left ankle pain after a fall 2 days ago as well as more chronic component of right shoulder pain over the past few months in the setting of known total shoulder arthroplasty.    Shoulder examination is most consistent with rotator cuff tendinopathy.  Left ankle consistent with lateral ankle sprain, rule out fracture today.    Diagnosis:  Chronic pain of right shoulder  Total shoulder arthroplasty of right shoulder without hardware complication  Acute sprain of  left ankle    Treatment options discussed today and we agreed to pursue subacromial injection of right shoulder to reduce inflammation around her tendons and bursal region.  In addition to this we discussed mobilizing shoulder with physical therapy.  If no improvement after this time, can consider discussing with a shoulder surgeon in determining whether there could be additional issues with her hardware.    In regard to the left ankle, discussed likely ankle sprain after x-rays reviewed and revealed no fracture.  We discussed an ankle brace or Ace wrap, she would like to continue with her own conservative therapies and she is already using compression hose on her ankle for lymphedema.    Follow-up as needed.    Right shoulder Injection - subacromial space   The patient was informed of the risks and the benefits of the procedure and a written consent was signed.  The patient s right shoulder was prepped with chlorhexidine in sterile fashion.   40 mg of triamcinolone acetate suspension was drawn up into a 5 mL syringe with 4 mL of 1% lidocaine.  Injection was performed with sub-sterile technique.  A 1.5-inch 22-gauge needle was used to enter the subacromial space at the posterior aspect of shoulder.  There were no complications. The patient tolerated the procedure well. There was negligible bleeding.   The patient was instructed to ice the shoulder upon leaving clinic and refrain from overuse over the next 3 days.   The patient was instructed to call or go to the emergency room with any unusual pain, swelling, redness, or if otherwise concerned.    Jeremie Landry DO [facility]  Primary Care Sports Medicine  [facility]    Large Joint Injection/Arthocentesis: R subacromial bursa    Date/Time: 8/7/2024 12:16 PM    Performed by: Jeremie Landry DO  Authorized by: Jeremie Landry DO    Indications:  Pain  Needle Size:  25 G  Guidance: landmark guided    Approach:  Lateral  Location:   Shoulder      Site:  R subacromial bursa  Medications:  40 mg triamcinolone 40 MG/ML; 4 mL lidocaine 1 %  Outcome:  Tolerated well, no immediate complications  Procedure discussed: discussed risks, benefits, and alternatives    Consent Given by:  Patient  Timeout: timeout called immediately prior to procedure    Prep: patient was prepped and draped in usual sterile fashion        Jeremie Landry DO, ANGELITOM  Primary Care Sports Medicine

## 2024-07-29 ENCOUNTER — NURSE TRIAGE (OUTPATIENT)
Dept: FAMILY MEDICINE | Facility: CLINIC | Age: 89
End: 2024-07-29
Payer: COMMERCIAL

## 2024-07-29 DIAGNOSIS — I50.31 ACUTE DIASTOLIC HEART FAILURE (H): ICD-10-CM

## 2024-07-29 RX ORDER — FUROSEMIDE 40 MG
40 TABLET ORAL DAILY
Qty: 180 TABLET | Refills: 3 | Status: SHIPPED | OUTPATIENT
Start: 2024-07-29

## 2024-07-29 NOTE — TELEPHONE ENCOUNTER
"Increased edema in both legs extending up to groin. Patient is wearing thigh-high compression socks    Does not drink soda or coffee. Patient watches sodium intake as much as she can but thinks she has a higher intake as a result from living in assisted living.     May have missed a couple doses of Lasix last week, patient didn't think she needed it. 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     - Patient is asking if she should increase lasix 20 mg to 40 mg?    Vitals this morning:  SpO2 93% on room air  /72    Dispo: Go to ED/UC now or to office with PCP approval  Patient declines disposition, requesting PCP's recommendations first.      Reason for Disposition   SEVERE swelling (e.g., swelling extends above knee, entire leg is swollen, weeping fluid)    Additional Information   Negative: Chest pain   Negative: Followed an insect bite and has localized swelling (e.g., small area of puffy or swollen skin)   Negative: Followed a knee injury   Negative: Ankle or foot injury   Negative: Pregnant with leg swelling or edema   Negative: Sounds like a life-threatening emergency to the triager   Negative: Difficulty breathing at rest   Negative: Entire foot is cool or blue in comparison to other side    Answer Assessment - Initial Assessment Questions  1. ONSET: \"When did the swelling start?\" (e.g., minutes, hours, days)      4 days ago  2. LOCATION: \"What part of the leg is swollen?\"  \"Are both legs swollen or just one leg?\"      Both legs extending up to groin.   3. SEVERITY: \"How bad is the swelling?\" (e.g., localized; mild, moderate, severe)    - SEVERE edema: Swelling extends above knee, facial or hand swelling present.   4. REDNESS: \"Does the swelling look red or infected?\"      No  5. PAIN: \"Is the swelling painful to touch?\" If Yes, ask: \"How painful is it?\"   (Scale 1-10; mild, moderate or severe)      Yes - Only when applying compression socks - rating 4/10  6. FEVER: \"Do you have a " "fever?\" If Yes, ask: \"What is it, how was it measured, and when did it start?\"       No  7. CAUSE: \"What do you think is causing the leg swelling?\"      Unknown  8. MEDICAL HISTORY: \"Do you have a history of blood clots (e.g., DVT), cancer, heart failure, kidney disease, or liver failure?\"      Heart and Kidney  9. RECURRENT SYMPTOM: \"Have you had leg swelling before?\" If Yes, ask: \"When was the last time?\" \"What happened that time?\"      Yes - previously admitted 2x for severe edema  10. OTHER SYMPTOMS: \"Do you have any other symptoms?\" (e.g., chest pain, difficulty breathing)        No  11. PREGNANCY: \"Is there any chance you are pregnant?\" \"When was your last menstrual period?\"        N/A    Protocols used: Leg Swelling and Edema-A-[place]    "

## 2024-07-29 NOTE — TELEPHONE ENCOUNTER
Spoke to pt regarding the increased dose of lasix and to schedule a lab appt for a follow up. Pt verbalized understanding and has no further questions or concerns.     Geoff Barrow RN  [facility]

## 2024-07-29 NOTE — TELEPHONE ENCOUNTER
OK to increase to 40 mg daily. Can we follow up basic metabolic panel in 3-4 weeks?    Damian Russ MD

## 2024-07-31 DIAGNOSIS — K21.9 GASTROESOPHAGEAL REFLUX DISEASE WITHOUT ESOPHAGITIS: ICD-10-CM

## 2024-08-04 ENCOUNTER — HEALTH MAINTENANCE LETTER (OUTPATIENT)
Age: 89
End: 2024-08-04

## 2024-08-05 ENCOUNTER — MEDICAL CORRESPONDENCE (OUTPATIENT)
Dept: HEALTH INFORMATION MANAGEMENT | Facility: CLINIC | Age: 89
End: 2024-08-05
Payer: COMMERCIAL

## 2024-08-07 ENCOUNTER — OFFICE VISIT (OUTPATIENT)
Dept: ORTHOPEDICS | Facility: CLINIC | Age: 89
End: 2024-08-07
Attending: INTERNAL MEDICINE
Payer: COMMERCIAL

## 2024-08-07 ENCOUNTER — ANCILLARY PROCEDURE (OUTPATIENT)
Dept: GENERAL RADIOLOGY | Facility: CLINIC | Age: 89
End: 2024-08-07
Attending: FAMILY MEDICINE
Payer: COMMERCIAL

## 2024-08-07 VITALS — SYSTOLIC BLOOD PRESSURE: 135 MMHG | DIASTOLIC BLOOD PRESSURE: 71 MMHG

## 2024-08-07 DIAGNOSIS — Z96.611 STATUS POST TOTAL SHOULDER ARTHROPLASTY, RIGHT: ICD-10-CM

## 2024-08-07 DIAGNOSIS — M25.511 CHRONIC PAIN IN RIGHT SHOULDER: ICD-10-CM

## 2024-08-07 DIAGNOSIS — M75.01 ADHESIVE CAPSULITIS OF RIGHT SHOULDER: ICD-10-CM

## 2024-08-07 DIAGNOSIS — M25.572 PAIN OF JOINT OF LEFT ANKLE AND FOOT: ICD-10-CM

## 2024-08-07 DIAGNOSIS — G89.29 CHRONIC PAIN IN RIGHT SHOULDER: ICD-10-CM

## 2024-08-07 DIAGNOSIS — M25.572 PAIN OF JOINT OF LEFT ANKLE AND FOOT: Primary | ICD-10-CM

## 2024-08-07 PROCEDURE — 73030 X-RAY EXAM OF SHOULDER: CPT | Mod: TC | Performed by: RADIOLOGY

## 2024-08-07 PROCEDURE — 20610 DRAIN/INJ JOINT/BURSA W/O US: CPT | Mod: RT | Performed by: FAMILY MEDICINE

## 2024-08-07 PROCEDURE — 73610 X-RAY EXAM OF ANKLE: CPT | Mod: TC | Performed by: RADIOLOGY

## 2024-08-07 PROCEDURE — 99203 OFFICE O/P NEW LOW 30 MIN: CPT | Mod: 25 | Performed by: FAMILY MEDICINE

## 2024-08-07 RX ORDER — TRIAMCINOLONE ACETONIDE 40 MG/ML
40 INJECTION, SUSPENSION INTRA-ARTICULAR; INTRAMUSCULAR
Status: SHIPPED | OUTPATIENT
Start: 2024-08-07

## 2024-08-07 RX ORDER — LIDOCAINE HYDROCHLORIDE 10 MG/ML
4 INJECTION, SOLUTION INFILTRATION; PERINEURAL
Status: SHIPPED | OUTPATIENT
Start: 2024-08-07

## 2024-08-07 RX ADMIN — TRIAMCINOLONE ACETONIDE 40 MG: 40 INJECTION, SUSPENSION INTRA-ARTICULAR; INTRAMUSCULAR at 12:16

## 2024-08-07 RX ADMIN — LIDOCAINE HYDROCHLORIDE 4 ML: 10 INJECTION, SOLUTION INFILTRATION; PERINEURAL at 12:16

## 2024-08-07 NOTE — LETTER
8/7/2024      Opal Sin  [address] [place] [id number]      Dear Colleague,    Thank you for referring your patient, Opal Sin, to the [facility] [place]. Please see a copy of my visit note below.    CHIEF COMPLAINT:  No chief complaint on file.       HISTORY OF PRESENT ILLNESS  Ms. Sin is a pleasant 89 year old year old female with past medical history of Parkinson's disease, right and left total shoulder arthroplasty, osteoporosis who presents to clinic today with right shoulder pain.  Opal explains that her shoulder has been going on for about 8 months. She has had several falls in the last few months and she is now in assisted living and they lifted from under her arms and that exacerbated. She fell on her back about 10 months ago and this may have started the pain. She also states she fell last night and is having neck pain     Onset: rapid, gradual  Location: right shoulder  Quality:  sharp  Duration: 8 months   Severity: 8/10 at worst  Timing:intermittent episodes with movement  Modifying factors:  resting and non-use makes it better, movement and use makes it worse  Associated signs & symptoms: None  Previous similar pain: Yes, both shoulders have been replaced, right was done around 2014  Treatments to date:Ice, heat, tylenol, gabapentin    Additional history: Of note she is also venous.  Single left ankle pain since a fall 2 days ago.  She notes mild swelling of the ankle.  She has been able to stand for transfers with discomfort of the left ankle.  History of osteoporosis.  History of multiple fractures related to falls.    Review of Systems:  A 10-point review of systems was obtained and is negative except for as noted in the HPI.       MEDICAL HISTORY  Patient Active Problem List   Diagnosis     Mixed hyperlipidemia     Essential hypertension     Internal derangement of knee     Degeneration of intervertebral disc, site unspecified      Cervical spinal stenosis     Acute bronchospasm     Hyposmolality and/or hyponatremia     Osteoporosis     HYPERLIPIDEMIA LDL GOAL <130     CKD (chronic kidney disease) stage 3, GFR 30-59 ml/min (H)     Intermittent asthma     Microalbuminuria     Essential hypertension, benign     Previous back surgery     DJD (degenerative joint disease), ankle and foot     Ankle joint pain     Enthesopathy of ankle and tarsus     Abnormal Pap smear of cervix     Lung nodule     Esophageal reflux     6th nerve palsy     Hx of osteomyelitis     Hx of antibiotic allergy     Hyponatremia     Leg hematoma     Fall from standing     Subdural bleeding (H)     Physical deconditioning     Spinal stenosis, unspecified region other than cervical     Chronic pain     Parkinson's disease (H)     TIA (transient ischemic attack)     Carotid aneurysm non ruptured     Delirium     Cerebral aneurysm     Benign essential hypertension     CVA (cerebral vascular accident) (H)     Aphasia     Thyroid nodule     Fall     Closed fracture of right iliac wing with routine healing, subsequent encounter     Pain     Macular degeneration of both eyes, unspecified type     Mild major depression (H)     Somatic dysfunction of pelvic region     Urinary incontinence, unspecified type     Acute diastolic heart failure (H)     Shortness of breath     Hypoxia     Uncomplicated asthma, unspecified asthma severity, unspecified whether persistent     Cough, unspecified type     Mild asthma with acute exacerbation, unspecified whether persistent     Acute and chronic respiratory failure with hypoxia (H)     Bilateral pleural effusion     Normocytic anemia     Mild episode of recurrent major depressive disorder (H24)       Current Outpatient Medications   Medication Sig Dispense Refill     acetaminophen (TYLENOL) 500 MG tablet Take 1,000 mg by mouth 2 times daily as needed for mild pain       acetaminophen (TYLENOL) 500 MG tablet Take 1,000 mg by mouth daily (with  breakfast)       albuterol (PROAIR HFA/PROVENTIL HFA/VENTOLIN HFA) 108 (90 Base) MCG/ACT inhaler Inhale 1-2 puffs into the lungs every 6 hours as needed for shortness of breath or wheezing 18 g 1     amLODIPine (NORVASC) 10 MG tablet Take 1 tablet (10 mg) by mouth daily 90 tablet 3     aspirin EC 81 MG EC tablet Take 1 tablet (81 mg) by mouth daily       atorvastatin (LIPITOR) 20 MG tablet Take 1 tablet (20 mg) by mouth daily 90 tablet 3     azithromycin (ZITHROMAX) 500 MG tablet TAKE 1 TABLET BY MOUTH 30 TO 60 MINUTES PRIOR TO DENTAL PROCEDURE 1 tablet 3     budesonide (PULMICORT) 0.5 MG/2ML neb solution Take 2 mLs (0.5 mg) by nebulization 2 times daily (Patient taking differently: Take 0.5 mg by nebulization 2 times daily as needed) 180 mL 11     calcium carbonate (OS-CHRIS) 1500 (600 Ca) MG tablet Take 600 mg by mouth 2 times daily (with meals) Staff may administer the Ca+ that pt has brought in       carbidopa-levodopa (SINEMET)  MG per tablet Take 2 tablets by mouth 4 times daily Takes at 6am, 11am, 4pm, and 9 pm       carvedilol (COREG) 12.5 MG tablet Take 1 tablet (12.5 mg) by mouth 2 times daily (with meals) 180 tablet 3     Cholecalciferol (VITAMIN D3 PO) Take 400 Units by mouth daily        coenzyme Q-10 capsule Take 1 capsule by mouth daily 100 mg dose       furosemide (LASIX) 20 MG tablet Take 1 tablet (20 mg) by mouth daily 90 tablet 3     gabapentin (NEURONTIN) 100 MG capsule Take 100 mg by mouth 2 times daily as needed for neuropathic pain       ipratropium - albuterol 0.5 mg/2.5 mg/3 mL (DUONEB) 0.5-2.5 (3) MG/3ML neb solution Take 1 vial (3 mLs) by nebulization 3 times daily 270 mL 3     losartan (COZAAR) 100 MG tablet Take 1 tablet (100 mg) by mouth every evening 90 tablet 3     Multiple Vitamins-Minerals (PRESERVISION AREDS) CAPS Take 1 capsule by mouth 2 times daily       omeprazole (PRILOSEC) 20 MG DR capsule TAKE 1 CAPSULE(20 MG) BY MOUTH DAILY 90 capsule 0     polyethylene glycol  (MIRALAX/GLYCOLAX) packet Take 1 packet by mouth daily as needed for constipation       polyethylene glycol-propylene glycol (SYSTANE ULTRA) 0.4-0.3 % SOLN ophthalmic solution Place 1 drop into both eyes every hour as needed for dry eyes May self admin and have at bedside.       raloxifene (EVISTA) 60 MG tablet TAKE 1 TABLET(60 MG) BY MOUTH DAILY 90 tablet 3     senna-docusate (SENOKOT-S;PERICOLACE) 8.6-50 MG per tablet Take 1 tablet by mouth 2 times daily as needed for constipation 30 tablet 0     sertraline (ZOLOFT) 25 MG tablet Take 1 tablet (25 mg) by mouth daily 90 tablet 0     tolterodine (DETROL) 1 MG tablet Take 1 tablet (1 mg) by mouth 2 times daily 180 tablet 3       Allergies   Allergen Reactions     Bee Pollen Hives     If MVI contains this - she will break out in a rash.      Cephalexin Monohydrate Hives     Ciprofloxacin Hives     Clindamycin Hives     Multi Vitamin-Minerals [Hair-Vites] Other (See Comments)     (If MV contains bee pollen she will break out in hives)      Penicillin [Penicillins] Hives     All antibiotics except zpak??????     Thiazide-Type Diuretics Other (See Comments)     Very low sodium levels     Tobramycin Hives     Vancomycin Hives     Ibuprofen Nausea and Vomiting and Nausea     stomach pain     Multiple Vitamin Hives     If MVI contains this - she will break out in a rash.      Pollen Extract Hives     Versed [Midazolam] Other (See Comments)     Confused, agiitated, for 6-7 hrs after anngiogram sedation     Vitamin E Hives     Ace Inhibitors Cough     Metoprolol Diarrhea      tolerates Inderal       Family History   Problem Relation Age of Onset     Cancer Mother 47        uterine     Cancer Brother 6        lung,smoker     Cardiovascular Brother         stroke age 67     Cerebrovascular Disease Sister         stroke age 68     Heart Disease Brother      Heart Disease Brother      Heart Disease Brother 60        heart transplant     Respiratory Sister      Alzheimer Disease  Brother 74     Coronary Artery Disease Maternal Half-Brother      Cerebrovascular Disease Sister      Prostate Cancer Brother      Breast Cancer No family hx of        Additional medical/Social/Surgical histories reviewed in [facility] and updated as appropriate.       PHYSICAL EXAM  There were no vitals taken for this visit.    General  - normal appearance, in no obvious distress  Musculoskeletal -right short shoulder  - inspection: normal bone and joint alignment, no obvious deformity, no scapular winging, no AC step-off,   - palpation: Tenderness lateral and posterior rotator cuff, normal clavicle, non-tender AC.  Shoulder is not warm, no evidence for effusion.  No erythema  - ROM:  Shoulder hiking with abduction, painful and limited forward elevation, abduction and internal rotation. External rotation limited to 45 degrees.  - strength: 5/5  strength, 4/5 shoulder strength in abduction due to pain.  5/5 ER and IR  Neuro  - no sensory or motor deficit, grossly normal coordination, normal muscle tone  Skin  - no ecchymosis, erythema, warmth, or induration, no obvious rash  Psych  - interactive, appropriate, normal mood and affect       IMAGING : X-ray right shoulder 4 views. Final results and radiologist's interpretation, available in the [facility] health record. Images were reviewed with the patient/family members in the office today. My personal interpretation of the performed imaging is no acute osseous abnormality or periprosthetic fracture.  Components of total shoulder arthroplasty are well-seated without any evidence for loosening.    X-ray left ankle 3 views reveals no acute osseous abnormality.  Degenerative changes of the midfoot.     ASSESSMENT & PLAN  Ms. Sin is a 89 year old year old female past medical history of parkinsonism, bilateral shoulder arthroplasty, osteoporosis who presents to clinic today with left ankle pain after a fall 2 days ago as well as more chronic component of right shoulder pain  over the past few months in the setting of known total shoulder arthroplasty.    Shoulder examination is most consistent with rotator cuff tendinopathy.  Left ankle consistent with lateral ankle sprain, rule out fracture today.    Diagnosis:  Chronic pain of right shoulder  Total shoulder arthroplasty of right shoulder without hardware complication  Acute sprain of left ankle    Treatment options discussed today and we agreed to pursue subacromial injection of right shoulder to reduce inflammation around her tendons and bursal region.  In addition to this we discussed mobilizing shoulder with physical therapy.  If no improvement after this time, can consider discussing with a shoulder surgeon in determining whether there could be additional issues with her hardware.    In regard to the left ankle, discussed likely ankle sprain after x-rays reviewed and revealed no fracture.  We discussed an ankle brace or Ace wrap, she would like to continue with her own conservative therapies and she is already using compression hose on her ankle for lymphedema.    Follow-up as needed.    Right shoulder Injection - subacromial space   The patient was informed of the risks and the benefits of the procedure and a written consent was signed.  The patient s right shoulder was prepped with chlorhexidine in sterile fashion.   40 mg of triamcinolone acetate suspension was drawn up into a 5 mL syringe with 4 mL of 1% lidocaine.  Injection was performed with sub-sterile technique.  A 1.5-inch 22-gauge needle was used to enter the subacromial space at the posterior aspect of shoulder.  There were no complications. The patient tolerated the procedure well. There was negligible bleeding.   The patient was instructed to ice the shoulder upon leaving clinic and refrain from overuse over the next 3 days.   The patient was instructed to call or go to the emergency room with any unusual pain, swelling, redness, or if otherwise concerned.    Jeremie  MARIO Landry DO [facility]  Primary Care Sports Medicine  [facility]    Large Joint Injection/Arthocentesis: R subacromial bursa    Date/Time: 8/7/2024 12:16 PM    Performed by: Jeremie Landry DO  Authorized by: Jeremie Landry DO    Indications:  Pain  Needle Size:  25 G  Guidance: landmark guided    Approach:  Lateral  Location:  Shoulder      Site:  R subacromial bursa  Medications:  40 mg triamcinolone 40 MG/ML; 4 mL lidocaine 1 %  Outcome:  Tolerated well, no immediate complications  Procedure discussed: discussed risks, benefits, and alternatives    Consent Given by:  Patient  Timeout: timeout called immediately prior to procedure    Prep: patient was prepped and draped in usual sterile fashion        Jeremie Landry DO, [facility]  Primary Care Sports Medicine       Again, thank you for allowing me to participate in the care of your patient.        Sincerely,        Jeremie Landry DO

## 2024-08-13 ENCOUNTER — NURSE TRIAGE (OUTPATIENT)
Dept: FAMILY MEDICINE | Facility: CLINIC | Age: 89
End: 2024-08-13
Payer: COMMERCIAL

## 2024-08-13 NOTE — TELEPHONE ENCOUNTER
"Katherine, PT with [facility] calling reporting multiple concerns.    Patient fell last Tuesday. Patient states \"her legs went out from under her\". Did not hit head, but was unable to reach call button and was on the floor for an hour before staff found her and assisted her up. Katherine states patient is still reporting neck pain and generalized pain since the fall.     Routing to triage to please call patient and triage pain after fall - thank you!     2.   Patient's lasix dose was recently increased from 20 mg daily to 40 mg daily by PCP, home care updated this in the chart and were given two interaction warnings between lasix and losartan & lasix and aspirin. Home Care does not need a callback regarding this but is required to report as FYI to PCP and only need a callback if changes are to be made.     Routing as FYI to PCP - please route back if additional instructions - thank you!    If changes to be made callback to Katherine PT at [phone]     Marsha MERAZ  [facility]  "

## 2024-08-13 NOTE — TELEPHONE ENCOUNTER
Triage Patient Outreach    Attempt # 1    Was call answered?  No.  Left voicemail to return call to Triage at Primary Clinic    Romy Subramanian RN

## 2024-08-14 NOTE — TELEPHONE ENCOUNTER
Pt returned call to the clinic.   Triaged pt and assisted with scheduling per provider recommendation.   Pt agreed to call the clinic back if symptoms worsen before scheduled appt.     Aug 23, 2024 3:00 PM  Provider Visit with Damian Russ MD  [facility] [phone]     Reason for Disposition   Unable to get up until help (e.g., caregiver, family, friend) arrived and on the ground 1 hour or more     Pt has already been seen and assessed, will schedule phone visit as recommended by PCP    Additional Information   Negative: Major injury from dangerous force (e.g., fall > 10 feet or 3 meters)   Negative: Major bleeding (e.g., actively dripping or spurting) and can't be stopped   Negative: Shock suspected (e.g., cold/pale/clammy skin, too weak to stand)   Negative: Difficult to awaken or acting confused (e.g., disoriented, slurred speech)   Negative: SEVERE weakness (i.e., unable to walk or barely able to walk, requires support) and new-onset or worsening   Negative: Can't stand (bear weight) or walk and new-onset after fall   Negative: Sounds like a life-threatening emergency to the triager   Negative: Fainted (passed out)   Negative: New-onset or worsening weakness of the face, arm or leg on one side of the body   Negative: New-onset or worsening dizziness and described as spinning or off balance (i.e., vertigo)   Negative: New-onset or worsening dizziness and NO spinning sensation or trouble with balance   Negative: Pregnant and fall   Negative: Patient has a concerning injury to a specific part of the body (e.g., chest, leg, head)   Negative: Patient has a wound (abrasion, cut, puncture, other skin injury or tear)   Negative: Injury (or injuries) that need emergency care   Negative: Sounds like a serious injury to the triager   Negative: Muscle pain and dark (cola colored) or red-colored urine    Answer Assessment - Initial Assessment Questions  1.  "MECHANISM: \"How did the fall happen?\"      Was in kitchen, put something in the garbage, and her legs became like \"mush\"  2. DOMESTIC VIOLENCE AND ELDER ABUSE SCREENING: \"Did you fall because someone pushed you or tried to hurt you?\" If Yes, ask: \"Are you safe now?\"      No  3. ONSET: \"When did the fall happen?\" (e.g., minutes, hours, or days ago)      8/6  4. LOCATION: \"What part of the body hit the ground?\" (e.g., back, buttocks, head, hips, knees, hands, head, stomach)      \"I just let myself down\", was lying on my arm   5. INJURY: \"Did you hurt (injure) yourself when you fell?\" If Yes, ask: \"What did you injure? Tell me more about this?\" (e.g., body area; type of injury; pain severity)\"      Neck/shoulder pain   6. PAIN: \"Is there any pain?\" If Yes, ask: \"How bad is the pain?\" (e.g., Scale 1-10; or mild,   moderate, severe)    - NONE (0): No pain    - MILD (1-3): Doesn't interfere with normal activities     - MODERATE (4-7): Interferes with normal activities or awakens from sleep     - SEVERE (8-10): Excruciating pain, unable to do any normal activities       5/10  7. SIZE: For cuts, bruises, or swelling, ask: \"How large is it?\" (e.g., inches or centimeters)       No   8. PREGNANCY: \"Is there any chance you are pregnant?\" \"When was your last menstrual period?\"      N/A  9. OTHER SYMPTOMS: \"Do you have any other symptoms?\" (e.g., dizziness, fever, weakness; new onset or worsening).       No  10. CAUSE: \"What do you think caused the fall (or falling)?\" (e.g., tripped, dizzy spell)        Lasix  Pt recently got a cortisone shot in her shoulder after the fall    Protocols used: Falls and [id number]  Thank you,  Sarahi Pressley RN    "

## 2024-08-14 NOTE — TELEPHONE ENCOUNTER
Called and spoke with family member who is with patient currently at her eye appointment.    Will callback to triage when patient is done with her appointment.    Marsha MERAZ  [facility]

## 2024-08-22 ASSESSMENT — ASTHMA QUESTIONNAIRES
QUESTION_2 LAST FOUR WEEKS HOW OFTEN HAVE YOU HAD SHORTNESS OF BREATH: ONCE OR TWICE A WEEK
QUESTION_4 LAST FOUR WEEKS HOW OFTEN HAVE YOU USED YOUR RESCUE INHALER OR NEBULIZER MEDICATION (SUCH AS ALBUTEROL): ONCE A WEEK OR LESS
ACT_TOTALSCORE: 20
QUESTION_5 LAST FOUR WEEKS HOW WOULD YOU RATE YOUR ASTHMA CONTROL: WELL CONTROLLED
HOSPITALIZATION_OVERNIGHT_LAST_YEAR_TOTAL: TWO
ACT_TOTALSCORE: 20
QUESTION_3 LAST FOUR WEEKS HOW OFTEN DID YOUR ASTHMA SYMPTOMS (WHEEZING, COUGHING, SHORTNESS OF BREATH, CHEST TIGHTNESS OR PAIN) WAKE YOU UP AT NIGHT OR EARLIER THAN USUAL IN THE MORNING: ONCE OR TWICE
QUESTION_1 LAST FOUR WEEKS HOW MUCH OF THE TIME DID YOUR ASTHMA KEEP YOU FROM GETTING AS MUCH DONE AT WORK, SCHOOL OR AT HOME: A LITTLE OF THE TIME

## 2024-08-23 ENCOUNTER — VIRTUAL VISIT (OUTPATIENT)
Dept: FAMILY MEDICINE | Facility: CLINIC | Age: 89
End: 2024-08-23
Payer: COMMERCIAL

## 2024-08-23 DIAGNOSIS — G89.29 CHRONIC PAIN IN RIGHT SHOULDER: Primary | ICD-10-CM

## 2024-08-23 DIAGNOSIS — M25.571 PAIN IN JOINT INVOLVING ANKLE AND FOOT, RIGHT: ICD-10-CM

## 2024-08-23 DIAGNOSIS — M25.511 CHRONIC PAIN IN RIGHT SHOULDER: Primary | ICD-10-CM
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
## 2024-08-23 PROCEDURE — 99442 PR PHYSICIAN TELEPHONE EVALUATION 11-20 MIN: CPT | Mod: 93 | Performed by: INTERNAL MEDICINE

## 2024-08-23 RX ORDER — TRAMADOL HYDROCHLORIDE 50 MG/1
25 TABLET ORAL EVERY 6 HOURS PRN
Qty: 10 TABLET | Refills: 0 | Status: SHIPPED | OUTPATIENT
Start: 2024-08-23 | End: 2024-08-28

## 2024-08-23 NOTE — PROGRESS NOTES
Opal is a 89 year old who is being evaluated via a billable telephone visit.    What phone number would you like to be contacted at? [phone]  How would you like to obtain your AVS? Halima  Originating Location (pt. Location): Home    Distant Location (provider location):  On-site      Subjective   Opal is a 89 year old, presenting for the following health issues:  Follow Up      Via the Health Maintenance questionnaire, the patient has reported the following services have been completed -Mammogram: [place] 2022-11-12, this information has not been sent to the abstraction team.  History of Present Illness       Reason for visit:  Follow- up aafter a fall    She eats 2-3 servings of fruits and vegetables daily.She consumes 2 sweetened beverage(s) daily.She exercises with enough effort to increase her heart rate 9 or less minutes per day.  She exercises with enough effort to increase her heart rate 3 or less days per week.   She is taking medications regularly.     Right Shoulder Pain   .dean has had persistent right shoulder pain which has bene aggravated by manipulation of the shoudler, overuse, underlying injury and recent falls.  She had a fall on 08/06/24 and did see Dr. Landry the following day with X-ray showing no fracture.  Treatment options discussed and recommended to pursue subacromial injection of right shoulder to reduce inflammation around her tendons and bursal region.  In addition she was offered physical therapy and if no improvement over the course of a few weeks she was recommended to consider discussing with a shoulder surgeon to determine whether there could be additional issues with her hardware.      Review of Systems  Constitutional, HEENT, cardiovascular, pulmonary, gi and gu systems are negative, except as otherwise noted.      Objective    Vitals - Patient Reported  Systolic (Patient Reported): 125  Diastolic (Patient Reported): 75  Pain Score: Severe Pain (6)  Pain Loc:  Shoulder      Vitals:  No vitals were obtained today due to virtual visit.    Physical Exam   General: Alert and no distress //Respiratory: No audible wheeze, cough, or shortness of breath // Psychiatric:  Appropriate affect, tone, and pace of words      (M25.511,  G89.29) Chronic pain in right shoulder  (primary encounter diagnosis)  Comment: Noted previous history of total shoulder arthroplasty.  Recommended that she consider follow-up with orthopedic surgery to discuss if hardware needs to be evaluated.  She is not interested in at the moment.  She does request an additional pain medication to take in addition to acetaminophen.  I did offer a low-dose of tramadol to take 25 mg every 6 hours as needed.  She will start with this medication.  She was counseled on the potential side effects including addiction, constipation, respiratory depression, fall risk amongst others.  She has assured me that she will be careful with this medication as she has taken it before and has had some success.  Plan: traMADol (ULTRAM) 50 MG tablet            (W19.XXXA) Fall, initial encounter  Comment: As above.  Also we will order a prescription for an ankle brace  Plan: Ankle/Foot Bracing Supplies Order Ankle Brace;         Right            (M25.571) Pain in joint involving ankle and foot, right  Comment: As above.  She will also continue follow-up with physical therapy and Occupational Therapy and if there is a need for assessment for wheelchair she will obtain this as well.  Plan: Ankle/Foot Bracing Supplies Order Ankle Brace;         Right              Phone call duration: 15 minutes  Signed Electronically by: Damian Russ MD, MD

## 2024-09-03 ENCOUNTER — TRANSFERRED RECORDS (OUTPATIENT)
Dept: HEALTH INFORMATION MANAGEMENT | Facility: CLINIC | Age: 89
End: 2024-09-03
Payer: COMMERCIAL

## 2024-09-03 NOTE — LETTER
September 11, 2024      Opal Azucena Mattana paula  [address] [place] [id number]        Dear ,    We are writing to inform you of your test results.    Cristhian Joseph,     I have had the opportunity to review your recent results and an interpretation is as follows:   Your bone density scan shows stable osteopenia - recommend continue calicum and vitamin D and raloxifene     Sincerely,   Damian Russ MD     No results found from the In Basket message.    If you have any questions or concerns, please call the clinic at the number listed above.       Sincerely,      Provider Outside

## 2024-09-11 NOTE — RESULT ENCOUNTER NOTE
Cristhian Joseph,    I have had the opportunity to review your recent results and an interpretation is as follows:  Your bone density scan shows stable osteopenia - recommend continue calicum and vitamin D and raloxifene    Sincerely,  Damian Russ MD

## 2024-09-18 DIAGNOSIS — Z53.9 DIAGNOSIS NOT YET DEFINED: Primary | ICD-10-CM

## 2024-09-18 PROCEDURE — G0179 MD RECERTIFICATION HHA PT: HCPCS | Performed by: INTERNAL MEDICINE

## 2024-09-24 ENCOUNTER — DOCUMENTATION ONLY (OUTPATIENT)
Dept: OTHER | Facility: CLINIC | Age: 89
End: 2024-09-24
Payer: COMMERCIAL

## 2024-10-04 ENCOUNTER — MEDICAL CORRESPONDENCE (OUTPATIENT)
Dept: HEALTH INFORMATION MANAGEMENT | Facility: CLINIC | Age: 89
End: 2024-10-04

## 2024-10-21 DIAGNOSIS — F32.0 MILD MAJOR DEPRESSION (H): ICD-10-CM

## 2024-10-21 RX ORDER — SERTRALINE HYDROCHLORIDE 25 MG/1
25 TABLET, FILM COATED ORAL DAILY
Qty: 90 TABLET | Refills: 0 | Status: SHIPPED | OUTPATIENT
Start: 2024-10-21

## 2024-11-01 ENCOUNTER — TRANSFERRED RECORDS (OUTPATIENT)
Dept: HEALTH INFORMATION MANAGEMENT | Facility: CLINIC | Age: 89
End: 2024-11-01
Payer: COMMERCIAL

## 2024-11-01 NOTE — LETTER
November 11, 2024      Opal Azucena Mattana paula  [address] [place] [id number]        Dear ,    We are writing to inform you of your test results.    Cristhian Joseph,     I have had the opportunity to review your recent results and an interpretation is as follows:   Your x-ray shows narrowing of the glenohumeral and acromioclavicular joint.  But there is no fracture.  The final impression shows a normal shoulder prosthesis     Sincerely,   Damian Russ MD

## 2024-11-04 ENCOUNTER — TELEPHONE (OUTPATIENT)
Dept: FAMILY MEDICINE | Facility: CLINIC | Age: 89
End: 2024-11-04
Payer: COMMERCIAL

## 2024-11-04 NOTE — TELEPHONE ENCOUNTER
Good day Dr. Russ,      Please see message below,          Please let us know if patient will need an appointment for this.      Eliz WARD MA on 11/4/2024 at 11:50 AM

## 2024-11-04 NOTE — TELEPHONE ENCOUNTER
Order/Referral Request    Who is requesting: Corrie--sister    Orders being requested: Needs an order for assisted living to cut up her food    Reason service is needed/diagnosis: Difficulty with eating with Parkinson's     When are orders needed by:     Has this been discussed with Provider: Yes    Does patient have a preference on a Group/Provider/Facility?  [facility]    Does patient have an appointment scheduled?: No    Where to send orders: Fax    Could we send this information to you in [facility] or would you prefer to receive a phone call?:   Patient would prefer a phone call   Okay to leave a detailed message?: Yes at Other phone number:  Corrie  [phone]

## 2024-11-05 ENCOUNTER — TELEPHONE (OUTPATIENT)
Dept: FAMILY MEDICINE | Facility: CLINIC | Age: 89
End: 2024-11-05
Payer: COMMERCIAL

## 2024-11-05 DIAGNOSIS — G20.B2 PARKINSON'S DISEASE WITH DYSKINESIA AND FLUCTUATING MANIFESTATIONS (H): Primary | ICD-10-CM

## 2024-11-05 NOTE — TELEPHONE ENCOUNTER
General Call      Reason for Call:  patients sister called and states that orders expires for OT; PT; and Speech Therapy with Petrona Davis at [facility]/     Would like new outpaitient orders as patients will be switching to Medicare A and B.    Please contact sister if contact.  Thank you.    What are your questions or concerns:  na    Date of last appointment with provider: August 2024    Could we send this information to you in [employer] or would you prefer to receive a phone call?:   Patient would prefer a phone call   Okay to leave a detailed message?: No at Other phone number:  [phone] *

## 2024-11-06 NOTE — TELEPHONE ENCOUNTER
I phoned sister Corrie.  Fax to therapy center is called [facility] Fax [phone]     Printed and faxed all 3 orders.     Tiki DIAZ MA

## 2024-11-08 NOTE — RESULT ENCOUNTER NOTE
Cristhian Joseph,    I have had the opportunity to review your recent results and an interpretation is as follows:  Your x-ray shows narrowing of the glenohumeral and acromioclavicular joint.  But there is no fracture.  The final impression shows a normal shoulder prosthesis    Sincerely,  Damian Russ MD

## 2024-11-12 ENCOUNTER — TELEPHONE (OUTPATIENT)
Dept: FAMILY MEDICINE | Facility: CLINIC | Age: 89
End: 2024-11-12
Payer: COMMERCIAL

## 2024-11-12 NOTE — TELEPHONE ENCOUNTER
Consent to communicate on file for patient's sister Corrie. Sister states that the patient got a message that she is due to wellness exam. This message does not show in Health Maintenance. Patient had her routine exam in July 2024.  Sister states that she will read the message and call back.  Sister also asked about x ray results. Informed 11/1 results are in the chart.  Silvia Oropeza RN

## 2024-11-19 ENCOUNTER — MEDICAL CORRESPONDENCE (OUTPATIENT)
Dept: HEALTH INFORMATION MANAGEMENT | Facility: CLINIC | Age: 89
End: 2024-11-19
Payer: COMMERCIAL

## 2024-12-10 ENCOUNTER — MEDICAL CORRESPONDENCE (OUTPATIENT)
Dept: HEALTH INFORMATION MANAGEMENT | Facility: CLINIC | Age: 89
End: 2024-12-10
Payer: COMMERCIAL

## 2025-01-07 ENCOUNTER — TELEPHONE (OUTPATIENT)
Dept: FAMILY MEDICINE | Facility: CLINIC | Age: 89
End: 2025-01-07
Payer: COMMERCIAL

## 2025-01-07 NOTE — TELEPHONE ENCOUNTER
Pt called back regarding message. Pt would like it COVID orders to be faxed to [facility] in [place]. Please fax PCP's ok for COVID shot in letter format. Thank you!    Geoff Barrow RN  [facility]

## 2025-01-07 NOTE — TELEPHONE ENCOUNTER
Lvm to call back. Pls clarify request. Does pt need order be sent, or help scheduling in office,verbal OK to receive vaccine? Per pcp, ok for covid vaccine.  Giselle Shin, CMA

## 2025-01-07 NOTE — TELEPHONE ENCOUNTER
Patient Returning Call    Reason for call:  needs covid shot in assisted living    Information relayed to patient:  care team will call    Patient has additional questions:  N/A    What are your questions/concerns:  sister called    Could we send this information to you in [facility] or would you prefer to receive a phone call?:   Patient would prefer a phone call   Okay to leave a detailed message?: N/A at Home number on file [phone] (home)

## 2025-01-07 NOTE — LETTER
2025      Opal Sin  [address] [place] [id number]        To whom it may concern at [facility],     It is ok for Opal Sin  1935 to get a Covid shot       Sincerely,      Damian Russ MD, MD

## 2025-01-07 NOTE — TELEPHONE ENCOUNTER
What is the fax # ?      [email or web address]  call: [phone]    Pended letter for printing after signed

## 2025-01-14 ENCOUNTER — MEDICAL CORRESPONDENCE (OUTPATIENT)
Dept: HEALTH INFORMATION MANAGEMENT | Facility: CLINIC | Age: 89
End: 2025-01-14
Payer: COMMERCIAL

## 2025-03-08 ENCOUNTER — APPOINTMENT (OUTPATIENT)
Dept: GENERAL RADIOLOGY | Facility: CLINIC | Age: 89
DRG: 193 | End: 2025-03-08
Attending: EMERGENCY MEDICINE
Payer: COMMERCIAL

## 2025-03-08 ENCOUNTER — HOSPITAL ENCOUNTER (INPATIENT)
Facility: CLINIC | Age: 89
LOS: 4 days | Discharge: NURSING, REHAB OR LONG-TERM CARE | DRG: 193 | End: 2025-03-12
Attending: EMERGENCY MEDICINE | Admitting: INTERNAL MEDICINE
Payer: COMMERCIAL

## 2025-03-08 DIAGNOSIS — I48.91 ATRIAL FIBRILLATION, UNSPECIFIED TYPE (H): ICD-10-CM

## 2025-03-08 DIAGNOSIS — I50.9 ACUTE ON CHRONIC CONGESTIVE HEART FAILURE, UNSPECIFIED HEART FAILURE TYPE (H): ICD-10-CM

## 2025-03-08 DIAGNOSIS — I48.20 CHRONIC ATRIAL FIBRILLATION (H): Primary | ICD-10-CM

## 2025-03-08 DIAGNOSIS — R06.2 WHEEZING: ICD-10-CM

## 2025-03-08 DIAGNOSIS — J10.1 INFLUENZA A: ICD-10-CM

## 2025-03-08 LAB
ALBUMIN SERPL BCG-MCNC: 3.5 G/DL (ref 3.5–5.2)
ALP SERPL-CCNC: 95 U/L (ref 40–150)
ALT SERPL W P-5'-P-CCNC: <5 U/L (ref 0–50)
ANION GAP SERPL CALCULATED.3IONS-SCNC: 12 MMOL/L (ref 7–15)
AST SERPL W P-5'-P-CCNC: 18 U/L (ref 0–45)
ATRIAL RATE - MUSE: 77 BPM
BASE EXCESS BLDV CALC-SCNC: -1 MMOL/L (ref -3–3)
BASE EXCESS BLDV CALC-SCNC: -2 MMOL/L (ref -3–3)
BASE EXCESS BLDV CALC-SCNC: 2.4 MMOL/L (ref -3–3)
BASOPHILS # BLD AUTO: 0 10E3/UL (ref 0–0.2)
BASOPHILS NFR BLD AUTO: 0 %
BILIRUB SERPL-MCNC: 0.3 MG/DL
BUN SERPL-MCNC: 29 MG/DL (ref 8–23)
CALCIUM SERPL-MCNC: 9.3 MG/DL (ref 8.8–10.4)
CHLORIDE SERPL-SCNC: 100 MMOL/L (ref 98–107)
CREAT SERPL-MCNC: 0.93 MG/DL (ref 0.51–0.95)
DIASTOLIC BLOOD PRESSURE - MUSE: NORMAL MMHG
EGFRCR SERPLBLD CKD-EPI 2021: 59 ML/MIN/1.73M2
EOSINOPHIL # BLD AUTO: 0 10E3/UL (ref 0–0.7)
EOSINOPHIL NFR BLD AUTO: 0 %
ERYTHROCYTE [DISTWIDTH] IN BLOOD BY AUTOMATED COUNT: 16.6 % (ref 10–15)
FLUAV RNA SPEC QL NAA+PROBE: POSITIVE
FLUBV RNA RESP QL NAA+PROBE: NEGATIVE
GLUCOSE SERPL-MCNC: 95 MG/DL (ref 70–99)
HCO3 BLDV-SCNC: 23 MMOL/L (ref 21–28)
HCO3 BLDV-SCNC: 23 MMOL/L (ref 21–28)
HCO3 BLDV-SCNC: 26 MMOL/L (ref 21–28)
HCO3 SERPL-SCNC: 22 MMOL/L (ref 22–29)
HCT VFR BLD AUTO: 32.4 % (ref 35–47)
HGB BLD-MCNC: 10.8 G/DL (ref 11.7–15.7)
HOLD SPECIMEN: NORMAL
IMM GRANULOCYTES # BLD: 0 10E3/UL
IMM GRANULOCYTES NFR BLD: 0 %
INTERPRETATION ECG - MUSE: NORMAL
LACTATE BLD-SCNC: 0.5 MMOL/L
LACTATE BLD-SCNC: 0.8 MMOL/L
LYMPHOCYTES # BLD AUTO: 1 10E3/UL (ref 0.8–5.3)
LYMPHOCYTES NFR BLD AUTO: 11 %
MCH RBC QN AUTO: 29.4 PG (ref 26.5–33)
MCHC RBC AUTO-ENTMCNC: 33.3 G/DL (ref 31.5–36.5)
MCV RBC AUTO: 88 FL (ref 78–100)
MONOCYTES # BLD AUTO: 0.8 10E3/UL (ref 0–1.3)
MONOCYTES NFR BLD AUTO: 9 %
NEUTROPHILS # BLD AUTO: 7.3 10E3/UL (ref 1.6–8.3)
NEUTROPHILS NFR BLD AUTO: 79 %
NRBC # BLD AUTO: 0 10E3/UL
NRBC BLD AUTO-RTO: 0 /100
NT-PROBNP SERPL-MCNC: 5144 PG/ML (ref 0–1800)
O2/TOTAL GAS SETTING VFR VENT: 35 %
OXYHGB MFR BLDV: 97 % (ref 70–75)
P AXIS - MUSE: 85 DEGREES
PCO2 BLDV: 35 MM HG (ref 40–50)
PCO2 BLDV: 38 MM HG (ref 40–50)
PCO2 BLDV: 40 MM HG (ref 40–50)
PH BLDV: 7.37 [PH] (ref 7.32–7.43)
PH BLDV: 7.4 [PH] (ref 7.32–7.43)
PH BLDV: 7.48 [PH] (ref 7.32–7.43)
PLATELET # BLD AUTO: 160 10E3/UL (ref 150–450)
PO2 BLDV: 39 MM HG (ref 25–47)
PO2 BLDV: 56 MM HG (ref 25–47)
PO2 BLDV: 86 MM HG (ref 25–47)
POTASSIUM SERPL-SCNC: 4.4 MMOL/L (ref 3.4–5.3)
PR INTERVAL - MUSE: 232 MS
PROCALCITONIN SERPL IA-MCNC: 0.09 NG/ML
PROT SERPL-MCNC: 6.8 G/DL (ref 6.4–8.3)
QRS DURATION - MUSE: 90 MS
QT - MUSE: 382 MS
QTC - MUSE: 432 MS
R AXIS - MUSE: -27 DEGREES
RBC # BLD AUTO: 3.67 10E6/UL (ref 3.8–5.2)
RSV RNA SPEC NAA+PROBE: NEGATIVE
SAO2 % BLDV: 72 % (ref 70–75)
SAO2 % BLDV: 89 % (ref 70–75)
SAO2 % BLDV: 98.1 % (ref 70–75)
SARS-COV-2 RNA RESP QL NAA+PROBE: NEGATIVE
SODIUM SERPL-SCNC: 134 MMOL/L (ref 135–145)
SYSTOLIC BLOOD PRESSURE - MUSE: NORMAL MMHG
T AXIS - MUSE: 46 DEGREES
TROPONIN T SERPL HS-MCNC: 30 NG/L
TROPONIN T SERPL HS-MCNC: 33 NG/L
VENTRICULAR RATE- MUSE: 77 BPM
WBC # BLD AUTO: 9.2 10E3/UL (ref 4–11)

## 2025-03-08 PROCEDURE — 250N000011 HC RX IP 250 OP 636: Mod: JW | Performed by: INTERNAL MEDICINE

## 2025-03-08 PROCEDURE — 71045 X-RAY EXAM CHEST 1 VIEW: CPT

## 2025-03-08 PROCEDURE — 99291 CRITICAL CARE FIRST HOUR: CPT | Mod: 25

## 2025-03-08 PROCEDURE — 85004 AUTOMATED DIFF WBC COUNT: CPT | Performed by: EMERGENCY MEDICINE

## 2025-03-08 PROCEDURE — 96375 TX/PRO/DX INJ NEW DRUG ADDON: CPT

## 2025-03-08 PROCEDURE — 94640 AIRWAY INHALATION TREATMENT: CPT | Mod: 76

## 2025-03-08 PROCEDURE — 999N000157 HC STATISTIC RCP TIME EA 10 MIN

## 2025-03-08 PROCEDURE — 82435 ASSAY OF BLOOD CHLORIDE: CPT | Performed by: EMERGENCY MEDICINE

## 2025-03-08 PROCEDURE — 80053 COMPREHEN METABOLIC PANEL: CPT | Performed by: EMERGENCY MEDICINE

## 2025-03-08 PROCEDURE — 36415 COLL VENOUS BLD VENIPUNCTURE: CPT | Performed by: EMERGENCY MEDICINE

## 2025-03-08 PROCEDURE — 250N000009 HC RX 250: Performed by: EMERGENCY MEDICINE

## 2025-03-08 PROCEDURE — 120N000013 HC R&B IMCU

## 2025-03-08 PROCEDURE — 93005 ELECTROCARDIOGRAM TRACING: CPT

## 2025-03-08 PROCEDURE — 99223 1ST HOSP IP/OBS HIGH 75: CPT | Performed by: INTERNAL MEDICINE

## 2025-03-08 PROCEDURE — 250N000009 HC RX 250: Performed by: INTERNAL MEDICINE

## 2025-03-08 PROCEDURE — 96374 THER/PROPH/DIAG INJ IV PUSH: CPT

## 2025-03-08 PROCEDURE — 84484 ASSAY OF TROPONIN QUANT: CPT | Performed by: EMERGENCY MEDICINE

## 2025-03-08 PROCEDURE — 36415 COLL VENOUS BLD VENIPUNCTURE: CPT | Performed by: INTERNAL MEDICINE

## 2025-03-08 PROCEDURE — 84484 ASSAY OF TROPONIN QUANT: CPT | Performed by: INTERNAL MEDICINE

## 2025-03-08 PROCEDURE — 250N000013 HC RX MED GY IP 250 OP 250 PS 637: Performed by: INTERNAL MEDICINE

## 2025-03-08 PROCEDURE — 85014 HEMATOCRIT: CPT | Performed by: EMERGENCY MEDICINE

## 2025-03-08 PROCEDURE — 82805 BLOOD GASES W/O2 SATURATION: CPT | Performed by: INTERNAL MEDICINE

## 2025-03-08 PROCEDURE — 99418 PROLNG IP/OBS E/M EA 15 MIN: CPT | Performed by: INTERNAL MEDICINE

## 2025-03-08 PROCEDURE — 94640 AIRWAY INHALATION TREATMENT: CPT

## 2025-03-08 PROCEDURE — 82040 ASSAY OF SERUM ALBUMIN: CPT | Performed by: EMERGENCY MEDICINE

## 2025-03-08 PROCEDURE — 5A09357 ASSISTANCE WITH RESPIRATORY VENTILATION, LESS THAN 24 CONSECUTIVE HOURS, CONTINUOUS POSITIVE AIRWAY PRESSURE: ICD-10-PCS | Performed by: EMERGENCY MEDICINE

## 2025-03-08 PROCEDURE — 99292 CRITICAL CARE ADDL 30 MIN: CPT

## 2025-03-08 PROCEDURE — 82803 BLOOD GASES ANY COMBINATION: CPT

## 2025-03-08 PROCEDURE — 87637 SARSCOV2&INF A&B&RSV AMP PRB: CPT | Performed by: EMERGENCY MEDICINE

## 2025-03-08 PROCEDURE — 83605 ASSAY OF LACTIC ACID: CPT

## 2025-03-08 PROCEDURE — 250N000011 HC RX IP 250 OP 636: Performed by: EMERGENCY MEDICINE

## 2025-03-08 PROCEDURE — 84145 PROCALCITONIN (PCT): CPT | Performed by: INTERNAL MEDICINE

## 2025-03-08 PROCEDURE — 83880 ASSAY OF NATRIURETIC PEPTIDE: CPT | Performed by: EMERGENCY MEDICINE

## 2025-03-08 PROCEDURE — 99207 PR APP CREDIT; MD BILLING SHARED VISIT: CPT | Performed by: INTERNAL MEDICINE

## 2025-03-08 RX ORDER — CARBOXYMETHYLCELLULOSE SODIUM 5 MG/ML
1 SOLUTION/ DROPS OPHTHALMIC
Status: DISCONTINUED | OUTPATIENT
Start: 2025-03-08 | End: 2025-03-12 | Stop reason: HOSPADM

## 2025-03-08 RX ORDER — HYDRALAZINE HYDROCHLORIDE 10 MG/1
10 TABLET, FILM COATED ORAL EVERY 4 HOURS PRN
Status: DISCONTINUED | OUTPATIENT
Start: 2025-03-08 | End: 2025-03-12 | Stop reason: HOSPADM

## 2025-03-08 RX ORDER — ACETAMINOPHEN 325 MG/1
650 TABLET ORAL EVERY 4 HOURS PRN
Status: DISCONTINUED | OUTPATIENT
Start: 2025-03-08 | End: 2025-03-12 | Stop reason: HOSPADM

## 2025-03-08 RX ORDER — ACETAMINOPHEN 500 MG
1000 TABLET ORAL
Status: DISCONTINUED | OUTPATIENT
Start: 2025-03-08 | End: 2025-03-12 | Stop reason: HOSPADM

## 2025-03-08 RX ORDER — AMLODIPINE BESYLATE 10 MG/1
10 TABLET ORAL DAILY
Status: DISCONTINUED | OUTPATIENT
Start: 2025-03-08 | End: 2025-03-12 | Stop reason: HOSPADM

## 2025-03-08 RX ORDER — CARVEDILOL 12.5 MG/1
12.5 TABLET ORAL 2 TIMES DAILY WITH MEALS
Status: DISCONTINUED | OUTPATIENT
Start: 2025-03-08 | End: 2025-03-12 | Stop reason: HOSPADM

## 2025-03-08 RX ORDER — OSELTAMIVIR PHOSPHATE 30 MG/1
30 CAPSULE ORAL ONCE
Status: DISCONTINUED | OUTPATIENT
Start: 2025-03-08 | End: 2025-03-08

## 2025-03-08 RX ORDER — ONDANSETRON 4 MG/1
4 TABLET, ORALLY DISINTEGRATING ORAL EVERY 6 HOURS PRN
Status: DISCONTINUED | OUTPATIENT
Start: 2025-03-08 | End: 2025-03-12 | Stop reason: HOSPADM

## 2025-03-08 RX ORDER — ASPIRIN 81 MG/1
81 TABLET ORAL DAILY
Status: DISCONTINUED | OUTPATIENT
Start: 2025-03-09 | End: 2025-03-12 | Stop reason: HOSPADM

## 2025-03-08 RX ORDER — GABAPENTIN 100 MG/1
100 CAPSULE ORAL 2 TIMES DAILY PRN
Status: DISCONTINUED | OUTPATIENT
Start: 2025-03-08 | End: 2025-03-12 | Stop reason: HOSPADM

## 2025-03-08 RX ORDER — METHYLPREDNISOLONE SODIUM SUCCINATE 125 MG/2ML
125 INJECTION INTRAMUSCULAR; INTRAVENOUS ONCE
Status: COMPLETED | OUTPATIENT
Start: 2025-03-08 | End: 2025-03-08

## 2025-03-08 RX ORDER — FUROSEMIDE 10 MG/ML
60 INJECTION INTRAMUSCULAR; INTRAVENOUS ONCE
Status: COMPLETED | OUTPATIENT
Start: 2025-03-08 | End: 2025-03-08

## 2025-03-08 RX ORDER — OMEPRAZOLE 20 MG/1
20 CAPSULE, DELAYED RELEASE ORAL DAILY
Status: DISCONTINUED | OUTPATIENT
Start: 2025-03-08 | End: 2025-03-08

## 2025-03-08 RX ORDER — HYDRALAZINE HYDROCHLORIDE 20 MG/ML
10 INJECTION INTRAMUSCULAR; INTRAVENOUS EVERY 4 HOURS PRN
Status: DISCONTINUED | OUTPATIENT
Start: 2025-03-08 | End: 2025-03-12 | Stop reason: HOSPADM

## 2025-03-08 RX ORDER — OSELTAMIVIR PHOSPHATE 30 MG/1
30 CAPSULE ORAL 2 TIMES DAILY
Status: DISCONTINUED | OUTPATIENT
Start: 2025-03-08 | End: 2025-03-08

## 2025-03-08 RX ORDER — IPRATROPIUM BROMIDE AND ALBUTEROL SULFATE 2.5; .5 MG/3ML; MG/3ML
3 SOLUTION RESPIRATORY (INHALATION)
Status: DISCONTINUED | OUTPATIENT
Start: 2025-03-08 | End: 2025-03-12 | Stop reason: HOSPADM

## 2025-03-08 RX ORDER — GUAIFENESIN 200 MG/10ML
100 LIQUID ORAL EVERY 4 HOURS PRN
Status: DISCONTINUED | OUTPATIENT
Start: 2025-03-08 | End: 2025-03-12 | Stop reason: HOSPADM

## 2025-03-08 RX ORDER — ATORVASTATIN CALCIUM 20 MG/1
20 TABLET, FILM COATED ORAL EVERY EVENING
Status: DISCONTINUED | OUTPATIENT
Start: 2025-03-08 | End: 2025-03-12 | Stop reason: HOSPADM

## 2025-03-08 RX ORDER — PANTOPRAZOLE SODIUM 40 MG/1
40 TABLET, DELAYED RELEASE ORAL
Status: DISCONTINUED | OUTPATIENT
Start: 2025-03-08 | End: 2025-03-12 | Stop reason: HOSPADM

## 2025-03-08 RX ORDER — IPRATROPIUM BROMIDE AND ALBUTEROL SULFATE 2.5; .5 MG/3ML; MG/3ML
3 SOLUTION RESPIRATORY (INHALATION) ONCE
Status: COMPLETED | OUTPATIENT
Start: 2025-03-08 | End: 2025-03-08

## 2025-03-08 RX ORDER — TOLTERODINE TARTRATE 1 MG/1
1 TABLET, EXTENDED RELEASE ORAL 2 TIMES DAILY
Status: DISCONTINUED | OUTPATIENT
Start: 2025-03-08 | End: 2025-03-12 | Stop reason: HOSPADM

## 2025-03-08 RX ORDER — CARBIDOPA AND LEVODOPA 25; 100 MG/1; MG/1
2 TABLET ORAL 4 TIMES DAILY
Status: DISCONTINUED | OUTPATIENT
Start: 2025-03-08 | End: 2025-03-12 | Stop reason: HOSPADM

## 2025-03-08 RX ORDER — OSELTAMIVIR PHOSPHATE 30 MG/1
30 CAPSULE ORAL ONCE
Status: DISCONTINUED | OUTPATIENT
Start: 2025-03-08 | End: 2025-03-10

## 2025-03-08 RX ORDER — LIDOCAINE 40 MG/G
CREAM TOPICAL
Status: DISCONTINUED | OUTPATIENT
Start: 2025-03-08 | End: 2025-03-08

## 2025-03-08 RX ORDER — RALOXIFENE HYDROCHLORIDE 60 MG/1
60 TABLET, FILM COATED ORAL EVERY EVENING
Status: DISCONTINUED | OUTPATIENT
Start: 2025-03-08 | End: 2025-03-12 | Stop reason: HOSPADM

## 2025-03-08 RX ORDER — METHYLPREDNISOLONE SODIUM SUCCINATE 125 MG/2ML
40 INJECTION INTRAMUSCULAR; INTRAVENOUS EVERY 12 HOURS
Status: DISCONTINUED | OUTPATIENT
Start: 2025-03-08 | End: 2025-03-09

## 2025-03-08 RX ORDER — FUROSEMIDE 10 MG/ML
40 INJECTION INTRAMUSCULAR; INTRAVENOUS
Status: DISCONTINUED | OUTPATIENT
Start: 2025-03-08 | End: 2025-03-09

## 2025-03-08 RX ORDER — LOSARTAN POTASSIUM 100 MG/1
100 TABLET ORAL EVERY EVENING
Status: DISCONTINUED | OUTPATIENT
Start: 2025-03-08 | End: 2025-03-12 | Stop reason: HOSPADM

## 2025-03-08 RX ORDER — AMOXICILLIN 250 MG
2 CAPSULE ORAL 2 TIMES DAILY PRN
Status: DISCONTINUED | OUTPATIENT
Start: 2025-03-08 | End: 2025-03-12 | Stop reason: HOSPADM

## 2025-03-08 RX ORDER — CARBOXYMETHYLCELLULOSE SODIUM 5 MG/ML
1 SOLUTION/ DROPS OPHTHALMIC
Status: DISCONTINUED | OUTPATIENT
Start: 2025-03-08 | End: 2025-03-10

## 2025-03-08 RX ORDER — AMOXICILLIN 250 MG
1 CAPSULE ORAL 2 TIMES DAILY PRN
Status: DISCONTINUED | OUTPATIENT
Start: 2025-03-08 | End: 2025-03-12 | Stop reason: HOSPADM

## 2025-03-08 RX ORDER — METHYLPREDNISOLONE SODIUM SUCCINATE 125 MG/2ML
40 INJECTION INTRAMUSCULAR; INTRAVENOUS EVERY 6 HOURS
Status: DISCONTINUED | OUTPATIENT
Start: 2025-03-08 | End: 2025-03-08

## 2025-03-08 RX ORDER — ONDANSETRON 2 MG/ML
4 INJECTION INTRAMUSCULAR; INTRAVENOUS EVERY 6 HOURS PRN
Status: DISCONTINUED | OUTPATIENT
Start: 2025-03-08 | End: 2025-03-12 | Stop reason: HOSPADM

## 2025-03-08 RX ORDER — ACETAMINOPHEN 650 MG/1
650 SUPPOSITORY RECTAL EVERY 4 HOURS PRN
Status: DISCONTINUED | OUTPATIENT
Start: 2025-03-08 | End: 2025-03-12 | Stop reason: HOSPADM

## 2025-03-08 RX ORDER — BUDESONIDE 0.5 MG/2ML
0.5 INHALANT ORAL 2 TIMES DAILY
Status: DISCONTINUED | OUTPATIENT
Start: 2025-03-08 | End: 2025-03-12 | Stop reason: HOSPADM

## 2025-03-08 RX ORDER — ALBUTEROL SULFATE 90 UG/1
1-2 INHALANT RESPIRATORY (INHALATION) EVERY 6 HOURS PRN
Status: DISCONTINUED | OUTPATIENT
Start: 2025-03-08 | End: 2025-03-12 | Stop reason: HOSPADM

## 2025-03-08 RX ORDER — OSELTAMIVIR PHOSPHATE 30 MG/1
30 CAPSULE ORAL 2 TIMES DAILY
Status: DISCONTINUED | OUTPATIENT
Start: 2025-03-08 | End: 2025-03-12 | Stop reason: HOSPADM

## 2025-03-08 RX ORDER — LIDOCAINE 40 MG/G
CREAM TOPICAL
Status: DISCONTINUED | OUTPATIENT
Start: 2025-03-08 | End: 2025-03-12 | Stop reason: HOSPADM

## 2025-03-08 RX ADMIN — TOLTERODINE TARTRATE 1 MG: 1 TABLET ORAL at 11:08

## 2025-03-08 RX ADMIN — FUROSEMIDE 40 MG: 10 INJECTION, SOLUTION INTRAMUSCULAR; INTRAVENOUS at 16:25

## 2025-03-08 RX ADMIN — CARBIDOPA AND LEVODOPA 2 TABLET: 25; 100 TABLET ORAL at 16:25

## 2025-03-08 RX ADMIN — METHYLPREDNISOLONE SODIUM SUCCINATE 37.5 MG: 125 INJECTION, POWDER, FOR SOLUTION INTRAMUSCULAR; INTRAVENOUS at 07:41

## 2025-03-08 RX ADMIN — Medication 600 MG: at 20:43

## 2025-03-08 RX ADMIN — ACETAMINOPHEN 500 MG: 500 TABLET, FILM COATED ORAL at 11:15

## 2025-03-08 RX ADMIN — LOSARTAN POTASSIUM 100 MG: 100 TABLET, FILM COATED ORAL at 20:18

## 2025-03-08 RX ADMIN — CARVEDILOL 12.5 MG: 12.5 TABLET, FILM COATED ORAL at 11:09

## 2025-03-08 RX ADMIN — IPRATROPIUM BROMIDE AND ALBUTEROL SULFATE 3 ML: .5; 3 SOLUTION RESPIRATORY (INHALATION) at 08:28

## 2025-03-08 RX ADMIN — ACETAMINOPHEN 650 MG: 325 TABLET, FILM COATED ORAL at 23:48

## 2025-03-08 RX ADMIN — BUDESONIDE 0.5 MG: 0.5 INHALANT RESPIRATORY (INHALATION) at 08:28

## 2025-03-08 RX ADMIN — IPRATROPIUM BROMIDE AND ALBUTEROL SULFATE 3 ML: .5; 3 SOLUTION RESPIRATORY (INHALATION) at 02:15

## 2025-03-08 RX ADMIN — IPRATROPIUM BROMIDE AND ALBUTEROL SULFATE 3 ML: .5; 3 SOLUTION RESPIRATORY (INHALATION) at 20:16

## 2025-03-08 RX ADMIN — IPRATROPIUM BROMIDE AND ALBUTEROL SULFATE 3 ML: .5; 3 SOLUTION RESPIRATORY (INHALATION) at 11:18

## 2025-03-08 RX ADMIN — ATORVASTATIN CALCIUM 20 MG: 20 TABLET, FILM COATED ORAL at 20:18

## 2025-03-08 RX ADMIN — METHYLPREDNISOLONE SODIUM SUCCINATE 37.5 MG: 125 INJECTION, POWDER, FOR SOLUTION INTRAMUSCULAR; INTRAVENOUS at 20:17

## 2025-03-08 RX ADMIN — OSELTAMIVIR PHOSPHATE 30 MG: 30 CAPSULE ORAL at 20:17

## 2025-03-08 RX ADMIN — FUROSEMIDE 40 MG: 10 INJECTION, SOLUTION INTRAMUSCULAR; INTRAVENOUS at 07:42

## 2025-03-08 RX ADMIN — CARBIDOPA AND LEVODOPA 2 TABLET: 25; 100 TABLET ORAL at 11:12

## 2025-03-08 RX ADMIN — TOLTERODINE TARTRATE 1 MG: 1 TABLET ORAL at 20:43

## 2025-03-08 RX ADMIN — RALOXIFENE HYDROCHLORIDE 60 MG: 60 TABLET, FILM COATED ORAL at 20:18

## 2025-03-08 RX ADMIN — METHYLPREDNISOLONE SODIUM SUCCINATE 125 MG: 125 INJECTION, POWDER, FOR SOLUTION INTRAMUSCULAR; INTRAVENOUS at 02:14

## 2025-03-08 RX ADMIN — AMLODIPINE BESYLATE 10 MG: 10 TABLET ORAL at 11:10

## 2025-03-08 RX ADMIN — BUDESONIDE 0.5 MG: 0.5 INHALANT RESPIRATORY (INHALATION) at 20:42

## 2025-03-08 RX ADMIN — IPRATROPIUM BROMIDE AND ALBUTEROL SULFATE 3 ML: .5; 3 SOLUTION RESPIRATORY (INHALATION) at 16:25

## 2025-03-08 RX ADMIN — CARVEDILOL 12.5 MG: 12.5 TABLET, FILM COATED ORAL at 20:43

## 2025-03-08 RX ADMIN — HYDRALAZINE HYDROCHLORIDE 10 MG: 20 INJECTION INTRAMUSCULAR; INTRAVENOUS at 08:22

## 2025-03-08 RX ADMIN — CARBIDOPA AND LEVODOPA 2 TABLET: 25; 100 TABLET ORAL at 20:17

## 2025-03-08 RX ADMIN — FUROSEMIDE 60 MG: 10 INJECTION, SOLUTION INTRAVENOUS at 02:14

## 2025-03-08 RX ADMIN — PANTOPRAZOLE SODIUM 40 MG: 40 TABLET, DELAYED RELEASE ORAL at 11:41

## 2025-03-08 ASSESSMENT — ACTIVITIES OF DAILY LIVING (ADL)
ADLS_ACUITY_SCORE: 60

## 2025-03-08 NOTE — ED TRIAGE NOTES
Patient arrives from home with a productive cough that began this afternoon. When EMS arrived she was 83% on room air. They applied 3L NC and she popped up to 93%. Per EMS wheezy and right sided B lines. They gave a duoneb and placed on IV in left forearm.

## 2025-03-08 NOTE — ED NOTES
Bed: [facility]  Expected date: 3/8/25  Expected time: 2:00 AM  Means of arrival: Ambulance  Comments:  HEMS [phone] sob
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
## 2025-03-08 NOTE — PHARMACY-ADMISSION MEDICATION HISTORY
Pharmacy Intern Admission Medication History    Admission medication history is complete. The information provided in this note is only as accurate as the sources available at the time of the update.    Information Source(s): Family member and CareEverywhere/[employer] via phone    Pertinent Information: Med rec updated per pt's sisters Irvin.  -Per [employer] Amlodipine was filled on 10/16 for 90ds, but pt's family members stated that pt is still taking 10 mg daily.   -Per family member, Pt takes Asprin every other day instead of daily as prescribed.  -Pt takes Sinemet: 2 tablets 4 times daily,  6am, 11am, 4pm, and 9pm.  -Pt stopped taking sertraline.      Changes made to PTA medication list:  Added: None  Deleted:   Senokot  Changed:   Sertraline and Budesonide marked not taking    Allergies reviewed with patient and updates made in EHR: yes    Medication History Completed By: Tiffany Lopez 3/8/2025 9:00 AM    PTA Med List   Medication Sig Last Dose/Taking    acetaminophen (TYLENOL) 500 MG tablet Take 1,000 mg by mouth 2 times daily as needed for mild pain Taking As Needed    acetaminophen (TYLENOL) 500 MG tablet Take 1,000 mg by mouth daily (with breakfast) 3/7/2025 Morning    albuterol (PROAIR HFA/PROVENTIL HFA/VENTOLIN HFA) 108 (90 Base) MCG/ACT inhaler Inhale 1-2 puffs into the lungs every 6 hours as needed for shortness of breath or wheezing Taking As Needed    amLODIPine (NORVASC) 10 MG tablet Take 1 tablet (10 mg) by mouth daily 3/7/2025 Morning    aspirin EC 81 MG EC tablet Take 1 tablet (81 mg) by mouth daily (Patient taking differently: Take 81 mg by mouth every other day.) 3/7/2025 Morning    atorvastatin (LIPITOR) 20 MG tablet Take 1 tablet (20 mg) by mouth daily 3/7/2025 Evening    azithromycin (ZITHROMAX) 500 MG tablet TAKE 1 TABLET BY MOUTH 30 TO 60 MINUTES PRIOR TO DENTAL PROCEDURE Taking    calcium carbonate (OS-CHRIS) 1500 (600 Ca) MG tablet Take 600 mg by mouth 2 times daily  (with meals) Staff may administer the Ca+ that pt has brought in 3/7/2025 Bedtime    carbidopa-levodopa (SINEMET)  MG per tablet Take 2 tablets by mouth 4 times daily Takes at 6am, 11am, 4pm, and 9 pm 3/7/2025 at  9:00 PM    carvedilol (COREG) 12.5 MG tablet Take 1 tablet (12.5 mg) by mouth 2 times daily (with meals) 3/7/2025 Bedtime    Cholecalciferol (VITAMIN D3 PO) Take 400 Units by mouth daily  3/7/2025 Morning    coenzyme Q-10 capsule Take 1 capsule by mouth daily 100 mg dose 3/7/2025 Morning    gabapentin (NEURONTIN) 100 MG capsule Take 100 mg by mouth 2 times daily as needed for neuropathic pain Taking As Needed    ipratropium - albuterol 0.5 mg/2.5 mg/3 mL (DUONEB) 0.5-2.5 (3) MG/3ML neb solution Take 1 vial (3 mLs) by nebulization 3 times daily 3/7/2025 Bedtime    losartan (COZAAR) 100 MG tablet Take 1 tablet (100 mg) by mouth every evening 3/7/2025 Evening    Multiple Vitamins-Minerals (PRESERVISION AREDS) CAPS Take 2 capsules by mouth daily. 3/7/2025 Noon    omeprazole (PRILOSEC) 20 MG DR capsule TAKE 1 CAPSULE(20 MG) BY MOUTH DAILY 3/7/2025 Morning    polyethylene glycol (MIRALAX/GLYCOLAX) packet Take 1 packet by mouth daily as needed for constipation Taking As Needed    polyethylene glycol-propylene glycol (SYSTANE ULTRA) 0.4-0.3 % SOLN ophthalmic solution Place 1 drop into both eyes every hour as needed for dry eyes May self admin and have at bedside. Taking As Needed    raloxifene (EVISTA) 60 MG tablet TAKE 1 TABLET(60 MG) BY MOUTH DAILY 3/7/2025 Evening    tolterodine (DETROL) 1 MG tablet Take 1 tablet (1 mg) by mouth 2 times daily 3/7/2025 Evening

## 2025-03-08 NOTE — ED NOTES
[facility]  ED Nurse Handoff Report    ED Chief complaint: Shortness of Breath      ED Diagnosis:   Final diagnoses:   Influenza A   Acute on chronic congestive heart failure, unspecified heart failure type (H)   Wheezing       Code Status: DNR / DNI    Allergies:   Allergies   Allergen Reactions    Bee Pollen Hives     If MVI contains this - she will break out in a rash.     Cephalexin Monohydrate Hives    Ciprofloxacin Hives    Clindamycin Hives    Multi Vitamin-Minerals [Hair-Vites] Other (See Comments)     (If MV contains bee pollen she will break out in hives)     Penicillin [Penicillins] Hives     All antibiotics except zpak??????    Thiazide-Type Diuretics Other (See Comments)     Very low sodium levels    Tobramycin Hives    Vancomycin Hives    Ibuprofen Nausea and Vomiting and Nausea     stomach pain    Multiple Vitamin Hives     If MVI contains this - she will break out in a rash.     Pollen Extract Hives    Versed [Midazolam] Other (See Comments)     Confused, agiitated, for 6-7 hrs after anngiogram sedation    Vitamin E Hives    Ace Inhibitors Cough    Metoprolol Diarrhea      tolerates Inderal       Patient Story:   Patient arrives via EMS after sister visited and noted she was short of breath. When EMS arrived she was 83% on room air. They applied 3L NC and she popped up to 93%. Has had a productive cough since yesterday afternoon. Labs and imaging obtained in ED. Medicated per MAR. Is influenza A positive.     Focused Assessment:    Neuro: Alert, oriented to time, place and situation  Respiratory:On bipap  Cardiology:  WNL Sinus rhythm with AV block  Gastrointestinal: soft, non tender, non distended   Genitourinary/Renal:  Incontinent baseline. Purewick present.   Musculoskeletal: moves all extremities   Skin: Intact skin   Lines: 18g right forearm, 20g left forearm    Labs Ordered and Resulted from Time of ED Arrival to Time of ED Departure   INFLUENZA A/B, RSV AND SARS-COV2 PCR -  Abnormal       Result Value    Influenza A PCR Positive (*)     Influenza B PCR Negative      RSV PCR Negative      SARS CoV2 PCR Negative     COMPREHENSIVE METABOLIC PANEL - Abnormal    Sodium 134 (*)     Potassium 4.4      Carbon Dioxide (CO2) 22      Anion Gap 12      Urea Nitrogen 29.0 (*)     Creatinine 0.93      GFR Estimate 59 (*)     Calcium 9.3      Chloride 100      Glucose 95      Alkaline Phosphatase 95      AST 18      ALT <5      Protein Total 6.8      Albumin 3.5      Bilirubin Total 0.3     TROPONIN T, HIGH SENSITIVITY - Abnormal    Troponin T, High Sensitivity 30 (*)    NT PROBNP INPATIENT - Abnormal    N terminal Pro BNP Inpatient 5,144 (*)    CBC WITH PLATELETS AND DIFFERENTIAL - Abnormal    WBC Count 9.2      RBC Count 3.67 (*)     Hemoglobin 10.8 (*)     Hematocrit 32.4 (*)     MCV 88      MCH 29.4      MCHC 33.3      RDW 16.6 (*)     Platelet Count 160      % Neutrophils 79      % Lymphocytes 11      % Monocytes 9      % Eosinophils 0      % Basophils 0      % Immature Granulocytes 0      NRBCs per 100 WBC 0      Absolute Neutrophils 7.3      Absolute Lymphocytes 1.0      Absolute Monocytes 0.8      Absolute Eosinophils 0.0      Absolute Basophils 0.0      Absolute Immature Granulocytes 0.0      Absolute NRBCs 0.0     ISTAT GASES LACTATE VENOUS POCT - Normal    Lactic Acid POCT 0.8      Bicarbonate Venous POCT 23      O2 Sat, Venous POCT 72      pCO2 Venous POCT 40      pH Venous POCT 7.37      pO2 Venous POCT 39      Base Excess/Deficit (+/-) POCT -2.0     TROPONIN T, HIGH SENSITIVITY        XR Chest Port 1 View   Final Result   IMPRESSION: Stable cardiomegaly. Mild pulmonary vascular congestion and interstitial edema. Trace effusions. No pneumothorax. Atherosclerotic calcifications of the aortic arch. Shoulder arthroplasties and partially visualized thoracolumbar fusion    hardware.            Treatments and/or interventions provided:      Medications   oseltamivir (TAMIFLU) capsule 30 mg  (has no administration in time range)   ipratropium - albuterol 0.5 mg/2.5 mg/3 mL (DUONEB) neb solution 3 mL (3 mLs Nebulization $Given 3/8/25 0215)   furosemide (LASIX) injection 60 mg (60 mg Intravenous $Given 3/8/25 0214)   methylPREDNISolone Na Suc (solu-MEDROL) injection 125 mg (125 mg Intravenous $Given 3/8/25 0214)        Patient's response to treatments and/or interventions:   Resting comfortably    To be done/followed up on inpatient unit:    See any in-patient orders    Does this patient have any cognitive concerns?: Forgetful    Activity level - Baseline/Home:    Walker    Activity Level - Current:     Stand with Assist and Walker    Patient's Preferred language: English     Needed?: No    Isolation: None and Droplet  Infection: Influenza  Patient tested for COVID 19 prior to admission: YES    Bariatric?: No    Vital Signs:   Vitals:    03/08/25 0225 03/08/25 0300 03/08/25 0301 03/08/25 0330   BP:  (!) 169/79     Pulse:  64 63 64   Resp:  22 23 24   Temp: 98  F (36.7  C)      TempSrc: Temporal      SpO2:  100% 100% 98%   Weight:           Cardiac Rhythm:     Was the PSS-3 completed:   Yes    Family Comments: Sister bedside    For the majority of the shift this patient's behavior was Green.   Behavioral interventions performed were     ED NURSE PHONE NUMBER: *[phone]
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
## 2025-03-08 NOTE — PROGRESS NOTES
[facility]    Medicine Progress Note - Hospitalist Service    Date of Admission:  3/8/2025    Assessment & Plan   Opal is an 89-year-old female with a history significant for HFpEF, CKD 3, Parkinson's, breast cancer and asthma who was admitted to [facility] on 3/8/2025 for acute hypoxic respiratory failure multifactorial from flu A, asthma exacerbation and HFpEF exacerbation.    Acute hypoxic respiratory failure  Multifactorial from HFpEF/asthma exacerbation and influenza A  Plan  Management of asthma, HFpEF and influenza A as below  Maintain oxygenation > 92%    Influenza A  Flu A PCR 3/8/2025: Positive  Plan  Continue renally adjusted Tamiflu 30 mg twice daily x 5 days (3/8/2025 - 3/12/2025)    HFpEF exacerbation  Last echo 5/2/2024: EF 50-55%, grade 2 diastolic dysfunction  Home regimen: Furosemide 40 mg daily  Plan  Continue furosemide 40 mg twice daily IV  Daily weights and strict I's and O's  2 g sodium, 2 L fluid restricted diet once off BiPAP    Asthma exacerbation  Home regimen: Albuterol as needed, budesonide twice daily  Plan  Methylprednisolone 37.5 mg every 12 hours today.  If stable, will transition to daily tomorrow 3/9/2025  Inhalers as needed  Monitor    Hypertension - amlodipine 10 mg daily, carvedilol 12.5 mg twice daily, losartan 100 mg daily  Hyperlipidemia - atorvastatin 20 mg daily  CKD 3 - baseline creatinine 0.8-1.1  Parkinson's disease - Sinemet  mg 4 times daily  GERD - omeprazole 20 mg daily  History of breast cancer - raloxifene 60 mg daily  Depression - Zoloft 25 mg daily, but patient has been off this for a long period of time now  Peripheral neuropathy - gabapentin 100 mg twice daily as needed  History of overactive bladder - tolterodine 1 mg twice daily      Diet: Combination Diet Regular Diet; Renal Diet (non-dialysis); 2 gm NA Diet  Fluid restriction 2000 ML FLUID    DVT Prophylaxis: Pneumatic Compression Devices  Santana Catheter: Not present  Lines:  None     Cardiac Monitoring: ACTIVE order. Indication: Acute decompensated heart failure (48 hours)  Code Status: No CPR- Do NOT Intubate      Disposition Plan     Medically Ready for Discharge: Anticipated in 2-4 Days    Umberto Long DO  Hospitalist Service  [facility]  Securely message with [employer] (more info)  Text page via [employer] Paging/Directory   ______________________________________________________________________    Interval History   Admitted overnight.    On evaluation this morning, is resting on the bed.  Still with some shortness of breath, but improved from initial admission.  No fevers, chills    Physical Exam   Vital Signs: Temp: 98  F (36.7  C) Temp src: Temporal BP: (!) 165/83 Pulse: 67   Resp: 19 SpO2: 97 % O2 Device: Oxymask Oxygen Delivery: 6 LPM  Weight: 147 lbs 14.86 oz    General Appearance:  Sleepy, on BiPAP  Respiratory: Bilateral air entry, some bibasilar crackles, scattered wheezing  Cardiovascular: 1 S2, RRR, no murmurs, no rubs  GI: Soft, nontender, bowel sounds are present  Skin: No rashes, no cyanosis  Neuro: Sleeping, neuroexam not performed    Medical Decision Making       60 MINUTES SPENT BY ME on the date of service doing chart review, history, exam, documentation & further activities per the note.      Data   ------------------------- PAST 24 HR DATA REVIEWED -----------------------------------------------

## 2025-03-09 LAB
ANION GAP SERPL CALCULATED.3IONS-SCNC: 15 MMOL/L (ref 7–15)
BUN SERPL-MCNC: 33 MG/DL (ref 8–23)
CALCIUM SERPL-MCNC: 9.2 MG/DL (ref 8.8–10.4)
CHLORIDE SERPL-SCNC: 95 MMOL/L (ref 98–107)
CREAT SERPL-MCNC: 0.93 MG/DL (ref 0.51–0.95)
EGFRCR SERPLBLD CKD-EPI 2021: 59 ML/MIN/1.73M2
ERYTHROCYTE [DISTWIDTH] IN BLOOD BY AUTOMATED COUNT: 16.3 % (ref 10–15)
GLUCOSE SERPL-MCNC: 142 MG/DL (ref 70–99)
HCO3 SERPL-SCNC: 25 MMOL/L (ref 22–29)
HCT VFR BLD AUTO: 32.7 % (ref 35–47)
HGB BLD-MCNC: 11.1 G/DL (ref 11.7–15.7)
MCH RBC QN AUTO: 29.4 PG (ref 26.5–33)
MCHC RBC AUTO-ENTMCNC: 33.9 G/DL (ref 31.5–36.5)
MCV RBC AUTO: 87 FL (ref 78–100)
PLATELET # BLD AUTO: 161 10E3/UL (ref 150–450)
POTASSIUM SERPL-SCNC: 4.2 MMOL/L (ref 3.4–5.3)
RBC # BLD AUTO: 3.78 10E6/UL (ref 3.8–5.2)
SODIUM SERPL-SCNC: 135 MMOL/L (ref 135–145)
WBC # BLD AUTO: 6.1 10E3/UL (ref 4–11)

## 2025-03-09 PROCEDURE — 999N000157 HC STATISTIC RCP TIME EA 10 MIN

## 2025-03-09 PROCEDURE — 250N000009 HC RX 250: Performed by: INTERNAL MEDICINE

## 2025-03-09 PROCEDURE — 250N000013 HC RX MED GY IP 250 OP 250 PS 637: Performed by: INTERNAL MEDICINE

## 2025-03-09 PROCEDURE — 250N000011 HC RX IP 250 OP 636: Performed by: INTERNAL MEDICINE

## 2025-03-09 PROCEDURE — 99233 SBSQ HOSP IP/OBS HIGH 50: CPT | Performed by: INTERNAL MEDICINE

## 2025-03-09 PROCEDURE — 80048 BASIC METABOLIC PNL TOTAL CA: CPT | Performed by: INTERNAL MEDICINE

## 2025-03-09 PROCEDURE — 85018 HEMOGLOBIN: CPT | Performed by: INTERNAL MEDICINE

## 2025-03-09 PROCEDURE — 82310 ASSAY OF CALCIUM: CPT | Performed by: INTERNAL MEDICINE

## 2025-03-09 PROCEDURE — 94640 AIRWAY INHALATION TREATMENT: CPT

## 2025-03-09 PROCEDURE — 120N000013 HC R&B IMCU

## 2025-03-09 PROCEDURE — 36415 COLL VENOUS BLD VENIPUNCTURE: CPT | Performed by: INTERNAL MEDICINE

## 2025-03-09 PROCEDURE — 94640 AIRWAY INHALATION TREATMENT: CPT | Mod: 76

## 2025-03-09 PROCEDURE — 250N000012 HC RX MED GY IP 250 OP 636 PS 637: Performed by: INTERNAL MEDICINE

## 2025-03-09 RX ORDER — PREDNISONE 20 MG/1
40 TABLET ORAL DAILY
Status: DISCONTINUED | OUTPATIENT
Start: 2025-03-09 | End: 2025-03-12 | Stop reason: HOSPADM

## 2025-03-09 RX ORDER — FUROSEMIDE 40 MG/1
40 TABLET ORAL DAILY
Status: DISCONTINUED | OUTPATIENT
Start: 2025-03-10 | End: 2025-03-12 | Stop reason: HOSPADM

## 2025-03-09 RX ORDER — VIT C/E/ZN/COPPR/LUTEIN/ZEAXAN 60 MG-6 MG
2 CAPSULE ORAL DAILY
Status: DISCONTINUED | OUTPATIENT
Start: 2025-03-09 | End: 2025-03-12 | Stop reason: HOSPADM

## 2025-03-09 RX ORDER — TETRAHYDROZOLINE HCL 0.05 %
1 DROPS OPHTHALMIC (EYE) 4 TIMES DAILY PRN
Status: DISCONTINUED | OUTPATIENT
Start: 2025-03-09 | End: 2025-03-09

## 2025-03-09 RX ORDER — POLYETHYLENE GLYCOL 3350 17 G/17G
17 POWDER, FOR SOLUTION ORAL DAILY PRN
Status: DISCONTINUED | OUTPATIENT
Start: 2025-03-09 | End: 2025-03-12 | Stop reason: HOSPADM

## 2025-03-09 RX ADMIN — RALOXIFENE HYDROCHLORIDE 60 MG: 60 TABLET, FILM COATED ORAL at 20:47

## 2025-03-09 RX ADMIN — IPRATROPIUM BROMIDE AND ALBUTEROL SULFATE 3 ML: .5; 3 SOLUTION RESPIRATORY (INHALATION) at 11:06

## 2025-03-09 RX ADMIN — ASPIRIN 81 MG: 81 TABLET, COATED ORAL at 08:43

## 2025-03-09 RX ADMIN — METHYLPREDNISOLONE SODIUM SUCCINATE 37.5 MG: 125 INJECTION, POWDER, FOR SOLUTION INTRAMUSCULAR; INTRAVENOUS at 08:42

## 2025-03-09 RX ADMIN — ATORVASTATIN CALCIUM 20 MG: 20 TABLET, FILM COATED ORAL at 20:42

## 2025-03-09 RX ADMIN — CARBIDOPA AND LEVODOPA 2 TABLET: 25; 100 TABLET ORAL at 06:44

## 2025-03-09 RX ADMIN — CARVEDILOL 12.5 MG: 12.5 TABLET, FILM COATED ORAL at 08:43

## 2025-03-09 RX ADMIN — PANTOPRAZOLE SODIUM 40 MG: 40 TABLET, DELAYED RELEASE ORAL at 08:54

## 2025-03-09 RX ADMIN — Medication 2 CAPSULE: at 13:25

## 2025-03-09 RX ADMIN — CARBIDOPA AND LEVODOPA 2 TABLET: 25; 100 TABLET ORAL at 15:37

## 2025-03-09 RX ADMIN — ALBUTEROL SULFATE 2 PUFF: 108 INHALANT RESPIRATORY (INHALATION) at 00:05

## 2025-03-09 RX ADMIN — ACETAMINOPHEN 650 MG: 325 TABLET, FILM COATED ORAL at 23:19

## 2025-03-09 RX ADMIN — AMLODIPINE BESYLATE 10 MG: 10 TABLET ORAL at 08:43

## 2025-03-09 RX ADMIN — IPRATROPIUM BROMIDE AND ALBUTEROL SULFATE 3 ML: .5; 3 SOLUTION RESPIRATORY (INHALATION) at 21:21

## 2025-03-09 RX ADMIN — TOLTERODINE TARTRATE 1 MG: 1 TABLET ORAL at 20:42

## 2025-03-09 RX ADMIN — ALBUTEROL SULFATE 2 PUFF: 108 INHALANT RESPIRATORY (INHALATION) at 05:25

## 2025-03-09 RX ADMIN — GUAIFENESIN 100 MG: 200 SOLUTION ORAL at 23:19

## 2025-03-09 RX ADMIN — IPRATROPIUM BROMIDE AND ALBUTEROL SULFATE 3 ML: .5; 3 SOLUTION RESPIRATORY (INHALATION) at 07:48

## 2025-03-09 RX ADMIN — CARBIDOPA AND LEVODOPA 2 TABLET: 25; 100 TABLET ORAL at 20:42

## 2025-03-09 RX ADMIN — TOLTERODINE TARTRATE 1 MG: 1 TABLET ORAL at 08:43

## 2025-03-09 RX ADMIN — OSELTAMIVIR PHOSPHATE 30 MG: 30 CAPSULE ORAL at 20:41

## 2025-03-09 RX ADMIN — FUROSEMIDE 40 MG: 10 INJECTION, SOLUTION INTRAMUSCULAR; INTRAVENOUS at 15:37

## 2025-03-09 RX ADMIN — ACETAMINOPHEN 650 MG: 325 TABLET, FILM COATED ORAL at 15:51

## 2025-03-09 RX ADMIN — ACETAMINOPHEN 1000 MG: 500 TABLET, FILM COATED ORAL at 08:43

## 2025-03-09 RX ADMIN — BUDESONIDE 0.5 MG: 0.5 INHALANT RESPIRATORY (INHALATION) at 21:21

## 2025-03-09 RX ADMIN — IPRATROPIUM BROMIDE AND ALBUTEROL SULFATE 3 ML: .5; 3 SOLUTION RESPIRATORY (INHALATION) at 14:33

## 2025-03-09 RX ADMIN — BUDESONIDE 0.5 MG: 0.5 INHALANT RESPIRATORY (INHALATION) at 07:48

## 2025-03-09 RX ADMIN — FUROSEMIDE 40 MG: 10 INJECTION, SOLUTION INTRAMUSCULAR; INTRAVENOUS at 08:43

## 2025-03-09 RX ADMIN — CARVEDILOL 12.5 MG: 12.5 TABLET, FILM COATED ORAL at 18:28

## 2025-03-09 RX ADMIN — OSELTAMIVIR PHOSPHATE 30 MG: 30 CAPSULE ORAL at 08:43

## 2025-03-09 RX ADMIN — LOSARTAN POTASSIUM 100 MG: 100 TABLET, FILM COATED ORAL at 20:42

## 2025-03-09 RX ADMIN — PREDNISONE 40 MG: 20 TABLET ORAL at 11:22

## 2025-03-09 RX ADMIN — CARBIDOPA AND LEVODOPA 2 TABLET: 25; 100 TABLET ORAL at 11:22

## 2025-03-09 ASSESSMENT — ACTIVITIES OF DAILY LIVING (ADL)
ADLS_ACUITY_SCORE: 69
ADLS_ACUITY_SCORE: 69
ADLS_ACUITY_SCORE: 70
ADLS_ACUITY_SCORE: 70
ADLS_ACUITY_SCORE: 66
ADLS_ACUITY_SCORE: 70
ADLS_ACUITY_SCORE: 69
ADLS_ACUITY_SCORE: 69
ADLS_ACUITY_SCORE: 70
ADLS_ACUITY_SCORE: 70
ADLS_ACUITY_SCORE: 69
ADLS_ACUITY_SCORE: 70
ADLS_ACUITY_SCORE: 70
ADLS_ACUITY_SCORE: 69
ADLS_ACUITY_SCORE: 70

## 2025-03-09 NOTE — PROGRESS NOTES
[facility]    Medicine Progress Note - Hospitalist Service    Date of Admission:  3/8/2025    Assessment & Plan   Opal is an 89-year-old female with a history significant for HFpEF, CKD 3, Parkinson's, breast cancer and asthma who was admitted to [facility] on 3/8/2025 for acute hypoxic respiratory failure multifactorial from flu A, asthma exacerbation and HFpEF exacerbation.    Acute hypoxic respiratory failure - improved  Multifactorial from HFpEF/asthma exacerbation and influenza A  Plan  Management of asthma, HFpEF and influenza A as below  Maintain oxygenation > 92%    Influenza A  Flu A PCR 3/8/2025: Positive  Plan  Continue renally adjusted Tamiflu 30 mg twice daily x 5 days (3/8/2025 - 3/12/2025)    HFpEF exacerbation  Last echo 5/2/2024: EF 50-55%, grade 2 diastolic dysfunction  Home regimen: Furosemide 40 mg daily  Net -1.4L output in past 24hrs  Plan  Continue furosemide 40 mg twice daily IV today.  Starting tomorrow, will transition to home regimen of furosemide 40 mg daily PO   Daily weights and strict I's and O's  2 g sodium, 2 L fluid restricted diet    Asthma exacerbation  Home regimen: Albuterol as needed, budesonide twice daily  Plan  Discontinue Methylprednisolone 37.5 mg every 12 hours today and start prednisone 40mg daily PO for additional 5 days (end date 3/14/2025)  Inhalers as needed  Monitor    Deconditioning  PT/OT consulted    Hypertension - amlodipine 10 mg daily, carvedilol 12.5 mg twice daily, losartan 100 mg daily  Hyperlipidemia - atorvastatin 20 mg daily  CKD 3 - baseline creatinine 0.8-1.1  Parkinson's disease - Sinemet  mg 4 times daily  GERD - omeprazole 20 mg daily  History of breast cancer - raloxifene 60 mg daily  Depression - Zoloft 25 mg daily, but patient has been off this for a long period of time now  Peripheral neuropathy - gabapentin 100 mg twice daily as needed  History of overactive bladder - tolterodine 1 mg twice daily      Diet:  Combination Diet Regular Diet; Renal Diet (non-dialysis); 2 gm NA Diet  Fluid restriction 2000 ML FLUID    DVT Prophylaxis: Pneumatic Compression Devices  Santana Catheter: Not present  Lines: None     Cardiac Monitoring: ACTIVE order. Indication: Acute decompensated heart failure (48 hours)  Code Status: No CPR- Do NOT Intubate      Disposition Plan     Medically Ready for Discharge: Anticipated in 2-4 Days pending improvement of hypoxic respiratory failure and PT/OT evaluation    Umberto Long DO  Hospitalist Service  [facility]  Securely message with [employer] (more info)  Text page via [employer] Paging/Directory   ______________________________________________________________________    Interval History   No overnight events.    On evaluation this morning, is resting on the bed.  Saturating well on room air.  States her shortness of breath has drastically improved.  Still with dry cough.  No fevers, chills.  Edema in her BLE has also drastically improved.    Physical Exam   Vital Signs: Temp: 97.6  F (36.4  C) Temp src: Rectal BP: (!) 156/102 Pulse: 65   Resp: 16 SpO2: 92 % O2 Device: None (Room air) Oxygen Delivery: 1 LPM  Weight: 147 lbs 14.86 oz    General Appearance:  Sleepy, on BiPAP  Respiratory: Bilateral air entry, some bibasilar crackles, scattered wheezing  Cardiovascular: 1 S2, RRR, no murmurs, no rubs.  GI: Soft, nontender, bowel sounds are present  Skin: No rashes, no cyanosis  Neuro: Sleeping, neuroexam not performed    Medical Decision Making       60 MINUTES SPENT BY ME on the date of service doing chart review, history, exam, documentation & further activities per the note.      Data   ------------------------- PAST 24 HR DATA REVIEWED -----------------------------------------------

## 2025-03-09 NOTE — PROGRESS NOTES
Pain: Pain goal 0 Pain Rating 4/10 Effective pain medication/regimen tylenol + warm pack.    CV Surgery Patient: No    Assessment    Resp: Expiratory wheezes. Stable on 2lpm of O2 via NC w/ humidification. Breath sounad has improved w/ albuterol inhaler prn x2.  Telemetry: NSR  Neuro: AO x4  GI/: 2g Na w/ 2000 ml fluid restriction. Hypoactive BS. Last BM 03/08 as per pt. Incontinent of bladder. Purewick in place, with adequate output.   Skin/Wounds: Skin intact except blanchable redness on coccyx.   Lines/Drains: IV SL on R & L forearm.  Activity: Assist x1 w/ GB + FWW, uses wheelchair mostly as per pt.  Sleep: 4-5h  Abnormal Labs: pending result of BNP & CBC    Aggression Stop Light: Green          Patient Care Plan: Placed pt on diuretics and tamiflu.

## 2025-03-09 NOTE — PROGRESS NOTES
2200 - 2300    - Arrived from ED on 2L NC  - - A&O x4, VSS  - LSD with expiratory wheezes on BUL.  - Skin intact ex.peeling of toes and redness to the coccyx.   - Reg diet, 2L FR  - Tele: SR w/1st degree AVHB/PACs    Perico Harry RN

## 2025-03-09 NOTE — PLAN OF CARE
A&O X4, [place].VSS on RA, HTN systolic in 140-160's.Lung Sounds diminished,occasional nonproductive cough.Bowel Sounds hypoactive, passing flatus, -BM.Voiding adequately, external cath in place. Blanchable redness to coccyx. Up assist 1 gait belt walker pivot, repositioning encouraged. Pain controlled with PRN tylenol. Tolerating low sodium 200ml FR.

## 2025-03-10 LAB
ANION GAP SERPL CALCULATED.3IONS-SCNC: 13 MMOL/L (ref 7–15)
ATRIAL RATE - MUSE: 131 BPM
BUN SERPL-MCNC: 39.1 MG/DL (ref 8–23)
CALCIUM SERPL-MCNC: 9 MG/DL (ref 8.8–10.4)
CHLORIDE SERPL-SCNC: 95 MMOL/L (ref 98–107)
CREAT SERPL-MCNC: 0.74 MG/DL (ref 0.51–0.95)
DIASTOLIC BLOOD PRESSURE - MUSE: NORMAL MMHG
EGFRCR SERPLBLD CKD-EPI 2021: 78 ML/MIN/1.73M2
GLUCOSE SERPL-MCNC: 157 MG/DL (ref 70–99)
HCO3 SERPL-SCNC: 26 MMOL/L (ref 22–29)
INTERPRETATION ECG - MUSE: NORMAL
MAGNESIUM SERPL-MCNC: 1.9 MG/DL (ref 1.7–2.3)
P AXIS - MUSE: NORMAL DEGREES
POTASSIUM SERPL-SCNC: 3.5 MMOL/L (ref 3.4–5.3)
PR INTERVAL - MUSE: NORMAL MS
QRS DURATION - MUSE: 96 MS
QT - MUSE: 350 MS
QTC - MUSE: 471 MS
R AXIS - MUSE: -14 DEGREES
SODIUM SERPL-SCNC: 134 MMOL/L (ref 135–145)
SYSTOLIC BLOOD PRESSURE - MUSE: NORMAL MMHG
T AXIS - MUSE: 101 DEGREES
VENTRICULAR RATE- MUSE: 109 BPM

## 2025-03-10 PROCEDURE — 97165 OT EVAL LOW COMPLEX 30 MIN: CPT | Mod: GO

## 2025-03-10 PROCEDURE — 94640 AIRWAY INHALATION TREATMENT: CPT | Mod: 76

## 2025-03-10 PROCEDURE — 250N000009 HC RX 250: Performed by: INTERNAL MEDICINE

## 2025-03-10 PROCEDURE — 97116 GAIT TRAINING THERAPY: CPT | Mod: GP

## 2025-03-10 PROCEDURE — 93010 ELECTROCARDIOGRAM REPORT: CPT | Performed by: INTERNAL MEDICINE

## 2025-03-10 PROCEDURE — 99233 SBSQ HOSP IP/OBS HIGH 50: CPT | Performed by: INTERNAL MEDICINE

## 2025-03-10 PROCEDURE — 97535 SELF CARE MNGMENT TRAINING: CPT | Mod: GO

## 2025-03-10 PROCEDURE — 93005 ELECTROCARDIOGRAM TRACING: CPT

## 2025-03-10 PROCEDURE — 94640 AIRWAY INHALATION TREATMENT: CPT

## 2025-03-10 PROCEDURE — 36415 COLL VENOUS BLD VENIPUNCTURE: CPT | Performed by: INTERNAL MEDICINE

## 2025-03-10 PROCEDURE — 97162 PT EVAL MOD COMPLEX 30 MIN: CPT | Mod: GP

## 2025-03-10 PROCEDURE — 80048 BASIC METABOLIC PNL TOTAL CA: CPT | Performed by: INTERNAL MEDICINE

## 2025-03-10 PROCEDURE — 250N000012 HC RX MED GY IP 250 OP 636 PS 637: Performed by: INTERNAL MEDICINE

## 2025-03-10 PROCEDURE — 250N000011 HC RX IP 250 OP 636: Performed by: INTERNAL MEDICINE

## 2025-03-10 PROCEDURE — 255N000002 HC RX 255 OP 636: Performed by: INTERNAL MEDICINE

## 2025-03-10 PROCEDURE — 83735 ASSAY OF MAGNESIUM: CPT | Performed by: INTERNAL MEDICINE

## 2025-03-10 PROCEDURE — 250N000013 HC RX MED GY IP 250 OP 250 PS 637: Performed by: INTERNAL MEDICINE

## 2025-03-10 PROCEDURE — 999N000157 HC STATISTIC RCP TIME EA 10 MIN

## 2025-03-10 PROCEDURE — 120N000013 HC R&B IMCU

## 2025-03-10 RX ORDER — NALOXONE HYDROCHLORIDE 0.4 MG/ML
0.4 INJECTION, SOLUTION INTRAMUSCULAR; INTRAVENOUS; SUBCUTANEOUS
Status: DISCONTINUED | OUTPATIENT
Start: 2025-03-10 | End: 2025-03-12 | Stop reason: HOSPADM

## 2025-03-10 RX ORDER — NALOXONE HYDROCHLORIDE 0.4 MG/ML
0.2 INJECTION, SOLUTION INTRAMUSCULAR; INTRAVENOUS; SUBCUTANEOUS
Status: DISCONTINUED | OUTPATIENT
Start: 2025-03-10 | End: 2025-03-12 | Stop reason: HOSPADM

## 2025-03-10 RX ORDER — DILTIAZEM HYDROCHLORIDE 5 MG/ML
15 INJECTION INTRAVENOUS ONCE
Status: COMPLETED | OUTPATIENT
Start: 2025-03-10 | End: 2025-03-10

## 2025-03-10 RX ADMIN — CARBIDOPA AND LEVODOPA 2 TABLET: 25; 100 TABLET ORAL at 16:43

## 2025-03-10 RX ADMIN — CARBIDOPA AND LEVODOPA 2 TABLET: 25; 100 TABLET ORAL at 21:36

## 2025-03-10 RX ADMIN — IPRATROPIUM BROMIDE AND ALBUTEROL SULFATE 3 ML: .5; 3 SOLUTION RESPIRATORY (INHALATION) at 11:26

## 2025-03-10 RX ADMIN — CARBIDOPA AND LEVODOPA 2 TABLET: 25; 100 TABLET ORAL at 10:43

## 2025-03-10 RX ADMIN — IPRATROPIUM BROMIDE AND ALBUTEROL SULFATE 3 ML: .5; 3 SOLUTION RESPIRATORY (INHALATION) at 08:19

## 2025-03-10 RX ADMIN — PREDNISONE 40 MG: 20 TABLET ORAL at 08:03

## 2025-03-10 RX ADMIN — BUDESONIDE 0.5 MG: 0.5 INHALANT RESPIRATORY (INHALATION) at 20:00

## 2025-03-10 RX ADMIN — TOLTERODINE TARTRATE 1 MG: 1 TABLET ORAL at 08:01

## 2025-03-10 RX ADMIN — PANTOPRAZOLE SODIUM 40 MG: 40 TABLET, DELAYED RELEASE ORAL at 06:41

## 2025-03-10 RX ADMIN — CARVEDILOL 12.5 MG: 12.5 TABLET, FILM COATED ORAL at 18:30

## 2025-03-10 RX ADMIN — LOSARTAN POTASSIUM 100 MG: 100 TABLET, FILM COATED ORAL at 21:36

## 2025-03-10 RX ADMIN — ATORVASTATIN CALCIUM 20 MG: 20 TABLET, FILM COATED ORAL at 21:35

## 2025-03-10 RX ADMIN — ACETAMINOPHEN 1000 MG: 500 TABLET, FILM COATED ORAL at 08:03

## 2025-03-10 RX ADMIN — CARBIDOPA AND LEVODOPA 2 TABLET: 25; 100 TABLET ORAL at 05:44

## 2025-03-10 RX ADMIN — GABAPENTIN 100 MG: 100 CAPSULE ORAL at 02:41

## 2025-03-10 RX ADMIN — IPRATROPIUM BROMIDE AND ALBUTEROL SULFATE 3 ML: .5; 3 SOLUTION RESPIRATORY (INHALATION) at 15:57

## 2025-03-10 RX ADMIN — OSELTAMIVIR PHOSPHATE 30 MG: 30 CAPSULE ORAL at 07:58

## 2025-03-10 RX ADMIN — IPRATROPIUM BROMIDE AND ALBUTEROL SULFATE 3 ML: .5; 3 SOLUTION RESPIRATORY (INHALATION) at 20:00

## 2025-03-10 RX ADMIN — CARVEDILOL 12.5 MG: 12.5 TABLET, FILM COATED ORAL at 07:58

## 2025-03-10 RX ADMIN — TOLTERODINE TARTRATE 1 MG: 1 TABLET ORAL at 21:36

## 2025-03-10 RX ADMIN — OSELTAMIVIR PHOSPHATE 30 MG: 30 CAPSULE ORAL at 21:36

## 2025-03-10 RX ADMIN — FUROSEMIDE 40 MG: 40 TABLET ORAL at 08:03

## 2025-03-10 RX ADMIN — BUDESONIDE 0.5 MG: 0.5 INHALANT RESPIRATORY (INHALATION) at 08:18

## 2025-03-10 RX ADMIN — PERFLUTREN 1.5 ML: 6.52 INJECTION, SUSPENSION INTRAVENOUS at 11:07

## 2025-03-10 RX ADMIN — RALOXIFENE HYDROCHLORIDE 60 MG: 60 TABLET, FILM COATED ORAL at 21:36

## 2025-03-10 RX ADMIN — ASPIRIN 81 MG: 81 TABLET, COATED ORAL at 07:58

## 2025-03-10 RX ADMIN — AMLODIPINE BESYLATE 10 MG: 10 TABLET ORAL at 07:59

## 2025-03-10 RX ADMIN — Medication 2 CAPSULE: at 08:02

## 2025-03-10 RX ADMIN — DILTIAZEM HYDROCHLORIDE 15 MG: 5 INJECTION INTRAVENOUS at 12:06

## 2025-03-10 ASSESSMENT — ACTIVITIES OF DAILY LIVING (ADL)
ADLS_ACUITY_SCORE: 77
DEPENDENT_IADLS:: CLEANING;COOKING;LAUNDRY;SHOPPING;MEAL PREPARATION;TRANSPORTATION
PREVIOUS_RESPONSIBILITIES: MEDICATION MANAGEMENT
ADLS_ACUITY_SCORE: 70
ADLS_ACUITY_SCORE: 72
ADLS_ACUITY_SCORE: 72
ADLS_ACUITY_SCORE: 77
ADLS_ACUITY_SCORE: 72
ADLS_ACUITY_SCORE: 77
ADLS_ACUITY_SCORE: 77
ADLS_ACUITY_SCORE: 70
ADLS_ACUITY_SCORE: 77
ADLS_ACUITY_SCORE: 77
ADLS_ACUITY_SCORE: 72
ADLS_ACUITY_SCORE: 77
ADLS_ACUITY_SCORE: 72
ADLS_ACUITY_SCORE: 72

## 2025-03-10 NOTE — CARE PLAN
..MD Notification.yes    Notified Person: MD    Notified Person Name: Nick Almaraz     Notification Date/Time:03/10/25. 8:55 am     Notification Interaction: Yeimi     Purpose of Notification:Pt is having Afib on tele     Orders Received:EKG 12 -lead order     Comments: MD notified of patient having A fib heart rates 109 to 122  showed on the monitor.

## 2025-03-10 NOTE — CONSULTS
"CM team called [facility], phone [phone] and Spoke with Majo who provided the following information:     In what kind of facility does pt reside?  [facility]    Name of building[facility]unit: [facility]                  2.   Best number to reach facility nursing? Phone [phone] Fax:[phone]        Evening/weekend number? [phone]    3.  What is the preferred return time for the resident?  M-F before 4 pm  Can patient return on weekend? no       Do you know how they typically transport? Family can typically transport    4.   Does facility manage medications?no patient does her own medication management, her sister will  from pharmacy If yes, contracted pharmacy?         If new Rx meds at discharge, fill at hospital pharmacy or contracted pharmacy?   hospital or pharmacy listed    5.   What is pt's baseline cognition and mobility? AxOx3        What level of cognition/mobility is required for safe return? Baseline, patient currently is assist of 1 with walker and wheelchair for meals    6.  Is resident enrolled in any \"services?\"  yes If so, what services:  Pt receives assistance with toileting, bathing, dressing, meals, housekeeping and laundry  7.  Are \"skilled home health\" or \"on-site outpatient therapy services\" available there? [facility]    8.   Is the resident seen by PCP: on-site   Name: Paul to start this week    9.   Does pt have any personal DME (describe)Yes wheelchair, walker    Care Management Initial Consult    General Information  Assessment completed with: Patient,    Type of CM/SW Visit: Initial Assessment    Primary Care Provider verified and updated as needed: Yes   Readmission within the last 30 days: current reason for admission unrelated to previous admission      Reason for Consult: discharge planning  Advance Care Planning: Advance Care Planning Reviewed: present on chart          Communication Assessment  Patient's communication " style: spoken language (English or Bilingual)    Hearing Difficulty or Deaf: yes   Wear Glasses or Blind: yes    Cognitive  Cognitive/Neuro/Behavioral: WDL  Level of Consciousness: alert  Arousal Level: opens eyes spontaneously  Orientation: oriented x 4  Mood/Behavior: calm, cooperative  Best Language: 0 - No aphasia  Speech: clear    Living Environment:   People in home: alone     Current living Arrangements: assisted living      Able to return to prior arrangements: yes       Family/Social Support:  Care provided by: self, homecare agency  Provides care for: no one, unable/limited ability to care for self     Support system:            Description of Support System:           Current Resources:   Patient receiving home care services: No        Community Resources: None  Equipment currently used at home: walker, standard, walker, rolling, wheelchair, manual  Supplies currently used at home: Nebulizer tubing    Employment/Financial:  Employment Status: retired        Financial Concerns: none           Does the patient's insurance plan have a 3 day qualifying hospital stay waiver?  Yes     Which insurance plan 3 day waiver is available? Alternative insurance waiver    Will the waiver be used for post-acute placement? Undetermined at this time    Lifestyle & Psychosocial Needs:  Social Drivers of Health     Food Insecurity: Low Risk  (7/11/2024)    Food Insecurity     Within the past 12 months, did you worry that your food would run out before you got money to buy more?: No     Within the past 12 months, did the food you bought just not last and you didn t have money to get more?: No   Depression: Not at risk (7/12/2024)    PHQ-2     PHQ-2 Score: 0   Housing Stability: Low Risk  (7/11/2024)    Housing Stability     Do you have housing? : Yes     Are you worried about losing your housing?: No   Tobacco Use: Low Risk  (8/23/2024)    Patient History     Smoking Tobacco Use: Never     Smokeless Tobacco Use: Never      Passive Exposure: Not on file   Financial Resource Strain: Low Risk  (7/11/2024)    Financial Resource Strain     Within the past 12 months, have you or your family members you live with been unable to get utilities (heat, electricity) when it was really needed?: No   Alcohol Use: Not At Risk (7/26/2023)    AUDIT-C     Frequency of Alcohol Consumption: Never     Average Number of Drinks: Patient does not drink     Frequency of Binge Drinking: Never   Transportation Needs: Low Risk  (7/11/2024)    Transportation Needs     Within the past 12 months, has lack of transportation kept you from medical appointments, getting your medicines, non-medical meetings or appointments, work, or from getting things that you need?: No   Physical Activity: Inactive (7/11/2024)    Exercise Vital Sign     Days of Exercise per Week: 0 days     Minutes of Exercise per Session: 0 min   Interpersonal Safety: Not on file   Stress: No Stress Concern Present (7/11/2024)    [place] [employer] - Occupational Stress Questionnaire     Feeling of Stress : Only a little   Social Connections: Unknown (7/11/2024)    Social Connection and Isolation Panel [NHANES]     Frequency of Communication with Friends and Family: Not on file     Frequency of Social Gatherings with Friends and Family: More than three times a week     Attends Yazdanism Services: Not on file     Active Member of Clubs or Organizations: Not on file     Attends Club or Organization Meetings: Not on file     Marital Status: Not on file   Health Literacy: Not on file       Functional Status:  Prior to admission patient needed assistance:   Dependent ADLs:: Ambulation-walker, Wheelchair-with assist, Bathing, Dressing, Toileting, Transfers  Dependent IADLs:: Cleaning, Cooking, Laundry, Shopping, Meal Preparation, Transportation       Mental Health Status:        none  Chemical Dependency Status:   none     Values/Beliefs:  Spiritual, Cultural Beliefs, Yazdanism  Practices, Values that affect care: no               Discussed  Partnership in Safe Discharge Planning  document with patient/family: No    Additional Information:   Met with patient to introduce self and discuss role of discharge planning.  Confirmed information above assessment, please see each section of above for details.  Patient lives in [facility] with residents, there are no steps to enter and no steps within the home; normally dependent with most ADLs/IADLs.  She uses a wheelchair for dining room, walker for shorter distances.  She needs assist of 1 for all transfers.  She does her own medication management and her sister will  the prescriptions for her.    Pt PCP is .  Damian Russ [phone] [address], [facility] [address]   Per half-way and patient she will be switching to [facility] in the near future.    Pt anticipates may need help with PT/OT prior to discharge.      Next Steps: Await PT/OTevaluations and send [facility] referral if appropriate.    Coral Tao RN

## 2025-03-10 NOTE — PROGRESS NOTES
"   03/10/25 1500   Appointment Info   Signing Clinician's Name / Credentials (OT) Johnna Price, OTR/L   Living Environment   People in Home facility resident   Current Living Arrangements assisted living   Transportation Anticipated family or friend will provide   Living Environment Comments Pt lives on 3rd floor, elevator access, 4ww, staff brings her down to dining dee in . Pt has walk-in shower w/ bar to step over to enter, grab bars, uses shower chair. Pt has grab bars by toilet.   Self-Care   Usual Activity Tolerance moderate   Current Activity Tolerance fair   Equipment Currently Used at Home walker, standard;walker, rolling;wheelchair, manual;grab bar, tub/shower;shower chair;grab bar, toilet   Activity/Exercise/Self-Care Comment Pt receives assist at [facility] for showers, toileting, bed mobility. Pt receives assist as needed w/ dressing, g/h. Pt uses 4WW for functional mobilty, [facility] staff provide  assist as needed.   Instrumental Activities of Daily Living (IADL)   Previous Responsibilities medication management   IADL Comments Pt receives assist at [facility] for IADLs. [facility] staff provide  transport to/from cafeteria for meals. Per chart review, \"She does her own medication management and her sister will  the prescriptions for her.\"   General Information   Onset of Illness/Injury or Date of Surgery 03/08/25   Referring Physician Umberto Long, DO   Patient/Family Therapy Goal Statement (OT) to return home   Additional Occupational Profile Info/Pertinent History of Current Problem Per chart review, \"Opal is an 89-year-old female with a history significant for HFpEF, CKD 3, Parkinson's, breast cancer and asthma who was admitted to [facility] on 3/8/2025 for acute hypoxic respiratory failure multifactorial from flu A, asthma exacerbation and HFpEF exacerbation.\"   Existing Precautions/Restrictions fall   Cognitive Status Examination   Orientation Status orientation to person, place and time   Affect/Mental " "Status (Cognitive) confused   Follows Commands delayed response/completion;follows one-step commands;increased processing time needed   Cognitive Status Comments Pt reports needing inc processing/completion time for activity d/t Parkinson's. Throughout session continued to state \"Oh, I keep forgetting I'm not at home\"   Visual Perception   Impact of Vision Impairment on Function (Vision) glasses full time   Pain Assessment   Patient Currently in Pain No   Range of Motion Comprehensive   Comment, General Range of Motion Not formally assessed; pt functioning near baseline per clinical judgement   Strength Comprehensive (MMT)   Comment, General Manual Muscle Testing (MMT) Assessment Not formally assessed; pt functioning near baseline per clinical judgement   Bed Mobility   Bed Mobility rolling right;supine-sit   Rolling Right [place] (Bed Mobility) 2 person assist;maximum assist (25% patient effort)   Supine-Sit [place] (Bed Mobility) moderate assist (50% patient effort);2 person assist   Assistive Device (Bed Mobility) bed rails;draw sheet   Transfers   Transfers sit-stand transfer;bed-chair transfer   Transfer Skill: Bed to Chair/Chair to Bed   Bed-Chair [place] (Transfers) 2 person assist;moderate assist (50% patient effort);verbal cues   Assistive Device (Bed-Chair Transfers) standard walker   Sit-Stand Transfer   Sit-Stand [place] (Transfers) moderate assist (50% patient effort);2 person assist;verbal cues   Assistive Device (Sit-Stand Transfers) walker, front-wheeled   Balance   Balance Comments No LOB during activity, but pt w/ posterior lean upon standing from EOB   Activities of Daily Living   BADL Assessment/Intervention grooming;lower body dressing;toileting   Lower Body Dressing Assessment/Training   Comment, (Lower Body Dressing) Per clinical judgement. Pt reporting Max-total A for dressing at baseline   [place] Level (Lower Body Dressing) dependent (less than 25% patient effort) "   Grooming Assessment/Training   Position (Grooming) supported sitting   [place] Level (Grooming) hair care, combing/brushing;oral care regimen;wash face, hands;set up;supervision   Toileting   Comment, (Toileting) Per clinical judgement. Pt reporting Max-total A for toileting at baseline   [place] Level (Toileting) maximum assist (25% patient effort)   Clinical Impression   Criteria for Skilled Therapeutic Interventions Met (OT) Yes, treatment indicated   OT Diagnosis Impaired I/ADL independence   OT Problem List-Impairments impacting ADL problems related to;activity tolerance impaired;balance;strength   Assessment of Occupational Performance 3-5 Performance Deficits   Identified Performance Deficits g/h, bed mobility, functional mobility   Planned Therapy Interventions (OT) ADL retraining;bed mobility training;strengthening;progressive activity/exercise;home program guidelines   Clinical Decision Making Complexity (OT) problem focused assessment/low complexity   Risk & Benefits of therapy have been explained evaluation/treatment results reviewed;participants voiced agreement with care plan;patient;participants included   OT Total Evaluation Time   OT Eval, Low Complexity Minutes (94385) 15   OT Goals   Therapy Frequency (OT) 3 times/week   OT Predicted Duration/Target Date for Goal Attainment 03/17/25   OT Goals Hygiene/Grooming;Lower Body Dressing;Bed Mobility;Toilet Transfer/Toileting;Transfers   OT: Hygiene/Grooming using adaptive equipment;modified independent   OT: Lower Body Dressing Maximum assist   OT: Bed Mobility Maximum assist;rolling;supine to/from sitting   OT: Transfer Moderate assist   OT: Toilet Transfer/Toileting Maximum assist   Interventions   Interventions Quick Adds Self-Care/Home Management   Self-Care/Home Management   Self-Care/Home Mgmt/ADL, Compensatory, Meal Prep Minutes (53455) 25   Symptoms Noted During/After Treatment (Meal Preparation/Planning Training) fatigue   Treatment  Detail/Skilled Intervention Pt greeted supine in bed, agreeable to OT. Pt needing inc processing/initiation time throughout session d/t Parkinson's. Pt roll R, supine<>sit EOB Mod Ax2. Pt STS w/ FWW Mod Ax2, pt w/ posterior lean upon standing. Pt amb w/ FWW CGAx1 w/ VCs for larger steps (pt amb w/ shuffling steps). Pt transfer to recliner chair Mod Ax1, VCs provided for safe hand placement. Pt completed sitting g/h (brush teeth, comb hair, wash face) Min Ax1 w/ set up. Pt in recliner chair at end of session, chair alarm on, all needs within reach. Handoff to RNs.   OT Discharge Planning   OT Plan AE for g/h (built up handle for tooth brush); UE/hand strengthening exercises, med mgmt, monitor cog   OT Discharge Recommendation (DC Rec) home with assist   OT Rationale for DC Rec Pt functioning below baseline, limited by dec activity tolerance. Pt admitted from [facility]. Pt receives assist with most I/ADLs at [facility]. Rec pt return to [facility] w/ continued Ax1 for I/ADLs and transfers. Pt would benefit from supervision of med mgmt upon return to [facility] d/t inc confusion at this time.   OT Brief overview of current status Goals of therapy will be to address safe mobility and make recs for d/c to next level of care. Pt and RN will continue to follow all falls risk precautions as documented by RN staff while hospitalized. Ax2   OT Total Distance Amb During Session (feet) 5   Total Session Time   Timed Code Treatment Minutes 25   Total Session Time (sum of timed and untimed services) 40

## 2025-03-10 NOTE — PROVIDER NOTIFICATION
Dr. Hightower notified of HOTN, ( 84/59 MAP 71) post diltiazem bolus given over 10 minutes. Response received. Monitor blood pressure, await ECHO for plan going forward.    1715 Remains in atrial fibrillation, .

## 2025-03-10 NOTE — PROGRESS NOTES
[facility]    Medicine Progress Note - Hospitalist Service    Date of Admission:  3/8/2025    Assessment & Plan   Opal is an 89-year-old female with a history significant for HFpEF, CKD 3, Parkinson's, breast cancer and asthma who was admitted to [facility] on 3/8/2025 for acute hypoxic respiratory failure multifactorial from flu A, asthma exacerbation and HFpEF exacerbation.     Influenza A   Acute hypoxic respiratory failure, likely multifactorial.  Improved   Flu A PCR 3/8/2025: Positive.  Multifactorial from HFpEF/asthma exacerbation and influenza A.  Suspect triggered by acute infection with hypoxia.   - Titrate O2 as able   - Tamiflu     New onset atrial fibrillation w/RVR  Noted to have heart rates in to the 120s on 3/10.  EKG done and confirmed atrial fibrillation.  Discussed anticoagulation and given her history of brain aneurysm s/p coiling will differ initation  - IV Diltiazem 15 mg ordered x1  - Likely start scheduled diltiazem once echo done     HFpEF exacerbation  Last echo 5/2/2024: EF 50-55%, grade 2 diastolic dysfunction  Home regimen: Furosemide 40 mg daily  - Strict I/Os and daily weights   - Initially on furosemide 40 mg twice daily IV and switched to PO   - Echo ordered      Asthma exacerbation  Home regimen: Albuterol as needed, budesonide twice daily  Plan  - Was on Solu-medrol initially but has since been transitioned to prednisone 40mg daily PO for additional 5 days (end date 3/14/2025)  - Inhalers as needed     Deconditioning  - PT/OT consulted     Hypertension - amlodipine 10 mg daily, carvedilol 12.5 mg twice daily, losartan 100 mg daily  Hyperlipidemia - atorvastatin 20 mg daily  CKD 3 - baseline creatinine 0.8-1.1  Parkinson's disease - Sinemet  mg 4 times daily  GERD - omeprazole 20 mg daily  History of breast cancer - raloxifene 60 mg daily  Depression - Zoloft 25 mg daily, but patient has been off this for a long period of time now  Peripheral  neuropathy - gabapentin 100 mg twice daily as needed  History of overactive bladder - tolterodine 1 mg twice daily  Cerebral aneurysm s/p coiling         Diet: Combination Diet Regular Diet; Renal Diet (non-dialysis); 2 gm NA Diet  Fluid restriction 2000 ML FLUID    DVT Prophylaxis: Pneumatic Compression Devices  [place] Catheter: Not present  Lines: None     Cardiac Monitoring: ACTIVE order. Indication: Acute decompensated heart failure (48 hours)  Code Status: No CPR- Do NOT Intubate      Clinically Significant Risk Factors         # Hyponatremia: Lowest Na = 134 mmol/L in last 2 days, will monitor as appropriate  # Hypochloremia: Lowest Cl = 95 mmol/L in last 2 days, will monitor as appropriate          # Hypertension: Noted on problem list  # Acute heart failure with preserved ejection fraction: heart failure noted on problem list, last echo with EF >50%, and receiving IV diuretics               # Financial/Environmental Concerns:    # Asthma: noted on problem list        Social Drivers of Health    Physical Activity: Inactive (7/11/2024)    Exercise Vital Sign     Days of Exercise per Week: 0 days     Minutes of Exercise per Session: 0 min   Social Connections: Unknown (7/11/2024)    Social Connection and Isolation Panel [NHANES]     Frequency of Social Gatherings with Friends and Family: More than three times a week          Disposition Plan     Medically Ready for Discharge: Anticipated Tomorrow             Nick Hightower DO  Hospitalist Service  [facility]  Securely message with [facility] (more info)  Text page via [employer] Paging/Directory   ______________________________________________________________________    Interval History   Patient seen and examined.  No acute events over night.  No fevers at this time.  No nausea or vomiting.  This morning noted to go in to a fib but patient is asymptomatic.  Discussed anticoagulation with the patient and given her aneurysm history will hold  off on anticoagulation     Physical Exam   Vital Signs: Temp: 98.3  F (36.8  C) Temp src: Axillary BP: 96/67 Pulse: 72   Resp: 16 SpO2: 96 % O2 Device: Nasal cannula Oxygen Delivery: 2 LPM  Weight: 147 lbs 14.86 oz    General Appearance: Resting comfortably. NAD  Respiratory: No respiratory distress on NC  Cardiovascular: Irregularly irregular.  Well perfused  GI: Soft. Non-distended  Skin: No obvious rashes or cyanosis  Other: Alert.  No edema      Medical Decision Making       55 MINUTES SPENT BY ME on the date of service doing chart review, history, exam, documentation & further activities per the note.      Data   ------------------------- PAST 24 HR DATA REVIEWED -----------------------------------------------    I have personally reviewed the following data over the past 24 hrs:    N/A  \   N/A   / N/A     134 (L) 95 (L) 39.1 (H) /  157 (H)   3.5 26 0.74 \       Imaging results reviewed over the past 24 hrs:   Recent Results (from the past 24 hours)   Echocardiogram Complete    Narrative    [id number]  [id number]  [id number]  [id number]^MORGAN^REILLY     [facility]  Echocardiography Laboratory  [address] [place] [address]     Name: BENJI KENT  MRN: [id number]  : 1935  Study Date: 03/10/2025 09:37 AM  Age: 89 yrs  Gender: Female  Patient Location: [facility]  Reason For Study: CHF  Ordering Physician: REILLY MORA  Performed By: MARKO Hernandez     BSA: 1.7 m2  Height: 61 in  Weight: 147 lb  HR: 113  BP: 165/83 mmHg  ______________________________________________________________________________  Procedure  Echocardiogram with two-dimensional, color and spectral Doppler. Definity (NDC  #24938-719) given intravenously. Contrast Definity. Technically difficult  study. Technically difficult study.Extremely difficult acoustic windows  despite the use of contrast for endcardial border definition. Technically  difficult study.Extremely poor acoustic  windows.  ______________________________________________________________________________  Interpretation Summary     Poor image quality. Despite use of IV contrast, assessment of LV function  ejection fraction was suboptimal. LV systolic function probably mildly  reduced. RV Salud function could not be assessed.  Valves are suboptimally assessed  Overall a nondiagnostic study. Consider cardiac MRI if clinically appropriate.  The rhythm was rapid atrial fibrillation.  ______________________________________________________________________________  Mitral Valve  There is mild mitral annular calcification. The mitral valve is not well  visualized. Doppler assessment is suboptimal. Trace MR noted.     Pulmonic Valve  The pulmonic valve is not well visualized.     Vessels  The aortic root is normal size.     Rhythm  The rhythm was rapid atrial fibrillation.     ______________________________________________________________________________  MMode/2D Measurements & Calculations  Ao root diam: 3.5 cm  asc Aorta Diam: 3.4 cm     LVOT diam: 2.1 cm  LVOT area: 3.4 cm2  Ao root diam index Ht(cm/m): 2.2  Ao root diam index BSA (cm/m2): 2.1  Asc Ao diam index BSA (cm/m2): 2.0  Asc Ao diam index Ht(cm/m): 2.2     ______________________________________________________________________________  Report approved by: Boo Shaver MD on 03/10/2025 11:36 AM

## 2025-03-10 NOTE — PROGRESS NOTES
Transferred to [facility] with all belonging, Pt expressed frustration and unhappiness transferring at 9 PM at night. Report given to RN.

## 2025-03-10 NOTE — PLAN OF CARE
"Goal Outcome Evaluation:    Vital signs stable on room air ex for elevated BP (Sp 170's). Alert and oriented x 4. Denies chest pain. Bowel sounds active, flatus +. Pain controlled with tylenol and gabapentin. Declined to take oxy ordered by the MD.she stated \"she might take it later in the day\". Up with assist of 1. On 2 g Na, 2L fluid solution. Continue plan of Care        "

## 2025-03-10 NOTE — PLAN OF CARE
Goal Outcome Evaluation:      Plan of Care Reviewed With: patient          Outcome Evaluation: Pt to return to [facility] at discharge.  Care management will continue to follow for discharge needs.

## 2025-03-10 NOTE — PLAN OF CARE
Goal Outcome Evaluation:  Pt is alert and oriented x 4 ,  Incontinent of bowel and bladder, on 2  gram sodium   grams sodium and regular diet .  She is on tele  for new onset of A fib , pt heart rates was 120 to 130 julian SOSA , he order EKG -12 leads stat  and later order diltiazem IV push , floor resource and help . Pt is 2 lpm oxygen via NC with continuous oximeter. Not oob .  She refused calcium pill. PISL

## 2025-03-10 NOTE — PLAN OF CARE
Goal Outcome Evaluation:      Plan of Care Reviewed With: patient    Overall Patient Progress: no change      Trauma/Ortho/Medical (Choose one) Medical    Diagnosis: Influenza A, Acute hypoxic respiratory failure  POD#: NA  Mental Status: A&O x3  Activity/dangle Assist of 1-2 with gait belt and walker  Diet: 2 gm Na, Renal diet, 2000 ml fluid restriction  Pain: Denies  Santana/Voiding Incontinent of B&B  Tele/Restraints/Iso: Contact iso- Droplet  02/LDA: 2L NC/ IV-SL  D/C Date:TBD  Other Info: Up in chair

## 2025-03-10 NOTE — PROGRESS NOTES
"   03/10/25 1600   Appointment Info   Signing Clinician's Name / Credentials (PT) Chey Conner PT, DPT   Living Environment   People in Home facility resident   Current Living Arrangements assisted living   Home Accessibility no concerns   Living Environment Comments Pt lives on 3rd floor, elevator access, 4ww, staff brings her down to dining dee in . Pt has walk-in shower w/ bar to step over to enter, grab bars, uses shower chair. Pt has grab bars by toilet.   Self-Care   Usual Activity Tolerance moderate   Current Activity Tolerance fair   Equipment Currently Used at Home walker, standard;walker, rolling;wheelchair, manual;grab bar, tub/shower;shower chair;grab bar, toilet   Fall history within last six months yes   Number of times patient has fallen within last six months 3   Activity/Exercise/Self-Care Comment Pt receives assist at [facility] for showers, toileting, bed mobility. Pt receives assist as needed w/ dressing, g/h. Pt uses 4WW for functional mobilty, [facility] staff provide  assist as needed.   General Information   Onset of Illness/Injury or Date of Surgery 03/08/25   Referring Physician Umberto Long DO   Pertinent History of Current Problem (include personal factors and/or comorbidities that impact the POC) \"\"Opal is an 89-year-old female with a history significant for HFpEF, CKD 3, Parkinson's, breast cancer and asthma who was admitted to [facility] on 3/8/2025 for acute hypoxic respiratory failure multifactorial from flu A, asthma exacerbation and HFpEF exacerbation.\"\"   Existing Precautions/Restrictions fall;oxygen therapy device and L/min  (2 L via NC)   Cognition   Affect/Mental Status (Cognition) confused   Orientation Status (Cognition) oriented to;person;place;time   Follows Commands (Cognition) follows one-step commands;follows two-step commands;repetition of directions required;physical/tactile prompts required;increased processing time needed   Cognitive Status Comments appears to think " she is in her room at [facility] multiple times throughout session   Pain Assessment   Patient Currently in Pain No   Integumentary/Edema   Integumentary/Edema no deficits were identifed   Posture    Posture Forward head position;Protracted shoulders   Range of Motion (ROM)   Range of Motion ROM is WFL   Strength (Manual Muscle Testing)   Strength Comments impaired functional LE strength L>R   Bed Mobility   Comment, (Bed Mobility) NT, pt up in chair upon PT arrival   Transfers   Comment, (Transfers) modA sit<>Stand to FWW   Gait/Stairs (Locomotion)   Comment, (Gait/Stairs) modA for amb with FWW   Balance   Balance Comments falls risk, currently requires use of FWW and phys assist for safe upright mobility   Sensory Examination   Sensory Perception Comments intermittent numbness/tingling to fingers   Clinical Impression   Criteria for Skilled Therapeutic Intervention Yes, treatment indicated   PT Diagnosis (PT) impaired IND with functional mobility   Influenced by the following impairments impaired activity tolerance, functional strength, balance   Functional limitations due to impairments impaired bed mobility, transfers, ambulation   Clinical Presentation (PT Evaluation Complexity) stable   Clinical Presentation Rationale Based on current presentation, PMH, social support   Clinical Decision Making (Complexity) moderate complexity   Planned Therapy Interventions (PT) balance training;bed mobility training;gait training;home exercise program;patient/family education;neuromuscular re-education;strengthening;transfer training;progressive activity/exercise;home program guidelines   Risk & Benefits of therapy have been explained evaluation/treatment results reviewed;care plan/treatment goals reviewed;risks/benefits reviewed;current/potential barriers reviewed;participants voiced agreement with care plan;participants included;patient   PT Total Evaluation Time   PT Eval, Moderate Complexity Minutes (13363) 10   Physical  Therapy Goals   PT Frequency 5x/week   PT Predicted Duration/Target Date for Goal Attainment 03/17/25   PT Goals Bed Mobility;Transfers;Gait   PT: Bed Mobility Minimal assist;Supine to/from sit   PT: Transfers Minimal assist;Sit to/from stand;Bed to/from chair;Assistive device   PT: Gait Minimal assist;Assistive device;Rolling walker;25 feet   Interventions   Interventions Quick Adds Gait Training;Therapeutic Activity;Therapeutic Procedure   Therapeutic Procedure/Exercise   Ther. Procedure: strength, endurance, ROM, flexibillity Minutes (77489) 6   Treatment Detail/Skilled Intervention Seated therex completed for LE strength benefits for carryover into upright mobility: AP, LAQ, sitting march, hip ab/adduction. VC and demo for form. Significantly reduced ROM on L 2/2 baseline weakness   Therapeutic Activity   Therapeutic Activities: dynamic activities to improve functional performance Minutes (77281) 6   Treatment Detail/Skilled Intervention Sit<>stands x3 to FWW with min-modA. Posterior lean in standing, needing up to modA for safety. cued for anterior WS. Pt remained up in chair, alarm armed and needs met.   Gait Training   Gait Training Minutes (04300) 10   Symptoms Noted During/After Treatment (Gait Training) fatigue   Treatment Detail/Skilled Intervention Pre-gait standing march progressing into amb with modA at FWW. Significant difficulty with L foot clearance and advancement. A and cuing for R WS to help faciliate this, pt also cued for increased step length. On second bout, pt had a hard time turning to return to chair, recliner pulled up closer behind her due to difficulty with this.   Distance in Feet 5 + 7'   PT Discharge Planning   PT Plan progress gait with close chair follow, sit<>stand reps   PT Discharge Recommendation (DC Rec) home with assist;home with home care physical therapy;Transitional Care Facility   PT Rationale for DC Rec Pt currently below reported baseline for mobility, requires up to  modA for mobility with use of FWW at this time. Limited to up to 7' of gait today. Pt limited by deficits in functional strength, balance, and activity tolerance. If shelter staff can provide Ax1 for all transfers and WC for any distance of amb, could return home with HHPT. If unable to have this level of assist, would need TCU.   PT Brief overview of current status Goals of therapy will be to address safe mobility and make recs for d/c to next level of care. Pt and RN will continue to follow all falls risk precautions as documented by RN staff while hospitalized.   PT Total Distance Amb During Session (feet) 12   Physical Therapy Time and Intention   Timed Code Treatment Minutes 22   Total Session Time (sum of timed and untimed services) 32

## 2025-03-10 NOTE — SIGNIFICANT EVENT
Significant Event Note    Time of event: 4:19 AM March 10, 2025    Description of event:  Back and R shoulder pain    Plan:  2.5 oxy q3  Avoiding NSAIDS given CKD    Beena Rodriguez MD

## 2025-03-11 LAB — GLUCOSE BLDC GLUCOMTR-MCNC: 106 MG/DL (ref 70–99)

## 2025-03-11 PROCEDURE — 97116 GAIT TRAINING THERAPY: CPT | Mod: GP

## 2025-03-11 PROCEDURE — 94640 AIRWAY INHALATION TREATMENT: CPT | Mod: 76

## 2025-03-11 PROCEDURE — 250N000012 HC RX MED GY IP 250 OP 636 PS 637: Performed by: INTERNAL MEDICINE

## 2025-03-11 PROCEDURE — 999N000157 HC STATISTIC RCP TIME EA 10 MIN

## 2025-03-11 PROCEDURE — 97530 THERAPEUTIC ACTIVITIES: CPT | Mod: GP

## 2025-03-11 PROCEDURE — 250N000013 HC RX MED GY IP 250 OP 250 PS 637: Performed by: INTERNAL MEDICINE

## 2025-03-11 PROCEDURE — 94640 AIRWAY INHALATION TREATMENT: CPT

## 2025-03-11 PROCEDURE — 99233 SBSQ HOSP IP/OBS HIGH 50: CPT | Performed by: INTERNAL MEDICINE

## 2025-03-11 PROCEDURE — 250N000009 HC RX 250: Performed by: INTERNAL MEDICINE

## 2025-03-11 PROCEDURE — 120N000001 HC R&B MED SURG/OB

## 2025-03-11 RX ADMIN — CARVEDILOL 12.5 MG: 12.5 TABLET, FILM COATED ORAL at 09:34

## 2025-03-11 RX ADMIN — ACETAMINOPHEN 650 MG: 325 TABLET, FILM COATED ORAL at 20:58

## 2025-03-11 RX ADMIN — BUDESONIDE 0.5 MG: 0.5 INHALANT RESPIRATORY (INHALATION) at 19:57

## 2025-03-11 RX ADMIN — IPRATROPIUM BROMIDE AND ALBUTEROL SULFATE 3 ML: .5; 3 SOLUTION RESPIRATORY (INHALATION) at 11:37

## 2025-03-11 RX ADMIN — OSELTAMIVIR PHOSPHATE 30 MG: 30 CAPSULE ORAL at 09:34

## 2025-03-11 RX ADMIN — BUDESONIDE 0.5 MG: 0.5 INHALANT RESPIRATORY (INHALATION) at 07:55

## 2025-03-11 RX ADMIN — IPRATROPIUM BROMIDE AND ALBUTEROL SULFATE 3 ML: .5; 3 SOLUTION RESPIRATORY (INHALATION) at 19:57

## 2025-03-11 RX ADMIN — RALOXIFENE HYDROCHLORIDE 60 MG: 60 TABLET, FILM COATED ORAL at 21:05

## 2025-03-11 RX ADMIN — AMLODIPINE BESYLATE 10 MG: 10 TABLET ORAL at 09:34

## 2025-03-11 RX ADMIN — OSELTAMIVIR PHOSPHATE 30 MG: 30 CAPSULE ORAL at 20:53

## 2025-03-11 RX ADMIN — CARBIDOPA AND LEVODOPA 2 TABLET: 25; 100 TABLET ORAL at 20:53

## 2025-03-11 RX ADMIN — CARBIDOPA AND LEVODOPA 2 TABLET: 25; 100 TABLET ORAL at 06:21

## 2025-03-11 RX ADMIN — CARBIDOPA AND LEVODOPA 2 TABLET: 25; 100 TABLET ORAL at 10:44

## 2025-03-11 RX ADMIN — CARBIDOPA AND LEVODOPA 2 TABLET: 25; 100 TABLET ORAL at 18:32

## 2025-03-11 RX ADMIN — PANTOPRAZOLE SODIUM 40 MG: 40 TABLET, DELAYED RELEASE ORAL at 06:21

## 2025-03-11 RX ADMIN — ATORVASTATIN CALCIUM 20 MG: 20 TABLET, FILM COATED ORAL at 20:53

## 2025-03-11 RX ADMIN — LOSARTAN POTASSIUM 100 MG: 100 TABLET, FILM COATED ORAL at 20:53

## 2025-03-11 RX ADMIN — ACETAMINOPHEN 650 MG: 325 TABLET, FILM COATED ORAL at 00:05

## 2025-03-11 RX ADMIN — PREDNISONE 40 MG: 20 TABLET ORAL at 09:34

## 2025-03-11 RX ADMIN — TOLTERODINE TARTRATE 1 MG: 1 TABLET ORAL at 20:53

## 2025-03-11 RX ADMIN — FUROSEMIDE 40 MG: 40 TABLET ORAL at 09:34

## 2025-03-11 RX ADMIN — CARVEDILOL 12.5 MG: 12.5 TABLET, FILM COATED ORAL at 18:32

## 2025-03-11 RX ADMIN — GABAPENTIN 100 MG: 100 CAPSULE ORAL at 06:21

## 2025-03-11 RX ADMIN — IPRATROPIUM BROMIDE AND ALBUTEROL SULFATE 3 ML: .5; 3 SOLUTION RESPIRATORY (INHALATION) at 07:55

## 2025-03-11 ASSESSMENT — ACTIVITIES OF DAILY LIVING (ADL)
ADLS_ACUITY_SCORE: 77

## 2025-03-11 NOTE — PROVIDER NOTIFICATION
MD Notification    Notified Person: MD    Notified Person Name: Beena Rodriguez     Notification Date/Time: 3/11 0635    Notification Interaction: Vocera    Purpose of Notification: Fyi, pts temp is 96.6, checked multiple times/ routes     Orders Received: Warm blankets bear hugger if blankets don't work    Comments:

## 2025-03-11 NOTE — PROGRESS NOTES
[facility]    Medicine Progress Note - Hospitalist Service    Date of Admission:  3/8/2025    Assessment & Plan   Opal is an 89-year-old female with a history significant for HFpEF, CKD 3, Parkinson's, breast cancer and asthma who was admitted to [facility] on 3/8/2025 for acute hypoxic respiratory failure multifactorial from flu A, asthma exacerbation and HFpEF exacerbation.     Influenza A   Acute hypoxic respiratory failure, likely multifactorial.  Improved   Flu A PCR 3/8/2025: Positive.  Multifactorial from HFpEF/asthma exacerbation and influenza A.  Suspect triggered by acute infection with hypoxia.   - Titrate O2 as able.  Patient still requiring 1-2 L.  Discussed with her that if she is not titrated off by tomorrow we will likely have to send her home with home O2   - Tamiflu     New onset atrial fibrillation w/RVR, now rate controlled  Noted to have heart rates in to the 120s on 3/10.  EKG done and confirmed atrial fibrillation.  Discussed anticoagulation and given her history of brain aneurysm s/p coiling will differ initation  - IV Diltiazem 15 mg ordered x1.  Has been rate controlled since   - At this time will just continue with PTA Coreg for rate control     Possible mild HFpEF exacerbation  Last echo 5/2/2024: EF 50-55%, grade 2 diastolic dysfunction  Home regimen: Furosemide 40 mg daily  * Echo was poor quality.  Possibly minimally reduced EF   - Strict I/Os and daily weights   - Initially on furosemide 40 mg twice daily IV and switched to PO      Asthma exacerbation  Home regimen: Albuterol as needed, budesonide twice daily  Plan  - Was on Solu-medrol initially but has since been transitioned to prednisone 40mg daily PO for additional 5 days (end date 3/14/2025)  - Inhalers as needed     Deconditioning  - PT/OT consulted     Hypertension - amlodipine 10 mg daily, carvedilol 12.5 mg twice daily, losartan 100 mg daily  Hyperlipidemia - atorvastatin 20 mg daily  CKD 3 -  baseline creatinine 0.8-1.1  Parkinson's disease - Sinemet  mg 4 times daily  GERD - omeprazole 20 mg daily  History of breast cancer - raloxifene 60 mg daily  Depression - Zoloft 25 mg daily, but patient has been off this for a long period of time now  Peripheral neuropathy - gabapentin 100 mg twice daily as needed  History of overactive bladder - tolterodine 1 mg twice daily  Cerebral aneurysm s/p coiling           Diet: Combination Diet Regular Diet; Renal Diet (non-dialysis); 2 gm NA Diet  Fluid restriction 2000 ML FLUID    DVT Prophylaxis: Pneumatic Compression Devices  Santana Catheter: Not present  Lines: None     Cardiac Monitoring: ACTIVE order. Indication: Acute decompensated heart failure (48 hours)  Code Status: No CPR- Do NOT Intubate      Clinically Significant Risk Factors         # Hyponatremia: Lowest Na = 134 mmol/L in last 2 days, will monitor as appropriate  # Hypochloremia: Lowest Cl = 95 mmol/L in last 2 days, will monitor as appropriate          # Hypertension: Noted on problem list  # Acute heart failure with preserved ejection fraction: heart failure noted on problem list, last echo with EF >50%, and receiving IV diuretics               # Financial/Environmental Concerns: none  # Asthma: noted on problem list        Social Drivers of Health    Physical Activity: Inactive (7/11/2024)    Exercise Vital Sign     Days of Exercise per Week: 0 days     Minutes of Exercise per Session: 0 min   Social Connections: Unknown (7/11/2024)    Social Connection and Isolation Panel [NHANES]     Frequency of Social Gatherings with Friends and Family: More than three times a week          Disposition Plan     Medically Ready for Discharge: Anticipated Tomorrow             Nick Hightower DO  Hospitalist Service  [facility]  Securely message with [employer] (more info)  Text page via [employer] Paging/Directory    ______________________________________________________________________    Interval History   Patient seen and examined.  No acute events over night.  No fevers noted.  Still mild hypoxia.  No pain currently.  No nausea or vomiting     Physical Exam   Vital Signs: Temp: 98.5  F (36.9  C) Temp src: Oral BP: 135/74 Pulse: 55   Resp: 16 SpO2: 92 % O2 Device: Nasal cannula Oxygen Delivery: 2 LPM  Weight: 137 lbs 5.55 oz    General Appearance: Resting comfortably. NAD  Respiratory: No respiratory distress.  No obvious wheezing  Cardiovascular: RRR  GI: Soft.  Non-distended  Skin: Dressings in place   Other: Alert. No edema      Medical Decision Making       52 MINUTES SPENT BY ME on the date of service doing chart review, history, exam, documentation & further activities per the note.      Data   ------------------------- PAST 24 HR DATA REVIEWED -----------------------------------------------        Imaging results reviewed over the past 24 hrs:   No results found for this or any previous visit (from the past 24 hours).

## 2025-03-11 NOTE — PROVIDER NOTIFICATION
Paged Dr. Hightower can we please discontinue IMC status for patient?     Discontinued    Addendum:  Administered most important medications this morning, did not give other ones will attempt later but patient is having difficulty with swallowing medications and water at this time.

## 2025-03-11 NOTE — PLAN OF CARE
Goal Outcome Evaluation:  Pt is Aox3 (not time), up A1 GBW, fluid restriction diet. Denies pain. VSS on 2L NC, home O2 assessment to be completed this evening. SLP to see patient in morning. Tele afib. Turn and reposition q2hrs. Discharge pending.

## 2025-03-11 NOTE — PROVIDER NOTIFICATION
Paged Dr. Hightower, patient is having trouble with swallowing do you want to order SLP consult before discharge?    SLP consult placed

## 2025-03-11 NOTE — PLAN OF CARE
Goal Outcome Evaluation:         Pt A&Ox4, intermittently confused and forgetful. On 2L NC O2. 2 ga NA/ renal diet. 2L fluid restriction. A2 gb/w. Denies SOB, MORRELL. Prn PO tylenol & gabapentin given for pain. Murphy WHITAKER.

## 2025-03-11 NOTE — PROGRESS NOTES
Care Management Follow Up    Length of Stay (days): 3    Expected Discharge Date: 03/12/2025     Concerns to be Addressed: discharge planning     Patient plan of care discussed at interdisciplinary rounds: Yes    Anticipated Discharge Disposition: Assisted Living, Home Care              Anticipated Discharge Services: None  Anticipated Discharge DME: Oxygen (pt had set up on prior visit)    Patient/family educated on Medicare website which has current facility and service quality ratings:    Education Provided on the Discharge Plan: Yes  Patient/Family in Agreement with the Plan:      Referrals Placed by CM/SW:    Private pay costs discussed: Not applicable    Discussed  Partnership in Safe Discharge Planning  document with patient/family: No     Handoff Completed: No, handoff not indicated or clinically appropriate    Additional Information:  Patient is anticipated to be ready for discharge home tomorrow.    Writer spoke with Dee at [facility] ([phone]).  She was updated on patient's current status.  She is requiring assist of 1 for all transfers and anticipate will need her wheelchair more due to weakness having only walked short distances.  She is recommended home PT and OT.  [place] is the provider for therapy at [facility].  We need to have PT and OT home care orders in place and the shelter will arrange with [place].  Patient Opal continues to require oxygen at 1-2 lpm so may discharge with oxygen.  She will need to return by 3 pm.  Meds are filled at the hospital as patient manages her own medications.  OT does recommend assistance with medication management.  The shelter can open that conversation when she returns.      Met with patient.  She is agreeable to returning to her shelter as early as tomorrow.  Inquired with bedside nurse if patient could get in/out of a car but she was not sure as she has not worked with patient's transfers.  Opal states she thinks she can get in a car.  Opal said we could  call her sister Corrie to discuss if she or other family member could provide transportation.  Left a message with sister Corrie at this time.    Next Steps: follow up with family to discuss transportation.  Anticipate discharge tomorrow with possible oxygen.    Addendum at 4:35 pm:      PT has seen patient and feels it would be difficult for her to transfer in/out of a car safely.  Patient's sister Corrie and brother in law Bernardo came in and discussed with them along with Opal.  They agree it may be too difficult and would like to have wheelchair transport arranged.  [facility] wheelchair transport arranged with oxygen for tomorrow 3/12 at [phone].  They did not have any other options other than 7 pm which would be too late for the [facility].      Dr. Hightower aware of early discharge and added the oxygen order.  Writer called [facility] for oxygen.  They would like us to call at 8 am (when they open) to order the oxygen and they will deliver by 10 am.  Bedside nurse will order; the charge and bedside nurse updated and aware.  Also bedside nurse will add the qualifying dot phrase for home oxygen this evening.    Dr. Hightower reminded to add PT/OT home care on the final orders.    Also left a voice mail message with Dee at [facility] with the update on the time she can expect Opal to return.    Need to fax orders to [facility] in the morning.    Amanda Kyle RN  Inpatient Float Care Coordinator  [facility]  [email or web address]

## 2025-03-12 VITALS
OXYGEN SATURATION: 93 % | RESPIRATION RATE: 16 BRPM | TEMPERATURE: 96.1 F | DIASTOLIC BLOOD PRESSURE: 87 MMHG | HEART RATE: 84 BPM | WEIGHT: 128.53 LBS | BODY MASS INDEX: 24.29 KG/M2 | SYSTOLIC BLOOD PRESSURE: 135 MMHG

## 2025-03-12 PROCEDURE — 99239 HOSP IP/OBS DSCHRG MGMT >30: CPT | Performed by: INTERNAL MEDICINE

## 2025-03-12 PROCEDURE — 250N000012 HC RX MED GY IP 250 OP 636 PS 637: Performed by: INTERNAL MEDICINE

## 2025-03-12 PROCEDURE — 92610 EVALUATE SWALLOWING FUNCTION: CPT | Mod: GN | Performed by: SPEECH-LANGUAGE PATHOLOGIST

## 2025-03-12 PROCEDURE — 250N000009 HC RX 250: Performed by: INTERNAL MEDICINE

## 2025-03-12 PROCEDURE — 250N000013 HC RX MED GY IP 250 OP 250 PS 637: Performed by: INTERNAL MEDICINE

## 2025-03-12 PROCEDURE — 97535 SELF CARE MNGMENT TRAINING: CPT | Mod: GO

## 2025-03-12 PROCEDURE — 92526 ORAL FUNCTION THERAPY: CPT | Mod: GN | Performed by: SPEECH-LANGUAGE PATHOLOGIST

## 2025-03-12 RX ORDER — PREDNISONE 20 MG/1
40 TABLET ORAL DAILY
Qty: 4 TABLET | Refills: 0 | Status: SHIPPED | OUTPATIENT
Start: 2025-03-13 | End: 2025-03-15

## 2025-03-12 RX ORDER — OSELTAMIVIR PHOSPHATE 30 MG/1
30 CAPSULE ORAL 2 TIMES DAILY
Qty: 2 CAPSULE | Refills: 0 | Status: SHIPPED | OUTPATIENT
Start: 2025-03-12 | End: 2025-03-13

## 2025-03-12 RX ADMIN — ACETAMINOPHEN 1000 MG: 500 TABLET, FILM COATED ORAL at 09:19

## 2025-03-12 RX ADMIN — TOLTERODINE TARTRATE 1 MG: 1 TABLET ORAL at 09:10

## 2025-03-12 RX ADMIN — PANTOPRAZOLE SODIUM 40 MG: 40 TABLET, DELAYED RELEASE ORAL at 06:12

## 2025-03-12 RX ADMIN — FUROSEMIDE 40 MG: 40 TABLET ORAL at 09:08

## 2025-03-12 RX ADMIN — BUDESONIDE 0.5 MG: 0.5 INHALANT RESPIRATORY (INHALATION) at 09:26

## 2025-03-12 RX ADMIN — PREDNISONE 40 MG: 20 TABLET ORAL at 09:08

## 2025-03-12 RX ADMIN — IPRATROPIUM BROMIDE AND ALBUTEROL SULFATE 3 ML: .5; 3 SOLUTION RESPIRATORY (INHALATION) at 09:25

## 2025-03-12 RX ADMIN — CARBIDOPA AND LEVODOPA 2 TABLET: 25; 100 TABLET ORAL at 06:12

## 2025-03-12 RX ADMIN — CARBIDOPA AND LEVODOPA 2 TABLET: 25; 100 TABLET ORAL at 11:55

## 2025-03-12 RX ADMIN — ASPIRIN 81 MG: 81 TABLET, COATED ORAL at 09:08

## 2025-03-12 RX ADMIN — GUAIFENESIN 100 MG: 200 SOLUTION ORAL at 09:22

## 2025-03-12 RX ADMIN — OSELTAMIVIR PHOSPHATE 30 MG: 30 CAPSULE ORAL at 09:08

## 2025-03-12 RX ADMIN — CARVEDILOL 12.5 MG: 12.5 TABLET, FILM COATED ORAL at 09:08

## 2025-03-12 RX ADMIN — Medication 2 CAPSULE: at 09:20

## 2025-03-12 RX ADMIN — AMLODIPINE BESYLATE 10 MG: 10 TABLET ORAL at 09:08

## 2025-03-12 RX ADMIN — ACETAMINOPHEN 650 MG: 325 TABLET, FILM COATED ORAL at 06:12

## 2025-03-12 ASSESSMENT — ACTIVITIES OF DAILY LIVING (ADL)
ADLS_ACUITY_SCORE: 76
ADLS_ACUITY_SCORE: 77
ADLS_ACUITY_SCORE: 76
ADLS_ACUITY_SCORE: 77

## 2025-03-12 NOTE — PLAN OF CARE
Occupational Therapy Discharge Summary    Reason for therapy discharge:    Discharged to home with home therapy.    Progress towards therapy goal(s). See goals on Care Plan in [facility] electronic health record for goal details.  Goals partially met.  Barriers to achieving goals:   discharge from facility.    Therapy recommendation(s):      No further therapy is recommended.OT Rationale for DC Rec: Pt functioning below baseline, limited by dec activity tolerance. Pt admitted from [facility]. Pt receives assist with most I/ADLs at [facility]. Rec pt return to [facility] w/ continued Ax1 for I/ADLs and transfers. Pt would benefit from supervision of med mgmt upon return to [facility] d/t inc confusion at this time.

## 2025-03-12 NOTE — PLAN OF CARE
"Physical Therapy Discharge Summary    Reason for therapy discharge:    Discharged to home with home therapy.    Progress towards therapy goal(s). See goals on Care Plan in [facility] electronic health record for goal details.  Goals partially met.  Barriers to achieving goals:   discharge from facility.    Therapy recommendation(s):    Continued therapy is recommended.  Rationale/Recommendations:  per most recent PT note: \"Pt currently below reported baseline for mobility, requires up to modA for mobility with use of FWW at this time. Limited to up to 4' of gait today. Pt limited by deficits in functional strength, balance, and activity tolerance. If [facility] staff can provide Ax1 for all transfers and WC for any distance of amb, could return home with HHPT. If unable to have this level of assist, would need TCU.\" .        "

## 2025-03-12 NOTE — PROGRESS NOTES
Meds sent through , phone number, [phone], delivery  tracking number, [id number].     I spoke with front dest of [facility] [employer], that the package will be delivered by 4pm. And Aleena has contact to call  by 4pm if not delivered.

## 2025-03-12 NOTE — PROGRESS NOTES
Patient alert and oriented x 4.  Denies shortness of breath, nausea vomiting, chest pain.  RN attempted home O2 eval x 2, patient did not qualify.  Patient's discharge to assisted living with home services.  Patient educated to keep packet for home RN.  Patient educated on new meds.  Patient able to repeat all information for understanding.  Patient educated on follow-up appointments, but RN educated patient that home RN can set up.  Patient left with all personal belongings and packet for facility.

## 2025-03-12 NOTE — PROGRESS NOTES
Patient has been assessed for Home Oxygen needs. Oxygen readings:    *Pulse oximetry (SpO2) = 91% on room air at rest while awake.    *SpO2 improved to 94% on 1  liters/minute at rest.    *SpO2 = 89% on room air during activity/with exercise.    *SpO2 improved to 95% on 1 liters/minute during activity/with exercise.

## 2025-03-12 NOTE — PROGRESS NOTES
Patient has been assessed for Home Oxygen needs. Oxygen readings:    *Pulse oximetry (SpO2) = 91% on room air at rest while awake.    *SpO2 improved to 95% on 1liters/minute at rest.    *SpO2 = 91% on room air during activity/with exercise.    *SpO2 improved to 98% on 1liters/minute during activity/with exercise.

## 2025-03-12 NOTE — PROGRESS NOTES
When RN went to administer AM medications/ nebs, pt was on RA and was dropping to 87 at rest on RA. Change from earlier assessment. Pt currently working with therapy following nebs. Pt is on RA and therapy will assess for 02 needs.

## 2025-03-12 NOTE — PROGRESS NOTES
CLINICAL SWALLOW EVALUATION       03/12/25 0905   Appointment Info   Signing Clinician's Name / Credentials (SLP) Ana Parsons M.A., CCC-SLP, [facility]   General Information   Onset of Illness/Injury or Date of Surgery 03/08/25   Referring Physician Dr. Hightower   Patient/Family Therapy Goal Statement (SLP) Patient would like to continue regular diet.   Pertinent History of Current Problem Patient admitted with influenza A, asthma exacerbation.  Her PMH is significant for Parkinson's, breast CA, asthma, and GERD.  She endorses remembering a pill got stuck in her mouth yesterday because she is so dry.  This is sometimes a problem for her taking pills with dry mouth.  She endorses working with an SLP in home care setting for voice loudness and swallowing exercises, too.   Type of Evaluation   Type of Evaluation Swallow Evaluation   Oral Motor   Oral Musculature generally intact   Structural Abnormalities none present   Mucosal Quality dry   Dentition (Oral Motor)   Dentition (Oral Motor) adequate dentition   Facial Symmetry (Oral Motor)   Facial Symmetry (Oral Motor) WNL   Lip Function (Oral Motor)   Lip Range of Motion (Oral Motor) WNL   Lip Strength (Oral Motor) WNL   Tongue Function (Oral Motor)   Tongue ROM (Oral Motor) WNL   Vocal Quality/Secretion Management (Oral Motor)   Vocal Quality (Oral Motor) hypophonic;dysphonic   Secretion Management (Oral Motor) WNL   General Swallowing Observations   Past History of Dysphagia Yes, pneumonia admission and clinical swallow evaluation in 5/2024 yielding recommendations for regular diet with thin liquids, reflux precautions.   Respiratory Support room air   Current Diet/Method of Nutritional Intake (General Swallowing Observations, NIS) thin liquids (level 0);regular diet   Swallowing Evaluation Clinical swallow evaluation   Clinical Swallow Evaluation   Feeding Assistance set up only required   Additional evaluation(s) completed today No   Clinical Swallow Evaluation  Textures Trialed thin liquids;pureed;solid foods   Clinical Swallow Eval: Thin Liquid Texture Trial   Mode of Presentation, Thin Liquids cup;straw;self-fed   Volume of Liquid or Food Presented 8 oz   Oral Phase of Swallow WFL   Pharyngeal Phase of Swallow intact   Diagnostic Statement Reports difficulty with straw orally when mouth is dry.  No overt Sx of aspiration.   Clinical Swallow Evaluation: Puree Solid Texture Trial   Mode of Presentation, Puree self-fed;spoon   Volume of Puree Presented 4 oz   Oral Phase, Puree WFL   Pharyngeal Phase, Puree intact   Diagnostic Statement No overt Sx of aspiration   Clinical Swallow Evaluation: Solid Food Texture Trial   Mode of Presentation self-fed   Volume Presented 3 crackers   Oral Phase residue in oral cavity  (Minimal)   Oral Residue mid posterior tongue   Pharyngeal Phase intact   Diagnostic Statement Careful mastication, liquid wash independent for dry cracker. No overt Sx of aspiration.   Esophageal Phase of Swallow   Patient reports or presents with symptoms of esophageal dysphagia Yes   Esophageal comments GERD hx, no belching or globus sensation observed or reported today.   Swallowing Recommendations   Diet Consistency Recommendations thin liquids (level 0);regular diet   Supervision Level for Intake patient independent   Mode of Delivery Recommendations slow rate of intake   Swallowing Maneuver Recommendations alternate food and liquid intake   Monitoring/Assistance Required (Eating/Swallowing) stop eating activities when fatigue is present   Recommended Feeding/Eating Techniques (Swallow Eval) maintain upright sitting position for eating;maintain upright posture during/after eating for 30 minutes;set-up and prepare tray   Medication Administration Recommendations, Swallowing (SLP) Whole pills in puree carrier - moisten mouth first   Instrumental Assessment Recommendations instrumental evaluation not recommended at this time   Clinical Impression   Criteria for  Skilled Therapeutic Interventions Met (SLP Eval) Yes, treatment indicated   SLP Diagnosis Minimal oropharyngeal dysphagia, increased aspiration risks during respiratory illness   Risks & Benefits of therapy have been explained evaluation/treatment results reviewed;patient   SLP Total Evaluation Time   Eval: oral/pharyngeal swallow function, clinical swallow Minutes (56982) 25   SLP Goals   Therapy Frequency (SLP Eval) one time eval and treatment only   SLP Predicted Duration/Target Date for Goal Attainment 03/12/25   SLP Goals Swallow   SLP: Safely tolerate diet without signs/symptoms of aspiration Regular diet;Thin liquids;With use of compensatory swallow strategies;With use of swallow precautions;Independently;Goal Met   Interventions   Interventions Quick Adds Swallowing Dysfunction   Swallowing Intervention   Treatment of Swallowing Dysfunction &/or Oral Function for Feeding Minutes (97895) 10   Symptoms Noted During/After Treatment Fatigue   Treatment Detail/Skilled Intervention Patient trained re: safe swallowing precautions, GERD prevention, and pill swallowing strategies.  Verbal, demonstration and written education provided and patient was able to implement strategies with minimal cueing.  Goal met.   SLP Discharge Planning   SLP Plan Discharge to [facility] today, resume home SLP services.   SLP Discharge Recommendation home with home health   SLP Rationale for DC Rec Had been receiving SLP services for swallowing and voice, recommend dysphagia follow up services.   SLP Brief overview of current status  Clinical swallow evaluation completed: recommend continue regular diet with thin liquids when alert, upright to chair for meals.  Recommend moisten mouth before meds, take pills one at a time in a puree carrier.   SLP Time and Intention   Total Session Time (sum of timed and untimed services) 35

## 2025-03-12 NOTE — PROGRESS NOTES
Call placed to [facility] to arrange for O2 delivery prior to discharge. Page placed via answering service- awaiting call back.

## 2025-03-12 NOTE — PROGRESS NOTES
"Care Management Discharge Note    Discharge Date: 03/12/2025       Discharge Disposition: Assisted Living, Home Care    Discharge Services: None    Discharge DME: Oxygen (pt had set up on prior visit)    Discharge Transportation: family or friend will provide    Private pay costs discussed: transportation costs    Does the patient's insurance plan have a 3 day qualifying hospital stay waiver?  Yes     Which insurance plan 3 day waiver is available? Alternative insurance waiver    Will the waiver be used for post-acute placement? No    PAS Confirmation Code:    Patient/family educated on Medicare website which has current facility and service quality ratings:      Education Provided on the Discharge Plan: Yes  Persons Notified of Discharge Plans: Dee WAYNE at [facility]; Charge RN; Jairo bedside RN  Patient/Family in Agreement with the Plan:  Yes    Handoff Referral Completed: No    Additional Information:        CC spoke to bedside RN who stated, \"patient doesn't qualify for oxygen as she passed the the home oxygen needs test. I let Dr. Hightower know and we won't order the oxygen for home.\"         CC called facility [facility],[phone], and notified her of plan for wheelchair ride and discharge today @12:. Updated her of SLP eval and faxed over discharge orders and note of KENYA triana per Dee's request.          CC notified Charge RN  and bedside RN of new discharge time.       Khloe Montes RN BSN  Care Coordination  [facility]   [phone]     "

## 2025-03-12 NOTE — PLAN OF CARE
Goal Outcome Evaluation:  Vital signs stable on 2L NC. Home O2 yet to be completed. Alert and oriented x 3. Denies chest pain. Bowel sounds active, flatus +,-BM,AUO. Pain controlled with tylenol. Up with assist of 1, GB & W.

## 2025-03-12 NOTE — CARE PLAN
03/12/25 1100   Home Oxygen Assessment (RN/RT ONLY)   1. SpO2 on room air at rest while awake 91       Oxygen LPM at rest 1   3. SpO2 on room air during Activity/with exercise 91   4. SpO2 with oxygen during activity/with exercise 98       Oxygen LPM during activity/with exercise 1   Does patient qualify for Home O2? No

## 2025-03-12 NOTE — DISCHARGE SUMMARY
[facility]  Hospitalist Discharge Summary      Date of Admission:  3/8/2025  Date of Discharge:  3/12/2025  Discharging Provider: Nick Hightower DO  Discharge Service: Hospitalist Service    Discharge Diagnoses      Influenza A   Acute hypoxic respiratory failure, likely multifactorial.  Resolved  New onset atrial fibrillation w/RVR, now rate controlled  Possible mild HFpEF exacerbation  Asthma exacerbation  Deconditioning     Hypertension   Hyperlipidemia   CKD stage 3   Parkinson's disease   GERD   History of breast cancer   Depression  Peripheral neuropathy   History of overactive bladder  Cerebral aneurysm s/p coiling        Clinically Significant Risk Factors          Follow-ups Needed After Discharge   Follow-up Appointments       Hospital Follow-up with Existing Primary Care Provider (PCP)      Please see details below         Schedule Primary Care visit within: 7 Days               Unresulted Labs Ordered in the Past 30 Days of this Admission       No orders found from 2/6/2025 to 3/9/2025.          Discharge Disposition   Discharged to assisted living  Condition at discharge: Stable    Hospital Course   Opal is an 89-year-old female with a history significant for HFpEF, CKD 3, Parkinson's, breast cancer and asthma who was admitted to [facility] on 3/8/2025 for acute hypoxic respiratory failure multifactorial from flu A, asthma exacerbation and HFpEF exacerbation.     Influenza A   Acute hypoxic respiratory failure, likely multifactorial.  Improved   Flu A PCR 3/8/2025: Positive.  Multifactorial from HFpEF/asthma exacerbation and influenza A.  Suspect triggered by acute infection with hypoxia.   - Titrate O2 as able.  Patient still requiring 1-2 L  titrated off of oxygen on day of discharge   - Tamiflu      New onset atrial fibrillation w/RVR, now rate controlled  Noted to have heart rates in to the 120s on 3/10.  EKG done and confirmed atrial fibrillation.  Discussed  anticoagulation and given her history of brain aneurysm s/p coiling will differ initation  - IV Diltiazem 15 mg ordered x1.  Has been rate controlled since   - At this time will just continue with PTA Coreg for rate control      Possible mild HFpEF exacerbation  Last echo 5/2/2024: EF 50-55%, grade 2 diastolic dysfunction  Home regimen: Furosemide 40 mg daily  * Echo was poor quality.  Possibly minimally reduced EF   - Strict I/Os and daily weights   - Initially on furosemide 40 mg twice daily IV and switched to PO      Asthma exacerbation  Home regimen: Albuterol as needed, budesonide twice daily  Plan  - Was on Solu-medrol initially but has since been transitioned to prednisone 40mg daily PO for additional 5 days (end date 3/14/2025)  - Inhalers as needed     Deconditioning  - PT/OT consulted     Hypertension - amlodipine 10 mg daily, carvedilol 12.5 mg twice daily, losartan 100 mg daily  Hyperlipidemia - atorvastatin 20 mg daily  CKD 3 - baseline creatinine 0.8-1.1  Parkinson's disease - Sinemet  mg 4 times daily  GERD - omeprazole 20 mg daily  History of breast cancer - raloxifene 60 mg daily  Depression - Zoloft 25 mg daily, but patient has been off this for a long period of time now  Peripheral neuropathy - gabapentin 100 mg twice daily as needed  History of overactive bladder - tolterodine 1 mg twice daily  Cerebral aneurysm s/p coiling        Consultations This Hospital Stay   CARE MANAGEMENT / SOCIAL WORK IP CONSULT  PHYSICAL THERAPY ADULT IP CONSULT  OCCUPATIONAL THERAPY ADULT IP CONSULT  SPEECH LANGUAGE PATH ADULT IP CONSULT    Code Status   No CPR- Do NOT Intubate    Time Spent on this Encounter   INick DO, personally saw the patient today and spent greater than 30 minutes discharging this patient.       Nick Hightower DO  [facility]  [address] [facility] [facility] [address]  Phone: [phone]  Fax:  [phone]  ______________________________________________________________________    Physical Exam   Vital Signs: Temp: (!) 96.1  F (35.6  C) Temp src: Axillary BP: 135/87 Pulse: 84   Resp: 16 SpO2: (!) 91 % O2 Device: None (Room air) Oxygen Delivery: 2 LPM  Weight: 128 lbs 8.45 oz  General Appearance: Resting comfortably. NAD  Respiratory: No respiratory distress  Cardiovascular: RRR  GI: Non-distended  Skin: No obvious rashes or cyanosis  Other: Alert.  No edema         Primary Care Physician   Damian Russ MD    Discharge Orders      Primary Care - Care Coordination Referral      Home Care Referral      Adult Cardiology Eval  Referral      Reason for your hospital stay    Flu     Activity    Your activity upon discharge: activity as tolerated     Diet    Follow this diet upon discharge: Current Diet:Orders Placed This Encounter      Fluid restriction 2000 ML FLUID      Combination Diet Regular Diet; Renal Diet (non-dialysis); 2 gm NA Diet; Thin Liquids (level 0) (Moisten mouth before whole meds in puree carrier one at a time.  Eat when alert and upright to chair, straw ok for small sips.)     Hospital Follow-up with Existing Primary Care Provider (PCP)    Please see details below            Significant Results and Procedures   Most Recent 3 CBC's:  Recent Labs   Lab Test 03/09/25  0609 03/08/25  0212 07/12/24  1121   WBC 6.1 9.2 11.7*   HGB 11.1* 10.8* 10.4*   MCV 87 88 95    160 269     Most Recent 3 BMP's:  Recent Labs   Lab Test 03/11/25  0925 03/10/25  0842 03/09/25  0609 03/08/25  0212   NA  --  134* 135 134*   POTASSIUM  --  3.5 4.2 4.4   CHLORIDE  --  95* 95* 100   CO2  --  26 25 22   BUN  --  39.1* 33.0* 29.0*   CR  --  0.74 0.93 0.93   ANIONGAP  --  13 15 12   CHRIS  --  9.0 9.2 9.3   * 157* 142* 95   ,   Results for orders placed or performed during the hospital encounter of 03/08/25   XR Chest Port 1 View    Narrative    EXAM: XR CHEST PORT 1 VIEW  LOCATION: [facility]  DATE: 3/8/2025    INDICATION: sob, htn  COMPARISON: PE chest CT and portable chest radiograph 2024.      Impression    IMPRESSION: Stable cardiomegaly. Mild pulmonary vascular congestion and interstitial edema. Trace effusions. No pneumothorax. Atherosclerotic calcifications of the aortic arch. Shoulder arthroplasties and partially visualized thoracolumbar fusion   hardware.   Echocardiogram Complete    Narrative    [id number]  [id number]  [id number]  [id number]^MORGAN^REILLY     [facility]  Echocardiography Laboratory  [address] [place] [address]     Name: BENJI KENT  MRN: [id number]  : 1935  Study Date: 03/10/2025 09:37 AM  Age: 89 yrs  Gender: Female  Patient Location: \[facility]\""  Reason For Study: CHF  Ordering Physician: REILLY MORA  Performed By: MARKO Hernandez     BSA: 1.7 m2  Height: 61 in  Weight: 147 lb  HR: 113  BP: 165/83 mmHg  ______________________________________________________________________________  Procedure  Echocardiogram with two-dimensional, color and spectral Doppler. Definity (NDC  #51984-151) given intravenously. Contrast Definity. Technically difficult  study. Technically difficult study.Extremely difficult acoustic windows  despite the use of contrast for endcardial border definition. Technically  difficult study.Extremely poor acoustic windows.  ______________________________________________________________________________  Interpretation Summary     Poor image quality. Despite use of IV contrast, assessment of LV function  ejection fraction was suboptimal. LV systolic function probably mildly  reduced. RV Salud function could not be assessed.  Valves are suboptimally assessed  Overall a nondiagnostic study. Consider cardiac MRI if clinically appropriate.  The rhythm was rapid atrial fibrillation.  ______________________________________________________________________________  Mitral Valve  There is  mild mitral annular calcification. The mitral valve is not well  visualized. Doppler assessment is suboptimal. Trace MR noted.     Pulmonic Valve  The pulmonic valve is not well visualized.     Vessels  The aortic root is normal size.     Rhythm  The rhythm was rapid atrial fibrillation.     ______________________________________________________________________________  MMode/2D Measurements & Calculations  Ao root diam: 3.5 cm  asc Aorta Diam: 3.4 cm     LVOT diam: 2.1 cm  LVOT area: 3.4 cm2  Ao root diam index Ht(cm/m): 2.2  Ao root diam index BSA (cm/m2): 2.1  Asc Ao diam index BSA (cm/m2): 2.0  Asc Ao diam index Ht(cm/m): 2.2     ______________________________________________________________________________  Report approved by: Boo Shaver MD on 03/10/2025 11:36 AM           *Note: Due to a large number of results and/or encounters for the requested time period, some results have not been displayed. A complete set of results can be found in Results Review.       Discharge Medications   Current Discharge Medication List        START taking these medications    Details   oseltamivir (TAMIFLU) 30 MG capsule Take 1 capsule (30 mg) by mouth 2 times daily for 2 doses.  Qty: 2 capsule, Refills: 0    Comments: No refills needed  Associated Diagnoses: Influenza A      predniSONE (DELTASONE) 20 MG tablet Take 2 tablets (40 mg) by mouth daily for 2 days.  Qty: 4 tablet, Refills: 0    Comments: No refills warranted  Associated Diagnoses: Influenza A           CONTINUE these medications which have NOT CHANGED    Details   !! acetaminophen (TYLENOL) 500 MG tablet Take 1,000 mg by mouth 2 times daily as needed for mild pain      !! acetaminophen (TYLENOL) 500 MG tablet Take 1,000 mg by mouth daily (with breakfast)      albuterol (PROAIR HFA/PROVENTIL HFA/VENTOLIN HFA) 108 (90 Base) MCG/ACT inhaler Inhale 1-2 puffs into the lungs every 6 hours as needed for shortness of breath or wheezing  Qty: 18 g, Refills: 1    Comments:  Pharmacy may dispense brand covered by insurance (Proair, or proventil or ventolin or generic albuterol inhaler)  Associated Diagnoses: Acute bronchospasm      amLODIPine (NORVASC) 10 MG tablet Take 1 tablet (10 mg) by mouth daily  Qty: 90 tablet, Refills: 3    Associated Diagnoses: Benign essential hypertension      aspirin EC 81 MG EC tablet Take 1 tablet (81 mg) by mouth daily    Associated Diagnoses: Transient cerebral ischemia, unspecified type      atorvastatin (LIPITOR) 20 MG tablet Take 1 tablet (20 mg) by mouth daily  Qty: 90 tablet, Refills: 3    Associated Diagnoses: Mixed hyperlipidemia      azithromycin (ZITHROMAX) 500 MG tablet TAKE 1 TABLET BY MOUTH 30 TO 60 MINUTES PRIOR TO DENTAL PROCEDURE  Qty: 1 tablet, Refills: 3    Associated Diagnoses: Preventive antibiotic      calcium carbonate (OS-CHRIS) 1500 (600 Ca) MG tablet Take 600 mg by mouth 2 times daily (with meals) Staff may administer the Ca+ that pt has brought in      carbidopa-levodopa (SINEMET)  MG per tablet Take 2 tablets by mouth 4 times daily Takes at 6am, 11am, 4pm, and 9 pm      carvedilol (COREG) 12.5 MG tablet Take 1 tablet (12.5 mg) by mouth 2 times daily (with meals)  Qty: 180 tablet, Refills: 3    Associated Diagnoses: Benign essential hypertension      Cholecalciferol (VITAMIN D3 PO) Take 400 Units by mouth daily       coenzyme Q-10 capsule Take 1 capsule by mouth daily 100 mg dose      gabapentin (NEURONTIN) 100 MG capsule Take 100 mg by mouth 2 times daily as needed for neuropathic pain      ipratropium - albuterol 0.5 mg/2.5 mg/3 mL (DUONEB) 0.5-2.5 (3) MG/3ML neb solution Take 1 vial (3 mLs) by nebulization 3 times daily  Qty: 270 mL, Refills: 3    Associated Diagnoses: Intermittent asthma without complication, unspecified asthma severity      losartan (COZAAR) 100 MG tablet Take 1 tablet (100 mg) by mouth every evening  Qty: 90 tablet, Refills: 3    Associated Diagnoses: Essential hypertension with goal blood pressure less  than 140/90      Multiple Vitamins-Minerals (PRESERVISION AREDS) CAPS Take 2 capsules by mouth daily.      omeprazole (PRILOSEC) 20 MG DR capsule TAKE 1 CAPSULE(20 MG) BY MOUTH DAILY  Qty: 90 capsule, Refills: 2    Associated Diagnoses: Gastroesophageal reflux disease without esophagitis      polyethylene glycol (MIRALAX/GLYCOLAX) packet Take 1 packet by mouth daily as needed for constipation      polyethylene glycol-propylene glycol (SYSTANE ULTRA) 0.4-0.3 % SOLN ophthalmic solution Place 1 drop into both eyes every hour as needed for dry eyes May self admin and have at bedside.      raloxifene (EVISTA) 60 MG tablet TAKE 1 TABLET(60 MG) BY MOUTH DAILY  Qty: 90 tablet, Refills: 3    Associated Diagnoses: Age-related osteoporosis without current pathological fracture      tolterodine (DETROL) 1 MG tablet Take 1 tablet (1 mg) by mouth 2 times daily  Qty: 180 tablet, Refills: 3    Associated Diagnoses: Urinary incontinence, unspecified type      budesonide (PULMICORT) 0.5 MG/2ML neb solution Take 2 mLs (0.5 mg) by nebulization 2 times daily  Qty: 180 mL, Refills: 11    Comments: For Profile Only - patient will call to fill  Associated Diagnoses: Intermittent asthma without complication, unspecified asthma severity      furosemide (LASIX) 40 MG tablet Take 1 tablet (40 mg) by mouth daily  Qty: 180 tablet, Refills: 3    Comments: For Profile Only - patient will call to fill - Dose increase  Associated Diagnoses: Acute diastolic heart failure (H)      sertraline (ZOLOFT) 25 MG tablet TAKE 1 TABLET(25 MG) BY MOUTH DAILY  Qty: 90 tablet, Refills: 0    Comments: Please advise pt they are due for an appointment with their provider for further refills.  Associated Diagnoses: Mild major depression       !! - Potential duplicate medications found. Please discuss with provider.        Allergies   Allergies   Allergen Reactions    Bee Pollen Hives     If MVI contains this - she will break out in a rash.     Cephalexin Monohydrate  Hives    Ciprofloxacin Hives    Clindamycin Hives    Multi Vitamin-Minerals [Hair-Vites] Other (See Comments)     (If MV contains bee pollen she will break out in hives)     Penicillin [Penicillins] Hives     All antibiotics except zpak??????    Thiazide-Type Diuretics Other (See Comments)     Very low sodium levels    Tobramycin Hives    Vancomycin Hives    Ibuprofen Nausea and Vomiting and Nausea     stomach pain    Multiple Vitamin Hives     If MVI contains this - she will break out in a rash.     Pollen Extract Hives    Versed [Midazolam] Other (See Comments)     Confused, agiitated, for 6-7 hrs after anngiogram sedation    Vitamin E Hives    Ace Inhibitors Cough    Metoprolol Diarrhea      tolerates Inderal

## 2025-03-12 NOTE — PROGRESS NOTES
Care Management Follow Up    Length of Stay (days): 4    Expected Discharge Date: 03/12/2025     Concerns to be Addressed: discharge planning     Patient plan of care discussed at interdisciplinary rounds: Yes    Anticipated Discharge Disposition: Assisted Living, Home Care              Anticipated Discharge Services: None  Anticipated Discharge DME: Oxygen (pt had set up on prior visit)    Patient/family educated on Medicare website which has current facility and service quality ratings:    Education Provided on the Discharge Plan: Yes  Patient/Family in Agreement with the Plan:      Referrals Placed by CM/SW:    Private pay costs discussed: transportation costs    Discussed  Partnership in Safe Discharge Planning  document with patient/family: No     Handoff Completed: No    Additional Information:  Proposed plan to discharge @ 10:10 am today was interrupted by MD due to new concerns over aspiration. SLP consult ordered and scheduled today @ 8:45 am. CC rescheduled transport Wheelchair ride with [facility] to 12:. Notified charge RN and MD of   Rescheduling for evaluation and recs. CC updated correction as well  Next Steps: await discharge orders and send to facility    Khloe Montes RN BSN  Care Coordination  [facility]   [phone]

## 2025-03-13 ENCOUNTER — PATIENT OUTREACH (OUTPATIENT)
Dept: CARE COORDINATION | Facility: CLINIC | Age: 89
End: 2025-03-13
Payer: COMMERCIAL

## 2025-03-13 NOTE — PROGRESS NOTES
Clinic Care Coordination Contact  Care Coordination Clinician Chart Review    Situation: Patient chart reviewed by care coordinator.    Background: Clinic Care Coordination Referral received from inpatient care team for transition handoff communication following hospital admission.    Assessment: Upon chart review, patient is not a candidate for Primary Care Clinic Care Coordination enrollment due to reason stated below:  Chart notes indicate that pt has switched to [facility] and will have her care managed by them starting this week.      Plan/Recommendations: Clinic Care Coordination Referral/order cancelled. RN/SW CC will perform no further monitoring/outreaches at this time and will remain available as needed. If new needs arise, a new Care Coordination Referral may be placed.    Miracle Tobar,  [facility]  Clinic Care Coordinator  [facility]  [phone]  [email or web address]

## 2025-03-14 ENCOUNTER — APPOINTMENT (OUTPATIENT)
Dept: CT IMAGING | Facility: CLINIC | Age: 89
End: 2025-03-14
Attending: SOCIAL WORKER
Payer: COMMERCIAL

## 2025-03-14 ENCOUNTER — APPOINTMENT (OUTPATIENT)
Dept: GENERAL RADIOLOGY | Facility: CLINIC | Age: 89
End: 2025-03-14
Attending: SOCIAL WORKER
Payer: COMMERCIAL

## 2025-03-14 ENCOUNTER — HOSPITAL ENCOUNTER (INPATIENT)
Facility: CLINIC | Age: 89
End: 2025-03-14
Attending: SOCIAL WORKER | Admitting: STUDENT IN AN ORGANIZED HEALTH CARE EDUCATION/TRAINING PROGRAM
Payer: COMMERCIAL

## 2025-03-14 DIAGNOSIS — R53.1 GENERALIZED WEAKNESS: ICD-10-CM

## 2025-03-14 DIAGNOSIS — Z51.5 HOSPICE CARE PATIENT: ICD-10-CM

## 2025-03-14 DIAGNOSIS — R40.0 SOMNOLENCE: ICD-10-CM

## 2025-03-14 DIAGNOSIS — J10.1 INFLUENZA A: Primary | ICD-10-CM

## 2025-03-14 DIAGNOSIS — J45.901 ASTHMA WITH ACUTE EXACERBATION, UNSPECIFIED ASTHMA SEVERITY, UNSPECIFIED WHETHER PERSISTENT: ICD-10-CM

## 2025-03-14 PROBLEM — N17.9 ACUTE KIDNEY FAILURE, UNSPECIFIED: Status: ACTIVE | Noted: 2025-03-14

## 2025-03-14 LAB
ALBUMIN SERPL BCG-MCNC: 3.4 G/DL (ref 3.5–5.2)
ALP SERPL-CCNC: 83 U/L (ref 40–150)
ALT SERPL W P-5'-P-CCNC: 9 U/L (ref 0–50)
ANION GAP SERPL CALCULATED.3IONS-SCNC: 9 MMOL/L (ref 7–15)
AST SERPL W P-5'-P-CCNC: 15 U/L (ref 0–45)
BASE EXCESS BLDV CALC-SCNC: 6 MMOL/L (ref -3–3)
BASOPHILS # BLD AUTO: 0 10E3/UL (ref 0–0.2)
BASOPHILS NFR BLD AUTO: 0 %
BILIRUB SERPL-MCNC: 0.4 MG/DL
BUN SERPL-MCNC: 59.3 MG/DL (ref 8–23)
CALCIUM SERPL-MCNC: 9.1 MG/DL (ref 8.8–10.4)
CHLORIDE SERPL-SCNC: 97 MMOL/L (ref 98–107)
CREAT SERPL-MCNC: 1.25 MG/DL (ref 0.51–0.95)
EGFRCR SERPLBLD CKD-EPI 2021: 42 ML/MIN/1.73M2
EOSINOPHIL # BLD AUTO: 0 10E3/UL (ref 0–0.7)
EOSINOPHIL NFR BLD AUTO: 0 %
ERYTHROCYTE [DISTWIDTH] IN BLOOD BY AUTOMATED COUNT: 16.3 % (ref 10–15)
GLUCOSE SERPL-MCNC: 94 MG/DL (ref 70–99)
HCO3 BLDV-SCNC: 29 MMOL/L (ref 21–28)
HCO3 SERPL-SCNC: 27 MMOL/L (ref 22–29)
HCT VFR BLD AUTO: 35.8 % (ref 35–47)
HGB BLD-MCNC: 12.3 G/DL (ref 11.7–15.7)
HOLD SPECIMEN: NORMAL
IMM GRANULOCYTES # BLD: 0.1 10E3/UL
IMM GRANULOCYTES NFR BLD: 1 %
LACTATE BLD-SCNC: 1.2 MMOL/L (ref 0.7–2)
LYMPHOCYTES # BLD AUTO: 1.2 10E3/UL (ref 0.8–5.3)
LYMPHOCYTES NFR BLD AUTO: 11 %
MCH RBC QN AUTO: 29.7 PG (ref 26.5–33)
MCHC RBC AUTO-ENTMCNC: 34.4 G/DL (ref 31.5–36.5)
MCV RBC AUTO: 87 FL (ref 78–100)
MONOCYTES # BLD AUTO: 0.7 10E3/UL (ref 0–1.3)
MONOCYTES NFR BLD AUTO: 7 %
NEUTROPHILS # BLD AUTO: 8.7 10E3/UL (ref 1.6–8.3)
NEUTROPHILS NFR BLD AUTO: 81 %
NRBC # BLD AUTO: 0 10E3/UL
NRBC BLD AUTO-RTO: 0 /100
NT-PROBNP SERPL-MCNC: 6754 PG/ML (ref 0–1800)
PCO2 BLDV: 38 MM HG (ref 40–50)
PH BLDV: 7.49 [PH] (ref 7.32–7.43)
PLATELET # BLD AUTO: 215 10E3/UL (ref 150–450)
PO2 BLDV: 37 MM HG (ref 25–47)
POTASSIUM SERPL-SCNC: 4 MMOL/L (ref 3.4–5.3)
PROT SERPL-MCNC: 6.6 G/DL (ref 6.4–8.3)
RBC # BLD AUTO: 4.14 10E6/UL (ref 3.8–5.2)
SAO2 % BLDV: 75 % (ref 70–75)
SODIUM SERPL-SCNC: 133 MMOL/L (ref 135–145)
WBC # BLD AUTO: 10.7 10E3/UL (ref 4–11)

## 2025-03-14 PROCEDURE — 87040 BLOOD CULTURE FOR BACTERIA: CPT | Performed by: SOCIAL WORKER

## 2025-03-14 PROCEDURE — 96374 THER/PROPH/DIAG INJ IV PUSH: CPT

## 2025-03-14 PROCEDURE — 250N000013 HC RX MED GY IP 250 OP 250 PS 637: Performed by: STUDENT IN AN ORGANIZED HEALTH CARE EDUCATION/TRAINING PROGRAM

## 2025-03-14 PROCEDURE — 99223 1ST HOSP IP/OBS HIGH 75: CPT | Performed by: STUDENT IN AN ORGANIZED HEALTH CARE EDUCATION/TRAINING PROGRAM

## 2025-03-14 PROCEDURE — 84155 ASSAY OF PROTEIN SERUM: CPT | Performed by: SOCIAL WORKER

## 2025-03-14 PROCEDURE — 83605 ASSAY OF LACTIC ACID: CPT

## 2025-03-14 PROCEDURE — 250N000013 HC RX MED GY IP 250 OP 250 PS 637: Performed by: SOCIAL WORKER

## 2025-03-14 PROCEDURE — 999N000157 HC STATISTIC RCP TIME EA 10 MIN

## 2025-03-14 PROCEDURE — 250N000009 HC RX 250: Performed by: SOCIAL WORKER

## 2025-03-14 PROCEDURE — 96361 HYDRATE IV INFUSION ADD-ON: CPT

## 2025-03-14 PROCEDURE — 94640 AIRWAY INHALATION TREATMENT: CPT | Mod: 76

## 2025-03-14 PROCEDURE — 70450 CT HEAD/BRAIN W/O DYE: CPT

## 2025-03-14 PROCEDURE — 250N000011 HC RX IP 250 OP 636: Mod: JZ | Performed by: SOCIAL WORKER

## 2025-03-14 PROCEDURE — 93005 ELECTROCARDIOGRAM TRACING: CPT

## 2025-03-14 PROCEDURE — 120N000001 HC R&B MED SURG/OB

## 2025-03-14 PROCEDURE — 82310 ASSAY OF CALCIUM: CPT | Performed by: SOCIAL WORKER

## 2025-03-14 PROCEDURE — 36415 COLL VENOUS BLD VENIPUNCTURE: CPT | Performed by: SOCIAL WORKER

## 2025-03-14 PROCEDURE — 258N000003 HC RX IP 258 OP 636: Performed by: SOCIAL WORKER

## 2025-03-14 PROCEDURE — 85025 COMPLETE CBC W/AUTO DIFF WBC: CPT | Performed by: SOCIAL WORKER

## 2025-03-14 PROCEDURE — 82247 BILIRUBIN TOTAL: CPT | Performed by: SOCIAL WORKER

## 2025-03-14 PROCEDURE — 94640 AIRWAY INHALATION TREATMENT: CPT

## 2025-03-14 PROCEDURE — 71046 X-RAY EXAM CHEST 2 VIEWS: CPT

## 2025-03-14 PROCEDURE — 250N000009 HC RX 250: Performed by: STUDENT IN AN ORGANIZED HEALTH CARE EDUCATION/TRAINING PROGRAM

## 2025-03-14 PROCEDURE — 99285 EMERGENCY DEPT VISIT HI MDM: CPT | Mod: 25

## 2025-03-14 PROCEDURE — 82803 BLOOD GASES ANY COMBINATION: CPT

## 2025-03-14 PROCEDURE — 83880 ASSAY OF NATRIURETIC PEPTIDE: CPT | Performed by: SOCIAL WORKER

## 2025-03-14 RX ORDER — ACETAMINOPHEN 500 MG
1000 TABLET ORAL ONCE
Status: COMPLETED | OUTPATIENT
Start: 2025-03-14 | End: 2025-03-14

## 2025-03-14 RX ORDER — AMOXICILLIN 250 MG
1 CAPSULE ORAL 2 TIMES DAILY PRN
Status: DISCONTINUED | OUTPATIENT
Start: 2025-03-14 | End: 2025-03-24

## 2025-03-14 RX ORDER — CARBIDOPA AND LEVODOPA 25; 100 MG/1; MG/1
2 TABLET ORAL 4 TIMES DAILY
Status: DISCONTINUED | OUTPATIENT
Start: 2025-03-14 | End: 2025-03-26 | Stop reason: HOSPADM

## 2025-03-14 RX ORDER — CARVEDILOL 12.5 MG/1
12.5 TABLET ORAL 2 TIMES DAILY WITH MEALS
Status: DISCONTINUED | OUTPATIENT
Start: 2025-03-14 | End: 2025-03-26 | Stop reason: HOSPADM

## 2025-03-14 RX ORDER — CARBIDOPA AND LEVODOPA 25; 100 MG/1; MG/1
1 TABLET ORAL 3 TIMES DAILY
Status: DISCONTINUED | OUTPATIENT
Start: 2025-03-14 | End: 2025-03-14

## 2025-03-14 RX ORDER — LIDOCAINE 40 MG/G
CREAM TOPICAL
Status: DISCONTINUED | OUTPATIENT
Start: 2025-03-14 | End: 2025-03-26 | Stop reason: HOSPADM

## 2025-03-14 RX ORDER — LOSARTAN POTASSIUM 100 MG/1
100 TABLET ORAL EVERY EVENING
Status: DISCONTINUED | OUTPATIENT
Start: 2025-03-14 | End: 2025-03-25

## 2025-03-14 RX ORDER — AMOXICILLIN 250 MG
2 CAPSULE ORAL 2 TIMES DAILY PRN
Status: DISCONTINUED | OUTPATIENT
Start: 2025-03-14 | End: 2025-03-24

## 2025-03-14 RX ORDER — RALOXIFENE HYDROCHLORIDE 60 MG/1
60 TABLET, FILM COATED ORAL EVERY EVENING
Status: DISCONTINUED | OUTPATIENT
Start: 2025-03-15 | End: 2025-03-26 | Stop reason: HOSPADM

## 2025-03-14 RX ORDER — METHYLPREDNISOLONE SODIUM SUCCINATE 125 MG/2ML
125 INJECTION INTRAMUSCULAR; INTRAVENOUS ONCE
Status: COMPLETED | OUTPATIENT
Start: 2025-03-14 | End: 2025-03-14

## 2025-03-14 RX ORDER — IPRATROPIUM BROMIDE AND ALBUTEROL SULFATE 2.5; .5 MG/3ML; MG/3ML
3 SOLUTION RESPIRATORY (INHALATION) ONCE
Status: COMPLETED | OUTPATIENT
Start: 2025-03-14 | End: 2025-03-14

## 2025-03-14 RX ORDER — ONDANSETRON 4 MG/1
4 TABLET, ORALLY DISINTEGRATING ORAL EVERY 6 HOURS PRN
Status: DISCONTINUED | OUTPATIENT
Start: 2025-03-14 | End: 2025-03-26 | Stop reason: HOSPADM

## 2025-03-14 RX ORDER — BUDESONIDE 0.5 MG/2ML
0.5 INHALANT ORAL 2 TIMES DAILY
Status: DISCONTINUED | OUTPATIENT
Start: 2025-03-14 | End: 2025-03-26 | Stop reason: HOSPADM

## 2025-03-14 RX ORDER — ACETAMINOPHEN 500 MG
1000 TABLET ORAL 2 TIMES DAILY PRN
Status: DISCONTINUED | OUTPATIENT
Start: 2025-03-14 | End: 2025-03-26 | Stop reason: HOSPADM

## 2025-03-14 RX ORDER — ASPIRIN 81 MG/1
81 TABLET ORAL EVERY OTHER DAY
Status: DISCONTINUED | OUTPATIENT
Start: 2025-03-15 | End: 2025-03-26 | Stop reason: HOSPADM

## 2025-03-14 RX ORDER — PROCHLORPERAZINE MALEATE 5 MG/1
5 TABLET ORAL EVERY 6 HOURS PRN
Status: DISCONTINUED | OUTPATIENT
Start: 2025-03-14 | End: 2025-03-15

## 2025-03-14 RX ORDER — AMLODIPINE BESYLATE 5 MG/1
10 TABLET ORAL DAILY
Status: DISCONTINUED | OUTPATIENT
Start: 2025-03-15 | End: 2025-03-26 | Stop reason: HOSPADM

## 2025-03-14 RX ORDER — FUROSEMIDE 40 MG/1
40 TABLET ORAL DAILY
Status: DISCONTINUED | OUTPATIENT
Start: 2025-03-14 | End: 2025-03-25

## 2025-03-14 RX ORDER — ATORVASTATIN CALCIUM 20 MG/1
20 TABLET, FILM COATED ORAL DAILY
Status: DISCONTINUED | OUTPATIENT
Start: 2025-03-15 | End: 2025-03-26 | Stop reason: HOSPADM

## 2025-03-14 RX ORDER — IPRATROPIUM BROMIDE AND ALBUTEROL SULFATE 2.5; .5 MG/3ML; MG/3ML
3 SOLUTION RESPIRATORY (INHALATION) 3 TIMES DAILY
Status: DISCONTINUED | OUTPATIENT
Start: 2025-03-14 | End: 2025-03-26 | Stop reason: HOSPADM

## 2025-03-14 RX ORDER — CARBIDOPA AND LEVODOPA 25; 100 MG/1; MG/1
2 TABLET ORAL ONCE
Status: COMPLETED | OUTPATIENT
Start: 2025-03-14 | End: 2025-03-14

## 2025-03-14 RX ORDER — PANTOPRAZOLE SODIUM 40 MG/1
40 TABLET, DELAYED RELEASE ORAL DAILY
Status: DISCONTINUED | OUTPATIENT
Start: 2025-03-15 | End: 2025-03-26 | Stop reason: HOSPADM

## 2025-03-14 RX ORDER — GABAPENTIN 100 MG/1
100 CAPSULE ORAL 2 TIMES DAILY PRN
Status: DISCONTINUED | OUTPATIENT
Start: 2025-03-14 | End: 2025-03-26 | Stop reason: HOSPADM

## 2025-03-14 RX ORDER — TOLTERODINE TARTRATE 1 MG/1
1 TABLET, EXTENDED RELEASE ORAL 2 TIMES DAILY
Status: DISCONTINUED | OUTPATIENT
Start: 2025-03-14 | End: 2025-03-26 | Stop reason: HOSPADM

## 2025-03-14 RX ORDER — ACETAMINOPHEN 500 MG
1000 TABLET ORAL
Status: DISCONTINUED | OUTPATIENT
Start: 2025-03-15 | End: 2025-03-26 | Stop reason: HOSPADM

## 2025-03-14 RX ORDER — METHYLPREDNISOLONE SODIUM SUCCINATE 125 MG/2ML
60 INJECTION INTRAMUSCULAR; INTRAVENOUS EVERY 12 HOURS
Status: COMPLETED | OUTPATIENT
Start: 2025-03-15 | End: 2025-03-16

## 2025-03-14 RX ORDER — ONDANSETRON 2 MG/ML
4 INJECTION INTRAMUSCULAR; INTRAVENOUS EVERY 6 HOURS PRN
Status: DISCONTINUED | OUTPATIENT
Start: 2025-03-14 | End: 2025-03-26 | Stop reason: HOSPADM

## 2025-03-14 RX ADMIN — IPRATROPIUM BROMIDE AND ALBUTEROL SULFATE 3 ML: .5; 3 SOLUTION RESPIRATORY (INHALATION) at 19:46

## 2025-03-14 RX ADMIN — SODIUM CHLORIDE 250 ML: 0.9 INJECTION, SOLUTION INTRAVENOUS at 13:43

## 2025-03-14 RX ADMIN — CARVEDILOL 12.5 MG: 12.5 TABLET, FILM COATED ORAL at 19:10

## 2025-03-14 RX ADMIN — ACETAMINOPHEN 1000 MG: 500 TABLET, FILM COATED ORAL at 12:55

## 2025-03-14 RX ADMIN — CARBIDOPA AND LEVODOPA 2 TABLET: 25; 100 TABLET ORAL at 14:52

## 2025-03-14 RX ADMIN — METHYLPREDNISOLONE SODIUM SUCCINATE 125 MG: 125 INJECTION, POWDER, FOR SOLUTION INTRAMUSCULAR; INTRAVENOUS at 13:42

## 2025-03-14 RX ADMIN — TOLTERODINE TARTRATE 1 MG: 1 TABLET ORAL at 21:30

## 2025-03-14 RX ADMIN — CARBIDOPA AND LEVODOPA 2 TABLET: 25; 100 TABLET ORAL at 21:30

## 2025-03-14 RX ADMIN — IPRATROPIUM BROMIDE AND ALBUTEROL SULFATE 3 ML: .5; 3 SOLUTION RESPIRATORY (INHALATION) at 13:46

## 2025-03-14 RX ADMIN — BUDESONIDE 0.5 MG: 0.5 INHALANT RESPIRATORY (INHALATION) at 19:46

## 2025-03-14 ASSESSMENT — COLUMBIA-SUICIDE SEVERITY RATING SCALE - C-SSRS
1. IN THE PAST MONTH, HAVE YOU WISHED YOU WERE DEAD OR WISHED YOU COULD GO TO SLEEP AND NOT WAKE UP?: NO
6. HAVE YOU EVER DONE ANYTHING, STARTED TO DO ANYTHING, OR PREPARED TO DO ANYTHING TO END YOUR LIFE?: NO
2. HAVE YOU ACTUALLY HAD ANY THOUGHTS OF KILLING YOURSELF IN THE PAST MONTH?: NO

## 2025-03-14 ASSESSMENT — ACTIVITIES OF DAILY LIVING (ADL)
ADLS_ACUITY_SCORE: 76
ADLS_ACUITY_SCORE: 63
ADLS_ACUITY_SCORE: 75
ADLS_ACUITY_SCORE: 61
ADLS_ACUITY_SCORE: 75
ADLS_ACUITY_SCORE: 61
ADLS_ACUITY_SCORE: 63
ADLS_ACUITY_SCORE: 76
ADLS_ACUITY_SCORE: 61
ADLS_ACUITY_SCORE: 63
ADLS_ACUITY_SCORE: 75
ADLS_ACUITY_SCORE: 81

## 2025-03-14 NOTE — ED NOTES
Bed: ED21  Expected date:   Expected time:   Means of arrival:   Comments:  414 89F SOB/fatigue, recent hospitalization, a fib

## 2025-03-14 NOTE — ED NOTES
[facility]  ED Nurse Handoff Report    ED Chief complaint: Generalized Weakness      ED Diagnosis:   Final diagnoses:   None       Code Status: DNR    Allergies:   Allergies   Allergen Reactions    Bee Pollen Hives     If MVI contains this - she will break out in a rash.     Cephalexin Monohydrate Hives    Ciprofloxacin Hives    Clindamycin Hives    Multi Vitamin-Minerals [Hair-Vites] Other (See Comments)     (If MV contains bee pollen she will break out in hives)     Penicillin [Penicillins] Hives     All antibiotics except zpak??????    Thiazide-Type Diuretics Other (See Comments)     Very low sodium levels    Tobramycin Hives    Vancomycin Hives    Ibuprofen Nausea and Vomiting and Nausea     stomach pain    Multiple Vitamin Hives     If MVI contains this - she will break out in a rash.     Pollen Extract Hives    Versed [Midazolam] Other (See Comments)     Confused, agiitated, for 6-7 hrs after anngiogram sedation    Vitamin E Hives    Ace Inhibitors Cough    Metoprolol Diarrhea      tolerates Inderal       Patient Story: Recently discharge from hospital to NH for influenza. Increased weakness and SOB today   Focused Assessment:  SOB, weakness, malaise    Treatments and/or interventions provided: HFNC, Nebs, IVF, labs, Steroid  Patient's response to treatments and/or interventions:     Abnormal Labs Resulted from Time of ED Arrival to Time of ED Departure   COMPREHENSIVE METABOLIC PANEL - Abnormal       Result Value    Sodium 133 (*)     Potassium 4.0      Carbon Dioxide (CO2) 27      Anion Gap 9      Urea Nitrogen 59.3 (*)     Creatinine 1.25 (*)     GFR Estimate 42 (*)     Calcium 9.1      Chloride 97 (*)     Glucose 94      Alkaline Phosphatase 83      AST 15      ALT 9      Protein Total 6.6      Albumin 3.4 (*)     Bilirubin Total 0.4     CBC WITH PLATELETS AND DIFFERENTIAL - Abnormal    WBC Count 10.7      RBC Count 4.14      Hemoglobin 12.3      Hematocrit 35.8      MCV 87      MCH 29.7       MCHC 34.4      RDW 16.3 (*)     Platelet Count 215      % Neutrophils 81      % Lymphocytes 11      % Monocytes 7      % Eosinophils 0      % Basophils 0      % Immature Granulocytes 1      NRBCs per 100 WBC 0      Absolute Neutrophils 8.7 (*)     Absolute Lymphocytes 1.2      Absolute Monocytes 0.7      Absolute Eosinophils 0.0      Absolute Basophils 0.0      Absolute Immature Granulocytes 0.1      Absolute NRBCs 0.0     ISTAT GASES LACTATE VENOUS POCT - Abnormal    Lactic Acid POCT 1.2      Bicarbonate Venous POCT 29 (*)     O2 Sat, Venous POCT 75      pCO2 Venous POCT 38 (*)     pH Venous POCT 7.49 (*)     pO2 Venous POCT 37      Base Excess/Deficit (+/-) POCT 6.0 (*)    NT PROBNP INPATIENT - Abnormal    N terminal Pro BNP Inpatient 6,754 (*)      XR Chest 2 Views   Final Result   IMPRESSION: Similar cardiomegaly and central pulmonary vascular congestion. Trace bilateral pleural fluid. No new focal consolidation or pneumothorax. Shoulder arthroplasties. Electronic device overlies the right chest. Spinal fusion hardware.      Head CT w/o contrast   Final Result   IMPRESSION:   1.  No acute process intracranially.      2.  Procedural changes of stent-assist aneurysmal coiling proximal left cavernous ICA region without evidence for hemorrhage in this region. This was most recently assessed on CT angiogram 03/18/2022.      3.  Chronic ischemic changes deep white matter both cerebral hemispheres noted. Nothing for acute infarction.                  To be done/followed up on inpatient unit:      Does this patient have any cognitive concerns?: Forgetful    Activity level - Baseline/Home:  Stand with Assist  Activity Level - Current:   Stand with assist x2    Patient's Preferred language: English   Needed?: No    Isolation: Droplet  Infection: Influenza  Patient tested for COVID 19 prior to admission: NO  Bariatric?: No    Vital Signs:   Vitals:    03/14/25 1158 03/14/25 1230 03/14/25 1330   BP: (!)  "145/105 117/88 117/80   Pulse: 73 95    Resp: 16 21    Temp: 98.3  F (36.8  C)     TempSrc: Temporal     SpO2: 93% (!) 90% 95%   Weight: 58.3 kg (128 lb 8.5 oz)     Height: 1.549 m (5' 1\")         Cardiac Rhythm:     Was the PSS-3 completed:   Yes  What interventions are required if any?               Family Comments: at bedside  OBS brochure/video discussed/provided to patient/family: Yes       For the majority of the shift this patient's behavior was Green.   Behavioral interventions performed were none.    ED NURSE PHONE NUMBER: [phone]         "

## 2025-03-14 NOTE — H&P
[facility]  History and Physical - Hospitalist Service       Date of Admission:  3/14/2025  PRIMARY CARE PROVIDER:    Damian Russ    Assessment & Plan   Opal Azucena Sin is a 89 year old female with a past medical history significant for HFpEF, CKD stage 3, breast cancer, asthma, Parkinson's, hyperlipidemia, and hypertension who generalized weakness and fatigue.     Of note, patient was recently admitted at [facility] from 3/8/25-3/12/25 for acute hypoxic respiratory 2/2 Influenza A, heart failure exacerbation and Asthma exacerbation. Treated with Tamiflu, IV lasix and steroids. She also developed new onset Afib on 3/10/25. She was given 15mg of IV diltiazem and was rate controlled after that. She was weaned off oxygen on 3/12/25 and discharged backto her [facility] on 3/12/25.     Possible Metabolic Encephalopathy   Possible Asthma Exacerbation  Generalized Weakness   Increased Fatigue   Presented with increased weakness and fatigue 2 days after recent hospitalizations. In the ED, she was VSS other than being hypertensive. Labs showing a Cr of 1.25, BNP of 6,000, pH of 7.49 with Co2 of 38. Per ED provider patient was sounding wheezy and tachypnea. She was given duonebs, solumedrol and a small fluid bolus. She was placed on Hiflow given her tachypnea and not due to hypoxia. Per ED provider, patient looks much more comfortable with high flow on.     - Blood Cx: pending   - UA ordered     - Spo2 goal > 90%    - Hi-flow due to work of breathing and tachypnea    - Wean as tolerated     - Continue Duonebs and Pulmicort nebs   - Start Solumedrol 60mg Q12H in the AM     - PT/OT consulted  - SW consulted     - Per pharmacy: family states that [facility] is unable to administer meds and she was not receiving her nebs. Patient is in charge of taking her meds. Family helps set this up weekly.    MICHAEL on CKD 3  Received 250cc bolus in the ED.   - Cr: 1.25   - Baseline: 0.8-1.1   - Will hold on  further fluids at this time     HFpEF   On admitting exam, patient looks dry though her BNP is elevated and has some slight faint bibasilar crackles on exam. May have some component of fluid overload.  - TTE 5/2/2024: EF 50-55%, grade 2 diastolic dysfunction  - TTE 3/2025: Echo was poor quality.  Possibly minimally reduced EF     - Tele  - Strict I/Os and daily weights   - Hold Lasix for now    - Reassess in the AM      Goals of Care   Family and patient confirmed with ED provider that patient is DNR/DNI and would prefer comfort focused treatment. She is okay with BIPAP or high flow mask. Discussed with family about comfort care/hospice. They are open to hearing more about this but would like to wait on Palliative consult at this time. They would like to see if the patient wakes up more to see what she thinks.   - Continue discussions  - SW consulted     Mild Hyponatremia  Has intermittently low in the past  - Na: 133  - Continue to monitor    Recent Influenza A Infection   Flu A PCR 3/8/2025: Positive. Was treated with Tamiflu.   - Noted     Recent New Onset Afib with RVR: Currently Rate Controlled   Noted to have heart rates in to the 120s on 3/10/25 during her last admission.  EKG done and confirmed atrial fibrillation.  She was given one time dose of  IV Diltiazem 15 mg. Has been rate controlled since. Anticoagulation was discussed but due to her history of brain aneurysm s/p coiling, anticoagulation was deferred.   - Tele  - Continue home Coreg 12.5mg BID with hold parameters      Hypertension   - Hold losartan 100 mg daily due to MICHAEL  - Continue amlodipine 10 mg daily, carvedilol 12.5 mg twice daily    Hyperlipidemia   - Continue atorvastatin 20 mg daily    Parkinson's Disease   - Continue Sinemet  mg 4 times daily    GERD   - Continue omeprazole 20 mg daily    History of Breast Cancer   - Continue raloxifene 60 mg daily    Depression   - Continue  Zoloft 25 mg daily    Peripheral Neuropathy   - Hold  gabapentin 100 mg twice daily     History of Overactive Bladder   - Continue tolterodin    Clinically Significant Risk Factors Present on Admission         # Hyponatremia: Lowest Na = 133 mmol/L in last 2 days, will monitor as appropriate  # Hypochloremia: Lowest Cl = 97 mmol/L in last 2 days, will monitor as appropriate      # Hypoalbuminemia: Lowest albumin = 3.4 g/dL at 3/14/2025 12:04 PM, will monitor as appropriate   # Drug Induced Platelet Defect: home medication list includes an antiplatelet medication   # Hypertension: Noted on problem list    # Chronic heart failure with preserved ejection fraction: heart failure noted on problem list and last echo with EF >50%              # Financial/Environmental Concerns:    # Asthma: noted on problem list             Diet:  Regular  DVT Prophylaxis: Pneumatic Compression Devices  [place] Catheter: Not present  Lines: None     Cardiac Monitoring: None  Code Status:  DNI/DNR         Disposition Plan      Expected Discharge Date: 03/16/2025             Entered: Darlin Long MD 03/14/2025, 3:37 PM       Medically Ready for Discharge: Anticipated in 2-4 Days        Darlin Long MD  Hosptialist Service  [facility]  Securely message with the Vocera Web Console (learn more here)  __________________________________________________________    Chief Complaint   Generalized Weakness     History is obtained from the patient, family and EMR.      History of Present Illness   Opal Sin is a 89 year old female with a past medical history significant for HFpEF, CKD stage 3, breast cancer, asthma, Parkinson's, hyperlipidemia, and hypertension who presented to the ED on 3/14/25 for generalized weakness.     Of note, patient was recently admitted at [facility] from 3/8/25-3/12/25 for acute hypoxic respiratory 2/2 Influenza A, heart failure exacerbation and Asthma exacerbation. Treated with Tamiflu, IV lasix and steroids. She also  developed new onset Afib on 3/10/25. She was given 15mg of IV diltiazem and was rate controlled after that. She was weaned off oxygen on 3/12/25 and discharged backto her custodial on 3/12/25.     Family brings patient in today due to generalized weakness and fatigue. Staff at her custodial had a hard time waking patient up this morning and found that shew as slumped over. Patient's sister feels that patient is more tired that her baseline.     In the ED:  Vitals upon arrival:   Temp: 98.3F, BP: 145/105, HR: 73, RR: 16, Spo2: 93% on RA    Labs:   Na: 133, K: 4, Cl: 94, Co2: 27  BUN: 59.3, Cr: 1.25     LFTs: WNL     BNP: 6,754    LA: 1.2     CBC: WNL     VB.49/38/29/75    CXR: Similar cardiomegaly and central pulmonary vascular congestion. Trace bilateral pleural fluid. No new focal consolidation or pneumothorax. Shoulder arthroplasties. Electronic device overlies the right chest. Spinal fusion hardware.     CT Head:   1.  No acute process intracranially.  2.  Procedural changes of stent-assist aneurysmal coiling proximal left cavernous ICA region without evidence for hemorrhage in this region. This was most recently assessed on CT angiogram 2022.  3.  Chronic ischemic changes deep white matter both cerebral hemispheres noted. Nothing for acute infarction.    She was given 250cc bolus, 125mg Solumedrol and duoneb in the ED.    Upon evaluation in the ED, the patient is sleepy. Arouses to voice and answers some simple questions but falls asleep quickly.     Past Medical History    I have reviewed this patient's medical history and updated it with pertinent information if needed.   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 Taking?   Cholecalciferol (VITAMIN D3 PO)  Self Yes Yes   Sig: Take 400 Units by mouth daily    Multiple Vitamins-Minerals (PRESERVISION AREDS) CAPS   Yes Yes   Sig: Take 2 capsules by mouth daily.   acetaminophen (TYLENOL) 500 MG tablet   Yes Yes   Sig: Take 1,000 mg by mouth 2 times daily as needed for mild pain   acetaminophen (TYLENOL) 500 MG tablet Morning  Yes Yes   Sig: Take 1,000 mg by mouth daily (with breakfast)   albuterol (PROAIR HFA/PROVENTIL HFA/VENTOLIN HFA) 108 (90 Base) MCG/ACT inhaler   No Yes   Sig: Inhale 1-2 puffs into the lungs every 6 hours as needed for shortness of breath or wheezing   amLODIPine (NORVASC) 10 MG tablet 11:00 AM  No Yes   Sig: Take 1 tablet (10 mg) by mouth daily   aspirin EC 81 MG EC tablet  Self No Yes   Sig: Take 1 tablet (81 mg) by mouth daily   Patient taking differently: Take 81 mg by mouth every other day.   atorvastatin (LIPITOR) 20 MG tablet  8:00 PM  No Yes   Sig: Take 1 tablet (20 mg) by mouth daily   azithromycin (ZITHROMAX) 500 MG tablet   No Yes   Sig: TAKE 1 TABLET BY MOUTH 30 TO 60 MINUTES PRIOR TO DENTAL PROCEDURE   budesonide (PULMICORT) 0.5 MG/2ML neb solution   No No   Sig: Take 2 mLs (0.5 mg) by nebulization 2 times daily   Patient not taking: Reported on 3/8/2025   calcium carbonate (OS-CHRIS) 1500 (600 Ca) MG tablet   Yes Yes   Sig: Take 600 mg by mouth 2 times daily (with meals) Staff may administer " the Ca+ that pt has brought in   carbidopa-levodopa (SINEMET)  MG per tablet  Self Yes Yes   Sig: Take 2 tablets by mouth 4 times daily Takes at 6am, 11am, 4pm, and 9 pm   carvedilol (COREG) 12.5 MG tablet   No Yes   Sig: Take 1 tablet (12.5 mg) by mouth 2 times daily (with meals)   Patient taking differently: Take 12.5 mg by mouth 2 times daily (with meals). 0900 and 1800   coenzyme Q-10 capsule   Yes Yes   Sig: Take 1 capsule by mouth daily 100 mg dose   furosemide (LASIX) 40 MG tablet   No Yes   Sig: Take 1 tablet (40 mg) by mouth daily   gabapentin (NEURONTIN) 100 MG capsule   Yes Yes   Sig: Take 100 mg by mouth 2 times daily as needed for neuropathic pain   ipratropium - albuterol 0.5 mg/2.5 mg/3 mL (DUONEB) 0.5-2.5 (3) MG/3ML neb solution   No No   Sig: Take 1 vial (3 mLs) by nebulization 3 times daily   losartan (COZAAR) 100 MG tablet  9:00 PM  No Yes   Sig: Take 1 tablet (100 mg) by mouth every evening   omeprazole (PRILOSEC) 20 MG DR capsule  7:00 AM  No Yes   Sig: TAKE 1 CAPSULE(20 MG) BY MOUTH DAILY   oseltamivir (TAMIFLU) 30 MG capsule   No No   Sig: Take 1 capsule (30 mg) by mouth 2 times daily for 2 doses.   polyethylene glycol (MIRALAX/GLYCOLAX) packet   Yes Yes   Sig: Take 1 packet by mouth daily as needed for constipation   polyethylene glycol-propylene glycol (SYSTANE ULTRA) 0.4-0.3 % SOLN ophthalmic solution   Yes Yes   Sig: Place 1 drop into both eyes every hour as needed for dry eyes May self admin and have at bedside.   predniSONE (DELTASONE) 20 MG tablet   No No   Sig: Take 2 tablets (40 mg) by mouth daily for 2 days.   raloxifene (EVISTA) 60 MG tablet  7:00 PM  No Yes   Sig: TAKE 1 TABLET(60 MG) BY MOUTH DAILY   sertraline (ZOLOFT) 25 MG tablet  9:00 AM  No Yes   Sig: TAKE 1 TABLET(25 MG) BY MOUTH DAILY   tolterodine (DETROL) 1 MG tablet   No Yes   Sig: Take 1 tablet (1 mg) by mouth 2 times daily   Patient taking differently: Take 1 mg by mouth 2 times daily. 0900 and 2100       Facility-Administered Medications: None     Allergies   Allergies   Allergen Reactions    Bee Pollen Hives     If MVI contains this - she will break out in a rash.     Cephalexin Monohydrate Hives    Ciprofloxacin Hives    Clindamycin Hives    Multi Vitamin-Minerals [Hair-Vites] Other (See Comments)     (If MV contains bee pollen she will break out in hives)     Penicillin [Penicillins] Hives     All antibiotics except zpak??????    Thiazide-Type Diuretics Other (See Comments)     Very low sodium levels    Tobramycin Hives    Vancomycin Hives    Ibuprofen Nausea and Vomiting and Nausea     stomach pain    Multiple Vitamin Hives     If MVI contains this - she will break out in a rash.     Pollen Extract Hives    Versed [Midazolam] Other (See Comments)     Confused, agiitated, for 6-7 hrs after anngiogram sedation    Vitamin E Hives    Ace Inhibitors Cough    Metoprolol Diarrhea      tolerates Inderal       Physical Exam   Vital Signs: Temp: 98.3  F (36.8  C) Temp src: Temporal BP: 125/78 Pulse: 72   Resp: 19 SpO2: 96 % O2 Device: High Flow Nasal Cannula (HFNC) Oxygen Delivery: 30 LPM  Weight: 128 lbs 8.45 oz    Constitutional: Sleepy  ENT: Normocephalic, without obvious abnormality, atraumatic, oral pharynx with moist mucus membranes.  Eyes pupils are equal, round and reactive to light; extra occular movements intact.  Normal sclera.    Pulmonary: Faint bibasilar crackles.  Cardiovascular: Irregular rhythm, normal S1 and S2  GI: Normal bowel sounds, soft, non-distended, non-tender.  Obese.  Skin/Integumen: Visualized skin appeared clear.  Neuro: Unable to assess  Extremities: No lower extremity edema noted    Medical Decision Making       75 MINUTES SPENT BY ME on the date of service doing chart review, history, exam, documentation & further activities per the note.         Data   Data reviewed today: I reviewed all medications, new labs and imaging results over the last 24 hours.      I have personally reviewed  the following data over the past 24 hrs:    10.7  \   12.3   / 215     133 (L) 97 (L) 59.3 (H) /  94   4.0 27 1.25 (H) \     ALT: 9 AST: 15 AP: 83 TBILI: 0.4   ALB: 3.4 (L) TOT PROTEIN: 6.6 LIPASE: N/A     Trop: N/A BNP: 6,754 (H)     Procal: N/A CRP: N/A Lactic Acid: 1.2         Imaging results reviewed over the past 24 hrs:   Recent Results (from the past 24 hours)   Head CT w/o contrast    Narrative    EXAM: CT HEAD WITHOUT CONTRAST  LOCATION: [facility]  DATE: 03/14/2025    INDICATION: ?Decreased responsiveness AMS this morning.  COMPARISON: None.  TECHNIQUE: Routine CT Head without IV contrast. Multiplanar reformats. Dose reduction techniques were used.    FINDINGS:  INTRACRANIAL CONTENTS: No intracranial hemorrhage, extra-axial collection, or mass effect.  No CT evidence of acute infarct. Mild to moderate presumed chronic small vessel ischemic changes. Mild generalized volume loss. No hydrocephalus. Corpus callosum   is normal. Cerebellar tonsillar position is satisfactory. No sella or suprasellar mass/hemorrhage. Vascular calcification. Artifact associated with endovascular coil bolus pack at the proximal left cavernous ICA level extends to the left middle cranial   fossa posteriorly. This appears to represent stent-assist aneurysmal embolization coils. No hyperdense hemorrhage is noted.     VISUALIZED ORBITS/SINUSES/MASTOIDS: Prior bilateral cataract surgery. Visualized portions of the orbits are otherwise unremarkable. No paranasal sinus mucosal disease. Scattered fluid/membrane thickening in the left mastoid air cells. No apparent mass in   the posterior nasopharynx or skull base.    BONES/SOFT TISSUES: No scalp hematoma. No skull fracture. Demineralization of the skull base. Nasopharynx is patent.      Impression    IMPRESSION:  1.  No acute process intracranially.    2.  Procedural changes of stent-assist aneurysmal coiling proximal left cavernous ICA region without evidence for  hemorrhage in this region. This was most recently assessed on CT angiogram 03/18/2022.    3.  Chronic ischemic changes deep white matter both cerebral hemispheres noted. Nothing for acute infarction.       XR Chest 2 Views    Narrative    EXAM: XR CHEST 2 VIEWS  LOCATION: [facility]  DATE: 3/14/2025    INDICATION: Fever  COMPARISON: Chest radiograph 3/8/2025.      Impression    IMPRESSION: Similar cardiomegaly and central pulmonary vascular congestion. Trace bilateral pleural fluid. No new focal consolidation or pneumothorax. Shoulder arthroplasties. Electronic device overlies the right chest. Spinal fusion hardware.

## 2025-03-14 NOTE — PROGRESS NOTES
RECEIVING UNIT ED HANDOFF REVIEW    ED Nurse Handoff Report was reviewed by: Betina Whalen RN on March 14, 2025 at 5:03 PM

## 2025-03-14 NOTE — PHARMACY-ADMISSION MEDICATION HISTORY
Pharmacist Admission Medication History    Admission medication history is complete. The information provided in this note is only as accurate as the sources available at the time of the update.    Information Source(s): Family member and Hospital records via in-person    Pertinent Information: Med list reflects 3/12/2025 discharge summary.  Family provided med list that reflects how med box is set up however there were inconsistencies between the discharge summary and their list.      Patient's family also reports she has not been using neb therapy since discharge     Changes made to PTA medication list:  Added: None  Deleted: None  Changed: None    Allergies reviewed with patient and updates made in EHR: no    Medication History Completed By: Jeremie Haider [facility] 3/14/2025 3:04 PM    PTA Med List   Medication Sig Note Last Dose/Taking    acetaminophen (TYLENOL) 500 MG tablet Take 1,000 mg by mouth 2 times daily as needed for mild pain  Taking As Needed    acetaminophen (TYLENOL) 500 MG tablet Take 1,000 mg by mouth daily (with breakfast)  Morning    albuterol (PROAIR HFA/PROVENTIL HFA/VENTOLIN HFA) 108 (90 Base) MCG/ACT inhaler Inhale 1-2 puffs into the lungs every 6 hours as needed for shortness of breath or wheezing  Taking As Needed    amLODIPine (NORVASC) 10 MG tablet Take 1 tablet (10 mg) by mouth daily  11:00 AM    aspirin EC 81 MG EC tablet Take 1 tablet (81 mg) by mouth daily (Patient taking differently: Take 81 mg by mouth every other day.)  Taking Differently    atorvastatin (LIPITOR) 20 MG tablet Take 1 tablet (20 mg) by mouth daily   8:00 PM    azithromycin (ZITHROMAX) 500 MG tablet TAKE 1 TABLET BY MOUTH 30 TO 60 MINUTES PRIOR TO DENTAL PROCEDURE  Taking    calcium carbonate (OS-CHRIS) 1500 (600 Ca) MG tablet Take 600 mg by mouth 2 times daily (with meals) Staff may administer the Ca+ that pt has brought in  Taking    carbidopa-levodopa (SINEMET)  MG per tablet Take 2 tablets by mouth 4 times daily  Takes at 6am, 11am, 4pm, and 9 pm  Taking    carvedilol (COREG) 12.5 MG tablet Take 1 tablet (12.5 mg) by mouth 2 times daily (with meals) (Patient taking differently: Take 12.5 mg by mouth 2 times daily (with meals). 0900 and 1800)  Taking Differently    Cholecalciferol (VITAMIN D3 PO) Take 400 Units by mouth daily   Taking    coenzyme Q-10 capsule Take 1 capsule by mouth daily 100 mg dose  Taking    furosemide (LASIX) 40 MG tablet Take 1 tablet (40 mg) by mouth daily 3/14/2025: Family reports this med is stored separately from med box. Uncertain if patient utilizes as PRN vs Holding med only when leaving her facility. Taking    gabapentin (NEURONTIN) 100 MG capsule Take 100 mg by mouth 2 times daily as needed for neuropathic pain  Taking As Needed    losartan (COZAAR) 100 MG tablet Take 1 tablet (100 mg) by mouth every evening   9:00 PM    Multiple Vitamins-Minerals (PRESERVISION AREDS) CAPS Take 2 capsules by mouth daily.  Taking    omeprazole (PRILOSEC) 20 MG DR capsule TAKE 1 CAPSULE(20 MG) BY MOUTH DAILY   7:00 AM    polyethylene glycol (MIRALAX/GLYCOLAX) packet Take 1 packet by mouth daily as needed for constipation  Taking As Needed    polyethylene glycol-propylene glycol (SYSTANE ULTRA) 0.4-0.3 % SOLN ophthalmic solution Place 1 drop into both eyes every hour as needed for dry eyes May self admin and have at bedside.  Taking As Needed    raloxifene (EVISTA) 60 MG tablet TAKE 1 TABLET(60 MG) BY MOUTH DAILY   7:00 PM    sertraline (ZOLOFT) 25 MG tablet TAKE 1 TABLET(25 MG) BY MOUTH DAILY 3/14/2025: Patient's family reports this med is segregated from patient's med box.  Patient utilizes as a PRN med.  9:00 AM    tolterodine (DETROL) 1 MG tablet Take 1 tablet (1 mg) by mouth 2 times daily (Patient taking differently: Take 1 mg by mouth 2 times daily. 0900 and 2100)  Taking Differently

## 2025-03-14 NOTE — ED PROVIDER NOTES
Emergency Department Note      History of Present Illness     Chief Complaint   Generalized Weakness      HPI   Opal Sin is a 89 year old female with a history of HFpEF, CKD stage 3, breast cancer, asthma, Parkinson's, hyperlipidemia, and hypertension, recent admission for acute hypoxic respiratory failure thought to be multifactorial in the setting of influenza, asthma exacerbation, possible heart failure exacerbation, who presents to the emergency department with a chief complaint of episode of unresponsiveness as well as increased weakness.  Sister reports that staff had trouble waking patient up this morning and she seemed to be slumped over and more lethargic. Patient notes neck pain however states that this is not new. Reports some difficulty breathing, no chest pain or belly pain. No falls in the past few days. Sister notes legs seem more swollen than prior. Pt confirms she is DNR/DNI although would be amenable to NIPPV.     Independent Historian   Family as detailed above.    Review of External Notes   3/12/25 discharge summary reviewed. Patient admitted on 3/8 for acute hypoxic respiratory failure secondary to influenza A, asthma exacerbation, and HFpEF exacerbation. New onset of atrial fibrillation.   Reviewed echo from 3/10. Poor image quality, probably mildly reduced left ventricular function.   Reviewed paper notes from patient's nursing facility. Patient is DNR/DNI. Comfort focused treatments. Limited antibiotics or artifical nutrition.   Past Medical History   Medical History and Problem List   Primary malignant neoplasm of female breast   Asthenia  Cerebral infarction  Sleep dysfunction  Impingement syndrome of right shoulder  Subdural hematoma  6th nerve palsy  Hypertension  Breast cancer  Carotid aneurysm   Cervical spinal stenosis  CKD Stage 3  GERD  Hyperlipidemia  Asthma  DJD  Parkinson's disease  CVA  CHF  Osteoporosis  HFpEF     Medications   Amlodipine  Aspirin 81 mg  "  Atorvastatin   Carvedilol  Furosemide  Gabapentin  Losartan   Sertraline  Tolterodine    Surgical History   Appendectomy   Breast surgery   Bunionectomy R   Gyn surgery   Head and neck surgery   Insert dorsal column stimulator  Mastectomy   Recession resection  Replacement shoulder socket   Wedge resection lung   TKR  Repair of cerebral aneurysm   Appendectomy   Mastectomy   Back surgeries  Total knee arthroplasty   Physical Exam     Patient Vitals for the past 24 hrs:   BP Temp Temp src Pulse Resp SpO2 Height Weight   03/14/25 1158 (!) 145/105 98.3  F (36.8  C) Temporal 73 16 93 % 1.549 m (5' 1\") 58.3 kg (128 lb 8.5 oz)     Physical Exam  General: Appears chronically ill.  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 Follows commands and grossly moving extremities equally.  CV: Regular rate and rhythm, radial and DP pulses equal  Respiratory: Intermittent deep coarse cough, trace wheezes   Abdomen: Soft, without rigidity or rebound throughout  Extremities: Mild edema bilaterally     Diagnostics     Lab Results   Labs Ordered and Resulted from Time of ED Arrival to Time of ED Departure   COMPREHENSIVE METABOLIC PANEL - Abnormal       Result Value    Sodium 133 (*)     Potassium 4.0      Carbon Dioxide (CO2) 27      Anion Gap 9      Urea Nitrogen 59.3 (*)     Creatinine 1.25 (*)     GFR Estimate 42 (*)     Calcium 9.1      Chloride 97 (*)     Glucose 94      Alkaline Phosphatase 83      AST 15      ALT 9      Protein Total 6.6      Albumin 3.4 (*)     Bilirubin Total 0.4     CBC WITH PLATELETS AND DIFFERENTIAL - Abnormal    WBC Count 10.7      RBC Count 4.14      Hemoglobin 12.3      Hematocrit 35.8      MCV 87      MCH 29.7      MCHC 34.4      RDW 16.3 (*)     Platelet Count 215      % Neutrophils 81      % " Lymphocytes 11      % Monocytes 7      % Eosinophils 0      % Basophils 0      % Immature Granulocytes 1      NRBCs per 100 WBC 0      Absolute Neutrophils 8.7 (*)     Absolute Lymphocytes 1.2      Absolute Monocytes 0.7      Absolute Eosinophils 0.0      Absolute Basophils 0.0      Absolute Immature Granulocytes 0.1      Absolute NRBCs 0.0     ISTAT GASES LACTATE VENOUS POCT - Abnormal    Lactic Acid POCT 1.2      Bicarbonate Venous POCT 29 (*)     O2 Sat, Venous POCT 75      pCO2 Venous POCT 38 (*)     pH Venous POCT 7.49 (*)     pO2 Venous POCT 37      Base Excess/Deficit (+/-) POCT 6.0 (*)    NT PROBNP INPATIENT - Abnormal    N terminal Pro BNP Inpatient 6,754 (*)        Imaging   XR Chest 2 Views   Final Result   IMPRESSION: Similar cardiomegaly and central pulmonary vascular congestion. Trace bilateral pleural fluid. No new focal consolidation or pneumothorax. Shoulder arthroplasties. Electronic device overlies the right chest. Spinal fusion hardware.      Head CT w/o contrast   Final Result   IMPRESSION:   1.  No acute process intracranially.      2.  Procedural changes of stent-assist aneurysmal coiling proximal left cavernous ICA region without evidence for hemorrhage in this region. This was most recently assessed on CT angiogram 03/18/2022.      3.  Chronic ischemic changes deep white matter both cerebral hemispheres noted. Nothing for acute infarction.                EKG   Please see ED course below     Independent Interpretation   CXR: Questionable increased haziness at the right heart border comapred to CXR from 3/8/2025.  CT head no signs of hemorrhage or midline shift     ED Course      Medications Administered   Medications - No data to display    Procedures   Procedures     Discussion of Management   Admitting Hospitalist, Dr Perry    ED Course   ED Course as of 03/15/25 2207   Fri Mar 14, 2025   1234 I initially assessed the patient and obtained the history and physical exam.    1330 pCO2  Venous POCT(!): 38   5788 Spoke with Dr Perry    1510 Reassessed. Patient feels more comfortable on the high flow nasal cannula. Work of breathing is decreased    1513 EKG 12:00  Atrial fibrillation, mild ST depressions in II and V5, V6 improved from EKG from 3/10/2025  Rate 87 QRS 94 QTc 425       Additional Documentation  None    Medical Decision Making / Diagnosis     CMS Diagnoses: None    MIPS       None    MDM   Opal Sin is a 89 year old female with above medical conditions who presents to the emergency department with a chief complaint of decreased responsiveness, generalized weakness, lethargy today.  On examination patient is somnolent however awakens to name stimuli.  She does not actually appear to be confused.  She is able to tell me that she has baseline 6th nerve palsy and I do see this documented in her chart.  She has no unilateral deficits that would be suggestive of an acute CVA.  CT head without any acute findings.  She is tachypneic with increased work of breathing and wheezing, this did improve after receiving nebulizer.  She continues to have a deep coarse cough. CXR read as no new infiltrates.  EKG appears to be unchanged/with improved ST depressions from her prior and she has no chest pain doubt ACS.  She is not retaining CO2 based on her VBG.  She is certainly quite weak and is unable to move herself very much in bed.  At this time UA is still pending.  I think given her generalized weakness home, her increased work of breathing, she will likely need to be admitted to the hospital.  I did give her high flow nasal cannula not in the setting of hypoxia but to provide some PEEP given her increased work of breathing. Discussed with hospitalist Dr Perry who has kindly accepted for admission. She is DNR/DNI.     Disposition   The patient was admitted to the hospital.     Diagnosis     ICD-10-CM    1. Asthma with acute exacerbation, unspecified asthma severity, unspecified  whether persistent  J45.901       2. Somnolence  R40.0       3. Generalized weakness  R53.1            Discharge Medications   New Prescriptions    No medications on file     Scribe Disclosure:  I, Serenity Strong, am serving as a scribe at 12:19 PM on 3/14/2025 to document services personally performed by Radha Fernandez MD based on my observations and the provider's statements to me.      Radha Fernandez MD  03/15/25 7969

## 2025-03-14 NOTE — PROGRESS NOTES
Admission    Patient arrives to room 614 via cart from ER.  Care plan note: Patient alert/CYNTHIA orientation due to patient sleeping. Skin has couple bruises/blanchable redness to coccyx/mepilex applied. Mepilex to heels. Vss, on 2 liters 96%. Lungs coarse.     Inpatient nursing criteria listed below were met:    Did you put disposition on whiteboard and in sticky note: NA  Full skin assessment done (add LDA if skin issue present). Initials of 2nd RN : CHIN/Manasa  Isolation education started/completed Yes  Patient allergies verified with patient: Yes  Fall Risk? (Care plan updated, Education given and documented) Yes  Primary Care Plan initiated: Yes  Home medications documented in belongings flowsheet: No  Patient belongings documented in belongings flowsheet: Yes  Reminder note (belongings/ medications) placed in discharge instructions:Yes  Admission profile/ required documentation complete:Yes  If patient is a 72 hour hold/Commitment are belongings removed from room and locked up? NA

## 2025-03-14 NOTE — ED TRIAGE NOTES
Recently discharge from hospital to NH for influenza. Increased weakness and SOB today     Triage Assessment (Adult)       Row Name 03/14/25 1201          Triage Assessment    Airway WDL WDL        Respiratory WDL    Respiratory WDL X  SOB cough        Cardiac WDL    Cardiac WDL X  Afib        Peripheral/Neurovascular WDL    Peripheral Neurovascular WDL WDL        Cognitive/Neuro/Behavioral WDL    Cognitive/Neuro/Behavioral WDL X  Slow to respond

## 2025-03-15 ENCOUNTER — APPOINTMENT (OUTPATIENT)
Dept: PHYSICAL THERAPY | Facility: CLINIC | Age: 89
End: 2025-03-15
Attending: STUDENT IN AN ORGANIZED HEALTH CARE EDUCATION/TRAINING PROGRAM
Payer: COMMERCIAL

## 2025-03-15 LAB
ALBUMIN UR-MCNC: 100 MG/DL
AMORPH CRY #/AREA URNS HPF: ABNORMAL /HPF
ANION GAP SERPL CALCULATED.3IONS-SCNC: 12 MMOL/L (ref 7–15)
APPEARANCE UR: ABNORMAL
ATRIAL RATE - MUSE: NORMAL BPM
BACTERIA #/AREA URNS HPF: ABNORMAL /HPF
BILIRUB UR QL STRIP: NEGATIVE
BUN SERPL-MCNC: 50.2 MG/DL (ref 8–23)
CALCIUM SERPL-MCNC: 8.8 MG/DL (ref 8.8–10.4)
CHLORIDE SERPL-SCNC: 99 MMOL/L (ref 98–107)
COLOR UR AUTO: ABNORMAL
CREAT SERPL-MCNC: 1 MG/DL (ref 0.51–0.95)
DIASTOLIC BLOOD PRESSURE - MUSE: NORMAL MMHG
EGFRCR SERPLBLD CKD-EPI 2021: 54 ML/MIN/1.73M2
ERYTHROCYTE [DISTWIDTH] IN BLOOD BY AUTOMATED COUNT: 16.1 % (ref 10–15)
GLUCOSE SERPL-MCNC: 154 MG/DL (ref 70–99)
GLUCOSE UR STRIP-MCNC: NEGATIVE MG/DL
HCO3 SERPL-SCNC: 23 MMOL/L (ref 22–29)
HCT VFR BLD AUTO: 34.9 % (ref 35–47)
HGB BLD-MCNC: 12 G/DL (ref 11.7–15.7)
HGB UR QL STRIP: NEGATIVE
INTERPRETATION ECG - MUSE: NORMAL
KETONES UR STRIP-MCNC: NEGATIVE MG/DL
LEUKOCYTE ESTERASE UR QL STRIP: ABNORMAL
MCH RBC QN AUTO: 29.5 PG (ref 26.5–33)
MCHC RBC AUTO-ENTMCNC: 34.4 G/DL (ref 31.5–36.5)
MCV RBC AUTO: 86 FL (ref 78–100)
NITRATE UR QL: NEGATIVE
P AXIS - MUSE: NORMAL DEGREES
PH UR STRIP: 5.5 [PH] (ref 5–7)
PLATELET # BLD AUTO: 173 10E3/UL (ref 150–450)
POTASSIUM SERPL-SCNC: 4 MMOL/L (ref 3.4–5.3)
PR INTERVAL - MUSE: NORMAL MS
QRS DURATION - MUSE: 94 MS
QT - MUSE: 354 MS
QTC - MUSE: 425 MS
R AXIS - MUSE: -18 DEGREES
RBC # BLD AUTO: 4.07 10E6/UL (ref 3.8–5.2)
RBC URINE: 1 /HPF
SODIUM SERPL-SCNC: 134 MMOL/L (ref 135–145)
SP GR UR STRIP: 1.02 (ref 1–1.03)
SQUAMOUS EPITHELIAL: <1 /HPF
SYSTOLIC BLOOD PRESSURE - MUSE: NORMAL MMHG
T AXIS - MUSE: 107 DEGREES
UROBILINOGEN UR STRIP-MCNC: NORMAL MG/DL
VENTRICULAR RATE- MUSE: 87 BPM
WBC # BLD AUTO: 4.7 10E3/UL (ref 4–11)
WBC URINE: 59 /HPF

## 2025-03-15 PROCEDURE — 85027 COMPLETE CBC AUTOMATED: CPT | Performed by: STUDENT IN AN ORGANIZED HEALTH CARE EDUCATION/TRAINING PROGRAM

## 2025-03-15 PROCEDURE — 999N000157 HC STATISTIC RCP TIME EA 10 MIN

## 2025-03-15 PROCEDURE — 99233 SBSQ HOSP IP/OBS HIGH 50: CPT | Performed by: INTERNAL MEDICINE

## 2025-03-15 PROCEDURE — 82435 ASSAY OF BLOOD CHLORIDE: CPT | Performed by: STUDENT IN AN ORGANIZED HEALTH CARE EDUCATION/TRAINING PROGRAM

## 2025-03-15 PROCEDURE — 250N000013 HC RX MED GY IP 250 OP 250 PS 637: Performed by: INTERNAL MEDICINE

## 2025-03-15 PROCEDURE — 36415 COLL VENOUS BLD VENIPUNCTURE: CPT | Performed by: STUDENT IN AN ORGANIZED HEALTH CARE EDUCATION/TRAINING PROGRAM

## 2025-03-15 PROCEDURE — 120N000001 HC R&B MED SURG/OB

## 2025-03-15 PROCEDURE — 97530 THERAPEUTIC ACTIVITIES: CPT | Mod: GP

## 2025-03-15 PROCEDURE — 250N000011 HC RX IP 250 OP 636: Performed by: INTERNAL MEDICINE

## 2025-03-15 PROCEDURE — 81003 URINALYSIS AUTO W/O SCOPE: CPT | Performed by: STUDENT IN AN ORGANIZED HEALTH CARE EDUCATION/TRAINING PROGRAM

## 2025-03-15 PROCEDURE — 97161 PT EVAL LOW COMPLEX 20 MIN: CPT | Mod: GP

## 2025-03-15 PROCEDURE — 250N000011 HC RX IP 250 OP 636: Performed by: STUDENT IN AN ORGANIZED HEALTH CARE EDUCATION/TRAINING PROGRAM

## 2025-03-15 PROCEDURE — 250N000009 HC RX 250: Performed by: STUDENT IN AN ORGANIZED HEALTH CARE EDUCATION/TRAINING PROGRAM

## 2025-03-15 PROCEDURE — 94640 AIRWAY INHALATION TREATMENT: CPT

## 2025-03-15 PROCEDURE — 87186 SC STD MICRODIL/AGAR DIL: CPT | Performed by: STUDENT IN AN ORGANIZED HEALTH CARE EDUCATION/TRAINING PROGRAM

## 2025-03-15 PROCEDURE — 94640 AIRWAY INHALATION TREATMENT: CPT | Mod: 76

## 2025-03-15 PROCEDURE — 84145 PROCALCITONIN (PCT): CPT | Performed by: HOSPITALIST

## 2025-03-15 PROCEDURE — 250N000013 HC RX MED GY IP 250 OP 250 PS 637: Performed by: STUDENT IN AN ORGANIZED HEALTH CARE EDUCATION/TRAINING PROGRAM

## 2025-03-15 PROCEDURE — 999N000111 HC STATISTIC OT IP EVAL DEFER

## 2025-03-15 RX ORDER — FUROSEMIDE 10 MG/ML
20 INJECTION INTRAMUSCULAR; INTRAVENOUS ONCE
Status: COMPLETED | OUTPATIENT
Start: 2025-03-15 | End: 2025-03-15

## 2025-03-15 RX ORDER — SERTRALINE HYDROCHLORIDE 25 MG/1
25 TABLET, FILM COATED ORAL DAILY
Status: DISCONTINUED | OUTPATIENT
Start: 2025-03-15 | End: 2025-03-26 | Stop reason: HOSPADM

## 2025-03-15 RX ADMIN — ACETAMINOPHEN 1000 MG: 500 TABLET, FILM COATED ORAL at 08:07

## 2025-03-15 RX ADMIN — ATORVASTATIN CALCIUM 20 MG: 20 TABLET, FILM COATED ORAL at 08:06

## 2025-03-15 RX ADMIN — AMLODIPINE BESYLATE 10 MG: 5 TABLET ORAL at 08:06

## 2025-03-15 RX ADMIN — TOLTERODINE TARTRATE 1 MG: 1 TABLET ORAL at 20:58

## 2025-03-15 RX ADMIN — FUROSEMIDE 20 MG: 10 INJECTION, SOLUTION INTRAVENOUS at 13:06

## 2025-03-15 RX ADMIN — IPRATROPIUM BROMIDE AND ALBUTEROL SULFATE 3 ML: .5; 3 SOLUTION RESPIRATORY (INHALATION) at 15:28

## 2025-03-15 RX ADMIN — IPRATROPIUM BROMIDE AND ALBUTEROL SULFATE 3 ML: .5; 3 SOLUTION RESPIRATORY (INHALATION) at 07:48

## 2025-03-15 RX ADMIN — ASPIRIN 81 MG: 81 TABLET, COATED ORAL at 08:06

## 2025-03-15 RX ADMIN — BUDESONIDE 0.5 MG: 0.5 INHALANT RESPIRATORY (INHALATION) at 07:48

## 2025-03-15 RX ADMIN — METHYLPREDNISOLONE SODIUM SUCCINATE 62.5 MG: 125 INJECTION, POWDER, FOR SOLUTION INTRAMUSCULAR; INTRAVENOUS at 11:07

## 2025-03-15 RX ADMIN — RALOXIFENE HYDROCHLORIDE 60 MG: 60 TABLET, FILM COATED ORAL at 20:58

## 2025-03-15 RX ADMIN — CARBIDOPA AND LEVODOPA 2 TABLET: 25; 100 TABLET ORAL at 16:36

## 2025-03-15 RX ADMIN — IPRATROPIUM BROMIDE AND ALBUTEROL SULFATE 3 ML: .5; 3 SOLUTION RESPIRATORY (INHALATION) at 19:30

## 2025-03-15 RX ADMIN — PANTOPRAZOLE SODIUM 40 MG: 40 TABLET, DELAYED RELEASE ORAL at 08:07

## 2025-03-15 RX ADMIN — CARBIDOPA AND LEVODOPA 2 TABLET: 25; 100 TABLET ORAL at 10:34

## 2025-03-15 RX ADMIN — TOLTERODINE TARTRATE 1 MG: 1 TABLET ORAL at 08:07

## 2025-03-15 RX ADMIN — BUDESONIDE 0.5 MG: 0.5 INHALANT RESPIRATORY (INHALATION) at 19:30

## 2025-03-15 RX ADMIN — CARBIDOPA AND LEVODOPA 2 TABLET: 25; 100 TABLET ORAL at 20:58

## 2025-03-15 RX ADMIN — CARBIDOPA AND LEVODOPA 2 TABLET: 25; 100 TABLET ORAL at 06:30

## 2025-03-15 RX ADMIN — CARVEDILOL 12.5 MG: 12.5 TABLET, FILM COATED ORAL at 18:25

## 2025-03-15 RX ADMIN — CARVEDILOL 12.5 MG: 12.5 TABLET, FILM COATED ORAL at 08:07

## 2025-03-15 RX ADMIN — SERTRALINE HYDROCHLORIDE 25 MG: 25 TABLET ORAL at 18:24

## 2025-03-15 RX ADMIN — METHYLPREDNISOLONE SODIUM SUCCINATE 62.5 MG: 125 INJECTION, POWDER, FOR SOLUTION INTRAMUSCULAR; INTRAVENOUS at 00:40

## 2025-03-15 RX ADMIN — ACETAMINOPHEN 1000 MG: 500 TABLET, FILM COATED ORAL at 22:08

## 2025-03-15 ASSESSMENT — ACTIVITIES OF DAILY LIVING (ADL)
ADLS_ACUITY_SCORE: 81
ADLS_ACUITY_SCORE: 86
ADLS_ACUITY_SCORE: 81
ADLS_ACUITY_SCORE: 86
ADLS_ACUITY_SCORE: 81
DEPENDENT_IADLS:: CLEANING;COOKING;LAUNDRY;SHOPPING;MEAL PREPARATION;TRANSPORTATION
ADLS_ACUITY_SCORE: 86
ADLS_ACUITY_SCORE: 81
ADLS_ACUITY_SCORE: 86
ADLS_ACUITY_SCORE: 81
ADLS_ACUITY_SCORE: 86
ADLS_ACUITY_SCORE: 81
ADLS_ACUITY_SCORE: 79
ADLS_ACUITY_SCORE: 81

## 2025-03-15 NOTE — PLAN OF CARE
Goal Outcome Evaluation:       DATE & TIME: 3/14/25 [id number]   Cognitive Concerns/ Orientation : A&OX4, [place], forgetful  BEHAVIOR & AGGRESSION TOOL COLOR: Green, calm/cooperative  CIWA SCORE: NA   ABNL VS/O2: VSS on 1l  MOBILITY: Total/lift/w/c bound at baseline  PAIN MANAGMENT: some neck pain-heat applied  DIET: Regular, need some   BOWEL/BLADDER: Incontinent, purewick in place  ABNL LAB/BG: BUN 59.3 Creat 1.25 BNP 6754  DRAIN/DEVICES: NA  TELEMETRY RH/YTHM:   SKIN: Couple bruises/blanchable redness to coccyx (mepilex applied) heels pink (mepilex appllied)  TESTS/PROCEDURES: NA  D/C DAY/GOALS/PLACE: Pending  OTHER IMPORTANT INFO: Pt alert/cooperative, total care, has bilateral hearing aids, takes pills with apple sauce.

## 2025-03-15 NOTE — PLAN OF CARE
OT: Order received, chart reviewed and discussed with care team. OT not indicated due to per chart review, pt was just admitted a few days ago, PT and OT saw at that time and recommended HC follow up in custodial. Pt reports she has assist w/ all ADLs and able to have Ax1 for transfers and WC down to dinning dee. Defer discharge recommendations to medical team, expect pt will be safe to return to custodial when O2 sats stabilize. Will complete orders.

## 2025-03-15 NOTE — PLAN OF CARE
Goal Outcome Evaluation:         Summary: SusanInflfrida Prieto parkinsons  DATE & TIME: 3/14/25-3/15/25 [id number]  Cognitive Concerns/ Orientation : A&OX4, [place], forgetful  BEHAVIOR & AGGRESSION TOOL COLOR: Green, calm/cooperative  CIWA SCORE: NA   ABNL VS/O2: VSS on 1L O2 95-95%  MOBILITY: Total/lift/w/c bound at baseline  PAIN MANAGMENT: Denies  DIET: Regular  BOWEL/BLADDER: Incontinent, purewick in place. Small smear x1  ABNL LAB/BG: BUN 59.3 Creat 1.25 BNP 6754  DRAIN/DEVICES: NA  TELEMETRY RH/YTHM: afib controlled   SKIN: scattered bruises/blanchable redness to coccyx mepilex in place c/d/I, heels pink mepilex in place c/d/i  TESTS/PROCEDURES: NA  D/C DAY/GOALS/PLACE: Pending  OTHER IMPORTANT INFO: Pt alert/cooperative, total care, has bilateral hearing aids, takes pills with apple sauce. UA collected-pending

## 2025-03-15 NOTE — CONSULTS
"  CM team called  phone [facility] Triage Nurse  and Spoke with Sandy who provided the following information:     In what kind [facility] facility [facility] pt [facility]    Name of building/unit: [place]                 2.   Best number to reach facility nursing? Phone [phone]  Fax:  [phone]        Evening/weekend number? [phone]    3.  What is the preferred return time for the resident? Before 4 pm M-F Can patient return on weekend? no       Do you know how they typically transport? Family or Medical Wheelchair transport    4.   Does facility manage medications?   No sister manages medications with patient If yes, contracted pharmacy? Name:          If new Rx meds at discharge, fill at hospital pharmacy or contracted pharmacy? Fill at hospital or listed pharmacy      5.   What is pt's baseline cognition and mobility? Assist of 1 with walker and wheelchair for meals          What level of cognition/mobility is required for safe return? AxOx3/baseline mobility    6.  Is resident enrolled in any \"services?\"  yes  If so, what services:  assistance to the bathroom, bathing, dressing, meals, house keeping and laundry.      7.  Are \"skilled home health\" or \"on-site outpatient therapy services\" available there?YES name/agency, if        known: [facility]    8.   Is the resident seen by PCP: on-site   Name: [facility], hasn't seen at facility yet, prior PCP    Damian Russ [phone] [address], [facility] [address]     9.   Does pt have any personal DME (describe)? Yes walker and wheelchair.  At [facility] they are grab bars in the shower, bathroom.)   Care Management Initial Consult    General Information  Assessment completed with: VM-chart review, Care Team Member,         Primary Care Provider verified and updated as needed:     Readmission within the last 30 days: unable to assess      Reason for Consult: discharge planning  Advance Care " Planning: Advance Care Planning Reviewed: present on chart          Communication Assessment  Patient's communication style: spoken language (English or Bilingual)    Hearing Difficulty or Deaf: yes   Wear Glasses or Blind: yes    Cognitive  Cognitive/Neuro/Behavioral: .WDL except  Level of Consciousness: intermittent confusion  Arousal Level: opens eyes spontaneously  Orientation: disoriented to, time  Mood/Behavior: cooperative, calm  Best Language: 0 - No aphasia  Speech: clear, spontaneous    Living Environment:   People in home: alone     Current living Arrangements: assisted living      Able to return to prior arrangements: other (see comments) (not sure at this time)       Family/Social Support:  Care provided by: self, homecare agency  Provides care for: no one, unable/limited ability to care for self     Support system: Sibling(s)          Description of Support System: Supportive         Current Resources:   Patient receiving home care services: No        Community Resources: None  Equipment currently used at home: wheelchair, manual, walker, standard  Supplies currently used at home:      Employment/Financial:  Employment Status: retired        Financial Concerns: none           Does the patient's insurance plan have a 3 day qualifying hospital stay waiver?  Yes     Which insurance plan 3 day waiver is available? Alternative insurance waiver    Will the waiver be used for post-acute placement? Undetermined at this time    Lifestyle & Psychosocial Needs:  Social Drivers of Health     Food Insecurity: Low Risk  (3/15/2025)    Food Insecurity     Within the past 12 months, did you worry that your food would run out before you got money to buy more?: No     Within the past 12 months, did the food you bought just not last and you didn t have money to get more?: No   Depression: Not at risk (7/12/2024)    PHQ-2     PHQ-2 Score: 0   Housing Stability: Low Risk  (3/15/2025)    Housing Stability     Do you have  housing? : Yes     Are you worried about losing your housing?: No   Tobacco Use: Low Risk  (8/23/2024)    Patient History     Smoking Tobacco Use: Never     Smokeless Tobacco Use: Never     Passive Exposure: Not on file   Financial Resource Strain: Low Risk  (3/15/2025)    Financial Resource Strain     Within the past 12 months, have you or your family members you live with been unable to get utilities (heat, electricity) when it was really needed?: No   Alcohol Use: Not At Risk (7/26/2023)    AUDIT-C     Frequency of Alcohol Consumption: Never     Average Number of Drinks: Patient does not drink     Frequency of Binge Drinking: Never   Transportation Needs: Low Risk  (3/15/2025)    Transportation Needs     Within the past 12 months, has lack of transportation kept you from medical appointments, getting your medicines, non-medical meetings or appointments, work, or from getting things that you need?: No   Physical Activity: Inactive (7/11/2024)    Exercise Vital Sign     Days of Exercise per Week: 0 days     Minutes of Exercise per Session: 0 min   Interpersonal Safety: Low Risk  (3/14/2025)    Interpersonal Safety     Do you feel physically and emotionally safe where you currently live?: Yes     Within the past 12 months, have you been hit, slapped, kicked or otherwise physically hurt by someone?: No     Within the past 12 months, have you been humiliated or emotionally abused in other ways by your partner or ex-partner?: No   Stress: No Stress Concern Present (7/11/2024)    [employer] [place] [employer] - Occupational Stress Questionnaire     Feeling of Stress : Only a little   Social Connections: Unknown (7/11/2024)    Social Connection and Isolation Panel [NHANES]     Frequency of Communication with Friends and Family: Not on file     Frequency of Social Gatherings with Friends and Family: More than three times a week     Attends Mu-ism Services: Not on file     Active Member of Clubs or  Organizations: Not on file     Attends Club or Organization Meetings: Not on file     Marital Status: Not on file   Health Literacy: Not on file       Functional Status:  Prior to admission patient needed assistance:   Dependent ADLs:: Ambulation-walker, Wheelchair-with assist, Bathing, Dressing, Toileting, Transfers  Dependent IADLs:: Cleaning, Cooking, Laundry, Shopping, Meal Preparation, Transportation       Mental Health Status:  none    Chemical Dependency Status:   none          Values/Beliefs:  Spiritual, Cultural Beliefs, Druze Practices, Values that affect care: no               Discussed  Partnership in Safe Discharge Planning  document with patient/family: No    Additional Information:  Writer did chart review as patient was resting, she was just discharged last week.  Confirmed information with on call triage nurse Sandy.  Patient [facility] with other residents, there are no stairs in facility which patient uses.  Normally patient is dependent with most ADL/iADL's. She uses a wheelchair to the dining room.  She needs a assist of 1 with all transfers.  She is supposed to have switched to [facility] as of last admission yet has not been seen by them yet according to Triage nurse Sandy.    Her prior PCP is    Damian Russ [phone] [address], [facility] [address]   Pt will need resumption of homecare for PT/OT at discharge.    Next Steps: Await PT eval this admission. Follow patient for discharge needs.        Coral Tao, Care Management RN, EDITH  [facility] - FLOAT   Contact: via Yeimi

## 2025-03-15 NOTE — PROGRESS NOTES
[facility]    Medicine Progress Note - Hospitalist Service    Date of Admission:  3/14/2025    Assessment & Plan   Opal Sin is a 89 year old female with a past medical history significant for HFpEF, CKD stage 3, breast cancer, asthma, Parkinson's, hyperlipidemia, and hypertension who generalized weakness and fatigue.      Of note, patient was recently admitted at [facility] from 3/8/25-3/12/25 for acute hypoxic respiratory 2/2 Influenza A, heart failure exacerbation and Asthma exacerbation. Treated with Tamiflu, IV lasix and steroids. She also developed new onset Afib on 3/10/25. She was given 15mg of IV diltiazem and was rate controlled after that. She was weaned off oxygen on 3/12/25 and discharged backto her [facility] on 3/12/25.      Possible Metabolic Encephalopathy   Possible Asthma Exacerbation  Generalized Weakness   Increased Fatigue   Presented with increased weakness and fatigue 2 days after recent hospitalizations. In the ED, she was VSS other than being hypertensive. Labs showing a Cr of 1.25, BNP of 6,000, pH of 7.49 with Co2 of 38. Per ED provider patient was sounding wheezy and tachypnea. She was given duonebs, solumedrol and a small fluid bolus. She was placed on Hiflow given her tachypnea and not due to hypoxia. Per ED provider, patient looks much more comfortable with high flow on.      - Blood Cx: pending   - UA normal.  Urine culture pending.  Patient has multiple allergies to antibiotics will continue to monitor urine culture for now we will holding off on antibiotics     - Spo2 goal > 90%                - Hi-flow due to work of breathing and tachypnea                - Wean as tolerated      - Continue Duonebs and Pulmicort nebs   - Started on  Solumedrol 60mg Q12H   Patient has coarse breath sounds and appears to be fluid overloaded.  20 mg of IV Lasix given on 3/15/2025  Restart PTA Lasix 40 mg p.o. daily on 3/16/2025     - PT/OT consulted  - SW  consulted                 - Per pharmacy: family states that senior living is unable to administer meds and she was not receiving her nebs. Patient is in charge of taking her meds. Family helps set this up weekly.         MICHAEL on CKD 3  Received 250cc bolus in the ED.   - Cr: 1.25               - Baseline: 0.8-1.1   -Creatinine at 1 on 3/15/2025     HFpEF   On admitting exam, patient looks dry though her BNP is elevated and has some slight faint bibasilar crackles on exam. May have some component of fluid overload.  - TTE 5/2/2024: EF 50-55%, grade 2 diastolic dysfunction  - TTE 3/2025: Echo was poor quality.  Possibly minimally reduced EF      - Tele  - Strict I/Os and daily weights   Patient has coarse breath sounds in looks fluid overloaded  20 mg of IV Lasix ordered on 3/15/2025  Restart PTA Lasix to 40 mg p.o. daily on 3/16/2025       Goals of Care   Family and patient confirmed with ED provider that patient is DNR/DNI and would prefer comfort focused treatment. She is okay with BIPAP or high flow mask. Discussed with family about comfort care/hospice. They are open to hearing more about this but would like to wait on Palliative consult at this time. They would like to see if the patient wakes up more to see what she thinks.   - Continue discussions after patient and family has more discussions about goals of care further  - SW consulted      Mild Hyponatremia  Has intermittently low in the past  - Na: 133  - Continue to monitor     Recent Influenza A Infection   Flu A PCR 3/8/2025: Positive. Was treated with Tamiflu.   - Noted      Recent New Onset Afib with RVR: Currently Rate Controlled   Noted to have heart rates in to the 120s on 3/10/25 during her last admission.  EKG done and confirmed atrial fibrillation.  She was given one time dose of  IV Diltiazem 15 mg. Has been rate controlled since. Anticoagulation was discussed but due to her history of brain aneurysm s/p coiling, anticoagulation was deferred.   - Tele  -  Continue home Coreg 12.5mg BID with hold parameters      Hypertension   - Hold losartan 100 mg daily due to MICHAEL  - Continue amlodipine 10 mg daily, carvedilol 12.5 mg twice daily     Hyperlipidemia   - Continue atorvastatin 20 mg daily     Parkinson's Disease   - Continue Sinemet  mg 4 times daily     GERD   - Continue omeprazole 20 mg daily     History of Breast Cancer   - Continue raloxifene 60 mg daily     Depression   - Continue  Zoloft 25 mg daily     Peripheral Neuropathy   - Hold gabapentin 100 mg twice daily      History of Overactive Bladder   - Continue tolterodin        Clinically Significant Risk Factors Present on Admission         # Hyponatremia: Lowest Na = 133 mmol/L in last 2 days, will monitor as appropriate  # Hypochloremia: Lowest Cl = 97 mmol/L in last 2 days, will monitor as appropriate      # Hypoalbuminemia: Lowest albumin = 3.4 g/dL at 3/14/2025 12:04 PM, will monitor as appropriate   # Drug Induced Platelet Defect: home medication list includes an antiplatelet medication   # Hypertension: Noted on problem list     # Chronic heart failure with preserved ejection fraction: heart failure noted on problem list and last echo with EF >50%               # Financial/Environmental Concerns:    # Asthma: noted on problem list           Diet: Combination Diet Regular Diet Adult    DVT Prophylaxis: Pneumatic Compression Devices  [place] Catheter: Not present  Lines: None     Cardiac Monitoring: ACTIVE order. Indication: Afib  Code Status: No CPR- Do NOT Intubate      Clinically Significant Risk Factors Present on Admission         # Hyponatremia: Lowest Na = 133 mmol/L in last 2 days, will monitor as appropriate  # Hypochloremia: Lowest Cl = 97 mmol/L in last 2 days, will monitor as appropriate      # Hypoalbuminemia: Lowest albumin = 3.4 g/dL at 3/14/2025 12:04 PM, will monitor as appropriate   # Drug Induced Platelet Defect: home medication list includes an antiplatelet medication   #  Hypertension: Noted on problem list  # Acute heart failure with preserved ejection fraction: heart failure noted on problem list, last echo with EF >50%, and receiving IV diuretics              # Financial/Environmental Concerns:    # Asthma: noted on problem list        Social Drivers of Health    Physical Activity: Inactive (7/11/2024)    Exercise Vital Sign     Days of Exercise per Week: 0 days     Minutes of Exercise per Session: 0 min   Social Connections: Unknown (7/11/2024)    Social Connection and Isolation Panel [NHANES]     Frequency of Social Gatherings with Friends and Family: More than three times a week          Disposition Plan     Medically Ready for Discharge: Anticipated in 2-4 Days             Judith Combs MD  Hospitalist Service  [facility]  Securely message with [employer] (more info)  Text page via [employer] Paging/Directory   ______________________________________________________________________    Interval History   Chart reviewed.  Discussed with bedside bedside RN    Patient resting comfortably in bed.  Denies any shortness of breath currently.  Occasional cough.  On 1 L of oxygen by nasal cannula currently.  no other acute issues    Physical Exam   Vital Signs: Temp: 98.7  F (37.1  C) Temp src: Axillary BP: 117/70 Pulse: 75   Resp: 17 SpO2: 96 % O2 Device: Nasal cannula Oxygen Delivery: 1 LPM  Weight: 128 lbs 8.45 oz    General Appearance: Alert awake, resting comfortably in bed, not in acute distress  Respiratory: Bilateral coarse breath sounds with basilar crackles heard on auscultation  Cardiovascular: Normal rate rhythm regular  GI: Soft, nontender nondistended bowel sounds positive  Skin: Warm and dry  Other:      Medical Decision Making       52 MINUTES SPENT BY ME on the date of service doing chart review, history, exam, documentation & further activities per the note.      Data     I have personally reviewed the following data over the past 24 hrs:    4.7  \    12.0   / 173     134 (L) 99 50.2 (H) /  154 (H)   4.0 23 1.00 (H) \     Trop: N/A BNP: N/A       Imaging results reviewed over the past 24 hrs:   Recent Results (from the past 24 hours)   XR Chest 2 Views    Narrative    EXAM: XR CHEST 2 VIEWS  LOCATION: [facility]  DATE: 3/14/2025    INDICATION: Fever  COMPARISON: Chest radiograph 3/8/2025.      Impression    IMPRESSION: Similar cardiomegaly and central pulmonary vascular congestion. Trace bilateral pleural fluid. No new focal consolidation or pneumothorax. Shoulder arthroplasties. Electronic device overlies the right chest. Spinal fusion hardware.

## 2025-03-15 NOTE — PROGRESS NOTES
"   03/15/25 0742   Appointment Info   Signing Clinician's Name / Credentials (PT) Tracie Alberto, DPPATRICIA   Living Environment   Current Living Arrangements assisted living   Home Accessibility no concerns   Living Environment Comments Pt reports she lives alone in an longterm. Per chart review: \"Family and patient confirmed with ED provider that patient is DNR/DNI and would prefer comfort focused treatment. She is okay with BIPAP or high flow mask. Discussed with family about comfort care/hospice. They are open to hearing more about this but would like to wait on Palliative consult at this time. They would like to see if the patient wakes up more to see what she thinks. \"   Self-Care   Usual Activity Tolerance fair   Current Activity Tolerance poor   Equipment Currently Used at Home wheelchair, manual;walker, standard   Fall history within last six months yes   Number of times patient has fallen within last six months 3   Activity/Exercise/Self-Care Comment Pt reports she receives receives assist with most of her ADLs including grooming, dressing, showering. Per chart review: \" Per pharmacy: family states that [facility] is unable to administer meds and she was not receiving her nebs. Patient is in charge of taking her meds. Family helps set this up weekly.\"   General Information   Onset of Illness/Injury or Date of Surgery 03/14/25   Referring Physician Darlin Long MD   Patient/Family Therapy Goals Statement (PT) improve mobility   Pertinent History of Current Problem (include personal factors and/or comorbidities that impact the POC) Per chart review: \"89 year old female with a past medical history significant for HFpEF, CKD stage 3, breast cancer, asthma, Parkinson's, hyperlipidemia, and hypertension who generalized weakness and fatigue.      Of note, patient was recently admitted at [facility] from 3/8/25-3/12/25 for acute hypoxic respiratory 2/2 Influenza A, heart failure exacerbation and Asthma " Pt with high phq4 screening, confirmed with nurse pt to be seen with [facility] psych liaison.      Sg Wolf RN, BSN   [id number] "exacerbation. Treated with Tamiflu, IV lasix and steroids. She also developed new onset Afib on 3/10/25. She was given 15mg of IV diltiazem and was rate controlled after that. She was weaned off oxygen on 3/12/25 and discharged backto her group home on 3/12/25.    \"   Existing Precautions/Restrictions fall;oxygen therapy device and L/min   General Observations Pt in chair upon arrival. Pt agreeable to PT.   Cognition   Affect/Mental Status (Cognition) WFL;flat/blunted affect   Orientation Status (Cognition) oriented x 4   Cognitive Status Comments Pt able to follow multi-step commands   Pain Assessment   Patient Currently in Pain Yes, see Vital Sign flowsheet  (L arm, B heels, perineal area (reports this with RN attempted purewick placement))   Integumentary/Edema   Integumentary/Edema Comments WFL   Posture    Posture Forward head position;Protracted shoulders;Kyphosis   Range of Motion (ROM)   ROM Comment WFL   Strength (Manual Muscle Testing)   Strength Comments Global weakness. 3-/5 L hip flexion and knee ext. Globally anti-gravity in functional mvmt.   Transfers   Transfers toilet transfer   Transfer Safety Concerns Noted decreased step length   Impairments Contributing to Impaired Transfers impaired balance;impaired postural control;decreased strength   Comment, (Transfers) Chauncey x2 with HHS from chair to commode   Gait/Stairs (Locomotion)   [place] Level (Gait) minimum assist (75% patient effort);2 person assist   Assistive Device (Gait) walker, standard   Distance in Feet (Gait) 2'   Deviations/Abnormal Patterns (Gait) verenice decreased;stride length decreased;festinating/shuffling   Comment, (Gait/Stairs) Pt amb 2' from chair to commode with small, shuffling steps -- with FWW and Chauncey x2   Balance   Balance Comments Balance deficits noted in gait   Sensory Examination   Sensory Perception Comments Pt reports full sensation, brief BLE screen   Clinical Impression   Criteria for Skilled Therapeutic Intervention " "Yes, treatment indicated   PT Diagnosis (PT) impaired mobility   Influenced by the following impairments activity tolerance, strength, balance   Functional limitations due to impairments ambulation, transfers   Clinical Presentation (PT Evaluation Complexity) stable   Clinical Presentation Rationale clinical judgment   Clinical Decision Making (Complexity) low complexity   Planned Therapy Interventions (PT) balance training;bed mobility training;home exercise program;manual therapy techniques;neuromuscular re-education;patient/family education;postural re-education;ROM (range of motion);strengthening;progressive activity/exercise;transfer training;risk factor education;home program guidelines   Risk & Benefits of therapy have been explained evaluation/treatment results reviewed;care plan/treatment goals reviewed;risks/benefits reviewed;participants included;patient   Clinical Impression Comments Largely limited by weakness   PT Total Evaluation Time   PT Eval, Low Complexity Minutes (23467) 10   Physical Therapy Goals   PT Frequency Daily   PT Predicted Duration/Target Date for Goal Attainment 03/19/25   PT Goals Gait;Transfers;Bed Mobility   PT: Bed Mobility Minimal assist;Supine to/from sit   PT: Transfers Minimal assist;Bed to/from chair;Assistive device;Sit to/from stand   PT: Gait Modified independent;Rolling walker;100 feet   Interventions   Interventions Quick Adds Therapeutic Activity   Therapeutic Procedure/Exercise   Ther. Procedure: strength, endurance, ROM, flexibillity Minutes (79493) 3   Treatment Detail/Skilled Intervention Pt performed STS x3 and attempted seated marches LAQs. Difficulty performing on LLE. Pt reports that she already had HHPT all set up prior to re-admission, writer encouraged pt to continue with this. Pt is well-motivated to get stronger \"I'd like to be more independent.\"   Therapeutic Activity   Therapeutic Activities: dynamic activities to improve functional performance Minutes " "(24144) 25   Symptoms Noted During/After Treatment Fatigue   Treatment Detail/Skilled Intervention Pt on 1L O2. After initial transfer to commode, pt requiring CGA for standing x1 min with dependence for wiping. Pt progressing to transfer from commode to chair with 1 HHS and Chauncey x1, demonstrating small shuffling steps. Pt STS x 2 with FWW and minAx1. Cues for hand placement. Pt ambulated forward 4' with FWW and Chauncey x2. Cueing for weight shift as pt tends to drag her LLE, cues to bring L foot along. Decreased verenice. Pt demonstrates mild imbalance with turning, requiring assist with turning the walker. Difficulty with full turn in the confined space therefore pt ambulating backwards toward chair x1', cues to wait until she can feel the chair at the back of her legs and cues for hand placement. Did discuss d/c planning with pt - current recommendation being [facility] as she appears close to her baseline. Pt understanding and declines questions or concerns other than \"to get stronger.\" Pt in chair with alarm on and call light within reach at end of session.   PT Discharge Planning   PT Plan progress strength, transfers, ambulation, activity tolerance, initiate HEP, assess bed mobility   PT Discharge Recommendation (DC Rec) home with assist;home with home care physical therapy;Transitional Care Facility   PT Rationale for DC Rec Pt is currently below baseline level of mobility. Pt was able to progress from modA to Chauncey x1 with commode to toilet transfer during the session. 7' total of gait today. Pt reports that at baseline, she is typically in her w/c but occasionally ambulate to her bathroom if able with use of walker. Pt will continue to benefit  If [facility] staff can provide Ax1 for all transfers and w/c for any distance of amb, could return home with HHPT. If unable to have this level of assist, would need TCU.   PT Brief overview of current status Ax1 with FWW; Goals of therapy will be to address safe mobility and make " recs for d/c to next level of care. Pt and RN will continue to follow all falls risk precautions as documented by RN staff while hospitalized.   PT Total Distance Amb During Session (feet) 7   Physical Therapy Time and Intention   Timed Code Treatment Minutes 28   Total Session Time (sum of timed and untimed services) 38

## 2025-03-15 NOTE — PLAN OF CARE
Goal Outcome Evaluation:    Summary: WeaknessInflu MARIO Prieto parkinsons  DATE & TIME: 3/14/25-3/15/25 [id number]  Cognitive Concerns/ Orientation : A&OX3, [place], forgetful  BEHAVIOR & AGGRESSION TOOL COLOR: Green, calm/cooperative  CIWA SCORE: NA   ABNL VS/O2: VSS on 1L O2 95-95%  MOBILITY: Total/lift/w/c bound at baseline, Up in chair for most of shift, able to take a few steps and get to bedside commode  PAIN MANAGMENT: Denies  DIET: Regular  BOWEL/BLADDER: Incontinent, purewick in place. BM x1  ABNL LAB/BG: BUN 59.3 Creat 1.00 BNP 6754  DRAIN/DEVICES: NA  TELEMETRY RH/YTHM: Afib CVR  SKIN: Scattered bruises/blanchable redness to coccyx mepilex in place  TESTS/PROCEDURES: NA  D/C DAY/GOALS/PLACE: Pending  OTHER IMPORTANT INFO: Pt alert/cooperative, total care, has bilateral hearing aids, takes pills with apple sauce. Urine culture pending. One time dose of IV Lasix's given

## 2025-03-16 ENCOUNTER — APPOINTMENT (OUTPATIENT)
Dept: PHYSICAL THERAPY | Facility: CLINIC | Age: 89
End: 2025-03-16
Payer: COMMERCIAL

## 2025-03-16 LAB
BACTERIA UR CULT: ABNORMAL
PROCALCITONIN SERPL IA-MCNC: 0.08 NG/ML

## 2025-03-16 PROCEDURE — 120N000001 HC R&B MED SURG/OB

## 2025-03-16 PROCEDURE — 250N000011 HC RX IP 250 OP 636: Mod: JW | Performed by: STUDENT IN AN ORGANIZED HEALTH CARE EDUCATION/TRAINING PROGRAM

## 2025-03-16 PROCEDURE — 250N000013 HC RX MED GY IP 250 OP 250 PS 637: Performed by: INTERNAL MEDICINE

## 2025-03-16 PROCEDURE — 250N000013 HC RX MED GY IP 250 OP 250 PS 637: Performed by: STUDENT IN AN ORGANIZED HEALTH CARE EDUCATION/TRAINING PROGRAM

## 2025-03-16 PROCEDURE — 94640 AIRWAY INHALATION TREATMENT: CPT

## 2025-03-16 PROCEDURE — 999N000157 HC STATISTIC RCP TIME EA 10 MIN

## 2025-03-16 PROCEDURE — 250N000009 HC RX 250: Performed by: STUDENT IN AN ORGANIZED HEALTH CARE EDUCATION/TRAINING PROGRAM

## 2025-03-16 PROCEDURE — 97530 THERAPEUTIC ACTIVITIES: CPT | Mod: GP

## 2025-03-16 PROCEDURE — 99232 SBSQ HOSP IP/OBS MODERATE 35: CPT | Performed by: HOSPITALIST

## 2025-03-16 PROCEDURE — 94640 AIRWAY INHALATION TREATMENT: CPT | Mod: 76

## 2025-03-16 RX ORDER — PREDNISONE 20 MG/1
40 TABLET ORAL DAILY
Status: DISCONTINUED | OUTPATIENT
Start: 2025-03-17 | End: 2025-03-17

## 2025-03-16 RX ADMIN — TOLTERODINE TARTRATE 1 MG: 1 TABLET ORAL at 21:02

## 2025-03-16 RX ADMIN — ACETAMINOPHEN 1000 MG: 500 TABLET, FILM COATED ORAL at 09:27

## 2025-03-16 RX ADMIN — CARBIDOPA AND LEVODOPA 2 TABLET: 25; 100 TABLET ORAL at 21:02

## 2025-03-16 RX ADMIN — SERTRALINE HYDROCHLORIDE 25 MG: 25 TABLET ORAL at 09:27

## 2025-03-16 RX ADMIN — CARBIDOPA AND LEVODOPA 2 TABLET: 25; 100 TABLET ORAL at 11:07

## 2025-03-16 RX ADMIN — TOLTERODINE TARTRATE 1 MG: 1 TABLET ORAL at 09:27

## 2025-03-16 RX ADMIN — IPRATROPIUM BROMIDE AND ALBUTEROL SULFATE 3 ML: .5; 3 SOLUTION RESPIRATORY (INHALATION) at 20:18

## 2025-03-16 RX ADMIN — METHYLPREDNISOLONE SODIUM SUCCINATE 62.5 MG: 125 INJECTION, POWDER, FOR SOLUTION INTRAMUSCULAR; INTRAVENOUS at 11:07

## 2025-03-16 RX ADMIN — METHYLPREDNISOLONE SODIUM SUCCINATE 62.5 MG: 125 INJECTION, POWDER, FOR SOLUTION INTRAMUSCULAR; INTRAVENOUS at 00:19

## 2025-03-16 RX ADMIN — ACETAMINOPHEN 1000 MG: 500 TABLET, FILM COATED ORAL at 18:29

## 2025-03-16 RX ADMIN — CARBIDOPA AND LEVODOPA 2 TABLET: 25; 100 TABLET ORAL at 06:37

## 2025-03-16 RX ADMIN — IPRATROPIUM BROMIDE AND ALBUTEROL SULFATE 3 ML: .5; 3 SOLUTION RESPIRATORY (INHALATION) at 14:57

## 2025-03-16 RX ADMIN — AMLODIPINE BESYLATE 10 MG: 5 TABLET ORAL at 09:27

## 2025-03-16 RX ADMIN — CARVEDILOL 12.5 MG: 12.5 TABLET, FILM COATED ORAL at 09:27

## 2025-03-16 RX ADMIN — CARVEDILOL 12.5 MG: 12.5 TABLET, FILM COATED ORAL at 18:25

## 2025-03-16 RX ADMIN — BUDESONIDE 0.5 MG: 0.5 INHALANT RESPIRATORY (INHALATION) at 09:29

## 2025-03-16 RX ADMIN — BUDESONIDE 0.5 MG: 0.5 INHALANT RESPIRATORY (INHALATION) at 20:18

## 2025-03-16 RX ADMIN — ATORVASTATIN CALCIUM 20 MG: 20 TABLET, FILM COATED ORAL at 09:27

## 2025-03-16 RX ADMIN — FUROSEMIDE 40 MG: 40 TABLET ORAL at 09:27

## 2025-03-16 RX ADMIN — IPRATROPIUM BROMIDE AND ALBUTEROL SULFATE 3 ML: .5; 3 SOLUTION RESPIRATORY (INHALATION) at 09:29

## 2025-03-16 RX ADMIN — CARBIDOPA AND LEVODOPA 2 TABLET: 25; 100 TABLET ORAL at 16:28

## 2025-03-16 RX ADMIN — RALOXIFENE HYDROCHLORIDE 60 MG: 60 TABLET, FILM COATED ORAL at 21:02

## 2025-03-16 RX ADMIN — PANTOPRAZOLE SODIUM 40 MG: 40 TABLET, DELAYED RELEASE ORAL at 09:27

## 2025-03-16 ASSESSMENT — ACTIVITIES OF DAILY LIVING (ADL)
ADLS_ACUITY_SCORE: 82
ADLS_ACUITY_SCORE: 81
ADLS_ACUITY_SCORE: 82
ADLS_ACUITY_SCORE: 81
ADLS_ACUITY_SCORE: 82
ADLS_ACUITY_SCORE: 81
ADLS_ACUITY_SCORE: 82

## 2025-03-16 NOTE — PLAN OF CARE
Goal Outcome Evaluation:       Shift Summary [id number]    A&Ox4. VSS on room air. Up AO2 GBW. Discharge pending.

## 2025-03-16 NOTE — PLAN OF CARE
Goal Outcome Evaluation:    Summary: Weakness, Influ A. Hx parkinsons  DATE & TIME: 3/16/25 [id number]  Cognitive Concerns/ Orientation : A&OX3, [place], forgetful  BEHAVIOR & AGGRESSION TOOL COLOR: Green, calm/cooperative  CIWA SCORE: NA   ABNL VS/O2: VSS on room air, weaned off of 1L  MOBILITY: Total/lift/w/c bound at baseline, up in chair for most of day  PAIN MANAGMENT: Denies  DIET: Regular, good appetite  BOWEL/BLADDER: Incontinent, purewick in place.  ABNL LAB/BG: BUN 59.3 Creat 1.00 BNP 6754, urine culture growing enterococcus faecalis   DRAIN/DEVICES: R PIV SL  SKIN: Scattered bruises/blanchable redness to coccyx mepilex in place  TESTS/PROCEDURES: NA  D/C DAY/GOALS/PLACE: Pending  OTHER IMPORTANT INFO: Pt alert/cooperative, total care, has bilateral hearing aids, takes pills with apple sauce. ID consulted due to results of urine culture

## 2025-03-16 NOTE — PLAN OF CARE
Goal Outcome Evaluation:  Summary: Klaudia Prieto parkinsons  DATE & TIME: 3/15/25 [id number]  Cognitive Concerns/ Orientation : A&OX3, [place], forgetful  BEHAVIOR & AGGRESSION TOOL COLOR: Green, calm/cooperative  CIWA SCORE: NA   ABNL VS/O2: VSS on 1L O2   MOBILITY: Total/lift/w/c bound at baseline  PAIN MANAGMENT:PRN tylenol given for shoulder pain - effective  DIET: Regular- tolerated, strict I & O's  BOWEL/BLADDER: Incontinent, purewick changed. BM x1  ABNL LAB/BG: See results  DRAIN/DEVICES: NA  TELEMETRY RH/YTHM: Afib CVR  SKIN: Scattered bruises/blanchable redness to coccyx mepilex changed  TESTS/PROCEDURES: NA  D/C DAY/GOALS/PLACE: Pending  OTHER IMPORTANT INFO:  Takes pills with apple sauce.

## 2025-03-16 NOTE — PLAN OF CARE
Goal Outcome Evaluation:         Summary: Klaudia Prieto parkinsons  DATE & TIME: 3/14/25-3/15/25 8801-5305  Cognitive Concerns/ Orientation : A&OX3, [place], forgetful  BEHAVIOR & AGGRESSION TOOL COLOR: Green, calm/cooperative  CIWA SCORE: NA   ABNL VS/O2: VSS on 1L O2  MOBILITY: Total/lift/w/c bound at baseline  PAIN MANAGMENT: Denies  DIET: Regular  BOWEL/BLADDER: Incontinent, purewick in place.  ABNL LAB/BG: BUN 59.3 Creat 1.00 BNP 6754  DRAIN/DEVICES: NA  TELEMETRY RH/YTHM: Afib CVR  SKIN: Scattered bruises/blanchable redness to coccyx mepilex in place  TESTS/PROCEDURES: NA  D/C DAY/GOALS/PLACE: Pending  OTHER IMPORTANT INFO: Pt alert/cooperative, total care, has bilateral hearing aids, takes pills with apple sauce.

## 2025-03-16 NOTE — PROGRESS NOTES
Droplet isolation removed has been 7 days since onset of symptoms and pt has been afebrile and no cough noted.

## 2025-03-16 NOTE — PROGRESS NOTES
[facility]    Medicine Progress Note - Hospitalist Service    Date of Admission:  3/14/2025    Assessment & Plan   Opal Sin is a 89 year old female with a past medical history significant for HFpEF, CKD stage 3, breast cancer, asthma, Parkinson's, hyperlipidemia, and hypertension who generalized weakness and fatigue.      Of note, patient was recently admitted at [facility] from 3/8/25-3/12/25 for acute hypoxic respiratory 2/2 Influenza A, heart failure exacerbation and Asthma exacerbation. Treated with Tamiflu, IV lasix and steroids. She also developed new onset Afib on 3/10/25. She was given 15mg of IV diltiazem and was rate controlled after that. She was weaned off oxygen on 3/12/25 and discharged backto her [facility] on 3/12/25.      Possible Metabolic Encephalopathy   ?Enterococcus faecalis UTI  Possible Asthma Exacerbation  Generalized Weakness   Increased Fatigue   Presented with increased weakness and fatigue 2 days after recent hospitalization. In the ED, she was VSS other than being hypertensive. Labs showing a Cr of 1.25, BNP of 6,000, pH of 7.49 with Co2 of 38. Per ED provider patient was sounding wheezy and tachypneic. She was given duonebs, solumedrol and a small fluid bolus. She was placed on Hiflow given her tachypnea and not due to hypoxia. Per ED provider, patient looks much more comfortable with high flow on.      - Blood Cx: pending   - UA normal.  Urine culture with Enterococcus faecalis.  Patient has multiple allergies to antibiotics [states that she can only tolerate Z-Chay] .  Holding off on antibiotics at this time given multiple allergies and also noted clinical improvement in her mentation without any antibiotics.  Patient also reiterates conservative approach to her care.  Consult ID also for guidance.     - Spo2 goal > 90%                - Hi-flow due to work of breathing and tachypnea-has been weaned off to room air on 3/16.     - Continue Duonebs  and Pulmicort nebs   - Started on  Solumedrol 60mg [id number] to oral prednisone from 3/17.  Patient has coarse breath sounds and appears to be fluid overloaded.  20 mg of IV Lasix given on 3/15/2025  Restart PTA Lasix 40 mg p.o. daily on 3/16/2025     - PT/OT consulted  - SW consulted                 - Per pharmacy: family states that nursing home is unable to administer meds and she was not receiving her nebs. Patient is in charge of taking her meds. Family helps set this up weekly.         MICHAEL on CKD 3  Received 250cc bolus in the ED.   - Cr: 1.25               - Baseline: 0.8-1.1   -Creatinine at 1 on 3/15/2025     HFpEF   On admitting exam, patient looks dry though her BNP is elevated and has some slight faint bibasilar crackles on exam. May have some component of fluid overload.  - TTE 5/2/2024: EF 50-55%, grade 2 diastolic dysfunction  - TTE 3/2025: Echo was poor quality.  Possibly minimally reduced EF      - Tele  - Strict I/Os and daily weights   Patient has coarse breath sounds in looks fluid overloaded  20 mg of IV Lasix ordered on 3/15/2025  Restart PTA Lasix to 40 mg p.o. daily on 3/16/2025       Goals of Care   Family and patient confirmed with ED provider that patient is DNR/DNI and would prefer comfort focused treatment. She is okay with BIPAP or high flow mask. Discussed with family about comfort care/hospice. They are open to hearing more about this but would like to wait on Palliative consult at this time.   - Continue discussions after patient and family has more discussions about goals of care further.  Patient's mentation has improved as of 3/16.  - SW consulted      Mild Hyponatremia  Has intermittently low in the past  - Na: 133  - Continue to monitor     Recent Influenza A Infection   Flu A PCR 3/8/2025: Positive. Was treated with Tamiflu.   - Noted      Recent New Onset Afib with RVR: Currently Rate Controlled   Noted to have heart rates in to the 120s on 3/10/25 during her last admission.  EKG  "done and confirmed atrial fibrillation.  She was given one time dose of  IV Diltiazem 15 mg. Has been rate controlled since. Anticoagulation was discussed but due to her history of brain aneurysm s/p coiling, anticoagulation was deferred.   - Tele  - Continue home Coreg 12.5mg BID with hold parameters      Hypertension   - Hold losartan 100 mg daily due to MICHAEL  - Continue amlodipine 10 mg daily, carvedilol 12.5 mg twice daily     Hyperlipidemia   - Continue atorvastatin 20 mg daily     Parkinson's Disease   - Continue Sinemet  mg 4 times daily     GERD   - Continue omeprazole 20 mg daily     History of Breast Cancer   - Continue raloxifene 60 mg daily     Depression   - Continue  Zoloft 25 mg daily     Peripheral Neuropathy   - Hold gabapentin 100 mg twice daily      History of Overactive Bladder   - Continue tolterodin             Diet: Combination Diet Regular Diet Adult    DVT Prophylaxis: Pneumatic Compression Devices  Santana Catheter: Not present  Lines: None     Cardiac Monitoring: ACTIVE order. Indication: Afib  Code Status: No CPR- Do NOT Intubate      Medically Ready for Discharge: Anticipated in 2-4 Days pending improvement in mentation, urine culture data.    Clinically Significant Risk Factors         # Hyponatremia: Lowest Na = 134 mmol/L in last 2 days, will monitor as appropriate       # Hypoalbuminemia: Lowest albumin = 3.4 g/dL at 3/14/2025 12:04 PM, will monitor as appropriate     # Hypertension: Noted on problem list    # Acute heart failure with preserved ejection fraction: heart failure noted on problem list, last echo with EF >50%, and receiving IV diuretics           # Overweight: Estimated body mass index is 25.74 kg/m  as calculated from the following:    Height as of this encounter: 1.549 m (5' 1\").    Weight as of this encounter: 61.8 kg (136 lb 3.9 oz)., PRESENT ON ADMISSION     # Financial/Environmental Concerns: none  # Asthma: noted on problem list        Social Drivers of Health  "   Physical Activity: Inactive (7/11/2024)    Exercise Vital Sign     Days of Exercise per Week: 0 days     Minutes of Exercise per Session: 0 min   Social Connections: Unknown (7/11/2024)    Social Connection and Isolation Panel [NHANES]     Frequency of Social Gatherings with Friends and Family: More than three times a week                   Sol Cervantes MD  Hospitalist Service  [facility]  Securely message with [employer] (more info)  Text page via [employer] Paging/Directory   ______________________________________________________________________    Interval History   Stable overnight.  Patient was seated in chair, on room air.  Appeared comfortable.  Was alert and answering questions appropriately.  Denied any pain with urination, no fevers or chills.  Has improved without any antibiotics.  Discussed possible UTI with patient.  She does have E. faecalis in her urine culture.  Discussed multiple drug allergies.  She would like to hold off on antibiotics at this time.  States that the only antibiotic she can tolerate a Z-Chay.  Discussed ID consult.    Physical Exam   Vital Signs: Temp: 97  F (36.1  C) Temp src: Axillary BP: 130/81 Pulse: 88   Resp: 18 SpO2: 92 % O2 Device: None (Room air) Oxygen Delivery: 1 LPM  Weight: 136 lbs 3.91 oz    General Appearance: Alert awake, resting comfortably in bed, not in acute distress  Respiratory: Bilateral coarse breath sounds with basilar crackles heard on auscultation  Cardiovascular: Normal rate rhythm regular  GI: Soft, nontender nondistended bowel sounds positive  Skin: Warm and dry  Other:      Medical Decision Making       52 MINUTES SPENT BY ME on the date of service doing chart review, history, exam, documentation & further activities per the note.      Data         Imaging results reviewed over the past 24 hrs:   No results found for this or any previous visit (from the past 24 hours).

## 2025-03-17 ENCOUNTER — APPOINTMENT (OUTPATIENT)
Dept: PHYSICAL THERAPY | Facility: CLINIC | Age: 89
End: 2025-03-17
Payer: COMMERCIAL

## 2025-03-17 PROCEDURE — 97116 GAIT TRAINING THERAPY: CPT | Mod: GP

## 2025-03-17 PROCEDURE — 250N000011 HC RX IP 250 OP 636: Performed by: HOSPITALIST

## 2025-03-17 PROCEDURE — 94640 AIRWAY INHALATION TREATMENT: CPT

## 2025-03-17 PROCEDURE — 250N000013 HC RX MED GY IP 250 OP 250 PS 637: Performed by: INTERNAL MEDICINE

## 2025-03-17 PROCEDURE — 999N000157 HC STATISTIC RCP TIME EA 10 MIN

## 2025-03-17 PROCEDURE — 94640 AIRWAY INHALATION TREATMENT: CPT | Mod: 76

## 2025-03-17 PROCEDURE — 250N000013 HC RX MED GY IP 250 OP 250 PS 637: Performed by: STUDENT IN AN ORGANIZED HEALTH CARE EDUCATION/TRAINING PROGRAM

## 2025-03-17 PROCEDURE — 99233 SBSQ HOSP IP/OBS HIGH 50: CPT | Performed by: HOSPITALIST

## 2025-03-17 PROCEDURE — 97530 THERAPEUTIC ACTIVITIES: CPT | Mod: GP

## 2025-03-17 PROCEDURE — 250N000012 HC RX MED GY IP 250 OP 636 PS 637: Performed by: HOSPITALIST

## 2025-03-17 PROCEDURE — 120N000001 HC R&B MED SURG/OB

## 2025-03-17 PROCEDURE — 250N000009 HC RX 250: Performed by: STUDENT IN AN ORGANIZED HEALTH CARE EDUCATION/TRAINING PROGRAM

## 2025-03-17 RX ORDER — FUROSEMIDE 10 MG/ML
40 INJECTION INTRAMUSCULAR; INTRAVENOUS ONCE
Status: COMPLETED | OUTPATIENT
Start: 2025-03-17 | End: 2025-03-17

## 2025-03-17 RX ADMIN — ATORVASTATIN CALCIUM 20 MG: 20 TABLET, FILM COATED ORAL at 08:39

## 2025-03-17 RX ADMIN — CARBIDOPA AND LEVODOPA 2 TABLET: 25; 100 TABLET ORAL at 16:46

## 2025-03-17 RX ADMIN — CARBIDOPA AND LEVODOPA 2 TABLET: 25; 100 TABLET ORAL at 21:57

## 2025-03-17 RX ADMIN — BUDESONIDE 0.5 MG: 0.5 INHALANT RESPIRATORY (INHALATION) at 19:57

## 2025-03-17 RX ADMIN — ASPIRIN 81 MG: 81 TABLET, COATED ORAL at 08:41

## 2025-03-17 RX ADMIN — TOLTERODINE TARTRATE 1 MG: 1 TABLET ORAL at 21:57

## 2025-03-17 RX ADMIN — FUROSEMIDE 40 MG: 40 TABLET ORAL at 08:40

## 2025-03-17 RX ADMIN — CARVEDILOL 12.5 MG: 12.5 TABLET, FILM COATED ORAL at 17:35

## 2025-03-17 RX ADMIN — FUROSEMIDE 40 MG: 10 INJECTION, SOLUTION INTRAVENOUS at 11:28

## 2025-03-17 RX ADMIN — CARVEDILOL 12.5 MG: 12.5 TABLET, FILM COATED ORAL at 08:39

## 2025-03-17 RX ADMIN — TOLTERODINE TARTRATE 1 MG: 1 TABLET ORAL at 08:40

## 2025-03-17 RX ADMIN — IPRATROPIUM BROMIDE AND ALBUTEROL SULFATE 3 ML: .5; 3 SOLUTION RESPIRATORY (INHALATION) at 08:10

## 2025-03-17 RX ADMIN — PREDNISONE 40 MG: 20 TABLET ORAL at 08:40

## 2025-03-17 RX ADMIN — ACETAMINOPHEN 1000 MG: 500 TABLET, FILM COATED ORAL at 02:50

## 2025-03-17 RX ADMIN — ACETAMINOPHEN 1000 MG: 500 TABLET, FILM COATED ORAL at 08:40

## 2025-03-17 RX ADMIN — IPRATROPIUM BROMIDE AND ALBUTEROL SULFATE 3 ML: .5; 3 SOLUTION RESPIRATORY (INHALATION) at 14:55

## 2025-03-17 RX ADMIN — AMLODIPINE BESYLATE 10 MG: 5 TABLET ORAL at 08:39

## 2025-03-17 RX ADMIN — CARBIDOPA AND LEVODOPA 2 TABLET: 25; 100 TABLET ORAL at 11:29

## 2025-03-17 RX ADMIN — BUDESONIDE 0.5 MG: 0.5 INHALANT RESPIRATORY (INHALATION) at 08:10

## 2025-03-17 RX ADMIN — PANTOPRAZOLE SODIUM 40 MG: 40 TABLET, DELAYED RELEASE ORAL at 08:40

## 2025-03-17 RX ADMIN — RALOXIFENE HYDROCHLORIDE 60 MG: 60 TABLET, FILM COATED ORAL at 21:57

## 2025-03-17 RX ADMIN — IPRATROPIUM BROMIDE AND ALBUTEROL SULFATE 3 ML: .5; 3 SOLUTION RESPIRATORY (INHALATION) at 19:57

## 2025-03-17 RX ADMIN — SERTRALINE HYDROCHLORIDE 25 MG: 25 TABLET ORAL at 08:40

## 2025-03-17 RX ADMIN — CARBIDOPA AND LEVODOPA 2 TABLET: 25; 100 TABLET ORAL at 06:25

## 2025-03-17 ASSESSMENT — ACTIVITIES OF DAILY LIVING (ADL)
ADLS_ACUITY_SCORE: 82

## 2025-03-17 NOTE — PLAN OF CARE
Goal Outcome Evaluation:    Summary: Weakness, influenza A, possible asthma exacerbation, possible fluid overload, Hx Parkinsons     DATE & TIME: 3/17/2025  6100 - 8270  Cognitive Concerns/ Orientation : A&O X 3, disoriented to time, occasional confusion, forgetful.  Hard of hearing.  BEHAVIOR & AGGRESSION TOOL COLOR: Green  CIWA SCORE: NA        ABNL VS/O2: VSS on 2 LPM supplemental  O2.  MOBILITY: Assist of 2 with GBW. Turn & repo  PAIN MANAGMENT: Head, neck, back pain reported; given scheduled acetaminophin, effective.  DIET: Regular.  Poor appetite.  BOWEL/BLADDER: Incontinent, purewick in place.  No BM this shift.  ABNL LAB/BG: Creatinine 1.0.  DRAIN/DEVICES:  L PIV SL  SKIN: Scattered bruises, blanchable redness to coccyx with protective mepilex in place  TESTS/PROCEDURES: NA  D/C DAY/GOALS/PLACE:   Pending improvement in mentation and fluid volume status.  OTHER IMPORTANT INFO: Bilateral hearing aids.  Takes pills whole with apple sauce. ID consulted re urine culture enterococcus faecalis and pt multiple ABX allergies

## 2025-03-17 NOTE — PROGRESS NOTES
[facility]    Medicine Progress Note - Hospitalist Service    Date of Admission:  3/14/2025    Assessment & Plan   Opal Sin is a 89 year old female with a past medical history significant for HFpEF, CKD stage 3, breast cancer, asthma, Parkinson's, hyperlipidemia, and hypertension who generalized weakness and fatigue.      Of note, patient was recently admitted at [facility] from 3/8/25-3/12/25 for acute hypoxic respiratory 2/2 Influenza A, heart failure exacerbation and Asthma exacerbation. Treated with Tamiflu, IV lasix and steroids. She also developed new onset Afib on 3/10/25. She was given 15mg of IV diltiazem and was rate controlled after that. She was weaned off oxygen on 3/12/25 and discharged backto her [facility] on 3/12/25.      Possible Acute Metabolic Encephalopathy , improved  ? Enterococcus Faecalis bacteriuria-asymptomatic, not treating  Acute hypoxic respiratory failure due to asthma exacerbation, CHF/pulmonary edema  Generalized Weakness   Increased Fatigue   Presented with increased weakness and fatigue 2 days after recent hospitalization. In the ED, she was VSS other than being hypertensive. Labs showing a Cr of 1.25, BNP of 6,000, pH of 7.49 with Co2 of 38. Per ED provider patient was sounding wheezy and tachypneic. She was given duonebs, solumedrol and a small fluid bolus. She was placed on Hiflow given her tachypnea and not due to hypoxia. Per ED provider, patient looks much more comfortable with high flow on.      - Blood Cx: pending   - UA normal.  Urine culture with Enterococcus faecalis.  Patient has multiple allergies to antibiotics [states that she can only tolerate Z-Chay] .  Holding off on antibiotics at this time given multiple allergies and also noted clinical improvement in her mentation without any antibiotics.  Patient also reiterates conservative approach to her care.  Discussed with ID on 3/17 and they agreed-no need to treat.     - Spo2 goal  > 90%                - Hi-flow due to work of breathing and tachypnea-has been weaned off to room air on 3/16.     - Continue Duonebs and Pulmicort nebs   - Started on  Solumedrol 60mg [id number] to oral prednisone from 3/17.  Start tapering dose from 3/18.  Has been on steroids since 3/8 and this is contributing to fluid retention.  Patient has coarse breath sounds and appears to be fluid overloaded.  20 mg of IV Lasix given on 3/15/2025  Restart PTA Lasix 40 mg p.o. daily on 3/16/2025.  Additional dose of 40 Mg IV Lasix given on 3/17.     - PT/OT consulted  - SW consulted                 - Per pharmacy: family states that [facility] is unable to administer meds and she was not receiving her nebs. Patient is in charge of taking her meds. Family helps set this up weekly.         MICHAEL on CKD 3  Received 250cc bolus in the ED.   - Cr: 1.25               - Baseline: 0.8-1.1   -Creatinine at 1 on 3/15/2025.  Monitor with diuresis.     HFpEF with mild acute exacerbation  On admitting exam, patient looks dry though her BNP is elevated and has some slight faint bibasilar crackles on exam. May have some component of fluid overload.  - TTE 5/2/2024: EF 50-55%, grade 2 diastolic dysfunction  - TTE 3/2025: Echo was poor quality.  Possibly minimally reduced EF      - Tele  - Strict I/Os and daily weights   Patient has coarse breath sounds in looks fluid overloaded  20 mg of IV Lasix ordered on 3/15/2025, additional 40 Mg IV dose on 3/17.  Restart PTA Lasix to 40 mg p.o. daily on 3/16/2025       Goals of Care   Family and patient confirmed with ED provider that patient is DNR/DNI and would prefer comfort focused treatment. She is okay with BIPAP or high flow mask. Discussed with family about comfort care/hospice. They are open to hearing more about this but would like to wait on Palliative consult at this time.   - Continue discussions after patient and family has more discussions about goals of care further.  Patient's mentation has  improved as of 3/16.  - SW consulted      Mild Hyponatremia  Has intermittently low in the past  - Na: 133  - Continue to monitor     Recent Influenza A Infection   Flu A PCR 3/8/2025: Positive. Was treated with Tamiflu.   - Noted      Recent New Onset Afib with RVR: Currently Rate Controlled   Noted to have heart rates in to the 120s on 3/10/25 during her last admission.  EKG done and confirmed atrial fibrillation.  She was given one time dose of  IV Diltiazem 15 mg. Has been rate controlled since. Anticoagulation was discussed but due to her history of brain aneurysm s/p coiling, anticoagulation was deferred.   - Tele  - Continue home Coreg 12.5mg BID with hold parameters      Hypertension   - Hold losartan 100 mg daily due to MICHAEL-BP is  - Continue amlodipine 10 mg daily, carvedilol 12.5 mg twice daily     Hyperlipidemia   - Continue atorvastatin 20 mg daily     Parkinson's Disease   - Continue Sinemet  mg 4 times daily     GERD   - Continue omeprazole 20 mg daily     History of Breast Cancer   - Continue raloxifene 60 mg daily     Depression   - Continue  Zoloft 25 mg daily     Peripheral Neuropathy   - Hold gabapentin 100 mg twice daily      History of Overactive Bladder   - Continue tolterodin             Diet: Combination Diet Regular Diet Adult    DVT Prophylaxis: Pneumatic Compression Devices  Santana Catheter: Not present  Lines: None     Cardiac Monitoring: None  Code Status: No CPR- Do NOT Intubate      Medically Ready for Discharge: Anticipated Tomorrow pending weaning of oxygen, progress with therapies.    Clinically Significant Risk Factors               # Hypoalbuminemia: Lowest albumin = 3.4 g/dL at 3/14/2025 12:04 PM, will monitor as appropriate     # Hypertension: Noted on problem list    # Acute heart failure with preserved ejection fraction: heart failure noted on problem list, last echo with EF >50%, and receiving IV diuretics           # Overweight: Estimated body mass index is 27.03 kg/m   "as calculated from the following:    Height as of this encounter: 1.549 m (5' 1\").    Weight as of this encounter: 64.9 kg (143 lb 1.3 oz)., PRESENT ON ADMISSION     # Financial/Environmental Concerns: none  # Asthma: noted on problem list        Social Drivers of Health    Physical Activity: Inactive (7/11/2024)    Exercise Vital Sign     Days of Exercise per Week: 0 days     Minutes of Exercise per Session: 0 min   Social Connections: Unknown (7/11/2024)    Social Connection and Isolation Panel [NHANES]     Frequency of Social Gatherings with Friends and Family: More than three times a week                   Sol Cervantes MD  Hospitalist Service  [facility]  Securely message with [employer] (more info)  Text page via [employer] Paging/Directory   ______________________________________________________________________    Interval History   Stable overnight.  Patient was laying in bed, back on 2 L nasal cannula oxygen.  Denies shortness of breath.  No fevers.  States that mucus is improved.  Called and updated her sister on phone.    Physical Exam   Vital Signs: Temp: 97.8  F (36.6  C) Temp src: Axillary BP: 139/75 Pulse: 72   Resp: 20 SpO2: 92 % O2 Device: Nasal cannula Oxygen Delivery: 1 LPM  Weight: 143 lbs 1.26 oz    General Appearance: Alert awake, resting comfortably in bed, not in acute distress  Respiratory: Bilateral coarse breath sounds with basilar crackles heard on auscultation  Cardiovascular: Normal rate rhythm regular  GI: Soft, nontender nondistended bowel sounds positive  Skin: Warm and dry  Other:      Medical Decision Making       52 MINUTES SPENT BY ME on the date of service doing chart review, history, exam, documentation & further activities per the note.      Data     I have personally reviewed the following data over the past 24 hrs:    Procal: N/A CRP: N/A Lactic Acid: N/A         Imaging results reviewed over the past 24 hrs:   No results found for this or any previous " visit (from the past 24 hours).

## 2025-03-17 NOTE — PLAN OF CARE
Summary: Weakness, Influenza A. Hx Parkinsons    DATE & TIME: 3/16/25, 1031 - 4719  Cognitive Concerns/ Orientation : A&O X 3 ,disoriented to time. [place], forgetful  BEHAVIOR & AGGRESSION TOOL COLOR: Green  CIWA SCORE: NA   ABNL VS/O2: VSS on room air, O2 1 LPM given overnight to keep O2 above 90%  MOBILITY: Total/lift/w/c bound at baseline  PAIN MANAGMENT: Denied  DIET: Regular  BOWEL/BLADDER: Incontinent, purewick in place.  ABNL LAB/BG: NA   DRAIN/DEVICES:  PIV SL  SKIN: Scattered bruises, blanchable redness to coccyx with protective mepilex in place  TESTS/PROCEDURES: NA  D/C DAY/GOALS/PLACE: Pending  OTHER IMPORTANT INFO: Patient has bilateral hearing aids, takes pills whole with apple sauce. ID consulted    Goal Outcome Evaluation:      Plan of Care Reviewed With: patient    Overall Patient Progress: improvingOverall Patient Progress: improving

## 2025-03-17 NOTE — PLAN OF CARE
Goal Outcome Evaluation:         Summary: Weakness, Influ A. Hx parkinsons  DATE & TIME: 3/16/25 2860-3915  Cognitive Concerns/ Orientation : A&OX3, [place], forgetful  BEHAVIOR & AGGRESSION TOOL COLOR: Green, calm/cooperative  CIWA SCORE: NA   ABNL VS/O2: VSS on room air, weaned off of 1L during the day sating in mid 90's  MOBILITY: Total/lift/w/c bound at baseline  PAIN MANAGMENT: Denies  DIET: Regular, good appetite  BOWEL/BLADDER: Incontinent, purewick in place.  ABNL LAB/BG: BUN 59.3 Creat 1.00 BNP 6754, urine culture growing enterococcus faecalis   DRAIN/DEVICES: R PIV SL  SKIN: Scattered bruises/blanchable redness to coccyx mepilex in place  TESTS/PROCEDURES: NA  D/C DAY/GOALS/PLACE: Pending  OTHER IMPORTANT INFO: Pt alert/cooperative, total care, has bilateral hearing aids, takes pills with apple sauce. ID consulted due to results of urine culture

## 2025-03-17 NOTE — PLAN OF CARE
Goal Outcome Evaluation:             Summary: Weakness, influenza A, possible asthma exacerbation, possible fluid overload, Hx Parkinsons       Cognitive Concerns/ Orientation : A&O X 3, disoriented to time, occasional confusion, forgetful.  Hard of hearing.  BEHAVIOR & AGGRESSION TOOL COLOR: Green  CIWA SCORE: NA        ABNL VS/O2: VSS on 2 LPM supplemental  O2.  MOBILITY: Assist of 2 with GBW. Turn & repo  PAIN MANAGMENT: Head, neck, back pain reported; given scheduled acetaminophin, effective.  DIET: Regular.  Poor appetite.  BOWEL/BLADDER: Incontinent, purewick in place.  No BM this shift.  ABNL LAB/BG: Creatinine 1.0.  DRAIN/DEVICES:  L PIV SL  SKIN: Scattered bruises, blanchable redness to coccyx with protective mepilex in place  TESTS/PROCEDURES: NA  D/C DAY/GOALS/PLACE:   Pending improvement in mentation and fluid volume status.  OTHER IMPORTANT INFO: Bilateral hearing aids.  Takes pills whole with apple sauce. ID consulted re urine culture enterococcus faecalis and pt multiple ABX allergies

## 2025-03-18 ENCOUNTER — APPOINTMENT (OUTPATIENT)
Dept: CT IMAGING | Facility: CLINIC | Age: 89
End: 2025-03-18
Attending: HOSPITALIST
Payer: COMMERCIAL

## 2025-03-18 LAB
ANION GAP SERPL CALCULATED.3IONS-SCNC: 9 MMOL/L (ref 7–15)
BASE EXCESS BLDV CALC-SCNC: 3 MMOL/L (ref -3–3)
BUN SERPL-MCNC: 48 MG/DL (ref 8–23)
CALCIUM SERPL-MCNC: 8.6 MG/DL (ref 8.8–10.4)
CHLORIDE SERPL-SCNC: 99 MMOL/L (ref 98–107)
CREAT SERPL-MCNC: 0.97 MG/DL (ref 0.51–0.95)
EGFRCR SERPLBLD CKD-EPI 2021: 56 ML/MIN/1.73M2
FLUAV RNA SPEC QL NAA+PROBE: POSITIVE
FLUBV RNA RESP QL NAA+PROBE: NEGATIVE
GLUCOSE SERPL-MCNC: 111 MG/DL (ref 70–99)
HCO3 BLDV-SCNC: 27 MMOL/L (ref 21–28)
HCO3 SERPL-SCNC: 25 MMOL/L (ref 22–29)
NT-PROBNP SERPL-MCNC: 6725 PG/ML (ref 0–1800)
O2/TOTAL GAS SETTING VFR VENT: 2 %
OXYHGB MFR BLDV: 82 % (ref 70–75)
PCO2 BLDV: 37 MM HG (ref 40–50)
PH BLDV: 7.47 [PH] (ref 7.32–7.43)
PO2 BLDV: 47 MM HG (ref 25–47)
POTASSIUM SERPL-SCNC: 3.6 MMOL/L (ref 3.4–5.3)
RADIOLOGIST FLAGS: NORMAL
RSV RNA SPEC NAA+PROBE: NEGATIVE
SAO2 % BLDV: 83.4 % (ref 70–75)
SARS-COV-2 RNA RESP QL NAA+PROBE: NEGATIVE
SODIUM SERPL-SCNC: 133 MMOL/L (ref 135–145)

## 2025-03-18 PROCEDURE — 87637 SARSCOV2&INF A&B&RSV AMP PRB: CPT | Performed by: HOSPITALIST

## 2025-03-18 PROCEDURE — 999N000157 HC STATISTIC RCP TIME EA 10 MIN

## 2025-03-18 PROCEDURE — 82805 BLOOD GASES W/O2 SATURATION: CPT | Performed by: HOSPITALIST

## 2025-03-18 PROCEDURE — 250N000013 HC RX MED GY IP 250 OP 250 PS 637: Performed by: STUDENT IN AN ORGANIZED HEALTH CARE EDUCATION/TRAINING PROGRAM

## 2025-03-18 PROCEDURE — 99233 SBSQ HOSP IP/OBS HIGH 50: CPT | Performed by: HOSPITALIST

## 2025-03-18 PROCEDURE — 82310 ASSAY OF CALCIUM: CPT | Performed by: HOSPITALIST

## 2025-03-18 PROCEDURE — 94640 AIRWAY INHALATION TREATMENT: CPT | Mod: 76

## 2025-03-18 PROCEDURE — 36415 COLL VENOUS BLD VENIPUNCTURE: CPT | Performed by: HOSPITALIST

## 2025-03-18 PROCEDURE — 250N000012 HC RX MED GY IP 250 OP 636 PS 637: Performed by: HOSPITALIST

## 2025-03-18 PROCEDURE — 83880 ASSAY OF NATRIURETIC PEPTIDE: CPT | Performed by: HOSPITALIST

## 2025-03-18 PROCEDURE — 80048 BASIC METABOLIC PNL TOTAL CA: CPT | Performed by: HOSPITALIST

## 2025-03-18 PROCEDURE — 250N000011 HC RX IP 250 OP 636: Performed by: HOSPITALIST

## 2025-03-18 PROCEDURE — 71250 CT THORAX DX C-: CPT

## 2025-03-18 PROCEDURE — 250N000009 HC RX 250: Performed by: STUDENT IN AN ORGANIZED HEALTH CARE EDUCATION/TRAINING PROGRAM

## 2025-03-18 PROCEDURE — 120N000001 HC R&B MED SURG/OB

## 2025-03-18 PROCEDURE — 250N000013 HC RX MED GY IP 250 OP 250 PS 637: Performed by: INTERNAL MEDICINE

## 2025-03-18 PROCEDURE — 94640 AIRWAY INHALATION TREATMENT: CPT

## 2025-03-18 RX ORDER — FUROSEMIDE 10 MG/ML
40 INJECTION INTRAMUSCULAR; INTRAVENOUS ONCE
Status: COMPLETED | OUTPATIENT
Start: 2025-03-18 | End: 2025-03-18

## 2025-03-18 RX ADMIN — PANTOPRAZOLE SODIUM 40 MG: 40 TABLET, DELAYED RELEASE ORAL at 08:15

## 2025-03-18 RX ADMIN — ATORVASTATIN CALCIUM 20 MG: 20 TABLET, FILM COATED ORAL at 08:15

## 2025-03-18 RX ADMIN — PREDNISONE 30 MG: 10 TABLET ORAL at 08:14

## 2025-03-18 RX ADMIN — TOLTERODINE TARTRATE 1 MG: 1 TABLET ORAL at 08:15

## 2025-03-18 RX ADMIN — SERTRALINE HYDROCHLORIDE 25 MG: 25 TABLET ORAL at 08:15

## 2025-03-18 RX ADMIN — CARBIDOPA AND LEVODOPA 2 TABLET: 25; 100 TABLET ORAL at 11:56

## 2025-03-18 RX ADMIN — IPRATROPIUM BROMIDE AND ALBUTEROL SULFATE 3 ML: .5; 3 SOLUTION RESPIRATORY (INHALATION) at 15:11

## 2025-03-18 RX ADMIN — FUROSEMIDE 40 MG: 10 INJECTION, SOLUTION INTRAMUSCULAR; INTRAVENOUS at 14:02

## 2025-03-18 RX ADMIN — TOLTERODINE TARTRATE 1 MG: 1 TABLET ORAL at 20:29

## 2025-03-18 RX ADMIN — CARVEDILOL 12.5 MG: 12.5 TABLET, FILM COATED ORAL at 08:15

## 2025-03-18 RX ADMIN — CARBIDOPA AND LEVODOPA 2 TABLET: 25; 100 TABLET ORAL at 15:59

## 2025-03-18 RX ADMIN — IPRATROPIUM BROMIDE AND ALBUTEROL SULFATE 3 ML: .5; 3 SOLUTION RESPIRATORY (INHALATION) at 08:23

## 2025-03-18 RX ADMIN — RALOXIFENE HYDROCHLORIDE 60 MG: 60 TABLET, FILM COATED ORAL at 20:29

## 2025-03-18 RX ADMIN — IPRATROPIUM BROMIDE AND ALBUTEROL SULFATE 3 ML: .5; 3 SOLUTION RESPIRATORY (INHALATION) at 19:00

## 2025-03-18 RX ADMIN — ACETAMINOPHEN 1000 MG: 500 TABLET, FILM COATED ORAL at 08:14

## 2025-03-18 RX ADMIN — ACETAMINOPHEN 1000 MG: 500 TABLET, FILM COATED ORAL at 22:11

## 2025-03-18 RX ADMIN — CARBIDOPA AND LEVODOPA 2 TABLET: 25; 100 TABLET ORAL at 20:29

## 2025-03-18 RX ADMIN — BUDESONIDE 0.5 MG: 0.5 INHALANT RESPIRATORY (INHALATION) at 19:00

## 2025-03-18 RX ADMIN — CARVEDILOL 12.5 MG: 12.5 TABLET, FILM COATED ORAL at 18:03

## 2025-03-18 RX ADMIN — CARBIDOPA AND LEVODOPA 2 TABLET: 25; 100 TABLET ORAL at 06:57

## 2025-03-18 RX ADMIN — AMLODIPINE BESYLATE 10 MG: 5 TABLET ORAL at 08:15

## 2025-03-18 ASSESSMENT — ACTIVITIES OF DAILY LIVING (ADL)
ADLS_ACUITY_SCORE: 82
ADLS_ACUITY_SCORE: 86
ADLS_ACUITY_SCORE: 82
ADLS_ACUITY_SCORE: 82
ADLS_ACUITY_SCORE: 86
ADLS_ACUITY_SCORE: 82
ADLS_ACUITY_SCORE: 86
ADLS_ACUITY_SCORE: 82
ADLS_ACUITY_SCORE: 86
ADLS_ACUITY_SCORE: 82
ADLS_ACUITY_SCORE: 86
ADLS_ACUITY_SCORE: 82

## 2025-03-18 NOTE — PROGRESS NOTES
[facility]    Medicine Progress Note - Hospitalist Service    Date of Admission:  3/14/2025    Assessment & Plan   Opal Sin is a 89 year old female with a past medical history significant for HFpEF, CKD stage 3, breast cancer, asthma, Parkinson's, hyperlipidemia, and hypertension who generalized weakness and fatigue.      Of note, patient was recently admitted at [facility] from 3/8/25-3/12/25 for acute hypoxic respiratory 2/2 Influenza A, heart failure exacerbation and Asthma exacerbation. Treated with Tamiflu, IV lasix and steroids. She also developed new onset Afib on 3/10/25. She was given 15mg of IV diltiazem and was rate controlled after that. She was weaned off oxygen on 3/12/25 and discharged backto her VIOLA on 3/12/25.      Possible Acute Metabolic Encephalopathy , improved  ? Enterococcus Faecalis bacteriuria-asymptomatic, not treating  Acute hypoxic respiratory failure due to asthma exacerbation, CHF/pulmonary edema  Generalized Weakness   Increased Fatigue   Presented with increased weakness and fatigue 2 days after recent hospitalization. In the ED, she was VSS other than being hypertensive. Labs showing a Cr of 1.25, BNP of 6,000, pH of 7.49 with Co2 of 38. Per ED provider patient was sounding wheezy and tachypneic. She was given duonebs, solumedrol and a small fluid bolus. She was placed on Hiflow given her tachypnea and not due to hypoxia. Per ED provider, patient looks much more comfortable with high flow on.      - Blood Cx: pending   - UA normal.  Urine culture with Enterococcus faecalis.  Patient has multiple allergies to antibiotics [states that she can only tolerate Z-Chay] .  Holding off on antibiotics at this time given multiple allergies and also noted clinical improvement in her mentation without any antibiotics.  Patient also reiterates conservative approach to her care.  Discussed with ID on 3/17 and they agreed-no need to treat.     - Spo2 goal  > 90%                - Hi-flow due to work of breathing and tachypnea-has been weaned off to room air on 3/16.  Now back on oxygen needing 3 L nasal cannula  - Continue Duonebs and Pulmicort nebs   - Started on  Solumedrol 60mg [id number] to oral prednisone from 3/17.  Start tapering dose from 3/18.  Has been on steroids since 3/8 and this is contributing to fluid retention.  Patient has coarse breath sounds and appears to be fluid overloaded.  20 mg of IV Lasix given on 3/15/2025  Restart PTA Lasix 40 mg p.o. daily on 3/16/2025.  Restarted diuresing with IV Lasix on 3/17-continue.   3/18-obtaining CT chest noncontrast to further evaluate given persistent hypoxia   May need to discharge to [facility] with oxygen in the next day or 2.     - PT/OT consulted  - SW consulted                 - Per pharmacy: family states that [facility] is unable to administer meds and she was not receiving her nebs. Patient is in charge of taking her meds. Family helps set this up weekly.         MICHAEL on CKD 3  Received 250cc bolus in the ED.   - Cr: 1.25               - Baseline: 0.8-1.1   -Creatinine at 1 on 3/15/2025.  Monitor with diuresis.     HFpEF with mild acute exacerbation  On admitting exam, patient looks dry though her BNP is elevated and has some slight faint bibasilar crackles on exam. May have some component of fluid overload.  - TTE 5/2/2024: EF 50-55%, grade 2 diastolic dysfunction  - TTE 3/2025: Echo was poor quality.  Possibly minimally reduced EF      - Tele  - Strict I/Os and daily weights   Patient has coarse breath sounds in looks fluid overloaded  Restarted IV diuresis on 3/17.       Goals of Care   Family and patient confirmed with ED provider that patient is DNR/DNI and would prefer comfort focused treatment. She is okay with BIPAP or high flow mask. Discussed with family about comfort care/hospice. They are open to hearing more about this but would like to wait on Palliative consult at this time.   - Continue discussions  after patient and family has more discussions about goals of care further.  Patient's mentation has improved as of 3/16.  - SW consulted      Mild Hyponatremia  Has intermittently low in the past  - Na: 133  - Continue to monitor     Recent Influenza A Infection   Flu A PCR 3/8/2025: Positive. Was treated with Tamiflu.   - Noted.  Rechecked viral panel on 3/18-negative COVID, RSV.  Influenza A PCR remains positive.     Recent New Onset Afib with RVR: Currently Rate Controlled   Noted to have heart rates in to the 120s on 3/10/25 during her last admission.  EKG done and confirmed atrial fibrillation.  She was given one time dose of  IV Diltiazem 15 mg. Has been rate controlled since. Anticoagulation was discussed but due to her history of brain aneurysm s/p coiling, anticoagulation was deferred.   - Tele  - Continue home Coreg 12.5mg BID with hold parameters      Hypertension   - Hold losartan 100 mg daily due to MICHAEL-BP is  - Continue amlodipine 10 mg daily, carvedilol 12.5 mg twice daily     Hyperlipidemia   - Continue atorvastatin 20 mg daily     Parkinson's Disease   - Continue Sinemet  mg 4 times daily     GERD   - Continue omeprazole 20 mg daily     History of Breast Cancer   - Continue raloxifene 60 mg daily     Depression   - Continue  Zoloft 25 mg daily     Peripheral Neuropathy   - Hold gabapentin 100 mg twice daily      History of Overactive Bladder   - Continue tolterodin             Diet: Combination Diet Regular Diet Adult  Room Service    DVT Prophylaxis: Pneumatic Compression Devices  Santana Catheter: Not present  Lines: None     Cardiac Monitoring: None  Code Status: No CPR- Do NOT Intubate      Medically Ready for Discharge: Anticipated Tomorrow pending weaning of oxygen, progress with therapies.    Clinically Significant Risk Factors         # Hyponatremia: Lowest Na = 133 mmol/L in last 2 days, will monitor as appropriate       # Hypoalbuminemia: Lowest albumin = 3.4 g/dL at 3/14/2025 12:04  "PM, will monitor as appropriate     # Hypertension: Noted on problem list    # Acute heart failure with preserved ejection fraction: heart failure noted on problem list, last echo with EF >50%, and receiving IV diuretics           # Overweight: Estimated body mass index is 27.08 kg/m  as calculated from the following:    Height as of this encounter: 1.549 m (5' 1\").    Weight as of this encounter: 65 kg (143 lb 4.8 oz).      # Financial/Environmental Concerns: none  # Asthma: noted on problem list        Social Drivers of Health    Physical Activity: Inactive (7/11/2024)    Exercise Vital Sign     Days of Exercise per Week: 0 days     Minutes of Exercise per Session: 0 min   Social Connections: Unknown (7/11/2024)    Social Connection and Isolation Panel [NHANES]     Frequency of Social Gatherings with Friends and Family: More than three times a week                   Sol Cervantes MD  Hospitalist Service  [facility]  Securely message with [employer] (more info)  Text page via [email or web address] Paging/Directory   ______________________________________________________________________    Interval History   Stable overnight.  Still requiring oxygen, was up to 3 L nasal cannula oxygen this morning.  Still coughing and sounds quite congested.  Was feeling very sleepy and tired today.  Was hard to wake her up today.  Family at bedside.    Physical Exam   Vital Signs: Temp: 97.8  F (36.6  C) Temp src: Oral BP: 138/69 Pulse: 94   Resp: 18 SpO2: 95 % O2 Device: Nasal cannula Oxygen Delivery: 2 LPM  Weight: 143 lbs 4.78 oz    General Appearance:Drowsy but arousable , resting comfortably in bed, not in acute distress, appeared more thickening  Respiratory: Bilateral coarse breath sounds with basilar crackles and bilateral wheezes heard on auscultation  Cardiovascular: Normal rate rhythm regular  GI: Soft, nontender nondistended bowel sounds positive  Skin: Warm and dry  Other:      Medical Decision Making       52 " MINUTES SPENT BY ME on the date of service doing chart review, history, exam, documentation & further activities per the note.      Data     I have personally reviewed the following data over the past 24 hrs:    N/A  \   N/A   / N/A     133 (L) 99 48.0 (H) /  111 (H)   3.6 25 0.97 (H) \     Trop: N/A BNP: 6,725 (H)       Imaging results reviewed over the past 24 hrs:   No results found for this or any previous visit (from the past 24 hours).

## 2025-03-18 NOTE — PROVIDER NOTIFICATION
MD Notification    Notified Person: MD    Notified Person Name: Dr. Cervantes    Notification Date/Time: 03/18/25 7617    Notification Interaction: Vocera message    Purpose of Notification: FYI was positive for influenza A. We are not starting her on droplet precautions again since she already completed 7 days of droplet precautions and tamiflu    Orders Received: Ok    Comments:

## 2025-03-18 NOTE — PLAN OF CARE
3/17/25  [id number]    Orientation: A/Ox3    Vitals/Tele: VSS, 2L via NC    IV Access/drains: External catheter in place, RPIV    Diet: Reg    Pain: C/O R neck and should pain. Refused medication. Hot pack applied.     Mobility: Ax2 GB W    GI/: Incontinent of B/B    Wound/Skin: Scattered bruises, blanchable redness to coccyx, mepi in place    Consults: PT    Discharge Plan: Pending, depending on weaning of oxygen      See Flow sheets for assessment

## 2025-03-19 LAB
ANION GAP SERPL CALCULATED.3IONS-SCNC: 9 MMOL/L (ref 7–15)
BACTERIA BLD CULT: NO GROWTH
BACTERIA BLD CULT: NO GROWTH
BUN SERPL-MCNC: 48.4 MG/DL (ref 8–23)
CALCIUM SERPL-MCNC: 8.7 MG/DL (ref 8.8–10.4)
CHLORIDE SERPL-SCNC: 99 MMOL/L (ref 98–107)
CREAT SERPL-MCNC: 1.04 MG/DL (ref 0.51–0.95)
EGFRCR SERPLBLD CKD-EPI 2021: 52 ML/MIN/1.73M2
ERYTHROCYTE [DISTWIDTH] IN BLOOD BY AUTOMATED COUNT: 15.9 % (ref 10–15)
GLUCOSE SERPL-MCNC: 124 MG/DL (ref 70–99)
HCO3 SERPL-SCNC: 25 MMOL/L (ref 22–29)
HCT VFR BLD AUTO: 32 % (ref 35–47)
HGB BLD-MCNC: 10.8 G/DL (ref 11.7–15.7)
MCH RBC QN AUTO: 29 PG (ref 26.5–33)
MCHC RBC AUTO-ENTMCNC: 33.8 G/DL (ref 31.5–36.5)
MCV RBC AUTO: 86 FL (ref 78–100)
PLATELET # BLD AUTO: 161 10E3/UL (ref 150–450)
POTASSIUM SERPL-SCNC: 3.9 MMOL/L (ref 3.4–5.3)
RBC # BLD AUTO: 3.72 10E6/UL (ref 3.8–5.2)
SODIUM SERPL-SCNC: 133 MMOL/L (ref 135–145)
WBC # BLD AUTO: 14.9 10E3/UL (ref 4–11)

## 2025-03-19 PROCEDURE — 250N000013 HC RX MED GY IP 250 OP 250 PS 637: Performed by: HOSPITALIST

## 2025-03-19 PROCEDURE — 80051 ELECTROLYTE PANEL: CPT | Performed by: HOSPITALIST

## 2025-03-19 PROCEDURE — 94640 AIRWAY INHALATION TREATMENT: CPT | Mod: 76

## 2025-03-19 PROCEDURE — 999N000157 HC STATISTIC RCP TIME EA 10 MIN

## 2025-03-19 PROCEDURE — 36415 COLL VENOUS BLD VENIPUNCTURE: CPT | Performed by: HOSPITALIST

## 2025-03-19 PROCEDURE — 85027 COMPLETE CBC AUTOMATED: CPT | Performed by: HOSPITALIST

## 2025-03-19 PROCEDURE — 250N000012 HC RX MED GY IP 250 OP 636 PS 637: Performed by: HOSPITALIST

## 2025-03-19 PROCEDURE — 250N000013 HC RX MED GY IP 250 OP 250 PS 637: Performed by: STUDENT IN AN ORGANIZED HEALTH CARE EDUCATION/TRAINING PROGRAM

## 2025-03-19 PROCEDURE — 99232 SBSQ HOSP IP/OBS MODERATE 35: CPT | Performed by: HOSPITALIST

## 2025-03-19 PROCEDURE — 80048 BASIC METABOLIC PNL TOTAL CA: CPT | Performed by: HOSPITALIST

## 2025-03-19 PROCEDURE — 120N000001 HC R&B MED SURG/OB

## 2025-03-19 PROCEDURE — 250N000013 HC RX MED GY IP 250 OP 250 PS 637: Performed by: INTERNAL MEDICINE

## 2025-03-19 PROCEDURE — 94640 AIRWAY INHALATION TREATMENT: CPT

## 2025-03-19 PROCEDURE — 250N000011 HC RX IP 250 OP 636: Performed by: HOSPITALIST

## 2025-03-19 PROCEDURE — 250N000009 HC RX 250: Performed by: STUDENT IN AN ORGANIZED HEALTH CARE EDUCATION/TRAINING PROGRAM

## 2025-03-19 RX ORDER — MEROPENEM 500 MG/1
500 INJECTION, POWDER, FOR SOLUTION INTRAVENOUS EVERY 8 HOURS
Status: DISCONTINUED | OUTPATIENT
Start: 2025-03-19 | End: 2025-03-24

## 2025-03-19 RX ORDER — AZITHROMYCIN 250 MG/1
250 TABLET, FILM COATED ORAL DAILY
Status: COMPLETED | OUTPATIENT
Start: 2025-03-20 | End: 2025-03-23

## 2025-03-19 RX ORDER — ERTAPENEM 1 G/1
1 INJECTION, POWDER, LYOPHILIZED, FOR SOLUTION INTRAMUSCULAR; INTRAVENOUS EVERY 24 HOURS
Status: DISCONTINUED | OUTPATIENT
Start: 2025-03-19 | End: 2025-03-19 | Stop reason: ALTCHOICE

## 2025-03-19 RX ORDER — AZITHROMYCIN MONOHYDRATE 500 MG/5ML
500 INJECTION, POWDER, LYOPHILIZED, FOR SOLUTION INTRAVENOUS ONCE
Status: COMPLETED | OUTPATIENT
Start: 2025-03-19 | End: 2025-03-19

## 2025-03-19 RX ORDER — POLYETHYLENE GLYCOL 3350 17 G/17G
17 POWDER, FOR SOLUTION ORAL 2 TIMES DAILY PRN
Status: DISCONTINUED | OUTPATIENT
Start: 2025-03-19 | End: 2025-03-26 | Stop reason: HOSPADM

## 2025-03-19 RX ORDER — GUAIFENESIN AND DEXTROMETHORPHAN HYDROBROMIDE 600; 30 MG/1; MG/1
1 TABLET, EXTENDED RELEASE ORAL 2 TIMES DAILY
Status: COMPLETED | OUTPATIENT
Start: 2025-03-19 | End: 2025-03-21

## 2025-03-19 RX ADMIN — BUDESONIDE 0.5 MG: 0.5 INHALANT RESPIRATORY (INHALATION) at 08:05

## 2025-03-19 RX ADMIN — BUDESONIDE 0.5 MG: 0.5 INHALANT RESPIRATORY (INHALATION) at 19:55

## 2025-03-19 RX ADMIN — CARBIDOPA AND LEVODOPA 2 TABLET: 25; 100 TABLET ORAL at 06:02

## 2025-03-19 RX ADMIN — GUAIFENESIN AND DEXTROMETHORPHAN HYDROBROMIDE 1 TABLET: 600; 30 TABLET, EXTENDED RELEASE ORAL at 12:03

## 2025-03-19 RX ADMIN — IPRATROPIUM BROMIDE AND ALBUTEROL SULFATE 3 ML: .5; 3 SOLUTION RESPIRATORY (INHALATION) at 19:55

## 2025-03-19 RX ADMIN — ACETAMINOPHEN 1000 MG: 500 TABLET, FILM COATED ORAL at 08:30

## 2025-03-19 RX ADMIN — TOLTERODINE TARTRATE 1 MG: 1 TABLET ORAL at 08:31

## 2025-03-19 RX ADMIN — PANTOPRAZOLE SODIUM 40 MG: 40 TABLET, DELAYED RELEASE ORAL at 08:30

## 2025-03-19 RX ADMIN — CARVEDILOL 12.5 MG: 12.5 TABLET, FILM COATED ORAL at 17:22

## 2025-03-19 RX ADMIN — CARBIDOPA AND LEVODOPA 2 TABLET: 25; 100 TABLET ORAL at 11:55

## 2025-03-19 RX ADMIN — GUAIFENESIN AND DEXTROMETHORPHAN HYDROBROMIDE 1 TABLET: 600; 30 TABLET, EXTENDED RELEASE ORAL at 20:36

## 2025-03-19 RX ADMIN — AZITHROMYCIN MONOHYDRATE 500 MG: 500 INJECTION, POWDER, LYOPHILIZED, FOR SOLUTION INTRAVENOUS at 12:58

## 2025-03-19 RX ADMIN — RALOXIFENE HYDROCHLORIDE 60 MG: 60 TABLET, FILM COATED ORAL at 20:36

## 2025-03-19 RX ADMIN — MEROPENEM 500 MG: 500 INJECTION, POWDER, FOR SOLUTION INTRAVENOUS at 11:58

## 2025-03-19 RX ADMIN — ATORVASTATIN CALCIUM 20 MG: 20 TABLET, FILM COATED ORAL at 08:31

## 2025-03-19 RX ADMIN — CARBIDOPA AND LEVODOPA 2 TABLET: 25; 100 TABLET ORAL at 17:22

## 2025-03-19 RX ADMIN — AMLODIPINE BESYLATE 10 MG: 5 TABLET ORAL at 08:31

## 2025-03-19 RX ADMIN — CARVEDILOL 12.5 MG: 12.5 TABLET, FILM COATED ORAL at 08:30

## 2025-03-19 RX ADMIN — IPRATROPIUM BROMIDE AND ALBUTEROL SULFATE 3 ML: .5; 3 SOLUTION RESPIRATORY (INHALATION) at 13:33

## 2025-03-19 RX ADMIN — POLYETHYLENE GLYCOL 3350 17 G: 17 POWDER, FOR SOLUTION ORAL at 11:55

## 2025-03-19 RX ADMIN — CARBIDOPA AND LEVODOPA 2 TABLET: 25; 100 TABLET ORAL at 20:36

## 2025-03-19 RX ADMIN — IPRATROPIUM BROMIDE AND ALBUTEROL SULFATE 3 ML: .5; 3 SOLUTION RESPIRATORY (INHALATION) at 08:05

## 2025-03-19 RX ADMIN — MEROPENEM 500 MG: 500 INJECTION, POWDER, FOR SOLUTION INTRAVENOUS at 20:36

## 2025-03-19 RX ADMIN — PREDNISONE 30 MG: 10 TABLET ORAL at 08:30

## 2025-03-19 RX ADMIN — SERTRALINE HYDROCHLORIDE 25 MG: 25 TABLET ORAL at 08:30

## 2025-03-19 RX ADMIN — TOLTERODINE TARTRATE 1 MG: 1 TABLET ORAL at 20:36

## 2025-03-19 RX ADMIN — ASPIRIN 81 MG: 81 TABLET, COATED ORAL at 08:30

## 2025-03-19 ASSESSMENT — ACTIVITIES OF DAILY LIVING (ADL)
ADLS_ACUITY_SCORE: 82
ADLS_ACUITY_SCORE: 86
ADLS_ACUITY_SCORE: 82
ADLS_ACUITY_SCORE: 86
ADLS_ACUITY_SCORE: 86
ADLS_ACUITY_SCORE: 82
ADLS_ACUITY_SCORE: 82
ADLS_ACUITY_SCORE: 86
ADLS_ACUITY_SCORE: 82
ADLS_ACUITY_SCORE: 86
ADLS_ACUITY_SCORE: 82

## 2025-03-19 NOTE — PROGRESS NOTES
Summary: Acute hypoxic respiratory failure d/t CHF/asthma exacerbation, increased fatigue, UTI    Orientation: A/O x3, Tylenol given    Vitals/Tele: VSS ex on 2L NC  IV Access/drains: R PIV SL     Diet: Regular    Mobility: A x 2 GB/W      GI/: Incontinent of bowel, purewick in place    Wound/Skin: Scattered bruising, preventive mepi in place    Consults: PT    Discharge Plan: TBD    See Flow sheets for assessment

## 2025-03-19 NOTE — PROGRESS NOTES
[facility]    Medicine Progress Note - Hospitalist Service    Date of Admission:  3/14/2025    Assessment & Plan   Opal Sin is a 89 year old female with a past medical history significant for HFpEF, CKD stage 3, breast cancer, asthma, Parkinson's, hyperlipidemia, and hypertension who generalized weakness and fatigue.      Of note, patient was recently admitted at [facility] from 3/8/25-3/12/25 for acute hypoxic respiratory 2/2 Influenza A, heart failure exacerbation and Asthma exacerbation. Treated with Tamiflu, IV lasix and steroids. She also developed new onset Afib on 3/10/25. She was given 15mg of IV diltiazem and was rate controlled after that. She was weaned off oxygen on 3/12/25 and discharged backto her VIOLA on 3/12/25.      Acute metabolic encephalopathy-resolved  Acute hypoxic respiratory failure due to asthma exacerbation, CHF/pulmonary edema  Left lower lobe pneumonia- Community-acquired vs hospital-acquired  ? Enterococcus Faecalis bacteriuria-asymptomatic, not treating  Generalized Weakness   Increased Fatigue   Presented with increased weakness and fatigue 2 days after recent hospitalization. In the ED, she was VSS other than being hypertensive. Labs showing a Cr of 1.25, BNP of 6,000, pH of 7.49 with Co2 of 38. Per ED provider patient was sounding wheezy and tachypneic. She was given duonebs, solumedrol and a small fluid bolus. She was placed on Hiflow given her tachypnea and not due to hypoxia. Per ED provider, patient looks much more comfortable with high flow on.      - Blood Cx: Negative  - UA normal.  Urine culture with Enterococcus faecalis.  Patient has multiple allergies to antibiotics [states that she can only tolerate Z-Chay] .  Held off on antibiotics at this time given multiple allergies and also noted clinical improvement in her mentation without any antibiotics.  Patient also reiterates conservative approach to her care.  Discussed with ID on  3/17 and they agreed-no need to treat.     - Spo2 goal > 90%                - Hi-flow oxygen placed in ED due to work of breathing and tachypnea-has been weaned off to room air on 3/16.  Over next few days was placed back on oxygen needing 3 L nasal cannula.  Continued acute hypoxic respiratory failure.  - Continue Duonebs and Pulmicort nebs   - Started on  Solumedrol 60mg Q12H at admission-switch to oral prednisone from 3/17.  Started tapering dose from 3/18.  Has been on steroids since 3/8 and this is contributing to fluid retention.  -Patient has coarse breath sounds and appears to be fluid overloaded.  Started diuresing on 3/15 with IV Lasix.  3/18-obtained CT chest noncontrast to further evaluate given persistent hypoxia  3/19-reviewed CT chest that showed left lower lobe pneumonia.  Also noted new leukocytosis-14.  Started on IV meropenem and azithromycin to cover for potential hospital-acquired pneumonia.  [Noted multiple drug allergies as well].  Holding further diuresis on 3/19 given new diagnosis of pneumonia.  Also added scheduled Mucinex DM, Aerobika/flutter valve.     - PT/OT consulted  - SW consulted                 - Per pharmacy: family states that [facility] is unable to administer meds and she was not receiving her nebs. Patient is in charge of taking her meds. Family helps set this up weekly.         MICHAEL on CKD 3  Received 250cc bolus in the ED.   - Cr: 1.25               - Baseline: 0.8-1.1   -Creatinine at 1 on 3/15/2025.  Monitor with diuresis.     HFpEF with mild acute exacerbation  On admitting exam, patient looks dry though her BNP is elevated and has some slight faint bibasilar crackles on exam. May have some component of fluid overload.  - TTE 5/2/2024: EF 50-55%, grade 2 diastolic dysfunction  - TTE 3/2025: Echo was poor quality.  Possibly minimally reduced EF      - Tele  - Strict I/Os and daily weights   Patient has coarse breath sounds in looks fluid overloaded  Started IV diuresis on 3/15.   Holding from 3/19 given new diagnosis of pneumonia.  Resume PTA 40 Mg daily Lasix as able in the next few days.       Goals of Care   Family and patient confirmed with ED provider that patient is DNR/DNI and would prefer comfort focused treatment. She is okay with BIPAP or high flow mask. Discussed with family about comfort care/hospice. They are open to hearing more about this but would like to wait on Palliative consult at this time.   -Patient's mentation has improved as of 3/16.  Current goals of care remain restorative.  - SW consulted      Mild Hyponatremia  Has intermittently low in the past  - Na: 133  - Continue to monitor     Recent Influenza A Infection   Flu A PCR 3/8/2025: Positive. Was treated with Tamiflu.   - Noted.  Rechecked viral panel on 3/18-negative COVID, RSV.  Influenza A PCR remains positive.     Recent New Onset Afib with RVR: Currently Rate Controlled   Noted to have heart rates in to the 120s on 3/10/25 during her last admission.  EKG done and confirmed atrial fibrillation.  She was given one time dose of  IV Diltiazem 15 mg. Has been rate controlled since. Anticoagulation was discussed but due to her history of brain aneurysm s/p coiling, anticoagulation was deferred.   - Tele  - Continue home Coreg 12.5mg BID with hold parameters      Hypertension   - Hold losartan 100 mg daily due to MICHAEL-BP is  - Continue amlodipine 10 mg daily, carvedilol 12.5 mg twice daily     Hyperlipidemia   - Continue atorvastatin 20 mg daily     Parkinson's Disease   - Continue Sinemet  mg 4 times daily     GERD   - Continue omeprazole 20 mg daily     History of Breast Cancer   - Continue raloxifene 60 mg daily     Depression   - Continue  Zoloft 25 mg daily     Peripheral Neuropathy   - Hold gabapentin 100 mg twice daily      History of Overactive Bladder   - Continue tolterodin             Diet: Combination Diet Regular Diet Adult  Room Service    DVT Prophylaxis: Pneumatic Compression Devices  Santana  "Catheter: Not present  Lines: None     Cardiac Monitoring: None  Code Status: No CPR- Do NOT Intubate      Medically Ready for Discharge: Anticipated in 2-4 Days pending weaning of oxygen, progress with therapies.    Clinically Significant Risk Factors         # Hyponatremia: Lowest Na = 133 mmol/L in last 2 days, will monitor as appropriate       # Hypoalbuminemia: Lowest albumin = 3.4 g/dL at 3/14/2025 12:04 PM, will monitor as appropriate     # Hypertension: Noted on problem list    # Acute heart failure with preserved ejection fraction: heart failure noted on problem list, last echo with EF >50%, and receiving IV diuretics           # Overweight: Estimated body mass index is 27.08 kg/m  as calculated from the following:    Height as of this encounter: 1.549 m (5' 1\").    Weight as of this encounter: 65 kg (143 lb 4.8 oz).      # Financial/Environmental Concerns: none  # Asthma: noted on problem list        Social Drivers of Health    Physical Activity: Inactive (7/11/2024)    Exercise Vital Sign     Days of Exercise per Week: 0 days     Minutes of Exercise per Session: 0 min   Social Connections: Unknown (7/11/2024)    Social Connection and Isolation Panel [NHANES]     Frequency of Social Gatherings with Friends and Family: More than three times a week                   Sol Cervantes MD  Hospitalist Service  [facility]  Securely message with [employer] (more info)  Text page via [employer] Paging/Directory   ______________________________________________________________________    Interval History   Stable overnight.  Still requiring oxygen, was up to 3 L nasal cannula oxygen this morning.  Still coughing and sounds quite congested.  No new complaints.  Discussed new finding of pneumonia on her CT chest.  She states she feels quite congested and asks for cough meds-Mucinex DM and Aerobika ordered.    Physical Exam   Vital Signs: Temp: 97.5  F (36.4  C) Temp src: Axillary BP: 99/67 Pulse: 82   " Resp: 20 SpO2: 93 % O2 Device: Nasal cannula Oxygen Delivery: 3 LPM  Weight: 143 lbs 4.78 oz    General Appearance: Alert, sitting up in chair not in acute distress  Respiratory: Bilateral coarse breath sounds with basilar crackles and bilateral wheezes heard on auscultation  Cardiovascular: Normal rate rhythm regular  GI: Soft, nontender nondistended bowel sounds positive  Skin: Warm and dry  Other:      Medical Decision Making       52 MINUTES SPENT BY ME on the date of service doing chart review, history, exam, documentation & further activities per the note.      Data     I have personally reviewed the following data over the past 24 hrs:    14.9 (H)  \   10.8 (L)   / 161     133 (L) 99 48.4 (H) /  124 (H)   3.9 25 1.04 (H) \       Imaging results reviewed over the past 24 hrs:   Recent Results (from the past 24 hours)   CT Chest w/o Contrast   Result Value    Radiologist flags Indeterminate left renal lesions.    Narrative    EXAM: CT CHEST W/O CONTRAST  LOCATION: [facility]  DATE: 3/18/2025    INDICATION: Ongoing hypoxia and cough.  COMPARISON: CTA chest 5/28/2024  TECHNIQUE: CT chest without IV contrast. Multiplanar reformats were obtained. Dose reduction techniques were used.  CONTRAST: None.    FINDINGS:   LUNGS AND PLEURA: Dense consolidation in the left lower lobe basilar segments. Additional patchy airspace opacities in the left lower lobe superior segment and right lower lobe. Trace bilateral pleural effusions. No pneumothorax. Extensive endobronchial   debris in the left lower lobe.    MEDIASTINUM/AXILLAE: Mild cardiomegaly. Small pericardial effusion. Enlarged right paratracheal lymph nodes measuring up to 1.1 cm in short axis. Limited evaluation for hilar lymphadenopathy on noncontrast exam.    CORONARY ARTERY CALCIFICATION: Severe.    UPPER ABDOMEN: Multiple left renal upper pole lesions, many of which are intermediate density and indeterminate.    MUSCULOSKELETAL: Left  breast implant. Stimulator device in the right chest, with leads extending into the thoracic spinal canal. Right shoulder arthroplasty, incompletely imaged. Multilevel degenerative changes of the spine. Lumbar spinal fusion   hardware, incompletely imaged.      Impression    IMPRESSION:   1.  Dense left lower lobe consolidation and patchy right lower lobe airspace opacities, compatible with multifocal pneumonia or aspiration. Extensive endobronchial debris in the left lower lobe, either mucous secretions or aspirated material. Consider CT   follow-up in 6-12 weeks after treatment to document complete resolution and exclude underlying mass.    2.  Trace bilateral pleural effusions.    3.  Small pericardial effusion.    4.  Enlarged right paratracheal lymph nodes, likely reactive.    5.  Incidental note of multiple indeterminate left renal lesions. Given the number of lesions, these are unlikely to be adequately characterized on ultrasound. Consider nonemergent renal protocol MRI for characterization, if clinically warranted.      [Consider Follow Up: Indeterminate left renal lesions.]    This report will be copied to the [facility] to ensure a provider acknowledges the finding.

## 2025-03-19 NOTE — PLAN OF CARE
Goal Outcome Evaluation:    Summary: Acute hypoxic respiratory failure d/t CHF/asthma exacerbation, increased fatigue, UTI    DATE & TIME: 03/19/25 [id number]    Cognitive Concerns/ Orientation: A&O x3, disoriented to situation. Forgetful at times. Voice hoarse - lost from coughing. [place] - bilateral hearing aids.  BEHAVIOR & AGGRESSION TOOL COLOR: Green  CIWA SCORE: NA   ABNL VS/O2: VSS on 2L NC (sats low 90s)  MOBILITY: Ax1-2 GB/Walker. Up in chair x1 this shift  PAIN MANAGMENT: C/O left arm pain, managed with scheduled Tylenol  DIET: Regular, poor appetite.  BOWEL/BLADDER: Inc of B&B. Purewick in place with good UOP. Last BM documented 3/15 - PRN Miralax given x1  ABNL LAB/BG: ; Cr 1.04; WBC 14.9 (trending up)  DRAIN/DEVICES: R PIV SL, intermittent abx.  TELEMETRY RHYTHM: NA  SKIN: scattered scabs and bruises, blanchable redness to sacrum/coccyx - mepilex in place  TESTS/PROCEDURES: none new  D/C DATE: pending  OTHER IMPORTANT INFO: Takes pills whole in applesauce. PT following. BID Mucinex started for frequent/productive cough.

## 2025-03-20 VITALS
SYSTOLIC BLOOD PRESSURE: 99 MMHG | DIASTOLIC BLOOD PRESSURE: 67 MMHG | HEIGHT: 61 IN | WEIGHT: 143.3 LBS | BODY MASS INDEX: 27.06 KG/M2 | RESPIRATION RATE: 17 BRPM | OXYGEN SATURATION: 97 % | TEMPERATURE: 97.9 F | HEART RATE: 92 BPM

## 2025-03-20 LAB
ANION GAP SERPL CALCULATED.3IONS-SCNC: 11 MMOL/L (ref 7–15)
BUN SERPL-MCNC: 50.4 MG/DL (ref 8–23)
CALCIUM SERPL-MCNC: 8.7 MG/DL (ref 8.8–10.4)
CHLORIDE SERPL-SCNC: 100 MMOL/L (ref 98–107)
CREAT SERPL-MCNC: 1.07 MG/DL (ref 0.51–0.95)
EGFRCR SERPLBLD CKD-EPI 2021: 50 ML/MIN/1.73M2
ERYTHROCYTE [DISTWIDTH] IN BLOOD BY AUTOMATED COUNT: 15.9 % (ref 10–15)
GLUCOSE SERPL-MCNC: 128 MG/DL (ref 70–99)
HCO3 SERPL-SCNC: 23 MMOL/L (ref 22–29)
HCT VFR BLD AUTO: 32.2 % (ref 35–47)
HGB BLD-MCNC: 10.9 G/DL (ref 11.7–15.7)
MCH RBC QN AUTO: 29.3 PG (ref 26.5–33)
MCHC RBC AUTO-ENTMCNC: 33.9 G/DL (ref 31.5–36.5)
MCV RBC AUTO: 87 FL (ref 78–100)
PLATELET # BLD AUTO: 164 10E3/UL (ref 150–450)
POTASSIUM SERPL-SCNC: 4.3 MMOL/L (ref 3.4–5.3)
RBC # BLD AUTO: 3.72 10E6/UL (ref 3.8–5.2)
SODIUM SERPL-SCNC: 134 MMOL/L (ref 135–145)
WBC # BLD AUTO: 17.9 10E3/UL (ref 4–11)

## 2025-03-20 PROCEDURE — 94640 AIRWAY INHALATION TREATMENT: CPT

## 2025-03-20 PROCEDURE — 85027 COMPLETE CBC AUTOMATED: CPT | Performed by: HOSPITALIST

## 2025-03-20 PROCEDURE — 999N000157 HC STATISTIC RCP TIME EA 10 MIN

## 2025-03-20 PROCEDURE — 250N000012 HC RX MED GY IP 250 OP 636 PS 637: Performed by: HOSPITALIST

## 2025-03-20 PROCEDURE — 94640 AIRWAY INHALATION TREATMENT: CPT | Mod: 76

## 2025-03-20 PROCEDURE — 250N000009 HC RX 250: Performed by: STUDENT IN AN ORGANIZED HEALTH CARE EDUCATION/TRAINING PROGRAM

## 2025-03-20 PROCEDURE — 250N000011 HC RX IP 250 OP 636: Performed by: HOSPITALIST

## 2025-03-20 PROCEDURE — 99233 SBSQ HOSP IP/OBS HIGH 50: CPT | Performed by: HOSPITALIST

## 2025-03-20 PROCEDURE — 250N000013 HC RX MED GY IP 250 OP 250 PS 637: Performed by: STUDENT IN AN ORGANIZED HEALTH CARE EDUCATION/TRAINING PROGRAM

## 2025-03-20 PROCEDURE — 80048 BASIC METABOLIC PNL TOTAL CA: CPT | Performed by: HOSPITALIST

## 2025-03-20 PROCEDURE — 120N000001 HC R&B MED SURG/OB

## 2025-03-20 PROCEDURE — 250N000013 HC RX MED GY IP 250 OP 250 PS 637: Performed by: HOSPITALIST

## 2025-03-20 PROCEDURE — 36415 COLL VENOUS BLD VENIPUNCTURE: CPT | Performed by: HOSPITALIST

## 2025-03-20 PROCEDURE — 250N000013 HC RX MED GY IP 250 OP 250 PS 637: Performed by: INTERNAL MEDICINE

## 2025-03-20 RX ADMIN — CARBIDOPA AND LEVODOPA 2 TABLET: 25; 100 TABLET ORAL at 05:54

## 2025-03-20 RX ADMIN — CARBIDOPA AND LEVODOPA 2 TABLET: 25; 100 TABLET ORAL at 11:22

## 2025-03-20 RX ADMIN — AZITHROMYCIN DIHYDRATE 250 MG: 250 TABLET ORAL at 08:42

## 2025-03-20 RX ADMIN — GUAIFENESIN AND DEXTROMETHORPHAN HYDROBROMIDE 1 TABLET: 600; 30 TABLET, EXTENDED RELEASE ORAL at 20:52

## 2025-03-20 RX ADMIN — ACETAMINOPHEN 1000 MG: 500 TABLET, FILM COATED ORAL at 20:51

## 2025-03-20 RX ADMIN — PREDNISONE 30 MG: 10 TABLET ORAL at 08:43

## 2025-03-20 RX ADMIN — CARBIDOPA AND LEVODOPA 2 TABLET: 25; 100 TABLET ORAL at 15:16

## 2025-03-20 RX ADMIN — SERTRALINE HYDROCHLORIDE 25 MG: 25 TABLET ORAL at 08:43

## 2025-03-20 RX ADMIN — BUDESONIDE 0.5 MG: 0.5 INHALANT RESPIRATORY (INHALATION) at 19:36

## 2025-03-20 RX ADMIN — MEROPENEM 500 MG: 500 INJECTION, POWDER, FOR SOLUTION INTRAVENOUS at 04:15

## 2025-03-20 RX ADMIN — BUDESONIDE 0.5 MG: 0.5 INHALANT RESPIRATORY (INHALATION) at 12:10

## 2025-03-20 RX ADMIN — ATORVASTATIN CALCIUM 20 MG: 20 TABLET, FILM COATED ORAL at 08:44

## 2025-03-20 RX ADMIN — CARVEDILOL 12.5 MG: 12.5 TABLET, FILM COATED ORAL at 18:24

## 2025-03-20 RX ADMIN — CARVEDILOL 12.5 MG: 12.5 TABLET, FILM COATED ORAL at 08:43

## 2025-03-20 RX ADMIN — TOLTERODINE TARTRATE 1 MG: 1 TABLET ORAL at 08:43

## 2025-03-20 RX ADMIN — PANTOPRAZOLE SODIUM 40 MG: 40 TABLET, DELAYED RELEASE ORAL at 08:44

## 2025-03-20 RX ADMIN — AMLODIPINE BESYLATE 10 MG: 5 TABLET ORAL at 08:44

## 2025-03-20 RX ADMIN — GUAIFENESIN AND DEXTROMETHORPHAN HYDROBROMIDE 1 TABLET: 600; 30 TABLET, EXTENDED RELEASE ORAL at 08:43

## 2025-03-20 RX ADMIN — RALOXIFENE HYDROCHLORIDE 60 MG: 60 TABLET, FILM COATED ORAL at 20:52

## 2025-03-20 RX ADMIN — CARBIDOPA AND LEVODOPA 2 TABLET: 25; 100 TABLET ORAL at 20:52

## 2025-03-20 RX ADMIN — IPRATROPIUM BROMIDE AND ALBUTEROL SULFATE 3 ML: .5; 3 SOLUTION RESPIRATORY (INHALATION) at 19:36

## 2025-03-20 RX ADMIN — TOLTERODINE TARTRATE 1 MG: 1 TABLET ORAL at 20:52

## 2025-03-20 RX ADMIN — MEROPENEM 500 MG: 500 INJECTION, POWDER, FOR SOLUTION INTRAVENOUS at 11:22

## 2025-03-20 RX ADMIN — IPRATROPIUM BROMIDE AND ALBUTEROL SULFATE 3 ML: .5; 3 SOLUTION RESPIRATORY (INHALATION) at 12:10

## 2025-03-20 RX ADMIN — MEROPENEM 500 MG: 500 INJECTION, POWDER, FOR SOLUTION INTRAVENOUS at 20:55

## 2025-03-20 RX ADMIN — ACETAMINOPHEN 1000 MG: 500 TABLET, FILM COATED ORAL at 08:44

## 2025-03-20 ASSESSMENT — ACTIVITIES OF DAILY LIVING (ADL)
ADLS_ACUITY_SCORE: 76
ADLS_ACUITY_SCORE: 82
ADLS_ACUITY_SCORE: 76
ADLS_ACUITY_SCORE: 76
ADLS_ACUITY_SCORE: 82
ADLS_ACUITY_SCORE: 82
ADLS_ACUITY_SCORE: 76
ADLS_ACUITY_SCORE: 83
ADLS_ACUITY_SCORE: 76
ADLS_ACUITY_SCORE: 82
ADLS_ACUITY_SCORE: 83
ADLS_ACUITY_SCORE: 83
ADLS_ACUITY_SCORE: 82
ADLS_ACUITY_SCORE: 76
ADLS_ACUITY_SCORE: 76
ADLS_ACUITY_SCORE: 82
ADLS_ACUITY_SCORE: 76
ADLS_ACUITY_SCORE: 82

## 2025-03-20 NOTE — PLAN OF CARE
Goal Outcome Evaluation:    Summary: Acute hypoxic respiratory failure d/t CHF/asthma exacerbation, increased fatigue, UTI    DATE & TIME: 03/20/25 [id number]    Cognitive Concerns/ Orientation: A&O x3, disoriented to situation. Forgetful at times. Voice hoarse - lost from coughing. [place] - bilateral hearing aids.  BEHAVIOR & AGGRESSION TOOL COLOR: Green  CIWA SCORE: NA   ABNL VS/O2: VSS on 2L NC (sats low 90s)  MOBILITY: Ax1-2 GB/Walker. Up in chair x1 this shift  PAIN MANAGMENT: C/O right hip & knee pain, managed with scheduled Tylenol  DIET: Regular, poor appetite.  BOWEL/BLADDER: Inc of B&B. Purewick in place with good UOP. No BM this shift  ABNL LAB/BG: ; Cr 1.07; BUN 50.4; WBC 17.9 (trending up)  DRAIN/DEVICES: R PIV SL, intermittent abx.  TELEMETRY RHYTHM: NA  SKIN: scattered scabs and bruises, blanchable redness to sacrum/coccyx - mepilex in place  TESTS/PROCEDURES: none new  D/C DATE: pending  OTHER IMPORTANT INFO: Takes pills whole in applesauce. PT following. Continues on BID Mucinex for cough.

## 2025-03-20 NOTE — PROGRESS NOTES
[facility]    Medicine Progress Note - Hospitalist Service    Date of Admission:  3/14/2025    Assessment & Plan   Opal Sin is a 89 year old female with a past medical history significant for HFpEF, CKD stage 3, breast cancer, asthma, Parkinson's, hyperlipidemia, and hypertension who generalized weakness and fatigue.      Of note, patient was recently admitted at [facility] from 3/8/25-3/12/25 for acute hypoxic respiratory 2/2 Influenza A, heart failure exacerbation and Asthma exacerbation. Treated with Tamiflu, IV lasix and steroids. She also developed new onset Afib on 3/10/25. She was given 15mg of IV diltiazem and was rate controlled after that. She was weaned off oxygen on 3/12/25 and discharged backto her [facility] on 3/12/25.      Acute metabolic encephalopathy-resolved  Acute hypoxic respiratory failure due to asthma exacerbation, CHF/pulmonary edema  Left lower lobe pneumonia- Community-acquired vs hospital-acquired  ? Enterococcus Faecalis bacteriuria-asymptomatic, not treating  Generalized Weakness   Increased Fatigue   Presented with increased weakness and fatigue 2 days after recent hospitalization. In the ED, she was VSS other than being hypertensive. Labs showing a Cr of 1.25, BNP of 6,000, pH of 7.49 with Co2 of 38. Per ED provider patient was sounding wheezy and tachypneic. She was given duonebs, solumedrol and a small fluid bolus. She was placed on Hiflow given her tachypnea and not due to hypoxia. Per ED provider, patient looks much more comfortable with high flow on.      - Blood Cx: Negative  - UA normal.  Urine culture with Enterococcus faecalis.  Patient has multiple allergies to antibiotics [states that she can only tolerate Z-Chay] .  Held off on antibiotics at this time given multiple allergies and also noted clinical improvement in her mentation without any antibiotics.  Patient also reiterates conservative approach to her care.  Discussed with ID on  3/17 and they agreed-no need to treat.     - Spo2 goal > 90%                - Hi-flow oxygen placed in ED due to work of breathing and tachypnea-has been weaned off to room air on 3/16.  Over next few days was placed back on oxygen needing 3 L nasal cannula.  Continued acute hypoxic respiratory failure.  - Continue Duonebs and Pulmicort nebs   - Started on  Solumedrol 60mg Q12H at admission-switch to oral prednisone from 3/17.  Started tapering dose from 3/18.  Has been on steroids since 3/8 and this is contributing to fluid retention.  -Patient has coarse breath sounds and appears to be fluid overloaded.  Started diuresing on 3/15 with IV Lasix.  3/18-obtained CT chest noncontrast to further evaluate given persistent hypoxia  3/19-reviewed CT chest that showed left lower lobe pneumonia.  Also noted new leukocytosis-14.  Started on IV meropenem and azithromycin to cover for potential hospital-acquired pneumonia.  [Noted multiple drug allergies as well].  Holding further diuresis on 3/19 given new diagnosis of pneumonia.  Also added scheduled Mucinex DM, Aerobika/flutter valve.     - PT/OT consulted  - SW consulted                 - Per pharmacy: family states that custodial is unable to administer meds and she was not receiving her nebs. Patient is in charge of taking her meds. Family helps set this up weekly.           MICHAEL on CKD 3  Received 250cc bolus in the ED.   - Cr: 1.25               - Baseline: 0.8-1.1   -Creatinine at 1 on 3/15/2025.  Monitor with diuresis.     HFpEF with mild acute exacerbation  On admitting exam, patient looks dry though her BNP is elevated and has some slight faint bibasilar crackles on exam. May have some component of fluid overload.  - TTE 5/2/2024: EF 50-55%, grade 2 diastolic dysfunction  - TTE 3/2025: Echo was poor quality.  Possibly minimally reduced EF      - Tele  - Strict I/Os and daily weights   Patient has coarse breath sounds in looks fluid overloaded  Started IV diuresis on 3/15.   Holding from 3/19 given new diagnosis of pneumonia.  Resume PTA 40 Mg daily Lasix as able in the next few days.        Goals of Care   Family and patient confirmed with ED provider that patient is DNR/DNI and would prefer comfort focused treatment. She is okay with BIPAP or high flow mask. Discussed with family about comfort care/hospice. They are open to hearing more about this but would like to wait on Palliative consult at this time.   -Patient's mentation has improved as of 3/16.  Current goals of care remain restorative.  - SW consulted      Mild Hyponatremia  Has intermittently low in the past  - Na: 133  - Continue to monitor     Recent Influenza A Infection   Flu A PCR 3/8/2025: Positive. Was treated with Tamiflu.   - Noted.  Rechecked viral panel on 3/18-negative COVID, RSV.  Influenza A PCR remains positive.     Recent New Onset Afib with RVR: Currently Rate Controlled   Noted to have heart rates in to the 120s on 3/10/25 during her last admission.  EKG done and confirmed atrial fibrillation.  She was given one time dose of  IV Diltiazem 15 mg. Has been rate controlled since. Anticoagulation was discussed but due to her history of brain aneurysm s/p coiling, anticoagulation was deferred.   - Tele  - Continue home Coreg 12.5mg BID with hold parameters      Hypertension   - Hold losartan 100 mg daily due to MICHAEL-BP is  - Continue amlodipine 10 mg daily, carvedilol 12.5 mg twice daily     Hyperlipidemia   - Continue atorvastatin 20 mg daily     Parkinson's Disease   - Continue Sinemet  mg 4 times daily     GERD   - Continue omeprazole 20 mg daily     History of Breast Cancer   - Continue raloxifene 60 mg daily     Depression   - Continue  Zoloft 25 mg daily     Peripheral Neuropathy   - Hold gabapentin 100 mg twice daily      History of Overactive Bladder   - Continue tolterodin             Diet: Combination Diet Regular Diet Adult  Room Service    DVT Prophylaxis: Pneumatic Compression Devices  Santana  "Catheter: Not present  Lines: None     Cardiac Monitoring: None  Code Status: No CPR- Do NOT Intubate      Clinically Significant Risk Factors         # Hyponatremia: Lowest Na = 133 mmol/L in last 2 days, will monitor as appropriate       # Hypoalbuminemia: Lowest albumin = 3.4 g/dL at 3/14/2025 12:04 PM, will monitor as appropriate     # Hypertension: Noted on problem list  # Acute heart failure with preserved ejection fraction: heart failure noted on problem list, last echo with EF >50%, and receiving IV diuretics           # Overweight: Estimated body mass index is 27.08 kg/m  as calculated from the following:    Height as of this encounter: 1.549 m (5' 1\").    Weight as of this encounter: 65 kg (143 lb 4.8 oz).      # Financial/Environmental Concerns: none  # Asthma: noted on problem list        Social Drivers of Health    Physical Activity: Inactive (7/11/2024)    Exercise Vital Sign     Days of Exercise per Week: 0 days     Minutes of Exercise per Session: 0 min   Social Connections: Unknown (7/11/2024)    Social Connection and Isolation Panel [NHANES]     Frequency of Social Gatherings with Friends and Family: More than three times a week          Disposition Plan     Medically Ready for Discharge: Anticipated in 2-4 Days             Arlene Dunham MD  Hospitalist Service  [facility]  Securely message with [employer] (more info)  Text page via [employer] Paging/Directory   ______________________________________________________________________    Interval History   Patient with cough but denies shortness of breath.  Concern from nursing staff regarding being able to follow commands.  Will discuss with patient's family regarding goals of care    Physical Exam   Vital Signs: Temp: 97.7  F (36.5  C) Temp src: Axillary BP: 132/74 Pulse: 69   Resp: 20 SpO2: 92 % O2 Device: Nasal cannula Oxygen Delivery: 2 LPM  Weight: 143 lbs 4.78 oz    General Appearance: Well appearing for stated age.  Respiratory: " CTAB, no rales or ronchi  Cardiovascular: S1, S2 normal, no murmurs  GI: non-tender on palpation, BS present      Medical Decision Making       55 MINUTES SPENT BY ME on the date of service doing chart review, history, exam, documentation & further activities per the note.      Data     I have personally reviewed the following data over the past 24 hrs:    17.9 (H)  \   10.9 (L)   / 164     134 (L) 100 50.4 (H) /  128 (H)   4.3 23 1.07 (H) \       Imaging results reviewed over the past 24 hrs:   No results found for this or any previous visit (from the past 24 hours).

## 2025-03-21 ENCOUNTER — APPOINTMENT (OUTPATIENT)
Dept: SPEECH THERAPY | Facility: CLINIC | Age: 89
End: 2025-03-21
Attending: HOSPITALIST
Payer: COMMERCIAL

## 2025-03-21 ENCOUNTER — APPOINTMENT (OUTPATIENT)
Dept: PHYSICAL THERAPY | Facility: CLINIC | Age: 89
End: 2025-03-21
Payer: COMMERCIAL

## 2025-03-21 PROCEDURE — 97530 THERAPEUTIC ACTIVITIES: CPT | Mod: GP | Performed by: PHYSICAL THERAPIST

## 2025-03-21 PROCEDURE — 250N000013 HC RX MED GY IP 250 OP 250 PS 637: Performed by: HOSPITALIST

## 2025-03-21 PROCEDURE — 92610 EVALUATE SWALLOWING FUNCTION: CPT | Mod: GN

## 2025-03-21 PROCEDURE — 250N000011 HC RX IP 250 OP 636: Performed by: HOSPITALIST

## 2025-03-21 PROCEDURE — 250N000009 HC RX 250: Performed by: STUDENT IN AN ORGANIZED HEALTH CARE EDUCATION/TRAINING PROGRAM

## 2025-03-21 PROCEDURE — 99233 SBSQ HOSP IP/OBS HIGH 50: CPT | Performed by: HOSPITALIST

## 2025-03-21 PROCEDURE — 999N000157 HC STATISTIC RCP TIME EA 10 MIN

## 2025-03-21 PROCEDURE — 97110 THERAPEUTIC EXERCISES: CPT | Mod: GP | Performed by: PHYSICAL THERAPIST

## 2025-03-21 PROCEDURE — 250N000012 HC RX MED GY IP 250 OP 636 PS 637: Performed by: HOSPITALIST

## 2025-03-21 PROCEDURE — 120N000001 HC R&B MED SURG/OB

## 2025-03-21 PROCEDURE — 250N000013 HC RX MED GY IP 250 OP 250 PS 637: Performed by: STUDENT IN AN ORGANIZED HEALTH CARE EDUCATION/TRAINING PROGRAM

## 2025-03-21 PROCEDURE — 94640 AIRWAY INHALATION TREATMENT: CPT

## 2025-03-21 PROCEDURE — 250N000013 HC RX MED GY IP 250 OP 250 PS 637: Performed by: INTERNAL MEDICINE

## 2025-03-21 PROCEDURE — 94640 AIRWAY INHALATION TREATMENT: CPT | Mod: 76

## 2025-03-21 RX ORDER — PREDNISONE 20 MG/1
40 TABLET ORAL DAILY
Status: DISCONTINUED | OUTPATIENT
Start: 2025-03-22 | End: 2025-03-22

## 2025-03-21 RX ORDER — PREDNISONE 20 MG/1
20 TABLET ORAL DAILY
Status: DISCONTINUED | OUTPATIENT
Start: 2025-03-22 | End: 2025-03-21

## 2025-03-21 RX ADMIN — SERTRALINE HYDROCHLORIDE 25 MG: 25 TABLET ORAL at 09:36

## 2025-03-21 RX ADMIN — CARBIDOPA AND LEVODOPA 2 TABLET: 25; 100 TABLET ORAL at 16:43

## 2025-03-21 RX ADMIN — BUDESONIDE 0.5 MG: 0.5 INHALANT RESPIRATORY (INHALATION) at 14:23

## 2025-03-21 RX ADMIN — CARVEDILOL 12.5 MG: 12.5 TABLET, FILM COATED ORAL at 17:00

## 2025-03-21 RX ADMIN — ACETAMINOPHEN 1000 MG: 500 TABLET, FILM COATED ORAL at 09:37

## 2025-03-21 RX ADMIN — RALOXIFENE HYDROCHLORIDE 60 MG: 60 TABLET, FILM COATED ORAL at 21:41

## 2025-03-21 RX ADMIN — MEROPENEM 500 MG: 500 INJECTION, POWDER, FOR SOLUTION INTRAVENOUS at 21:42

## 2025-03-21 RX ADMIN — MEROPENEM 500 MG: 500 INJECTION, POWDER, FOR SOLUTION INTRAVENOUS at 04:31

## 2025-03-21 RX ADMIN — GUAIFENESIN AND DEXTROMETHORPHAN HYDROBROMIDE 1 TABLET: 600; 30 TABLET, EXTENDED RELEASE ORAL at 09:37

## 2025-03-21 RX ADMIN — PANTOPRAZOLE SODIUM 40 MG: 40 TABLET, DELAYED RELEASE ORAL at 09:37

## 2025-03-21 RX ADMIN — MEROPENEM 500 MG: 500 INJECTION, POWDER, FOR SOLUTION INTRAVENOUS at 11:15

## 2025-03-21 RX ADMIN — TOLTERODINE TARTRATE 1 MG: 1 TABLET ORAL at 21:41

## 2025-03-21 RX ADMIN — BUDESONIDE 0.5 MG: 0.5 INHALANT RESPIRATORY (INHALATION) at 20:44

## 2025-03-21 RX ADMIN — IPRATROPIUM BROMIDE AND ALBUTEROL SULFATE 3 ML: .5; 3 SOLUTION RESPIRATORY (INHALATION) at 20:41

## 2025-03-21 RX ADMIN — TOLTERODINE TARTRATE 1 MG: 1 TABLET ORAL at 09:37

## 2025-03-21 RX ADMIN — IPRATROPIUM BROMIDE AND ALBUTEROL SULFATE 3 ML: .5; 3 SOLUTION RESPIRATORY (INHALATION) at 07:27

## 2025-03-21 RX ADMIN — CARVEDILOL 12.5 MG: 12.5 TABLET, FILM COATED ORAL at 09:37

## 2025-03-21 RX ADMIN — CARBIDOPA AND LEVODOPA 2 TABLET: 25; 100 TABLET ORAL at 21:41

## 2025-03-21 RX ADMIN — ASPIRIN 81 MG: 81 TABLET, COATED ORAL at 09:37

## 2025-03-21 RX ADMIN — PREDNISONE 30 MG: 10 TABLET ORAL at 09:36

## 2025-03-21 RX ADMIN — AMLODIPINE BESYLATE 10 MG: 5 TABLET ORAL at 09:36

## 2025-03-21 RX ADMIN — IPRATROPIUM BROMIDE AND ALBUTEROL SULFATE 3 ML: .5; 3 SOLUTION RESPIRATORY (INHALATION) at 14:23

## 2025-03-21 RX ADMIN — AZITHROMYCIN DIHYDRATE 250 MG: 250 TABLET ORAL at 09:36

## 2025-03-21 RX ADMIN — POLYETHYLENE GLYCOL 3350 17 G: 17 POWDER, FOR SOLUTION ORAL at 09:47

## 2025-03-21 RX ADMIN — ATORVASTATIN CALCIUM 20 MG: 20 TABLET, FILM COATED ORAL at 09:37

## 2025-03-21 RX ADMIN — CARBIDOPA AND LEVODOPA 2 TABLET: 25; 100 TABLET ORAL at 11:15

## 2025-03-21 RX ADMIN — CARBIDOPA AND LEVODOPA 2 TABLET: 25; 100 TABLET ORAL at 05:49

## 2025-03-21 RX ADMIN — GUAIFENESIN AND DEXTROMETHORPHAN HYDROBROMIDE 1 TABLET: 600; 30 TABLET, EXTENDED RELEASE ORAL at 21:41

## 2025-03-21 ASSESSMENT — ACTIVITIES OF DAILY LIVING (ADL)
ADLS_ACUITY_SCORE: 82
ADLS_ACUITY_SCORE: 83
ADLS_ACUITY_SCORE: 82
ADLS_ACUITY_SCORE: 83
ADLS_ACUITY_SCORE: 82
ADLS_ACUITY_SCORE: 83
ADLS_ACUITY_SCORE: 82
ADLS_ACUITY_SCORE: 83
ADLS_ACUITY_SCORE: 83
ADLS_ACUITY_SCORE: 82
ADLS_ACUITY_SCORE: 82

## 2025-03-21 NOTE — PROGRESS NOTES
[facility]    Medicine Progress Note - Hospitalist Service    Date of Admission:  3/14/2025    Assessment & Plan   Opal Sin is a 89 year old female with a past medical history significant for HFpEF, CKD stage 3, breast cancer, asthma, Parkinson's, hyperlipidemia, and hypertension who presented with generalized weakness and fatigue.      Of note, patient was recently admitted at [facility] from 3/8/25-3/12/25 for acute hypoxic respiratory 2/2 Influenza A, heart failure exacerbation and Asthma exacerbation. Treated with Tamiflu, IV lasix and steroids. She also developed new onset Afib on 3/10/25. She was given 15mg of IV diltiazem and was rate controlled after that. She was weaned off oxygen on 3/12/25 and [facility].      Acute metabolic encephalopathy-resolved  Acute hypoxic respiratory failure due to asthma exacerbation CHF/pulmonary edema  Left lower lobe pneumonia- Community-acquired vs hospital-acquired  ? Enterococcus Faecalis bacteriuria-asymptomatic, not treating  Generalized Weakness   Increased Fatigue     *Presented with increased weakness and fatigue 2 days after recent hospitalization.   *In the ED, she was VSS other than being hypertensive. Labs showing a Cr of 1.25, BNP of 6,000, pH of 7.49 with Co2 of 38.   *Per ED provider patient was sounding wheezy and tachypneic. She was given duonebs, solumedrol and a small fluid bolus. She was placed on Hiflow given her tachypnea and not due to hypoxia.   *CT chest  obtained on 3/18 showed left lower lobe pneumonia and patchy right lower lobe airspace opacities, compatible with multifocal pneumonia or aspiration   Plan   -- on meropenem and azithromycin started on 3/19 (multiple drug allergies). Initially was being monitored off abx.   --  Started on  Solumedrol 60mg Q12H at admission-switch to oral prednisone from 3/17.  Started tapering dose from 3/18.  Has been on steroids since 3/8 and  this is contributing to fluid retention. Patient wheezing, hence will increase steroid dose back to 40mg on 3/21.   initially was being diuresed; 3/15 - 3/19. Lasix held due to new diagnosis of PNA. Now resumed 3/21  -- Continue Duonebs and Pulmicort nebs   -- Blood Cx: NGTD  - UA normal.  Urine culture with Enterococcus faecalis.  Patient has multiple allergies to antibiotics [states that she can only tolerate Z-Chay] .  Previous provider discussed with ID on 3/17 and they agreed-no need to treat.  - PT/OT consulted  - SLP to eval for aspiration. Plan for video swallo eval on Monday 3/24    HFpEF with mild acute exacerbation  *Patient noted to look dry on admission.   *BNP elevated, crackles noted on admitting exam.  * TTE 5/2/2024: EF 50-55%, grade 2 diastolic dysfunction  * TTE 3/2025: Echo was poor quality.  Possibly minimally reduced EF    Plan  -- initially was being diuresed, 3/15 - 3/19. Lasix held due to new diagnosis of PNA.   -- will resume PTA lasix today, 3/21  -- continue to monitor resp status  -- continue supplemental O2, wean O2 as tolerated.       MICHAEL on CKD 3  Received 250cc bolus in the ED.   - Cr: 1.25 on admission  -- Baseline: 0.8-1.1   -- now improved to ~1.02  -- continue to monitor BMP       Goals of Care   Family and patient confirmed with ED provider that patient is DNR/DNI and would prefer comfort focused treatment. She is okay with BIPAP or high flow mask. Discussed with family about comfort care/hospice. They are open to hearing more about this but would like to wait on Palliative consult at this time.   -Patient's mentation has improved as of 3/16.  Current goals of care remain restorative.  - SW consulted   - on 3/21, after giving update to patient and family (2 sisters and  bother shahriaraw,) patient asked if she consider the option of dying, after I probed further asking if she would like to restorative measures, she notes she wiill like to get well and feel better and if this is not to  be achieved or is prolonged, then she will like to pursue the option of comfort care. Palliative care consult placed for further discussions.     Mild Hyponatremia  Has intermittently low in the past  - Na: 133 on admission, trended up slightly  - Continue to monitor     Recent Influenza A Infection   Flu A PCR 3/8/2025: Positive. Was treated with Tamiflu.   - Noted.  Rechecked viral panel on 3/18-negative COVID, RSV.  Influenza A PCR remains positive.     Recent New Onset Afib with RVR: Currently Rate Controlled   Noted to have heart rates in to the 120s on 3/10/25 during her last admission.  EKG done and confirmed atrial fibrillation.  She was given one time dose of  IV Diltiazem 15 mg. Has been rate controlled since. Anticoagulation was discussed but due to her history of brain aneurysm s/p coiling, anticoagulation was deferred.   - Tele  - Continue home Coreg 12.5mg BID with hold parameters      Hypertension   - Hold losartan 100 mg daily due to MICHAEL-BP is  - Continue amlodipine 10 mg daily, carvedilol 12.5 mg twice daily     Hyperlipidemia   - Continue atorvastatin 20 mg daily     Parkinson's Disease   - Continue Sinemet  mg 4 times daily     GERD   - Continue omeprazole 20 mg daily     History of Breast Cancer   - Continue raloxifene 60 mg daily     Depression   - Continue  Zoloft 25 mg daily     Peripheral Neuropathy   - Hold gabapentin 100 mg twice daily      History of Overactive Bladder   - Continue tolterodin             Diet: Combination Diet Regular Diet Adult  Room Service  Snacks/Supplements Adult: Magic Cup; Between Meals  Snacks/Supplements Adult: Gelatein Plus; Between Meals    DVT Prophylaxis: Pneumatic Compression Devices  Santana Catheter: Not present  Lines: None     Cardiac Monitoring: None  Code Status: No CPR- Do NOT Intubate      Clinically Significant Risk Factors         # Hyponatremia: Lowest Na = 134 mmol/L in last 2 days, will monitor as appropriate       # Hypoalbuminemia: Lowest  "albumin = 3.4 g/dL at 3/14/2025 12:04 PM, will monitor as appropriate     # Hypertension: Noted on problem list    # Chronic heart failure with preserved ejection fraction: heart failure noted on problem list and last echo with EF >50%           # Overweight: Estimated body mass index is 26.24 kg/m  as calculated from the following:    Height as of this encounter: 1.549 m (5' 1\").    Weight as of this encounter: 63 kg (138 lb 14.2 oz).        # Financial/Environmental Concerns: none  # Asthma: noted on problem list        Social Drivers of Health    Physical Activity: Inactive (7/11/2024)    Exercise Vital Sign     Days of Exercise per Week: 0 days     Minutes of Exercise per Session: 0 min   Social Connections: Unknown (7/11/2024)    Social Connection and Isolation Panel [NHANES]     Frequency of Social Gatherings with Friends and Family: More than three times a week          Disposition Plan     Medically Ready for Discharge: Anticipated in 2-4 Days             Arlene Dunham MD  Hospitalist Service  [facility]  Securely message with [employer] (more info)  Text page via [employer] Paging/Directory   ______________________________________________________________________    Interval History   Discussed with family at bedside along with patient, they will like to try restorative measures first and if patient not improving, then comfort focused care  Continues to have cough, although improved.    Physical Exam   Vital Signs: Temp: 97.9  F (36.6  C) Temp src: Oral BP: 130/75 Pulse: 77   Resp: 18 SpO2: 95 % O2 Device: Nasal cannula Oxygen Delivery: 2 LPM  Weight: 138 lbs 14.24 oz    General Appearance: Well appearing for stated age.  Respiratory: CTAB, no rales or ronchi  Cardiovascular: S1, S2 normal, no murmurs  GI: non-tender on palpation, BS present      Medical Decision Making       55 MINUTES SPENT BY ME on the date of service doing chart review, history, exam, documentation & further activities per " the note.      Data         Imaging results reviewed over the past 24 hrs:   No results found for this or any previous visit (from the past 24 hours).

## 2025-03-21 NOTE — PLAN OF CARE
Goal Outcome Evaluation:     Summary: Acute hypoxic respiratory failure d/t CHF/asthma exacerbation, increased fatigue, UTI    DATE & TIME: 03/21/25 [id number]    Cognitive Concerns/ Orientation: A&O x3, disoriented to situation. Forgetful at times. Voice hoarse. [place] - bilateral hearing aids.  BEHAVIOR & AGGRESSION TOOL COLOR: Green  ABNL VS/O2: VSS on 2L NC (sats low 90s)  MOBILITY: Ax2 GB/Walker. Up in chair x1 this shift  PAIN MANAGMENT: C/O neck pain-managed with scheduled Tylenol  DIET: Regular, poor appetite.  BOWEL/BLADDER: No BM in several days, PRN Miralax given, unable to void, straight cath x1  ABNL LAB/BG: ; Cr 1.07; BUN 50.4; WBC 17.9 (trending up)  DRAIN/DEVICES: R PIV SL, intermittent abx.  TELEMETRY RHYTHM: NA  SKIN: Scattered scabs and bruises, blanchable redness to sacrum/coccyx - mepilex in place  TESTS/PROCEDURES: none new  D/C DATE: pending  OTHER IMPORTANT INFO: Takes pills whole in applesauce. PT following. Continues on BID Mucinex for cough. On meropenem Q8

## 2025-03-21 NOTE — PLAN OF CARE
Goal Outcome Evaluation:                      Summary: Acute hypoxic respiratory failure d/t CHF/asthma exacerbation, increased fatigue, UTI    DATE & TIME: 03/20/25 [id number]   Cognitive Concerns/ Orientation: A&O x3, disoriented to situation. Forgetful at times. [place] - bilateral hearing aids at bedside  BEHAVIOR & AGGRESSION TOOL COLOR: Green  CIWA SCORE: NA   ABNL VS/O2: VSS on 2L NC  MOBILITY: Ax1-2 GB/Walker.   PAIN MANAGMENT: c/o neck pain,refused intervention  DIET: Regular  BOWEL/BLADDER: [place] of B&B.No BM this shift,pt seems to be retaining,bladder scan 759ml,straight cath 800ml  ABNL LAB/BG: ; Cr 1.07; BUN 50.4; WBC 17.9 (trending up)  DRAIN/DEVICES: R PIV SL, w/intermittent abx.  TELEMETRY RHYTHM: NA  SKIN: scattered scabs and bruises, blanchable redness to sacrum/coccyx - mepilex in place  TESTS/PROCEDURES:None  D/C DATE: pending  OTHER IMPORTANT INFO: Takes pills whole with applesauce. PT following.

## 2025-03-21 NOTE — PROGRESS NOTES
"Clinical Swallow Evaluation (CSE):     03/21/25 3126   Appointment Info   Signing Clinician's Name / Credentials (SLP) Sherri Poon MS CCC-SLP   General Information   Onset of Illness/Injury or Date of Surgery 03/14/25   Referring Physician Arlene Donovan MD   Patient/Family Therapy Goal Statement (SLP) Did not state   Pertinent History of Current Problem   Per provider \"Opal Sin is a 89 year old female with a past medical history significant for HFpEF, CKD stage 3, breast cancer, asthma, Parkinson's, hyperlipidemia, and hypertension who generalized weakness and fatigue.          Of note, patient was recently admitted at [facility] from 3/8/25-3/12/25 for acute hypoxic respiratory 2/2 Influenza A, heart failure exacerbation and Asthma exacerbation. Treated with Tamiflu, IV lasix and steroids. She also developed new onset Afib on 3/10/25. She was given 15mg of IV diltiazem and was rate controlled after that. She was weaned off oxygen on 3/12/25 and [facility].\"     Admitted with \"Acute metabolic encephalopathy-resolved; Acute hypoxic respiratory failure due to asthma exacerbation, CHF/pulmonary edema; Left lower lobe pneumonia- Community-acquired vs hospital-acquired; ? Enterococcus Faecalis bacteriuria-asymptomatic, not treating; Generalized Weakness; Increased Fatigue\"     SLP for swallow evaluation.     Multiple clinical swallow evals in past: 3/12/2025, 5/3/2024, 8/9/2017, 11/30/2016 all with recs for regular/thin diet. Today, pt reports occassionally having difficult with both food and liquids, coughing, but it is rare.     General Observations Pt fatigued, but agreeable to eval. Multiple family members present (2 sisters, brother in law). Hypophonia and hoarse vocal quality.   Pain Assessment   Patient Currently in Pain No   Type of Evaluation   Type of Evaluation Swallow Evaluation   Oral Motor   Oral Musculature generally intact   Structural Abnormalities " none present   Mucosal Quality dry   Dentition (Oral Motor)   Dentition (Oral Motor) adequate dentition   Facial Symmetry (Oral Motor)   Facial Symmetry (Oral Motor) WNL   Lip Function (Oral Motor)   Lip Range of Motion (Oral Motor) WNL   Lip Strength (Oral Motor) WNL   Tongue Function (Oral Motor)   Tongue ROM (Oral Motor) protrusion is impaired  (?mininal deviation to the R)   Jaw Function (Oral Motor)   Jaw Function (Oral Motor) WNL   Cough/Swallow/Gag Reflex (Oral Motor)   Soft Palate/Velum (Oral Motor) WNL   Volitional Throat Clear/Cough (Oral Motor) WNL   Volitional Swallow (Oral Motor) WNL   Vocal Quality/Secretion Management (Oral Motor)   Vocal Quality (Oral Motor) hypophonic;dysphonic;hoarse   Secretion Management (Oral Motor) WNL   General Swallowing Observations   Current Diet/Method of Nutritional Intake (General Swallowing Observations, NIS) regular diet;thin liquids (level 0)   Respiratory Support nasal cannula  (2L)   Swallowing Evaluation Clinical swallow evaluation   Clinical Swallow Eval: Thin Liquid Texture Trial   Mode of Presentation, Thin Liquids cup;straw   Volume of Liquid or Food Presented ~ 2 OZ   Oral Phase of Swallow WFL   Pharyngeal Phase of Swallow coughing/choking   Diagnostic Statement delayed cough x1   Clinical Swallow Evaluation: Puree Solid Texture Trial   Mode of Presentation, Puree spoon;fed by clinician   Volume of Puree Presented 1 bite   Oral Phase, Puree WFL   Pharyngeal Phase, Puree intact   Diagnostic Statement no overt clinical signs/sx aspiration noted   Clinical Swallow Evaluation: Solid Food Texture Trial   Mode of Presentation fed by clinician   Volume Presented 3 bites   Oral Phase residue in oral cavity   Pharyngeal Phase coughing/choking   Diagnostic Statement delayed cough x1   Esophageal Phase of Swallow   Patient reports or presents with symptoms of esophageal dysphagia Yes   Esophageal comments GERD hx, belch x1   Swallowing Recommendations   Diet Consistency  Recommendations thin liquids (level 0);regular diet   Medication Administration Recommendations, Swallowing (SLP) whole in puree   Instrumental Assessment Recommendations VFSS (videofluoroscopic swallowing study)  (pt considering, can complete monday)   General Therapy Interventions   Planned Therapy Interventions Dysphagia Treatment   Clinical Impression   Criteria for Skilled Therapeutic Interventions Met (SLP Eval) Yes, treatment indicated   SLP Diagnosis mild oral, potential pharyngeal dysphagia   Risks & Benefits of therapy have been explained evaluation/treatment results reviewed;care plan/treatment goals reviewed;risks/benefits reviewed;current/potential barriers reviewed;participants voiced agreement with care plan;participants included;patient;sibling   Clinical Impression Comments   Clinical swallow evaluation completed, limited given fatigue and provider enterring room. Pt currently presents with mild oral, potential pharyngeal dysphagia. Xerostomia resulting in prolonged mastication/oral residue (clearing with liquid wash). No percievable delay in swallow with notable laryngeal elevation to palpation. Delayed cough x2 across trials, unable to assess in related to swallow vs current PNA/CHF. Educated pt/family on limitations of clinical swallow evaluation and recs for video swallow study as pt has not had one yet, recurrent PNA's and hx of Parkinson's. Pt stated she would consider, can complete Monday.     SLP Total Evaluation Time   Eval: oral/pharyngeal swallow function, clinical swallow Minutes (52425) 12   SLP Goals   Therapy Frequency (SLP Eval) 5 times/week   SLP Predicted Duration/Target Date for Goal Attainment 03/28/25   SLP Goals Swallow   SLP: Safely tolerate diet without signs/symptoms of aspiration Regular diet;Thin liquids;With use of compensatory swallow strategies;With use of swallow precautions;Independently;Goal Met   SLP Discharge Planning   SLP Plan PO tolerance and ?check if pt  wishing for VFSS monday   SLP Discharge Recommendation home with home health   SLP Rationale for DC Rec further SLP needs pending potential VFSS   SLP Brief overview of current status  Continue regular diet (self-selecting soft/moister) with thin liquids when alert, upright to chair for meals, alternate between bites/sips. Recommend moisten mouth before meds or foods, take pills one at a time in a puree carrier.   SLP Time and Intention   Total Session Time (sum of timed and untimed services) 12

## 2025-03-21 NOTE — PROGRESS NOTES
"CLINICAL NUTRITION SERVICES - ASSESSMENT NOTE    RECOMMENDATIONS FOR MDs/PROVIDERS TO ORDER:  None    Registered Dietitian Interventions:  -Magic cup bid and gel plus daily to help meet nutrition needs with inadequate intake  -Unable to order mvi with minerals at this time d/t listed allergies    Future/Additional Recommendations:  Adjust supplements pending intake, weight, tolerance, acceptance, labs       REASON FOR ASSESSMENT  LOS    INFORMATION OBTAINED  Assessed patient in room. Pt c/o that she is tired. She is willing to try supplements.   Pt thought she was not supposed to have Na or caffeine and reports this was making is difficult to eat. Educated pt that she has no diet restrictions and should not be restricted with low intake    NUTRITION HISTORY  Lives in nursing home  Uses walker. Meals are provided    Previous encounter with RD in May. Pt liked the hospital food, eating well    Pt has been in TCU since last admit 5/28-6/1    Pt reports she has no appetite, has lost her sense of taste and smell a month ago and has disliked the food at her TCU, all making it much more difficult to eat. She states her intake is <50% of baseline. She has had a protein shake and did not like it.     Intake has been poor during hospital stay, 25-50% of meals    CURRENT NUTRITION ORDERS  Diet: Orders Placed This Encounter      Combination Diet Regular Diet Adult      CURRENT INTAKE/TOLERANCE  Ate 100% of breakfast this am at 448 kcal, 18 g protein, 25% of lunch    LABS  Nutrition-relevant labs:   Na 134 (L), stable  Bun/CR 50.4/1.0 (H), stable  WBC 17.9 (H), rising    MEDICATIONS  Nutrition-relevant medications:   Lipitor, oral abx, sinemet, iv abx, protonix, prednisone    ANTHROPOMETRICS  Height: 154.9 cm (5' 1\")  IBW: 47.8 kg  BMI: Body mass index is 26.24 kg/m .   Weight History: Wt is fluid up. No wt loss noted  Date/Time Weight Weight Method   03/21/25 0333 63 kg (138 lb 14.2 oz) Bed scale   03/18/25 0703 65 kg (143 lb 4.8 oz) " Bed scale   03/17/25 0146 64.9 kg (143 lb 1.3 oz) Bed scale   03/16/25 0119 61.8 kg (136 lb 3.9 oz) Bed scale   03/14/25 1158 58.3 kg (128 lb 8.5 oz) --     Wt Readings from Last 10 Encounters:   03/12/25 58.3 kg (128 lb 8.5 oz)-TCU   07/12/24 61.7 kg (136 lb)   06/17/24 55.2 kg (121 lb 12.8 oz)   06/11/24 55.9 kg (123 lb 3.2 oz)   06/05/24 55.6 kg (122 lb 8 oz)   06/04/24 55.6 kg (122 lb 8 oz)   06/03/24 55.6 kg (122 lb 8 oz)   06/01/24 52.7 kg (116 lb 2.9 oz)   05/15/24 52.6 kg (115 lb 14.4 oz)       Dosing Weight: 58.3 kg, based on dry wt    ASSESSED NUTRITION NEEDS  Estimated Energy Needs: 2483-8070 kcals/day (25 - 30 kcals/kg)  Justification: Maintenance  Estimated Protein Needs: 70-88 grams protein/day (1.2 - 1.5 grams of pro/kg)  Justification: Hypercatabolism with acute illness and Repletion  Estimated Fluid Needs: 0798-9705 mL/day (25 - 30 mL/kg)  Justification: Maintenance and Per provider pending fluid status    SYSTEM AND PHYSICAL FINDINGS    Pt has her own teeth  GI symptoms:   Last BM 3/18. Prn miralax given this am  Skin/wounds:  no open areas    MALNUTRITION  % Intake: </=50% for >/= 1 month (severe)  % Weight Loss: None noted  Subcutaneous Fat Loss: Orbital: Severe and Buccal: Moderate  Muscle Loss: Temples (temporalis muscle): Mild, Clavicles (pectoralis and deltoids): Severe, Shoulders (deltoids): Severe, and Interosseous muscles: Moderate  Fluid Accumulation/Edema: Mild, 1+  Malnutrition Diagnosis: Severe malnutrition in the context of chronic illness  Malnutrition Present on Admission: Unable to assess    NUTRITION DIAGNOSIS  Inadequate oral intake related to altered taste, decreased appetite as evidenced by intake 25-50% in hospital and pt report of intake <50% x 1 month    GOALS  Total avg nutritional intake to meet a minimum of 1450 kcal/kg and 70 g PRO/kg daily (per dosing wt 58.3 kg).     MONITORING/EVALUATION  Progress toward goals will be monitored and evaluated per policy.

## 2025-03-21 NOTE — PROGRESS NOTES
Care Management Follow Up    Length of Stay (days): 7    Expected Discharge Date: 03/24/2025 pending     Concerns to be Addressed: discharge planning     Patient plan of care discussed at interdisciplinary rounds: Yes    Anticipated Discharge Disposition:  Back to long-term vs TCU        Anticipated Discharge Services:  TBD  Anticipated Discharge DME: Oxygen    Patient/family educated on Medicare website which has current facility and service quality ratings:  NA  Education Provided on the Discharge Plan:  ongoing  Patient/Family in Agreement with the Plan:  awaiting improvement    Referrals Placed by CM/SW:  NA  Private pay costs discussed: Not applicable    Discussed  Partnership in Safe Discharge Planning  document with patient/family: No     Handoff Completed: No, handoff not indicated or clinically appropriate    Additional Information:  Have been in communication with Nurse Dee from [facility] ([phone]) every other day.    Unfortunately, patient has not had much improvement in the past few days.  Per nsg report, patient is now taking assist of two.  long-term can take back if assist of one.      Per Hospitalist admission note on 3/14, family elected to wait on Palliative consult.      Next Steps:   Care Management will continue  Await further goals of care discussion    Tracie Martins, RN  Inpatient Care Management  [phone]        RN Triage:     Patient is calling in to discuss nasal congestion and a cough when her nose is draining in her throat. These symptoms started a few days ago.  NO fever. NO fatigue, no dizziness or unbalanced. Feeling ok. NO shortness of breath, chest tightness or dry cough. She took a zyrtec just now. She  takes BP medication and is asking if zyrtec is ok. She was advised yes.   Patient went out twice this week for a walk. She has been practicing social distancing. She was advised to monitor her symptoms for now, continue with social distancing and zyrtec.     [place] from 1-14th [facility].       Martha Bills RN, BSN Care Connection Triage Nurse

## 2025-03-22 ENCOUNTER — APPOINTMENT (OUTPATIENT)
Dept: PHYSICAL THERAPY | Facility: CLINIC | Age: 89
End: 2025-03-22
Payer: COMMERCIAL

## 2025-03-22 ENCOUNTER — APPOINTMENT (OUTPATIENT)
Dept: SPEECH THERAPY | Facility: CLINIC | Age: 89
End: 2025-03-22
Payer: COMMERCIAL

## 2025-03-22 LAB
ANION GAP SERPL CALCULATED.3IONS-SCNC: 11 MMOL/L (ref 7–15)
BASOPHILS # BLD AUTO: 0 10E3/UL (ref 0–0.2)
BASOPHILS NFR BLD AUTO: 0 %
BUN SERPL-MCNC: 55.6 MG/DL (ref 8–23)
CALCIUM SERPL-MCNC: 8.4 MG/DL (ref 8.8–10.4)
CHLORIDE SERPL-SCNC: 95 MMOL/L (ref 98–107)
CREAT SERPL-MCNC: 1.11 MG/DL (ref 0.51–0.95)
EGFRCR SERPLBLD CKD-EPI 2021: 48 ML/MIN/1.73M2
EOSINOPHIL # BLD AUTO: 0 10E3/UL (ref 0–0.7)
EOSINOPHIL NFR BLD AUTO: 0 %
ERYTHROCYTE [DISTWIDTH] IN BLOOD BY AUTOMATED COUNT: 15.9 % (ref 10–15)
GLUCOSE SERPL-MCNC: 124 MG/DL (ref 70–99)
HCO3 SERPL-SCNC: 23 MMOL/L (ref 22–29)
HCT VFR BLD AUTO: 28.8 % (ref 35–47)
HGB BLD-MCNC: 10.1 G/DL (ref 11.7–15.7)
IMM GRANULOCYTES # BLD: 0.2 10E3/UL
IMM GRANULOCYTES NFR BLD: 1 %
LYMPHOCYTES # BLD AUTO: 0.6 10E3/UL (ref 0.8–5.3)
LYMPHOCYTES NFR BLD AUTO: 4 %
MCH RBC QN AUTO: 29.9 PG (ref 26.5–33)
MCHC RBC AUTO-ENTMCNC: 35.1 G/DL (ref 31.5–36.5)
MCV RBC AUTO: 85 FL (ref 78–100)
MONOCYTES # BLD AUTO: 0.8 10E3/UL (ref 0–1.3)
MONOCYTES NFR BLD AUTO: 6 %
NEUTROPHILS # BLD AUTO: 12.5 10E3/UL (ref 1.6–8.3)
NEUTROPHILS NFR BLD AUTO: 89 %
NRBC # BLD AUTO: 0 10E3/UL
NRBC BLD AUTO-RTO: 0 /100
PLATELET # BLD AUTO: 196 10E3/UL (ref 150–450)
POTASSIUM SERPL-SCNC: 4.6 MMOL/L (ref 3.4–5.3)
PROCALCITONIN SERPL IA-MCNC: 0.46 NG/ML
RBC # BLD AUTO: 3.38 10E6/UL (ref 3.8–5.2)
SODIUM SERPL-SCNC: 129 MMOL/L (ref 135–145)
WBC # BLD AUTO: 14.1 10E3/UL (ref 4–11)

## 2025-03-22 PROCEDURE — 85025 COMPLETE CBC W/AUTO DIFF WBC: CPT | Performed by: HOSPITALIST

## 2025-03-22 PROCEDURE — 250N000013 HC RX MED GY IP 250 OP 250 PS 637: Performed by: HOSPITALIST

## 2025-03-22 PROCEDURE — 999N000157 HC STATISTIC RCP TIME EA 10 MIN

## 2025-03-22 PROCEDURE — 94640 AIRWAY INHALATION TREATMENT: CPT

## 2025-03-22 PROCEDURE — 97116 GAIT TRAINING THERAPY: CPT | Mod: GP

## 2025-03-22 PROCEDURE — 99233 SBSQ HOSP IP/OBS HIGH 50: CPT | Performed by: INTERNAL MEDICINE

## 2025-03-22 PROCEDURE — 97530 THERAPEUTIC ACTIVITIES: CPT | Mod: GP

## 2025-03-22 PROCEDURE — 94640 AIRWAY INHALATION TREATMENT: CPT | Mod: 76

## 2025-03-22 PROCEDURE — 250N000011 HC RX IP 250 OP 636: Performed by: HOSPITALIST

## 2025-03-22 PROCEDURE — 92526 ORAL FUNCTION THERAPY: CPT | Mod: GN

## 2025-03-22 PROCEDURE — 36415 COLL VENOUS BLD VENIPUNCTURE: CPT | Performed by: HOSPITALIST

## 2025-03-22 PROCEDURE — 80048 BASIC METABOLIC PNL TOTAL CA: CPT | Performed by: HOSPITALIST

## 2025-03-22 PROCEDURE — 84145 PROCALCITONIN (PCT): CPT | Performed by: HOSPITALIST

## 2025-03-22 PROCEDURE — 250N000013 HC RX MED GY IP 250 OP 250 PS 637: Performed by: STUDENT IN AN ORGANIZED HEALTH CARE EDUCATION/TRAINING PROGRAM

## 2025-03-22 PROCEDURE — 250N000012 HC RX MED GY IP 250 OP 636 PS 637: Performed by: HOSPITALIST

## 2025-03-22 PROCEDURE — 120N000001 HC R&B MED SURG/OB

## 2025-03-22 PROCEDURE — 250N000013 HC RX MED GY IP 250 OP 250 PS 637: Performed by: INTERNAL MEDICINE

## 2025-03-22 PROCEDURE — 250N000009 HC RX 250: Performed by: STUDENT IN AN ORGANIZED HEALTH CARE EDUCATION/TRAINING PROGRAM

## 2025-03-22 RX ORDER — PREDNISONE 20 MG/1
40 TABLET ORAL DAILY
Status: COMPLETED | OUTPATIENT
Start: 2025-03-23 | End: 2025-03-23

## 2025-03-22 RX ORDER — PREDNISONE 20 MG/1
40 TABLET ORAL DAILY
Status: DISCONTINUED | OUTPATIENT
Start: 2025-03-22 | End: 2025-03-22

## 2025-03-22 RX ORDER — PREDNISONE 10 MG/1
10 TABLET ORAL DAILY
Status: DISCONTINUED | OUTPATIENT
Start: 2025-03-30 | End: 2025-03-22

## 2025-03-22 RX ORDER — PREDNISONE 20 MG/1
20 TABLET ORAL DAILY
Status: DISCONTINUED | OUTPATIENT
Start: 2025-03-27 | End: 2025-03-26 | Stop reason: HOSPADM

## 2025-03-22 RX ORDER — PREDNISONE 10 MG/1
10 TABLET ORAL DAILY
Status: DISCONTINUED | OUTPATIENT
Start: 2025-03-30 | End: 2025-03-26 | Stop reason: HOSPADM

## 2025-03-22 RX ORDER — PREDNISONE 20 MG/1
20 TABLET ORAL DAILY
Status: DISCONTINUED | OUTPATIENT
Start: 2025-03-27 | End: 2025-03-22

## 2025-03-22 RX ADMIN — TOLTERODINE TARTRATE 1 MG: 1 TABLET ORAL at 08:03

## 2025-03-22 RX ADMIN — FUROSEMIDE 40 MG: 40 TABLET ORAL at 08:03

## 2025-03-22 RX ADMIN — MEROPENEM 500 MG: 500 INJECTION, POWDER, FOR SOLUTION INTRAVENOUS at 04:03

## 2025-03-22 RX ADMIN — AMLODIPINE BESYLATE 10 MG: 5 TABLET ORAL at 08:02

## 2025-03-22 RX ADMIN — ATORVASTATIN CALCIUM 20 MG: 20 TABLET, FILM COATED ORAL at 08:02

## 2025-03-22 RX ADMIN — AZITHROMYCIN DIHYDRATE 250 MG: 250 TABLET ORAL at 08:02

## 2025-03-22 RX ADMIN — ACETAMINOPHEN 1000 MG: 500 TABLET, FILM COATED ORAL at 08:02

## 2025-03-22 RX ADMIN — IPRATROPIUM BROMIDE AND ALBUTEROL SULFATE 3 ML: .5; 3 SOLUTION RESPIRATORY (INHALATION) at 21:02

## 2025-03-22 RX ADMIN — SERTRALINE HYDROCHLORIDE 25 MG: 25 TABLET ORAL at 08:02

## 2025-03-22 RX ADMIN — RALOXIFENE HYDROCHLORIDE 60 MG: 60 TABLET, FILM COATED ORAL at 20:31

## 2025-03-22 RX ADMIN — BUDESONIDE 0.5 MG: 0.5 INHALANT RESPIRATORY (INHALATION) at 08:08

## 2025-03-22 RX ADMIN — CARVEDILOL 12.5 MG: 12.5 TABLET, FILM COATED ORAL at 08:02

## 2025-03-22 RX ADMIN — PANTOPRAZOLE SODIUM 40 MG: 40 TABLET, DELAYED RELEASE ORAL at 08:03

## 2025-03-22 RX ADMIN — IPRATROPIUM BROMIDE AND ALBUTEROL SULFATE 3 ML: .5; 3 SOLUTION RESPIRATORY (INHALATION) at 14:22

## 2025-03-22 RX ADMIN — TOLTERODINE TARTRATE 1 MG: 1 TABLET ORAL at 20:29

## 2025-03-22 RX ADMIN — MEROPENEM 500 MG: 500 INJECTION, POWDER, FOR SOLUTION INTRAVENOUS at 12:37

## 2025-03-22 RX ADMIN — IPRATROPIUM BROMIDE AND ALBUTEROL SULFATE 3 ML: .5; 3 SOLUTION RESPIRATORY (INHALATION) at 08:08

## 2025-03-22 RX ADMIN — CARBIDOPA AND LEVODOPA 2 TABLET: 25; 100 TABLET ORAL at 20:29

## 2025-03-22 RX ADMIN — CARBIDOPA AND LEVODOPA 2 TABLET: 25; 100 TABLET ORAL at 06:53

## 2025-03-22 RX ADMIN — BUDESONIDE 0.5 MG: 0.5 INHALANT RESPIRATORY (INHALATION) at 21:02

## 2025-03-22 RX ADMIN — CARBIDOPA AND LEVODOPA 2 TABLET: 25; 100 TABLET ORAL at 10:47

## 2025-03-22 RX ADMIN — CARVEDILOL 12.5 MG: 12.5 TABLET, FILM COATED ORAL at 17:40

## 2025-03-22 RX ADMIN — CARBIDOPA AND LEVODOPA 2 TABLET: 25; 100 TABLET ORAL at 16:05

## 2025-03-22 RX ADMIN — PREDNISONE 40 MG: 20 TABLET ORAL at 08:02

## 2025-03-22 RX ADMIN — MEROPENEM 500 MG: 500 INJECTION, POWDER, FOR SOLUTION INTRAVENOUS at 20:30

## 2025-03-22 ASSESSMENT — ACTIVITIES OF DAILY LIVING (ADL)
ADLS_ACUITY_SCORE: 78
ADLS_ACUITY_SCORE: 82
ADLS_ACUITY_SCORE: 82
ADLS_ACUITY_SCORE: 78
ADLS_ACUITY_SCORE: 82
ADLS_ACUITY_SCORE: 78
ADLS_ACUITY_SCORE: 82
ADLS_ACUITY_SCORE: 82
ADLS_ACUITY_SCORE: 78
ADLS_ACUITY_SCORE: 82
ADLS_ACUITY_SCORE: 78
ADLS_ACUITY_SCORE: 82
ADLS_ACUITY_SCORE: 78
ADLS_ACUITY_SCORE: 82
ADLS_ACUITY_SCORE: 78

## 2025-03-22 NOTE — PLAN OF CARE
SLP: Met with pt and bedside RN to discuss pt's desire for completion of VFSS given frequent pneumonias. Pt unsure on wanting to complete additional tests, or modify diet/thicken liquids if necessary. Plan for RN, MD to have conversation with pt and family more in-depth regarding goals of care. However, pt acknowledges she does not want to keep having to come back to the hospital. SLP cont to follow for ongoing care planning. Cont regular diet with thin liquids.

## 2025-03-22 NOTE — PLAN OF CARE
Summary: Acute hypoxic respiratory failure d/t CHF/asthma exacerbation, increased fatigue, UTI    DATE & TIME: 03/21/25 2085-0498   Cognitive Concerns/ Orientation: A&O x4, int confusion. Forgetful at times. Voice hoarse. [place] - bilateral hearing aids.  BEHAVIOR & AGGRESSION TOOL COLOR: Green  ABNL VS/O2: VSS, weaned to 1L NC (sats low 90s)  MOBILITY: Ax2 GB/Walker, turn/repo  PAIN MANAGMENT: denies  DIET: Regular, poor appetite.  BOWEL/BLADDER: incontinent of urine- bladder scanned 315, was already straight cathed x2, c/o constipation, was given miralax in AM  ABNL LAB/BG: ; Cr 1.07; BUN 50.4; WBC 17.9 (trending up)  DRAIN/DEVICES: R PIV SL, intermittent abx.  TELEMETRY RHYTHM: NA  SKIN: Scattered scabs and bruises, blanchable redness to sacrum/coccyx - mepilex in place  TESTS/PROCEDURES: none new  D/C DATE: pending  OTHER IMPORTANT INFO: Takes pills whole in applesauce. PT following. Continues on BID Mucinex for cough. On meropenem Q8

## 2025-03-22 NOTE — PLAN OF CARE
Goal Outcome Evaluation:  Cognitive Concerns/ Orientation: Alert/oriented x 4, forgetful, intermittent confusion; [place] - bilateral hearing aids charging at bedside  BEHAVIOR & AGGRESSION TOOL COLOR: Green  ABNL VS/O2: VSS, 1 LPM oxygen via nasal cannula with O2 sats 90's, desats in mid 80's with activity  MOBILITY: Assist-2 gait belt walker, turn/repo Q2hrs. Up in the chair most of shift with family visiting, weight shifted.   PAIN MANAGMENT: denies  DIET: Regular-fair  BOWEL/BLADDER: incontinent of urine-purewick changed and reapplied when in bed. Small BM  ABNL LAB/BG: . WBC 14  DRAIN/DEVICES: R PIV SL  TELEMETRY RHYTHM: NA  SKIN: Scattered scabs and bruises,  sacrum/coccyx - mepilex in place for prevention  TESTS/PROCEDURES: SLP video study planned for Monday is pt is in agreement  D/C DATE: pending  OTHER IMPORTANT INFO: Takes pills whole in applesauce. Coarse lung sounds, MORRELL. Congested, nonproductive cough. Palliative consult. Lasix on hold

## 2025-03-22 NOTE — PROGRESS NOTES
[facility]    Medicine Progress Note - Hospitalist Service    Date of Admission:  3/14/2025    Assessment & Plan   Opal Sin is a 89 year old female with a past medical history significant for HFpEF, CKD stage 3, breast cancer, asthma, Parkinson's, hyperlipidemia, and hypertension who presented with generalized weakness and fatigue.  Hospital course complicated by acute hypoxic respiratory failure secondary to pneumonia, HFpEF exacerbation and asthma exacerbation     Of note, patient was recently admitted at [facility] from 3/8/25-3/12/25 for acute hypoxic respiratory 2/2 Influenza A, heart failure exacerbation and Asthma exacerbation. Treated with Tamiflu, IV lasix and steroids. She also developed new onset Afib on 3/10/25. She was given 15mg of IV diltiazem and was rate controlled after that. She was weaned off oxygen on 3/12/25 and discharged back to her correction on 3/12/25.      Acute metabolic encephalopathy - resolved  Acute hypoxic respiratory failure due to asthma exacerbation, HFpEF exacerbation and CAPvsHAP - improved  Generalized Weakness   - High flow nasal cannula on admission  - CT chest WO 3/18 2025 showed left lower lobe pneumonia and patchy right lower lobe airspace opacities, compatible with multifocal pneumonia or aspiration   - TTE 3/2025: Echo was poor quality.  Possibly minimally reduced EF   Plan   - Continue meropenem 500 mg every 8 hours IV x 7 days (end date 3/25/2025) to treat possible HAP and azithromcyin 500mg daily PO x4 days (end date 3/23/2025) to treat CAP  - Prednisone 40 mg daily for additional 2 days followed by 10 mg taper every 3 days starting on 3/24/2025 for asthma exacerbation  - DuoNeb 3 times daily, budesonide twice daily  - Hold home Lasix 40 mg daily PO given worsening sodium as below  - PT/OT consulted  - SLP to eval for aspiration. Plan for video swallo eval on Monday 3/24, but patient refusing this      MICHAEL on CKD  3-improved  Received 250cc bolus in the ED.   - Cr: 1.25 on admission  -- Baseline: 0.8-1.1   -- now improved to ~1.02  -- continue to monitor BMP    Mild Hyponatremia - worsening  Has intermittently low in the past  - Encourage PO intake  - Hold furosemide 40mg daily PO for now  - Continue to monitor     Goals of Care   Family and patient confirmed with ED provider that patient is DNR/DNI and would prefer comfort focused treatment. She is okay with BIPAP or high flow mask. Discussed with family about comfort care/hospice. They are open to hearing more about this but would like to wait on Palliative consult at this time.   -Patient's mentation has improved as of 3/16.  Current goals of care remain restorative.  - SW consulted   - on 3/21, after giving update to patient and family (2 sisters and  bother sh[place]riaraw,) patient asked if she consider the option of dying, after I probed further asking if she would like to restorative measures, she notes she wiill like to get well and feel better and if this is not to be achieved or is prolonged, then she will like to pursue the option of comfort care. Palliative care consult placed for further discussions.     Enterococcus Faecalis bacteriuria-asymptomatic. Not treating    Recent Influenza A Infection   Flu A PCR 3/8/2025: Positive. Was treated with Tamiflu.   - Rechecked viral panel on 3/18-negative COVID, RSV.  Influenza A PCR remains positive.     Recent New Onset Afib with RVR: Currently Rate Controlled   Noted to have heart rates in to the 120s on 3/10/25 during her last admission.  EKG done and confirmed atrial fibrillation.  She was given one time dose of  IV Diltiazem 15 mg. Has been rate controlled since. Anticoagulation was discussed but due to her history of brain aneurysm s/p coiling, anticoagulation was deferred.   - Tele  - Continue home Coreg 12.5mg BID with hold parameters      Hypertension   - Hold losartan 100 mg daily due to MICHAEL-BP is  - Continue amlodipine  10 mg daily, carvedilol 12.5 mg twice daily     Hyperlipidemia   - Continue atorvastatin 20 mg daily     Parkinson's Disease   - Continue Sinemet  mg 4 times daily     GERD   - Continue omeprazole 20 mg daily     History of Breast Cancer   - Continue raloxifene 60 mg daily     Depression   - Continue  Zoloft 25 mg daily     Peripheral Neuropathy   - Hold gabapentin 100 mg twice daily      History of Overactive Bladder   - Continue tolterodin    Diet: Combination Diet Regular Diet Adult  Room Service  Snacks/Supplements Adult: Magic Cup; Between Meals  Snacks/Supplements Adult: Gelatein Plus; Between Meals    DVT Prophylaxis: Pneumatic Compression Devices  [place] Catheter: Not present  Lines: None     Cardiac Monitoring: None  Code Status: No CPR- Do NOT Intubate      Disposition Plan     Medically Ready for Discharge: Anticipated in 2-4 Days    Umberto Long DO  Hospitalist Service  [facility]  Securely message with [employer] (more info)  Text page via [employer] Paging/Directory   ______________________________________________________________________    Interval History   No acute events overnight.    On evaluation this morning, he is resting on the bed.  Extremely flat affect and lethargic today.  Able to answer me but in few words.  Still feels dyspneic, but notes it is improved from initial admission.  Currently on 1 L nasal cannula.  She was encouraged by me to increase eating in order to get her strength back up.    Physical Exam   Vital Signs: Temp: 97.8  F (36.6  C) Temp src: Oral BP: 128/79 Pulse: 85   Resp: 22 SpO2: 93 % O2 Device: Nasal cannula Oxygen Delivery: 1 LPM  Weight: 138 lbs 14.24 oz    General Appearance: Well appearing for stated age.  Respiratory: CTAB, no rales or ronchi  Cardiovascular: S1, S2 normal, no murmurs  GI: non-tender on palpation, BS present      Medical Decision Making       55 MINUTES SPENT BY ME on the date of service doing chart review, history, exam,  documentation & further activities per the note.      Data     I have personally reviewed the following data over the past 24 hrs:    14.1 (H)  \   10.1 (L)   / 196     129 (L) 95 (L) 55.6 (H) /  124 (H)   4.6 23 1.11 (H) \     Procal: 0.46 CRP: N/A Lactic Acid: N/A         Imaging results reviewed over the past 24 hrs:   No results found for this or any previous visit (from the past 24 hours).

## 2025-03-22 NOTE — PLAN OF CARE
Summary: Acute hypoxic respiratory failure d/t CHF/asthma exacerbation, increased fatigue, UTI     DATE & TIME: 03/21/25 3153-9418   Cognitive Concerns/ Orientation: Alert/oriented x 4, forgetful, intermittent confusion; [place] - bilateral hearing aids charging at bedside  BEHAVIOR & AGGRESSION TOOL COLOR: Green  ABNL VS/O2: VSS, 1 LPM oxygen via nasal cannula with O2 sats 94%  MOBILITY: Assist-2 gait belt walker, turn/repo Q2hrs  PAIN MANAGMENT: denies  DIET: Regular  BOWEL/BLADDER: incontinent of urine-purewick in place; Bladder scan for 384; straight catheterized 2x during 03/21  ABNL LAB/BG: ; Cr 1.07; BUN 50.4; WBC 17.9 (trending up)  DRAIN/DEVICES: R PIV SL, intermittent Merrem Q8hrs  TELEMETRY RHYTHM: NA  SKIN: Scattered scabs and bruises, blanchable redness to sacrum/coccyx - mepilex in place  TESTS/PROCEDURES: SLP video study planned for Monday  D/C DATE: pending  OTHER IMPORTANT INFO: Takes pills whole in applesauce; PT following; Continues on BID Mucinex for cough; Palliative consult

## 2025-03-23 ENCOUNTER — APPOINTMENT (OUTPATIENT)
Dept: SPEECH THERAPY | Facility: CLINIC | Age: 89
End: 2025-03-23
Payer: COMMERCIAL

## 2025-03-23 ENCOUNTER — APPOINTMENT (OUTPATIENT)
Dept: PHYSICAL THERAPY | Facility: CLINIC | Age: 89
End: 2025-03-23
Payer: COMMERCIAL

## 2025-03-23 PROCEDURE — 250N000009 HC RX 250: Performed by: STUDENT IN AN ORGANIZED HEALTH CARE EDUCATION/TRAINING PROGRAM

## 2025-03-23 PROCEDURE — 250N000011 HC RX IP 250 OP 636: Performed by: HOSPITALIST

## 2025-03-23 PROCEDURE — 120N000001 HC R&B MED SURG/OB

## 2025-03-23 PROCEDURE — 250N000013 HC RX MED GY IP 250 OP 250 PS 637: Performed by: STUDENT IN AN ORGANIZED HEALTH CARE EDUCATION/TRAINING PROGRAM

## 2025-03-23 PROCEDURE — 99232 SBSQ HOSP IP/OBS MODERATE 35: CPT | Performed by: INTERNAL MEDICINE

## 2025-03-23 PROCEDURE — 250N000013 HC RX MED GY IP 250 OP 250 PS 637: Performed by: INTERNAL MEDICINE

## 2025-03-23 PROCEDURE — 250N000013 HC RX MED GY IP 250 OP 250 PS 637: Performed by: HOSPITALIST

## 2025-03-23 PROCEDURE — 97530 THERAPEUTIC ACTIVITIES: CPT | Mod: GP

## 2025-03-23 PROCEDURE — 999N000157 HC STATISTIC RCP TIME EA 10 MIN

## 2025-03-23 PROCEDURE — 94640 AIRWAY INHALATION TREATMENT: CPT

## 2025-03-23 PROCEDURE — 250N000012 HC RX MED GY IP 250 OP 636 PS 637: Performed by: INTERNAL MEDICINE

## 2025-03-23 PROCEDURE — 92526 ORAL FUNCTION THERAPY: CPT | Mod: GN

## 2025-03-23 PROCEDURE — 94640 AIRWAY INHALATION TREATMENT: CPT | Mod: 76

## 2025-03-23 PROCEDURE — 97116 GAIT TRAINING THERAPY: CPT | Mod: GP

## 2025-03-23 RX ADMIN — AZITHROMYCIN DIHYDRATE 250 MG: 250 TABLET ORAL at 08:04

## 2025-03-23 RX ADMIN — TOLTERODINE TARTRATE 1 MG: 1 TABLET ORAL at 08:06

## 2025-03-23 RX ADMIN — MEROPENEM 500 MG: 500 INJECTION, POWDER, FOR SOLUTION INTRAVENOUS at 05:24

## 2025-03-23 RX ADMIN — IPRATROPIUM BROMIDE AND ALBUTEROL SULFATE 3 ML: .5; 3 SOLUTION RESPIRATORY (INHALATION) at 21:02

## 2025-03-23 RX ADMIN — ASPIRIN 81 MG: 81 TABLET, COATED ORAL at 08:04

## 2025-03-23 RX ADMIN — CARVEDILOL 12.5 MG: 12.5 TABLET, FILM COATED ORAL at 18:49

## 2025-03-23 RX ADMIN — POLYETHYLENE GLYCOL 3350 17 G: 17 POWDER, FOR SOLUTION ORAL at 08:03

## 2025-03-23 RX ADMIN — IPRATROPIUM BROMIDE AND ALBUTEROL SULFATE 3 ML: .5; 3 SOLUTION RESPIRATORY (INHALATION) at 08:47

## 2025-03-23 RX ADMIN — CARBIDOPA AND LEVODOPA 2 TABLET: 25; 100 TABLET ORAL at 08:04

## 2025-03-23 RX ADMIN — MEROPENEM 500 MG: 500 INJECTION, POWDER, FOR SOLUTION INTRAVENOUS at 11:19

## 2025-03-23 RX ADMIN — POLYETHYLENE GLYCOL 3350 17 G: 17 POWDER, FOR SOLUTION ORAL at 08:07

## 2025-03-23 RX ADMIN — BUDESONIDE 0.5 MG: 0.5 INHALANT RESPIRATORY (INHALATION) at 08:47

## 2025-03-23 RX ADMIN — CARBIDOPA AND LEVODOPA 2 TABLET: 25; 100 TABLET ORAL at 11:55

## 2025-03-23 RX ADMIN — CARVEDILOL 12.5 MG: 12.5 TABLET, FILM COATED ORAL at 08:06

## 2025-03-23 RX ADMIN — CARBIDOPA AND LEVODOPA 2 TABLET: 25; 100 TABLET ORAL at 17:07

## 2025-03-23 RX ADMIN — TOLTERODINE TARTRATE 1 MG: 1 TABLET ORAL at 20:44

## 2025-03-23 RX ADMIN — BUDESONIDE 0.5 MG: 0.5 INHALANT RESPIRATORY (INHALATION) at 21:02

## 2025-03-23 RX ADMIN — IPRATROPIUM BROMIDE AND ALBUTEROL SULFATE 3 ML: .5; 3 SOLUTION RESPIRATORY (INHALATION) at 13:27

## 2025-03-23 RX ADMIN — PANTOPRAZOLE SODIUM 40 MG: 40 TABLET, DELAYED RELEASE ORAL at 08:06

## 2025-03-23 RX ADMIN — AMLODIPINE BESYLATE 10 MG: 5 TABLET ORAL at 08:06

## 2025-03-23 RX ADMIN — MEROPENEM 500 MG: 500 INJECTION, POWDER, FOR SOLUTION INTRAVENOUS at 20:36

## 2025-03-23 RX ADMIN — ATORVASTATIN CALCIUM 20 MG: 20 TABLET, FILM COATED ORAL at 08:04

## 2025-03-23 RX ADMIN — PREDNISONE 40 MG: 20 TABLET ORAL at 08:05

## 2025-03-23 RX ADMIN — ACETAMINOPHEN 1000 MG: 500 TABLET, FILM COATED ORAL at 00:58

## 2025-03-23 RX ADMIN — RALOXIFENE HYDROCHLORIDE 60 MG: 60 TABLET, FILM COATED ORAL at 20:44

## 2025-03-23 RX ADMIN — SERTRALINE HYDROCHLORIDE 25 MG: 25 TABLET ORAL at 08:06

## 2025-03-23 RX ADMIN — ACETAMINOPHEN 1000 MG: 500 TABLET, FILM COATED ORAL at 08:06

## 2025-03-23 RX ADMIN — CARBIDOPA AND LEVODOPA 2 TABLET: 25; 100 TABLET ORAL at 20:34

## 2025-03-23 ASSESSMENT — ACTIVITIES OF DAILY LIVING (ADL)
ADLS_ACUITY_SCORE: 82
ADLS_ACUITY_SCORE: 78
ADLS_ACUITY_SCORE: 82
ADLS_ACUITY_SCORE: 82
ADLS_ACUITY_SCORE: 78
ADLS_ACUITY_SCORE: 78
ADLS_ACUITY_SCORE: 82
ADLS_ACUITY_SCORE: 82
ADLS_ACUITY_SCORE: 78
ADLS_ACUITY_SCORE: 82
ADLS_ACUITY_SCORE: 78
ADLS_ACUITY_SCORE: 85
ADLS_ACUITY_SCORE: 85
ADLS_ACUITY_SCORE: 82
ADLS_ACUITY_SCORE: 82
ADLS_ACUITY_SCORE: 78
ADLS_ACUITY_SCORE: 82
ADLS_ACUITY_SCORE: 82

## 2025-03-23 NOTE — PROGRESS NOTES
[facility]    Medicine Progress Note - Hospitalist Service    Date of Admission:  3/14/2025    Assessment & Plan   Opal Sin is a 89 year old female with a past medical history significant for HFpEF, CKD stage 3, breast cancer, asthma, Parkinson's, hyperlipidemia, and hypertension who presented with generalized weakness and fatigue.  Hospital course complicated by acute hypoxic respiratory failure secondary to pneumonia, HFpEF exacerbation and asthma exacerbation     Of note, patient was recently admitted at [facility] from 3/8/25-3/12/25 for acute hypoxic respiratory 2/2 Influenza A, heart failure exacerbation and Asthma exacerbation. Treated with Tamiflu, IV lasix and steroids. She also developed new onset Afib on 3/10/25. She was given 15mg of IV diltiazem and was rate controlled after that. She was weaned off oxygen on 3/12/25 and discharged back to her [facility] on 3/12/25.      Acute metabolic encephalopathy - resolved  Acute hypoxic respiratory failure due to asthma exacerbation, HFpEF exacerbation and CAPvsHAP - improved  Generalized Weakness   - High flow nasal cannula on admission  - CT chest WO 3/18 2025 showed left lower lobe pneumonia and patchy right lower lobe airspace opacities, compatible with multifocal pneumonia or aspiration   - TTE 3/2025: Echo was poor quality.  Possibly minimally reduced EF   Plan   - Continue meropenem 500 mg every 8 hours IV x 7 days (end date 3/25/2025) to treat possible HAP and azithromcyin 500mg daily PO x4 days (end date 3/23/2025) to treat CAP  - Prednisone 40 mg daily for additional 1 day followed by 10 mg taper every 3 days starting on 3/24/2025 for asthma exacerbation  - DuoNeb 3 times daily, budesonide twice daily  - Hold home Lasix 40 mg daily PO given worsening sodium as below  - PT/OT consulted  - SLP to eval for aspiration. Plan for video swallo eval on Monday 3/24, but patient refusing this      MICHAEL on CKD  3-improved  Received 250cc bolus in the ED.   - Cr: 1.25 on admission  -- Baseline: 0.8-1.1   -- now improved to ~1.02  -- continue to monitor BMP    Mild Hyponatremia - worsening  Has intermittently low in the past  - Encourage PO intake  - Hold furosemide 40mg daily PO for now  - Continue to monitor     Goals of Care   Family and patient confirmed with ED provider that patient is DNR/DNI and would prefer comfort focused treatment. She is okay with BIPAP or high flow mask. Discussed with family about comfort care/hospice. They are open to hearing more about this but would like to wait on Palliative consult at this time.   -Patient's mentation has improved as of 3/16.  Current goals of care remain restorative.  - SW consulted   - on 3/21, after giving update to patient and family (2 sisters and  bother shahriaraw,) patient asked if she consider the option of dying, after I probed further asking if she would like to restorative measures, she notes she wiill like to get well and feel better and if this is not to be achieved or is prolonged, then she will like to pursue the option of comfort care. Palliative care consult placed for further discussions.     Enterococcus Faecalis bacteriuria-asymptomatic. Not treating    Recent Influenza A Infection   Flu A PCR 3/8/2025: Positive. Was treated with Tamiflu.   - Rechecked viral panel on 3/18-negative COVID, RSV.  Influenza A PCR remains positive.     Recent New Onset Afib with RVR: Currently Rate Controlled   Noted to have heart rates in to the 120s on 3/10/25 during her last admission.  EKG done and confirmed atrial fibrillation.  She was given one time dose of  IV Diltiazem 15 mg. Has been rate controlled since. Anticoagulation was discussed but due to her history of brain aneurysm s/p coiling, anticoagulation was deferred.   - Tele  - Continue home Coreg 12.5mg BID with hold parameters      Hypertension   - Hold losartan 100 mg daily due to MICHAEL-BP is  - Continue amlodipine  10 mg daily, carvedilol 12.5 mg twice daily     Hyperlipidemia   - Continue atorvastatin 20 mg daily     Parkinson's Disease   - Continue Sinemet  mg 4 times daily     GERD   - Continue omeprazole 20 mg daily     History of Breast Cancer   - Continue raloxifene 60 mg daily     Depression   - Continue  Zoloft 25 mg daily     Peripheral Neuropathy   - Hold gabapentin 100 mg twice daily      History of Overactive Bladder   - Continue tolterodin    Diet: Combination Diet Regular Diet Adult  Room Service  Snacks/Supplements Adult: Magic Cup; Between Meals  Snacks/Supplements Adult: Gelatein Plus; Between Meals    DVT Prophylaxis: Pneumatic Compression Devices  Santana Catheter: Not present  Lines: None     Cardiac Monitoring: None  Code Status: No CPR- Do NOT Intubate      Disposition Plan     Medically Ready for Discharge: Anticipated in 2-4 Days    Umberto Long DO  Hospitalist Service  [facility]  Securely message with [employer] (more info)  Text page via [employer] Paging/Directory   ______________________________________________________________________    Interval History   No acute events overnight.    On evaluation this morning, he is resting on the bed looking outside.  Still reports significant weakness and utilizing 2 L nasal cannula.  No new complaints today.  She is still amenable with having palliative care come see her tomorrow    Physical Exam   Vital Signs: Temp: 97.9  F (36.6  C) Temp src: Oral BP: 133/70 Pulse: 74   Resp: 18 SpO2: 97 % O2 Device: Nasal cannula with humidification Oxygen Delivery: 2 LPM  Weight: 138 lbs 14.24 oz    General Appearance: Well appearing for stated age.  Respiratory: CTAB, no rales or ronchi  Cardiovascular: S1, S2 normal, no murmurs  GI: non-tender on palpation, BS present      Medical Decision Making       55 MINUTES SPENT BY ME on the date of service doing chart review, history, exam, documentation & further activities per the note.      Data          Imaging results reviewed over the past 24 hrs:   No results found for this or any previous visit (from the past 24 hours).

## 2025-03-23 NOTE — PLAN OF CARE
Goal Outcome Evaluation:  Cognitive Concerns/ Orientation: A&Ox3-4, very forgetful.  [place]  BEHAVIOR & AGGRESSION TOOL COLOR: Green  ABNL VS/O2: VSS, on 1L NC with humidification.   MOBILITY: Ax2 GB/W. T&R in bed. Up in the recliner for lunch  PAIN MANAGMENT: denies   DIET: Regular-fair  BOWEL/BLADDER: Incontinent. Purewick in use. Mirilax given per patient request-had one BM  DRAIN/DEVICES: R PIV SL   SKIN: Scattered scabs and bruises,  sacrum/coccyx - mepilex in place for prevention  TESTS/PROCEDURES: none-declined video swallow tomorrow  D/C DATE: pending  OTHER IMPORTANT INFO: Takes pills whole in applesauce. Course lung sounds, MORRELL. Congested, nonproductive cough. Palliative consult tomorrow.  Q8h merrem, PO zithro, prednisone taper, nebs. Holding lasix.

## 2025-03-23 NOTE — PLAN OF CARE
Goal Outcome Evaluation:      Plan of Care Reviewed With: patient    Overall Patient Progress: no change    DATE & TIME: 3/22/25 4465-2192  Cognitive Concerns/ Orientation: A&O x3-4, very forgetful. Disoriented to situation at times at night. [place]  BEHAVIOR & AGGRESSION TOOL COLOR: Green  ABNL VS/O2: VSS, on 1L NC with humidification. O2 sats low to mid 90's, desats to mid 80's with activity. Hoping to wean in the morning, pt did not want to overnight   MOBILITY: Ax2 GB/W. T&R in bed. Has a hard time laying on L side due to L shoulder pain so encouraged weight shifting at those times  PAIN MANAGMENT: PRN Tylenol and hot packs managing neck and L shoulder pain   DIET: Regular-fair  BOWEL/BLADDER: Incontinent. Purewick in use. Some abd/gas pain, refused Senna overnight. Has only had 2 smear BM's in the past couple of days   ABNL LAB/BG: , creat 1.11, WBC 14.1  DRAIN/DEVICES: R PIV SL   SKIN: Scattered scabs and bruises,  sacrum/coccyx - mepilex in place for prevention  TESTS/PROCEDURES: SLP video study planned for Monday is pt is in agreement  D/C DATE: pending  OTHER IMPORTANT INFO: Takes pills whole in applesauce. Fine crackles lung sounds, MORRELL. Congested, nonproductive cough. Palliative consulted.  Q8h merrem, PO zithro, prednisone taper, nebs. Holding lasix.

## 2025-03-24 LAB
ANION GAP SERPL CALCULATED.3IONS-SCNC: 10 MMOL/L (ref 7–15)
BUN SERPL-MCNC: 47.3 MG/DL (ref 8–23)
CALCIUM SERPL-MCNC: 8.3 MG/DL (ref 8.8–10.4)
CHLORIDE SERPL-SCNC: 101 MMOL/L (ref 98–107)
CREAT SERPL-MCNC: 0.63 MG/DL (ref 0.51–0.95)
EGFRCR SERPLBLD CKD-EPI 2021: 85 ML/MIN/1.73M2
GLUCOSE SERPL-MCNC: 141 MG/DL (ref 70–99)
HCO3 SERPL-SCNC: 23 MMOL/L (ref 22–29)
POTASSIUM SERPL-SCNC: 4.4 MMOL/L (ref 3.4–5.3)
SODIUM SERPL-SCNC: 134 MMOL/L (ref 135–145)

## 2025-03-24 PROCEDURE — 36415 COLL VENOUS BLD VENIPUNCTURE: CPT | Performed by: INTERNAL MEDICINE

## 2025-03-24 PROCEDURE — 94640 AIRWAY INHALATION TREATMENT: CPT | Mod: 76

## 2025-03-24 PROCEDURE — 80048 BASIC METABOLIC PNL TOTAL CA: CPT | Performed by: INTERNAL MEDICINE

## 2025-03-24 PROCEDURE — 99498 ADVNCD CARE PLAN ADDL 30 MIN: CPT | Performed by: NURSE PRACTITIONER

## 2025-03-24 PROCEDURE — 99497 ADVNCD CARE PLAN 30 MIN: CPT | Mod: 25 | Performed by: NURSE PRACTITIONER

## 2025-03-24 PROCEDURE — 250N000011 HC RX IP 250 OP 636: Performed by: HOSPITALIST

## 2025-03-24 PROCEDURE — 999N000157 HC STATISTIC RCP TIME EA 10 MIN

## 2025-03-24 PROCEDURE — 250N000013 HC RX MED GY IP 250 OP 250 PS 637: Performed by: INTERNAL MEDICINE

## 2025-03-24 PROCEDURE — 99223 1ST HOSP IP/OBS HIGH 75: CPT | Mod: 25 | Performed by: NURSE PRACTITIONER

## 2025-03-24 PROCEDURE — 99232 SBSQ HOSP IP/OBS MODERATE 35: CPT | Performed by: INTERNAL MEDICINE

## 2025-03-24 PROCEDURE — 120N000001 HC R&B MED SURG/OB

## 2025-03-24 PROCEDURE — 94640 AIRWAY INHALATION TREATMENT: CPT

## 2025-03-24 PROCEDURE — 250N000009 HC RX 250: Performed by: STUDENT IN AN ORGANIZED HEALTH CARE EDUCATION/TRAINING PROGRAM

## 2025-03-24 PROCEDURE — 250N000012 HC RX MED GY IP 250 OP 636 PS 637: Performed by: INTERNAL MEDICINE

## 2025-03-24 PROCEDURE — 250N000013 HC RX MED GY IP 250 OP 250 PS 637: Performed by: STUDENT IN AN ORGANIZED HEALTH CARE EDUCATION/TRAINING PROGRAM

## 2025-03-24 RX ORDER — MORPHINE SULFATE 20 MG/ML
5-10 SOLUTION ORAL
Status: DISCONTINUED | OUTPATIENT
Start: 2025-03-24 | End: 2025-03-26 | Stop reason: HOSPADM

## 2025-03-24 RX ORDER — MINERAL OIL/HYDROPHIL PETROLAT
OINTMENT (GRAM) TOPICAL
Status: DISCONTINUED | OUTPATIENT
Start: 2025-03-24 | End: 2025-03-26 | Stop reason: HOSPADM

## 2025-03-24 RX ORDER — SENNOSIDES 8.6 MG
1 TABLET ORAL 2 TIMES DAILY PRN
Status: DISCONTINUED | OUTPATIENT
Start: 2025-03-24 | End: 2025-03-26 | Stop reason: HOSPADM

## 2025-03-24 RX ORDER — NALOXONE HYDROCHLORIDE 0.4 MG/ML
0.1 INJECTION, SOLUTION INTRAMUSCULAR; INTRAVENOUS; SUBCUTANEOUS
Status: DISCONTINUED | OUTPATIENT
Start: 2025-03-24 | End: 2025-03-26 | Stop reason: HOSPADM

## 2025-03-24 RX ORDER — ATROPINE SULFATE 10 MG/ML
2 SOLUTION/ DROPS OPHTHALMIC EVERY 4 HOURS PRN
Status: DISCONTINUED | OUTPATIENT
Start: 2025-03-24 | End: 2025-03-26 | Stop reason: HOSPADM

## 2025-03-24 RX ORDER — BISACODYL 10 MG
10 SUPPOSITORY, RECTAL RECTAL
Status: DISCONTINUED | OUTPATIENT
Start: 2025-03-27 | End: 2025-03-26 | Stop reason: HOSPADM

## 2025-03-24 RX ORDER — CARBOXYMETHYLCELLULOSE SODIUM 5 MG/ML
1-2 SOLUTION/ DROPS OPHTHALMIC
Status: DISCONTINUED | OUTPATIENT
Start: 2025-03-24 | End: 2025-03-26 | Stop reason: HOSPADM

## 2025-03-24 RX ORDER — NALOXONE HYDROCHLORIDE 0.4 MG/ML
0.2 INJECTION, SOLUTION INTRAMUSCULAR; INTRAVENOUS; SUBCUTANEOUS
Status: DISCONTINUED | OUTPATIENT
Start: 2025-03-24 | End: 2025-03-26 | Stop reason: HOSPADM

## 2025-03-24 RX ADMIN — CARBIDOPA AND LEVODOPA 2 TABLET: 25; 100 TABLET ORAL at 22:30

## 2025-03-24 RX ADMIN — MEROPENEM 500 MG: 500 INJECTION, POWDER, FOR SOLUTION INTRAVENOUS at 12:38

## 2025-03-24 RX ADMIN — ATORVASTATIN CALCIUM 20 MG: 20 TABLET, FILM COATED ORAL at 22:30

## 2025-03-24 RX ADMIN — IPRATROPIUM BROMIDE AND ALBUTEROL SULFATE 3 ML: .5; 3 SOLUTION RESPIRATORY (INHALATION) at 19:58

## 2025-03-24 RX ADMIN — CARBIDOPA AND LEVODOPA 2 TABLET: 25; 100 TABLET ORAL at 06:02

## 2025-03-24 RX ADMIN — ACETAMINOPHEN 1000 MG: 500 TABLET, FILM COATED ORAL at 22:30

## 2025-03-24 RX ADMIN — CARBIDOPA AND LEVODOPA 2 TABLET: 25; 100 TABLET ORAL at 11:19

## 2025-03-24 RX ADMIN — CARBIDOPA AND LEVODOPA 2 TABLET: 25; 100 TABLET ORAL at 16:08

## 2025-03-24 RX ADMIN — MEROPENEM 500 MG: 500 INJECTION, POWDER, FOR SOLUTION INTRAVENOUS at 04:29

## 2025-03-24 RX ADMIN — RALOXIFENE HYDROCHLORIDE 60 MG: 60 TABLET, FILM COATED ORAL at 22:30

## 2025-03-24 RX ADMIN — TOLTERODINE TARTRATE 1 MG: 1 TABLET ORAL at 07:58

## 2025-03-24 RX ADMIN — BUDESONIDE 0.5 MG: 0.5 INHALANT RESPIRATORY (INHALATION) at 19:58

## 2025-03-24 RX ADMIN — CARVEDILOL 12.5 MG: 12.5 TABLET, FILM COATED ORAL at 18:35

## 2025-03-24 RX ADMIN — PREDNISONE 30 MG: 20 TABLET ORAL at 08:00

## 2025-03-24 RX ADMIN — SERTRALINE HYDROCHLORIDE 25 MG: 25 TABLET ORAL at 08:00

## 2025-03-24 RX ADMIN — CARVEDILOL 12.5 MG: 12.5 TABLET, FILM COATED ORAL at 07:58

## 2025-03-24 RX ADMIN — PANTOPRAZOLE SODIUM 40 MG: 40 TABLET, DELAYED RELEASE ORAL at 06:57

## 2025-03-24 RX ADMIN — AMLODIPINE BESYLATE 10 MG: 5 TABLET ORAL at 07:58

## 2025-03-24 RX ADMIN — TOLTERODINE TARTRATE 1 MG: 1 TABLET ORAL at 22:30

## 2025-03-24 RX ADMIN — ACETAMINOPHEN 1000 MG: 500 TABLET, FILM COATED ORAL at 07:58

## 2025-03-24 ASSESSMENT — ACTIVITIES OF DAILY LIVING (ADL)
ADLS_ACUITY_SCORE: 82
ADLS_ACUITY_SCORE: 82
ADLS_ACUITY_SCORE: 78
ADLS_ACUITY_SCORE: 82
ADLS_ACUITY_SCORE: 78
ADLS_ACUITY_SCORE: 82
ADLS_ACUITY_SCORE: 78
ADLS_ACUITY_SCORE: 82

## 2025-03-24 NOTE — PLAN OF CARE
Physical Therapy Discharge Summary    Reason for therapy discharge:    No further expectations of functional progress.  Change in medical status.  Comfort cares as of today, 3/24/25    Progress towards therapy goal(s). See goals on Care Plan in [facility] electronic health record for goal details.  Goals not met.  Barriers to achieving goals:   transitioned to comfort cares.    Therapy recommendation(s):    No further therapy is recommended.

## 2025-03-24 NOTE — PLAN OF CARE
DATE & TIME: 3/23/25 1900-3/24/25 0730  Cognitive Concerns/ Orientation: A&Ox 4, very forgetful.  [place]  BEHAVIOR & AGGRESSION TOOL COLOR: Green  ABNL VS/O2: VSS on 2L NC with humidification.   MOBILITY: Ax2 GB/W. T&R in bed.   PAIN MANAGMENT: Neck pain, pillow support provided   DIET: Regular  BOWEL/BLADDER: Incontinent. Purewick in use, no BM  DRAIN/DEVICES: R PIV saline locked.  SKIN: Scattered scabs and bruises,  sacrum/coccyx - mepilex in place for prevention  TESTS/PROCEDURES: none-declined video swallow for today  D/C DATE: Pending  OTHER IMPORTANT INFO: Takes pills whole in applesauce. Coarse lung sounds, MORRELL. Congested, nonproductive cough. Palliative consult today.  Q8h merrem, PO zithro, prednisone taper, nebs.

## 2025-03-24 NOTE — PROGRESS NOTES
Care Management Follow Up    Length of Stay (days): 10    Expected Discharge Date: 03/26/2025     Concerns to be Addressed: discharge planning     Patient plan of care discussed at interdisciplinary rounds: Yes    Anticipated Discharge Disposition:                Anticipated Discharge Services:    Anticipated Discharge DME: Oxygen (had prior at [facility] at admission)    Patient/family educated on Medicare website which has current facility and service quality ratings:    Education Provided on the Discharge Plan:    Patient/Family in Agreement with the Plan:      Referrals Placed by CM/SW:    Private pay costs discussed: Not applicable    Discussed  Partnership in Safe Discharge Planning  document with patient/family: No     Handoff Completed: No, handoff not indicated or clinically appropriate    Additional Information:  Writer is informed by Palliative provider Farnaz that pt is going comfort care / hospice and is hoping to return to her AL [facility] on hospice.  Writer met with pt at bedside, pt's sister Corrie is present. Pt and Corrie are in agreement to hospice. Pt and corrie would both like pt to return to [facility] on hospice. Writer informed pt and corrie that writer will have to reach out to [facility] to see if they are able to accommodate her back on hospice as typically hospice requires 24/7 care. Pt and corire state understanding. Pt states she has a lift chair at her AL and this is what she prefers to sleep in as it is more comfortable than a bed. Pt has a wheelchair available at AL as well. Pt does not have oxygen at AL so this would be needed for DME. Pt states no preference to hospice agency. She state she would like care management to reach out to her AL and whatever hospice agency they use she is ok with referral being sent there. Pt's sister then provided writer with phone number for Dee YOUSSEF with [facility] [phone]. Pt and Corrie requesting that writer call Dee to coordinate.  Discussed transport with pt and roya as well. Pt and roya both state they would like a wheelchair transport. Writer informed them that this is a private pay cost and pt would receive a bill in the mail for this. Pt and roya state understanding. Pt and roya state no further questions or concerns at this time.    Writer place phone call to [facility]  [phone]. No answer. Writer left voicemail asking for call back about whether they can accommodate pt back on hospice with 24/7 care and what hospice agency is used. Writer requesting call back.    Writer left notice for unit  to follow up with AL tomorrow as have not heard back from them today. Writer left notice for unit  to send referral to hospice agency based off of whichever one AL prefers per pt.       Next Steps: coordination for AL with hospice.     Miley Dewitt, JUANITAW  Social Work  [facility]

## 2025-03-24 NOTE — CONSULTS
Palliative Care Consultation Note  [facility]      Patient: Opal Sin  Date of Admission:  3/14/2025    Requesting Clinician / Team: Dr. Dunham/hospitalist  Reason for consult: Goals of care     Recommendations & Counseling     GOALS OF CARE:  Comfort focused -Per discussion with Opal and family, transition to comfort measures  Social work consult for hospice planning.  Opal and family hopes that she can return to [facility] agency [facility]    ADVANCE CARE PLANNING:  Patient has an advance directive dated March 2008.  Primary Health Care Agent sister Hattie Benton.  Alternate(s) sister Corrie Oquendo.  Her agents must act jointly.  There is a POLST form on file, this was reviewed and current.  Code status: No CPR- Do NOT Intubate    MEDICAL MANAGEMENT:  As above, transition to comfort measures  Opal would like to discontinue antibiotics  Opal would like to continue prior to admission medications as of now  Opal would like to continue nebulizer treatments  Family will talk more with hospice about injections that she receives for her macular degeneration, i.e. can this continue under hospice services and who pays for it  High concentration morphine 5 to 10 mg sublingual every 2 hours as needed pain, shortness of breath  Regular diet for pleasure and comfort, will not pursue video swallow evaluation  Atropine as needed secretions  Oxygen via nasal cannula as needed for comfort    PSYCHOSOCIAL/SPIRITUAL SUPPORT:  Family -supportive sisters Hattie and Corrie at bedside, as well as brother-in-law    Palliative Care will continue to follow peripherally. Thank you for the consult and allowing us to aid in the care of Opal Sin.    These recommendations have been discussed with Dr. Long, Ailyn RN, unit  Miley.    Chart documentation was completed, in part, with [employer] voice-recognition software. Even though reviewed, some grammatical, spelling, and  word errors may remain.     MIO Arceo CNP  Securely message with [employer] info)  Text page via [facility] Paging/Directory     Palliative Summary/HPI     Opal Sin is a 89 year old female with a past medical history significant for HFpEF, CKD stage 3, hx breast cancer, asthma, Parkinson's disease, depression, peripheral neuropathy, overactive bladder, hyperlipidemia, hypertension, and recent hospitalization 3/8 to 3/12 for acute hypoxic respiratory failure 2/2 influenza A, heart failure exacerbation, and A-fib who re-presents with generalized weakness and fatigue.  She is found to have acute hypoxic respiratory failure secondary to multifocal pneumonia, HFpEF exacerbation, and asthma exacerbation.  MICHAEL on CKD resolving.    Today, the patient was seen for:  Goals of care    Palliative Care Summary:   Met with Opal, along with sisters Hattie and Corrie, brother-in-law.     I introduced our role as an extra layer of support and how we help patients and families dealing with serious, potentially life-limiting illnesses. I explained the composition of the palliative care team.  Palliative care helps patients and families navigate their care while focusing on the whole person; providing emotional, social and spiritual support  Palliative care often assists with symptom management, information sharing about what to expect from the illness, available treatment options and what effect those options may have on the disease course, and provide effective communication and caring support.    Prognosis, Goals, & Planning:    Functional Status just prior to this current hospitalization:  Recent hospitalization 3/8 to 3/12 for acute hypoxic respiratory failure 2/2 influenza A, heart failure exacerbation, and A-fib    Prognosis, Goals, and/or Advance Care Planning:  Advance Care Planning Discussion 3/24/2025. ICynthia APRN CNP met with Patient and their family today at the hospital to discuss  Advance Care Planning. Opal Sin does have decisional capacity and was present for this discussion. Those present were informed of the voluntary nature of this discussion and wished to proceed. This discussion began at 1315 and ended at 1410 for a total of 55 minutes.    Provided some education around the difference between palliative care and hospice.  Opal is very clear that she is not afraid of dying and is death accepting.  She does not want to have recurrent hospitalizations and she wants her focus of care to be on comfort.  Education provided on transition to comfort-focused goals of care would be including discontinuation of IV fluids, cardiac monitoring, labs, and other interventions that do not directly promote comfort.  Anticipatory guidance was given regarding feeding, hunger, fluids at end of life. We discussed utilization of medications to ease pain, air hunger, agitation and restlessness. Discussed that this process is very purposeful in terms of ensuring patient is as comfortable as possible and that family wishes are honored.  Education provided regarding hospice philosophy, prognostic, and eligibility criteria. Discussed what services are provided and those that are not. Discussed common misconceptions. We explored the various disposition options where they can receive hospice care (home, residential hospice homes, LTC with hospice) including subsequent financial and familial implications. Discussed typical anticipated timing of discharge, acknowledging that the care management team will assist in specifics.   Based on this discussion, transition to comfort measures and pursue hospice at her assisted living facility  Did educate patient and family on eligibility for hospice and the general prognostic timeframe of 6 months or less.  As she has been pretty adequately treated for pneumonia, her body will ultimately guide the way as the days and weeks progress.  It is possible that she will  continue to decline due to weakness and back to back hospitalizations/infections, leading to her end of life.  It is also possible that she could plateau and stabilize, with the possibility of recovering from hospice.  If she were to recover/graduate from hospice, we talked about how hospice can be pursued in the future when her dying process does unfold.    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 Notable medications:  APAP 1 g p.o. daily with 1 g p.o. twice daily as needed mild pain  Norvasc  Aspirin  Lipitor  Pulmicort nebulizer twice daily  Sinemet 2 tablets 4 times daily  Coreg  DuoNebs 3 times daily  PPI  Prednisone taper  Raloxifene  Zoloft 25 mg p.o. daily  Detrol  Atropine as needed secretions  Gabapentin 100 mg p.o. twice daily as needed neuropathic pain  Roxanol 5 to 10 mg p.o. every 2 hours as needed pain, shortness of breath    ROS:  Comprehensive ROS is reviewed and is negative except as here & per HPI: N/A    Physical Exam   Vital Signs with Ranges  Temp:  [97.8  F (36.6  C)-98.3  F (36.8  C)] 98.3  F (36.8  C)  Pulse:  [77-87] 87  Resp:  [18] 18  BP: (106-117)/(68-72) 112/68  SpO2:  [95 %-97 %] 96 %  143 lbs  8.31 oz  CONSTITUTIONAL: Chronically ill weak elderly woman seen sitting up in the chair in NAD, A&Ox3.  Weak vocal quality.  Calm and cooperative.  Family present  HEENT: NCAT  NECK: Supple  CARDIOVASCULAR: RRR  RESPIRATORY: NL respiratory effort on 2L NC, congested nonproductive cough  NEUROLOGIC: Appropriately responsive during interview  PSYCH: Affect engaged    Data reviewed:  Recent imaging independently reviewed, my comments on pertinents:   3/18 CT chest with left lower lobe opacity, right lower lobe opacity    Recent lab data independently reviewed, my comments on pertinents:   Na 134  K 4.4  Creat 0.63  Procalcitonin 0.46  WBC 14.1  Hgb 10.1  Plt 196    Medical Decision Making   Please see A&P for additional details of medical decision making.  MANAGEMENT DISCUSSED with the following over the past 24 hours: Ailyn Davis RN, unit  Miley   NOTE(S)/MEDICAL RECORDS REVIEWED over the past 24 hours: H&P, hospitalist notes, nursing notes, SLP note, care coordination note, former hospitalization discharge summary  Tests personally interpreted in the past 24 hours:  - CHEST CT showing left lower lobe opacity, right lower lobe opacity  Tests REVIEWED in the past 24 hours:  - See lab/imaging results included in the data section of the note  - BMP  - CBC  - Procalcitonin  SUPPLEMENTAL HISTORY, in addition to the patient's history, over the past 24 hours obtained from:   - Ailyn Davis RN, unit  Miley, sisters Hattie and Corrie, brother-in-law  Medical complexity over the past 24 hours:  - Decision to DE-ESCALATE CARE based on prognosis

## 2025-03-24 NOTE — PLAN OF CARE
Summary: Acute hypoxic respiratory failure d/t PNA/CHF/asthma exacerbation. Recent Flu/new afib earlier this month.      DATE & TIME: 3/24/2025 2124-0011  Cognitive Concerns/ Orientation: A&Ox 4, forgetful.  Circle with bilateral hearing aids  BEHAVIOR & AGGRESSION TOOL COLOR: Green  ABNL VS/O2: VSS on 2L NC with humidification-wean as able.   MOBILITY: Ax2 GB/Walker, up to chair today. Turn&Repo in bed.   PAIN MANAGMENT: chronic neck pain, pillow support/hot packs and scheduled tylenol provided   DIET: Regular- poor appetite  BOWEL/BLADDER: Incontinent. Purewick in use, no BM today  DRAIN/DEVICES: R PIV saline locked.  SKIN: Scattered scabs and bruises,  sacrum/coccyx - mepilex in place for prevention. +1 BLE eema  TESTS/PROCEDURES: none-declined video swallow  D/C DATE: back to [facility] on hospice tomorrow if all arrangements can be made?   OTHER IMPORTANT INFO: Takes pills whole in applesauce. Coarse lung sounds, MORRELL. Congested, nonproductive cough. Palliative saw patient today- pt transitioned to comfort cares with wishes to continue her oral medications and enroll in hospice at her [facility]. Antibiotics stopped. Continues on nebs/prednisone.

## 2025-03-24 NOTE — PROGRESS NOTES
"CLINICAL NUTRITION SERVICES - REASSESSMENT NOTE    RECOMMENDATIONS FOR MDs/PROVIDERS TO ORDER:  Could consider an appetite stimulant, if appropriate     Registered Dietitian Interventions:  Enter supplements into [facility] (room service system)  Added flavor/seasoning preferences into [facility] (room service system)  Continue Regular diet     INFORMATION OBTAINED  Assessed patient in room.     CURRENT NUTRITION ORDERS  Diet: Orders Placed This Encounter      Combination Diet Regular Diet Adult  Room Service Appropriate with assist    Snacks/Supplements: Gelatein Plus daily and Magic Cup BID    Nutrition Support:     Dosing Weight: 58.3 kg, based on dry wt     ASSESSED NUTRITION NEEDS  Estimated Energy Needs: 6963-3645 kcals/day (25 - 30 kcals/kg)  Justification: Maintenance  Estimated Protein Needs: 70-88 grams protein/day (1.2 - 1.5 grams of pro/kg)  Justification: Hypercatabolism with acute illness and Repletion  Estimated Fluid Needs: 0600-7822 mL/day (25 - 30 mL/kg)  Justification: Maintenance and Per provider pending fluid status    CURRENT INTAKE/TOLERANCE  - Flowsheets show a poor appetite and 3 intakes per day of 25-50% for the most part. One day only had intakes BID, plus a couple days with a 75% and a 100% intake  - pt reported her intakes as going \"not very good,\" as she's \"not hungry for any of the food.\" When asked why not, she reported the food has \"no flavor\" and she has \"no taste.\" --> nutrition associate came to take her lunch order and was able to input comments to make sure to add seasoning packets (herb, pepper) are on her meal trays  - She didn't recall getting any Magic Cups or Gel+. She did note liking albarran, chocolate flavors and was OK with making sure these supplements get sent.      NEW FINDINGS  Nutrition-relevant labs: Na 134 (L), BUN 47.3 (H), -141  Nutrition-relevant medications: Reviewed   GI symptoms: Reviewed  Skin/wounds: Reviewed  Weight: Trending up, suspect fluid  - " Admission: 58.3 kg (128 lb 8.5 oz) (03/14/25 1158)   - Most Recent: 65.1 kg (143 lb 8.3 oz) (03/24/25 0124)    MALNUTRITION - 3/21  % Intake: </=50% for >/= 1 month (severe)  % Weight Loss: None noted  Subcutaneous Fat Loss: Orbital: Severe and Buccal: Moderate  Muscle Loss: Temples (temporalis muscle): Mild, Clavicles (pectoralis and deltoids): Severe, Shoulders (deltoids): Severe, and Interosseous muscles: Moderate  Fluid Accumulation/Edema: Mild, 1+  Malnutrition Diagnosis: Severe malnutrition in the context of chronic illness  Malnutrition Present on Admission: Unable to assess    EVALUATION OF THE PROGRESS TOWARD GOALS   Previous Goals  Total avg nutritional intake to meet a minimum of 1450 kcal/kg and 70 g PRO/kg daily (per dosing wt 58.3 kg).  Evaluation: Progressing    Previous Nutrition Diagnosis  Inadequate oral intake related to altered taste, decreased appetite as evidenced by intake 25-50% in hospital and pt report of intake <50% x 1 month  Evaluation: No change    NUTRITION DIAGNOSIS  Inadequate oral intake related to altered taste, decreased appetite as evidenced by intake 25-50% in hospital and pt report of intake <50% x 1 month    INTERVENTIONS  Manage composition of oral intake  Medical food supplement therapy  See nutrition interventions above    Goals  Patient to consume % of nutritionally adequate meal trays TID, or the equivalent with supplements/snacks.     Monitoring/Evaluation      Progress toward goals will be monitored and evaluated per policy.

## 2025-03-24 NOTE — PROGRESS NOTES
[facility]    Medicine Progress Note - Hospitalist Service    Date of Admission:  3/14/2025    Assessment & Plan   Opal Sin is a 89 year old female with a past medical history significant for HFpEF, CKD stage 3, breast cancer, asthma, Parkinson's, hyperlipidemia, and hypertension who presented with generalized weakness and and acute hypoxic respiratory failure secondary to pneumonia, HFpEF exacerbation and asthma exacerbation.  Goals of care discussion held between palliative care specialist and patient/sister/brother-in-law on 3/24/2025 where he was ultimately decided to proceed with hospice/comfort care.    Goals of care  Cynthia Osei of palliative team discussed extensively with patient, sister and brother-in-law on 3/24/2025 where patient ultimately decided to pursue hospice/comfort care  - Comfort care order set placed by palliative team  - Patient prefers to keep PTA meds despite being on comfort care.  Okay with this  - Patient amenable with keeping O2 currently on 2 L nasal cannula, but if she develops more air hunger, will administer morphine as needed.  Will not escalate oxygenation  -  to assist with placement     Acute metabolic encephalopathy  Acute hypoxic respiratory failure due to asthma exacerbation, HFpEF exacerbation and CAPvsHAP  Generalized Weakness   MICHAEL on CKD 3  Mild Hyponatremia   Goals of Care    Enterococcus Faecalis bacteriuria-asymptomatic  Recent Influenza A Infection   Recent New Onset Afib with RVR: Currently Rate Controlled   Hypertension   Hyperlipidemia   Parkinson's Disease   GERD   History of Breast Cancer   Depression   Peripheral Neuropathy   History of Overactive Bladder       Diet: Combination Diet Regular Diet Adult  Room Service  Snacks/Supplements Adult: Magic Cup; Between Meals  Snacks/Supplements Adult: Gelatein Plus; Between Meals    DVT Prophylaxis: Pneumatic Compression Devices  Santana Catheter: Not present  Lines:  None     Cardiac Monitoring: None  Code Status: No CPR- Do NOT Intubate      Disposition Plan     Medically Ready for Discharge: Pending  assistance with hospice    Umberto Long DO  Hospitalist Service  [facility]  Securely message with [employer] (more info)  Text page via [employer] Paging/Directory   ______________________________________________________________________    Interval History   No acute events overnight.    On evaluation this morning, she is resting on the bed and accompanied by sister and brother-in-law.  I entered the room right after palliative care specialist had an extensive discussion with them.  They reinforced the discussion with the palliative care specialist or the decision was made to ultimately pursue hospice/comfort care.  Patient did emphasize her desire to continue PTA medications.    Physical Exam   Vital Signs: Temp: 98.2  F (36.8  C) Temp src: Oral BP: 122/75 Pulse: 79   Resp: 18 SpO2: 98 % O2 Device: Nasal cannula Oxygen Delivery: 2 LPM  Weight: 143 lbs 8.31 oz    General Appearance: Well appearing for stated age.  Respiratory: CTAB, no rales or ronchi  Cardiovascular: S1, S2 normal, no murmurs  GI: non-tender on palpation, BS present      Medical Decision Making       55 MINUTES SPENT BY ME on the date of service doing chart review, history, exam, documentation & further activities per the note.      Data     I have personally reviewed the following data over the past 24 hrs:    N/A  \   N/A   / N/A     134 (L) 101 47.3 (H) /  141 (H)   4.4 23 0.63 \       Imaging results reviewed over the past 24 hrs:   No results found for this or any previous visit (from the past 24 hours).

## 2025-03-25 ENCOUNTER — MEDICAL CORRESPONDENCE (OUTPATIENT)
Dept: HEALTH INFORMATION MANAGEMENT | Facility: CLINIC | Age: 89
End: 2025-03-25
Payer: COMMERCIAL

## 2025-03-25 PROCEDURE — 250N000013 HC RX MED GY IP 250 OP 250 PS 637: Performed by: STUDENT IN AN ORGANIZED HEALTH CARE EDUCATION/TRAINING PROGRAM

## 2025-03-25 PROCEDURE — 250N000013 HC RX MED GY IP 250 OP 250 PS 637: Performed by: INTERNAL MEDICINE

## 2025-03-25 PROCEDURE — 94640 AIRWAY INHALATION TREATMENT: CPT

## 2025-03-25 PROCEDURE — 94640 AIRWAY INHALATION TREATMENT: CPT | Mod: 76

## 2025-03-25 PROCEDURE — 99232 SBSQ HOSP IP/OBS MODERATE 35: CPT | Performed by: INTERNAL MEDICINE

## 2025-03-25 PROCEDURE — 120N000001 HC R&B MED SURG/OB

## 2025-03-25 PROCEDURE — 999N000157 HC STATISTIC RCP TIME EA 10 MIN

## 2025-03-25 PROCEDURE — 250N000009 HC RX 250: Performed by: STUDENT IN AN ORGANIZED HEALTH CARE EDUCATION/TRAINING PROGRAM

## 2025-03-25 PROCEDURE — 250N000013 HC RX MED GY IP 250 OP 250 PS 637: Performed by: HOSPITALIST

## 2025-03-25 PROCEDURE — 250N000012 HC RX MED GY IP 250 OP 636 PS 637: Performed by: INTERNAL MEDICINE

## 2025-03-25 RX ADMIN — ASPIRIN 81 MG: 81 TABLET, COATED ORAL at 08:15

## 2025-03-25 RX ADMIN — POLYETHYLENE GLYCOL 3350 17 G: 17 POWDER, FOR SOLUTION ORAL at 10:42

## 2025-03-25 RX ADMIN — BUDESONIDE 0.5 MG: 0.5 INHALANT RESPIRATORY (INHALATION) at 21:00

## 2025-03-25 RX ADMIN — PREDNISONE 30 MG: 20 TABLET ORAL at 08:14

## 2025-03-25 RX ADMIN — RALOXIFENE HYDROCHLORIDE 60 MG: 60 TABLET, FILM COATED ORAL at 21:24

## 2025-03-25 RX ADMIN — CARBIDOPA AND LEVODOPA 2 TABLET: 25; 100 TABLET ORAL at 10:41

## 2025-03-25 RX ADMIN — CARBIDOPA AND LEVODOPA 2 TABLET: 25; 100 TABLET ORAL at 17:13

## 2025-03-25 RX ADMIN — CARVEDILOL 12.5 MG: 12.5 TABLET, FILM COATED ORAL at 08:14

## 2025-03-25 RX ADMIN — IPRATROPIUM BROMIDE AND ALBUTEROL SULFATE 3 ML: .5; 3 SOLUTION RESPIRATORY (INHALATION) at 21:00

## 2025-03-25 RX ADMIN — IPRATROPIUM BROMIDE AND ALBUTEROL SULFATE 3 ML: .5; 3 SOLUTION RESPIRATORY (INHALATION) at 07:42

## 2025-03-25 RX ADMIN — CARBIDOPA AND LEVODOPA 2 TABLET: 25; 100 TABLET ORAL at 21:06

## 2025-03-25 RX ADMIN — ATORVASTATIN CALCIUM 20 MG: 20 TABLET, FILM COATED ORAL at 21:06

## 2025-03-25 RX ADMIN — IPRATROPIUM BROMIDE AND ALBUTEROL SULFATE 3 ML: .5; 3 SOLUTION RESPIRATORY (INHALATION) at 14:16

## 2025-03-25 RX ADMIN — TOLTERODINE TARTRATE 1 MG: 1 TABLET ORAL at 21:06

## 2025-03-25 RX ADMIN — CARBIDOPA AND LEVODOPA 2 TABLET: 25; 100 TABLET ORAL at 06:11

## 2025-03-25 RX ADMIN — CARVEDILOL 12.5 MG: 12.5 TABLET, FILM COATED ORAL at 18:11

## 2025-03-25 RX ADMIN — SERTRALINE HYDROCHLORIDE 25 MG: 25 TABLET ORAL at 08:14

## 2025-03-25 RX ADMIN — TOLTERODINE TARTRATE 1 MG: 1 TABLET ORAL at 08:14

## 2025-03-25 RX ADMIN — ACETAMINOPHEN 1000 MG: 500 TABLET, FILM COATED ORAL at 21:06

## 2025-03-25 RX ADMIN — AMLODIPINE BESYLATE 10 MG: 5 TABLET ORAL at 08:14

## 2025-03-25 RX ADMIN — ACETAMINOPHEN 1000 MG: 500 TABLET, FILM COATED ORAL at 08:14

## 2025-03-25 RX ADMIN — BUDESONIDE 0.5 MG: 0.5 INHALANT RESPIRATORY (INHALATION) at 07:42

## 2025-03-25 ASSESSMENT — ACTIVITIES OF DAILY LIVING (ADL)
ADLS_ACUITY_SCORE: 82
ADLS_ACUITY_SCORE: 82
ADLS_ACUITY_SCORE: 86
ADLS_ACUITY_SCORE: 82
ADLS_ACUITY_SCORE: 86
ADLS_ACUITY_SCORE: 86
ADLS_ACUITY_SCORE: 82
ADLS_ACUITY_SCORE: 86
ADLS_ACUITY_SCORE: 82
ADLS_ACUITY_SCORE: 86
ADLS_ACUITY_SCORE: 82
ADLS_ACUITY_SCORE: 86
ADLS_ACUITY_SCORE: 82
ADLS_ACUITY_SCORE: 86
ADLS_ACUITY_SCORE: 86

## 2025-03-25 NOTE — PROGRESS NOTES
Primary: Admitted for generalized weakness and acute hypoxic resp failure due to pneumonia and CHF and asthma exacerbation. Recent Flu/new afib earlier this month.     Orientation: A/O x4    Vitals/Tele: VSS on 2L NC w/ humidifier for comfort.     IV Access/drains: R PIV SL    Diet: Regular    Mobility: A x 2 GB/W, T/Repo    GI/: Incontinent of B/B, purewick in place    Wound/Skin: BLE edema, scattered scabs/bruising, mepi in place for preventive measures.      Consults:     Discharge Plan: Pending back to [facility] w/  hospice possibly today.    Other Info: intermittent non-productive cough w/ coarse LS  See Flow sheets for assessment

## 2025-03-25 NOTE — PLAN OF CARE
Goal Outcome Evaluation:  Cognitive Concerns/ Orientation: A&Ox4, forgetful.  [place] with bilateral hearing aids  BEHAVIOR & AGGRESSION TOOL COLOR: Green  ABNL VS/O2: VSS on 2L NC with humidification for comfort  MOBILITY: Ax2 GB/Walker, up to chair for lunch. Turn&Repo in bed for comfort  PAIN MANAGMENT: chronic neck pain, on scheduled tylenol, heat packs applied  DIET: Regular- poor appetite  BOWEL/BLADDER: Incontinent. Purewick in use, no BM, mirilax given  DRAIN/DEVICES: R PIV saline locked.  SKIN: Scattered scabs and bruises,  sacrum/coccyx - mepilex in place for prevention. +1 BLE edema  TESTS/PROCEDURES: none  OTHER IMPORTANT INFO: Takes pills whole in applesauce. Coarse lung sounds, MORRELL. Congested, nonproductive cough. Discharge to [facility] with hospice when arrangements are finalized

## 2025-03-25 NOTE — PLAN OF CARE
Summary: Acute hypoxic respiratory failure d/t PNA/CHF/asthma exacerbation. Recent Flu/new afib earlier this month.      DATE & TIME: 3/24/2025 4937-3626  Cognitive Concerns/ Orientation: A&Ox 4, forgetful.  [place] with bilateral hearing aids  BEHAVIOR & AGGRESSION TOOL COLOR: Green  ABNL VS/O2: VSS on 2L NC with humidification for comfort  MOBILITY: Ax2 GB/Walker, up to chair for meals. Turn&Repo in bed.   PAIN MANAGMENT: chronic neck pain, pillow support/hot packs and PRN tylenol given x1.   DIET: Regular- poor appetite  BOWEL/BLADDER: Incontinent. Purewick in use, no BM today  DRAIN/DEVICES: R PIV saline locked.  SKIN: Scattered scabs and bruises,  sacrum/coccyx - mepilex in place for prevention. +1 BLE eema  TESTS/PROCEDURES: none  D/C DATE: back to [facility] on hospice tomorrow if all arrangements can be made?   OTHER IMPORTANT INFO: Takes pills whole in applesauce. Coarse lung sounds, MORRELL. Congested, nonproductive cough. Palliative saw patient today- pt transitioned to comfort cares with wishes to continue her oral medications and enroll in hospice at her [facility]. Antibiotics stopped. Continues on nebs/prednisone.

## 2025-03-25 NOTE — PROGRESS NOTES
[facility]    Medicine Progress Note - Hospitalist Service    Date of Admission:  3/14/2025    Assessment & Plan   Opal Sin is a 89 year old female with a past medical history significant for HFpEF, CKD stage 3, breast cancer, asthma, Parkinson's, hyperlipidemia, and hypertension who presented with generalized weakness and and acute hypoxic respiratory failure secondary to pneumonia, HFpEF exacerbation and asthma exacerbation.  Goals of care discussion held between palliative care specialist and patient/sister/brother-in-law on 3/24/2025 where he was ultimately decided to proceed with hospice/comfort care.  Plans to discharge patient to [facility] assisted living with [facility] either tomorrow or Thursday    Goals of care  Cynthia Osei of palliative team discussed extensively with patient, sister and brother-in-law on 3/24/2025 where patient ultimately decided to pursue hospice/comfort care  - Comfort care order set placed by palliative team on 3/20/2025  - Patient prefers to keep PTA meds despite being on comfort care.  Okay with this  - Patient amenable with keeping O2 currently on 2 L nasal cannula, but if she develops more air hunger, will administer morphine as needed.  Will not escalate oxygenation  -  to assist with placement.  Potential discharge to [facility] with assisted living with [facility] either tomorrow or Thursday     Acute metabolic encephalopathy  Acute hypoxic respiratory failure due to asthma exacerbation, HFpEF exacerbation and CAPvsHAP  Generalized Weakness   MICHAEL on CKD 3  Mild Hyponatremia   Goals of Care    Enterococcus Faecalis bacteriuria-asymptomatic  Recent Influenza A Infection   Recent New Onset Afib with RVR: Currently Rate Controlled   Hypertension   Hyperlipidemia   Parkinson's Disease   GERD   History of Breast Cancer   Depression   Peripheral Neuropathy   History of Overactive Bladder       Diet: Combination Diet Regular  Diet Adult  Room Service  Snacks/Supplements Adult: Magic Cup; Between Meals  Snacks/Supplements Adult: Gelatein Plus; Between Meals    DVT Prophylaxis: None  Santana Catheter: Not present  Lines: None     Cardiac Monitoring: None  Code Status: No CPR- Do NOT Intubate      Disposition Plan     Medically Ready for Discharge: Pending  assistance with hospice    Umberto Long DO  Hospitalist Service  [facility]  Securely message with [employer] (more info)  Text page via [employer] Paging/Directory   ______________________________________________________________________    Interval History   No acute events overnight.    On evaluation this morning, she is resting on the chair next to her bed with her lunch tray in front of her.  She reports feeling decent.  Just wants to go back to her assisted living to which I told her our  will help out with this via hospice    Physical Exam   Vital Signs: Temp: 98.3  F (36.8  C) Temp src: Oral BP: 117/73 Pulse: 58   Resp: 18 SpO2: 93 % O2 Device: Nasal cannula with humidification Oxygen Delivery: 2 LPM  Weight: 143 lbs 8.31 oz    General Appearance: Well appearing for stated age.  Respiratory: CTAB, no rales or ronchi  Cardiovascular: S1, S2 normal, no murmurs  GI: non-tender on palpation, BS present      Medical Decision Making       55 MINUTES SPENT BY ME on the date of service doing chart review, history, exam, documentation & further activities per the note.      Data         Imaging results reviewed over the past 24 hrs:   No results found for this or any previous visit (from the past 24 hours).

## 2025-03-25 NOTE — PROGRESS NOTES
Speech Language Therapy Discharge Summary    Reason for therapy discharge:    Change in medical status.  The patient is transitioning to hospice care and SLP consult was discontinued.    Progress towards therapy goal(s). See goals on Care Plan in Epic electronic health record for goal details.  Goals not met.  Barriers to achieving goals:   limited tolerance for therapy.    Therapy recommendation(s):    No further therapy is recommended.    The patient was on a regular diet/thin liquids at the time SLP was discontinued.

## 2025-03-26 ENCOUNTER — MEDICAL CORRESPONDENCE (OUTPATIENT)
Dept: HEALTH INFORMATION MANAGEMENT | Facility: CLINIC | Age: 89
End: 2025-03-26

## 2025-03-26 VITALS
HEIGHT: 61 IN | SYSTOLIC BLOOD PRESSURE: 126 MMHG | BODY MASS INDEX: 27.1 KG/M2 | TEMPERATURE: 97.6 F | DIASTOLIC BLOOD PRESSURE: 76 MMHG | HEART RATE: 70 BPM | RESPIRATION RATE: 18 BRPM | WEIGHT: 143.52 LBS | OXYGEN SATURATION: 93 %

## 2025-03-26 PROCEDURE — 999N000157 HC STATISTIC RCP TIME EA 10 MIN

## 2025-03-26 PROCEDURE — 94640 AIRWAY INHALATION TREATMENT: CPT

## 2025-03-26 PROCEDURE — 250N000009 HC RX 250: Performed by: STUDENT IN AN ORGANIZED HEALTH CARE EDUCATION/TRAINING PROGRAM

## 2025-03-26 PROCEDURE — 250N000013 HC RX MED GY IP 250 OP 250 PS 637: Performed by: INTERNAL MEDICINE

## 2025-03-26 PROCEDURE — 250N000012 HC RX MED GY IP 250 OP 636 PS 637: Performed by: INTERNAL MEDICINE

## 2025-03-26 PROCEDURE — 250N000013 HC RX MED GY IP 250 OP 250 PS 637: Performed by: STUDENT IN AN ORGANIZED HEALTH CARE EDUCATION/TRAINING PROGRAM

## 2025-03-26 PROCEDURE — 99239 HOSP IP/OBS DSCHRG MGMT >30: CPT | Performed by: STUDENT IN AN ORGANIZED HEALTH CARE EDUCATION/TRAINING PROGRAM

## 2025-03-26 RX ORDER — LIDOCAINE 40 MG/G
CREAM TOPICAL
Qty: 2.5 G | Refills: 1 | Status: SHIPPED | OUTPATIENT
Start: 2025-03-26

## 2025-03-26 RX ORDER — MINERAL OIL/HYDROPHIL PETROLAT
OINTMENT (GRAM) TOPICAL
Qty: 10 G | Refills: 1 | Status: SHIPPED | OUTPATIENT
Start: 2025-03-26

## 2025-03-26 RX ORDER — MORPHINE SULFATE 20 MG/ML
5-10 SOLUTION ORAL
Qty: 20 ML | Refills: 0 | Status: SHIPPED | OUTPATIENT
Start: 2025-03-26

## 2025-03-26 RX ORDER — BISACODYL 10 MG
10 SUPPOSITORY, RECTAL RECTAL
Qty: 5 SUPPOSITORY | Refills: 1 | Status: SHIPPED | OUTPATIENT
Start: 2025-03-27

## 2025-03-26 RX ORDER — ATROPINE SULFATE 10 MG/ML
2 SOLUTION/ DROPS OPHTHALMIC EVERY 4 HOURS PRN
Qty: 10 ML | Refills: 1 | Status: SHIPPED | OUTPATIENT
Start: 2025-03-26

## 2025-03-26 RX ORDER — CARBOXYMETHYLCELLULOSE SODIUM 5 MG/ML
1-2 SOLUTION/ DROPS OPHTHALMIC
Qty: 2 EACH | Refills: 0 | Status: SHIPPED | OUTPATIENT
Start: 2025-03-26

## 2025-03-26 RX ORDER — ONDANSETRON 4 MG/1
4 TABLET, ORALLY DISINTEGRATING ORAL EVERY 6 HOURS PRN
Qty: 30 TABLET | Refills: 1 | Status: SHIPPED | OUTPATIENT
Start: 2025-03-26

## 2025-03-26 RX ADMIN — SERTRALINE HYDROCHLORIDE 25 MG: 25 TABLET ORAL at 08:21

## 2025-03-26 RX ADMIN — PREDNISONE 30 MG: 20 TABLET ORAL at 08:21

## 2025-03-26 RX ADMIN — BUDESONIDE 0.5 MG: 0.5 INHALANT RESPIRATORY (INHALATION) at 07:19

## 2025-03-26 RX ADMIN — ACETAMINOPHEN 1000 MG: 500 TABLET, FILM COATED ORAL at 08:21

## 2025-03-26 RX ADMIN — CARBIDOPA AND LEVODOPA 2 TABLET: 25; 100 TABLET ORAL at 10:52

## 2025-03-26 RX ADMIN — AMLODIPINE BESYLATE 10 MG: 5 TABLET ORAL at 08:21

## 2025-03-26 RX ADMIN — TOLTERODINE TARTRATE 1 MG: 1 TABLET ORAL at 08:21

## 2025-03-26 RX ADMIN — ACETAMINOPHEN 1000 MG: 500 TABLET, FILM COATED ORAL at 03:21

## 2025-03-26 RX ADMIN — CARBIDOPA AND LEVODOPA 2 TABLET: 25; 100 TABLET ORAL at 06:24

## 2025-03-26 RX ADMIN — IPRATROPIUM BROMIDE AND ALBUTEROL SULFATE 3 ML: .5; 3 SOLUTION RESPIRATORY (INHALATION) at 07:20

## 2025-03-26 RX ADMIN — PANTOPRAZOLE SODIUM 40 MG: 40 TABLET, DELAYED RELEASE ORAL at 06:24

## 2025-03-26 RX ADMIN — CARVEDILOL 12.5 MG: 12.5 TABLET, FILM COATED ORAL at 08:21

## 2025-03-26 ASSESSMENT — ACTIVITIES OF DAILY LIVING (ADL)
ADLS_ACUITY_SCORE: 85
ADLS_ACUITY_SCORE: 86
ADLS_ACUITY_SCORE: 85
ADLS_ACUITY_SCORE: 81
ADLS_ACUITY_SCORE: 86

## 2025-03-26 NOTE — PROGRESS NOTES
Care Management Follow Up    Length of Stay (days): 12    Expected Discharge Date: 03/26/2025     Concerns to be Addressed: discharge planning     Patient plan of care discussed at interdisciplinary rounds: Yes    Anticipated Discharge Disposition:    Anticipated Discharge Services:    Anticipated Discharge DME: Oxygen (had prior at [facility] at admission)    Patient/family educated on Medicare website which has current facility and service quality ratings:    Education Provided on the Discharge Plan:    Patient/Family in Agreement with the Plan:      Referrals Placed by CM/SW:    Private pay costs discussed: Not applicable    Discussed  Partnership in Safe Discharge Planning  document with patient/family: No     Handoff Completed: No, handoff not indicated or clinically appropriate    Additional Information:  ROSANA confirmed [facility] has accepted for services.   SW received message to contact liaison for discharge planning, ROSANA called Julienne Henderson and left voicemail with request for call back to coordinate discharge.     Julienne Henderson, RN hospice liaison for [facility] [phone]     Addendum 1044: SW met with pt at bedside to get preferences on transport, pt had concerns about supporting her neck so she decided on a Stretcher ride to be best.      ROSANA received call from Ching at pt facility [phone] inquiring about discharge plan. ROSANA indicated that I need to contact transport to see what they have available, Rosana confirmed pt needed to be back by 3pm.     ROSANA called [facility] transport and arranged stretcher ride for [phone] today.     ROSANA updated Nurse, ROSANA updated Doctor and requested orders and 3 days of comfort meds.    ROSANA called back Julienne from [facility] to update on time, they will plan for and admission time of 2 - 2:30 pm     ROSANA called back facility and left voicemail with Dee on time transport is set up for and that [facility] will also be  there when pt arrives.     ADD: 1053: ROSANA spoke with Dee from [facility]- requested to Fax orders over    Fax: [phone]    ROSANA Faxed over orders.     Addendum 1423: ROSANA finished [facility] transportation form.     Next Steps: Send discharge orders to Hospice and facility Once in. Update HUC.     Eileen Love, BSW

## 2025-03-26 NOTE — PLAN OF CARE
Goal Outcome Evaluation:                DATE & TIME: 3/25 0904-1400  Cognitive Concerns/ Orientation: A&Ox4, forgetful.  [place] with bilateral hearing aids  BEHAVIOR & AGGRESSION TOOL COLOR: Green, calm/cooperative  ABNL VS/O2: VS deferred-Comfort care, O2 with humidification for comfort  MOBILITY: Ax2 GB/Walker, turn&repo in bed  PAIN MANAGMENT: chronic neck pain, on scheduled tylenol, heat packs applied  DIET: Regular- poor appetite  BOWEL/BLADDER: Incontinent, Purewick in use, no BM  DRAIN/DEVICES: R PIV SL  SKIN: Scattered scabs and bruises,  sacrum/coccyx - mepilex in place for prevention. +1 BLE edema  TESTS/PROCEDURES: none  OTHER IMPORTANT INFO: Pt alert/cooperative, Tylenol prn given for c/o neck pain.Takes pills whole in applesauce. Coarse lung sounds, MORRELL. Congested, nonproductive cough. Discharge to [facility] with hospice when arrangements are finalized.

## 2025-03-26 NOTE — PLAN OF CARE
Goal Outcome Evaluation:  [facility] to [facility] via . Meds filled and packet sent with transportation. Belongings, including blanket, flowers, glasses, hearing aids, cell phone and  sent with transport.

## 2025-03-26 NOTE — DISCHARGE SUMMARY
"[facility]  Hospitalist Discharge Summary      Date of Admission:  3/14/2025  Date of Discharge:  3/26/2025  Discharging Provider: Vitaliy Watson MD  Discharge Service: Hospitalist Service    Discharge Diagnoses   Acute metabolic encephalopathy  Acute hypoxic respiratory failure due to asthma exacerbation, HFpEF exacerbation and CAPvsHAP  Generalized Weakness   MICHAEL on CKD 3  Mild Hyponatremia   Goals of Care    Enterococcus Faecalis bacteriuria-asymptomatic  Recent Influenza A Infection   Recent New Onset Afib with RVR: Currently Rate Controlled   Hypertension   Hyperlipidemia   Parkinson's Disease   GERD   History of Breast Cancer   Depression   Peripheral Neuropathy   History of Overactive Bladder    Clinically Significant Risk Factors     # Overweight: Estimated body mass index is 27.12 kg/m  as calculated from the following:    Height as of this encounter: 1.549 m (5' 1\").    Weight as of this encounter: 65.1 kg (143 lb 8.3 oz).  # Severe Malnutrition: based on nutrition assessment and treatment provided per dietitian's recommendations.      Follow-ups Needed After Discharge   Follow-up Appointments       Hospital Follow-up with Existing Primary Care Provider (PCP)      Please see details below         Schedule Primary Care visit within: 7 Days             Unresulted Labs Ordered in the Past 30 Days of this Admission       No orders found from 2/12/2025 to 3/15/2025.        These results will be followed up by n/a    Discharge Disposition   Discharged to long-term care facility  Condition at discharge: Terminal    Hospital Course   Opal Sin is a 89 year old female with a past medical history significant for HFpEF, CKD stage 3, breast cancer, asthma, Parkinson's, hyperlipidemia, and hypertension who presented with generalized weakness and and acute hypoxic respiratory failure secondary to pneumonia, HFpEF exacerbation and asthma exacerbation.  Goals of care discussion held " between palliative care specialist and patient/sister/brother-in-law on 3/24/2025 where she was ultimately decided to proceed with hospice/comfort care. Discharging back to [facility] with [facility] hospice 3/26.    Goals of care  Palliative team discussed extensively with patient, sister and brother-in-law on 3/24/2025 where patient ultimately decided to pursue hospice/comfort care  - Patient prefers to keep PTA meds despite being on comfort care  - Patient wanting to keep O2 currently on 2L nasal cannula, but if she develops more air hunger, will administer morphine as needed.  Will not escalate oxygenation  - [facility] hospice to evaluate upon return to [facility]     Acute metabolic encephalopathy  Acute hypoxic respiratory failure due to asthma exacerbation, HFpEF exacerbation and CAPvsHAP  Generalized Weakness   MICHAEL on CKD 3  Mild Hyponatremia   Goals of Care    Enterococcus Faecalis bacteriuria-asymptomatic  Recent Influenza A Infection   Recent New Onset Afib with RVR: Currently Rate Controlled   Hypertension   Hyperlipidemia   Parkinson's Disease   GERD   History of Breast Cancer   Depression   Peripheral Neuropathy   History of Overactive Bladder     Consultations This Hospital Stay   PHYSICAL THERAPY ADULT IP CONSULT  OCCUPATIONAL THERAPY ADULT IP CONSULT  CARE MANAGEMENT / SOCIAL WORK IP CONSULT  CARE MANAGEMENT / SOCIAL WORK IP CONSULT  INFECTIOUS DISEASES IP CONSULT  SPEECH LANGUAGE PATH ADULT IP CONSULT  PALLIATIVE CARE ADULT IP CONSULT  CARE MANAGEMENT / SOCIAL WORK IP CONSULT    Code Status   No CPR- Do NOT Intubate    Time Spent on this Encounter   I, Vitaliy Watson MD, personally saw the patient today and spent greater than 30 minutes discharging this patient.       Vitaliy Watson MD  [place] [address] [facility]  [address] [place] [address]  Phone: [phone]  ______________________________________________________________________    Physical Exam   Vital Signs:  Temp: 97.6  F (36.4  C) Temp src: Axillary BP: 126/76 Pulse: 70   Resp: 18 SpO2: 93 % O2 Device: Nasal cannula Oxygen Delivery: 2 LPM  Weight: 143 lbs 8.31 oz    General: Awake, alert, appears uncomfortable  HEENT: Atraumatic, normocephalic, EOMI, no scleral icterus  Skin: No rashes, lesions, or wounds visualized  Neuro: AOx4, CN II-XII grossly intact, no focal deficits, moving all extremities spontaneously, speech normal       Primary Care Physician   Damian Russ MD    Discharge Orders      Primary Care - Care Coordination Referral      General info for SNF    Length of Stay Estimate: Long Term Care  Condition at Discharge: Terminal  Level of care:board and care  Rehabilitation Potential: Poor  Admission H&P remains valid and up-to-date: Yes  Recent Chemotherapy: N/A                   Use Nursing Home Standing Orders: Yes     Mantoux instructions    Give two-step Mantoux (PPD) Per Facility Policy No (if no explain).  Patient on hospice     Reason for your hospital stay    Acute hypoxic respiratory failure     Activity - Up ad job     Diet    Follow this diet upon discharge: Current Diet:Orders Placed This Encounter      Room Service      Snacks/Supplements Adult: Magic Cup; Between Meals      Snacks/Supplements Adult: Gelatein Plus; Between Meals      Combination Diet Regular Diet Adult     Hospital Follow-up with Existing Primary Care Provider (PCP)    Please see details below          Significant Results and Procedures   Most Recent 3 CBC's:  Recent Labs   Lab Test 03/22/25  0611 03/20/25  0703 03/19/25  0704   WBC 14.1* 17.9* 14.9*   HGB 10.1* 10.9* 10.8*   MCV 85 87 86    164 161   Most Recent 3 BMP's:  Recent Labs   Lab Test 03/24/25  0704 03/22/25  0611 03/20/25  0703   * 129* 134*   POTASSIUM 4.4 4.6 4.3   CHLORIDE 101 95* 100   CO2 23 23 23   BUN 47.3* 55.6* 50.4*   CR 0.63 1.11* 1.07*   ANIONGAP 10 11 11   CHRIS 8.3* 8.4* 8.7*   * 124* 128*     Discharge Medications   Current  Discharge Medication List        START taking these medications    Details   atropine 1 % ophthalmic solution Place 2 drops under the tongue every 4 hours as needed for secretions.  Qty: 10 mL, Refills: 1    Associated Diagnoses: Hospice care patient      bisacodyl (DULCOLAX) 10 MG suppository Place 1 suppository (10 mg) rectally once as needed for other (for no bowel movement for 72 hours).  Qty: 5 suppository, Refills: 1    Associated Diagnoses: Hospice care patient      carboxymethylcellulose PF (REFRESH PLUS) 0.5 % ophthalmic solution Place 1-2 drops into both eyes every hour as needed for dry eyes.  Qty: 2 each, Refills: 0    Associated Diagnoses: Hospice care patient      lidocaine (LMX4) 4 % external cream Apply topically every hour as needed for pain (with VAD insertion).  Qty: 2.5 g, Refills: 1    Associated Diagnoses: Hospice care patient      mineral oil-hydrophilic petrolatum (AQUAPHOR) external ointment Apply topically every hour as needed for dry skin.  Qty: 10 g, Refills: 1    Associated Diagnoses: Hospice care patient      morphine sulfate (ROXANOL) 20 mg/mL (HIGH CONC) soln Take 0.25-0.5 mLs (5-10 mg) by mouth every 2 hours as needed for moderate pain or shortness of breath.  Qty: 20 mL, Refills: 0    Associated Diagnoses: Hospice care patient      ondansetron (ZOFRAN ODT) 4 MG ODT tab Take 1 tablet (4 mg) by mouth every 6 hours as needed.  Qty: 30 tablet, Refills: 1    Associated Diagnoses: Hospice care patient           CONTINUE these medications which have NOT CHANGED    Details   albuterol (PROAIR HFA/PROVENTIL HFA/VENTOLIN HFA) 108 (90 Base) MCG/ACT inhaler Inhale 1-2 puffs into the lungs every 6 hours as needed for shortness of breath or wheezing  Qty: 18 g, Refills: 1    Comments: Pharmacy may dispense brand covered by insurance (Proair, or proventil or ventolin or generic albuterol inhaler)  Associated Diagnoses: Acute bronchospasm      amLODIPine (NORVASC) 10 MG tablet Take 1 tablet (10 mg)  by mouth daily  Qty: 90 tablet, Refills: 3    Associated Diagnoses: Benign essential hypertension      aspirin EC 81 MG EC tablet Take 1 tablet (81 mg) by mouth daily    Associated Diagnoses: Transient cerebral ischemia, unspecified type      atorvastatin (LIPITOR) 20 MG tablet Take 1 tablet (20 mg) by mouth daily  Qty: 90 tablet, Refills: 3    Associated Diagnoses: Mixed hyperlipidemia      budesonide (PULMICORT) 0.5 MG/2ML neb solution Take 2 mLs (0.5 mg) by nebulization 2 times daily  Qty: 180 mL, Refills: 11    Comments: For Profile Only - patient will call to fill  Associated Diagnoses: Intermittent asthma without complication, unspecified asthma severity      carbidopa-levodopa (SINEMET)  MG per tablet Take 2 tablets by mouth 4 times daily Takes at 6am, 11am, 4pm, and 9 pm      carvedilol (COREG) 12.5 MG tablet Take 1 tablet (12.5 mg) by mouth 2 times daily (with meals)  Qty: 180 tablet, Refills: 3    Associated Diagnoses: Benign essential hypertension      coenzyme Q-10 capsule Take 1 capsule by mouth daily 100 mg dose      omeprazole (PRILOSEC) 20 MG DR capsule TAKE 1 CAPSULE(20 MG) BY MOUTH DAILY  Qty: 90 capsule, Refills: 2    Associated Diagnoses: Gastroesophageal reflux disease without esophagitis      raloxifene (EVISTA) 60 MG tablet TAKE 1 TABLET(60 MG) BY MOUTH DAILY  Qty: 90 tablet, Refills: 3    Associated Diagnoses: Age-related osteoporosis without current pathological fracture      sertraline (ZOLOFT) 25 MG tablet TAKE 1 TABLET(25 MG) BY MOUTH DAILY  Qty: 90 tablet, Refills: 0    Comments: Please advise pt they are due for an appointment with their provider for further refills.  Associated Diagnoses: Mild major depression      tolterodine (DETROL) 1 MG tablet Take 1 tablet (1 mg) by mouth 2 times daily  Qty: 180 tablet, Refills: 3    Associated Diagnoses: Urinary incontinence, unspecified type      ipratropium - albuterol 0.5 mg/2.5 mg/3 mL (DUONEB) 0.5-2.5 (3) MG/3ML neb solution Take 1  vial (3 mLs) by nebulization 3 times daily  Qty: 270 mL, Refills: 3    Associated Diagnoses: Intermittent asthma without complication, unspecified asthma severity           STOP taking these medications       acetaminophen (TYLENOL) 500 MG tablet Comments:   Reason for Stopping:         acetaminophen (TYLENOL) 500 MG tablet Comments:   Reason for Stopping:         azithromycin (ZITHROMAX) 500 MG tablet Comments:   Reason for Stopping:         calcium carbonate (OS-CHRIS) 1500 (600 Ca) MG tablet Comments:   Reason for Stopping:         Cholecalciferol (VITAMIN D3 PO) Comments:   Reason for Stopping:         furosemide (LASIX) 40 MG tablet Comments:   Reason for Stopping:         gabapentin (NEURONTIN) 100 MG capsule Comments:   Reason for Stopping:         losartan (COZAAR) 100 MG tablet Comments:   Reason for Stopping:         Multiple Vitamins-Minerals (PRESERVISION AREDS) CAPS Comments:   Reason for Stopping:         oseltamivir (TAMIFLU) 30 MG capsule Comments:   Reason for Stopping:         polyethylene glycol (MIRALAX/GLYCOLAX) packet Comments:   Reason for Stopping:         polyethylene glycol-propylene glycol (SYSTANE ULTRA) 0.4-0.3 % SOLN ophthalmic solution Comments:   Reason for Stopping:         predniSONE (DELTASONE) 20 MG tablet Comments:   Reason for Stopping:             Allergies   Allergies   Allergen Reactions    Bee Pollen Hives     If MVI contains this - she will break out in a rash.     Cephalexin Monohydrate Hives    Ciprofloxacin Hives    Clindamycin Hives    Multi Vitamin-Minerals [Hair-Vites] Other (See Comments)     (If MV contains bee pollen she will break out in hives)     Penicillin [Penicillins] Hives     All antibiotics except zpak??????    Thiazide-Type Diuretics Other (See Comments)     Very low sodium levels    Tobramycin Hives    Vancomycin Hives    Ibuprofen Nausea and Vomiting and Nausea     stomach pain    Multiple Vitamin Hives     If MVI contains this - she will break out in a  rash.     Pollen Extract Hives    Versed [Midazolam] Other (See Comments)     Confused, agiitated, for 6-7 hrs after anngiogram sedation    Vitamin E Hives    Ace Inhibitors Cough    Metoprolol Diarrhea      tolerates Inderal

## 2025-03-26 NOTE — PROGRESS NOTES
Care Management Discharge Note    Discharge Date: 03/26/2025       Discharge Disposition:      Discharge Services:      Discharge DME: Oxygen (had prior at [facility] at admission)    Discharge Transportation: family or friend will provide    Private pay costs discussed: Not applicable    Does the patient's insurance plan have a 3 day qualifying hospital stay waiver?  No    PAS Confirmation Code:    Patient/family educated on Medicare website which has current facility and service quality ratings:      Education Provided on the Discharge Plan:    Persons Notified of Discharge Plans: Home with Hospice  Patient/Family in Agreement with the Plan:  yes    Handoff Referral Completed: No, handoff not indicated or clinically appropriate    Additional Information:  Pt returned home to their [place] with Hospice services through [facility] hospice. No further needs at this time.     JUANITA KrugerW

## 2025-03-26 NOTE — PLAN OF CARE
Goal Outcome Evaluation:                DATE & TIME: 3/25/25  [id number]  Cognitive Concerns/ Orientation: A&Ox4, forgetful.  [place] with bilateral hearing aids  BEHAVIOR & AGGRESSION TOOL COLOR: Green, calm/cooperative  ABNL VS/O2: VS deferred-Comfort care, O2 with humidification for comfort  MOBILITY: Ax2 GB/Walker, turn&repo  PAIN MANAGMENT: PRN tylenol given x 1  DIET: Regular  BOWEL/BLADDER: Incontinent, Purewick in place, no BM this shift  DRAIN/DEVICES: R PIV SL  SKIN: Scattered scabs and bruises,  sacrum/coccyx - mepilex in place for prevention. +1 BLE edema  TESTS/PROCEDURES: none  OTHER IMPORTANT INFO:

## 2025-03-27 ENCOUNTER — PATIENT OUTREACH (OUTPATIENT)
Dept: CARE COORDINATION | Facility: CLINIC | Age: 89
End: 2025-03-27
Payer: COMMERCIAL

## 2025-03-27 NOTE — PROGRESS NOTES
Clinic Care Coordination Contact  Care Coordination Clinician Chart Review    Situation: Patient chart reviewed by care coordinator.    Background: Clinic Care Coordination Referral received from inpatient care team for transition handoff communication following hospital admission.    Assessment: Upon chart review, patient is not a candidate for Primary Care Clinic Care Coordination enrollment due to reason stated below:  Patient enrolled into hospice.    Plan/Recommendations: Clinic Care Coordination Referral/order cancelled. RN/SW CC will perform no further monitoring/outreaches at this time and will remain available as needed. If new needs arise, a new Care Coordination Referral may be placed.    Miracle Tobar,  [facility]  Clinic Care Coordinator  [facility]  [phone]  [email or web address]

## 2025-04-01 ENCOUNTER — DOCUMENTATION ONLY (OUTPATIENT)
Dept: OTHER | Facility: CLINIC | Age: 89
End: 2025-04-01
Payer: COMMERCIAL

## 2025-04-23 ENCOUNTER — TELEPHONE (OUTPATIENT)
Dept: PHARMACY | Facility: OTHER | Age: 89
End: 2025-04-23
Payer: COMMERCIAL

## 2025-04-23 NOTE — TELEPHONE ENCOUNTER
MTM Recruitment: [facility] insurance     Referral outreach attempt #1 on April 23, 2025      Outcome: left voicemail- Call back number [phone]. [employer] message sent.     Lore Bates CPhT  [facility]

## 2028-02-19 ENCOUNTER — MEDICAL CORRESPONDENCE (OUTPATIENT)
Dept: HEALTH INFORMATION MANAGEMENT | Facility: CLINIC | Age: OVER 89
End: 2028-02-19
Payer: COMMERCIAL

## (undated) RX ORDER — LIDOCAINE HYDROCHLORIDE 10 MG/ML
INJECTION, SOLUTION EPIDURAL; INFILTRATION; INTRACAUDAL; PERINEURAL
Status: DISPENSED
Start: 2019-02-21